# Patient Record
Sex: FEMALE | Race: OTHER | NOT HISPANIC OR LATINO
[De-identification: names, ages, dates, MRNs, and addresses within clinical notes are randomized per-mention and may not be internally consistent; named-entity substitution may affect disease eponyms.]

---

## 2018-05-30 ENCOUNTER — FORM ENCOUNTER (OUTPATIENT)
Age: 63
End: 2018-05-30

## 2018-06-01 PROBLEM — Z00.00 ENCOUNTER FOR PREVENTIVE HEALTH EXAMINATION: Status: ACTIVE | Noted: 2018-06-01

## 2018-06-05 ENCOUNTER — OUTPATIENT (OUTPATIENT)
Dept: OUTPATIENT SERVICES | Facility: HOSPITAL | Age: 63
LOS: 1 days | End: 2018-06-05
Payer: MEDICARE

## 2018-06-05 ENCOUNTER — RESULT REVIEW (OUTPATIENT)
Age: 63
End: 2018-06-05

## 2018-06-05 DIAGNOSIS — C54.1 MALIGNANT NEOPLASM OF ENDOMETRIUM: ICD-10-CM

## 2018-06-05 DIAGNOSIS — C53.9 MALIGNANT NEOPLASM OF CERVIX UTERI, UNSPECIFIED: ICD-10-CM

## 2018-06-05 PROCEDURE — 88321 CONSLTJ&REPRT SLD PREP ELSWR: CPT

## 2018-06-07 ENCOUNTER — FORM ENCOUNTER (OUTPATIENT)
Age: 63
End: 2018-06-07

## 2018-06-08 ENCOUNTER — APPOINTMENT (OUTPATIENT)
Dept: NUCLEAR MEDICINE | Facility: IMAGING CENTER | Age: 63
End: 2018-06-08
Payer: MEDICARE

## 2018-06-08 ENCOUNTER — OUTPATIENT (OUTPATIENT)
Dept: OUTPATIENT SERVICES | Facility: HOSPITAL | Age: 63
LOS: 1 days | End: 2018-06-08
Payer: MEDICARE

## 2018-06-08 DIAGNOSIS — Z00.8 ENCOUNTER FOR OTHER GENERAL EXAMINATION: ICD-10-CM

## 2018-06-08 PROCEDURE — 78815 PET IMAGE W/CT SKULL-THIGH: CPT | Mod: 26,PI

## 2018-06-08 PROCEDURE — 78815 PET IMAGE W/CT SKULL-THIGH: CPT

## 2018-06-08 PROCEDURE — A9552: CPT

## 2018-06-19 ENCOUNTER — OUTPATIENT (OUTPATIENT)
Dept: OUTPATIENT SERVICES | Facility: HOSPITAL | Age: 63
LOS: 1 days | End: 2018-06-19
Payer: MEDICARE

## 2018-06-19 VITALS
WEIGHT: 130.07 LBS | SYSTOLIC BLOOD PRESSURE: 140 MMHG | HEIGHT: 62 IN | HEART RATE: 76 BPM | TEMPERATURE: 97 F | RESPIRATION RATE: 14 BRPM | DIASTOLIC BLOOD PRESSURE: 84 MMHG

## 2018-06-19 DIAGNOSIS — Z01.818 ENCOUNTER FOR OTHER PREPROCEDURAL EXAMINATION: ICD-10-CM

## 2018-06-19 DIAGNOSIS — C54.1 MALIGNANT NEOPLASM OF ENDOMETRIUM: ICD-10-CM

## 2018-06-19 DIAGNOSIS — Z29.9 ENCOUNTER FOR PROPHYLACTIC MEASURES, UNSPECIFIED: ICD-10-CM

## 2018-06-19 DIAGNOSIS — Z98.890 OTHER SPECIFIED POSTPROCEDURAL STATES: Chronic | ICD-10-CM

## 2018-06-19 LAB
ALBUMIN SERPL ELPH-MCNC: 4.4 G/DL — SIGNIFICANT CHANGE UP (ref 3.3–5.2)
ALP SERPL-CCNC: 80 U/L — SIGNIFICANT CHANGE UP (ref 40–120)
ALT FLD-CCNC: 16 U/L — SIGNIFICANT CHANGE UP
ANION GAP SERPL CALC-SCNC: 14 MMOL/L — SIGNIFICANT CHANGE UP (ref 5–17)
AST SERPL-CCNC: 15 U/L — SIGNIFICANT CHANGE UP
BASOPHILS # BLD AUTO: 0 K/UL — SIGNIFICANT CHANGE UP (ref 0–0.2)
BASOPHILS NFR BLD AUTO: 0.2 % — SIGNIFICANT CHANGE UP (ref 0–2)
BILIRUB SERPL-MCNC: 0.4 MG/DL — SIGNIFICANT CHANGE UP (ref 0.4–2)
BLD GP AB SCN SERPL QL: SIGNIFICANT CHANGE UP
BUN SERPL-MCNC: 19 MG/DL — SIGNIFICANT CHANGE UP (ref 8–20)
CALCIUM SERPL-MCNC: 9.8 MG/DL — SIGNIFICANT CHANGE UP (ref 8.6–10.2)
CHLORIDE SERPL-SCNC: 102 MMOL/L — SIGNIFICANT CHANGE UP (ref 98–107)
CO2 SERPL-SCNC: 26 MMOL/L — SIGNIFICANT CHANGE UP (ref 22–29)
CREAT SERPL-MCNC: 0.56 MG/DL — SIGNIFICANT CHANGE UP (ref 0.5–1.3)
EOSINOPHIL # BLD AUTO: 0.1 K/UL — SIGNIFICANT CHANGE UP (ref 0–0.5)
EOSINOPHIL NFR BLD AUTO: 0.8 % — SIGNIFICANT CHANGE UP (ref 0–6)
GLUCOSE SERPL-MCNC: 91 MG/DL — SIGNIFICANT CHANGE UP (ref 70–115)
HBA1C BLD-MCNC: 5.1 % — SIGNIFICANT CHANGE UP (ref 4–5.6)
HCT VFR BLD CALC: 42.6 % — SIGNIFICANT CHANGE UP (ref 37–47)
HGB BLD-MCNC: 14.6 G/DL — SIGNIFICANT CHANGE UP (ref 12–16)
LYMPHOCYTES # BLD AUTO: 2.2 K/UL — SIGNIFICANT CHANGE UP (ref 1–4.8)
LYMPHOCYTES # BLD AUTO: 26 % — SIGNIFICANT CHANGE UP (ref 20–55)
MCHC RBC-ENTMCNC: 31.4 PG — HIGH (ref 27–31)
MCHC RBC-ENTMCNC: 34.3 G/DL — SIGNIFICANT CHANGE UP (ref 32–36)
MCV RBC AUTO: 91.6 FL — SIGNIFICANT CHANGE UP (ref 81–99)
MONOCYTES # BLD AUTO: 0.6 K/UL — SIGNIFICANT CHANGE UP (ref 0–0.8)
MONOCYTES NFR BLD AUTO: 6.8 % — SIGNIFICANT CHANGE UP (ref 3–10)
NEUTROPHILS # BLD AUTO: 5.5 K/UL — SIGNIFICANT CHANGE UP (ref 1.8–8)
NEUTROPHILS NFR BLD AUTO: 66.1 % — SIGNIFICANT CHANGE UP (ref 37–73)
PLATELET # BLD AUTO: 245 K/UL — SIGNIFICANT CHANGE UP (ref 150–400)
POTASSIUM SERPL-MCNC: 4.4 MMOL/L — SIGNIFICANT CHANGE UP (ref 3.5–5.3)
POTASSIUM SERPL-SCNC: 4.4 MMOL/L — SIGNIFICANT CHANGE UP (ref 3.5–5.3)
PROT SERPL-MCNC: 7.4 G/DL — SIGNIFICANT CHANGE UP (ref 6.6–8.7)
RBC # BLD: 4.65 M/UL — SIGNIFICANT CHANGE UP (ref 4.4–5.2)
RBC # FLD: 12.2 % — SIGNIFICANT CHANGE UP (ref 11–15.6)
SODIUM SERPL-SCNC: 142 MMOL/L — SIGNIFICANT CHANGE UP (ref 135–145)
TYPE + AB SCN PNL BLD: SIGNIFICANT CHANGE UP
WBC # BLD: 8.4 K/UL — SIGNIFICANT CHANGE UP (ref 4.8–10.8)
WBC # FLD AUTO: 8.4 K/UL — SIGNIFICANT CHANGE UP (ref 4.8–10.8)

## 2018-06-19 PROCEDURE — 80053 COMPREHEN METABOLIC PANEL: CPT

## 2018-06-19 PROCEDURE — 86901 BLOOD TYPING SEROLOGIC RH(D): CPT

## 2018-06-19 PROCEDURE — 85027 COMPLETE CBC AUTOMATED: CPT

## 2018-06-19 PROCEDURE — G0463: CPT

## 2018-06-19 PROCEDURE — 83036 HEMOGLOBIN GLYCOSYLATED A1C: CPT

## 2018-06-19 PROCEDURE — 86900 BLOOD TYPING SEROLOGIC ABO: CPT

## 2018-06-19 PROCEDURE — 86850 RBC ANTIBODY SCREEN: CPT

## 2018-06-19 PROCEDURE — 36415 COLL VENOUS BLD VENIPUNCTURE: CPT

## 2018-06-19 PROCEDURE — 93005 ELECTROCARDIOGRAM TRACING: CPT

## 2018-06-19 PROCEDURE — 93010 ELECTROCARDIOGRAM REPORT: CPT

## 2018-06-19 NOTE — H&P PST ADULT - NEGATIVE GASTROINTESTINAL SYMPTOMS
no vomiting/no diarrhea/no nausea/no abdominal pain/no flatulence/no melena/no jaundice/no change in bowel habits/no hematochezia/no steatorrhea/no hiccoughs

## 2018-06-19 NOTE — H&P PST ADULT - PSH
H/O dilation and curettage    History of hand surgery  repair from dog bite 2000  History of lumpectomy of right breast  benign  S/P myomectomy

## 2018-06-19 NOTE — H&P PST ADULT - NEGATIVE CARDIOVASCULAR SYMPTOMS
no paroxysmal nocturnal dyspnea/no peripheral edema/no dyspnea on exertion/no palpitations/no orthopnea/no claudication/no chest pain

## 2018-06-19 NOTE — H&P PST ADULT - HISTORY OF PRESENT ILLNESS
62 y/o female with h/o 64 y/o female with h/o endometrial cancer patient now presents for robotic assisted total laparoscopic hysterectomy bilateral salpingo oophorectomy sentinel lymph node mapping cystoscopy possible staging

## 2018-06-19 NOTE — H&P PST ADULT - ASSESSMENT
endometrial cancer  CAPRINI SCORE    AGE RELATED RISK FACTORS                                                       MOBILITY RELATED FACTORS  [] Age 41-60 years                                            (1 Point)                  [ ] Bed rest                                                        (1 Point)  [ x] Age: 61-74 years                                           (2 Points)                [ ] Plaster cast                                                   (2 Points)  [ ] Age= 75 years                                              (3 Points)                 [ ] Bed bound for more than 72 hours                   (2 Points)    DISEASE RELATED RISK FACTORS                                               GENDER SPECIFIC FACTORS  [ ] Edema in the lower extremities                       (1 Point)                  [ ] Pregnancy                                                     (1 Point)  [ ] Varicose veins                                               (1 Point)                  [ ] Post-partum < 6 weeks                                   (1 Point)             [ ] BMI > 25 Kg/m2                                            (1 Point)                  [ ] Hormonal therapy  or oral contraception            (1 Point)                 [ ] Sepsis (in the previous month)                        (1 Point)                  [ ] History of pregnancy complications  [ ] Pneumonia or serious lung disease                                               [ ] Unexplained or recurrent                       (1 Point)           (in the previous month)                               (1 Point)  [ ] Abnormal pulmonary function test                     (1 Point)                 SURGERY RELATED RISK FACTORS  [ ] Acute myocardial infarction                              (1 Point)                 [ ]  Section                                            (1 Point)  [ ] Congestive heart failure (in the previous month)  (1 Point)                 [ ] Minor surgery                                                 (1 Point)   [ ] Inflammatory bowel disease                             (1 Point)                 [ ] Arthroscopic surgery                                        (2 Points)  [ ] Central venous access                                    (2 Points)                [ x] General surgery lasting more than 45 minutes   (2 Points)       [ ] Stroke (in the previous month)                          (5 Points)               [ ] Elective arthroplasty                                        (5 Points)                                                                                                                                               HEMATOLOGY RELATED FACTORS                                                 TRAUMA RELATED RISK FACTORS  [ ] Prior episodes of VTE                                     (3 Points)                 [ ] Fracture of the hip, pelvis, or leg                       (5 Points)  [ ] Positive family history for VTE                         (3 Points)                 [ ] Acute spinal cord injury (in the previous month)  (5 Points)  [ ] Prothrombin 06300 A                                      (3 Points)                 [ ] Paralysis  (less than 1 month)                          (5 Points)  [ ] Factor V Leiden                                             (3 Points)                 [ ] Multiple Trauma within 1 month                         (5 Points)  [ ] Lupus anticoagulants                                     (3 Points)                                                           [ ] Anticardiolipin antibodies                                (3 Points)                                                       [ ] High homocysteine in the blood                      (3 Points)                                             [ ] Other congenital or acquired thrombophilia       (3 Points)                                                [ ] Heparin induced thrombocytopenia                  (3 Points)                                          Total Score [      4   ]

## 2018-06-19 NOTE — H&P PST ADULT - NEUROLOGICAL DETAILS
alert and oriented x 3/responds to verbal commands/responds to pain/no spontaneous movement/cranial nerves intact/superficial reflexes intact/deep reflexes intact/normal strength/sensation intact

## 2018-06-19 NOTE — H&P PST ADULT - NEGATIVE PSYCHIATRIC SYMPTOMS
no paranoia/no depression/no mood swings/no auditory hallucinations/no insomnia/no visual hallucinations/no suicidal ideation/no hyperactivity/no anxiety/no memory loss/no agitation

## 2018-06-19 NOTE — H&P PST ADULT - NSANTHOSAYNRD_GEN_A_CORE
No. SHAQ screening performed.  STOP BANG Legend: 0-2 = LOW Risk; 3-4 = INTERMEDIATE Risk; 5-8 = HIGH Risk

## 2018-06-19 NOTE — H&P PST ADULT - NEGATIVE GENERAL SYMPTOMS
no polyphagia/no polyuria/no weight gain/no anorexia/no malaise/no fatigue/no chills/no polydipsia/no fever/no weight loss/no sweating

## 2018-06-19 NOTE — H&P PST ADULT - NEGATIVE OPHTHALMOLOGIC SYMPTOMS
no lacrimation L/no irritation R/no loss of vision L/no photophobia/no discharge L/no pain R/no scleral injection L/no pain L/no lacrimation R/no loss of vision R/no scleral injection R/no blurred vision L/no blurred vision R/no discharge R/no irritation L/no diplopia

## 2018-06-19 NOTE — H&P PST ADULT - RS GEN PE MLT RESP DETAILS PC
good air movement/airway patent/breath sounds equal/no rales/no wheezes/normal/no rhonchi/no subcutaneous emphysema/clear to auscultation bilaterally/no intercostal retractions/respirations non-labored

## 2018-06-19 NOTE — H&P PST ADULT - PMH
Carotid stenosis, left    Cerebrovascular accident (CVA) due to occlusion of carotid artery  no residual

## 2018-06-19 NOTE — H&P PST ADULT - NEGATIVE SKIN SYMPTOMS
no dryness/no change in size/color of mole/no tumor/no pitted nails/no rash/no hair loss/no brittle nails/no itching

## 2018-06-19 NOTE — H&P PST ADULT - PROBLEM SELECTOR PLAN 1
robotic assisted total laparoscopic hysterectomy bilateral salpingo oophorectomy sentinel lymph node mapping cystoscopy possible staging

## 2018-06-19 NOTE — H&P PST ADULT - NEGATIVE ENMT SYMPTOMS
no ear pain/no throat pain/no dysphagia/no hearing difficulty/no nasal discharge/no post-nasal discharge/no gum bleeding/no abnormal taste sensation/no tinnitus/no vertigo/no sinus symptoms/no recurrent cold sores/no dry mouth/no nasal congestion/no nasal obstruction/no nose bleeds

## 2018-06-19 NOTE — H&P PST ADULT - MUSCULOSKELETAL
details… detailed exam normal/no joint swelling/no joint erythema/no joint warmth/no calf tenderness/normal strength/ROM intact

## 2018-06-19 NOTE — H&P PST ADULT - NEGATIVE NEUROLOGICAL SYMPTOMS
no tremors/no confusion/no syncope/no paresthesias/no difficulty walking/no loss of sensation/no headache/no hemiparesis/no facial palsy/no transient paralysis/no weakness/no generalized seizures/no vertigo/no loss of consciousness/no focal seizures

## 2018-06-19 NOTE — H&P PST ADULT - FAMILY HISTORY
Father  Still living? No  Family history of CLL (chronic lymphoid leukemia), Age at diagnosis: Age Unknown     Mother  Still living? No  Family history of breast cancer in female, Age at diagnosis: Age Unknown  Family history of hypertension, Age at diagnosis: Age Unknown

## 2018-06-19 NOTE — H&P PST ADULT - NEGATIVE MUSCULOSKELETAL SYMPTOMS
no leg pain L/no myalgia/no arm pain R/no leg pain R/no stiffness/no joint swelling/no back pain/no arm pain L/no muscle cramps/no muscle weakness/no neck pain/no arthralgia/no arthritis

## 2018-07-01 ENCOUNTER — FORM ENCOUNTER (OUTPATIENT)
Age: 63
End: 2018-07-01

## 2018-07-02 ENCOUNTER — RESULT REVIEW (OUTPATIENT)
Age: 63
End: 2018-07-02

## 2018-07-02 ENCOUNTER — OUTPATIENT (OUTPATIENT)
Dept: OUTPATIENT SERVICES | Facility: HOSPITAL | Age: 63
LOS: 1 days | End: 2018-07-02
Payer: MEDICARE

## 2018-07-02 DIAGNOSIS — Z98.890 OTHER SPECIFIED POSTPROCEDURAL STATES: Chronic | ICD-10-CM

## 2018-07-03 ENCOUNTER — RESULT REVIEW (OUTPATIENT)
Age: 63
End: 2018-07-03

## 2018-07-03 ENCOUNTER — TRANSCRIPTION ENCOUNTER (OUTPATIENT)
Age: 63
End: 2018-07-03

## 2018-07-03 PROCEDURE — 82962 GLUCOSE BLOOD TEST: CPT

## 2018-07-03 PROCEDURE — S2900: CPT

## 2018-07-03 PROCEDURE — 80048 BASIC METABOLIC PNL TOTAL CA: CPT

## 2018-07-03 PROCEDURE — 88309 TISSUE EXAM BY PATHOLOGIST: CPT

## 2018-07-03 PROCEDURE — 88305 TISSUE EXAM BY PATHOLOGIST: CPT

## 2018-07-03 PROCEDURE — 83735 ASSAY OF MAGNESIUM: CPT

## 2018-07-03 PROCEDURE — 88341 IMHCHEM/IMCYTCHM EA ADD ANTB: CPT

## 2018-07-03 PROCEDURE — 85027 COMPLETE CBC AUTOMATED: CPT

## 2018-07-03 PROCEDURE — 58548 LAP RADICAL HYST: CPT

## 2018-07-03 PROCEDURE — 88342 IMHCHEM/IMCYTCHM 1ST ANTB: CPT

## 2018-07-03 RX ORDER — IBUPROFEN 200 MG
1 TABLET ORAL
Qty: 0 | Refills: 0 | COMMUNITY

## 2018-07-11 ENCOUNTER — FORM ENCOUNTER (OUTPATIENT)
Age: 63
End: 2018-07-11

## 2018-07-18 DIAGNOSIS — C54.1 MALIGNANT NEOPLASM OF ENDOMETRIUM: ICD-10-CM

## 2018-07-18 PROBLEM — I65.22 OCCLUSION AND STENOSIS OF LEFT CAROTID ARTERY: Chronic | Status: ACTIVE | Noted: 2018-06-19

## 2018-08-02 ENCOUNTER — FORM ENCOUNTER (OUTPATIENT)
Age: 63
End: 2018-08-02

## 2018-08-06 ENCOUNTER — OUTPATIENT (OUTPATIENT)
Dept: OUTPATIENT SERVICES | Facility: HOSPITAL | Age: 63
LOS: 1 days | Discharge: HOME | End: 2018-08-06
Payer: MEDICARE

## 2018-08-06 ENCOUNTER — RECORD ABSTRACTING (OUTPATIENT)
Age: 63
End: 2018-08-06

## 2018-08-06 DIAGNOSIS — Z87.42 PERSONAL HISTORY OF OTHER DISEASES OF THE FEMALE GENITAL TRACT: ICD-10-CM

## 2018-08-06 DIAGNOSIS — Z87.39 PERSONAL HISTORY OF OTHER DISEASES OF THE MUSCULOSKELETAL SYSTEM AND CONNECTIVE TISSUE: ICD-10-CM

## 2018-08-06 DIAGNOSIS — Z82.49 FAMILY HISTORY OF ISCHEMIC HEART DISEASE AND OTHER DISEASES OF THE CIRCULATORY SYSTEM: ICD-10-CM

## 2018-08-06 DIAGNOSIS — Z87.898 PERSONAL HISTORY OF OTHER SPECIFIED CONDITIONS: ICD-10-CM

## 2018-08-06 DIAGNOSIS — Z83.3 FAMILY HISTORY OF DIABETES MELLITUS: ICD-10-CM

## 2018-08-06 DIAGNOSIS — Z86.79 PERSONAL HISTORY OF OTHER DISEASES OF THE CIRCULATORY SYSTEM: ICD-10-CM

## 2018-08-06 DIAGNOSIS — I10 ESSENTIAL (PRIMARY) HYPERTENSION: ICD-10-CM

## 2018-08-06 DIAGNOSIS — Z63.4 DISAPPEARANCE AND DEATH OF FAMILY MEMBER: ICD-10-CM

## 2018-08-06 DIAGNOSIS — Z78.9 OTHER SPECIFIED HEALTH STATUS: ICD-10-CM

## 2018-08-06 DIAGNOSIS — Z98.890 OTHER SPECIFIED POSTPROCEDURAL STATES: Chronic | ICD-10-CM

## 2018-08-06 SDOH — SOCIAL STABILITY - SOCIAL INSECURITY: DISSAPEARANCE AND DEATH OF FAMILY MEMBER: Z63.4

## 2018-08-09 ENCOUNTER — APPOINTMENT (OUTPATIENT)
Dept: RADIATION ONCOLOGY | Facility: CLINIC | Age: 63
End: 2018-08-09
Payer: MEDICARE

## 2018-08-09 VITALS
SYSTOLIC BLOOD PRESSURE: 146 MMHG | DIASTOLIC BLOOD PRESSURE: 82 MMHG | RESPIRATION RATE: 18 BRPM | HEART RATE: 80 BPM | WEIGHT: 59 LBS | BODY MASS INDEX: 10.86 KG/M2 | HEIGHT: 62 IN | OXYGEN SATURATION: 100 %

## 2018-08-09 DIAGNOSIS — Z80.3 FAMILY HISTORY OF MALIGNANT NEOPLASM OF BREAST: ICD-10-CM

## 2018-08-09 DIAGNOSIS — Z86.018 PERSONAL HISTORY OF OTHER BENIGN NEOPLASM: ICD-10-CM

## 2018-08-09 PROCEDURE — 77263 THER RADIOLOGY TX PLNG CPLX: CPT

## 2018-08-09 PROCEDURE — 99204 OFFICE O/P NEW MOD 45 MIN: CPT | Mod: 25,GC

## 2018-08-12 PROBLEM — Z86.018 HISTORY OF UTERINE LEIOMYOMA: Status: RESOLVED | Noted: 2018-08-06 | Resolved: 2018-08-12

## 2018-08-28 PROCEDURE — 77338 DESIGN MLC DEVICE FOR IMRT: CPT | Mod: 26

## 2018-08-28 PROCEDURE — 77300 RADIATION THERAPY DOSE PLAN: CPT | Mod: 26

## 2018-08-28 PROCEDURE — 77301 RADIOTHERAPY DOSE PLAN IMRT: CPT | Mod: 26

## 2018-08-31 PROCEDURE — 77387B: CUSTOM | Mod: 26

## 2018-09-04 PROCEDURE — 77427 RADIATION TX MANAGEMENT X5: CPT

## 2018-09-04 PROCEDURE — 77387B: CUSTOM | Mod: 26

## 2018-09-05 PROCEDURE — 77387B: CUSTOM | Mod: 26

## 2018-09-06 LAB
BASOPHILS # BLD AUTO: 0.03 K/UL
BASOPHILS NFR BLD AUTO: 0.5 %
EOSINOPHIL # BLD AUTO: 0.17 K/UL
EOSINOPHIL NFR BLD AUTO: 2.8 %
HCT VFR BLD CALC: 42.5 %
HGB BLD-MCNC: 14.6 G/DL
IMM GRANULOCYTES NFR BLD AUTO: 0.2 %
LYMPHOCYTES # BLD AUTO: 2.24 K/UL
LYMPHOCYTES NFR BLD AUTO: 37.1 %
MAN DIFF?: NORMAL
MCHC RBC-ENTMCNC: 32.4 PG
MCHC RBC-ENTMCNC: 34.4 GM/DL
MCV RBC AUTO: 94.2 FL
MONOCYTES # BLD AUTO: 0.52 K/UL
MONOCYTES NFR BLD AUTO: 8.6 %
NEUTROPHILS # BLD AUTO: 3.06 K/UL
NEUTROPHILS NFR BLD AUTO: 50.8 %
PLATELET # BLD AUTO: 227 K/UL
RBC # BLD: 4.51 M/UL
RBC # FLD: 12.8 %
WBC # FLD AUTO: 6.03 K/UL

## 2018-09-06 PROCEDURE — 77014: CPT | Mod: 26

## 2018-09-07 PROCEDURE — 77387B: CUSTOM | Mod: 26

## 2018-09-10 PROCEDURE — 77387B: CUSTOM | Mod: 26

## 2018-09-11 PROCEDURE — 77387B: CUSTOM | Mod: 26

## 2018-09-11 PROCEDURE — 77427 RADIATION TX MANAGEMENT X5: CPT

## 2018-09-12 PROCEDURE — 77387B: CUSTOM | Mod: 26

## 2018-09-13 PROCEDURE — 77014: CPT | Mod: 26

## 2018-09-14 PROCEDURE — 77387B: CUSTOM | Mod: 26

## 2018-09-17 PROCEDURE — 77387B: CUSTOM | Mod: 26

## 2018-09-18 VITALS
WEIGHT: 131.72 LBS | HEIGHT: 62 IN | SYSTOLIC BLOOD PRESSURE: 149 MMHG | HEART RATE: 88 BPM | TEMPERATURE: 98.5 F | RESPIRATION RATE: 18 BRPM | BODY MASS INDEX: 24.24 KG/M2 | OXYGEN SATURATION: 100 % | DIASTOLIC BLOOD PRESSURE: 83 MMHG

## 2018-09-18 PROCEDURE — 77387B: CUSTOM | Mod: 26

## 2018-09-19 PROCEDURE — 77427 RADIATION TX MANAGEMENT X5: CPT

## 2018-09-19 PROCEDURE — 77387B: CUSTOM | Mod: 26

## 2018-09-20 PROCEDURE — 77014: CPT | Mod: 26

## 2018-09-21 PROCEDURE — 77387B: CUSTOM | Mod: 26

## 2018-09-24 PROCEDURE — 77387B: CUSTOM | Mod: 26

## 2018-09-25 PROCEDURE — 77387B: CUSTOM | Mod: 26

## 2018-09-26 PROCEDURE — 77387B: CUSTOM | Mod: 26

## 2018-09-27 PROCEDURE — 77014: CPT | Mod: 26

## 2018-09-28 PROCEDURE — 77387B: CUSTOM | Mod: 26

## 2018-10-01 PROCEDURE — 77427 RADIATION TX MANAGEMENT X5: CPT

## 2018-10-01 PROCEDURE — 77387B: CUSTOM | Mod: 26

## 2018-10-01 NOTE — HISTORY OF PRESENT ILLNESS
[FreeTextEntry1] : Mrs. Hoang is a 63 yr old woman s/p comprehensive surgical staging for a FIGO stage II grade II endometrial cancer. \par \par Doing well.  No urinary or bowel complaints. No vaginal bleeding or discharge. Today patient completed 2160/4500 cGy.

## 2018-10-01 NOTE — HISTORY OF PRESENT ILLNESS
[FreeTextEntry1] : Mrs. Hoang is a 63 yr old woman s/p comprehensive surgical staging for a FIGO stage II grade II endometrial cancer. \par \par Doing well.   No urinary or bowel complaints. No vaginal bleeding or discharge. \par Discussede brachytherapy at conclusion of EBRT.

## 2018-10-01 NOTE — DISEASE MANAGEMENT
[Pathological] : TNM Stage: p [II] : II [TTNM] : 2 [NTNM] : 0 [MTNM] : 0 [de-identified] : 1335 [[place] [id number]] : 2267 [de-identified] : pelvis

## 2018-10-01 NOTE — DISEASE MANAGEMENT
[Pathological] : TNM Stage: p [II] : II [TTNM] : 2 [NTNM] : 0 [MTNM] : 0 [de-identified] : 8046 [[place] [id number]] : 4635 [de-identified] : pelvis

## 2018-10-01 NOTE — REVIEW OF SYSTEMS
[Constipation: Grade 0] : Constipation: Grade 0 [Diarrhea: Grade 0] : Diarrhea: Grade 0 [Nausea: Grade 1 - Loss of appetite without alteration in eating habits] : Nausea: Grade 1 - Loss of appetite without alteration in eating habits [Vomiting: Grade 0] : Vomiting: Grade 0 [Fatigue: Grade 0] : Fatigue: Grade 0 [Urinary Incontinence: Grade 0] : Urinary Incontinence: Grade 0  [Urinary Retention: Grade 0] : Urinary Retention: Grade 0 [Urinary Tract Pain: Grade 0] : Urinary Tract Pain: Grade 0 [Urinary Urgency: Grade 0] : Urinary Urgency: Grade 0 [Urinary Frequency: Grade 0] : Urinary Frequency: Grade 0 [de-identified] : mild

## 2018-10-01 NOTE — REVIEW OF SYSTEMS
[Constipation: Grade 0] : Constipation: Grade 0 [Diarrhea: Grade 0] : Diarrhea: Grade 0 [Nausea: Grade 1 - Loss of appetite without alteration in eating habits] : Nausea: Grade 1 - Loss of appetite without alteration in eating habits [Vomiting: Grade 0] : Vomiting: Grade 0 [Fatigue: Grade 0] : Fatigue: Grade 0 [Urinary Incontinence: Grade 0] : Urinary Incontinence: Grade 0  [Urinary Retention: Grade 0] : Urinary Retention: Grade 0 [Urinary Tract Pain: Grade 0] : Urinary Tract Pain: Grade 0 [Urinary Urgency: Grade 0] : Urinary Urgency: Grade 0 [Urinary Frequency: Grade 0] : Urinary Frequency: Grade 0 [de-identified] : mild

## 2018-10-02 PROCEDURE — 77387B: CUSTOM | Mod: 26

## 2018-10-03 VITALS
WEIGHT: 130.73 LBS | RESPIRATION RATE: 18 BRPM | OXYGEN SATURATION: 99 % | HEART RATE: 77 BPM | BODY MASS INDEX: 23.91 KG/M2

## 2018-10-03 PROCEDURE — 77387B: CUSTOM | Mod: 26

## 2018-10-03 NOTE — PHYSICAL EXAM
[Normal] : well developed, well nourished, in no acute distress [Abdomen Soft] : soft [Nondistended] : nondistended [Abdomen Tenderness] : non-tender [Normal External Genitalia] : normal external genitalia  [Normal Vaginal Cuff] : vaginal cuff without lesion or nodularity [Cervical Lymph Nodes Enlarged Anterior Bilaterally] : anterior cervical [Supraclavicular Lymph Nodes Enlarged Bilaterally] : supraclavicular [Inguinal Lymph Nodes Enlarged Bilaterally] : inguinal

## 2018-10-03 NOTE — DISEASE MANAGEMENT
[Pathological] : TNM Stage: p [II] : II [TTNM] : 2 [NTNM] : 0 [MTNM] : 0 [de-identified] : 2339 [[place] [id number]] : 9603 [de-identified] : pelvis

## 2018-10-03 NOTE — HISTORY OF PRESENT ILLNESS
[FreeTextEntry1] : Mrs. Hoang is a 63 yr old woman s/p comprehensive surgical staging for a FIGO stage II grade II endometrial cancer. \par \par Doing well.   No urinary or bowel complaints. No vaginal bleeding or discharge. \par Discussed  brachytherapy at conclusion of EBRT.

## 2018-10-03 NOTE — REVIEW OF SYSTEMS
[Constipation: Grade 0] : Constipation: Grade 0 [Diarrhea: Grade 0] : Diarrhea: Grade 0 [Vomiting: Grade 0] : Vomiting: Grade 0 [Fatigue: Grade 0] : Fatigue: Grade 0 [Urinary Incontinence: Grade 0] : Urinary Incontinence: Grade 0  [Urinary Retention: Grade 0] : Urinary Retention: Grade 0 [Urinary Tract Pain: Grade 0] : Urinary Tract Pain: Grade 0 [Urinary Urgency: Grade 0] : Urinary Urgency: Grade 0 [Urinary Frequency: Grade 0] : Urinary Frequency: Grade 0 [Nausea: Grade 0] : Nausea: Grade 0 [de-identified] : mild

## 2018-10-04 PROCEDURE — 77014: CPT | Mod: 26

## 2018-10-05 PROCEDURE — 77387B: CUSTOM | Mod: 26

## 2018-10-12 PROCEDURE — 77317 BRACHYTX ISODOSE INTERMED: CPT | Mod: 26

## 2018-10-12 PROCEDURE — 77290 THER RAD SIMULAJ FIELD CPLX: CPT | Mod: 26

## 2018-10-12 PROCEDURE — 77770 HDR RDNCL NTRSTL/ICAV BRCHTX: CPT | Mod: 26

## 2018-10-12 PROCEDURE — 57156 INS VAG BRACHYTX DEVICE: CPT

## 2018-10-12 PROCEDURE — 77332 RADIATION TREATMENT AID(S): CPT | Mod: 26

## 2018-10-19 PROCEDURE — 77770 HDR RDNCL NTRSTL/ICAV BRCHTX: CPT | Mod: 26

## 2018-10-19 PROCEDURE — 77332 RADIATION TREATMENT AID(S): CPT | Mod: 26

## 2018-10-19 PROCEDURE — 57156 INS VAG BRACHYTX DEVICE: CPT

## 2018-11-16 ENCOUNTER — APPOINTMENT (OUTPATIENT)
Dept: RADIATION ONCOLOGY | Facility: CLINIC | Age: 63
End: 2018-11-16
Payer: MEDICARE

## 2018-11-16 VITALS
WEIGHT: 132.5 LBS | RESPIRATION RATE: 14 BRPM | TEMPERATURE: 98.42 F | DIASTOLIC BLOOD PRESSURE: 78 MMHG | OXYGEN SATURATION: 100 % | HEART RATE: 79 BPM | BODY MASS INDEX: 24.23 KG/M2 | SYSTOLIC BLOOD PRESSURE: 131 MMHG

## 2018-11-16 PROCEDURE — 99024 POSTOP FOLLOW-UP VISIT: CPT | Mod: GC

## 2018-11-16 NOTE — PHYSICAL EXAM
[Abdomen Soft] : soft [Nondistended] : nondistended [Abdomen Tenderness] : non-tender [Normal External Genitalia] : normal external genitalia  [Normal Vaginal Cuff] : vaginal cuff without lesion or nodularity [Cervical Lymph Nodes Enlarged Anterior Bilaterally] : anterior cervical [Supraclavicular Lymph Nodes Enlarged Bilaterally] : supraclavicular [Inguinal Lymph Nodes Enlarged Bilaterally] : inguinal [Normal] : oriented to person, place and time, the affect was normal, the mood was normal and not anxious

## 2018-11-16 NOTE — VITALS
[Maximal Pain Intensity: 0/10] : 0/10 [Least Pain Intensity: 0/10] : 0/10 [NoTreatment Scheduled] : no treatment scheduled [100: Normal, no complaints, no evidence of disease.] : 100: Normal, no complaints, no evidence of disease. [100: Fully active, normal.] : 100: Fully active, normal. [ECOG Performance Status: 0 - Fully active, able to carry on all pre-disease performance without restriction] : Performance Status: 0 - Fully active, able to carry on all pre-disease performance without restriction

## 2018-11-19 NOTE — HISTORY OF PRESENT ILLNESS
[FreeTextEntry1] : 63 year old woman with Endometrioid Carcinoma who completed 4500 cGy to pelvis and 1200 cGy vaginal cuff brachytherapy completed on 10/19/2018.  Pt presents today for PTE.\par \par She denies pain, vaginal discharge, vaginal bleeding and urinary symptoms. She recently had an episode of diarrhea after consuming broccoli.  Otherwise, she denies bowel symptoms.

## 2018-11-19 NOTE — REVIEW OF SYSTEMS
[Urinary Frequency] : urinary frequency [Negative] : Allergic/Immunologic [Anal Pain: Grade 0] : Anal Pain: Grade 0 [Constipation: Grade 0] : Constipation: Grade 0 [Diarrhea: Grade 0] : Diarrhea: Grade 0 [Dyspepsia: Grade 0] : Dyspepsia: Grade 0 [Dysphagia: Grade 0] : Dysphagia: Grade 0 [Esophagitis: Grade 0] : Esophagitis: Grade 0 [Fecal Incontinence: Grade 0] : Fecal Incontinence: Grade 0 [Gastroparesis: Grade 0] : Gastroparesis: Grade 0 [Nausea: Grade 0] : Nausea: Grade 0 [Proctitis: Grade 0] : Proctitis: Grade 0 [Rectal Pain: Grade 0] : Rectal Pain: Grade 0 [Small Intestinal Obstruction: Grade 0] : Small Intestinal Obstruction: Grade 0 [Vomiting: Grade 0] : Vomiting: Grade 0 [Edema Limbs: Grade 0] : Edema Limbs: Grade 0  [Fatigue: Grade 0] : Fatigue: Grade 0 [Localized Edema: Grade 0] : Localized Edema: Grade 0  [Neck Edema: Grade 0] : Neck Edema: Grade 0 [Hematuria: Grade 0] : Hematuria: Grade 0 [Urinary Incontinence: Grade 0] : Urinary Incontinence: Grade 0  [Urinary Retention: Grade 0] : Urinary Retention: Grade 0 [Urinary Tract Pain: Grade 0] : Urinary Tract Pain: Grade 0 [Urinary Urgency: Grade 0] : Urinary Urgency: Grade 0 [Urinary Frequency: Grade 0] : Urinary Frequency: Grade 0 [Ejaculation Disorder: Grade 0] : Ejaculation Disorder: Grade 0 [Erectile Dysfunction: Grade 0] : Erectile Dysfunction: Grade 0 [Genital Edema: Grade 0] : Genital Edema: Grade 0 [Vaginal Stricture: Grade 0] : Vaginal Stricture: Grade 0 [Vaginal Infection: Grade 0] : Vaginal Infection: Grade 0  [Breast Pain: Grade 0] : Breast Pain: Grade 0 [Dyspareunia: Grade 0] : Dyspareunia: Grade 0 [Tinnitus - Grade 0] : Tinnitus - Grade 0 [Blurred Vision: Grade 0] : Blurred Vision: Grade 0 [Mucositis Oral: Grade 0] : Mucositis Oral: Grade 0  [Xerostomia: Grade 0] : Xerostomia: Grade 0 [Oral Pain: Grade 0] : Oral Pain: Grade 0 [Salivary duct inflammation: Grade 0] : Salivary duct inflammation: Grade 0 [Dysgeusia: Grade 0] : Dysgeusia: Grade 0 [Cognitive Disturbance: Grade 0] : Cognitive Disturbance: Grade 0 [Concentration Impairment: Grade 0] : Concentration Impairment: Grade 0 [Dizziness: Grade 0] : Dizziness: Grade 0  [Facial Muscle Weakness: Grade 0] : Facial Muscle Weakness: Grade 0 [Headache: Grade 0] : Headache: Grade 0 [Lethargy: Grade 0] : Lethargy: Grade 0 [Meningismus: Grade 0] : Meningismus: Grade 0 [Peripheral Motor Neuropathy: Grade 0] : Peripheral Motor Neuropathy: Grade 0 [Peripheral Sensory Neuropathy: Grade 0] : Peripheral Sensory Neuropathy: Grade 0 [Somnolence: Grade 0] : Somnolence: Grade 0 [Anxiety: Grade 0] : Anxiety: Grade 0  [Depression: Grade 0] : Depression: Grade 0 [Insomnia: Grade 0] : Insomnia: Grade 0 [Libido Decreased: Grade 0] : Libido Decreased: Grade 0 [Cough: Grade 0] : Cough: Grade 0 [Dyspnea: Grade 0] : Dyspnea: Grade 0 [Hiccups: Grade 0] : Hiccups: Grade 0 [Hoarseness: Grade 0] : Hoarseness: Grade 0 [Hypoxia: Grade 0] : Hypoxia: Grade 0 [Pharyngeal Mucositis: Grade 0] : Pharyngeal Mucositis: Grade 0 [Pneumonitis: Grade 0] : Pneumonitis: Grade 0 [Voice Alteration: Grade 0] : Voice Alteration: Grade 0 [Alopecia: Grade 0] : Alopecia: Grade 0 [Pruritus: Grade 0] : Pruritus: Grade 0 [Skin Atrophy: Grade 0] : Skin Atrophy: Grade 0 [Skin Hyperpigmentation: Grade 0] : Skin Hyperpigmentation: Grade 0 [Skin Induration: Grade 0] : Skin Induration: Grade 0 [Dermatitis Radiation: Grade 0] : Dermatitis Radiation: Grade 0 [Nocturia] : no nocturia [Vaginal Discharge] : no vaginal discharge [Dysmenorrhea/Abn Vaginal Bleeding] : no dysmenorrhea/abnormal vaginal bleeding

## 2018-11-20 ENCOUNTER — FORM ENCOUNTER (OUTPATIENT)
Age: 63
End: 2018-11-20

## 2018-11-21 ENCOUNTER — APPOINTMENT (OUTPATIENT)
Dept: GYNECOLOGIC ONCOLOGY | Facility: CLINIC | Age: 63
End: 2018-11-21
Payer: MEDICARE

## 2018-11-21 VITALS
WEIGHT: 129 LBS | RESPIRATION RATE: 16 BRPM | BODY MASS INDEX: 23.74 KG/M2 | HEIGHT: 62 IN | DIASTOLIC BLOOD PRESSURE: 88 MMHG | HEART RATE: 88 BPM | SYSTOLIC BLOOD PRESSURE: 147 MMHG | OXYGEN SATURATION: 99 %

## 2018-11-21 PROCEDURE — 99214 OFFICE O/P EST MOD 30 MIN: CPT

## 2018-12-16 NOTE — HISTORY OF PRESENT ILLNESS
[FreeTextEntry1] : Mrs. Hoang is a 63 yr old woman s/p comprehensive surgical staging for a FIGO stage II grade II endometrial cancer. \par \par Doing well. Reports occasional nausea relieved with white grapes. No urinary or bowel complaints. No vaginal bleeding or discharge.

## 2018-12-16 NOTE — REVIEW OF SYSTEMS
[Constipation: Grade 0] : Constipation: Grade 0 [Diarrhea: Grade 0] : Diarrhea: Grade 0 [Nausea: Grade 1 - Loss of appetite without alteration in eating habits] : Nausea: Grade 1 - Loss of appetite without alteration in eating habits [Vomiting: Grade 0] : Vomiting: Grade 0 [Fatigue: Grade 0] : Fatigue: Grade 0 [Urinary Incontinence: Grade 0] : Urinary Incontinence: Grade 0  [Urinary Retention: Grade 0] : Urinary Retention: Grade 0 [Urinary Tract Pain: Grade 0] : Urinary Tract Pain: Grade 0 [Urinary Urgency: Grade 0] : Urinary Urgency: Grade 0 [Urinary Frequency: Grade 0] : Urinary Frequency: Grade 0 [de-identified] : mild

## 2018-12-16 NOTE — DISEASE MANAGEMENT
[Pathological] : TNM Stage: p [II] : II [TTNM] : 2 [NTNM] : 0 [MTNM] : 0 [de-identified] : 2332 [[place] [id number]] : 1160 [de-identified] : pelvis

## 2019-02-11 ENCOUNTER — APPOINTMENT (OUTPATIENT)
Dept: GYNECOLOGIC ONCOLOGY | Facility: CLINIC | Age: 64
End: 2019-02-11
Payer: MEDICARE

## 2019-02-11 VITALS
OXYGEN SATURATION: 100 % | BODY MASS INDEX: 23.55 KG/M2 | RESPIRATION RATE: 16 BRPM | WEIGHT: 128 LBS | HEART RATE: 74 BPM | SYSTOLIC BLOOD PRESSURE: 146 MMHG | DIASTOLIC BLOOD PRESSURE: 83 MMHG | HEIGHT: 62 IN

## 2019-02-11 PROCEDURE — 99214 OFFICE O/P EST MOD 30 MIN: CPT

## 2019-02-12 NOTE — END OF VISIT
[FreeTextEntry3] : Written by Alexandria Starks, acting as a scribe for Dr. Paras Grayson.\par This note accurately reflects the work and decision made by me.\par

## 2019-02-12 NOTE — REASON FOR VISIT
[FreeTextEntry1] : [facility]\par \par [facility] [phone] at [address] [place] [phone]\par

## 2019-02-12 NOTE — HISTORY OF PRESENT ILLNESS
[FreeTextEntry1] : This 62y/o with stage 2 grade 2 endometrial cancer since 07/2018 returns to the office today for a routine three month surveillance visit. Patient feels well from a gynecological stand point. She denies pain or VB. She is due for a mammogram 05/2019.

## 2019-02-12 NOTE — PHYSICAL EXAM
[Absent] : Adnexa(ae): Absent [Normal] : Recto-Vaginal Exam: Normal [Fully active, able to carry on all pre-disease performance without restriction] : Status 0 - Fully active, able to carry on all pre-disease performance without restriction [de-identified] : Alexandria Starks MA was present the entire time of gynecological exam

## 2019-05-08 ENCOUNTER — APPOINTMENT (OUTPATIENT)
Dept: GYNECOLOGIC ONCOLOGY | Facility: CLINIC | Age: 64
End: 2019-05-08
Payer: MEDICARE

## 2019-05-08 VITALS
HEIGHT: 62 IN | SYSTOLIC BLOOD PRESSURE: 149 MMHG | WEIGHT: 126 LBS | HEART RATE: 84 BPM | DIASTOLIC BLOOD PRESSURE: 81 MMHG | RESPIRATION RATE: 16 BRPM | OXYGEN SATURATION: 97 % | BODY MASS INDEX: 23.19 KG/M2

## 2019-05-08 PROCEDURE — 99214 OFFICE O/P EST MOD 30 MIN: CPT

## 2019-05-08 NOTE — REASON FOR VISIT
[FreeTextEntry1] : [facility]\par \par [facility] [phone] at [address] [place] [id number]\par

## 2019-05-08 NOTE — PHYSICAL EXAM
[Absent] : Adnexa(ae): Absent [Normal] : Bimanual Exam: Normal [de-identified] : Alexandria Starks MA was present the entire time of gynecological exam

## 2019-05-08 NOTE — HISTORY OF PRESENT ILLNESS
[FreeTextEntry1] : This 65 y/o with stage 2 grade 2 endometrial cancer since 07/2018 returns to the office today for a routine three month surveillance visit. Patient feels well from a gynecological stand point. She denies pain or VB. Patient is scheduled for a mammogram tomorrow. Her last bone density was in 2018. She is overdue for a colonoscopy.

## 2019-05-16 ENCOUNTER — APPOINTMENT (OUTPATIENT)
Dept: RADIATION ONCOLOGY | Facility: CLINIC | Age: 64
End: 2019-05-16
Payer: MEDICARE

## 2019-05-16 VITALS
BODY MASS INDEX: 23.79 KG/M2 | RESPIRATION RATE: 16 BRPM | HEART RATE: 76 BPM | DIASTOLIC BLOOD PRESSURE: 74 MMHG | TEMPERATURE: 98.42 F | SYSTOLIC BLOOD PRESSURE: 112 MMHG | OXYGEN SATURATION: 100 % | WEIGHT: 130.07 LBS

## 2019-05-16 PROCEDURE — 99213 OFFICE O/P EST LOW 20 MIN: CPT | Mod: GC

## 2019-05-21 NOTE — PHYSICAL EXAM
[Abdomen Soft] : soft [Nondistended] : nondistended [Normal External Genitalia] : normal external genitalia  [Abdomen Tenderness] : non-tender [Normal Vaginal Cuff] : vaginal cuff without lesion or nodularity [Cervical Lymph Nodes Enlarged Anterior Bilaterally] : anterior cervical [Inguinal Lymph Nodes Enlarged Bilaterally] : inguinal [Supraclavicular Lymph Nodes Enlarged Bilaterally] : supraclavicular [Normal] : oriented to person, place and time, the affect was normal, the mood was normal and not anxious [] : no respiratory distress [Blood on examination glove] : no blood on examination glove

## 2019-05-21 NOTE — REVIEW OF SYSTEMS
[Urinary Frequency] : urinary frequency [Negative] : Allergic/Immunologic [Anal Pain: Grade 0] : Anal Pain: Grade 0 [Constipation: Grade 0] : Constipation: Grade 0 [Diarrhea: Grade 0] : Diarrhea: Grade 0 [Nausea: Grade 0] : Nausea: Grade 0 [Edema Limbs: Grade 0] : Edema Limbs: Grade 0  [Fatigue: Grade 0] : Fatigue: Grade 0 [Localized Edema: Grade 0] : Localized Edema: Grade 0  [Urinary Incontinence: Grade 0] : Urinary Incontinence: Grade 0  [Hematuria: Grade 0] : Hematuria: Grade 0 [Urinary Retention: Grade 0] : Urinary Retention: Grade 0 [Urinary Tract Pain: Grade 0] : Urinary Tract Pain: Grade 0 [Urinary Frequency: Grade 0] : Urinary Frequency: Grade 0 [Ejaculation Disorder: Grade 0] : Ejaculation Disorder: Grade 0 [Urinary Urgency: Grade 0] : Urinary Urgency: Grade 0 [Erectile Dysfunction: Grade 0] : Erectile Dysfunction: Grade 0 [Vaginal Stricture: Grade 0] : Vaginal Stricture: Grade 0 [Genital Edema: Grade 0] : Genital Edema: Grade 0 [Breast Pain: Grade 0] : Breast Pain: Grade 0 [Vaginal Infection: Grade 0] : Vaginal Infection: Grade 0  [Dyspareunia: Grade 0] : Dyspareunia: Grade 0 [Tinnitus - Grade 0] : Tinnitus - Grade 0 [Blurred Vision: Grade 0] : Blurred Vision: Grade 0 [Mucositis Oral: Grade 0] : Mucositis Oral: Grade 0  [Oral Pain: Grade 0] : Oral Pain: Grade 0 [Xerostomia: Grade 0] : Xerostomia: Grade 0 [Dysgeusia: Grade 0] : Dysgeusia: Grade 0 [Salivary duct inflammation: Grade 0] : Salivary duct inflammation: Grade 0 [Cognitive Disturbance: Grade 0] : Cognitive Disturbance: Grade 0 [Concentration Impairment: Grade 0] : Concentration Impairment: Grade 0 [Facial Muscle Weakness: Grade 0] : Facial Muscle Weakness: Grade 0 [Dizziness: Grade 0] : Dizziness: Grade 0  [Headache: Grade 0] : Headache: Grade 0 [Peripheral Motor Neuropathy: Grade 0] : Peripheral Motor Neuropathy: Grade 0 [Lethargy: Grade 0] : Lethargy: Grade 0 [Meningismus: Grade 0] : Meningismus: Grade 0 [Somnolence: Grade 0] : Somnolence: Grade 0 [Peripheral Sensory Neuropathy: Grade 0] : Peripheral Sensory Neuropathy: Grade 0 [Anxiety: Grade 0] : Anxiety: Grade 0  [Depression: Grade 0] : Depression: Grade 0 [Insomnia: Grade 0] : Insomnia: Grade 0 [Libido Decreased: Grade 0] : Libido Decreased: Grade 0 [Cough: Grade 0] : Cough: Grade 0 [Dyspnea: Grade 0] : Dyspnea: Grade 0 [Hiccups: Grade 0] : Hiccups: Grade 0 [Hoarseness: Grade 0] : Hoarseness: Grade 0 [Hypoxia: Grade 0] : Hypoxia: Grade 0 [Pharyngeal Mucositis: Grade 0] : Pharyngeal Mucositis: Grade 0 [Pneumonitis: Grade 0] : Pneumonitis: Grade 0 [Voice Alteration: Grade 0] : Voice Alteration: Grade 0 [Alopecia: Grade 0] : Alopecia: Grade 0 [Skin Atrophy: Grade 0] : Skin Atrophy: Grade 0 [Pruritus: Grade 0] : Pruritus: Grade 0 [Dermatitis Radiation: Grade 0] : Dermatitis Radiation: Grade 0 [Skin Induration: Grade 0] : Skin Induration: Grade 0 [Skin Hyperpigmentation: Grade 0] : Skin Hyperpigmentation: Grade 0 [Nocturia] : no nocturia [Vaginal Discharge] : no vaginal discharge [Dysmenorrhea/Abn Vaginal Bleeding] : no dysmenorrhea/abnormal vaginal bleeding

## 2019-05-21 NOTE — VITALS
[Least Pain Intensity: 0/10] : 0/10 [Maximal Pain Intensity: 0/10] : 0/10 [ECOG Performance Status: 0 - Fully active, able to carry on all pre-disease performance without restriction] : Performance Status: 0 - Fully active, able to carry on all pre-disease performance without restriction [90: Able to carry normal activity; minor signs or symptoms of disease.] : 90: Able to carry normal activity; minor signs or symptoms of disease.

## 2019-05-21 NOTE — HISTORY OF PRESENT ILLNESS
[FreeTextEntry1] : 63 year old woman with Endometrioid Carcinoma who completed 4500 cGy to pelvis and 1200 cGy vaginal cuff brachytherapy completed on 10/19/2018. \par \par 5/16/19  Follow up. She was seen by Dr Grayson on 5/9/19. She reports feeling well. She denies pain, vaginal discharge, vaginal bleeding and urinary symptoms.  she denies bowel symptoms. She denies recent use of dilators.

## 2019-09-12 ENCOUNTER — APPOINTMENT (OUTPATIENT)
Dept: GYNECOLOGIC ONCOLOGY | Facility: CLINIC | Age: 64
End: 2019-09-12
Payer: MEDICARE

## 2019-09-12 VITALS
DIASTOLIC BLOOD PRESSURE: 87 MMHG | WEIGHT: 128 LBS | SYSTOLIC BLOOD PRESSURE: 133 MMHG | BODY MASS INDEX: 23.55 KG/M2 | OXYGEN SATURATION: 100 % | HEIGHT: 62 IN | HEART RATE: 89 BPM | RESPIRATION RATE: 16 BRPM

## 2019-09-12 PROCEDURE — 99214 OFFICE O/P EST MOD 30 MIN: CPT

## 2019-09-12 NOTE — REASON FOR VISIT
[FreeTextEntry1] : [facility]\par \par [facility] [phone] at [address] [place] [id number]\par

## 2019-09-12 NOTE — PHYSICAL EXAM
[Absent] : Adnexa(ae): Absent [Normal] : Bimanual Exam: Normal [de-identified] : Emily Chatman was present as chaperone during examination.\par  [de-identified] : shortnened, atropic vagina, moderate clear fluid in vault, difficult pelvic exam due to RT changes. + blood when examined

## 2019-09-12 NOTE — HISTORY OF PRESENT ILLNESS
[FreeTextEntry1] : This 65 y/o with stage 2 grade 2 endometrial cancer since 07/2018 s/p RA TLH, BSO bilateral pelvic and para-aortic sentinel lymph node dissection cystoscopy PW on 7/02/2018 s/p pelvic IMRT and vaginal    cuff brachytherapy- 10/2018 with Dr. Bowling. Patient returns to the office today for a routine three month surveillance visit. Patient complains of leaking a thin yellowish fluid from vagina for the past month, unsure if it is urine. She has to wear a light pad due to this leakage. Denies abdominal or pelvic pains.  Also saw a small amount of blood last week when she wiped after urination. Her last bone density was in 2018. Had a normal colonoscopy in Jan 2014. Reports a normal mammogram this year.  Pt is not sexually active and has not been using vaginal dilators. \par  \par Preop pet ct-6/8/2018-bilateral renal cysts, no evidence of mets.\par

## 2019-09-13 LAB — CANCER AG125 SERPL-ACNC: 27 U/ML

## 2019-09-16 ENCOUNTER — FORM ENCOUNTER (OUTPATIENT)
Age: 64
End: 2019-09-16

## 2019-09-17 ENCOUNTER — APPOINTMENT (OUTPATIENT)
Dept: CT IMAGING | Facility: IMAGING CENTER | Age: 64
End: 2019-09-17
Payer: MEDICARE

## 2019-09-17 ENCOUNTER — OUTPATIENT (OUTPATIENT)
Dept: OUTPATIENT SERVICES | Facility: HOSPITAL | Age: 64
LOS: 1 days | End: 2019-09-17
Payer: MEDICARE

## 2019-09-17 DIAGNOSIS — Z98.890 OTHER SPECIFIED POSTPROCEDURAL STATES: Chronic | ICD-10-CM

## 2019-09-17 DIAGNOSIS — C54.1 MALIGNANT NEOPLASM OF ENDOMETRIUM: ICD-10-CM

## 2019-09-17 PROCEDURE — 74177 CT ABD & PELVIS W/CONTRAST: CPT | Mod: 26

## 2019-09-17 PROCEDURE — 74177 CT ABD & PELVIS W/CONTRAST: CPT

## 2019-09-17 PROCEDURE — 82565 ASSAY OF CREATININE: CPT

## 2019-09-17 PROCEDURE — 71260 CT THORAX DX C+: CPT | Mod: 26

## 2019-09-17 PROCEDURE — 71260 CT THORAX DX C+: CPT

## 2019-09-23 ENCOUNTER — APPOINTMENT (OUTPATIENT)
Dept: THORACIC SURGERY | Facility: CLINIC | Age: 64
End: 2019-09-23
Payer: MEDICARE

## 2019-09-23 VITALS
OXYGEN SATURATION: 100 % | SYSTOLIC BLOOD PRESSURE: 146 MMHG | RESPIRATION RATE: 16 BRPM | HEART RATE: 97 BPM | DIASTOLIC BLOOD PRESSURE: 77 MMHG

## 2019-09-23 DIAGNOSIS — R91.1 SOLITARY PULMONARY NODULE: ICD-10-CM

## 2019-09-23 PROCEDURE — 99203 OFFICE O/P NEW LOW 30 MIN: CPT

## 2019-09-24 NOTE — ASSESSMENT
[FreeTextEntry1] : Angela Gentile is a 64-year-old female with a history of a GYN malignancy who presents now with bilateral pulmonary nodules concerning for metastatic disease. These do appear too small for an accurate transthoracic needle biopsy and I have recommended a thoracoscopy and removal. She will be scheduled for a right thoracoscopy in the near future.\par \par Thank you for allowing me to participate in the care of your patient.\par \par Emmanuel Weston MD\par Department of Cardiovascular and Thoracic Surgery\par \par [employer]\[facility] [employer] at [facility]\par

## 2019-09-24 NOTE — PHYSICAL EXAM
[General Appearance - In No Acute Distress] : in no acute distress [General Appearance - Alert] : alert [Auscultation Breath Sounds / Voice Sounds] : lungs were clear to auscultation bilaterally [Heart Rate And Rhythm] : heart rate was normal and rhythm regular [Heart Sounds] : normal S1 and S2 [Heart Sounds Gallop] : no gallops [Murmurs] : no murmurs [Heart Sounds Pericardial Friction Rub] : no pericardial rub [Examination Of The Chest] : the chest was normal in appearance [Chest Visual Inspection Thoracic Asymmetry] : no chest asymmetry [Diminished Respiratory Excursion] : normal chest expansion [Bowel Sounds] : normal bowel sounds [Abdomen Tenderness] : non-tender [] : no hepato-splenomegaly [Abdomen Soft] : soft [Abdomen Mass (___ Cm)] : no abdominal mass palpated [No Focal Deficits] : no focal deficits [Oriented To Time, Place, And Person] : oriented to person, place, and time [Impaired Insight] : insight and judgment were intact [Affect] : the affect was normal

## 2019-09-24 NOTE — HISTORY OF PRESENT ILLNESS
[FreeTextEntry1] : \cici Gentile was in the office today for an initial visit. She has a history of a GYN malignancy and now bilateral pulmonary nodules concerning for metastatic disease. She feels well and has no complaints.

## 2019-09-27 ENCOUNTER — APPOINTMENT (OUTPATIENT)
Dept: GYNECOLOGIC ONCOLOGY | Facility: CLINIC | Age: 64
End: 2019-09-27
Payer: MEDICARE

## 2019-09-27 PROCEDURE — 99214 OFFICE O/P EST MOD 30 MIN: CPT

## 2019-10-09 ENCOUNTER — NON-APPOINTMENT (OUTPATIENT)
Age: 64
End: 2019-10-09

## 2019-10-09 ENCOUNTER — APPOINTMENT (OUTPATIENT)
Dept: CARDIOLOGY | Facility: CLINIC | Age: 64
End: 2019-10-09
Payer: MEDICARE

## 2019-10-09 VITALS
HEIGHT: 62 IN | BODY MASS INDEX: 23.55 KG/M2 | DIASTOLIC BLOOD PRESSURE: 80 MMHG | HEART RATE: 85 BPM | WEIGHT: 128 LBS | OXYGEN SATURATION: 97 % | SYSTOLIC BLOOD PRESSURE: 160 MMHG

## 2019-10-09 DIAGNOSIS — Z01.810 ENCOUNTER FOR PREPROCEDURAL CARDIOVASCULAR EXAMINATION: ICD-10-CM

## 2019-10-09 PROCEDURE — 99205 OFFICE O/P NEW HI 60 MIN: CPT

## 2019-10-09 PROCEDURE — 93000 ELECTROCARDIOGRAM COMPLETE: CPT

## 2019-10-09 NOTE — PHYSICAL EXAM
[Normal Appearance] : normal appearance [General Appearance - Well Developed] : well developed [Well Groomed] : well groomed [General Appearance - Well Nourished] : well nourished [Normal Conjunctiva] : the conjunctiva exhibited no abnormalities [No Oral Pallor] : no oral pallor [No Oral Cyanosis] : no oral cyanosis [Normal Oral Mucosa] : normal oral mucosa [Normal Jugular Venous A Waves Present] : normal jugular venous A waves present [Normal Jugular Venous V Waves Present] : normal jugular venous V waves present [Heart Sounds] : normal S1 and S2 [Arterial Pulses Normal] : the arterial pulses were normal [Edema] : no peripheral edema present [Respiration, Rhythm And Depth] : normal respiratory rhythm and effort [Auscultation Breath Sounds / Voice Sounds] : lungs were clear to auscultation bilaterally [Abdomen Soft] : soft [Abdomen Tenderness] : non-tender [] : no hepato-splenomegaly [Abnormal Walk] : normal gait [Nail Clubbing] : no clubbing of the fingernails [Cyanosis, Localized] : no localized cyanosis [Skin Turgor] : normal skin turgor [No Venous Stasis] : no venous stasis [No Skin Ulcers] : no skin ulcer [Affect] : the affect was normal [Oriented To Time, Place, And Person] : oriented to person, place, and time [Mood] : the mood was normal [FreeTextEntry1] : CEA scar on left

## 2019-10-09 NOTE — ASSESSMENT
[FreeTextEntry1] : 63 y/o female with history of ?metastatic cancer with bilateral pulmonary nodules going for thoracoscopy\par no exertional chest pain or  SOB, palpitations, dizziness or syncope\par hx of PVD with significant carotid disease s/p CEA and hx of CVA , no significant risk factors \par will order an exercise stress test  to evaluate for ischemia given PVD \par will order an echo to evaluate for valvular heart disease\par based on results of these 2 tests, will evaluate risk for surgery.

## 2019-10-09 NOTE — REASON FOR VISIT
[Initial Evaluation] : an initial evaluation of [FreeTextEntry1] : preoperative risk stratification prior to non cardiac surgery

## 2019-10-09 NOTE — HISTORY OF PRESENT ILLNESS
[FreeTextEntry1] : 63 y/o female with history of endometrial cancer s/p hysterectomy, BSO and lymph nodes dissection \par NO prior cardiac history , no DM, no HTN, no smoking. no FH Of CAD \par no exertional chest pain or  SOB, palpitations, dizziness or syncope\par hx of CVA 15 years ago and carotid endarterectomy. \par \par

## 2019-10-10 ENCOUNTER — FORM ENCOUNTER (OUTPATIENT)
Age: 64
End: 2019-10-10

## 2019-10-11 ENCOUNTER — OUTPATIENT (OUTPATIENT)
Dept: OUTPATIENT SERVICES | Facility: HOSPITAL | Age: 64
LOS: 1 days | End: 2019-10-11
Payer: MEDICARE

## 2019-10-11 DIAGNOSIS — Z01.810 ENCOUNTER FOR PREPROCEDURAL CARDIOVASCULAR EXAMINATION: ICD-10-CM

## 2019-10-11 DIAGNOSIS — Z98.890 OTHER SPECIFIED POSTPROCEDURAL STATES: Chronic | ICD-10-CM

## 2019-10-11 PROCEDURE — 93016 CV STRESS TEST SUPVJ ONLY: CPT

## 2019-10-11 PROCEDURE — 93018 CV STRESS TEST I&R ONLY: CPT

## 2019-10-11 PROCEDURE — 93306 TTE W/DOPPLER COMPLETE: CPT | Mod: 26

## 2019-10-16 NOTE — PHYSICAL EXAM
[Normal] : No focal neurologic defects observed [de-identified] : Alexandria Starks MA was present the entire time of results and discussion

## 2019-10-16 NOTE — HISTORY OF PRESENT ILLNESS
[FreeTextEntry1] : 65 y/o with stage 2 grade 2 endometrial cancer since 07/2018 s/p RA TLH, BSO bilateral pelvic and para-aortic sentinel lymph node dissection cystoscopy PW on 7/02/2018 s/p pelvic IMRT and vaginal cuff brachytherapy- 10/2018 with Dr. Bowling. She was recently seen by my PA for a svl visit and complained of a thin yellowish fluid from the vagina, unsure if it is urine. On exam she had a moderate amount of clear fluid in vault, and blood. 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 CT C/A/P and ca-125 was ordered to evaluate. \par \par CT revealed new bilateral pulmonary nodules, some of which contain central cavitation, suspicious for metastatic disease. Nodularity is noted superior to the vaginal cuff, with the largest component to the right of midline, measuring 1.9x1.6 cm, concerning for metastatic disease. \par \par Ca-125 from 9/12 was 27. \par Results were discussed with patient over the phone, she was advised to have a consult with Dr. Weston. She was seen by Dr. Weston on 9/23/19. His recommendation was to have a thoracoscopy and removal, which is scheduled for 10/18/19.

## 2019-10-16 NOTE — END OF VISIT
[FreeTextEntry3] : Written by Alexandria Starks, acting as a scribe for Dr. Paras Grayson.\par This note accurately reflects the work and decisions made by me\par

## 2019-10-16 NOTE — REASON FOR VISIT
[FreeTextEntry1] : [facility]\par \par [facility] [phone] at [address] [place] [id number]\par

## 2019-10-21 ENCOUNTER — OUTPATIENT (OUTPATIENT)
Dept: OUTPATIENT SERVICES | Facility: HOSPITAL | Age: 64
LOS: 1 days | End: 2019-10-21
Payer: MEDICARE

## 2019-10-21 VITALS
RESPIRATION RATE: 20 BRPM | DIASTOLIC BLOOD PRESSURE: 90 MMHG | HEIGHT: 61 IN | TEMPERATURE: 88 F | SYSTOLIC BLOOD PRESSURE: 146 MMHG | HEART RATE: 76 BPM | WEIGHT: 293 LBS

## 2019-10-21 DIAGNOSIS — Z90.710 ACQUIRED ABSENCE OF BOTH CERVIX AND UTERUS: Chronic | ICD-10-CM

## 2019-10-21 DIAGNOSIS — Z29.9 ENCOUNTER FOR PROPHYLACTIC MEASURES, UNSPECIFIED: ICD-10-CM

## 2019-10-21 DIAGNOSIS — Z01.818 ENCOUNTER FOR OTHER PREPROCEDURAL EXAMINATION: ICD-10-CM

## 2019-10-21 DIAGNOSIS — Z98.890 OTHER SPECIFIED POSTPROCEDURAL STATES: Chronic | ICD-10-CM

## 2019-10-21 DIAGNOSIS — R91.1 SOLITARY PULMONARY NODULE: ICD-10-CM

## 2019-10-21 DIAGNOSIS — I63.239 CEREBRAL INFARCTION DUE TO UNSPECIFIED OCCLUSION OR STENOSIS OF UNSPECIFIED CAROTID ARTERY: ICD-10-CM

## 2019-10-21 LAB
ANION GAP SERPL CALC-SCNC: 12 MMOL/L — SIGNIFICANT CHANGE UP (ref 5–17)
APTT BLD: 24.3 SEC — LOW (ref 27.5–36.3)
BASOPHILS # BLD AUTO: 0.01 K/UL — SIGNIFICANT CHANGE UP (ref 0–0.2)
BASOPHILS NFR BLD AUTO: 0.2 % — SIGNIFICANT CHANGE UP (ref 0–2)
BLD GP AB SCN SERPL QL: SIGNIFICANT CHANGE UP
BUN SERPL-MCNC: 12 MG/DL — SIGNIFICANT CHANGE UP (ref 8–20)
CALCIUM SERPL-MCNC: 9.6 MG/DL — SIGNIFICANT CHANGE UP (ref 8.6–10.2)
CHLORIDE SERPL-SCNC: 103 MMOL/L — SIGNIFICANT CHANGE UP (ref 98–107)
CO2 SERPL-SCNC: 28 MMOL/L — SIGNIFICANT CHANGE UP (ref 22–29)
CREAT SERPL-MCNC: 0.57 MG/DL — SIGNIFICANT CHANGE UP (ref 0.5–1.3)
EOSINOPHIL # BLD AUTO: 0.04 K/UL — SIGNIFICANT CHANGE UP (ref 0–0.5)
EOSINOPHIL NFR BLD AUTO: 0.8 % — SIGNIFICANT CHANGE UP (ref 0–6)
GLUCOSE SERPL-MCNC: 99 MG/DL — SIGNIFICANT CHANGE UP (ref 70–115)
HCT VFR BLD CALC: 42.5 % — SIGNIFICANT CHANGE UP (ref 34.5–45)
HGB BLD-MCNC: 14.8 G/DL — SIGNIFICANT CHANGE UP (ref 11.5–15.5)
IMM GRANULOCYTES NFR BLD AUTO: 0.2 % — SIGNIFICANT CHANGE UP (ref 0–1.5)
INR BLD: 0.94 RATIO — SIGNIFICANT CHANGE UP (ref 0.88–1.16)
LYMPHOCYTES # BLD AUTO: 1 K/UL — SIGNIFICANT CHANGE UP (ref 1–3.3)
LYMPHOCYTES # BLD AUTO: 20 % — SIGNIFICANT CHANGE UP (ref 13–44)
MCHC RBC-ENTMCNC: 32.7 PG — SIGNIFICANT CHANGE UP (ref 27–34)
MCHC RBC-ENTMCNC: 34.8 GM/DL — SIGNIFICANT CHANGE UP (ref 32–36)
MCV RBC AUTO: 93.8 FL — SIGNIFICANT CHANGE UP (ref 80–100)
MONOCYTES # BLD AUTO: 0.45 K/UL — SIGNIFICANT CHANGE UP (ref 0–0.9)
MONOCYTES NFR BLD AUTO: 9 % — SIGNIFICANT CHANGE UP (ref 2–14)
NEUTROPHILS # BLD AUTO: 3.48 K/UL — SIGNIFICANT CHANGE UP (ref 1.8–7.4)
NEUTROPHILS NFR BLD AUTO: 69.8 % — SIGNIFICANT CHANGE UP (ref 43–77)
PLATELET # BLD AUTO: 253 K/UL — SIGNIFICANT CHANGE UP (ref 150–400)
POTASSIUM SERPL-MCNC: 4.2 MMOL/L — SIGNIFICANT CHANGE UP (ref 3.5–5.3)
POTASSIUM SERPL-SCNC: 4.2 MMOL/L — SIGNIFICANT CHANGE UP (ref 3.5–5.3)
PROTHROM AB SERPL-ACNC: 10.8 SEC — SIGNIFICANT CHANGE UP (ref 10–12.9)
RBC # BLD: 4.53 M/UL — SIGNIFICANT CHANGE UP (ref 3.8–5.2)
RBC # FLD: 11.8 % — SIGNIFICANT CHANGE UP (ref 10.3–14.5)
SODIUM SERPL-SCNC: 143 MMOL/L — SIGNIFICANT CHANGE UP (ref 135–145)
WBC # BLD: 4.99 K/UL — SIGNIFICANT CHANGE UP (ref 3.8–10.5)
WBC # FLD AUTO: 4.99 K/UL — SIGNIFICANT CHANGE UP (ref 3.8–10.5)

## 2019-10-21 PROCEDURE — 93010 ELECTROCARDIOGRAM REPORT: CPT

## 2019-10-21 PROCEDURE — G0463: CPT

## 2019-10-21 PROCEDURE — 93005 ELECTROCARDIOGRAM TRACING: CPT

## 2019-10-21 RX ORDER — SODIUM CHLORIDE 9 MG/ML
3 INJECTION INTRAMUSCULAR; INTRAVENOUS; SUBCUTANEOUS EVERY 8 HOURS
Refills: 0 | Status: DISCONTINUED | OUTPATIENT
Start: 2019-10-30 | End: 2019-11-01

## 2019-10-21 NOTE — H&P PST ADULT - ASSESSMENT
CAPRINI SCORE [CLOT]    AGE RELATED RISK FACTORS                                                       MOBILITY RELATED FACTORS  [ ] Age 41-60 years                                            (1 Point)                  [ ] Bed rest                                                        (1 Point)  [x ] Age: 61-74 years                                           (2 Points)                 [ ] Plaster cast                                                   (2 Points)  [ ] Age= 75 years                                              (3 Points)                 [ ] Bed bound for more than 72 hours                 (2 Points)    DISEASE RELATED RISK FACTORS                                               GENDER SPECIFIC FACTORS  [ ] Edema in the lower extremities                       (1 Point)                  [ ] Pregnancy                                                     (1 Point)  [ ] Varicose veins                                               (1 Point)                  [ ] Post-partum < 6 weeks                                   (1 Point)             [ ] BMI > 25 Kg/m2                                            (1 Point)                  [ ] Hormonal therapy  or oral contraception          (1 Point)                 [ ] Sepsis (in the previous month)                        (1 Point)                  [ ] History of pregnancy complications                 (1 point)  [ ] Pneumonia or serious lung disease                                               [ ] Unexplained or recurrent                     (1 Point)           (in the previous month)                               (1 Point)  [ ] Abnormal pulmonary function test                     (1 Point)                 SURGERY RELATED RISK FACTORS  [ ] Acute myocardial infarction                              (1 Point)                 [ ]  Section                                             (1 Point)  [ ] Congestive heart failure (in the previous month)  (1 Point)               [ ] Minor surgery                                                  (1 Point)   [ ] Inflammatory bowel disease                             (1 Point)                 [ ] Arthroscopic surgery                                        (2 Points)  [ ] Central venous access                                      (2 Points)                [x ] General surgery lasting more than 45 minutes   (2 Points)       [ ] Stroke (in the previous month)                          (5 Points)               [ ] Elective arthroplasty                                         (5 Points)                                                                                                                                               HEMATOLOGY RELATED FACTORS                                                 TRAUMA RELATED RISK FACTORS  [ ] Prior episodes of VTE                                     (3 Points)                [ ] Fracture of the hip, pelvis, or leg                       (5 Points)  [ ] Positive family history for VTE                         (3 Points)                 [ ] Acute spinal cord injury (in the previous month)  (5 Points)  [ ] Prothrombin 27563 A                                     (3 Points)                 [ ] Paralysis  (less than 1 month)                             (5 Points)  [ ] Factor V Leiden                                             (3 Points)                  [ ] Multiple Trauma within 1 month                        (5 Points)  [ ] Lupus anticoagulants                                     (3 Points)                                                           [ ] Anticardiolipin antibodies                               (3 Points)                                                       [ ] High homocysteine in the blood                      (3 Points)                                             [ ] Other congenital or acquired thrombophilia      (3 Points)                                                [ ] Heparin induced thrombocytopenia                  (3 Points)                                          Total Score [  4       ]    Caprini Score 0 - 2:  Low Risk, No VTE Prophylaxis required for most patients, encourage ambulation  Caprini Score 3 - 6:  At Risk, pharmacologic VTE prophylaxis is indicated for most patients (in the absence of a contraindication)  Caprini Score Greater than or = 7:  High Risk, pharmacologic VTE prophylaxis is indicated for most patients (in the absence of a contraindication)  OPIOID RISK TOOL    RACHAEL EACH BOX THAT APPLIES AND ADD TOTALS AT THE END    FAMILY HISTORY OF SUBSTANCE ABUSE                 FEMALE         MALE                                                Alcohol                             [  ]1 pt          [  ]3pts                                               Illegal Drugs                     [  ]2 pts        [  ]3pts                                               Rx Drugs                           [  ]4 pts        [  ]4 pts    PERSONAL HISTORY OF SUBSTANCE ABUSE                                                                                          Alcohol                             [  ]3 pts       [  ]3 pts                                               Illegal Drugs                     [  ]4 pts        [  ]4 pts                                               Rx Drugs                           [  ]5 pts        [  ]5 pts    AGE BETWEEN 16-45 YEARS                                      [  ]1 pt         [  ]1 pt    HISTORY OF PREADOLESCENT   SEXUAL ABUSE                                                             [  ]3 pts        [  ]0pts    PSYCHOLOGICAL DISEASE                     ADD, OCD, Bipolar, Schizophrenia        [  ]2 pts         [  ]2 pts                      Depression                                               [  ]1 pt           [  ]1 pt           SCORING TOTAL   (add numbers and type here)              (**0*)                                     A score of 3 or lower indicated LOW risk for future opioid abuse  A score of 4 to 7 indicated moderate risk for future opioid abuse  A score of 8 or higher indicates a high risk for opioid abuse

## 2019-10-21 NOTE — H&P PST ADULT - NSICDXPROBLEM_GEN_ALL_CORE_FT
PROBLEM DIAGNOSES  Problem: Solitary pulmonary nodule present on computed tomography of lung  Assessment and Plan: flex bronchoscopy right video assist thorascopic surgery lung resection     Problem: Need for prophylactic measure  Assessment and Plan: caprini score 4 moderate VTE risk SCD's ordered surgical team to assess need for pharm proph     Problem: Cerebrovascular accident (CVA) due to occlusion of carotid artery  Assessment and Plan:

## 2019-10-21 NOTE — H&P PST ADULT - NSICDXPASTSURGICALHX_GEN_ALL_CORE_FT
PAST SURGICAL HISTORY:  H/O dilation and curettage     History of hand surgery repair from dog bite 2000    History of lumpectomy of right breast benign    S/P hysterectomy     S/P myomectomy

## 2019-10-21 NOTE — H&P PST ADULT - HISTORY OF PRESENT ILLNESS
Patient is 65 y/o works as a  non smoker . Patient reports having right lung nodules, which was found on a previous examination patient denies pain or discomfort. Patient is having a flex bronchoscopy right video assisted thoracoscopic surgery , lung resection ,with Dr dickens on 10/30/19. Patient medical history CVA due to carotid stenosis , carotid stenosis , Surgical history hysterectomy, lumpectomy, D & C ,.

## 2019-10-21 NOTE — ASU PATIENT PROFILE, ADULT - LEARNING ASSESSMENT (PATIENT) ADDITIONAL COMMENTS
Pt left PST without nursing interview. LMOM for pt to call PST to complete interview. NP, instructed pt on pre-op instructions/teaching & pre-surgical infection prevention instructions. Tips for safer surgery given.

## 2019-10-21 NOTE — ASU PATIENT PROFILE, ADULT - ABLE TO REACH PT
[phone] (H) / [phone] (H) / [phone] (C) [phone] (H) / [phone] (C)/yes

## 2019-10-21 NOTE — H&P PST ADULT - NSICDXPASTMEDICALHX_GEN_ALL_CORE_FT
PAST MEDICAL HISTORY:  Carotid stenosis, left     Cerebrovascular accident (CVA) due to occlusion of carotid artery no residual

## 2019-10-21 NOTE — H&P PST ADULT - NSICDXFAMILYHX_GEN_ALL_CORE_FT
FAMILY HISTORY:  Family history of breast cancer in female  Family history of CLL (chronic lymphoid leukemia)  Family history of hypertension

## 2019-10-25 ENCOUNTER — APPOINTMENT (OUTPATIENT)
Dept: GYNECOLOGIC ONCOLOGY | Facility: CLINIC | Age: 64
End: 2019-10-25

## 2019-10-29 ENCOUNTER — TRANSCRIPTION ENCOUNTER (OUTPATIENT)
Age: 64
End: 2019-10-29

## 2019-10-30 ENCOUNTER — APPOINTMENT (OUTPATIENT)
Dept: THORACIC SURGERY | Facility: HOSPITAL | Age: 64
End: 2019-10-30

## 2019-10-30 ENCOUNTER — RESULT REVIEW (OUTPATIENT)
Age: 64
End: 2019-10-30

## 2019-10-30 ENCOUNTER — INPATIENT (INPATIENT)
Facility: HOSPITAL | Age: 64
LOS: 1 days | Discharge: HOME | DRG: 167 | End: 2019-11-01
Attending: THORACIC SURGERY (CARDIOTHORACIC VASCULAR SURGERY) | Admitting: THORACIC SURGERY (CARDIOTHORACIC VASCULAR SURGERY)
Payer: MEDICARE

## 2019-10-30 VITALS
RESPIRATION RATE: 16 BRPM | SYSTOLIC BLOOD PRESSURE: 128 MMHG | TEMPERATURE: 98 F | OXYGEN SATURATION: 100 % | WEIGHT: 126.99 LBS | HEIGHT: 61 IN | DIASTOLIC BLOOD PRESSURE: 85 MMHG | HEART RATE: 83 BPM

## 2019-10-30 DIAGNOSIS — Z90.710 ACQUIRED ABSENCE OF BOTH CERVIX AND UTERUS: Chronic | ICD-10-CM

## 2019-10-30 DIAGNOSIS — R91.1 SOLITARY PULMONARY NODULE: ICD-10-CM

## 2019-10-30 DIAGNOSIS — Z98.890 OTHER SPECIFIED POSTPROCEDURAL STATES: Chronic | ICD-10-CM

## 2019-10-30 LAB
BLD GP AB SCN SERPL QL: SIGNIFICANT CHANGE UP
GRAM STN FLD: SIGNIFICANT CHANGE UP
SPECIMEN SOURCE: SIGNIFICANT CHANGE UP

## 2019-10-30 PROCEDURE — 93017 CV STRESS TEST TRACING ONLY: CPT

## 2019-10-30 PROCEDURE — 88307 TISSUE EXAM BY PATHOLOGIST: CPT | Mod: 26

## 2019-10-30 PROCEDURE — 32666 THORACOSCOPY W/WEDGE RESECT: CPT

## 2019-10-30 PROCEDURE — 88342 IMHCHEM/IMCYTCHM 1ST ANTB: CPT | Mod: 26

## 2019-10-30 PROCEDURE — 71045 X-RAY EXAM CHEST 1 VIEW: CPT | Mod: 26

## 2019-10-30 PROCEDURE — 88341 IMHCHEM/IMCYTCHM EA ADD ANTB: CPT | Mod: 26

## 2019-10-30 PROCEDURE — 93306 TTE W/DOPPLER COMPLETE: CPT

## 2019-10-30 PROCEDURE — 32667 THORACOSCOPY W/W RESECT ADDL: CPT

## 2019-10-30 RX ORDER — CEFAZOLIN SODIUM 1 G
2000 VIAL (EA) INJECTION ONCE
Refills: 0 | Status: COMPLETED | OUTPATIENT
Start: 2019-10-30 | End: 2019-10-30

## 2019-10-30 RX ORDER — ONDANSETRON 8 MG/1
4 TABLET, FILM COATED ORAL ONCE
Refills: 0 | Status: DISCONTINUED | OUTPATIENT
Start: 2019-10-30 | End: 2019-10-30

## 2019-10-30 RX ORDER — FENTANYL CITRATE 50 UG/ML
30 INJECTION INTRAVENOUS
Refills: 0 | Status: DISCONTINUED | OUTPATIENT
Start: 2019-10-30 | End: 2019-11-01

## 2019-10-30 RX ORDER — FENTANYL CITRATE 50 UG/ML
50 INJECTION INTRAVENOUS
Refills: 0 | Status: DISCONTINUED | OUTPATIENT
Start: 2019-10-30 | End: 2019-10-30

## 2019-10-30 RX ORDER — SODIUM CHLORIDE 9 MG/ML
3 INJECTION INTRAMUSCULAR; INTRAVENOUS; SUBCUTANEOUS EVERY 8 HOURS
Refills: 0 | Status: DISCONTINUED | OUTPATIENT
Start: 2019-10-30 | End: 2019-10-30

## 2019-10-30 RX ORDER — KETOROLAC TROMETHAMINE 30 MG/ML
15 SYRINGE (ML) INJECTION EVERY 6 HOURS
Refills: 0 | Status: DISCONTINUED | OUTPATIENT
Start: 2019-10-30 | End: 2019-11-01

## 2019-10-30 RX ORDER — HYDROMORPHONE HYDROCHLORIDE 2 MG/ML
1 INJECTION INTRAMUSCULAR; INTRAVENOUS; SUBCUTANEOUS
Refills: 0 | Status: DISCONTINUED | OUTPATIENT
Start: 2019-10-30 | End: 2019-10-30

## 2019-10-30 RX ORDER — ONDANSETRON 8 MG/1
4 TABLET, FILM COATED ORAL EVERY 6 HOURS
Refills: 0 | Status: DISCONTINUED | OUTPATIENT
Start: 2019-10-30 | End: 2019-11-01

## 2019-10-30 RX ORDER — SODIUM CHLORIDE 9 MG/ML
1000 INJECTION, SOLUTION INTRAVENOUS
Refills: 0 | Status: DISCONTINUED | OUTPATIENT
Start: 2019-10-30 | End: 2019-10-30

## 2019-10-30 RX ORDER — ASPIRIN/CALCIUM CARB/MAGNESIUM 324 MG
81 TABLET ORAL DAILY
Refills: 0 | Status: DISCONTINUED | OUTPATIENT
Start: 2019-10-31 | End: 2019-11-01

## 2019-10-30 RX ORDER — NALOXONE HYDROCHLORIDE 4 MG/.1ML
0.1 SPRAY NASAL
Refills: 0 | Status: DISCONTINUED | OUTPATIENT
Start: 2019-10-30 | End: 2019-11-01

## 2019-10-30 RX ADMIN — FENTANYL CITRATE 30 MILLILITER(S): 50 INJECTION INTRAVENOUS at 19:41

## 2019-10-30 RX ADMIN — FENTANYL CITRATE 30 MILLILITER(S): 50 INJECTION INTRAVENOUS at 18:29

## 2019-10-30 RX ADMIN — FENTANYL CITRATE 30 MILLILITER(S): 50 INJECTION INTRAVENOUS at 15:11

## 2019-10-30 RX ADMIN — Medication 100 MILLIGRAM(S): at 12:45

## 2019-10-30 RX ADMIN — SODIUM CHLORIDE 3 MILLILITER(S): 9 INJECTION INTRAMUSCULAR; INTRAVENOUS; SUBCUTANEOUS at 22:17

## 2019-10-30 NOTE — BRIEF OPERATIVE NOTE - NSICDXBRIEFPROCEDURE_GEN_ALL_CORE_FT
PROCEDURES:  Wedge resection, lung 30-Oct-2019 16:52:11 Right upper/lower wedge resection Yovani Rojas  Thoracotomy, with VATS 30-Oct-2019 16:51:56  Yovani Rojas  Bronchoscopy, flexible, adult 30-Oct-2019 16:51:38  Yovani Rojas

## 2019-10-31 DIAGNOSIS — R91.8 OTHER NONSPECIFIC ABNORMAL FINDING OF LUNG FIELD: ICD-10-CM

## 2019-10-31 LAB
ANION GAP SERPL CALC-SCNC: 13 MMOL/L — SIGNIFICANT CHANGE UP (ref 5–17)
BUN SERPL-MCNC: 11 MG/DL — SIGNIFICANT CHANGE UP (ref 8–20)
CALCIUM SERPL-MCNC: 9 MG/DL — SIGNIFICANT CHANGE UP (ref 8.6–10.2)
CHLORIDE SERPL-SCNC: 106 MMOL/L — SIGNIFICANT CHANGE UP (ref 98–107)
CO2 SERPL-SCNC: 23 MMOL/L — SIGNIFICANT CHANGE UP (ref 22–29)
CREAT SERPL-MCNC: 0.48 MG/DL — LOW (ref 0.5–1.3)
GLUCOSE SERPL-MCNC: 128 MG/DL — HIGH (ref 70–115)
HCT VFR BLD CALC: 39.2 % — SIGNIFICANT CHANGE UP (ref 34.5–45)
HGB BLD-MCNC: 13.3 G/DL — SIGNIFICANT CHANGE UP (ref 11.5–15.5)
MAGNESIUM SERPL-MCNC: 2.2 MG/DL — SIGNIFICANT CHANGE UP (ref 1.6–2.6)
MCHC RBC-ENTMCNC: 32.2 PG — SIGNIFICANT CHANGE UP (ref 27–34)
MCHC RBC-ENTMCNC: 33.9 GM/DL — SIGNIFICANT CHANGE UP (ref 32–36)
MCV RBC AUTO: 94.9 FL — SIGNIFICANT CHANGE UP (ref 80–100)
PLATELET # BLD AUTO: 211 K/UL — SIGNIFICANT CHANGE UP (ref 150–400)
POTASSIUM SERPL-MCNC: 4.8 MMOL/L — SIGNIFICANT CHANGE UP (ref 3.5–5.3)
POTASSIUM SERPL-SCNC: 4.8 MMOL/L — SIGNIFICANT CHANGE UP (ref 3.5–5.3)
RBC # BLD: 4.13 M/UL — SIGNIFICANT CHANGE UP (ref 3.8–5.2)
RBC # FLD: 11.7 % — SIGNIFICANT CHANGE UP (ref 10.3–14.5)
SODIUM SERPL-SCNC: 142 MMOL/L — SIGNIFICANT CHANGE UP (ref 135–145)
WBC # BLD: 8.94 K/UL — SIGNIFICANT CHANGE UP (ref 3.8–10.5)
WBC # FLD AUTO: 8.94 K/UL — SIGNIFICANT CHANGE UP (ref 3.8–10.5)

## 2019-10-31 PROCEDURE — 71045 X-RAY EXAM CHEST 1 VIEW: CPT | Mod: 26

## 2019-10-31 RX ORDER — PANTOPRAZOLE SODIUM 20 MG/1
40 TABLET, DELAYED RELEASE ORAL
Refills: 0 | Status: DISCONTINUED | OUTPATIENT
Start: 2019-10-31 | End: 2019-11-01

## 2019-10-31 RX ORDER — SENNA PLUS 8.6 MG/1
2 TABLET ORAL AT BEDTIME
Refills: 0 | Status: DISCONTINUED | OUTPATIENT
Start: 2019-10-31 | End: 2019-11-01

## 2019-10-31 RX ORDER — IPRATROPIUM/ALBUTEROL SULFATE 18-103MCG
3 AEROSOL WITH ADAPTER (GRAM) INHALATION EVERY 6 HOURS
Refills: 0 | Status: DISCONTINUED | OUTPATIENT
Start: 2019-10-31 | End: 2019-11-01

## 2019-10-31 RX ORDER — INFLUENZA VIRUS VACCINE 15; 15; 15; 15 UG/.5ML; UG/.5ML; UG/.5ML; UG/.5ML
0.5 SUSPENSION INTRAMUSCULAR ONCE
Refills: 0 | Status: DISCONTINUED | OUTPATIENT
Start: 2019-10-31 | End: 2019-11-01

## 2019-10-31 RX ORDER — ENOXAPARIN SODIUM 100 MG/ML
40 INJECTION SUBCUTANEOUS DAILY
Refills: 0 | Status: DISCONTINUED | OUTPATIENT
Start: 2019-10-31 | End: 2019-11-01

## 2019-10-31 RX ADMIN — ENOXAPARIN SODIUM 40 MILLIGRAM(S): 100 INJECTION SUBCUTANEOUS at 23:11

## 2019-10-31 RX ADMIN — Medication 81 MILLIGRAM(S): at 12:45

## 2019-10-31 RX ADMIN — SODIUM CHLORIDE 3 MILLILITER(S): 9 INJECTION INTRAMUSCULAR; INTRAVENOUS; SUBCUTANEOUS at 06:07

## 2019-10-31 RX ADMIN — SODIUM CHLORIDE 3 MILLILITER(S): 9 INJECTION INTRAMUSCULAR; INTRAVENOUS; SUBCUTANEOUS at 16:33

## 2019-10-31 RX ADMIN — SODIUM CHLORIDE 3 MILLILITER(S): 9 INJECTION INTRAMUSCULAR; INTRAVENOUS; SUBCUTANEOUS at 23:11

## 2019-10-31 RX ADMIN — FENTANYL CITRATE 30 MILLILITER(S): 50 INJECTION INTRAVENOUS at 19:29

## 2019-10-31 RX ADMIN — FENTANYL CITRATE 30 MILLILITER(S): 50 INJECTION INTRAVENOUS at 07:34

## 2019-10-31 RX ADMIN — PANTOPRAZOLE SODIUM 40 MILLIGRAM(S): 20 TABLET, DELAYED RELEASE ORAL at 12:44

## 2019-10-31 NOTE — PROGRESS NOTE ADULT - SUBJECTIVE AND OBJECTIVE BOX
Subjective: Patient c/o some incisional pain. Alleviated with PCA. Yet to use IS. Ambulating well. Voiding well. Denies fever, chills, cough, SOB.     Vital Signs:  Vital Signs Last 24 Hrs  T(C): 36.7 (10-31-19 @ 06:05), Max: 37 (10-30-19 @ 22:14)  T(F): 98 (10-31-19 @ 06:05), Max: 98.6 (10-30-19 @ 22:14)  HR: 65 (10-31-19 @ 06:05) (65 - 88)  BP: 112/60 (10-31-19 @ 06:05) (109/64 - 141/64)  RR: 16 (10-31-19 @ 06:05) (16 - 24)  SpO2: 99% (10-31-19 @ 06:05) (98% - 100%) on (O2)    I&O's Detail    30 Oct 2019 07:01  -  31 Oct 2019 07:00  --------------------------------------------------------  IN:  Total IN: 0 mL    OUT:    Chest Tube: 5 mL  Total OUT: 5 mL    Total NET: -5 mL          General: WN/WD NAD  Neurology: Awake, nonfocal, CAMARGO x 4  Eyes: Scleras clear, PERRLA/ EOMI, Gross vision intact  ENT: Gross hearing intact, grossly patent pharynx, no stridor  Neck: Neck supple, trachea midline, No JVD  Respiratory: Diminished at right base  CV: RRR, S1S2, no murmurs, rubs or gallops  Abdominal: Soft, NT, ND  Extremities: No edema  Skin: No Rashes, Hematoma, Ecchymosis  Psych: Oriented x 3, normal affect  Incisions: Dressing C/D/I  Tubes: R CT to WS, 5cc out in last 24H. small air leak this AM    Relevant labs, radiology and Medications reviewed                        13.3   8.94  )-----------( 211      ( 31 Oct 2019 06:25 )             39.2     10-31    142  |  106  |  11.0  ----------------------------<  128<H>  4.8   |  23.0  |  0.48<L>    Ca    9.0      31 Oct 2019 06:25  Mg     2.2     10-31        MEDICATIONS  (STANDING):  aspirin enteric coated 81 milliGRAM(s) Oral daily  enoxaparin Injectable 40 milliGRAM(s) SubCutaneous daily  fentaNYL PCA (50 MICROgram(s)/mL) 30 milliLiter(s) PCA Continuous PCA Continuous  pantoprazole    Tablet 40 milliGRAM(s) Oral before breakfast  senna 2 Tablet(s) Oral at bedtime  sodium chloride 0.9% lock flush 3 milliLiter(s) IV Push every 8 hours    MEDICATIONS  (PRN):  albuterol/ipratropium for Nebulization. 3 milliLiter(s) Nebulizer every 6 hours PRN Shortness of Breath and/or Wheezing  ketorolac   Injectable 15 milliGRAM(s) IV Push every 6 hours PRN Moderate Pain (4 - 6)  naloxone Injectable 0.1 milliGRAM(s) IV Push every 3 minutes PRN For ANY of the following changes in patient status:  A. RR LESS THAN 10 breaths per minute, B. Oxygen saturation LESS THAN 90%, C. Sedation score of 6  ondansetron Injectable 4 milliGRAM(s) IV Push every 6 hours PRN Nausea        Assessment  64y Female  w/ PAST MEDICAL & SURGICAL HISTORY:  Cerebrovascular accident (CVA) due to occlusion of carotid artery: no residual  Carotid stenosis, left  S/P hysterectomy  History of lumpectomy of right breast: benign  History of hand surgery: repair from dog bite 2000  S/P myomectomy  H/O dilation and curettage  admitted with complaints of Patient is a 64y old  Female who presents with a chief complaint of .  On (Date), patient underwent Wedge resection, lung  Thoracotomy, with VATS  Bronchoscopy, flexible, adult

## 2019-11-01 ENCOUNTER — TRANSCRIPTION ENCOUNTER (OUTPATIENT)
Age: 64
End: 2019-11-01

## 2019-11-01 VITALS
HEART RATE: 67 BPM | DIASTOLIC BLOOD PRESSURE: 70 MMHG | SYSTOLIC BLOOD PRESSURE: 113 MMHG | OXYGEN SATURATION: 98 % | RESPIRATION RATE: 17 BRPM | TEMPERATURE: 98 F

## 2019-11-01 LAB
ANION GAP SERPL CALC-SCNC: 11 MMOL/L — SIGNIFICANT CHANGE UP (ref 5–17)
BUN SERPL-MCNC: 14 MG/DL — SIGNIFICANT CHANGE UP (ref 8–20)
CALCIUM SERPL-MCNC: 9.1 MG/DL — SIGNIFICANT CHANGE UP (ref 8.6–10.2)
CHLORIDE SERPL-SCNC: 104 MMOL/L — SIGNIFICANT CHANGE UP (ref 98–107)
CO2 SERPL-SCNC: 24 MMOL/L — SIGNIFICANT CHANGE UP (ref 22–29)
CREAT SERPL-MCNC: 0.53 MG/DL — SIGNIFICANT CHANGE UP (ref 0.5–1.3)
GLUCOSE SERPL-MCNC: 114 MG/DL — SIGNIFICANT CHANGE UP (ref 70–115)
HCT VFR BLD CALC: 38.9 % — SIGNIFICANT CHANGE UP (ref 34.5–45)
HGB BLD-MCNC: 12.9 G/DL — SIGNIFICANT CHANGE UP (ref 11.5–15.5)
MAGNESIUM SERPL-MCNC: 1.9 MG/DL — SIGNIFICANT CHANGE UP (ref 1.6–2.6)
MCHC RBC-ENTMCNC: 31.9 PG — SIGNIFICANT CHANGE UP (ref 27–34)
MCHC RBC-ENTMCNC: 33.2 GM/DL — SIGNIFICANT CHANGE UP (ref 32–36)
MCV RBC AUTO: 96.3 FL — SIGNIFICANT CHANGE UP (ref 80–100)
NIGHT BLUE STAIN TISS: SIGNIFICANT CHANGE UP
PLATELET # BLD AUTO: 201 K/UL — SIGNIFICANT CHANGE UP (ref 150–400)
POTASSIUM SERPL-MCNC: 4.1 MMOL/L — SIGNIFICANT CHANGE UP (ref 3.5–5.3)
POTASSIUM SERPL-SCNC: 4.1 MMOL/L — SIGNIFICANT CHANGE UP (ref 3.5–5.3)
RBC # BLD: 4.04 M/UL — SIGNIFICANT CHANGE UP (ref 3.8–5.2)
RBC # FLD: 11.8 % — SIGNIFICANT CHANGE UP (ref 10.3–14.5)
SODIUM SERPL-SCNC: 139 MMOL/L — SIGNIFICANT CHANGE UP (ref 135–145)
SPECIMEN SOURCE: SIGNIFICANT CHANGE UP
WBC # BLD: 7.61 K/UL — SIGNIFICANT CHANGE UP (ref 3.8–10.5)
WBC # FLD AUTO: 7.61 K/UL — SIGNIFICANT CHANGE UP (ref 3.8–10.5)

## 2019-11-01 PROCEDURE — 71045 X-RAY EXAM CHEST 1 VIEW: CPT | Mod: 26,76

## 2019-11-01 PROCEDURE — 71045 X-RAY EXAM CHEST 1 VIEW: CPT | Mod: 26,77

## 2019-11-01 RX ORDER — MAGNESIUM SULFATE 500 MG/ML
2 VIAL (ML) INJECTION ONCE
Refills: 0 | Status: COMPLETED | OUTPATIENT
Start: 2019-11-01 | End: 2019-11-01

## 2019-11-01 RX ORDER — OXYCODONE AND ACETAMINOPHEN 5; 325 MG/1; MG/1
1 TABLET ORAL EVERY 6 HOURS
Refills: 0 | Status: DISCONTINUED | OUTPATIENT
Start: 2019-11-01 | End: 2019-11-01

## 2019-11-01 RX ADMIN — OXYCODONE AND ACETAMINOPHEN 1 TABLET(S): 5; 325 TABLET ORAL at 09:34

## 2019-11-01 RX ADMIN — Medication 50 GRAM(S): at 11:37

## 2019-11-01 RX ADMIN — FENTANYL CITRATE 30 MILLILITER(S): 50 INJECTION INTRAVENOUS at 07:20

## 2019-11-01 RX ADMIN — Medication 81 MILLIGRAM(S): at 12:41

## 2019-11-01 RX ADMIN — SODIUM CHLORIDE 3 MILLILITER(S): 9 INJECTION INTRAMUSCULAR; INTRAVENOUS; SUBCUTANEOUS at 05:56

## 2019-11-01 RX ADMIN — PANTOPRAZOLE SODIUM 40 MILLIGRAM(S): 20 TABLET, DELAYED RELEASE ORAL at 06:12

## 2019-11-01 RX ADMIN — ONDANSETRON 4 MILLIGRAM(S): 8 TABLET, FILM COATED ORAL at 06:12

## 2019-11-01 NOTE — DISCHARGE NOTE PROVIDER - CARE PROVIDER_API CALL
Emmanuel Weston (MD)  Surgery; Thoracic Surgery  [address] [place] [address]  Phone: [phone]  Fax: [phone]  Follow Up Time:

## 2019-11-01 NOTE — DISCHARGE NOTE PROVIDER - NSDCCPTREATMENT_GEN_ALL_CORE_FT
PRINCIPAL PROCEDURE  Procedure: Thoracotomy, with VATS  Findings and Treatment:       SECONDARY PROCEDURE  Procedure: Wedge resection, lung  Findings and Treatment: Right upper/lower wedge resection

## 2019-11-01 NOTE — PROGRESS NOTE ADULT - ASSESSMENT
65yo F with PMH of carotid stenosis, CVA, now s/p FB, R VATS, RUL and RLL wedge resection (x2 each lobe) for lung nodules.     PLAN  Neuro: Pain management with PCA. Transition to oral regimen once tube removed.   Pulm: Encourage coughing, deep breathing and use of incentive spirometry. Daily CXR.   Cardio: Monitor telemetry/alarms  GI: Tolerating diet. Continue stool softeners.  Renal: Monitor urine output, supplement electrolytes as needed  Vasc: Lovenox SC/SCDs for DVT prophylaxis  Heme: Stable H/H.  ID: Off antibiotics. Stable.  Therapy: OOB/ambulate  Tubes: Monitor Chest tube output. Maintain to WS. Reassess this afternoon for leak. Possible removal today pending leak results.   Disposition: Aim to D/C to home later today pending CT removal     Discussed with Cardiothoracic Team at AM rounds.    Elizabeth BLAIR  Thoracic Surgery   #[phone]
63yo F with PMH of carotid stenosis, CVA, now s/p FB, R VATS, RUL and RLL wedge resection (x2 each lobe) for lung nodules 10/30 with Dr. Weston

## 2019-11-01 NOTE — DISCHARGE NOTE PROVIDER - NSDCFUSCHEDAPPT_GEN_ALL_CORE_FT
SOHA VICENTE ; 12/05/2019 ; NPP [address] SOHA VICENTE ; 11/11/2019 ; NPP Thor Surg [address]   SOHA VICENTE ; 12/05/2019 ; NPP [facility] [address] [place] [address] SOHA VICENTE ; 11/11/2019 ; NPP Thor Surg [address]   SOHA VICENTE ; 12/05/2019 ; NPP [facility] [address] [place] [address]

## 2019-11-01 NOTE — PROGRESS NOTE ADULT - PROBLEM SELECTOR PLAN 1
air leak resolved  chest tube d/c'd  f/u post removal CXR, if no pneumothorax, will discharge pt  d/c fentanyl PCA, start percocet  OOB, ambulate  cont IS use, deep breathing/coughing  diet as tolerated, nausea resolved without intervention ? due to fentanyl?  replace lytes PRN  lovenox for dvt ppx  protonix for GI ppx  d/w Dr. Weston in AM rounds; air leak resolved  chest tube d/c'd  f/u post removal CXR, if no pneumothorax, will discharge pt  f/u Monday 11/11, suture to be removed in office   d/c fentanyl PCA, start percocet  OOB, ambulate  cont IS use, deep breathing/coughing  diet as tolerated, nausea resolved without intervention ? due to fentanyl?  replace lytes PRN  lovenox for dvt ppx  protonix for GI ppx  d/w Dr. Weston in AM rounds;

## 2019-11-01 NOTE — DISCHARGE NOTE PROVIDER - NSDCFUADDINST_GEN_ALL_CORE_FT
Please call the Cardiothoracic Surgery office at [phone] if you are experiencing any shortness of breath, chest pain, fevers or chills, drainage from the incisions, persistent nausea, vomiting or if you have any questions about your medications. If the symptoms are severe, call 911 and go to the nearest hospital.

## 2019-11-01 NOTE — DISCHARGE NOTE PROVIDER - HOSPITAL COURSE
64F, pmhx carotid stenosis, CVA (no residual deficit), hysterectomy, lumpectomy, D&C, non smoker, presented electively now s/p FB, R VATS (upper and lower wedge resection, 2 from each lobe) 10/30 with Dr. Weston. Postop course notable for air leak. chest tube placed to waterseal 10/31. Air leak resolved and chest tube d/c'd.  Tiny residual apical pneumothorax on f/u CXR (present before removal).  pt remains HD stable and is appropriate for discharge as discussed with Dr. Weston. 64F, pmhx carotid stenosis, CVA (no residual deficit), uterine CA s/p hysterectomy, lumpectomy, D&C, non smoker, incidental lung nodule found on CT (for discharge s/p hysterectomy) presented electively now s/p FB, R VATS (upper and lower wedge resection, 2 from each lobe) 10/30 with Dr. Weston. Postop course notable for air leak. chest tube placed to waterseal 10/31. Air leak resolved and chest tube d/c'd.  Tiny residual apical pneumothorax on f/u CXR (present before removal).  pt remains HD stable and is appropriate for discharge as discussed with Dr. Weston.

## 2019-11-01 NOTE — DISCHARGE NOTE NURSING/CASE MANAGEMENT/SOCIAL WORK - PATIENT PORTAL LINK FT
You can access the FollowMyHealth Patient Portal offered by [facility] by registering at the following website: [email or web address]. By joining [employer]’s FollowMyHealth portal, you will also be able to view your health information using other applications (apps) compatible with our system.

## 2019-11-01 NOTE — PROGRESS NOTE ADULT - SUBJECTIVE AND OBJECTIVE BOX
POD   Subjective:    T(C): 36.4 (11-01-19 @ 05:56), Max: 37.3 (10-31-19 @ 15:30)  HR: 83 (11-01-19 @ 05:56) (73 - 84)  BP: 131/79 (11-01-19 @ 05:56) (119/74 - 137/82)  RR: 16 (11-01-19 @ 05:56) (16 - 18)  SpO2: 95% (11-01-19 @ 05:56) (93% - 100%)    11-01    139  |  104  |  14.0  ----------------------------<  114  4.1   |  24.0  |  0.53    Ca    9.1      01 Nov 2019 05:55  Mg     1.9     11-01                                 12.9   7.61  )-----------( 201      ( 01 Nov 2019 05:55 )             38.9          I&O's Detail    31 Oct 2019 07:01  -  01 Nov 2019 07:00  --------------------------------------------------------  IN:    0.9% NaCl: 550 mL  Total IN: 550 mL    OUT:    Chest Tube: 95 mL  Total OUT: 95 mL  Total NET: 455 mL    Drug Dosing Weight  Height (cm): 154.94 (30 Oct 2019 11:10)  Weight (kg): 57.6 (30 Oct 2019 11:28)  BMI (kg/m2): 24 (30 Oct 2019 11:28)  BSA (m2): 1.56 (30 Oct 2019 11:28)    MEDICATIONS  (STANDING):  aspirin enteric coated 81 milliGRAM(s) Oral daily  enoxaparin Injectable 40 milliGRAM(s) SubCutaneous daily  fentaNYL PCA (50 MICROgram(s)/mL) 30 milliLiter(s) PCA Continuous PCA Continuous  influenza   Vaccine 0.5 milliLiter(s) IntraMuscular once  pantoprazole    Tablet 40 milliGRAM(s) Oral before breakfast  senna 2 Tablet(s) Oral at bedtime  sodium chloride 0.9% lock flush 3 milliLiter(s) IV Push every 8 hours    MEDICATIONS  (PRN):  albuterol/ipratropium for Nebulization. 3 milliLiter(s) Nebulizer every 6 hours PRN Shortness of Breath and/or Wheezing  ketorolac   Injectable 15 milliGRAM(s) IV Push every 6 hours PRN Moderate Pain (4 - 6)  naloxone Injectable 0.1 milliGRAM(s) IV Push every 3 minutes PRN For ANY of the following changes in patient status:  A. RR LESS THAN 10 breaths per minute, B. Oxygen saturation LESS THAN 90%, C. Sedation score of 6  ondansetron Injectable 4 milliGRAM(s) IV Push every 6 hours PRN Nausea      Physical Exam  Neuro:   Pulm:   CV:   Abd:   Extrem/MS:   Incision(s): POD 2 s/p FB R VATS, upper and lower wedge resection x4, postop CT with air leak, none noted today, chest tube d/c'd    Subjective: c/o pain, some relief with PCA, also with nausea this morning, resolved without intervention, denies CP, palpitations, SOB, cough, fever, chills, diaphoresis, HA, numbness/tingling, lightheadedness/dizziness, abd pain, N/V;    T(C): 36.4 (11-01-19 @ 05:56), Max: 37.3 (10-31-19 @ 15:30)  HR: 83 (11-01-19 @ 05:56) (73 - 84)  BP: 131/79 (11-01-19 @ 05:56) (119/74 - 137/82)  RR: 16 (11-01-19 @ 05:56) (16 - 18)  SpO2: 95% (11-01-19 @ 05:56) (93% - 100%)    11-01    139  |  104  |  14.0  ----------------------------<  114  4.1   |  24.0  |  0.53    Ca    9.1      01 Nov 2019 05:55  Mg     1.9     11-01                                 12.9   7.61  )-----------( 201      ( 01 Nov 2019 05:55 )             38.9          I&O's Detail    31 Oct 2019 07:01  -  01 Nov 2019 07:00  --------------------------------------------------------  IN:    0.9% NaCl: 550 mL  Total IN: 550 mL    OUT:    Chest Tube: 95 mL  Total OUT: 95 mL  Total NET: 455 mL    Drug Dosing Weight  Height (cm): 154.94 (30 Oct 2019 11:10)  Weight (kg): 57.6 (30 Oct 2019 11:28)  BMI (kg/m2): 24 (30 Oct 2019 11:28)  BSA (m2): 1.56 (30 Oct 2019 11:28)    MEDICATIONS  (STANDING):  aspirin enteric coated 81 milliGRAM(s) Oral daily  enoxaparin Injectable 40 milliGRAM(s) SubCutaneous daily  fentaNYL PCA (50 MICROgram(s)/mL) 30 milliLiter(s) PCA Continuous PCA Continuous  influenza   Vaccine 0.5 milliLiter(s) IntraMuscular once  pantoprazole    Tablet 40 milliGRAM(s) Oral before breakfast  senna 2 Tablet(s) Oral at bedtime  sodium chloride 0.9% lock flush 3 milliLiter(s) IV Push every 8 hours    MEDICATIONS  (PRN):  albuterol/ipratropium for Nebulization. 3 milliLiter(s) Nebulizer every 6 hours PRN Shortness of Breath and/or Wheezing  ketorolac   Injectable 15 milliGRAM(s) IV Push every 6 hours PRN Moderate Pain (4 - 6)  naloxone Injectable 0.1 milliGRAM(s) IV Push every 3 minutes PRN For ANY of the following changes in patient status:  A. RR LESS THAN 10 breaths per minute, B. Oxygen saturation LESS THAN 90%, C. Sedation score of 6  ondansetron Injectable 4 milliGRAM(s) IV Push every 6 hours PRN Nausea    Physical Exam  Constitutional: NAD, well developed, well nourished  Neuro: A+O x 3, non-focal, speech clear and intact  HEENT: NC/AT, PERRL, EOMI, anicteric sclerae, oral mucosa pink and moist  Neck: supple, no JVD  CV: S1S2 RRR, no murmurs/rubs  Pulm/chest: decreased breath sounds b/l, no wheezing, no use of accessory muscles  Abd: + BS soft NT ND  Ext: CAMARGO x 4, no clubbing/cyanosis/edema  Skin: warm, well perfused  Psych: calm, appropriate affect POD 2 s/p FB R VATS, upper and lower wedge resection x4, postop CT with air leak, none noted today, chest tube d/c'd    Subjective: c/o pain, some relief with PCA, also with nausea this morning, resolved without intervention, denies CP, palpitations, SOB, cough, fever, chills, diaphoresis, HA, numbness/tingling, lightheadedness/dizziness, abd pain, N/V;    T(C): 36.4 (11-01-19 @ 05:56), Max: 37.3 (10-31-19 @ 15:30)  HR: 83 (11-01-19 @ 05:56) (73 - 84)  BP: 131/79 (11-01-19 @ 05:56) (119/74 - 137/82)  RR: 16 (11-01-19 @ 05:56) (16 - 18)  SpO2: 95% (11-01-19 @ 05:56) (93% - 100%)    11-01    139  |  104  |  14.0  ----------------------------<  114  4.1   |  24.0  |  0.53    Ca    9.1      01 Nov 2019 05:55  Mg     1.9     11-01                                 12.9   7.61  )-----------( 201      ( 01 Nov 2019 05:55 )             38.9          I&O's Detail    31 Oct 2019 07:01  -  01 Nov 2019 07:00  --------------------------------------------------------  IN:    0.9% NaCl: 550 mL  Total IN: 550 mL    OUT:    Chest Tube: 95 mL  Total OUT: 95 mL  Total NET: 455 mL    Drug Dosing Weight  Height (cm): 154.94 (30 Oct 2019 11:10)  Weight (kg): 57.6 (30 Oct 2019 11:28)  BMI (kg/m2): 24 (30 Oct 2019 11:28)  BSA (m2): 1.56 (30 Oct 2019 11:28)    MEDICATIONS  (STANDING):  aspirin enteric coated 81 milliGRAM(s) Oral daily  enoxaparin Injectable 40 milliGRAM(s) SubCutaneous daily  fentaNYL PCA (50 MICROgram(s)/mL) 30 milliLiter(s) PCA Continuous PCA Continuous  influenza   Vaccine 0.5 milliLiter(s) IntraMuscular once  pantoprazole    Tablet 40 milliGRAM(s) Oral before breakfast  senna 2 Tablet(s) Oral at bedtime  sodium chloride 0.9% lock flush 3 milliLiter(s) IV Push every 8 hours    MEDICATIONS  (PRN):  albuterol/ipratropium for Nebulization. 3 milliLiter(s) Nebulizer every 6 hours PRN Shortness of Breath and/or Wheezing  ketorolac   Injectable 15 milliGRAM(s) IV Push every 6 hours PRN Moderate Pain (4 - 6)  naloxone Injectable 0.1 milliGRAM(s) IV Push every 3 minutes PRN For ANY of the following changes in patient status:  A. RR LESS THAN 10 breaths per minute, B. Oxygen saturation LESS THAN 90%, C. Sedation score of 6  ondansetron Injectable 4 milliGRAM(s) IV Push every 6 hours PRN Nausea    Physical Exam  Constitutional: NAD, well developed, well nourished  Neuro: A+O x 3, non-focal, speech clear and intact  HEENT: NC/AT, PERRL, EOMI, anicteric sclerae, oral mucosa pink and moist  Neck: supple, no JVD  CV: S1S2 RRR, no murmurs/rubs  Pulm/chest: decreased breath sounds b/l, no wheezing, no use of accessory muscles  Abd: + BS soft NT ND  Ext: CAMARGO x 4, no clubbing/cyanosis/edema  Skin: warm, well perfused  Psych: calm, appropriate affect   Inc: R VATS site C/D/I, R pCT site with suture

## 2019-11-04 LAB
CULTURE RESULTS: SIGNIFICANT CHANGE UP
SPECIMEN SOURCE: SIGNIFICANT CHANGE UP

## 2019-11-05 LAB — SURGICAL PATHOLOGY STUDY: SIGNIFICANT CHANGE UP

## 2019-11-11 ENCOUNTER — OUTPATIENT (OUTPATIENT)
Dept: OUTPATIENT SERVICES | Facility: HOSPITAL | Age: 64
LOS: 1 days | End: 2019-11-11
Payer: MEDICARE

## 2019-11-11 ENCOUNTER — APPOINTMENT (OUTPATIENT)
Dept: THORACIC SURGERY | Facility: CLINIC | Age: 64
End: 2019-11-11
Payer: MEDICARE

## 2019-11-11 VITALS
HEART RATE: 92 BPM | RESPIRATION RATE: 15 BRPM | HEIGHT: 61 IN | BODY MASS INDEX: 23.98 KG/M2 | WEIGHT: 127 LBS | OXYGEN SATURATION: 99 % | DIASTOLIC BLOOD PRESSURE: 86 MMHG | TEMPERATURE: 208.4 F | SYSTOLIC BLOOD PRESSURE: 143 MMHG

## 2019-11-11 DIAGNOSIS — Z98.890 OTHER SPECIFIED POSTPROCEDURAL STATES: Chronic | ICD-10-CM

## 2019-11-11 DIAGNOSIS — Z90.710 ACQUIRED ABSENCE OF BOTH CERVIX AND UTERUS: Chronic | ICD-10-CM

## 2019-11-11 DIAGNOSIS — R91.1 SOLITARY PULMONARY NODULE: ICD-10-CM

## 2019-11-11 PROCEDURE — 71046 X-RAY EXAM CHEST 2 VIEWS: CPT | Mod: 26

## 2019-11-11 PROCEDURE — 99024 POSTOP FOLLOW-UP VISIT: CPT

## 2019-11-11 PROCEDURE — 71046 X-RAY EXAM CHEST 2 VIEWS: CPT

## 2019-11-12 PROCEDURE — 88307 TISSUE EXAM BY PATHOLOGIST: CPT

## 2019-11-12 PROCEDURE — 87116 MYCOBACTERIA CULTURE: CPT

## 2019-11-12 PROCEDURE — 86901 BLOOD TYPING SEROLOGIC RH(D): CPT

## 2019-11-12 PROCEDURE — 86900 BLOOD TYPING SEROLOGIC ABO: CPT

## 2019-11-12 PROCEDURE — 71045 X-RAY EXAM CHEST 1 VIEW: CPT

## 2019-11-12 PROCEDURE — 87102 FUNGUS ISOLATION CULTURE: CPT

## 2019-11-12 PROCEDURE — 88341 IMHCHEM/IMCYTCHM EA ADD ANTB: CPT

## 2019-11-12 PROCEDURE — 80048 BASIC METABOLIC PNL TOTAL CA: CPT

## 2019-11-12 PROCEDURE — 85027 COMPLETE CBC AUTOMATED: CPT

## 2019-11-12 PROCEDURE — 88342 IMHCHEM/IMCYTCHM 1ST ANTB: CPT

## 2019-11-12 PROCEDURE — 36415 COLL VENOUS BLD VENIPUNCTURE: CPT

## 2019-11-12 PROCEDURE — 86850 RBC ANTIBODY SCREEN: CPT

## 2019-11-12 PROCEDURE — 87075 CULTR BACTERIA EXCEPT BLOOD: CPT

## 2019-11-12 PROCEDURE — 87206 SMEAR FLUORESCENT/ACID STAI: CPT

## 2019-11-12 PROCEDURE — 83735 ASSAY OF MAGNESIUM: CPT

## 2019-11-12 PROCEDURE — 87070 CULTURE OTHR SPECIMN AEROBIC: CPT

## 2019-11-13 NOTE — PHYSICAL EXAM
[Auscultation Breath Sounds / Voice Sounds] : lungs were clear to auscultation bilaterally [Heart Rate And Rhythm] : heart rate was normal and rhythm regular [Heart Sounds] : normal S1 and S2 [Heart Sounds Gallop] : no gallops [Murmurs] : no murmurs [Heart Sounds Pericardial Friction Rub] : no pericardial rub [Examination Of The Chest] : the chest was normal in appearance [Chest Visual Inspection Thoracic Asymmetry] : no chest asymmetry [Diminished Respiratory Excursion] : normal chest expansion [FreeTextEntry1] : wounds are healing well [Bowel Sounds] : normal bowel sounds [Abdomen Soft] : soft [Abdomen Tenderness] : non-tender [Abdomen Mass (___ Cm)] : no abdominal mass palpated [] : no hepato-splenomegaly

## 2019-11-13 NOTE — HISTORY OF PRESENT ILLNESS
[FreeTextEntry1] : \cici Gentile was in the office today for her first postoperative visit. She recently underwent thoracoscopy and resection of several pulmonary nodules. These demonstrated malignancy consistent with metastatic endometrial carcinoma.

## 2019-11-13 NOTE — ASSESSMENT
[FreeTextEntry1] : Angela Gentile is a 64-year-old female with a history of uterine carcinoma who recently underwent a thoracoscopy and biopsy which demonstrated metastatic disease. I recommend that she follow up with GYN oncology in the near future for further recommendations.\par \par Thank you for allowing me to participate in the care of your patient.\par \par Emmanuel Weston MD\[facility] Department of Cardiovascular and Thoracic Surgery\par \[facility] Sabrina Alas\[facility] [employer] [facility]\par

## 2019-11-25 LAB
CULTURE RESULTS: SIGNIFICANT CHANGE UP
SPECIMEN SOURCE: SIGNIFICANT CHANGE UP

## 2019-12-05 ENCOUNTER — APPOINTMENT (OUTPATIENT)
Dept: RADIATION ONCOLOGY | Facility: CLINIC | Age: 64
End: 2019-12-05

## 2019-12-08 ENCOUNTER — FORM ENCOUNTER (OUTPATIENT)
Age: 64
End: 2019-12-08

## 2019-12-19 ENCOUNTER — APPOINTMENT (OUTPATIENT)
Dept: RADIATION ONCOLOGY | Facility: CLINIC | Age: 64
End: 2019-12-19

## 2019-12-21 LAB
CULTURE RESULTS: SIGNIFICANT CHANGE UP
SPECIMEN SOURCE: SIGNIFICANT CHANGE UP

## 2020-07-31 ENCOUNTER — APPOINTMENT (OUTPATIENT)
Dept: GYNECOLOGIC ONCOLOGY | Facility: CLINIC | Age: 65
End: 2020-07-31
Payer: MEDICARE

## 2020-07-31 PROCEDURE — 99204 OFFICE O/P NEW MOD 45 MIN: CPT | Mod: 95

## 2020-08-04 ENCOUNTER — OUTPATIENT (OUTPATIENT)
Dept: OUTPATIENT SERVICES | Facility: HOSPITAL | Age: 65
LOS: 1 days | Discharge: HOME | End: 2020-08-04

## 2020-08-04 DIAGNOSIS — Z98.890 OTHER SPECIFIED POSTPROCEDURAL STATES: Chronic | ICD-10-CM

## 2020-08-04 DIAGNOSIS — Z90.710 ACQUIRED ABSENCE OF BOTH CERVIX AND UTERUS: Chronic | ICD-10-CM

## 2020-08-04 DIAGNOSIS — C54.1 MALIGNANT NEOPLASM OF ENDOMETRIUM: ICD-10-CM

## 2020-08-06 ENCOUNTER — RESULT REVIEW (OUTPATIENT)
Age: 65
End: 2020-08-06

## 2020-08-06 ENCOUNTER — APPOINTMENT (OUTPATIENT)
Dept: HEMATOLOGY ONCOLOGY | Facility: CLINIC | Age: 65
End: 2020-08-06
Payer: MEDICARE

## 2020-08-06 ENCOUNTER — OUTPATIENT (OUTPATIENT)
Dept: OUTPATIENT SERVICES | Facility: HOSPITAL | Age: 65
LOS: 1 days | End: 2020-08-06
Payer: MEDICARE

## 2020-08-06 VITALS
TEMPERATURE: 208.58 F | HEART RATE: 80 BPM | DIASTOLIC BLOOD PRESSURE: 76 MMHG | WEIGHT: 121.25 LBS | BODY MASS INDEX: 21.48 KG/M2 | HEIGHT: 62.99 IN | OXYGEN SATURATION: 99 % | SYSTOLIC BLOOD PRESSURE: 114 MMHG | RESPIRATION RATE: 16 BRPM

## 2020-08-06 DIAGNOSIS — Z98.890 OTHER SPECIFIED POSTPROCEDURAL STATES: Chronic | ICD-10-CM

## 2020-08-06 DIAGNOSIS — C54.1 MALIGNANT NEOPLASM OF ENDOMETRIUM: ICD-10-CM

## 2020-08-06 DIAGNOSIS — Z90.710 ACQUIRED ABSENCE OF BOTH CERVIX AND UTERUS: Chronic | ICD-10-CM

## 2020-08-06 LAB
ALBUMIN SERPL ELPH-MCNC: 3.3 G/DL
ALP BLD-CCNC: 476 U/L
ALT SERPL-CCNC: 46 U/L
ANION GAP SERPL CALC-SCNC: 16 MMOL/L
APTT BLD: 32.4 SEC
AST SERPL-CCNC: 30 U/L
BASOPHILS # BLD AUTO: 0.06 K/UL — SIGNIFICANT CHANGE UP (ref 0–0.2)
BASOPHILS NFR BLD AUTO: 0.3 % — SIGNIFICANT CHANGE UP (ref 0–2)
BILIRUB SERPL-MCNC: 0.3 MG/DL
BLD GP AB SCN SERPL QL: NEGATIVE — SIGNIFICANT CHANGE UP
BUN SERPL-MCNC: 11 MG/DL
CALCIUM SERPL-MCNC: 9.6 MG/DL
CANCER AG125 SERPL-ACNC: 88 U/ML
CEA SERPL-MCNC: 1.7 NG/ML
CHLORIDE SERPL-SCNC: 94 MMOL/L
CO2 SERPL-SCNC: 28 MMOL/L
CREAT SERPL-MCNC: 0.57 MG/DL
EOSINOPHIL # BLD AUTO: 0.04 K/UL — SIGNIFICANT CHANGE UP (ref 0–0.5)
EOSINOPHIL NFR BLD AUTO: 0.2 % — SIGNIFICANT CHANGE UP (ref 0–6)
GLUCOSE SERPL-MCNC: 130 MG/DL
HCT VFR BLD CALC: 37.6 % — SIGNIFICANT CHANGE UP (ref 34.5–45)
HGB BLD-MCNC: 12.1 G/DL — SIGNIFICANT CHANGE UP (ref 11.5–15.5)
IMM GRANULOCYTES NFR BLD AUTO: 0.7 % — SIGNIFICANT CHANGE UP (ref 0–1.5)
INR PPP: 1.12 RATIO
LYMPHOCYTES # BLD AUTO: 1.04 K/UL — SIGNIFICANT CHANGE UP (ref 1–3.3)
LYMPHOCYTES # BLD AUTO: 5.9 % — LOW (ref 13–44)
MCHC RBC-ENTMCNC: 32.2 GM/DL — SIGNIFICANT CHANGE UP (ref 32–36)
MCHC RBC-ENTMCNC: 32.3 PG — SIGNIFICANT CHANGE UP (ref 27–34)
MCV RBC AUTO: 100.3 FL — HIGH (ref 80–100)
MONOCYTES # BLD AUTO: 1.12 K/UL — HIGH (ref 0–0.9)
MONOCYTES NFR BLD AUTO: 6.4 % — SIGNIFICANT CHANGE UP (ref 2–14)
NEUTROPHILS # BLD AUTO: 15.17 K/UL — HIGH (ref 1.8–7.4)
NEUTROPHILS NFR BLD AUTO: 86.5 % — HIGH (ref 43–77)
NRBC # BLD: 0 /100 WBCS — SIGNIFICANT CHANGE UP (ref 0–0)
PLATELET # BLD AUTO: 590 K/UL — HIGH (ref 150–400)
POTASSIUM SERPL-SCNC: 4.6 MMOL/L
PROT SERPL-MCNC: 7.1 G/DL
PT BLD: 13.2 SEC
RBC # BLD: 3.75 M/UL — LOW (ref 3.8–5.2)
RBC # FLD: 11.4 % — SIGNIFICANT CHANGE UP (ref 10.3–14.5)
RH IG SCN BLD-IMP: POSITIVE — SIGNIFICANT CHANGE UP
RH IG SCN BLD-IMP: POSITIVE — SIGNIFICANT CHANGE UP
SODIUM SERPL-SCNC: 138 MMOL/L
WBC # BLD: 17.56 K/UL — HIGH (ref 3.8–10.5)
WBC # FLD AUTO: 17.56 K/UL — HIGH (ref 3.8–10.5)

## 2020-08-06 PROCEDURE — 93000 ELECTROCARDIOGRAM COMPLETE: CPT

## 2020-08-06 PROCEDURE — 99205 OFFICE O/P NEW HI 60 MIN: CPT | Mod: 25

## 2020-08-07 LAB
HAV IGM SER QL: NONREACTIVE
HBV CORE IGG+IGM SER QL: NONREACTIVE
HBV CORE IGM SER QL: NONREACTIVE
HBV SURFACE AB SER QL: REACTIVE
HBV SURFACE AG SER QL: NONREACTIVE
HBV SURFACE AG SER QL: NONREACTIVE
HCV AB SER QL: NONREACTIVE
HCV AB SER QL: NONREACTIVE
HCV S/CO RATIO: 0.09 S/CO
HCV S/CO RATIO: 0.11 S/CO

## 2020-08-11 ENCOUNTER — RESULT REVIEW (OUTPATIENT)
Age: 65
End: 2020-08-11

## 2020-08-11 ENCOUNTER — APPOINTMENT (OUTPATIENT)
Dept: HEMATOLOGY ONCOLOGY | Facility: CLINIC | Age: 65
End: 2020-08-11

## 2020-08-11 ENCOUNTER — APPOINTMENT (OUTPATIENT)
Dept: CT IMAGING | Facility: IMAGING CENTER | Age: 65
End: 2020-08-11
Payer: MEDICARE

## 2020-08-11 ENCOUNTER — OUTPATIENT (OUTPATIENT)
Dept: OUTPATIENT SERVICES | Facility: HOSPITAL | Age: 65
LOS: 1 days | End: 2020-08-11
Payer: MEDICARE

## 2020-08-11 DIAGNOSIS — Z98.890 OTHER SPECIFIED POSTPROCEDURAL STATES: Chronic | ICD-10-CM

## 2020-08-11 DIAGNOSIS — Z90.710 ACQUIRED ABSENCE OF BOTH CERVIX AND UTERUS: Chronic | ICD-10-CM

## 2020-08-11 DIAGNOSIS — C54.1 MALIGNANT NEOPLASM OF ENDOMETRIUM: ICD-10-CM

## 2020-08-11 LAB
BASOPHILS # BLD AUTO: 0.04 K/UL — SIGNIFICANT CHANGE UP (ref 0–0.2)
BASOPHILS NFR BLD AUTO: 0.3 % — SIGNIFICANT CHANGE UP (ref 0–2)
EOSINOPHIL # BLD AUTO: 0.04 K/UL — SIGNIFICANT CHANGE UP (ref 0–0.5)
EOSINOPHIL NFR BLD AUTO: 0.3 % — SIGNIFICANT CHANGE UP (ref 0–6)
HCT VFR BLD CALC: 35.1 % — SIGNIFICANT CHANGE UP (ref 34.5–45)
HGB BLD-MCNC: 11.8 G/DL — SIGNIFICANT CHANGE UP (ref 11.5–15.5)
IMM GRANULOCYTES NFR BLD AUTO: 1 % — SIGNIFICANT CHANGE UP (ref 0–1.5)
LYMPHOCYTES # BLD AUTO: 0.92 K/UL — LOW (ref 1–3.3)
LYMPHOCYTES # BLD AUTO: 6.7 % — LOW (ref 13–44)
MCHC RBC-ENTMCNC: 32.6 PG — SIGNIFICANT CHANGE UP (ref 27–34)
MCHC RBC-ENTMCNC: 33.6 GM/DL — SIGNIFICANT CHANGE UP (ref 32–36)
MCV RBC AUTO: 97 FL — SIGNIFICANT CHANGE UP (ref 80–100)
MONOCYTES # BLD AUTO: 1.06 K/UL — HIGH (ref 0–0.9)
MONOCYTES NFR BLD AUTO: 7.8 % — SIGNIFICANT CHANGE UP (ref 2–14)
NEUTROPHILS # BLD AUTO: 11.47 K/UL — HIGH (ref 1.8–7.4)
NEUTROPHILS NFR BLD AUTO: 83.9 % — HIGH (ref 43–77)
NRBC # BLD: 0 /100 WBCS — SIGNIFICANT CHANGE UP (ref 0–0)
PLATELET # BLD AUTO: 685 K/UL — HIGH (ref 150–400)
RBC # BLD: 3.62 M/UL — LOW (ref 3.8–5.2)
RBC # FLD: 11.3 % — SIGNIFICANT CHANGE UP (ref 10.3–14.5)
WBC # BLD: 13.66 K/UL — HIGH (ref 3.8–10.5)
WBC # FLD AUTO: 13.66 K/UL — HIGH (ref 3.8–10.5)

## 2020-08-11 PROCEDURE — 71260 CT THORAX DX C+: CPT

## 2020-08-11 PROCEDURE — 74177 CT ABD & PELVIS W/CONTRAST: CPT | Mod: 26

## 2020-08-11 PROCEDURE — 71260 CT THORAX DX C+: CPT | Mod: 26

## 2020-08-11 PROCEDURE — 74177 CT ABD & PELVIS W/CONTRAST: CPT

## 2020-08-12 LAB — HCV RNA FLD QL NAA+PROBE: NEGATIVE

## 2020-08-19 ENCOUNTER — RESULT REVIEW (OUTPATIENT)
Age: 65
End: 2020-08-19

## 2020-08-19 ENCOUNTER — APPOINTMENT (OUTPATIENT)
Dept: INFUSION THERAPY | Facility: HOSPITAL | Age: 65
End: 2020-08-19

## 2020-08-19 ENCOUNTER — APPOINTMENT (OUTPATIENT)
Dept: HEMATOLOGY ONCOLOGY | Facility: CLINIC | Age: 65
End: 2020-08-19
Payer: MEDICARE

## 2020-08-19 LAB
ALBUMIN SERPL ELPH-MCNC: 3.3 G/DL
ALP BLD-CCNC: 297 U/L
ALT SERPL-CCNC: 11 U/L
ANION GAP SERPL CALC-SCNC: 13 MMOL/L
AST SERPL-CCNC: 13 U/L
BASOPHILS # BLD AUTO: 0.05 K/UL — SIGNIFICANT CHANGE UP (ref 0–0.2)
BASOPHILS NFR BLD AUTO: 0.3 % — SIGNIFICANT CHANGE UP (ref 0–2)
BILIRUB SERPL-MCNC: 0.2 MG/DL
BUN SERPL-MCNC: 9 MG/DL
CALCIUM SERPL-MCNC: 10.3 MG/DL
CHLORIDE SERPL-SCNC: 92 MMOL/L
CO2 SERPL-SCNC: 26 MMOL/L
CREAT SERPL-MCNC: 0.53 MG/DL
EOSINOPHIL # BLD AUTO: 0.06 K/UL — SIGNIFICANT CHANGE UP (ref 0–0.5)
EOSINOPHIL NFR BLD AUTO: 0.4 % — SIGNIFICANT CHANGE UP (ref 0–6)
GLUCOSE SERPL-MCNC: 143 MG/DL
HCT VFR BLD CALC: 37.6 % — SIGNIFICANT CHANGE UP (ref 34.5–45)
HGB BLD-MCNC: 12.3 G/DL — SIGNIFICANT CHANGE UP (ref 11.5–15.5)
IMM GRANULOCYTES NFR BLD AUTO: 0.9 % — SIGNIFICANT CHANGE UP (ref 0–1.5)
LYMPHOCYTES # BLD AUTO: 0.93 K/UL — LOW (ref 1–3.3)
LYMPHOCYTES # BLD AUTO: 5.8 % — LOW (ref 13–44)
MAGNESIUM SERPL-MCNC: 2.3 MG/DL
MCHC RBC-ENTMCNC: 32 PG — SIGNIFICANT CHANGE UP (ref 27–34)
MCHC RBC-ENTMCNC: 32.7 GM/DL — SIGNIFICANT CHANGE UP (ref 32–36)
MCV RBC AUTO: 97.9 FL — SIGNIFICANT CHANGE UP (ref 80–100)
MONOCYTES # BLD AUTO: 1.31 K/UL — HIGH (ref 0–0.9)
MONOCYTES NFR BLD AUTO: 8.1 % — SIGNIFICANT CHANGE UP (ref 2–14)
NEUTROPHILS # BLD AUTO: 13.66 K/UL — HIGH (ref 1.8–7.4)
NEUTROPHILS NFR BLD AUTO: 84.5 % — HIGH (ref 43–77)
NRBC # BLD: 0 /100 WBCS — SIGNIFICANT CHANGE UP (ref 0–0)
PLATELET # BLD AUTO: 657 K/UL — HIGH (ref 150–400)
POTASSIUM SERPL-SCNC: 4.9 MMOL/L
PROT SERPL-MCNC: 7.1 G/DL
RBC # BLD: 3.84 M/UL — SIGNIFICANT CHANGE UP (ref 3.8–5.2)
RBC # FLD: 11.9 % — SIGNIFICANT CHANGE UP (ref 10.3–14.5)
SODIUM SERPL-SCNC: 132 MMOL/L
WBC # BLD: 16.16 K/UL — HIGH (ref 3.8–10.5)
WBC # FLD AUTO: 16.16 K/UL — HIGH (ref 3.8–10.5)

## 2020-08-19 PROCEDURE — 99214 OFFICE O/P EST MOD 30 MIN: CPT

## 2020-08-19 NOTE — REVIEW OF SYSTEMS
[Negative] : Allergic/Immunologic [FreeTextEntry8] : +vaginal discharge and spotting, vaginal mass/lumps

## 2020-08-19 NOTE — ASSESSMENT
[FreeTextEntry1] : Ms. Hoang is a 66 y/o G0 postmenopausal F who is referred to medical oncology for treatment considerations for recurrent endometrial cancer. She has a history of stage II endometrioid adenocarcinoma s/p TLH-BSO and staging surgery in 7/2018 followed by adjuvant RT, now with biopsy proven metastatic disease involving lung and vaginal cuff nodularity. She presents for initial consultation accompanied by a friend, Michelle for further oncologic management. \par \par I reviewed with the patient in detail, the findings from her recent oncologic workup including the surgical pathology findings from her lung resection and imaging studies from 10/2019 that demonstrate metastatic disease concerning for recurrent endometrial cancer. Unfortunately the patient was unable to follow up since this diagnosis until recently, and we discussed the need for repeat imaging studies to assess the full extent of her metastatic disease. \par \par We reviewed my recommendations for systemic chemotherapy for the treatment of her metastatic recurrent endometrial cancer. I explained the rationale and the goals of systemic therapy in this disease setting which is unresectable and difficult to cure. I discussed the potential benefits and risks of chemotherapy, as well as the possible side effects and toxicities of combination treatment with carboplatin and paclitaxel. These include but are not limited to: fatigue, nausea, vomiting, decreased appetite, alopecia, diarrhea, constipation, body aches, neuropathy, cytopenias with increased risk of infections, renal dysfunction, liver toxicities and allergic reactions. We also discussed the possible need for growth factors and supportive transfusions.  \par \par After a lengthy discussion, the patient demonstrated good understanding of the above and is in agreement with the plan of care as outlined. The patient signed the chemotherapy consent. \par \par Plan:\par -prechemotherapy bloodwork/EKG, check tumor markers\par -chemotherapy consent; tentatively scheduled to start chemotherapy in 1-2 weeks\par -repeat imaging to assess full extent of disease, CT CAP ordered\par -send tissue for Foundation testing and PDL1/MSI status \par -chemotherapy teaching provided \par -oxycodone prescribed for pain control and referral to palliative care \par \par All of the patient and her friend's questions and concerns were addressed in full.

## 2020-08-19 NOTE — HISTORY OF PRESENT ILLNESS
[N: ___] : N[unfilled] [T: ___] : T[unfilled] [M: ___] : M[unfilled] [AJCC Stage: ____] : AJCC Stage: [unfilled] [de-identified] :  ER +  NM +  PAX 8  +     MSI STABLE [de-identified] : Referred by Dr. Gaytan\par \par Ms. Hoang is a 64 y/o G0 postmenopausal F who is referred to medical oncology for treatment considerations for recurrent endometrial cancer.\par Her oncologic history is summarized as follows:\par \par She was initially diagnosed with stage II grade 2 endometrial cancer in 2018, and her oncologic history is summarized as follows:\par \par 18 -  She underwent robotic assisted TLH BSO, bilateral pelvic and para-aortic sentinel LN dissection by Dr. Paras Grayson\par \par Surgical pathology demonstrated pT2N0 disease with endometrial tumor measuring 4cm, FIGO 2 endometrioid adenocarcinoma, >90 myometrial invasion with involvement of cervical stroma, +LVI. \par \par IHC of MMR proteins with intact expression \par Pelvic wash negative for malignant cells\par \par 10/2018 - Completed pelvic IMRT and vaginal cuff brachytherapy with Dr. Bowling\par \par She did well clinically under surveillance until 2019 when she developed vaginal spotting and fluid discharge, and was evaluated by Dr. Grayson. CA-125 was 27. Concern for possible recurrence and further workup pursued.\par \par 19 CT CAP demonstrated:\par New bilateral pulmonary nodules, some with central cavitation, suspicious for metastatic disease\par Enhancing nodularity superior to the vaginal cuff concerning for metastatic disease, measuring 1.9x1.6cm\par \par 10/30/19 - Underwent thorascopic multiple RLL wedge resections with Dr. Weston\par Surgical pathology c/w metastatic adenocarcinoma, consistent with patient's endometrial primary\par +ER MD Pax8\par \par She was recommended further treatment for her recurrent endometrial cancer but the patient was lost to follow up until last month. Over the past several months she has noticed increasing hardness and "lumps" in her vagina, which has been progressively more painful and burning sensation. She was subsequently evaluated by Dr. Gaytan and referred to medical oncology for systemic treatment evaluation. \par \par She continues to have vaginal discharge and on and off spotting (not more than 1 pad a day). She feels constipated. She has been taking Percocet 5-325, Tylenol and ibuprofen for vaginal pain. She has been very busy with work and family affairs, was not able to come for cancer treatment over the past months. She denies fevers, chills, n/v, abdominal pain, urinary symptoms, cough, CP, SOB. \par \par PMH:\par uterine fibroids\par osteoporosis\par \par PSH:\par R breast resection (?) was told benign pathology, \par D&C \par \par All: none\par Medications: Percocet\par \par SH:\par  since , lives alone\par No children\par Works as a hairdresser\par Denies smoking history, drinks wine socially\par \par FH:\par Mother - breast cancer in her 80s\par Father - leukemia\par Sister -  recently from bladder cancer at age 68\par \par GYN:\par Menopause age 53\par No use of HRT\par G0\par \par HCM:\par Mammogram - 2019 was normal per patient report\par Colonoscopy >10 years ago, was normal per report \par Her sister got sick and passed away due to bladder cancer. \par

## 2020-08-21 ENCOUNTER — APPOINTMENT (OUTPATIENT)
Dept: INFUSION THERAPY | Facility: HOSPITAL | Age: 65
End: 2020-08-21

## 2020-08-24 DIAGNOSIS — Z51.11 ENCOUNTER FOR ANTINEOPLASTIC CHEMOTHERAPY: ICD-10-CM

## 2020-08-24 DIAGNOSIS — R11.2 NAUSEA WITH VOMITING, UNSPECIFIED: ICD-10-CM

## 2020-08-25 ENCOUNTER — APPOINTMENT (OUTPATIENT)
Dept: HEMATOLOGY ONCOLOGY | Facility: CLINIC | Age: 65
End: 2020-08-25
Payer: MEDICARE

## 2020-08-25 DIAGNOSIS — R20.8 OTHER DISTURBANCES OF SKIN SENSATION: ICD-10-CM

## 2020-08-25 PROCEDURE — 99442: CPT | Mod: 95

## 2020-08-31 ENCOUNTER — LABORATORY RESULT (OUTPATIENT)
Age: 65
End: 2020-08-31

## 2020-08-31 ENCOUNTER — APPOINTMENT (OUTPATIENT)
Dept: HEMATOLOGY ONCOLOGY | Facility: CLINIC | Age: 65
End: 2020-08-31

## 2020-09-01 NOTE — RESULTS/DATA
[FreeTextEntry1] : CT CAP 8/11/20:\par \par FINDINGS:\par CHEST:\par LUNGS AND LARGE AIRWAYS: Patent central airways. Interval marked worsening of pulmonary metastatic lesions. For example a right upper lobe mass measuring 2.5 x 2.0 cm (2:31), previously 8.0 x 0.6 cm and a right lower lobe mass measuring 2.9 x 2.5 cm (2:34), previously 1.0 x 0.9 cm. Several new metastases are seen within the bilateral lungs as well.\par PLEURA: No pleural effusion.\par VESSELS: Within normal limits.\par HEART: Heart size is normal. No pericardial effusion.\par MEDIASTINUM AND VIDAL: No lymphadenopathy.\par CHEST WALL AND LOWER NECK: Within normal limits.\par \par ABDOMEN AND PELVIS:\par LIVER: Within normal limits.\par BILE DUCTS: Common bile duct measures 7 mm, the upper limit of normal, with gradual tapering distally.\par GALLBLADDER: Within normal limits.\par SPLEEN: Within normal limits.\par PANCREAS: Within normal limits.\par ADRENALS: Within normal limits.\par KIDNEYS/URETERS: Multiple renal cysts bilaterally. No hydronephrosis.\par \par \par PELVIC ORGANS: Centrally necrotic mass measuring 4.8 x 4.4 x 8.2 cm extending from the vaginal cuff, inferiorly through the vagina to the level of the introitus. The mass abuts the posterior/inferior bladder with an irregular margin, suggesting invasion. The mass also abuts the rectum with unclear evidence of invasion.\par \par BOWEL: No bowel obstruction. Appendix is normal.\par PERITONEUM: No ascites.\par VESSELS: Within normal limits.\par RETROPERITONEUM/LYMPH NODES: New conglomerate of centrally necrotic left external iliac lymph nodes measuring 3.0 x 1.7 cm with partial encasement of the external iliac artery and compression of the external iliac vein. New centrally necrotic left inguinal lymph node/conglomerate of lymph nodes measuring 4.5 x 3.5 cm with surrounding fat stranding; the mass is inseparable from the abductor musculature.\par ABDOMINAL WALL: Within normal limits.\par BONES: No osseous lesions.\par \par IMPRESSION:\par Progression of disease since 9/17/2019.\par \par Enlarging pulmonary metastases. Large necrotic vaginal mass.\par \par Bulky necrotic left external iliac and inguinal lymphadenopathy.

## 2020-09-01 NOTE — ASSESSMENT
[FreeTextEntry1] : Ms. Hoang is a 66 y/o G0 postmenopausal F who is referred to medical oncology for treatment considerations for recurrent endometrial cancer. She has a history of stage II endometrioid adenocarcinoma s/p TLH-BSO and staging surgery in 7/2018 followed by adjuvant RT, now with biopsy proven metastatic disease involving lung and vaginal cuff nodularity. \par \par We reviewed the CT scans from 8/11 that shows disease progression since her last imaging studies in 9/2019 with enlarging pulmonary metastases, large necrotic vaginal mass and bulky pelvic adenopathy. \par Enlarging pulmonary metastases. Large necrotic vaginal mass.\par \par We again reviewed my recommendations for systemic chemotherapy for the treatment of her metastatic recurrent endometrial cancer. I explained the rationale and the goals of systemic therapy and the potential toxicities and side effects of chemotherapy with carbo/taxol. \par \par She has completed a course of antibiotics for possible superinfection of the vaginal mass that appears clean and non-bleeding today. \par \par Plan:\par -repeat bloodwork today\par -proceed with C1 carbo/taxol on 8/21 as scheduled \par -f/u Foundation testing of tumor and MSI/PDL1 testing \par -f/u palliative care referral, to see Dr. Singh \par -continue oxycodone for pian control\par \par All of the patient's questions and concerns were addressed in full.

## 2020-09-01 NOTE — HISTORY OF PRESENT ILLNESS
[T: ___] : T[unfilled] [M: ___] : M[unfilled] [N: ___] : N[unfilled] [AJCC Stage: ____] : AJCC Stage: [unfilled] [de-identified] : Referred by Dr. Gaytan\par \par Ms. Hoang is a 64 y/o G0 postmenopausal F who is referred to medical oncology for treatment considerations for recurrent endometrial cancer.\par Her oncologic history is summarized as follows:\par \par She was initially diagnosed with stage II grade 2 endometrial cancer in 2018, and her oncologic history is summarized as follows:\par \par 18 -  She underwent robotic assisted TLH BSO, bilateral pelvic and para-aortic sentinel LN dissection by Dr. Paras Grayson\par \par Surgical pathology demonstrated pT2N0 disease with endometrial tumor measuring 4cm, FIGO 2 endometrioid adenocarcinoma, >90 myometrial invasion with involvement of cervical stroma, +LVI. \par \par IHC of MMR proteins with intact expression \par Pelvic wash negative for malignant cells\par \par 10/2018 - Completed pelvic IMRT and vaginal cuff brachytherapy with Dr. Bowling\par \par She did well clinically under surveillance until 2019 when she developed vaginal spotting and fluid discharge, and was evaluated by Dr. Grayson. CA-125 was 27. Concern for possible recurrence and further workup pursued.\par \par 19 CT CAP demonstrated:\par New bilateral pulmonary nodules, some with central cavitation, suspicious for metastatic disease\par Enhancing nodularity superior to the vaginal cuff concerning for metastatic disease, measuring 1.9x1.6cm\par \par 10/30/19 - Underwent thorascopic multiple RLL wedge resections with Dr. Weston\par Surgical pathology c/w metastatic adenocarcinoma, consistent with patient's endometrial primary\par +ER IN Pax8\par \par She was recommended further treatment for her recurrent endometrial cancer but the patient was lost to follow up until last month. Over the past several months she has noticed increasing hardness and "lumps" in her vagina, which has been progressively more painful and burning sensation. She was subsequently evaluated by Dr. Gaytan and referred to medical oncology for systemic treatment evaluation. \par \par She continues to have vaginal discharge and on and off spotting (not more than 1 pad a day). She feels constipated. She has been taking Percocet 5-325, Tylenol and ibuprofen for vaginal pain. She has been very busy with work and family affairs, was not able to come for cancer treatment over the past months. She denies fevers, chills, n/v, abdominal pain, urinary symptoms, cough, CP, SOB. \par \par PMH:\par uterine fibroids\par osteoporosis\par \par PSH:\par R breast resection (?) was told benign pathology, \par D&C \par \par All: none\par Medications: Percocet\par \par SH:\par  since , lives alone\par No children\par Works as a hairdresser\par Denies smoking history, drinks wine socially\par \par FH:\par Mother - breast cancer in her 80s\par Father - leukemia\par Sister -  recently from bladder cancer at age 68\par \par GYN:\par Menopause age 53\par No use of HRT\par G0\par \par HCM:\par Mammogram - 2019 was normal per patient report\par Colonoscopy >10 years ago, was normal per report \par Her sister got sick and passed away due to bladder cancer. \par  [de-identified] :  ER +  NE +  PAX 8  +     MSI STABLE [de-identified] : The patient presents for follow up. She is scheduled for chemotherapy to start 8/21. Since her last visit she feels well overall but continues to have on and off vaginal/perineal pain. She has been taking oxycodone and analgesics with improvement, but it does not completely resolve her pain. She continues to have intermittent vaginal spotting <1pad daily. She denies fevers, chills, cough, SOB, n/v, abdominal pain, urinary symptoms, changes in bowel habits, headaches, dizziness. \par \par

## 2020-09-01 NOTE — PHYSICAL EXAM
[Fully active, able to carry on all pre-disease performance without restriction] : Status 0 - Fully active, able to carry on all pre-disease performance without restriction [Normal] : affect appropriate [de-identified] : vaginal mass protruding at midline with whitish discharge, thickened area mons pubis. Non-bleeding

## 2020-09-02 ENCOUNTER — APPOINTMENT (OUTPATIENT)
Dept: INFUSION THERAPY | Facility: HOSPITAL | Age: 65
End: 2020-09-02

## 2020-09-02 ENCOUNTER — RESULT REVIEW (OUTPATIENT)
Age: 65
End: 2020-09-02

## 2020-09-02 ENCOUNTER — LABORATORY RESULT (OUTPATIENT)
Age: 65
End: 2020-09-02

## 2020-09-02 LAB
BASOPHILS # BLD AUTO: 0 K/UL — SIGNIFICANT CHANGE UP (ref 0–0.2)
BASOPHILS NFR BLD AUTO: 0 % — SIGNIFICANT CHANGE UP (ref 0–2)
BUN SERPL-MCNC: 19 MG/DL — SIGNIFICANT CHANGE UP (ref 7–23)
CA-I BLDA-SCNC: 1.19 MMOL/L — SIGNIFICANT CHANGE UP (ref 1.12–1.3)
CHLORIDE SERPL-SCNC: 93 MMOL/L — LOW (ref 96–108)
CO2 SERPL-SCNC: 32 MMOL/L — HIGH (ref 22–31)
CREAT SERPL-MCNC: 0.6 MG/DL — SIGNIFICANT CHANGE UP (ref 0.5–1.3)
EOSINOPHIL # BLD AUTO: 0 K/UL — SIGNIFICANT CHANGE UP (ref 0–0.5)
EOSINOPHIL NFR BLD AUTO: 0 % — SIGNIFICANT CHANGE UP (ref 0–6)
GLUCOSE SERPL-MCNC: 118 MG/DL — HIGH (ref 70–99)
HCT VFR BLD CALC: 33.3 % — LOW (ref 34.5–45)
HGB BLD-MCNC: 11.6 G/DL — SIGNIFICANT CHANGE UP (ref 11.5–15.5)
LYMPHOCYTES # BLD AUTO: 0.89 K/UL — LOW (ref 1–3.3)
LYMPHOCYTES # BLD AUTO: 21 % — SIGNIFICANT CHANGE UP (ref 13–44)
MCHC RBC-ENTMCNC: 32.7 PG — SIGNIFICANT CHANGE UP (ref 27–34)
MCHC RBC-ENTMCNC: 34.8 GM/DL — SIGNIFICANT CHANGE UP (ref 32–36)
MCV RBC AUTO: 93.8 FL — SIGNIFICANT CHANGE UP (ref 80–100)
MONOCYTES # BLD AUTO: 0.38 K/UL — SIGNIFICANT CHANGE UP (ref 0–0.9)
MONOCYTES NFR BLD AUTO: 9 % — SIGNIFICANT CHANGE UP (ref 2–14)
NEUTROPHILS # BLD AUTO: 2.98 K/UL — SIGNIFICANT CHANGE UP (ref 1.8–7.4)
NEUTROPHILS NFR BLD AUTO: 67 % — SIGNIFICANT CHANGE UP (ref 43–77)
NEUTS BAND # BLD: 3 % — SIGNIFICANT CHANGE UP (ref 0–8)
NRBC # BLD: 0 /100 — SIGNIFICANT CHANGE UP (ref 0–0)
NRBC # BLD: SIGNIFICANT CHANGE UP /100 WBCS (ref 0–0)
PLAT MORPH BLD: NORMAL — SIGNIFICANT CHANGE UP
PLATELET # BLD AUTO: 217 K/UL — SIGNIFICANT CHANGE UP (ref 150–400)
POTASSIUM SERPL-MCNC: 3.5 MMOL/L — SIGNIFICANT CHANGE UP (ref 3.5–5.3)
POTASSIUM SERPL-SCNC: 3.5 MMOL/L — SIGNIFICANT CHANGE UP (ref 3.5–5.3)
RBC # BLD: 3.55 M/UL — LOW (ref 3.8–5.2)
RBC # FLD: 12.3 % — SIGNIFICANT CHANGE UP (ref 10.3–14.5)
RBC BLD AUTO: SIGNIFICANT CHANGE UP
SODIUM SERPL-SCNC: 134 MMOL/L — LOW (ref 135–145)
WBC # BLD: 4.25 K/UL — SIGNIFICANT CHANGE UP (ref 3.8–10.5)
WBC # FLD AUTO: 4.25 K/UL — SIGNIFICANT CHANGE UP (ref 3.8–10.5)

## 2020-09-03 ENCOUNTER — APPOINTMENT (OUTPATIENT)
Dept: HEMATOLOGY ONCOLOGY | Facility: CLINIC | Age: 65
End: 2020-09-03
Payer: MEDICARE

## 2020-09-03 LAB
ALBUMIN SERPL ELPH-MCNC: 3.8 G/DL
ALP BLD-CCNC: 256 U/L
ALT SERPL-CCNC: 25 U/L
ANION GAP SERPL CALC-SCNC: 21 MMOL/L
AST SERPL-CCNC: 25 U/L
BASOPHILS # BLD AUTO: 0.01 K/UL
BASOPHILS NFR BLD AUTO: 0.4 %
BILIRUB SERPL-MCNC: 0.2 MG/DL
BUN SERPL-MCNC: 14 MG/DL
CALCIUM SERPL-MCNC: 9.4 MG/DL
CHLORIDE SERPL-SCNC: 87 MMOL/L
CO2 SERPL-SCNC: 25 MMOL/L
CREAT SERPL-MCNC: 0.67 MG/DL
EOSINOPHIL # BLD AUTO: 0.02 K/UL
EOSINOPHIL NFR BLD AUTO: 0.8 %
GLUCOSE SERPL-MCNC: 95 MG/DL
HCT VFR BLD CALC: 35.5 %
HGB BLD-MCNC: 11.6 G/DL
IMM GRANULOCYTES NFR BLD AUTO: 0 %
LYMPHOCYTES # BLD AUTO: 0.87 K/UL
LYMPHOCYTES NFR BLD AUTO: 34.9 %
MAGNESIUM SERPL-MCNC: 2.3 MG/DL
MAN DIFF?: NORMAL
MCHC RBC-ENTMCNC: 31.7 PG
MCHC RBC-ENTMCNC: 32.7 GM/DL
MCV RBC AUTO: 97 FL
MONOCYTES # BLD AUTO: 1.02 K/UL
MONOCYTES NFR BLD AUTO: 41 %
NEUTROPHILS # BLD AUTO: 0.57 K/UL
NEUTROPHILS NFR BLD AUTO: 22.9 %
PLATELET # BLD AUTO: 323 K/UL
POTASSIUM SERPL-SCNC: 3.7 MMOL/L
PROT SERPL-MCNC: 6.7 G/DL
RBC # BLD: 3.66 M/UL
RBC # FLD: 11.9 %
SARS-COV-2 N GENE NPH QL NAA+PROBE: NOT DETECTED
SODIUM SERPL-SCNC: 134 MMOL/L
WBC # FLD AUTO: 2.49 K/UL

## 2020-09-03 PROCEDURE — 99443: CPT | Mod: 95

## 2020-09-05 ENCOUNTER — OUTPATIENT (OUTPATIENT)
Dept: OUTPATIENT SERVICES | Facility: HOSPITAL | Age: 65
LOS: 1 days | Discharge: HOME | End: 2020-09-05

## 2020-09-05 DIAGNOSIS — Z98.890 OTHER SPECIFIED POSTPROCEDURAL STATES: Chronic | ICD-10-CM

## 2020-09-05 DIAGNOSIS — C54.1 MALIGNANT NEOPLASM OF ENDOMETRIUM: ICD-10-CM

## 2020-09-05 DIAGNOSIS — Z90.710 ACQUIRED ABSENCE OF BOTH CERVIX AND UTERUS: Chronic | ICD-10-CM

## 2020-09-07 PROCEDURE — 86850 RBC ANTIBODY SCREEN: CPT

## 2020-09-07 PROCEDURE — 86900 BLOOD TYPING SEROLOGIC ABO: CPT

## 2020-09-07 PROCEDURE — 86901 BLOOD TYPING SEROLOGIC RH(D): CPT

## 2020-09-08 DIAGNOSIS — Z87.898 PERSONAL HISTORY OF OTHER SPECIFIED CONDITIONS: ICD-10-CM

## 2020-09-18 ENCOUNTER — RESULT REVIEW (OUTPATIENT)
Age: 65
End: 2020-09-18

## 2020-09-18 ENCOUNTER — APPOINTMENT (OUTPATIENT)
Dept: INFUSION THERAPY | Facility: HOSPITAL | Age: 65
End: 2020-09-18

## 2020-09-18 LAB
BASOPHILS # BLD AUTO: 0.04 K/UL — SIGNIFICANT CHANGE UP (ref 0–0.2)
BASOPHILS NFR BLD AUTO: 0.6 % — SIGNIFICANT CHANGE UP (ref 0–2)
EOSINOPHIL # BLD AUTO: 0.04 K/UL — SIGNIFICANT CHANGE UP (ref 0–0.5)
EOSINOPHIL NFR BLD AUTO: 0.6 % — SIGNIFICANT CHANGE UP (ref 0–6)
HCT VFR BLD CALC: 32.7 % — LOW (ref 34.5–45)
HGB BLD-MCNC: 11.4 G/DL — LOW (ref 11.5–15.5)
IMM GRANULOCYTES NFR BLD AUTO: 0.8 % — SIGNIFICANT CHANGE UP (ref 0–1.5)
LYMPHOCYTES # BLD AUTO: 1.43 K/UL — SIGNIFICANT CHANGE UP (ref 1–3.3)
LYMPHOCYTES # BLD AUTO: 20.2 % — SIGNIFICANT CHANGE UP (ref 13–44)
MCHC RBC-ENTMCNC: 33.2 PG — SIGNIFICANT CHANGE UP (ref 27–34)
MCHC RBC-ENTMCNC: 34.9 G/DL — SIGNIFICANT CHANGE UP (ref 32–36)
MCV RBC AUTO: 95.3 FL — SIGNIFICANT CHANGE UP (ref 80–100)
MONOCYTES # BLD AUTO: 1.01 K/UL — HIGH (ref 0–0.9)
MONOCYTES NFR BLD AUTO: 14.3 % — HIGH (ref 2–14)
NEUTROPHILS # BLD AUTO: 4.49 K/UL — SIGNIFICANT CHANGE UP (ref 1.8–7.4)
NEUTROPHILS NFR BLD AUTO: 63.5 % — SIGNIFICANT CHANGE UP (ref 43–77)
NRBC # BLD: 0 /100 WBCS — SIGNIFICANT CHANGE UP (ref 0–0)
PLATELET # BLD AUTO: 400 K/UL — SIGNIFICANT CHANGE UP (ref 150–400)
RBC # BLD: 3.43 M/UL — LOW (ref 3.8–5.2)
RBC # FLD: 13.5 % — SIGNIFICANT CHANGE UP (ref 10.3–14.5)
WBC # BLD: 7.07 K/UL — SIGNIFICANT CHANGE UP (ref 3.8–10.5)
WBC # FLD AUTO: 7.07 K/UL — SIGNIFICANT CHANGE UP (ref 3.8–10.5)

## 2020-09-21 DIAGNOSIS — R11.2 NAUSEA WITH VOMITING, UNSPECIFIED: ICD-10-CM

## 2020-09-21 DIAGNOSIS — Z51.11 ENCOUNTER FOR ANTINEOPLASTIC CHEMOTHERAPY: ICD-10-CM

## 2020-09-22 NOTE — HISTORY OF PRESENT ILLNESS
[N: ___] : N[unfilled] [T: ___] : T[unfilled] [M: ___] : M[unfilled] [AJCC Stage: ____] : AJCC Stage: [unfilled] [de-identified] : Referred by Dr. Gaytan\par \par Ms. Hoang is a 66 y/o G0 postmenopausal F who is referred to medical oncology for treatment considerations for recurrent endometrial cancer.\par Her oncologic history is summarized as follows:\par \par She was initially diagnosed with stage II grade 2 endometrial cancer in 2018, and her oncologic history is summarized as follows:\par \par 18 -  She underwent robotic assisted TLH BSO, bilateral pelvic and para-aortic sentinel LN dissection by Dr. Paras Grayson\par \par Surgical pathology demonstrated pT2N0 disease with endometrial tumor measuring 4cm, FIGO 2 endometrioid adenocarcinoma, >90 myometrial invasion with involvement of cervical stroma, +LVI. \par \par IHC of MMR proteins with intact expression \par Pelvic wash negative for malignant cells\par \par 10/2018 - Completed pelvic IMRT and vaginal cuff brachytherapy with Dr. Bowling\par \par She did well clinically under surveillance until 2019 when she developed vaginal spotting and fluid discharge, and was evaluated by Dr. Grayson. CA-125 was 27. Concern for possible recurrence and further workup pursued.\par \par 19 CT CAP demonstrated:\par New bilateral pulmonary nodules, some with central cavitation, suspicious for metastatic disease\par Enhancing nodularity superior to the vaginal cuff concerning for metastatic disease, measuring 1.9x1.6cm\par \par 10/30/19 - Underwent thorascopic multiple RLL wedge resections with Dr. Weston\par Surgical pathology c/w metastatic adenocarcinoma, consistent with patient's endometrial primary\par +ER IN Pax8\par \par She was recommended further treatment for her recurrent endometrial cancer but the patient was lost to follow up until last month. Over the past several months she has noticed increasing hardness and "lumps" in her vagina, which has been progressively more painful and burning sensation. She was subsequently evaluated by Dr. Gaytan and referred to medical oncology for systemic treatment evaluation. \par \par She continues to have vaginal discharge and on and off spotting (not more than 1 pad a day). She feels constipated. She has been taking Percocet 5-325, Tylenol and ibuprofen for vaginal pain. She has been very busy with work and family affairs, was not able to come for cancer treatment over the past months. She denies fevers, chills, n/v, abdominal pain, urinary symptoms, cough, CP, SOB. \par \par PMH:\par uterine fibroids\par osteoporosis\par \par PSH:\par R breast resection (?) was told benign pathology, \par D&C \par \par All: none\par Medications: Percocet\par \par SH:\par  since , lives alone\par No children\par Works as a hairdresser\par Denies smoking history, drinks wine socially\par \par FH:\par Mother - breast cancer in her 80s\par Father - leukemia\par Sister -  recently from bladder cancer at age 68\par \par GYN:\par Menopause age 53\par No use of HRT\par G0\par \par HCM:\par Mammogram - 2019 was normal per patient report\par Colonoscopy >10 years ago, was normal per report \par Her sister got sick and passed away due to bladder cancer. \par  [de-identified] :  ER +  [place] +  PAX 8  +     MSI STABLE [de-identified] : The patient presents for follow up. Since her C1 carbo/taxol treatment she felt weak and fatigued. She had diarrhea for several days following treatment that resolved 2 days ago. She has been having little energy to do her ADLs or walking much. She also has decreased appetite and has not been eating or drinking much. Since undergoing IV hydration yesterday, she is feeling much better and more energetic. She was able to do her daily ADLs, and is currently eating and drinking well today. She feels 100% better. She has stable vaginal perineal pain that is improved with oxycodone and Tylenol. She continues to have intermittent vaginal spotting <1pad daily. She denies fevers, chills, cough, SOB, n/v, abdominal pain, urinary symptoms, changes in bowel habits, headaches, dizziness. \par \par \par \par \par

## 2020-09-22 NOTE — PHYSICAL EXAM
[Normal] : affect appropriate [Restricted in physically strenuous activity but ambulatory and able to carry out work of a light or sedentary nature] : Status 1- Restricted in physically strenuous activity but ambulatory and able to carry out work of a light or sedentary nature, e.g., light house work, office work [de-identified] : vaginal mass protruding at midline with whitish discharge, thickened area mons pubis. Non-bleeding

## 2020-09-22 NOTE — ASSESSMENT
[FreeTextEntry1] : Ms. Hoang is a 64 y/o G0 postmenopausal F who is referred to medical oncology for treatment considerations for recurrent endometrial cancer. She has a history of stage II endometrioid adenocarcinoma s/p TLH-BSO and staging surgery in 7/2018 followed by adjuvant RT, now with biopsy proven metastatic disease involving lung and vaginal cuff nodularity. \par \par CT scans from 8/11 that shows disease progression since her last imaging studies in 9/2019 with enlarging pulmonary metastases, large necrotic vaginal mass and bulky pelvic adenopathy. Enlarging pulmonary metastases. Large necrotic vaginal mass.\par \par She is s/p C1 carbo/taxol and post-treatment course was c/b diarrhea and weakness, which have improved significantly after IV hydration. She is feeling back to her baseline today. We discussed management of chemotherapy-associated toxicities. She knows to call my office should there be any further concerns or symptoms.\par \par Plan:\par -interval bloodwork\par -proceed with C2 carbo/taxol as scheduled \par -f/u [facility] testing of tumor and MSI/PDL1 testing \par -f/u palliative care referral, Dr. Singh \par -continue oxycodone for pian control\par \par All of the patient's questions and concerns were addressed in full.

## 2020-09-26 ENCOUNTER — RESULT REVIEW (OUTPATIENT)
Age: 65
End: 2020-09-26

## 2020-09-26 ENCOUNTER — LABORATORY RESULT (OUTPATIENT)
Age: 65
End: 2020-09-26

## 2020-09-26 ENCOUNTER — APPOINTMENT (OUTPATIENT)
Dept: INFUSION THERAPY | Facility: HOSPITAL | Age: 65
End: 2020-09-26

## 2020-09-26 LAB
BASOPHILS # BLD AUTO: 0.02 K/UL — SIGNIFICANT CHANGE UP (ref 0–0.2)
BASOPHILS NFR BLD AUTO: 0.8 % — SIGNIFICANT CHANGE UP (ref 0–2)
EOSINOPHIL # BLD AUTO: 0.03 K/UL — SIGNIFICANT CHANGE UP (ref 0–0.5)
EOSINOPHIL NFR BLD AUTO: 1.2 % — SIGNIFICANT CHANGE UP (ref 0–6)
HCT VFR BLD CALC: 31.9 % — LOW (ref 34.5–45)
HGB BLD-MCNC: 11 G/DL — LOW (ref 11.5–15.5)
IMM GRANULOCYTES NFR BLD AUTO: 0.8 % — SIGNIFICANT CHANGE UP (ref 0–1.5)
LYMPHOCYTES # BLD AUTO: 0.59 K/UL — LOW (ref 1–3.3)
LYMPHOCYTES # BLD AUTO: 23 % — SIGNIFICANT CHANGE UP (ref 13–44)
MCHC RBC-ENTMCNC: 32.4 PG — SIGNIFICANT CHANGE UP (ref 27–34)
MCHC RBC-ENTMCNC: 34.5 G/DL — SIGNIFICANT CHANGE UP (ref 32–36)
MCV RBC AUTO: 93.8 FL — SIGNIFICANT CHANGE UP (ref 80–100)
MONOCYTES # BLD AUTO: 0.26 K/UL — SIGNIFICANT CHANGE UP (ref 0–0.9)
MONOCYTES NFR BLD AUTO: 10.1 % — SIGNIFICANT CHANGE UP (ref 2–14)
NEUTROPHILS # BLD AUTO: 1.65 K/UL — LOW (ref 1.8–7.4)
NEUTROPHILS NFR BLD AUTO: 64.1 % — SIGNIFICANT CHANGE UP (ref 43–77)
NRBC # BLD: 0 /100 WBCS — SIGNIFICANT CHANGE UP (ref 0–0)
PLATELET # BLD AUTO: 218 K/UL — SIGNIFICANT CHANGE UP (ref 150–400)
RBC # BLD: 3.4 M/UL — LOW (ref 3.8–5.2)
RBC # FLD: 12.5 % — SIGNIFICANT CHANGE UP (ref 10.3–14.5)
WBC # BLD: 2.57 K/UL — LOW (ref 3.8–10.5)
WBC # FLD AUTO: 2.57 K/UL — LOW (ref 3.8–10.5)

## 2020-09-28 ENCOUNTER — APPOINTMENT (OUTPATIENT)
Dept: HEMATOLOGY ONCOLOGY | Facility: CLINIC | Age: 65
End: 2020-09-28

## 2020-09-28 DIAGNOSIS — E86.0 DEHYDRATION: ICD-10-CM

## 2020-09-29 ENCOUNTER — APPOINTMENT (OUTPATIENT)
Dept: HEMATOLOGY ONCOLOGY | Facility: CLINIC | Age: 65
End: 2020-09-29
Payer: MEDICARE

## 2020-09-29 PROCEDURE — 99441: CPT | Mod: 95

## 2020-09-30 ENCOUNTER — APPOINTMENT (OUTPATIENT)
Dept: HEMATOLOGY ONCOLOGY | Facility: CLINIC | Age: 65
End: 2020-09-30
Payer: MEDICARE

## 2020-09-30 PROCEDURE — 99443: CPT | Mod: 95

## 2020-10-05 ENCOUNTER — OUTPATIENT (OUTPATIENT)
Dept: OUTPATIENT SERVICES | Facility: HOSPITAL | Age: 65
LOS: 1 days | Discharge: HOME | End: 2020-10-05
Payer: MEDICARE

## 2020-10-05 DIAGNOSIS — Z98.890 OTHER SPECIFIED POSTPROCEDURAL STATES: Chronic | ICD-10-CM

## 2020-10-05 DIAGNOSIS — C54.1 MALIGNANT NEOPLASM OF ENDOMETRIUM: ICD-10-CM

## 2020-10-05 DIAGNOSIS — Z90.710 ACQUIRED ABSENCE OF BOTH CERVIX AND UTERUS: Chronic | ICD-10-CM

## 2020-10-09 ENCOUNTER — APPOINTMENT (OUTPATIENT)
Dept: HEMATOLOGY ONCOLOGY | Facility: CLINIC | Age: 65
End: 2020-10-09
Payer: MEDICARE

## 2020-10-09 ENCOUNTER — RESULT REVIEW (OUTPATIENT)
Age: 65
End: 2020-10-09

## 2020-10-09 ENCOUNTER — APPOINTMENT (OUTPATIENT)
Dept: INFUSION THERAPY | Facility: HOSPITAL | Age: 65
End: 2020-10-09

## 2020-10-09 DIAGNOSIS — T80.1XXA VASCULAR COMPLICATIONS FOLLOWING INFUSION, TRANSFUSION AND THERAPEUTIC INJECTION, INITIAL ENCOUNTER: ICD-10-CM

## 2020-10-09 LAB
BASOPHILS # BLD AUTO: 0.02 K/UL — SIGNIFICANT CHANGE UP (ref 0–0.2)
BASOPHILS NFR BLD AUTO: 0.5 % — SIGNIFICANT CHANGE UP (ref 0–2)
EOSINOPHIL # BLD AUTO: 0.01 K/UL — SIGNIFICANT CHANGE UP (ref 0–0.5)
EOSINOPHIL NFR BLD AUTO: 0.2 % — SIGNIFICANT CHANGE UP (ref 0–6)
HCT VFR BLD CALC: 31.8 % — LOW (ref 34.5–45)
HGB BLD-MCNC: 11.3 G/DL — LOW (ref 11.5–15.5)
IMM GRANULOCYTES NFR BLD AUTO: 0.2 % — SIGNIFICANT CHANGE UP (ref 0–1.5)
LYMPHOCYTES # BLD AUTO: 1.31 K/UL — SIGNIFICANT CHANGE UP (ref 1–3.3)
LYMPHOCYTES # BLD AUTO: 31.5 % — SIGNIFICANT CHANGE UP (ref 13–44)
MCHC RBC-ENTMCNC: 33.3 PG — SIGNIFICANT CHANGE UP (ref 27–34)
MCHC RBC-ENTMCNC: 35.5 G/DL — SIGNIFICANT CHANGE UP (ref 32–36)
MCV RBC AUTO: 93.8 FL — SIGNIFICANT CHANGE UP (ref 80–100)
MONOCYTES # BLD AUTO: 0.49 K/UL — SIGNIFICANT CHANGE UP (ref 0–0.9)
MONOCYTES NFR BLD AUTO: 11.8 % — SIGNIFICANT CHANGE UP (ref 2–14)
NEUTROPHILS # BLD AUTO: 2.32 K/UL — SIGNIFICANT CHANGE UP (ref 1.8–7.4)
NEUTROPHILS NFR BLD AUTO: 55.8 % — SIGNIFICANT CHANGE UP (ref 43–77)
NRBC # BLD: 0 /100 WBCS — SIGNIFICANT CHANGE UP (ref 0–0)
PLATELET # BLD AUTO: 228 K/UL — SIGNIFICANT CHANGE UP (ref 150–400)
RBC # BLD: 3.39 M/UL — LOW (ref 3.8–5.2)
RBC # FLD: 14.6 % — HIGH (ref 10.3–14.5)
WBC # BLD: 4.16 K/UL — SIGNIFICANT CHANGE UP (ref 3.8–10.5)
WBC # FLD AUTO: 4.16 K/UL — SIGNIFICANT CHANGE UP (ref 3.8–10.5)

## 2020-10-09 PROCEDURE — 99212 OFFICE O/P EST SF 10 MIN: CPT

## 2020-10-12 ENCOUNTER — APPOINTMENT (OUTPATIENT)
Dept: INFUSION THERAPY | Facility: HOSPITAL | Age: 65
End: 2020-10-12

## 2020-10-12 DIAGNOSIS — Z51.11 ENCOUNTER FOR ANTINEOPLASTIC CHEMOTHERAPY: ICD-10-CM

## 2020-10-12 DIAGNOSIS — R11.2 NAUSEA WITH VOMITING, UNSPECIFIED: ICD-10-CM

## 2020-10-12 PROBLEM — T80.1XXA IV INFILTRATION, INITIAL ENCOUNTER: Status: ACTIVE | Noted: 2020-10-12

## 2020-10-15 ENCOUNTER — LABORATORY RESULT (OUTPATIENT)
Age: 65
End: 2020-10-15

## 2020-10-15 ENCOUNTER — RESULT REVIEW (OUTPATIENT)
Age: 65
End: 2020-10-15

## 2020-10-15 ENCOUNTER — OUTPATIENT (OUTPATIENT)
Dept: OUTPATIENT SERVICES | Facility: HOSPITAL | Age: 65
LOS: 1 days | End: 2020-10-15
Payer: MEDICARE

## 2020-10-15 ENCOUNTER — APPOINTMENT (OUTPATIENT)
Dept: INFUSION THERAPY | Facility: HOSPITAL | Age: 65
End: 2020-10-15

## 2020-10-15 DIAGNOSIS — Z98.890 OTHER SPECIFIED POSTPROCEDURAL STATES: Chronic | ICD-10-CM

## 2020-10-15 DIAGNOSIS — Z90.710 ACQUIRED ABSENCE OF BOTH CERVIX AND UTERUS: Chronic | ICD-10-CM

## 2020-10-15 LAB
BASOPHILS # BLD AUTO: 0 K/UL — SIGNIFICANT CHANGE UP (ref 0–0.2)
BASOPHILS NFR BLD AUTO: 0 % — SIGNIFICANT CHANGE UP (ref 0–2)
EOSINOPHIL # BLD AUTO: 0 K/UL — SIGNIFICANT CHANGE UP (ref 0–0.5)
EOSINOPHIL NFR BLD AUTO: 0 % — SIGNIFICANT CHANGE UP (ref 0–6)
HCT VFR BLD CALC: 31.7 % — LOW (ref 34.5–45)
HGB BLD-MCNC: 11.2 G/DL — LOW (ref 11.5–15.5)
LYMPHOCYTES # BLD AUTO: 0.62 K/UL — LOW (ref 1–3.3)
LYMPHOCYTES # BLD AUTO: 28 % — SIGNIFICANT CHANGE UP (ref 13–44)
MCHC RBC-ENTMCNC: 33.6 PG — SIGNIFICANT CHANGE UP (ref 27–34)
MCHC RBC-ENTMCNC: 35.3 G/DL — SIGNIFICANT CHANGE UP (ref 32–36)
MCV RBC AUTO: 95.2 FL — SIGNIFICANT CHANGE UP (ref 80–100)
MONOCYTES # BLD AUTO: 0.07 K/UL — SIGNIFICANT CHANGE UP (ref 0–0.9)
MONOCYTES NFR BLD AUTO: 3 % — SIGNIFICANT CHANGE UP (ref 2–14)
NEUTROPHILS # BLD AUTO: 1.52 K/UL — LOW (ref 1.8–7.4)
NEUTROPHILS NFR BLD AUTO: 69 % — SIGNIFICANT CHANGE UP (ref 43–77)
NRBC # BLD: 0 /100 — SIGNIFICANT CHANGE UP (ref 0–0)
NRBC # BLD: SIGNIFICANT CHANGE UP /100 WBCS (ref 0–0)
PLAT MORPH BLD: NORMAL — SIGNIFICANT CHANGE UP
PLATELET # BLD AUTO: 320 K/UL — SIGNIFICANT CHANGE UP (ref 150–400)
RBC # BLD: 3.33 M/UL — LOW (ref 3.8–5.2)
RBC # FLD: 13.7 % — SIGNIFICANT CHANGE UP (ref 10.3–14.5)
RBC BLD AUTO: SIGNIFICANT CHANGE UP
WBC # BLD: 2.21 K/UL — LOW (ref 3.8–10.5)
WBC # FLD AUTO: 2.21 K/UL — LOW (ref 3.8–10.5)

## 2020-10-16 DIAGNOSIS — E86.0 DEHYDRATION: ICD-10-CM

## 2020-10-19 ENCOUNTER — APPOINTMENT (OUTPATIENT)
Dept: HEMATOLOGY ONCOLOGY | Facility: CLINIC | Age: 65
End: 2020-10-19
Payer: MEDICARE

## 2020-10-19 VITALS
DIASTOLIC BLOOD PRESSURE: 83 MMHG | RESPIRATION RATE: 16 BRPM | SYSTOLIC BLOOD PRESSURE: 118 MMHG | WEIGHT: 112.44 LBS | TEMPERATURE: 210.56 F | OXYGEN SATURATION: 99 % | BODY MASS INDEX: 19.92 KG/M2 | HEART RATE: 114 BPM

## 2020-10-19 PROCEDURE — 99214 OFFICE O/P EST MOD 30 MIN: CPT

## 2020-10-20 ENCOUNTER — NON-APPOINTMENT (OUTPATIENT)
Age: 65
End: 2020-10-20

## 2020-10-20 DIAGNOSIS — C54.1 MALIGNANT NEOPLASM OF ENDOMETRIUM: ICD-10-CM

## 2020-10-20 NOTE — RESULTS/DATA
[FreeTextEntry1] : CT CAP 8/11/20:\par \par FINDINGS:\par CHEST:\par LUNGS AND LARGE AIRWAYS: Patent central airways. Interval marked worsening of pulmonary metastatic lesions. For example a right upper lobe mass measuring 2.5 x 2.0 cm (2:31), previously 8.0 x 0.6 cm and a right lower lobe mass measuring 2.9 x 2.5 cm (2:34), previously 1.0 x 0.9 cm. Several new metastases are seen within the bilateral lungs as well.\par PLEURA: No pleural effusion.\par VESSELS: Within normal limits.\par HEART: Heart size is normal. No pericardial effusion.\par MEDIASTINUM AND VIADL: No lymphadenopathy.\par CHEST WALL AND LOWER NECK: Within normal limits.\par \par ABDOMEN AND PELVIS:\par LIVER: Within normal limits.\par BILE DUCTS: Common bile duct measures 7 mm, the upper limit of normal, with gradual tapering distally.\par GALLBLADDER: Within normal limits.\par SPLEEN: Within normal limits.\par PANCREAS: Within normal limits.\par ADRENALS: Within normal limits.\par KIDNEYS/URETERS: Multiple renal cysts bilaterally. No hydronephrosis.\par \par \par PELVIC ORGANS: Centrally necrotic mass measuring 4.8 x 4.4 x 8.2 cm extending from the vaginal cuff, inferiorly through the vagina to the level of the introitus. The mass abuts the posterior/inferior bladder with an irregular margin, suggesting invasion. The mass also abuts the rectum with unclear evidence of invasion.\par \par BOWEL: No bowel obstruction. Appendix is normal.\par PERITONEUM: No ascites.\par VESSELS: Within normal limits.\par RETROPERITONEUM/LYMPH NODES: New conglomerate of centrally necrotic left external iliac lymph nodes measuring 3.0 x 1.7 cm with partial encasement of the external iliac artery and compression of the external iliac vein. New centrally necrotic left inguinal lymph node/conglomerate of lymph nodes measuring 4.5 x 3.5 cm with surrounding fat stranding; the mass is inseparable from the abductor musculature.\par ABDOMINAL WALL: Within normal limits.\par BONES: No osseous lesions.\par \par IMPRESSION:\par Progression of disease since 9/17/2019.\par \par Enlarging pulmonary metastases. Large necrotic vaginal mass.\par \par Bulky necrotic left external iliac and inguinal lymphadenopathy.

## 2020-10-20 NOTE — ASSESSMENT
Well-Child Checkup: 11 to 13 Years     Physical activity is key to lifelong good health. Encourage your child to find activities that he or she enjoys.     Between ages 11 and 13, your child will grow and change a lot. Its important to keep having yearly checkups so the healthcare provider can track this progress. As your child enters puberty, he or she may become more embarrassed about having a checkup. Reassure your child that the exam is normal and necessary. Be aware that the healthcare provider may ask to talk with the child without you in the exam room.  School and social issues  Here are some topics you, your child, and the healthcare provider may want to discuss during this visit:  · School performance. How is your child doing in school? Is homework finished on time? Does your child stay organized? These are skills you can help with. Keep in mind that a drop in school performance can be a sign of other problems.  · Friendships. Do you like your childs friends? Do the friendships seem healthy? Make sure to talk to your child about who his or her friends are and how they spend time together. This is the age when peer pressure can start to be a problem.  · Life at home. How is your childs behavior? Does he or she get along with others in the family? Is he or she respectful of you, other adults, and authority? Does your child participate in family events, or does he or she withdraw from other family members?  · Risky behaviors. Its not too early to start talking to your child about drugs, alcohol, smoking, and sex. Make sure your child understands that these are not activities he or she should do, even if friends are. Answer your childs questions, and dont be afraid to ask questions of your own. Make sure your child knows he or she can always come to you for help. If youre not sure how to approach these topics, talk to the healthcare provider for advice.  Entering puberty  Puberty is the stage when a  [FreeTextEntry1] : Ms. Hoang is a 64 y/o G0 postmenopausal F who is referred to medical oncology for treatment considerations for recurrent endometrial cancer. She has a history of stage II endometrioid adenocarcinoma s/p TLH-BSO and staging surgery in 7/2018 followed by adjuvant RT, now with biopsy proven metastatic disease involving lung and vaginal cuff nodularity. \par \par CT scans from 8/11 that shows disease progression since her last imaging studies in 9/2019 with enlarging pulmonary metastases, large necrotic vaginal mass and bulky pelvic adenopathy. Enlarging pulmonary metastases. Large necrotic vaginal mass.\par \par She is s/p C2 carbo/taxol and post-treatment course improved significantly after IV hydration. She is feeling back to her baseline today. We discussed management of chemotherapy-associated toxicities. She knows to call my office should there be any further concerns or symptoms.\par \par Plan:\par -interval bloodwork scheduled today\par -proceed with C3 carbo/taxol as scheduled \par -plan for imaging studies after C3 to assess response to treatment \par -f/u [facility] testing of tumor and MSI/PDL1 testing \par -f/u palliative care referral, Dr. Singh \par -continue oxycodone for pain control\par \par All of the patient's questions and concerns were addressed in full.  child begins to develop sexually into an adult. It usually starts between 9 and 14 for girls, and between 12 and 16 for boys. Here is some of what you can expect when puberty begins:  · Acne and body odor. Hormones that increase during puberty can cause acne (pimples) on the face and body. Hormones can also increase sweating and cause a stronger body odor. At this age, your child should begin to shower or bathe daily. Encourage your child to use deodorant and acne products as needed.  · Body changes in girls. Early in puberty, breasts begin to develop. One breast often starts to grow before the other. This is normal. Hair begins to grow in the pubic area, under the arms, and on the legs. Around 2 years after breasts begin to grow, a girl will start having monthly periods (menstruation). To help prepare your daughter for this change, talk to her about periods, what to expect, and how to use feminine products.  · Body changes in boys. At the start of puberty, the testicles drop lower and the scrotum darkens and becomes looser. Hair begins to grow in the pubic area, under the arms, and on the legs, chest, and face. The voice changes, becoming lower and deeper. As the penis grows and matures, erections and wet dreams begin to happen. Reassure your son that this is normal.  · Emotional changes. Along with these physical changes, youll likely notice changes in your childs personality. You may notice your child developing an interest in dating and becoming more than friends with others. Also, many kids become patiño and develop an attitude around puberty. This can be frustrating, but it is very normal. Try to be patient and consistent. Encourage conversations, even when your child doesnt seem to want to talk. No matter how your child acts, he or she still needs a parent.  Nutrition and exercise tips  Today, kids are less active and eat more junk food than ever before. Your child is starting to make choices about what  to eat and how active to be. You cant always have the final say, but you can help your child develop healthy habits. Here are some tips:  · Help your child get at least 30 to 60 minutes of activity every day. The time can be broken up throughout the day. If the weathers bad or youre worried about safety, find supervised indoor activities.   · Limit screen time to 1 hour each day. This includes time spent watching TV, playing video games, using the computer, and texting. If your child has a TV, computer, or video game console in the bedroom, consider replacing it with a music player. For many kids, dancing and singing are fun ways to get moving.  · Limit sugary drinks. Soda, juice, and sports drinks lead to unhealthy weight gain and tooth decay. Water and low-fat or nonfat milk are best to drink. In moderation (no more than 8 to 12 ounces daily), 100% fruit juice is OK. Save soda and other sugary drinks for special occasions.  · Have at least one family meal together each day. Busy schedules often limit time for sitting and talking. Sitting and eating together allows for family time. It also lets you see what and how your child eats.  · Pay attention to portions. Serve portions that make sense for your kids. Let them stop eating when theyre full--dont make them clean their plates. Be aware that many kids appetites increase during puberty. If your child is still hungry after a meal, offer seconds of vegetables or fruit.  · Serve and encourage healthy foods. Your child is making more food decisions on his or her own. All foods have a place in a balanced diet. Fruits, vegetables, lean meats, and whole grains should be eaten every day. Save less healthy foods--like french fries, candy, and chips--for a special occasion. When your child does choose to eat junk food, consider making the child buy it with his or her own money. Ask your child to tell you when he or she buys junk food or swaps food with  "friends.  · Bring your child to the dentist at least twice a year for teeth cleaning and a checkup.  Sleeping tips  At this age, your child needs about 10 hours of sleep each night. Here are some tips:  · Set a bedtime and make sure your child follows it each night.  · TV, computer, and video games can agitate a child and make it hard to calm down for the night. Turn them off the at least an hour before bed. Instead, encourage your child to read before bed.  · If your child has a cell phone, make sure its turned off at night.  · Dont let your child go to sleep very late or sleep in on weekends. This can disrupt sleep patterns and make it harder to sleep on school nights.  · Remind your child to brush and floss his or her teeth before bed. Briefly supervise your child's dental self-care once a week to make sure of proper technique.  Safety tips  Recommendations for keeping your child safe include the following:   · When riding a bike, roller-skating, or using a scooter or skateboard, your child should wear a helmet with the strap fastened. When using roller skates, a scooter, or a skateboard, it is also a good idea for your child to wear wrist guards, elbow pads, and knee pads.  · In the car, all children younger than 13 should sit in the back seat. Children shorter than 4'9" (57 inches) should continue to use a booster seat to properly position the seat belt.  · If your child has a cell phone or portable music player, make sure these are used safely and responsibly. Do not allow your child to talk on the phone, text, or listen to music with headphones while he or she is riding a bike or walking outdoors. Remind your child to pay special attention when crossing the street.  · Constant loud music can cause hearing damage, so monitor the volume on your childs music player. Many players let you set a limit for how loud the volume can be turned up. Check the directions for details.  · At this age, kids may start " taking risks that could be dangerous to their health or well-being. Sometimes bad decisions stem from peer pressure. Other times, kids just dont think ahead about what could happen. Teach your child the importance of making good decisions. Talk about how to recognize peer pressure and come up with strategies for coping with it.  · Sudden changes in your childs mood, behavior, friendships, or activities can be warning signs of problems at school or in other aspects of your childs life. If you notice signs like these, talk to your child and to the staff at your childs school. The healthcare provider may also be able to offer advice.  Vaccines  Based on recommendations from the American Association of Pediatrics, at this visit your child may receive the following vaccines:  · Human papillomavirus (HPV) (ages 11 to 12)  · Influenza (flu), annually  · Meningococcal (ages 11 to 12)  · Tetanus, diphtheria, and pertussis (ages 11 to 12)  Stay on top of social media  In this wired age, kids are much more connected with friends--possibly some theyve never met in person. To teach your child how to use social media responsibly:  · Set limits for the use of cell phones, the computer, and the Internet. Remind your child that you can check the web browser history and cell phone logs to know how these devices are being used. Use parental controls and passwords to block access to inappropriate websites. Use privacy settings on websites so only your childs friends can view his or her profile.  · Explain to your child the dangers of giving out personal information online. Teach your child not to share his or her phone number, address, picture, or other personal details with online friends without your permission.  · Make sure your child understands that things he or she says on the Internet are never private. Posts made on websites like Facebook, [employer], and [employer] can be seen by people they werent intended for. Posts can  easily be misunderstood and can even cause trouble for you or your child. Supervise your childs use of social networks, chat rooms, and email.      Next checkup at: _______________________________     PARENT NOTES:  Date Last Reviewed: 12/1/2016  © 8135-1312 [employer]. [address] [place] [address], [place] [address]. All rights reserved. This information is not intended as a substitute for professional medical care. Always follow your healthcare professional's instructions.

## 2020-10-20 NOTE — PHYSICAL EXAM
[Restricted in physically strenuous activity but ambulatory and able to carry out work of a light or sedentary nature] : Status 1- Restricted in physically strenuous activity but ambulatory and able to carry out work of a light or sedentary nature, e.g., light house work, office work [de-identified] : vaginal mass protruding at midline with whitish discharge, thickened area mons pubis. Non-bleeding

## 2020-10-20 NOTE — REVIEW OF SYSTEMS
[FreeTextEntry8] : +vaginal discharge and spotting, vaginal mass/lumps  [Negative] : Allergic/Immunologic

## 2020-10-20 NOTE — HISTORY OF PRESENT ILLNESS
[T: ___] : T[unfilled] [N: ___] : N[unfilled] [M: ___] : M[unfilled] [AJCC Stage: ____] : AJCC Stage: [unfilled] [Home] : at home, [unfilled] , at the time of the visit. [de-identified] :  ER +  [place] +  PAX 8  +     MSI STABLE [de-identified] : Referred by Dr. Gaytan\par \par Ms. Hoang is a 66 y/o G0 postmenopausal F who is referred to medical oncology for treatment considerations for recurrent endometrial cancer.\par Her oncologic history is summarized as follows:\par \par She was initially diagnosed with stage II grade 2 endometrial cancer in 2018, and her oncologic history is summarized as follows:\par \par 18 -  She underwent robotic assisted TLH BSO, bilateral pelvic and para-aortic sentinel LN dissection by Dr. Paras Grayson\par \par Surgical pathology demonstrated pT2N0 disease with endometrial tumor measuring 4cm, FIGO 2 endometrioid adenocarcinoma, >90 myometrial invasion with involvement of cervical stroma, +LVI. \par \par IHC of MMR proteins with intact expression \par Pelvic wash negative for malignant cells\par \par 10/2018 - Completed pelvic IMRT and vaginal cuff brachytherapy with Dr. Bowling\par \par She did well clinically under surveillance until 2019 when she developed vaginal spotting and fluid discharge, and was evaluated by Dr. Grayson. CA-125 was 27. Concern for possible recurrence and further workup pursued.\par \par 19 CT CAP demonstrated:\par New bilateral pulmonary nodules, some with central cavitation, suspicious for metastatic disease\par Enhancing nodularity superior to the vaginal cuff concerning for metastatic disease, measuring 1.9x1.6cm\par \par 10/30/19 - Underwent thorascopic multiple RLL wedge resections with Dr. Weston\par Surgical pathology c/w metastatic adenocarcinoma, consistent with patient's endometrial primary\par +ER [place] Pax8\par \par She was recommended further treatment for her recurrent endometrial cancer but the patient was lost to follow up until last month. Over the past several months she has noticed increasing hardness and "lumps" in her vagina, which has been progressively more painful and burning sensation. She was subsequently evaluated by Dr. Gaytan and referred to medical oncology for systemic treatment evaluation. \par \par She continues to have vaginal discharge and on and off spotting (not more than 1 pad a day). She feels constipated. She has been taking Percocet 5-325, Tylenol and ibuprofen for vaginal pain. She has been very busy with work and family affairs, was not able to come for cancer treatment over the past months. She denies fevers, chills, n/v, abdominal pain, urinary symptoms, cough, CP, SOB. \par \par PMH:\par uterine fibroids\par osteoporosis\par \par PSH:\par R breast resection (?) was told benign pathology, \par D&C \par \par All: none\par Medications: Percocet\par \par SH:\par  since , lives alone\par No children\par Works as a hairdresser\par Denies smoking history, drinks wine socially\par \par FH:\par Mother - breast cancer in her 80s\par Father - leukemia\par Sister -  recently from bladder cancer at age 68\par \par GYN:\par Menopause age 53\par No use of HRT\par G0\par \par HCM:\par Mammogram - 2019 was normal per patient report\par Colonoscopy >10 years ago, was normal per report \par Her sister got sick and passed away due to bladder cancer. \par  [Medical Office: ([facility])___] : at the medical office located in  [de-identified] : The patient presents for follow up. Since her C2 carbo/taxol treatment on 9/18 she underwent IV hydration and she felt much better with energy and appetite. She feels that her vaginal mass is regressing and pain is significantly improved. She has not had to use the oxycodone. No further vaginal spotting noted. She is able to do her daily ADLs, and is currently eating and drinking well today. She denies fevers, chills, cough, SOB, n/v, abdominal pain, urinary symptoms, changes in bowel habits, headaches, dizziness. \par \par \par \par \par  [Verbal consent obtained from patient] : the patient, [unfilled]

## 2020-10-21 ENCOUNTER — RESULT REVIEW (OUTPATIENT)
Age: 65
End: 2020-10-21

## 2020-10-21 ENCOUNTER — LABORATORY RESULT (OUTPATIENT)
Age: 65
End: 2020-10-21

## 2020-10-21 ENCOUNTER — APPOINTMENT (OUTPATIENT)
Dept: INFUSION THERAPY | Facility: HOSPITAL | Age: 65
End: 2020-10-21

## 2020-10-21 LAB
BASOPHILS # BLD AUTO: 0 K/UL — SIGNIFICANT CHANGE UP (ref 0–0.2)
BASOPHILS NFR BLD AUTO: 0 % — SIGNIFICANT CHANGE UP (ref 0–2)
EOSINOPHIL # BLD AUTO: 0.02 K/UL — SIGNIFICANT CHANGE UP (ref 0–0.5)
EOSINOPHIL NFR BLD AUTO: 1.2 % — SIGNIFICANT CHANGE UP (ref 0–6)
HCT VFR BLD CALC: 23.7 % — LOW (ref 34.5–45)
HGB BLD-MCNC: 8.4 G/DL — LOW (ref 11.5–15.5)
LYMPHOCYTES # BLD AUTO: 0.84 K/UL — LOW (ref 1–3.3)
LYMPHOCYTES # BLD AUTO: 52.3 % — HIGH (ref 13–44)
MCHC RBC-ENTMCNC: 33.7 PG — SIGNIFICANT CHANGE UP (ref 27–34)
MCHC RBC-ENTMCNC: 35.4 G/DL — SIGNIFICANT CHANGE UP (ref 32–36)
MCV RBC AUTO: 95.2 FL — SIGNIFICANT CHANGE UP (ref 80–100)
MONOCYTES # BLD AUTO: 0.48 K/UL — SIGNIFICANT CHANGE UP (ref 0–0.9)
MONOCYTES NFR BLD AUTO: 30.2 % — HIGH (ref 2–14)
NEUTROPHILS # BLD AUTO: 0.26 K/UL — LOW (ref 1.8–7.4)
NEUTROPHILS NFR BLD AUTO: 16.3 % — LOW (ref 43–77)
NRBC # BLD: 0 /100 — SIGNIFICANT CHANGE UP (ref 0–0)
NRBC # BLD: SIGNIFICANT CHANGE UP /100 WBCS (ref 0–0)
PLAT MORPH BLD: NORMAL — SIGNIFICANT CHANGE UP
PLATELET # BLD AUTO: 78 K/UL — LOW (ref 150–400)
RBC # BLD: 2.49 M/UL — LOW (ref 3.8–5.2)
RBC # FLD: 13.2 % — SIGNIFICANT CHANGE UP (ref 10.3–14.5)
RBC BLD AUTO: SIGNIFICANT CHANGE UP
WBC # BLD: 1.6 K/UL — LOW (ref 3.8–10.5)
WBC # FLD AUTO: 1.6 K/UL — LOW (ref 3.8–10.5)

## 2020-10-21 PROCEDURE — 86850 RBC ANTIBODY SCREEN: CPT

## 2020-10-21 PROCEDURE — 86923 COMPATIBILITY TEST ELECTRIC: CPT

## 2020-10-21 PROCEDURE — 86900 BLOOD TYPING SEROLOGIC ABO: CPT

## 2020-10-21 PROCEDURE — 86901 BLOOD TYPING SEROLOGIC RH(D): CPT

## 2020-10-21 PROCEDURE — 93010 ELECTROCARDIOGRAM REPORT: CPT

## 2020-10-22 ENCOUNTER — APPOINTMENT (OUTPATIENT)
Dept: INFUSION THERAPY | Facility: HOSPITAL | Age: 65
End: 2020-10-22

## 2020-10-22 ENCOUNTER — LABORATORY RESULT (OUTPATIENT)
Age: 65
End: 2020-10-22

## 2020-10-22 ENCOUNTER — NON-APPOINTMENT (OUTPATIENT)
Age: 65
End: 2020-10-22

## 2020-10-22 DIAGNOSIS — E83.42 HYPOMAGNESEMIA: ICD-10-CM

## 2020-10-23 ENCOUNTER — APPOINTMENT (OUTPATIENT)
Dept: INFUSION THERAPY | Facility: HOSPITAL | Age: 65
End: 2020-10-23

## 2020-10-23 DIAGNOSIS — Z51.89 ENCOUNTER FOR OTHER SPECIFIED AFTERCARE: ICD-10-CM

## 2020-10-24 ENCOUNTER — APPOINTMENT (OUTPATIENT)
Dept: INFUSION THERAPY | Facility: HOSPITAL | Age: 65
End: 2020-10-24

## 2020-10-26 ENCOUNTER — APPOINTMENT (OUTPATIENT)
Dept: CT IMAGING | Facility: IMAGING CENTER | Age: 65
End: 2020-10-26
Payer: MEDICARE

## 2020-10-26 ENCOUNTER — OUTPATIENT (OUTPATIENT)
Dept: OUTPATIENT SERVICES | Facility: HOSPITAL | Age: 65
LOS: 1 days | End: 2020-10-26
Payer: MEDICARE

## 2020-10-26 DIAGNOSIS — Z98.890 OTHER SPECIFIED POSTPROCEDURAL STATES: Chronic | ICD-10-CM

## 2020-10-26 DIAGNOSIS — C54.1 MALIGNANT NEOPLASM OF ENDOMETRIUM: ICD-10-CM

## 2020-10-26 DIAGNOSIS — Z90.710 ACQUIRED ABSENCE OF BOTH CERVIX AND UTERUS: Chronic | ICD-10-CM

## 2020-10-26 PROCEDURE — 74177 CT ABD & PELVIS W/CONTRAST: CPT | Mod: 26

## 2020-10-26 PROCEDURE — 74177 CT ABD & PELVIS W/CONTRAST: CPT

## 2020-10-26 PROCEDURE — 71260 CT THORAX DX C+: CPT | Mod: 26

## 2020-10-26 PROCEDURE — 71260 CT THORAX DX C+: CPT

## 2020-10-30 ENCOUNTER — APPOINTMENT (OUTPATIENT)
Dept: INFUSION THERAPY | Facility: HOSPITAL | Age: 65
End: 2020-10-30

## 2020-10-30 ENCOUNTER — OUTPATIENT (OUTPATIENT)
Dept: OUTPATIENT SERVICES | Facility: HOSPITAL | Age: 65
LOS: 1 days | Discharge: HOME | End: 2020-10-30

## 2020-10-30 DIAGNOSIS — C54.1 MALIGNANT NEOPLASM OF ENDOMETRIUM: ICD-10-CM

## 2020-10-30 DIAGNOSIS — Z98.890 OTHER SPECIFIED POSTPROCEDURAL STATES: Chronic | ICD-10-CM

## 2020-10-30 DIAGNOSIS — Z90.710 ACQUIRED ABSENCE OF BOTH CERVIX AND UTERUS: Chronic | ICD-10-CM

## 2020-10-31 ENCOUNTER — LABORATORY RESULT (OUTPATIENT)
Age: 65
End: 2020-10-31

## 2020-11-01 ENCOUNTER — LABORATORY RESULT (OUTPATIENT)
Age: 65
End: 2020-11-01

## 2020-11-01 LAB
APPEARANCE: ABNORMAL
BILIRUBIN URINE: NEGATIVE
BLOOD URINE: ABNORMAL
COLOR: YELLOW
GLUCOSE QUALITATIVE U: NEGATIVE
KETONES URINE: NEGATIVE
LEUKOCYTE ESTERASE URINE: ABNORMAL
NITRITE URINE: NEGATIVE
PH URINE: 5.5
PROTEIN URINE: ABNORMAL
SPECIFIC GRAVITY URINE: 1.02
UROBILINOGEN URINE: NORMAL

## 2020-11-02 ENCOUNTER — APPOINTMENT (OUTPATIENT)
Dept: INFUSION THERAPY | Facility: HOSPITAL | Age: 65
End: 2020-11-02

## 2020-11-03 NOTE — HISTORY OF PRESENT ILLNESS
[T: ___] : T[unfilled] [N: ___] : N[unfilled] [M: ___] : M[unfilled] [AJCC Stage: ____] : AJCC Stage: [unfilled] [de-identified] : Referred by Dr. Gaytan\par \par Ms. Hoang is a 66 y/o G0 postmenopausal F who is referred to medical oncology for treatment considerations for recurrent endometrial cancer.\par Her oncologic history is summarized as follows:\par \par She was initially diagnosed with stage II grade 2 endometrial cancer in 2018, and her oncologic history is summarized as follows:\par \par 18 -  She underwent robotic assisted TLH BSO, bilateral pelvic and para-aortic sentinel LN dissection by Dr. Paras Grayson\par \par Surgical pathology demonstrated pT2N0 disease with endometrial tumor measuring 4cm, FIGO 2 endometrioid adenocarcinoma, >90 myometrial invasion with involvement of cervical stroma, +LVI. \par \par IHC of MMR proteins with intact expression \par Pelvic wash negative for malignant cells\par \par 10/2018 - Completed pelvic IMRT and vaginal cuff brachytherapy with Dr. Bowling\par \par She did well clinically under surveillance until 2019 when she developed vaginal spotting and fluid discharge, and was evaluated by Dr. Grayson. CA-125 was 27. Concern for possible recurrence and further workup pursued.\par \par 19 CT CAP demonstrated:\par New bilateral pulmonary nodules, some with central cavitation, suspicious for metastatic disease\par Enhancing nodularity superior to the vaginal cuff concerning for metastatic disease, measuring 1.9x1.6cm\par \par 10/30/19 - Underwent thorascopic multiple RLL wedge resections with Dr. Weston\par Surgical pathology c/w metastatic adenocarcinoma, consistent with patient's endometrial primary\par +ER [place] Pax8\par \par She was recommended further treatment for her recurrent endometrial cancer but the patient was lost to follow up until last month. Over the past several months she has noticed increasing hardness and "lumps" in her vagina, which has been progressively more painful and burning sensation. She was subsequently evaluated by Dr. Gaytan and referred to medical oncology for systemic treatment evaluation. \par \par She continues to have vaginal discharge and on and off spotting (not more than 1 pad a day). She feels constipated. She has been taking Percocet 5-325, Tylenol and ibuprofen for vaginal pain. She has been very busy with work and family affairs, was not able to come for cancer treatment over the past months. She denies fevers, chills, n/v, abdominal pain, urinary symptoms, cough, CP, SOB. \par \par PMH:\par uterine fibroids\par osteoporosis\par \par PSH:\par R breast resection (?) was told benign pathology, \par D&C \par \par All: none\par Medications: Percocet\par \par SH:\par  since , lives alone\par No children\par Works as a hairdresser\par Denies smoking history, drinks wine socially\par \par FH:\par Mother - breast cancer in her 80s\par Father - leukemia\par Sister -  recently from bladder cancer at age 68\par \par GYN:\par Menopause age 53\par No use of HRT\par G0\par \par HCM:\par Mammogram - 2019 was normal per patient report\par Colonoscopy >10 years ago, was normal per report \par Her sister got sick and passed away due to bladder cancer. \par  [de-identified] :  ER +  [place] +  PAX 8  +     MSI STABLE [de-identified] : The patient presents for follow up. Since her C2 carbo/taxol treatment on 9/18 she underwent IV hydration and she felt much better with energy and appetite. She feels that her vaginal mass is regressing and pain is significantly improved. She has not had to use the oxycodone. No further vaginal spotting noted. She is able to do her daily ADLs, and is currently eating and drinking well today. She denies fevers, chills, cough, SOB, n/v, abdominal pain, urinary symptoms, changes in bowel habits, headaches, dizziness. \par \par \par \par \par

## 2020-11-03 NOTE — PHYSICAL EXAM
[Restricted in physically strenuous activity but ambulatory and able to carry out work of a light or sedentary nature] : Status 1- Restricted in physically strenuous activity but ambulatory and able to carry out work of a light or sedentary nature, e.g., light house work, office work [de-identified] : vaginal mass protruding at midline with whitish discharge, thickened area mons pubis. Non-bleeding

## 2020-11-03 NOTE — ASSESSMENT
[FreeTextEntry1] : Ms. Hoang is a 66 y/o G0 postmenopausal F who is referred to medical oncology for treatment considerations for recurrent endometrial cancer. She has a history of stage II endometrioid adenocarcinoma s/p TLH-BSO and staging surgery in 7/2018 followed by adjuvant RT, now with biopsy proven metastatic disease involving lung and vaginal cuff nodularity. \par \par CT scans from 8/11 that shows disease progression since her last imaging studies in 9/2019 with enlarging pulmonary metastases, large necrotic vaginal mass and bulky pelvic adenopathy. Enlarging pulmonary metastases. Large necrotic vaginal mass.\par \par She is s/p C2 carbo/taxol and post-treatment course improved significantly after IV hydration. She is feeling back to her baseline today. We discussed management of chemotherapy-associated toxicities. She knows to call my office should there be any further concerns or symptoms.\par \par Plan:\par -interval bloodwork scheduled today\par -proceed with C3 carbo/taxol as scheduled \par -plan for imaging studies after C3 to assess response to treatment \par -f/u [facility] testing of tumor and MSI/PDL1 testing \par -f/u palliative care referral, Dr. Singh \par -continue oxycodone for pain control\par \par All of the patient's questions and concerns were addressed in full.

## 2020-11-06 ENCOUNTER — RESULT REVIEW (OUTPATIENT)
Age: 65
End: 2020-11-06

## 2020-11-06 ENCOUNTER — APPOINTMENT (OUTPATIENT)
Dept: HEMATOLOGY ONCOLOGY | Facility: CLINIC | Age: 65
End: 2020-11-06
Payer: MEDICARE

## 2020-11-06 ENCOUNTER — APPOINTMENT (OUTPATIENT)
Dept: INFUSION THERAPY | Facility: HOSPITAL | Age: 65
End: 2020-11-06

## 2020-11-06 DIAGNOSIS — Z51.11 ENCOUNTER FOR ANTINEOPLASTIC CHEMOTHERAPY: ICD-10-CM

## 2020-11-06 DIAGNOSIS — R11.2 NAUSEA WITH VOMITING, UNSPECIFIED: ICD-10-CM

## 2020-11-06 LAB
BASOPHILS # BLD AUTO: 0.03 K/UL — SIGNIFICANT CHANGE UP (ref 0–0.2)
BASOPHILS NFR BLD AUTO: 0.9 % — SIGNIFICANT CHANGE UP (ref 0–2)
EOSINOPHIL # BLD AUTO: 0.04 K/UL — SIGNIFICANT CHANGE UP (ref 0–0.5)
EOSINOPHIL NFR BLD AUTO: 1.2 % — SIGNIFICANT CHANGE UP (ref 0–6)
HCT VFR BLD CALC: 35.1 % — SIGNIFICANT CHANGE UP (ref 34.5–45)
HGB BLD-MCNC: 12.1 G/DL — SIGNIFICANT CHANGE UP (ref 11.5–15.5)
IMM GRANULOCYTES NFR BLD AUTO: 0.9 % — SIGNIFICANT CHANGE UP (ref 0–1.5)
LYMPHOCYTES # BLD AUTO: 0.95 K/UL — LOW (ref 1–3.3)
LYMPHOCYTES # BLD AUTO: 29.2 % — SIGNIFICANT CHANGE UP (ref 13–44)
MCHC RBC-ENTMCNC: 33.1 PG — SIGNIFICANT CHANGE UP (ref 27–34)
MCHC RBC-ENTMCNC: 35 G/DL — SIGNIFICANT CHANGE UP (ref 32–36)
MCV RBC AUTO: 94.4 FL — SIGNIFICANT CHANGE UP (ref 80–100)
MONOCYTES # BLD AUTO: 0.71 K/UL — SIGNIFICANT CHANGE UP (ref 0–0.9)
MONOCYTES NFR BLD AUTO: 21.8 % — HIGH (ref 2–14)
NEUTROPHILS # BLD AUTO: 1.49 K/UL — LOW (ref 1.8–7.4)
NEUTROPHILS NFR BLD AUTO: 46 % — SIGNIFICANT CHANGE UP (ref 43–77)
NRBC # BLD: 0 /100 WBCS — SIGNIFICANT CHANGE UP (ref 0–0)
PLATELET # BLD AUTO: 447 K/UL — HIGH (ref 150–400)
RBC # BLD: 3.72 M/UL — LOW (ref 3.8–5.2)
RBC # FLD: 15.5 % — HIGH (ref 10.3–14.5)
WBC # BLD: 3.25 K/UL — LOW (ref 3.8–10.5)
WBC # FLD AUTO: 3.25 K/UL — LOW (ref 3.8–10.5)

## 2020-11-06 PROCEDURE — 99213 OFFICE O/P EST LOW 20 MIN: CPT

## 2020-11-11 ENCOUNTER — APPOINTMENT (OUTPATIENT)
Dept: INFUSION THERAPY | Facility: HOSPITAL | Age: 65
End: 2020-11-11

## 2020-11-11 ENCOUNTER — RESULT REVIEW (OUTPATIENT)
Age: 65
End: 2020-11-11

## 2020-11-11 ENCOUNTER — LABORATORY RESULT (OUTPATIENT)
Age: 65
End: 2020-11-11

## 2020-11-11 LAB
BASOPHILS # BLD AUTO: 0 K/UL — SIGNIFICANT CHANGE UP (ref 0–0.2)
BASOPHILS NFR BLD AUTO: 0 % — SIGNIFICANT CHANGE UP (ref 0–2)
EOSINOPHIL # BLD AUTO: 0.06 K/UL — SIGNIFICANT CHANGE UP (ref 0–0.5)
EOSINOPHIL NFR BLD AUTO: 1 % — SIGNIFICANT CHANGE UP (ref 0–6)
HCT VFR BLD CALC: 35.7 % — SIGNIFICANT CHANGE UP (ref 34.5–45)
HGB BLD-MCNC: 12.2 G/DL — SIGNIFICANT CHANGE UP (ref 11.5–15.5)
LYMPHOCYTES # BLD AUTO: 0.68 K/UL — LOW (ref 1–3.3)
LYMPHOCYTES # BLD AUTO: 11 % — LOW (ref 13–44)
MCHC RBC-ENTMCNC: 33 PG — SIGNIFICANT CHANGE UP (ref 27–34)
MCHC RBC-ENTMCNC: 34.2 G/DL — SIGNIFICANT CHANGE UP (ref 32–36)
MCV RBC AUTO: 96.5 FL — SIGNIFICANT CHANGE UP (ref 80–100)
MONOCYTES # BLD AUTO: 0.43 K/UL — SIGNIFICANT CHANGE UP (ref 0–0.9)
MONOCYTES NFR BLD AUTO: 7 % — SIGNIFICANT CHANGE UP (ref 2–14)
NEUTROPHILS # BLD AUTO: 4.99 K/UL — SIGNIFICANT CHANGE UP (ref 1.8–7.4)
NEUTROPHILS NFR BLD AUTO: 81 % — HIGH (ref 43–77)
NRBC # BLD: 0 /100 — SIGNIFICANT CHANGE UP (ref 0–0)
NRBC # BLD: SIGNIFICANT CHANGE UP /100 WBCS (ref 0–0)
PLAT MORPH BLD: NORMAL — SIGNIFICANT CHANGE UP
PLATELET # BLD AUTO: 217 K/UL — SIGNIFICANT CHANGE UP (ref 150–400)
RBC # BLD: 3.7 M/UL — LOW (ref 3.8–5.2)
RBC # FLD: 15 % — HIGH (ref 10.3–14.5)
RBC BLD AUTO: SIGNIFICANT CHANGE UP
WBC # BLD: 6.16 K/UL — SIGNIFICANT CHANGE UP (ref 3.8–10.5)
WBC # FLD AUTO: 6.16 K/UL — SIGNIFICANT CHANGE UP (ref 3.8–10.5)

## 2020-11-13 ENCOUNTER — APPOINTMENT (OUTPATIENT)
Dept: INFUSION THERAPY | Facility: HOSPITAL | Age: 65
End: 2020-11-13

## 2020-11-16 ENCOUNTER — APPOINTMENT (OUTPATIENT)
Dept: HEMATOLOGY ONCOLOGY | Facility: CLINIC | Age: 65
End: 2020-11-16

## 2020-11-18 ENCOUNTER — RESULT REVIEW (OUTPATIENT)
Age: 65
End: 2020-11-18

## 2020-11-18 ENCOUNTER — APPOINTMENT (OUTPATIENT)
Dept: HEMATOLOGY ONCOLOGY | Facility: CLINIC | Age: 65
End: 2020-11-18
Payer: MEDICARE

## 2020-11-18 VITALS
BODY MASS INDEX: 21.49 KG/M2 | RESPIRATION RATE: 16 BRPM | HEART RATE: 87 BPM | WEIGHT: 121.25 LBS | TEMPERATURE: 207.5 F | SYSTOLIC BLOOD PRESSURE: 149 MMHG | OXYGEN SATURATION: 99 % | DIASTOLIC BLOOD PRESSURE: 88 MMHG

## 2020-11-18 LAB
BASOPHILS # BLD AUTO: 0.14 K/UL — SIGNIFICANT CHANGE UP (ref 0–0.2)
BASOPHILS NFR BLD AUTO: 0.9 % — SIGNIFICANT CHANGE UP (ref 0–2)
EOSINOPHIL # BLD AUTO: 0.02 K/UL — SIGNIFICANT CHANGE UP (ref 0–0.5)
EOSINOPHIL NFR BLD AUTO: 0.1 % — SIGNIFICANT CHANGE UP (ref 0–6)
HCT VFR BLD CALC: 34.8 % — SIGNIFICANT CHANGE UP (ref 34.5–45)
HGB BLD-MCNC: 11.8 G/DL — SIGNIFICANT CHANGE UP (ref 11.5–15.5)
IMM GRANULOCYTES NFR BLD AUTO: 5.4 % — HIGH (ref 0–1.5)
LYMPHOCYTES # BLD AUTO: 1.48 K/UL — SIGNIFICANT CHANGE UP (ref 1–3.3)
LYMPHOCYTES # BLD AUTO: 9.2 % — LOW (ref 13–44)
MCHC RBC-ENTMCNC: 33.2 PG — SIGNIFICANT CHANGE UP (ref 27–34)
MCHC RBC-ENTMCNC: 33.9 G/DL — SIGNIFICANT CHANGE UP (ref 32–36)
MCV RBC AUTO: 98 FL — SIGNIFICANT CHANGE UP (ref 80–100)
MONOCYTES # BLD AUTO: 1.31 K/UL — HIGH (ref 0–0.9)
MONOCYTES NFR BLD AUTO: 8.1 % — SIGNIFICANT CHANGE UP (ref 2–14)
NEUTROPHILS # BLD AUTO: 12.34 K/UL — HIGH (ref 1.8–7.4)
NEUTROPHILS NFR BLD AUTO: 76.3 % — SIGNIFICANT CHANGE UP (ref 43–77)
NRBC # BLD: 0 /100 WBCS — SIGNIFICANT CHANGE UP (ref 0–0)
PLATELET # BLD AUTO: 104 K/UL — LOW (ref 150–400)
RBC # BLD: 3.55 M/UL — LOW (ref 3.8–5.2)
RBC # FLD: 14.6 % — HIGH (ref 10.3–14.5)
WBC # BLD: 16.16 K/UL — HIGH (ref 3.8–10.5)
WBC # FLD AUTO: 16.16 K/UL — HIGH (ref 3.8–10.5)

## 2020-11-18 PROCEDURE — 99214 OFFICE O/P EST MOD 30 MIN: CPT

## 2020-11-18 RX ORDER — GABAPENTIN 300 MG/1
300 CAPSULE ORAL
Qty: 90 | Refills: 3 | Status: DISCONTINUED | COMMUNITY
Start: 2020-08-25 | End: 2020-11-18

## 2020-11-19 ENCOUNTER — RESULT REVIEW (OUTPATIENT)
Age: 65
End: 2020-11-19

## 2020-11-19 ENCOUNTER — APPOINTMENT (OUTPATIENT)
Dept: HEMATOLOGY ONCOLOGY | Facility: CLINIC | Age: 65
End: 2020-11-19

## 2020-11-19 LAB
BASOPHILS # BLD AUTO: 0.05 K/UL — SIGNIFICANT CHANGE UP (ref 0–0.2)
BASOPHILS NFR BLD AUTO: 0.4 % — SIGNIFICANT CHANGE UP (ref 0–2)
EOSINOPHIL # BLD AUTO: 0.03 K/UL — SIGNIFICANT CHANGE UP (ref 0–0.5)
EOSINOPHIL NFR BLD AUTO: 0.3 % — SIGNIFICANT CHANGE UP (ref 0–6)
HCT VFR BLD CALC: 31.7 % — LOW (ref 34.5–45)
HGB BLD-MCNC: 11.1 G/DL — LOW (ref 11.5–15.5)
IMM GRANULOCYTES NFR BLD AUTO: 2.1 % — HIGH (ref 0–1.5)
INR BLD: 0.96 RATIO — SIGNIFICANT CHANGE UP (ref 0.88–1.16)
LYMPHOCYTES # BLD AUTO: 1.33 K/UL — SIGNIFICANT CHANGE UP (ref 1–3.3)
LYMPHOCYTES # BLD AUTO: 11.8 % — LOW (ref 13–44)
MCHC RBC-ENTMCNC: 33.4 PG — SIGNIFICANT CHANGE UP (ref 27–34)
MCHC RBC-ENTMCNC: 35 G/DL — SIGNIFICANT CHANGE UP (ref 32–36)
MCV RBC AUTO: 95.5 FL — SIGNIFICANT CHANGE UP (ref 80–100)
MONOCYTES # BLD AUTO: 0.9 K/UL — SIGNIFICANT CHANGE UP (ref 0–0.9)
MONOCYTES NFR BLD AUTO: 8 % — SIGNIFICANT CHANGE UP (ref 2–14)
NEUTROPHILS # BLD AUTO: 8.71 K/UL — HIGH (ref 1.8–7.4)
NEUTROPHILS NFR BLD AUTO: 77.4 % — HIGH (ref 43–77)
NRBC # BLD: 0 /100 WBCS — SIGNIFICANT CHANGE UP (ref 0–0)
PLATELET # BLD AUTO: 103 K/UL — LOW (ref 150–400)
PROTHROM AB SERPL-ACNC: 11.5 SEC — SIGNIFICANT CHANGE UP (ref 10.6–13.6)
RBC # BLD: 3.32 M/UL — LOW (ref 3.8–5.2)
RBC # FLD: 14.8 % — HIGH (ref 10.3–14.5)
WBC # BLD: 11.26 K/UL — HIGH (ref 3.8–10.5)
WBC # FLD AUTO: 11.26 K/UL — HIGH (ref 3.8–10.5)

## 2020-11-20 ENCOUNTER — RESULT REVIEW (OUTPATIENT)
Age: 65
End: 2020-11-20

## 2020-11-20 ENCOUNTER — OUTPATIENT (OUTPATIENT)
Dept: OUTPATIENT SERVICES | Facility: HOSPITAL | Age: 65
LOS: 1 days | End: 2020-11-20
Payer: MEDICARE

## 2020-11-20 VITALS
OXYGEN SATURATION: 100 % | TEMPERATURE: 98 F | SYSTOLIC BLOOD PRESSURE: 122 MMHG | RESPIRATION RATE: 16 BRPM | WEIGHT: 111.99 LBS | HEIGHT: 61 IN | HEART RATE: 89 BPM | DIASTOLIC BLOOD PRESSURE: 79 MMHG

## 2020-11-20 VITALS
DIASTOLIC BLOOD PRESSURE: 72 MMHG | HEART RATE: 82 BPM | RESPIRATION RATE: 17 BRPM | OXYGEN SATURATION: 99 % | SYSTOLIC BLOOD PRESSURE: 125 MMHG

## 2020-11-20 DIAGNOSIS — Z90.710 ACQUIRED ABSENCE OF BOTH CERVIX AND UTERUS: Chronic | ICD-10-CM

## 2020-11-20 DIAGNOSIS — Z98.890 OTHER SPECIFIED POSTPROCEDURAL STATES: Chronic | ICD-10-CM

## 2020-11-20 DIAGNOSIS — C54.1 MALIGNANT NEOPLASM OF ENDOMETRIUM: ICD-10-CM

## 2020-11-20 LAB
ALBUMIN SERPL ELPH-MCNC: 4.2 G/DL
ALP BLD-CCNC: 277 U/L
ALT SERPL-CCNC: 56 U/L
ANION GAP SERPL CALC-SCNC: 12 MMOL/L
AST SERPL-CCNC: 38 U/L
BILIRUB SERPL-MCNC: 0.2 MG/DL
BUN SERPL-MCNC: 11 MG/DL
CALCIUM SERPL-MCNC: 9 MG/DL
CANCER AG125 SERPL-ACNC: 12 U/ML
CHLORIDE SERPL-SCNC: 104 MMOL/L
CO2 SERPL-SCNC: 25 MMOL/L
CREAT SERPL-MCNC: 0.66 MG/DL
GLUCOSE SERPL-MCNC: 104 MG/DL
MAGNESIUM SERPL-MCNC: 1.7 MG/DL
POTASSIUM SERPL-SCNC: 3.6 MMOL/L
PROT SERPL-MCNC: 7.2 G/DL
SARS-COV-2 N GENE NPH QL NAA+PROBE: NOT DETECTED
SODIUM SERPL-SCNC: 142 MMOL/L

## 2020-11-20 PROCEDURE — 76937 US GUIDE VASCULAR ACCESS: CPT | Mod: 26

## 2020-11-20 PROCEDURE — 36561 INSERT TUNNELED CV CATH: CPT

## 2020-11-20 PROCEDURE — C1769: CPT

## 2020-11-20 PROCEDURE — 76937 US GUIDE VASCULAR ACCESS: CPT

## 2020-11-20 PROCEDURE — 77001 FLUOROGUIDE FOR VEIN DEVICE: CPT | Mod: 26

## 2020-11-20 PROCEDURE — 77001 FLUOROGUIDE FOR VEIN DEVICE: CPT

## 2020-11-20 PROCEDURE — C1894: CPT

## 2020-11-20 PROCEDURE — C1788: CPT

## 2020-11-20 RX ORDER — ACETAMINOPHEN 500 MG
650 TABLET ORAL ONCE
Refills: 0 | Status: DISCONTINUED | OUTPATIENT
Start: 2020-11-20 | End: 2020-12-04

## 2020-11-20 NOTE — PRE PROCEDURE NOTE - PRE PROCEDURE EVALUATION
Interventional Radiology Pre-Procedure Note    This is a 65y Female with Lung metastatic adenocarcinoma who presents to IR for mediport placement.    NPO:  Anticoagulation:  Antibiotics:  Adverse reaction to anesthesia:  Objection to blood products:     PAST MEDICAL & SURGICAL HISTORY:  Cerebrovascular accident (CVA) due to occlusion of carotid artery  no residual    Carotid stenosis, left    S/P hysterectomy    History of lumpectomy of right breast  benign    History of hand surgery  repair from dog bite 2000    S/P myomectomy    H/O dilation and curettage         Vitals:Vital Signs Last 24 Hrs  T(C): --  T(F): --  HR: --  BP: --  BP(mean): --  RR: --  SpO2: --    Allergies: Allergies    gabapentin (Other)    Intolerances        Physical Exam: Gen; NAD    Labs: LAbs reviewed and WNL                        11.1   11.26 )-----------( 103      ( 19 Nov 2020 14:32 )             31.7           PT/INR - ( 19 Nov 2020 18:11 )   PT: 11.5 sec;   INR: 0.96 ratio             Informed consent obtained. All questions and concerns have been addressed at this time.     Plan: Mediport placement   Interventional Radiology Pre-Procedure Note    This is a 65y Female with Uterine CA as primary site s/p hysterectomy, Lung metastatic adenocarcinoma who presents to IR for mediport placement.    NPO:  Anticoagulation:  Antibiotics:  Adverse reaction to anesthesia:  Objection to blood products:     PAST MEDICAL & SURGICAL HISTORY:  Cerebrovascular accident (CVA) due to occlusion of carotid artery  no residual    Carotid stenosis, left    S/P hysterectomy    History of lumpectomy of right breast  benign    History of hand surgery  repair from dog bite 2000    S/P myomectomy    H/O dilation and curettage         Vitals:Vital Signs Last 24 Hrs  T(C): --  T(F): --  HR: --  BP: --  BP(mean): --  RR: --  SpO2: --    Allergies: Allergies    gabapentin (Other)    Intolerances        Physical Exam: Gen; NAD    Labs: LAbs reviewed and WNL                        11.1   11.26 )-----------( 103      ( 19 Nov 2020 14:32 )             31.7           PT/INR - ( 19 Nov 2020 18:11 )   PT: 11.5 sec;   INR: 0.96 ratio             Informed consent obtained. All questions and concerns have been addressed at this time.     Plan: Mediport placement   Interventional Radiology Pre-Procedure Note    This is a 65y Female with Uterine CA as primary site s/p hysterectomy, Lung metastatic adenocarcinoma who presents to IR for mediport placement.    NPO: 11/19/20 at [place]  Anticoagulation: none  Antibiotics: none  Adverse reaction to anesthesia: denies  Objection to blood products: none    PAST MEDICAL & SURGICAL HISTORY:  Cerebrovascular accident (CVA) due to occlusion of carotid artery  no residual    Carotid stenosis, left    S/P hysterectomy    History of lumpectomy of right breast  benign    History of hand surgery  repair from dog bite 2000    S/P myomectomy    H/O dilation and curettage         Vitals:Vital Signs Last 24 Hrs  T(C): --  T(F): --  HR: --  BP: --  BP(mean): --  RR: --  SpO2: --    Allergies: Allergies    gabapentin (Other)    Intolerances        Physical Exam: Gen; NAD    Labs: LAbs reviewed and WNL                        11.1   11.26 )-----------( 103      ( 19 Nov 2020 14:32 )             31.7           PT/INR - ( 19 Nov 2020 18:11 )   PT: 11.5 sec;   INR: 0.96 ratio             Informed consent obtained. All questions and concerns have been addressed at this time.     Plan: Mediport placement   Interventional Radiology Pre-Procedure Note    This is a 65y Female with Uterine CA as primary site s/p hysterectomy, Lung metastatic adenocarcinoma who presents to IR for mediport placement.    NPO: 11/19/20 at [place]  Anticoagulation: none  Antibiotics: none  Adverse reaction to anesthesia: denies  Objection to blood products: none    PAST MEDICAL & SURGICAL HISTORY:  Cerebrovascular accident (CVA) due to occlusion of carotid artery  no residual    Carotid stenosis, left    S/P hysterectomy    History of lumpectomy of right breast  benign    History of hand surgery  repair from dog bite 2000    S/P myomectomy    H/O dilation and curettage         Vitals:Vital Signs Last 24 Hrs  T(C): --  T(F): --  HR: --  BP: --  BP(mean): --  RR: --  SpO2: --    Allergies: Allergies    gabapentin (Other)    Intolerances        Physical Exam: Gen; NAD    Labs: LAbs reviewed and WNL                        11.1   11.26 )-----------( 103      ( 19 Nov 2020 14:32 )             31.7           PT/INR - ( 19 Nov 2020 18:11 )   PT: 11.5 sec;   INR: 0.96 ratio             Informed consent obtained. All questions and concerns have been addressed at this time.     Plan: Chest port placement

## 2020-11-20 NOTE — ASU PREOP CHECKLIST - DENTURES
Physical Therapy Daily Note     Name: Obdulia Yepez  Clinic Number: [id number]  Diagnosis:   Encounter Diagnoses   Name Primary?    Gait instability Yes    Chronic pain of left knee      Physician: Dr. Og Perez  Precautions: falls   Visit  3/12  PTA Visit #: 2  Time In: :10:00 AM  Time Out:  11:20  AM     Subjective     Pt reports: No new c/o's.     Pain Scale: Obdulia rates pain on a scale of 0-10 to be 4 currently.    Objective     Obdulia received individual therapeutic exercises to develop strength, posture and core stabilization for 45 minutes includin. Recumbent Bike x 25 mins  2. Quadraped - alternate leg x 10- needs cga to keep from falling    3. Bridging x 20 - blue theraband around thighs to engage gluteals  4. Ball Squeezes x 3 mins  5. Lateral and front walking on/off Air-Ex mat with CGA   6. Stepping on/off the Air-Ex mat - CGA to maintain balance   7. Clams: 20 x each side with blue band  8. Sit <>stand from mat - focus on keeping feet on ground and controlled sitting. - standing on blue mat  9. Transverse Ab activation with progression to posterior pelvic tilt x 15  10. Nu-Step x 20 mins  11. Alternate level standing - one foot on bench, one of ground - rotation toward flexed hip/knee - control movement and prevent LOB  12. Stairs x 5   13. Vigor Gym level 7 x10 mins  14. Toe Taps on Airex mat x 2 mins        Obdulia received the following manual therapy techniques:  were applied to the:  for  minutes including:       The patient received the following direct contact modalities after being cleared for contraindications:     The patient received the following supervised modalities after being cleared for contradictions:     Education provided re:  Rec to continue use of rolling walker at all times.     Obdulia verbalized good understanding of education provided.   No spiritual or educational barriers to learning provided    Assessment      Patient tolerated treatment well.  She is demonstrating improved strength in her LE's - now able to ride the bicycle independently for full 20 mins - can keep the pedals moving by herself.   Continues to demonstrate myelopathic gait with peripheral neuropathy - improving in her stability with use of the walker.    She is using her RW all of the time now, which will hopefully help with safety.  She appears in abetter mood today, alittle more upbeat about what she needs to work on.   This is a 64 y.o. female referred to outpatient physical therapy and presents with a medical diagnosis of gait instability; LE weakness and demonstrates limitations as described in the problem list. Pt prognosis is Fair. Pt will continue to benefit from skilled outpatient physical therapy to address the deficits listed in the problem list, provide pt/family education and to maximize pt's level of independence in the home and community environment.     Strength Re-assessment:    Left Abduction 4+/5 - no change  Left IR 3+/5 - no change ; Left Hip Extension 4-/5 - improved to 4/5 ; Knee Flexion 4-/5 - no change   Right Abduction 4-/5 - improved to 4/5 ; Left IR 3-/5 - improved to 3+/5 ; Right Hip Extension 4-/5 - improved to 4/5; Knee Flexion 4-/5 - no change       Goals as follows:    Short Term Goals (4 Weeks):   1. Improvement in LE strength by 1/2 grade - goals partially met - see above.   2. Improvement in ability to perform sit <>stand from chair to 9 reps without LOB - goals met  3. Able to stand on compliant surface x 1 min without LOB- exceeded goal - able to stand x 2 mins.   4. LEFS improved to <50 % limitation - No improvement in score.     Long Term Goals (6 Weeks):   1. Patient Independent with HEP and management of symptoms  2. Patient to have no falls in 6 weeks  3. LEFS improved to <40% limitation     Plan     Continue with established Plan of Care towards PT goals. 2x/week x 6 weeks    Therapist: Jonathan Favre,  PTA  12/20/2018   no

## 2020-11-20 NOTE — ASU DISCHARGE PLAN (ADULT/PEDIATRIC) - ASU DC SPECIAL INSTRUCTIONSFT
Chest Port Placement    Discharge Instructions  - You have had a chest port implated in your chest.   - The port is ready for use.  - You may shower in 48 hours. No soaking or swimming for 2 weeks or until the site is completely healed.  - Keep the area covered and dry for the next 7 days. It may be removed by a chemotherapy nurse as needed for treatment.  - Do not perform any heavy lifting or put tension on the area for the next week or until the site is healed.  - You may resume your normal diet.  - You may resume your normal medications however you should wait 48 hours before restarting aspirin, plavix, or blood thinners.  - It is normal to experience some pain over the site for the next few days. You may take apply ice to the area (20 minutes on, 20 minutes off) and take Tylenol for that pain. Do not take more frequently than every 6 hours and do not exceed more than 3000mg of Tylenol in a 24 hour period.    - You were given conscious sedation which may make you drowsy, therefore you need someone to stay with you until the morning following the procedure.  - Do not drive, engage in heavy lifting or strenuous activity, or drink any alcoholic beverages for the next 24 hours.   - You may resume normal activity in 24 hours.    Notify your primary physician and/or Interventional Radiology IMMEDIATELY if you experience any of the following       - Fever of 100.4F or 38C       - Chills or Rigors/ Shakes       - Swelling and/or Redness in the area around the port       - Worsening Pain       - Blood soaked bandages or worsening bleeding       - Lightheadedness and/or dizziness upon standing       - Chest Pain/ Tightness       - Shortness of Breath       - Difficulty walking    If you have a problem that you believe requires IMMEDIATE attention, please go to your NEAREST Emergency Room. If you believe your problem can safely wait until you speak to a physician, please call Interventional Radiology for any concerns.    During Normal Weekday Business Hours- You can contact the Interventional Radiology department during normal business hours via telephone.  During Evenings and Weekends- If you need to contact Interventional Radiology during off hours, do so by calling the hospital and requesting to be connected to the Interventional Radiologist on call. Please feel free to contact us at [phone] if any problems arise. After 6PM, Monday through Friday, on weekends and on holidays, please call [phone] and ask for the radiology resident on call to be paged.

## 2020-11-20 NOTE — ASU DISCHARGE PLAN (ADULT/PEDIATRIC) - CALL YOUR DOCTOR IF YOU HAVE ANY OF THE FOLLOWING:
Swelling that gets worse/Bleeding that does not stop/Nausea and vomiting that does not stop/Wound/Surgical Site with redness, or foul smelling discharge or pus

## 2020-11-20 NOTE — ASU DISCHARGE PLAN (ADULT/PEDIATRIC) - ASU DC REMOVE DRESSINGFT
Date of Surgery Update:  Kristin Pascual was seen and examined. See office H&P dated 7/25/18- prior ESRD on HD off HD 6 months, with L peña AVF steal symptoms - L hand numbness. Presents for L peña AVF with plication / finger pressures and side branch ligation.      Signed By: Jacque Youssef MD     August 23, 2018 1:02 PM
48

## 2020-11-20 NOTE — ASU PATIENT PROFILE, ADULT - PSH
H/O dilation and curettage    History of hand surgery  repair from dog bite 2000  History of lumpectomy of right breast  benign  S/P hysterectomy    S/P myomectomy

## 2020-11-25 DIAGNOSIS — Z45.2 ENCOUNTER FOR ADJUSTMENT AND MANAGEMENT OF VASCULAR ACCESS DEVICE: ICD-10-CM

## 2020-11-25 DIAGNOSIS — C55 MALIGNANT NEOPLASM OF UTERUS, PART UNSPECIFIED: ICD-10-CM

## 2020-11-27 ENCOUNTER — RESULT REVIEW (OUTPATIENT)
Age: 65
End: 2020-11-27

## 2020-11-27 ENCOUNTER — APPOINTMENT (OUTPATIENT)
Dept: INFUSION THERAPY | Facility: HOSPITAL | Age: 65
End: 2020-11-27

## 2020-11-27 LAB
BASOPHILS # BLD AUTO: 0.03 K/UL — SIGNIFICANT CHANGE UP (ref 0–0.2)
BASOPHILS NFR BLD AUTO: 0.8 % — SIGNIFICANT CHANGE UP (ref 0–2)
EOSINOPHIL # BLD AUTO: 0.03 K/UL — SIGNIFICANT CHANGE UP (ref 0–0.5)
EOSINOPHIL NFR BLD AUTO: 0.8 % — SIGNIFICANT CHANGE UP (ref 0–6)
HCT VFR BLD CALC: 30.8 % — LOW (ref 34.5–45)
HGB BLD-MCNC: 10.6 G/DL — LOW (ref 11.5–15.5)
IMM GRANULOCYTES NFR BLD AUTO: 0.5 % — SIGNIFICANT CHANGE UP (ref 0–1.5)
LYMPHOCYTES # BLD AUTO: 1.05 K/UL — SIGNIFICANT CHANGE UP (ref 1–3.3)
LYMPHOCYTES # BLD AUTO: 26.4 % — SIGNIFICANT CHANGE UP (ref 13–44)
MCHC RBC-ENTMCNC: 34.2 PG — HIGH (ref 27–34)
MCHC RBC-ENTMCNC: 34.4 G/DL — SIGNIFICANT CHANGE UP (ref 32–36)
MCV RBC AUTO: 99.4 FL — SIGNIFICANT CHANGE UP (ref 80–100)
MONOCYTES # BLD AUTO: 0.79 K/UL — SIGNIFICANT CHANGE UP (ref 0–0.9)
MONOCYTES NFR BLD AUTO: 19.8 % — HIGH (ref 2–14)
NEUTROPHILS # BLD AUTO: 2.06 K/UL — SIGNIFICANT CHANGE UP (ref 1.8–7.4)
NEUTROPHILS NFR BLD AUTO: 51.7 % — SIGNIFICANT CHANGE UP (ref 43–77)
NRBC # BLD: 0 /100 WBCS — SIGNIFICANT CHANGE UP (ref 0–0)
PLATELET # BLD AUTO: 218 K/UL — SIGNIFICANT CHANGE UP (ref 150–400)
RBC # BLD: 3.1 M/UL — LOW (ref 3.8–5.2)
RBC # FLD: 16.6 % — HIGH (ref 10.3–14.5)
WBC # BLD: 3.98 K/UL — SIGNIFICANT CHANGE UP (ref 3.8–10.5)
WBC # FLD AUTO: 3.98 K/UL — SIGNIFICANT CHANGE UP (ref 3.8–10.5)

## 2020-11-30 ENCOUNTER — NON-APPOINTMENT (OUTPATIENT)
Age: 65
End: 2020-11-30

## 2020-11-30 ENCOUNTER — OUTPATIENT (OUTPATIENT)
Dept: OUTPATIENT SERVICES | Facility: HOSPITAL | Age: 65
LOS: 1 days | Discharge: HOME | End: 2020-11-30

## 2020-11-30 DIAGNOSIS — Z98.890 OTHER SPECIFIED POSTPROCEDURAL STATES: Chronic | ICD-10-CM

## 2020-11-30 DIAGNOSIS — C54.1 MALIGNANT NEOPLASM OF ENDOMETRIUM: ICD-10-CM

## 2020-11-30 DIAGNOSIS — Z90.710 ACQUIRED ABSENCE OF BOTH CERVIX AND UTERUS: Chronic | ICD-10-CM

## 2020-12-01 NOTE — ASSESSMENT
[FreeTextEntry1] : Ms. Hoang is a 64 y/o G0 postmenopausal F who is referred to medical oncology for treatment considerations for recurrent endometrial cancer. She has a history of stage II endometrioid adenocarcinoma s/p TLH-BSO and staging surgery in 7/2018 followed by adjuvant RT, now with biopsy proven metastatic disease involving lung and vaginal cuff nodularity. \par \par CT scans from 8/11 that shows disease progression since her last imaging studies in 9/2019 with enlarging pulmonary metastases, large necrotic vaginal mass and bulky pelvic adenopathy. Enlarging pulmonary metastases. Large necrotic vaginal mass.\par \par She is s/p C2 carbo/taxol and post-treatment course improved significantly after IV hydration. She is feeling back to her baseline today. We discussed management of chemotherapy-associated toxicities. She knows to call my office should there be any further concerns or symptoms.\par \par Plan:\par -interval bloodwork scheduled today\par -proceed with C3 carbo/taxol as scheduled \par -plan for imaging studies after C3 to assess response to treatment \par -f/u [facility] testing of tumor and MSI/PDL1 testing \par -f/u palliative care referral, Dr. Singh \par -continue oxycodone for pain control\par \par All of the patient's questions and concerns were addressed in full.

## 2020-12-01 NOTE — PHYSICAL EXAM
[Restricted in physically strenuous activity but ambulatory and able to carry out work of a light or sedentary nature] : Status 1- Restricted in physically strenuous activity but ambulatory and able to carry out work of a light or sedentary nature, e.g., light house work, office work [de-identified] : vaginal mass protruding at midline with whitish discharge, thickened area mons pubis. Non-bleeding

## 2020-12-01 NOTE — HISTORY OF PRESENT ILLNESS
[T: ___] : T[unfilled] [N: ___] : N[unfilled] [M: ___] : M[unfilled] [AJCC Stage: ____] : AJCC Stage: [unfilled] [de-identified] : Referred by Dr. Gaytan\par \par Ms. Hoang is a 66 y/o G0 postmenopausal F who is referred to medical oncology for treatment considerations for recurrent endometrial cancer.\par Her oncologic history is summarized as follows:\par \par She was initially diagnosed with stage II grade 2 endometrial cancer in 2018, and her oncologic history is summarized as follows:\par \par 18 -  She underwent robotic assisted TLH BSO, bilateral pelvic and para-aortic sentinel LN dissection by Dr. Paras Grayson\par \par Surgical pathology demonstrated pT2N0 disease with endometrial tumor measuring 4cm, FIGO 2 endometrioid adenocarcinoma, >90 myometrial invasion with involvement of cervical stroma, +LVI. \par \par IHC of MMR proteins with intact expression \par Pelvic wash negative for malignant cells\par \par 10/2018 - Completed pelvic IMRT and vaginal cuff brachytherapy with Dr. Bowling\par \par She did well clinically under surveillance until 2019 when she developed vaginal spotting and fluid discharge, and was evaluated by Dr. Grayson. CA-125 was 27. Concern for possible recurrence and further workup pursued.\par \par 19 CT CAP demonstrated:\par New bilateral pulmonary nodules, some with central cavitation, suspicious for metastatic disease\par Enhancing nodularity superior to the vaginal cuff concerning for metastatic disease, measuring 1.9x1.6cm\par \par 10/30/19 - Underwent thorascopic multiple RLL wedge resections with Dr. Weston\par Surgical pathology c/w metastatic adenocarcinoma, consistent with patient's endometrial primary\par +ER [place] Pax8\par \par She was recommended further treatment for her recurrent endometrial cancer but the patient was lost to follow up until last month. Over the past several months she has noticed increasing hardness and "lumps" in her vagina, which has been progressively more painful and burning sensation. She was subsequently evaluated by Dr. Gaytan and referred to medical oncology for systemic treatment evaluation. \par \par She continues to have vaginal discharge and on and off spotting (not more than 1 pad a day). She feels constipated. She has been taking Percocet 5-325, Tylenol and ibuprofen for vaginal pain. She has been very busy with work and family affairs, was not able to come for cancer treatment over the past months. She denies fevers, chills, n/v, abdominal pain, urinary symptoms, cough, CP, SOB. \par \par PMH:\par uterine fibroids\par osteoporosis\par \par PSH:\par R breast resection (?) was told benign pathology, \par D&C \par \par All: none\par Medications: Percocet\par \par SH:\par  since , lives alone\par No children\par Works as a hairdresser\par Denies smoking history, drinks wine socially\par \par FH:\par Mother - breast cancer in her 80s\par Father - leukemia\par Sister -  recently from bladder cancer at age 68\par \par GYN:\par Menopause age 53\par No use of HRT\par G0\par \par HCM:\par Mammogram - 2019 was normal per patient report\par Colonoscopy >10 years ago, was normal per report \par Her sister got sick and passed away due to bladder cancer. \par  [de-identified] :  ER +  [place] +  PAX 8  +     MSI STABLE [de-identified] : The patient presents for follow up. Since her C2 carbo/taxol treatment on 9/18 she underwent IV hydration and she felt much better with energy and appetite. She feels that her vaginal mass is regressing and pain is significantly improved. She has not had to use the oxycodone. No further vaginal spotting noted. She is able to do her daily ADLs, and is currently eating and drinking well today. She denies fevers, chills, cough, SOB, n/v, abdominal pain, urinary symptoms, changes in bowel habits, headaches, dizziness. \par \par 11/18/20:\par Feels ok, felt much better with lower doses \par Appetite is good\par Felt nausea \par \par \par \par

## 2020-12-03 ENCOUNTER — APPOINTMENT (OUTPATIENT)
Dept: INFUSION THERAPY | Facility: HOSPITAL | Age: 65
End: 2020-12-03

## 2020-12-06 DIAGNOSIS — E86.0 DEHYDRATION: ICD-10-CM

## 2020-12-09 ENCOUNTER — APPOINTMENT (OUTPATIENT)
Dept: HEMATOLOGY ONCOLOGY | Facility: CLINIC | Age: 65
End: 2020-12-09
Payer: MEDICARE

## 2020-12-09 VITALS
RESPIRATION RATE: 16 BRPM | BODY MASS INDEX: 21.34 KG/M2 | TEMPERATURE: 207.5 F | DIASTOLIC BLOOD PRESSURE: 80 MMHG | OXYGEN SATURATION: 99 % | SYSTOLIC BLOOD PRESSURE: 137 MMHG | WEIGHT: 123.46 LBS | HEIGHT: 63.78 IN | HEART RATE: 68 BPM

## 2020-12-09 PROCEDURE — 99214 OFFICE O/P EST MOD 30 MIN: CPT

## 2020-12-09 NOTE — HISTORY OF PRESENT ILLNESS
[T: ___] : T[unfilled] [N: ___] : N[unfilled] [M: ___] : M[unfilled] [AJCC Stage: ____] : AJCC Stage: [unfilled] [de-identified] : Referred by Dr. Gaytan\par \par Ms. Hoang is a 64 y/o G0 postmenopausal F who is referred to medical oncology for treatment considerations for recurrent endometrial cancer.\par Her oncologic history is summarized as follows:\par \par She was initially diagnosed with stage II grade 2 endometrial cancer in 2018, and her oncologic history is summarized as follows:\par \par 18 -  She underwent robotic assisted TLH BSO, bilateral pelvic and para-aortic sentinel LN dissection by Dr. Paras Grayson\par \par Surgical pathology demonstrated pT2N0 disease with endometrial tumor measuring 4cm, FIGO 2 endometrioid adenocarcinoma, >90 myometrial invasion with involvement of cervical stroma, +LVI. \par \par IHC of MMR proteins with intact expression \par Pelvic wash negative for malignant cells\par \par 10/2018 - Completed pelvic IMRT and vaginal cuff brachytherapy with Dr. Bowling\par \par She did well clinically under surveillance until 2019 when she developed vaginal spotting and fluid discharge, and was evaluated by Dr. Grayson. CA-125 was 27. Concern for possible recurrence and further workup pursued.\par \par 19 CT CAP demonstrated:\par New bilateral pulmonary nodules, some with central cavitation, suspicious for metastatic disease\par Enhancing nodularity superior to the vaginal cuff concerning for metastatic disease, measuring 1.9x1.6cm\par \par 10/30/19 - Underwent thorascopic multiple RLL wedge resections with Dr. Weston\par Surgical pathology c/w metastatic adenocarcinoma, consistent with patient's endometrial primary\par +ER [place] Pax8\par \par She was recommended further treatment for her recurrent endometrial cancer but the patient was lost to follow up until last month. Over the past several months she has noticed increasing hardness and "lumps" in her vagina, which has been progressively more painful and burning sensation. She was subsequently evaluated by Dr. Gaytan and referred to medical oncology for systemic treatment evaluation. \par \par She continues to have vaginal discharge and on and off spotting (not more than 1 pad a day). She feels constipated. She has been taking Percocet 5-325, Tylenol and ibuprofen for vaginal pain. She has been very busy with work and family affairs, was not able to come for cancer treatment over the past months. She denies fevers, chills, n/v, abdominal pain, urinary symptoms, cough, CP, SOB. \par \par PMH:\par uterine fibroids\par osteoporosis\par \par PSH:\par R breast resection (?) was told benign pathology, \par D&C \par \par All: none\par Medications: Percocet\par \par SH:\par  since , lives alone\par No children\par Works as a hairdresser\par Denies smoking history, drinks wine socially\par \par FH:\par Mother - breast cancer in her 80s\par Father - leukemia\par Sister -  recently from bladder cancer at age 68\par \par GYN:\par Menopause age 53\par No use of HRT\par G0\par \par HCM:\par Mammogram - 2019 was normal per patient report\par Colonoscopy >10 years ago, was normal per report \par Her sister got sick and passed away due to bladder cancer. \par  [de-identified] :  ER +  [place] +  PAX 8  +     MSI STABLE [de-identified] : The patient presents for follow up. Since her C2 carbo/taxol treatment on 9/18 she underwent IV hydration and she felt much better with energy and appetite. She feels that her vaginal mass is regressing and pain is significantly improved. She has not had to use the oxycodone. No further vaginal spotting noted. She is able to do her daily ADLs, and is currently eating and drinking well today. She denies fevers, chills, cough, SOB, n/v, abdominal pain, urinary symptoms, changes in bowel habits, headaches, dizziness. \par \par 11/18/20:\par Feels ok, felt much better with lower doses \par Appetite is good\par Felt nausea \par \par 12/9/20:\par Didn't bounce back as previously \par \par \par

## 2020-12-09 NOTE — PHYSICAL EXAM
[Restricted in physically strenuous activity but ambulatory and able to carry out work of a light or sedentary nature] : Status 1- Restricted in physically strenuous activity but ambulatory and able to carry out work of a light or sedentary nature, e.g., light house work, office work [de-identified] : vaginal mass protruding at midline with whitish discharge, thickened area mons pubis. Non-bleeding

## 2020-12-10 ENCOUNTER — RESULT REVIEW (OUTPATIENT)
Age: 65
End: 2020-12-10

## 2020-12-10 ENCOUNTER — APPOINTMENT (OUTPATIENT)
Dept: INFUSION THERAPY | Facility: HOSPITAL | Age: 65
End: 2020-12-10

## 2020-12-10 ENCOUNTER — LABORATORY RESULT (OUTPATIENT)
Age: 65
End: 2020-12-10

## 2020-12-10 LAB
BASOPHILS # BLD AUTO: 0.02 K/UL — SIGNIFICANT CHANGE UP (ref 0–0.2)
BASOPHILS NFR BLD AUTO: 0.2 % — SIGNIFICANT CHANGE UP (ref 0–2)
EOSINOPHIL # BLD AUTO: 0.03 K/UL — SIGNIFICANT CHANGE UP (ref 0–0.5)
EOSINOPHIL NFR BLD AUTO: 0.3 % — SIGNIFICANT CHANGE UP (ref 0–6)
HCT VFR BLD CALC: 26.6 % — LOW (ref 34.5–45)
HGB BLD-MCNC: 9.3 G/DL — LOW (ref 11.5–15.5)
IMM GRANULOCYTES NFR BLD AUTO: 0.6 % — SIGNIFICANT CHANGE UP (ref 0–1.5)
LYMPHOCYTES # BLD AUTO: 1.04 K/UL — SIGNIFICANT CHANGE UP (ref 1–3.3)
LYMPHOCYTES # BLD AUTO: 11.2 % — LOW (ref 13–44)
MCHC RBC-ENTMCNC: 35 G/DL — SIGNIFICANT CHANGE UP (ref 32–36)
MCHC RBC-ENTMCNC: 35.4 PG — HIGH (ref 27–34)
MCV RBC AUTO: 101.1 FL — HIGH (ref 80–100)
MONOCYTES # BLD AUTO: 0.72 K/UL — SIGNIFICANT CHANGE UP (ref 0–0.9)
MONOCYTES NFR BLD AUTO: 7.7 % — SIGNIFICANT CHANGE UP (ref 2–14)
NEUTROPHILS # BLD AUTO: 7.43 K/UL — HIGH (ref 1.8–7.4)
NEUTROPHILS NFR BLD AUTO: 80 % — HIGH (ref 43–77)
NRBC # BLD: 0 /100 WBCS — SIGNIFICANT CHANGE UP (ref 0–0)
PLATELET # BLD AUTO: 120 K/UL — LOW (ref 150–400)
RBC # BLD: 2.63 M/UL — LOW (ref 3.8–5.2)
RBC # FLD: 17.3 % — HIGH (ref 10.3–14.5)
WBC # BLD: 9.3 K/UL — SIGNIFICANT CHANGE UP (ref 3.8–10.5)
WBC # FLD AUTO: 9.3 K/UL — SIGNIFICANT CHANGE UP (ref 3.8–10.5)

## 2020-12-18 ENCOUNTER — RESULT REVIEW (OUTPATIENT)
Age: 65
End: 2020-12-18

## 2020-12-18 ENCOUNTER — APPOINTMENT (OUTPATIENT)
Dept: INFUSION THERAPY | Facility: HOSPITAL | Age: 65
End: 2020-12-18

## 2020-12-18 ENCOUNTER — LABORATORY RESULT (OUTPATIENT)
Age: 65
End: 2020-12-18

## 2020-12-18 LAB
BASOPHILS # BLD AUTO: 0.03 K/UL — SIGNIFICANT CHANGE UP (ref 0–0.2)
BASOPHILS NFR BLD AUTO: 0.5 % — SIGNIFICANT CHANGE UP (ref 0–2)
EOSINOPHIL # BLD AUTO: 0.02 K/UL — SIGNIFICANT CHANGE UP (ref 0–0.5)
EOSINOPHIL NFR BLD AUTO: 0.3 % — SIGNIFICANT CHANGE UP (ref 0–6)
HCT VFR BLD CALC: 28.1 % — LOW (ref 34.5–45)
HGB BLD-MCNC: 9.9 G/DL — LOW (ref 11.5–15.5)
IMM GRANULOCYTES NFR BLD AUTO: 0.9 % — SIGNIFICANT CHANGE UP (ref 0–1.5)
LYMPHOCYTES # BLD AUTO: 0.83 K/UL — LOW (ref 1–3.3)
LYMPHOCYTES # BLD AUTO: 14.4 % — SIGNIFICANT CHANGE UP (ref 13–44)
MCHC RBC-ENTMCNC: 35.2 G/DL — SIGNIFICANT CHANGE UP (ref 32–36)
MCHC RBC-ENTMCNC: 36.3 PG — HIGH (ref 27–34)
MCV RBC AUTO: 102.9 FL — HIGH (ref 80–100)
MONOCYTES # BLD AUTO: 0.74 K/UL — SIGNIFICANT CHANGE UP (ref 0–0.9)
MONOCYTES NFR BLD AUTO: 12.9 % — SIGNIFICANT CHANGE UP (ref 2–14)
NEUTROPHILS # BLD AUTO: 4.08 K/UL — SIGNIFICANT CHANGE UP (ref 1.8–7.4)
NEUTROPHILS NFR BLD AUTO: 71 % — SIGNIFICANT CHANGE UP (ref 43–77)
NRBC # BLD: 0 /100 WBCS — SIGNIFICANT CHANGE UP (ref 0–0)
PLATELET # BLD AUTO: 228 K/UL — SIGNIFICANT CHANGE UP (ref 150–400)
RBC # BLD: 2.73 M/UL — LOW (ref 3.8–5.2)
RBC # FLD: 18.4 % — HIGH (ref 10.3–14.5)
WBC # BLD: 5.75 K/UL — SIGNIFICANT CHANGE UP (ref 3.8–10.5)
WBC # FLD AUTO: 5.75 K/UL — SIGNIFICANT CHANGE UP (ref 3.8–10.5)

## 2020-12-21 ENCOUNTER — NON-APPOINTMENT (OUTPATIENT)
Age: 65
End: 2020-12-21

## 2020-12-21 DIAGNOSIS — Z51.89 ENCOUNTER FOR OTHER SPECIFIED AFTERCARE: ICD-10-CM

## 2020-12-21 DIAGNOSIS — Z51.11 ENCOUNTER FOR ANTINEOPLASTIC CHEMOTHERAPY: ICD-10-CM

## 2020-12-21 DIAGNOSIS — R11.2 NAUSEA WITH VOMITING, UNSPECIFIED: ICD-10-CM

## 2020-12-23 ENCOUNTER — APPOINTMENT (OUTPATIENT)
Dept: INFUSION THERAPY | Facility: HOSPITAL | Age: 65
End: 2020-12-23

## 2020-12-23 PROBLEM — Z87.898 HISTORY OF VAGINAL INFECTION: Status: RESOLVED | Noted: 2020-09-08 | Resolved: 2020-12-23

## 2020-12-24 ENCOUNTER — OUTPATIENT (OUTPATIENT)
Dept: OUTPATIENT SERVICES | Facility: HOSPITAL | Age: 65
LOS: 1 days | Discharge: HOME | End: 2020-12-24

## 2020-12-24 DIAGNOSIS — Z98.890 OTHER SPECIFIED POSTPROCEDURAL STATES: Chronic | ICD-10-CM

## 2020-12-24 DIAGNOSIS — C54.1 MALIGNANT NEOPLASM OF ENDOMETRIUM: ICD-10-CM

## 2020-12-24 DIAGNOSIS — Z90.710 ACQUIRED ABSENCE OF BOTH CERVIX AND UTERUS: Chronic | ICD-10-CM

## 2021-01-04 ENCOUNTER — RESULT REVIEW (OUTPATIENT)
Age: 66
End: 2021-01-04

## 2021-01-04 ENCOUNTER — APPOINTMENT (OUTPATIENT)
Dept: INFUSION THERAPY | Facility: HOSPITAL | Age: 66
End: 2021-01-04

## 2021-01-04 ENCOUNTER — LABORATORY RESULT (OUTPATIENT)
Age: 66
End: 2021-01-04

## 2021-01-04 ENCOUNTER — APPOINTMENT (OUTPATIENT)
Dept: HEMATOLOGY ONCOLOGY | Facility: CLINIC | Age: 66
End: 2021-01-04
Payer: MEDICARE

## 2021-01-04 VITALS
TEMPERATURE: 208.22 F | HEIGHT: 55 IN | DIASTOLIC BLOOD PRESSURE: 83 MMHG | RESPIRATION RATE: 16 BRPM | OXYGEN SATURATION: 99 % | WEIGHT: 131.83 LBS | BODY MASS INDEX: 30.51 KG/M2 | SYSTOLIC BLOOD PRESSURE: 137 MMHG | HEART RATE: 99 BPM

## 2021-01-04 LAB
BASOPHILS # BLD AUTO: 0.03 K/UL — SIGNIFICANT CHANGE UP (ref 0–0.2)
BASOPHILS NFR BLD AUTO: 0.3 % — SIGNIFICANT CHANGE UP (ref 0–2)
EOSINOPHIL # BLD AUTO: 0.01 K/UL — SIGNIFICANT CHANGE UP (ref 0–0.5)
EOSINOPHIL NFR BLD AUTO: 0.1 % — SIGNIFICANT CHANGE UP (ref 0–6)
HCT VFR BLD CALC: 27.2 % — LOW (ref 34.5–45)
HGB BLD-MCNC: 9.2 G/DL — LOW (ref 11.5–15.5)
IMM GRANULOCYTES NFR BLD AUTO: 0.5 % — SIGNIFICANT CHANGE UP (ref 0–1.5)
LYMPHOCYTES # BLD AUTO: 0.84 K/UL — LOW (ref 1–3.3)
LYMPHOCYTES # BLD AUTO: 9.1 % — LOW (ref 13–44)
MCHC RBC-ENTMCNC: 33.8 G/DL — SIGNIFICANT CHANGE UP (ref 32–36)
MCHC RBC-ENTMCNC: 36.4 PG — HIGH (ref 27–34)
MCV RBC AUTO: 107.5 FL — HIGH (ref 80–100)
MONOCYTES # BLD AUTO: 1.04 K/UL — HIGH (ref 0–0.9)
MONOCYTES NFR BLD AUTO: 11.3 % — SIGNIFICANT CHANGE UP (ref 2–14)
NEUTROPHILS # BLD AUTO: 7.24 K/UL — SIGNIFICANT CHANGE UP (ref 1.8–7.4)
NEUTROPHILS NFR BLD AUTO: 78.7 % — HIGH (ref 43–77)
NRBC # BLD: 0 /100 WBCS — SIGNIFICANT CHANGE UP (ref 0–0)
PLATELET # BLD AUTO: 183 K/UL — SIGNIFICANT CHANGE UP (ref 150–400)
RBC # BLD: 2.53 M/UL — LOW (ref 3.8–5.2)
RBC # FLD: 16.5 % — HIGH (ref 10.3–14.5)
WBC # BLD: 9.21 K/UL — SIGNIFICANT CHANGE UP (ref 3.8–10.5)
WBC # FLD AUTO: 9.21 K/UL — SIGNIFICANT CHANGE UP (ref 3.8–10.5)

## 2021-01-04 PROCEDURE — 99072 ADDL SUPL MATRL&STAF TM PHE: CPT

## 2021-01-04 PROCEDURE — 99214 OFFICE O/P EST MOD 30 MIN: CPT

## 2021-01-08 ENCOUNTER — OUTPATIENT (OUTPATIENT)
Dept: OUTPATIENT SERVICES | Facility: HOSPITAL | Age: 66
LOS: 1 days | End: 2021-01-08
Payer: MEDICARE

## 2021-01-08 ENCOUNTER — APPOINTMENT (OUTPATIENT)
Dept: CT IMAGING | Facility: IMAGING CENTER | Age: 66
End: 2021-01-08
Payer: MEDICARE

## 2021-01-08 DIAGNOSIS — Z98.890 OTHER SPECIFIED POSTPROCEDURAL STATES: Chronic | ICD-10-CM

## 2021-01-08 DIAGNOSIS — C54.1 MALIGNANT NEOPLASM OF ENDOMETRIUM: ICD-10-CM

## 2021-01-08 DIAGNOSIS — Z90.710 ACQUIRED ABSENCE OF BOTH CERVIX AND UTERUS: Chronic | ICD-10-CM

## 2021-01-08 PROCEDURE — 74177 CT ABD & PELVIS W/CONTRAST: CPT

## 2021-01-08 PROCEDURE — 71260 CT THORAX DX C+: CPT | Mod: 26

## 2021-01-08 PROCEDURE — 71260 CT THORAX DX C+: CPT

## 2021-01-08 PROCEDURE — 74177 CT ABD & PELVIS W/CONTRAST: CPT | Mod: 26

## 2021-01-13 ENCOUNTER — OUTPATIENT (OUTPATIENT)
Dept: OUTPATIENT SERVICES | Facility: HOSPITAL | Age: 66
LOS: 1 days | Discharge: HOME | End: 2021-01-13

## 2021-01-13 DIAGNOSIS — Z98.890 OTHER SPECIFIED POSTPROCEDURAL STATES: Chronic | ICD-10-CM

## 2021-01-13 DIAGNOSIS — Z90.710 ACQUIRED ABSENCE OF BOTH CERVIX AND UTERUS: Chronic | ICD-10-CM

## 2021-01-13 DIAGNOSIS — C54.1 MALIGNANT NEOPLASM OF ENDOMETRIUM: ICD-10-CM

## 2021-01-15 ENCOUNTER — APPOINTMENT (OUTPATIENT)
Dept: INFUSION THERAPY | Facility: HOSPITAL | Age: 66
End: 2021-01-15

## 2021-01-15 ENCOUNTER — RESULT REVIEW (OUTPATIENT)
Age: 66
End: 2021-01-15

## 2021-01-15 LAB
BASOPHILS # BLD AUTO: 0.03 K/UL — SIGNIFICANT CHANGE UP (ref 0–0.2)
BASOPHILS NFR BLD AUTO: 0.5 % — SIGNIFICANT CHANGE UP (ref 0–2)
EOSINOPHIL # BLD AUTO: 0.06 K/UL — SIGNIFICANT CHANGE UP (ref 0–0.5)
EOSINOPHIL NFR BLD AUTO: 1 % — SIGNIFICANT CHANGE UP (ref 0–6)
HCT VFR BLD CALC: 29.5 % — LOW (ref 34.5–45)
HGB BLD-MCNC: 10.4 G/DL — LOW (ref 11.5–15.5)
IMM GRANULOCYTES NFR BLD AUTO: 0.7 % — SIGNIFICANT CHANGE UP (ref 0–1.5)
LYMPHOCYTES # BLD AUTO: 0.69 K/UL — LOW (ref 1–3.3)
LYMPHOCYTES # BLD AUTO: 11.6 % — LOW (ref 13–44)
MCHC RBC-ENTMCNC: 35.3 G/DL — SIGNIFICANT CHANGE UP (ref 32–36)
MCHC RBC-ENTMCNC: 37.1 PG — HIGH (ref 27–34)
MCV RBC AUTO: 105.4 FL — HIGH (ref 80–100)
MONOCYTES # BLD AUTO: 0.77 K/UL — SIGNIFICANT CHANGE UP (ref 0–0.9)
MONOCYTES NFR BLD AUTO: 13 % — SIGNIFICANT CHANGE UP (ref 2–14)
NEUTROPHILS # BLD AUTO: 4.34 K/UL — SIGNIFICANT CHANGE UP (ref 1.8–7.4)
NEUTROPHILS NFR BLD AUTO: 73.2 % — SIGNIFICANT CHANGE UP (ref 43–77)
NRBC # BLD: 0 /100 WBCS — SIGNIFICANT CHANGE UP (ref 0–0)
PLATELET # BLD AUTO: 217 K/UL — SIGNIFICANT CHANGE UP (ref 150–400)
RBC # BLD: 2.8 M/UL — LOW (ref 3.8–5.2)
RBC # FLD: 14 % — SIGNIFICANT CHANGE UP (ref 10.3–14.5)
WBC # BLD: 5.93 K/UL — SIGNIFICANT CHANGE UP (ref 3.8–10.5)
WBC # FLD AUTO: 5.93 K/UL — SIGNIFICANT CHANGE UP (ref 3.8–10.5)

## 2021-01-19 ENCOUNTER — NON-APPOINTMENT (OUTPATIENT)
Age: 66
End: 2021-01-19

## 2021-01-19 DIAGNOSIS — Z51.11 ENCOUNTER FOR ANTINEOPLASTIC CHEMOTHERAPY: ICD-10-CM

## 2021-01-19 DIAGNOSIS — Z51.89 ENCOUNTER FOR OTHER SPECIFIED AFTERCARE: ICD-10-CM

## 2021-01-19 DIAGNOSIS — R11.2 NAUSEA WITH VOMITING, UNSPECIFIED: ICD-10-CM

## 2021-01-21 ENCOUNTER — APPOINTMENT (OUTPATIENT)
Dept: INFUSION THERAPY | Facility: HOSPITAL | Age: 66
End: 2021-01-21

## 2021-01-22 DIAGNOSIS — E86.0 DEHYDRATION: ICD-10-CM

## 2021-01-25 ENCOUNTER — RESULT REVIEW (OUTPATIENT)
Age: 66
End: 2021-01-25

## 2021-01-25 ENCOUNTER — LABORATORY RESULT (OUTPATIENT)
Age: 66
End: 2021-01-25

## 2021-01-25 ENCOUNTER — APPOINTMENT (OUTPATIENT)
Dept: INFUSION THERAPY | Facility: HOSPITAL | Age: 66
End: 2021-01-25

## 2021-01-25 ENCOUNTER — APPOINTMENT (OUTPATIENT)
Dept: HEMATOLOGY ONCOLOGY | Facility: CLINIC | Age: 66
End: 2021-01-25
Payer: MEDICARE

## 2021-01-25 VITALS
DIASTOLIC BLOOD PRESSURE: 80 MMHG | OXYGEN SATURATION: 99 % | BODY MASS INDEX: 23.73 KG/M2 | TEMPERATURE: 206.96 F | RESPIRATION RATE: 16 BRPM | SYSTOLIC BLOOD PRESSURE: 156 MMHG | HEIGHT: 62 IN | WEIGHT: 128.97 LBS | HEART RATE: 113 BPM

## 2021-01-25 LAB
BASOPHILS # BLD AUTO: 0.04 K/UL — SIGNIFICANT CHANGE UP (ref 0–0.2)
BASOPHILS NFR BLD AUTO: 0.4 % — SIGNIFICANT CHANGE UP (ref 0–2)
EOSINOPHIL # BLD AUTO: 0.03 K/UL — SIGNIFICANT CHANGE UP (ref 0–0.5)
EOSINOPHIL NFR BLD AUTO: 0.3 % — SIGNIFICANT CHANGE UP (ref 0–6)
HCT VFR BLD CALC: 27.1 % — LOW (ref 34.5–45)
HGB BLD-MCNC: 9.2 G/DL — LOW (ref 11.5–15.5)
IMM GRANULOCYTES NFR BLD AUTO: 0.6 % — SIGNIFICANT CHANGE UP (ref 0–1.5)
LYMPHOCYTES # BLD AUTO: 1 K/UL — SIGNIFICANT CHANGE UP (ref 1–3.3)
LYMPHOCYTES # BLD AUTO: 10.4 % — LOW (ref 13–44)
MCHC RBC-ENTMCNC: 33.9 G/DL — SIGNIFICANT CHANGE UP (ref 32–36)
MCHC RBC-ENTMCNC: 36.8 PG — HIGH (ref 27–34)
MCV RBC AUTO: 108.4 FL — HIGH (ref 80–100)
MONOCYTES # BLD AUTO: 0.71 K/UL — SIGNIFICANT CHANGE UP (ref 0–0.9)
MONOCYTES NFR BLD AUTO: 7.4 % — SIGNIFICANT CHANGE UP (ref 2–14)
NEUTROPHILS # BLD AUTO: 7.75 K/UL — HIGH (ref 1.8–7.4)
NEUTROPHILS NFR BLD AUTO: 80.9 % — HIGH (ref 43–77)
NRBC # BLD: 0 /100 WBCS — SIGNIFICANT CHANGE UP (ref 0–0)
PLATELET # BLD AUTO: 110 K/UL — LOW (ref 150–400)
RBC # BLD: 2.5 M/UL — LOW (ref 3.8–5.2)
RBC # FLD: 12.6 % — SIGNIFICANT CHANGE UP (ref 10.3–14.5)
WBC # BLD: 9.59 K/UL — SIGNIFICANT CHANGE UP (ref 3.8–10.5)
WBC # FLD AUTO: 9.59 K/UL — SIGNIFICANT CHANGE UP (ref 3.8–10.5)

## 2021-01-25 PROCEDURE — 99214 OFFICE O/P EST MOD 30 MIN: CPT

## 2021-01-26 LAB — SARS-COV-2 N GENE NPH QL NAA+PROBE: NOT DETECTED

## 2021-01-31 NOTE — RESULTS/DATA
[FreeTextEntry1] : CT CAP 10/26/20:\par \par FINDINGS:\par CHEST:\par LUNGS AND LARGE AIRWAYS: Patent central airways. Status post right upper and lower lobe wedge resections. Bilateral pulmonary nodules are again noted, decreased in size and many of which now demonstrate cavitation.. Reference lesions are as follows:\par \par Right upper lobe: There is currently a 2.2 x 1.5 cm lobulated cavity in the location of the previously noted 2.5 x 2 cm right upper lobe mass.\par Right lower lobe previously noted mass also demonstrates a residual cavity on the current study, measuring 2 x 1.9 cm (2:34).\par A left apical nodule (2:12) measures 1.3 x 1.2 cm, previously 1.5 x 1.3 cm.\par PLEURA: No pleural effusion.\par VESSELS: Within normal limits.\par HEART: Heart size is normal. No pericardial effusion.\par MEDIASTINUM AND VIDAL: No lymphadenopathy.\par CHEST WALL AND LOWER NECK: Within normal limits.\par \par ABDOMEN AND PELVIS:\par LIVER: Within normal limits.\par BILE DUCTS: Common bile duct is dilated at the level of the pancreatic head, measuring 0.8 cm.\par GALLBLADDER: Within normal limits.\par SPLEEN: Within normal limits.\par PANCREAS: Within normal limits.\par ADRENALS: Within normal limits.\par KIDNEYS/URETERS: No hydronephrosis. Multiple bilateral renal cysts, some of which demonstrate internal septations.\par \par BLADDER: Within normal limits.\par REPRODUCTIVE ORGANS: Status post hysterectomy. Previously noted necrotic mass extending from the vaginal cuff has decreased in size, currently measuring 2.9 x 1.2 cm (2:148), previously 4.8 x 4.4 cm.\par \par BOWEL: No bowel obstruction. Appendix is within normal limits.\par PERITONEUM: No ascites.\par VESSELS: Within normal limits.\par RETROPERITONEUM/LYMPH NODES: Interval decrease in size of previously noted necrotic left external iliac and left inguinal lymphadenopathy. A reference left external iliac lymph node (2:140) measures 1.5 x 1.1 cm, previously 3 x 1.7 cm. Previously noted necrotic left inguinal lymph node has decreased in size, currently measuring 1.8 x 1.4 cm, previously 4.5 x 3.5 cm.\par ABDOMINAL WALL: Within normal limits.\par BONES: Degenerative changes in the spine.\par \par IMPRESSION:\par Interval decrease in size of previously noted bilateral pulmonary nodules, many of which now demonstrate residual cavities.\par \par Interval decrease in size of previously noted necrotic vaginal cuff mass, left external iliac and left inguinal lymphadenopathy.\par

## 2021-01-31 NOTE — HISTORY OF PRESENT ILLNESS
[T: ___] : T[unfilled] [N: ___] : N[unfilled] [M: ___] : M[unfilled] [AJCC Stage: ____] : AJCC Stage: [unfilled] [de-identified] :  ER +  [place] +  PAX 8  +     MSI STABLE [de-identified] : Referred by Dr. Gaytan\par \par Ms. Hoang is a 66 y/o G0 postmenopausal F who is referred to medical oncology for treatment considerations for recurrent endometrial cancer.\par Her oncologic history is summarized as follows:\par \par She was initially diagnosed with stage II grade 2 endometrial cancer in 2018, and her oncologic history is summarized as follows:\par \par 18 -  She underwent robotic assisted TLH BSO, bilateral pelvic and para-aortic sentinel LN dissection by Dr. Paras Grayson\par \par Surgical pathology demonstrated pT2N0 disease with endometrial tumor measuring 4cm, FIGO 2 endometrioid adenocarcinoma, >90 myometrial invasion with involvement of cervical stroma, +LVI. \par \par IHC of MMR proteins with intact expression \par Pelvic wash negative for malignant cells\par \par 10/2018 - Completed pelvic IMRT and vaginal cuff brachytherapy with Dr. Bowling\par \par She did well clinically under surveillance until 2019 when she developed vaginal spotting and fluid discharge, and was evaluated by Dr. Grayson. CA-125 was 27. Concern for possible recurrence and further workup pursued.\par \par 19 CT CAP demonstrated:\par New bilateral pulmonary nodules, some with central cavitation, suspicious for metastatic disease\par Enhancing nodularity superior to the vaginal cuff concerning for metastatic disease, measuring 1.9x1.6cm\par \par 10/30/19 - Underwent thorascopic multiple RLL wedge resections with Dr. Weston\par Surgical pathology c/w metastatic adenocarcinoma, consistent with patient's endometrial primary\par +ER [place] Pax8\par \par She was recommended further treatment for her recurrent endometrial cancer but the patient was lost to follow up until last month. Over the past several months she has noticed increasing hardness and "lumps" in her vagina, which has been progressively more painful and burning sensation. She was subsequently evaluated by Dr. Gaytan and referred to medical oncology for systemic treatment evaluation. \par \par She continues to have vaginal discharge and on and off spotting (not more than 1 pad a day). She feels constipated. She has been taking Percocet 5-325, Tylenol and ibuprofen for vaginal pain. She has been very busy with work and family affairs, was not able to come for cancer treatment over the past months. She denies fevers, chills, n/v, abdominal pain, urinary symptoms, cough, CP, SOB. \par \par PMH:\par uterine fibroids\par osteoporosis\par \par PSH:\par R breast resection (?) was told benign pathology, \par D&C \par \par All: none\par Medications: Percocet\par \par SH:\par  since , lives alone\par No children\par Works as a hairdresser\par Denies smoking history, drinks wine socially\par \par FH:\par Mother - breast cancer in her 80s\par Father - leukemia\par Sister -  recently from bladder cancer at age 68\par \par GYN:\par Menopause age 53\par No use of HRT\par G0\par \par HCM:\par Mammogram - 2019 was normal per patient report\par Colonoscopy >10 years ago, was normal per report \par Her sister got sick and passed away due to bladder cancer. \par  [de-identified] : The patient presents for follow up. S/p C6 on 12/18/20. Tolerated chemotherapy well without significant toxicities, had mild fatigue but was able to do all activities and now back to baseline. She has been able to go outside and walk the dog daily. Only complaint is the pain in the legs b/l since onpro growth support was added but improved with analgesics. Eating/drinking well. She denies fevers, chills, cough, SOB, n/v, abdominal pain, urinary symptoms, neuropathy, changes in bowel habits, vaginal bleeding or discharge, headaches, dizziness. \par \par \par \par

## 2021-01-31 NOTE — PHYSICAL EXAM
[Restricted in physically strenuous activity but ambulatory and able to carry out work of a light or sedentary nature] : Status 1- Restricted in physically strenuous activity but ambulatory and able to carry out work of a light or sedentary nature, e.g., light house work, office work [de-identified] : vaginal mass protruding at midline with whitish discharge, thickened area mons pubis. Non-bleeding

## 2021-01-31 NOTE — ASSESSMENT
[FreeTextEntry1] : Ms. Hoang is a 64 y/o G0 postmenopausal F who is referred to medical oncology for treatment considerations for recurrent endometrial cancer. She has a history of stage II endometrioid adenocarcinoma s/p TLH-BSO and staging surgery in 7/2018 followed by adjuvant RT, now with biopsy proven metastatic disease involving lung and vaginal cuff nodularity. \par \par CT scans from 10/26/20 after C3 chemotherapy showed interval decrease in the size of pulmonary nodules and vaginal cuff mass. \par \par She is s/p C6 carbo/taxol and continues to tolerate chemotherapy without significant toxicities. We again discussed management of chemotherapy-associated toxicities. She knows to call my office should there be any further concerns or symptoms.\par \par Plan:\par -interval bloodwork scheduled today\par -CT scans ordered to assess response to therapy\par -proceed with carbo/taxol as scheduled \par -continue oxycodone for pain control prn\par -f/u Dr. Gaytan in gyn onc clinic \par \par All of the patient's questions and concerns were addressed in full.

## 2021-02-03 NOTE — RESULTS/DATA
[FreeTextEntry1] : CT CAP 1/8/21:\par \par FINDINGS:\par CHEST:\par LUNGS AND LARGE AIRWAYS: Patent central airways. Status post right upper and lower lobe wedge resections. A left apical nodule (2:12) measures 1.4 x 1.3 cm, previously 1.3 x 1.2 cm. A new left upper lobe nodule (2:30) measures 0.7 cm. Residual thin-walled cavities are again noted in the right upper and lower lobes at the site of previously noted nodules. An irregular right upper lobe nodule (2:33) measures 1 x 0.8 cm, unchanged.\par PLEURA: No pleural effusion.\par VESSELS: Mediport, with its tip in the right atrium.\par HEART: Heart size is normal. No pericardial effusion.\par MEDIASTINUM AND VIDAL: No lymphadenopathy.\par CHEST WALL AND LOWER NECK: Within normal limits.\par \par ABDOMEN AND PELVIS:\par LIVER: Within normal limits.\par BILE DUCTS: Normal caliber.\par GALLBLADDER: Within normal limits.\par SPLEEN: Within normal limits.\par PANCREAS: Within normal limits.\par ADRENALS: Within normal limits.\par KIDNEYS/URETERS: No hydronephrosis. Again noted, are bilateral cysts, some of which contain internal septations.\par \par BLADDER: Underdistended.\par REPRODUCTIVE ORGANS: Status post hysterectomy. A centrally necrotic mass involving the vagina and right vaginal cuff is again noted, measuring approximately 2.6 x 1.7 cm (2:146), previously measuring 2.9 x 1.2 cm. However, the mass appears increased in size inferiorly, extending to the introitus, where it measures 4.3 x 3.5 cm (2:156).\par \par BOWEL: No bowel obstruction.\par PERITONEUM: No ascites.\par VESSELS: Within normal limits.\par RETROPERITONEUM/LYMPH NODES: A previously noted left external iliac lymph node has decreased in size, currently measuring 0.8 x 0.6 cm (2:141), previously 1.5 x 1.1 cm. A heterogeneous necrotic appearing left inguinal lymph node (2:153) measures 1.5 x 1.1 cm, previously 1.8 x 1.4 cm.\par ABDOMINAL WALL: Infiltration of the subcutaneous tissues tissues in the lower anterior abdomen.\par BONES: Mild degenerative changes in the spine.\par \par IMPRESSION:\par Slight interval increase in size of a left apical nodule. In addition, there is a new 0.7 cm left upper lobe nodule.\par \par Thin-walled right upper and lower lobe cavities at the site of prior metastatic disease are unchanged. An irregular right upper lobe nodule is also stable.\par \par Mass involving the right vaginal cuff and vagina, similar in size to 10/26/2020. However, there is increased size of a component of the mass which extends inferiorly to the introitus, measuring up to 4.3 x 3.5 cm.\par \par Heterogeneous left inguinal lymph node and left external iliac lymph node, slightly decreased in size since 10/26/2020.\par

## 2021-02-03 NOTE — PHYSICAL EXAM
[Restricted in physically strenuous activity but ambulatory and able to carry out work of a light or sedentary nature] : Status 1- Restricted in physically strenuous activity but ambulatory and able to carry out work of a light or sedentary nature, e.g., light house work, office work [de-identified] : vaginal mass protruding at midline with whitish discharge, thickened area mons pubis. Non-bleeding

## 2021-02-03 NOTE — ASSESSMENT
[FreeTextEntry1] : Ms. Hoang is a 66 y/o G0 postmenopausal F who is referred to medical oncology for treatment considerations for recurrent endometrial cancer. She has a history of stage II endometrioid adenocarcinoma s/p TLH-BSO and staging surgery in 7/2018 followed by adjuvant RT, now with biopsy proven metastatic disease involving lung and vaginal cuff nodularity. \par \par CT scans from 1/8/21 reviewed which shows overall stable disease however I discussed that there is a new subcm REMBERTO nodule as well as a slight increase in a component of the vaginal cuff mass inferiorly however overall vaginal cuff mass reviewed and stable. Inginal lymphadenpathy stable/slightly decreased. \par \par She is s/p C7 carbo/taxol and continues to tolerate chemotherapy without significant toxicities. We again discussed management of chemotherapy-associated toxicities.\par \par Plan:\par -interval bloodwork scheduled today\par -proceed with carbo/taxol as scheduled \par -continue oxycodone for pain control prn\par -f/u Dr. Gaytan in gyn onc clinic \par \par All of the patient's questions and concerns were addressed in full.

## 2021-02-03 NOTE — HISTORY OF PRESENT ILLNESS
[T: ___] : T[unfilled] [N: ___] : N[unfilled] [M: ___] : M[unfilled] [AJCC Stage: ____] : AJCC Stage: [unfilled] [de-identified] : Referred by Dr. Gaytan\par \par Ms. Hoang is a 66 y/o G0 postmenopausal F who is referred to medical oncology for treatment considerations for recurrent endometrial cancer.\par Her oncologic history is summarized as follows:\par \par She was initially diagnosed with stage II grade 2 endometrial cancer in 2018, and her oncologic history is summarized as follows:\par \par 18 -  She underwent robotic assisted TLH BSO, bilateral pelvic and para-aortic sentinel LN dissection by Dr. Paras Grayson\par \par Surgical pathology demonstrated pT2N0 disease with endometrial tumor measuring 4cm, FIGO 2 endometrioid adenocarcinoma, >90 myometrial invasion with involvement of cervical stroma, +LVI. \par \par IHC of MMR proteins with intact expression \par Pelvic wash negative for malignant cells\par \par 10/2018 - Completed pelvic IMRT and vaginal cuff brachytherapy with Dr. Bowling\par \par She did well clinically under surveillance until 2019 when she developed vaginal spotting and fluid discharge, and was evaluated by Dr. Grayson. CA-125 was 27. Concern for possible recurrence and further workup pursued.\par \par 19 CT CAP demonstrated:\par New bilateral pulmonary nodules, some with central cavitation, suspicious for metastatic disease\par Enhancing nodularity superior to the vaginal cuff concerning for metastatic disease, measuring 1.9x1.6cm\par \par 10/30/19 - Underwent thorascopic multiple RLL wedge resections with Dr. Weston\par Surgical pathology c/w metastatic adenocarcinoma, consistent with patient's endometrial primary\par +ER CT Pax8\par \par She was recommended further treatment for her recurrent endometrial cancer but the patient was lost to follow up until last month. Over the past several months she has noticed increasing hardness and "lumps" in her vagina, which has been progressively more painful and burning sensation. She was subsequently evaluated by Dr. Gaytan and referred to medical oncology for systemic treatment evaluation. \par \par She continues to have vaginal discharge and on and off spotting (not more than 1 pad a day). She feels constipated. She has been taking Percocet 5-325, Tylenol and ibuprofen for vaginal pain. She has been very busy with work and family affairs, was not able to come for cancer treatment over the past months. She denies fevers, chills, n/v, abdominal pain, urinary symptoms, cough, CP, SOB. \par \par PMH:\par uterine fibroids\par osteoporosis\par \par PSH:\par R breast resection (?) was told benign pathology, \par D&C \par \par All: none\par Medications: Percocet\par \par SH:\par  since , lives alone\par No children\par Works as a hairdresser\par Denies smoking history, drinks wine socially\par \par FH:\par Mother - breast cancer in her 80s\par Father - leukemia\par Sister -  recently from bladder cancer at age 68\par \par GYN:\par Menopause age 53\par No use of HRT\par G0\par \par HCM:\par Mammogram - 2019 was normal per patient report\par Colonoscopy >10 years ago, was normal per report \par Her sister got sick and passed away due to bladder cancer. \par  [de-identified] :  ER +  [place] +  PAX 8  +     MSI STABLE [de-identified] : The patient presents for follow up. Tolerated last chemotherapy well without significant toxicities. She feels very well overall. She had mild fatigue but was able to do all activities and now back to baseline. She has been able to go outside and walk the dog daily. Only complaint is the pain in the legs b/l since onpro growth support was added but improved with analgesics. Eating/drinking well. She denies fevers, chills, cough, SOB, n/v, abdominal pain, urinary symptoms, neuropathy, changes in bowel habits, vaginal bleeding or discharge, headaches, dizziness. \par \par \par \par

## 2021-02-11 ENCOUNTER — NON-APPOINTMENT (OUTPATIENT)
Age: 66
End: 2021-02-11

## 2021-02-11 ENCOUNTER — RESULT REVIEW (OUTPATIENT)
Age: 66
End: 2021-02-11

## 2021-02-11 ENCOUNTER — APPOINTMENT (OUTPATIENT)
Dept: INFUSION THERAPY | Facility: HOSPITAL | Age: 66
End: 2021-02-11

## 2021-02-11 ENCOUNTER — APPOINTMENT (OUTPATIENT)
Dept: HEMATOLOGY ONCOLOGY | Facility: CLINIC | Age: 66
End: 2021-02-11
Payer: MEDICARE

## 2021-02-11 DIAGNOSIS — Z51.11 ENCOUNTER FOR ANTINEOPLASTIC CHEMOTHERAPY: ICD-10-CM

## 2021-02-11 DIAGNOSIS — T78.40XA ALLERGY, UNSPECIFIED, INITIAL ENCOUNTER: ICD-10-CM

## 2021-02-11 DIAGNOSIS — J39.2 OTHER DISEASES OF PHARYNX: ICD-10-CM

## 2021-02-11 LAB
BASOPHILS # BLD AUTO: 0.03 K/UL — SIGNIFICANT CHANGE UP (ref 0–0.2)
BASOPHILS NFR BLD AUTO: 0.5 % — SIGNIFICANT CHANGE UP (ref 0–2)
EOSINOPHIL # BLD AUTO: 0.04 K/UL — SIGNIFICANT CHANGE UP (ref 0–0.5)
EOSINOPHIL NFR BLD AUTO: 0.6 % — SIGNIFICANT CHANGE UP (ref 0–6)
HCT VFR BLD CALC: 32 % — LOW (ref 34.5–45)
HGB BLD-MCNC: 11.1 G/DL — LOW (ref 11.5–15.5)
IMM GRANULOCYTES NFR BLD AUTO: 0.8 % — SIGNIFICANT CHANGE UP (ref 0–1.5)
LYMPHOCYTES # BLD AUTO: 0.9 K/UL — LOW (ref 1–3.3)
LYMPHOCYTES # BLD AUTO: 14.1 % — SIGNIFICANT CHANGE UP (ref 13–44)
MCHC RBC-ENTMCNC: 34.7 G/DL — SIGNIFICANT CHANGE UP (ref 32–36)
MCHC RBC-ENTMCNC: 36.2 PG — HIGH (ref 27–34)
MCV RBC AUTO: 104.2 FL — HIGH (ref 80–100)
MONOCYTES # BLD AUTO: 0.8 K/UL — SIGNIFICANT CHANGE UP (ref 0–0.9)
MONOCYTES NFR BLD AUTO: 12.5 % — SIGNIFICANT CHANGE UP (ref 2–14)
NEUTROPHILS # BLD AUTO: 4.57 K/UL — SIGNIFICANT CHANGE UP (ref 1.8–7.4)
NEUTROPHILS NFR BLD AUTO: 71.5 % — SIGNIFICANT CHANGE UP (ref 43–77)
NRBC # BLD: 0 /100 WBCS — SIGNIFICANT CHANGE UP (ref 0–0)
PLATELET # BLD AUTO: 304 K/UL — SIGNIFICANT CHANGE UP (ref 150–400)
RBC # BLD: 3.07 M/UL — LOW (ref 3.8–5.2)
RBC # FLD: 12.2 % — SIGNIFICANT CHANGE UP (ref 10.3–14.5)
WBC # BLD: 6.39 K/UL — SIGNIFICANT CHANGE UP (ref 3.8–10.5)
WBC # FLD AUTO: 6.39 K/UL — SIGNIFICANT CHANGE UP (ref 3.8–10.5)

## 2021-02-11 PROCEDURE — 99214 OFFICE O/P EST MOD 30 MIN: CPT

## 2021-02-12 ENCOUNTER — APPOINTMENT (OUTPATIENT)
Dept: HEMATOLOGY ONCOLOGY | Facility: CLINIC | Age: 66
End: 2021-02-12

## 2021-02-12 ENCOUNTER — LABORATORY RESULT (OUTPATIENT)
Age: 66
End: 2021-02-12

## 2021-02-13 ENCOUNTER — OUTPATIENT (OUTPATIENT)
Dept: OUTPATIENT SERVICES | Facility: HOSPITAL | Age: 66
LOS: 1 days | Discharge: HOME | End: 2021-02-13

## 2021-02-13 DIAGNOSIS — C54.1 MALIGNANT NEOPLASM OF ENDOMETRIUM: ICD-10-CM

## 2021-02-13 DIAGNOSIS — Z98.890 OTHER SPECIFIED POSTPROCEDURAL STATES: Chronic | ICD-10-CM

## 2021-02-13 DIAGNOSIS — Z90.710 ACQUIRED ABSENCE OF BOTH CERVIX AND UTERUS: Chronic | ICD-10-CM

## 2021-02-17 ENCOUNTER — APPOINTMENT (OUTPATIENT)
Dept: INFUSION THERAPY | Facility: HOSPITAL | Age: 66
End: 2021-02-17

## 2021-02-17 ENCOUNTER — APPOINTMENT (OUTPATIENT)
Dept: HEMATOLOGY ONCOLOGY | Facility: CLINIC | Age: 66
End: 2021-02-17
Payer: MEDICARE

## 2021-02-17 VITALS
WEIGHT: 125.64 LBS | HEART RATE: 89 BPM | DIASTOLIC BLOOD PRESSURE: 93 MMHG | BODY MASS INDEX: 23.12 KG/M2 | HEIGHT: 61.81 IN | SYSTOLIC BLOOD PRESSURE: 151 MMHG | OXYGEN SATURATION: 99 % | RESPIRATION RATE: 16 BRPM | TEMPERATURE: 97 F

## 2021-02-17 PROCEDURE — 99214 OFFICE O/P EST MOD 30 MIN: CPT

## 2021-02-17 PROCEDURE — 99072 ADDL SUPL MATRL&STAF TM PHE: CPT

## 2021-02-18 ENCOUNTER — NON-APPOINTMENT (OUTPATIENT)
Age: 66
End: 2021-02-18

## 2021-02-18 DIAGNOSIS — E86.0 DEHYDRATION: ICD-10-CM

## 2021-02-18 PROBLEM — T78.40XA DRUG-INDUCED HYPERSENSITIVITY REACTION: Status: ACTIVE | Noted: 2021-02-18

## 2021-02-18 PROBLEM — Z51.11 ENCOUNTER FOR ANTINEOPLASTIC CHEMOTHERAPY: Status: ACTIVE | Noted: 2021-02-18

## 2021-02-18 PROBLEM — J39.2 THROAT IRRITATION: Status: ACTIVE | Noted: 2021-02-18

## 2021-02-22 ENCOUNTER — LABORATORY RESULT (OUTPATIENT)
Age: 66
End: 2021-02-22

## 2021-02-22 ENCOUNTER — RESULT REVIEW (OUTPATIENT)
Age: 66
End: 2021-02-22

## 2021-02-22 ENCOUNTER — APPOINTMENT (OUTPATIENT)
Dept: INFUSION THERAPY | Facility: HOSPITAL | Age: 66
End: 2021-02-22

## 2021-02-22 LAB
BASOPHILS # BLD AUTO: 0.01 K/UL — SIGNIFICANT CHANGE UP (ref 0–0.2)
BASOPHILS NFR BLD AUTO: 0.3 % — SIGNIFICANT CHANGE UP (ref 0–2)
EOSINOPHIL # BLD AUTO: 0.02 K/UL — SIGNIFICANT CHANGE UP (ref 0–0.5)
EOSINOPHIL NFR BLD AUTO: 0.5 % — SIGNIFICANT CHANGE UP (ref 0–6)
HCT VFR BLD CALC: 28.4 % — LOW (ref 34.5–45)
HGB BLD-MCNC: 9.8 G/DL — LOW (ref 11.5–15.5)
IMM GRANULOCYTES NFR BLD AUTO: 0.3 % — SIGNIFICANT CHANGE UP (ref 0–1.5)
LYMPHOCYTES # BLD AUTO: 1.01 K/UL — SIGNIFICANT CHANGE UP (ref 1–3.3)
LYMPHOCYTES # BLD AUTO: 25.4 % — SIGNIFICANT CHANGE UP (ref 13–44)
MCHC RBC-ENTMCNC: 34.5 G/DL — SIGNIFICANT CHANGE UP (ref 32–36)
MCHC RBC-ENTMCNC: 35.8 PG — HIGH (ref 27–34)
MCV RBC AUTO: 103.6 FL — HIGH (ref 80–100)
MONOCYTES # BLD AUTO: 0.59 K/UL — SIGNIFICANT CHANGE UP (ref 0–0.9)
MONOCYTES NFR BLD AUTO: 14.8 % — HIGH (ref 2–14)
NEUTROPHILS # BLD AUTO: 2.34 K/UL — SIGNIFICANT CHANGE UP (ref 1.8–7.4)
NEUTROPHILS NFR BLD AUTO: 58.7 % — SIGNIFICANT CHANGE UP (ref 43–77)
NRBC # BLD: 0 /100 WBCS — SIGNIFICANT CHANGE UP (ref 0–0)
PLATELET # BLD AUTO: 229 K/UL — SIGNIFICANT CHANGE UP (ref 150–400)
RBC # BLD: 2.74 M/UL — LOW (ref 3.8–5.2)
RBC # FLD: 12 % — SIGNIFICANT CHANGE UP (ref 10.3–14.5)
WBC # BLD: 3.98 K/UL — SIGNIFICANT CHANGE UP (ref 3.8–10.5)
WBC # FLD AUTO: 3.98 K/UL — SIGNIFICANT CHANGE UP (ref 3.8–10.5)

## 2021-02-27 ENCOUNTER — APPOINTMENT (OUTPATIENT)
Dept: CT IMAGING | Facility: IMAGING CENTER | Age: 66
End: 2021-02-27
Payer: MEDICARE

## 2021-02-27 ENCOUNTER — RESULT REVIEW (OUTPATIENT)
Age: 66
End: 2021-02-27

## 2021-02-27 ENCOUNTER — OUTPATIENT (OUTPATIENT)
Dept: OUTPATIENT SERVICES | Facility: HOSPITAL | Age: 66
LOS: 1 days | End: 2021-02-27
Payer: MEDICARE

## 2021-02-27 DIAGNOSIS — Z98.890 OTHER SPECIFIED POSTPROCEDURAL STATES: Chronic | ICD-10-CM

## 2021-02-27 DIAGNOSIS — Z00.8 ENCOUNTER FOR OTHER GENERAL EXAMINATION: ICD-10-CM

## 2021-02-27 DIAGNOSIS — C54.1 MALIGNANT NEOPLASM OF ENDOMETRIUM: ICD-10-CM

## 2021-02-27 DIAGNOSIS — Z90.710 ACQUIRED ABSENCE OF BOTH CERVIX AND UTERUS: Chronic | ICD-10-CM

## 2021-02-27 PROCEDURE — 71260 CT THORAX DX C+: CPT

## 2021-02-27 PROCEDURE — 71260 CT THORAX DX C+: CPT | Mod: 26

## 2021-02-27 PROCEDURE — 74177 CT ABD & PELVIS W/CONTRAST: CPT | Mod: 26

## 2021-02-27 PROCEDURE — 74177 CT ABD & PELVIS W/CONTRAST: CPT

## 2021-03-15 ENCOUNTER — LABORATORY RESULT (OUTPATIENT)
Age: 66
End: 2021-03-15

## 2021-03-15 ENCOUNTER — RESULT REVIEW (OUTPATIENT)
Age: 66
End: 2021-03-15

## 2021-03-15 ENCOUNTER — APPOINTMENT (OUTPATIENT)
Dept: INFUSION THERAPY | Facility: HOSPITAL | Age: 66
End: 2021-03-15

## 2021-03-15 ENCOUNTER — APPOINTMENT (OUTPATIENT)
Dept: HEMATOLOGY ONCOLOGY | Facility: CLINIC | Age: 66
End: 2021-03-15
Payer: MEDICARE

## 2021-03-15 ENCOUNTER — APPOINTMENT (OUTPATIENT)
Dept: HEMATOLOGY ONCOLOGY | Facility: CLINIC | Age: 66
End: 2021-03-15

## 2021-03-15 VITALS
OXYGEN SATURATION: 99 % | HEART RATE: 118 BPM | SYSTOLIC BLOOD PRESSURE: 148 MMHG | WEIGHT: 127.18 LBS | TEMPERATURE: 97.2 F | HEIGHT: 61.81 IN | BODY MASS INDEX: 23.4 KG/M2 | RESPIRATION RATE: 16 BRPM | DIASTOLIC BLOOD PRESSURE: 89 MMHG

## 2021-03-15 LAB
BASOPHILS # BLD AUTO: 0.02 K/UL — SIGNIFICANT CHANGE UP (ref 0–0.2)
BASOPHILS NFR BLD AUTO: 0.2 % — SIGNIFICANT CHANGE UP (ref 0–2)
EOSINOPHIL # BLD AUTO: 0.04 K/UL — SIGNIFICANT CHANGE UP (ref 0–0.5)
EOSINOPHIL NFR BLD AUTO: 0.5 % — SIGNIFICANT CHANGE UP (ref 0–6)
HCT VFR BLD CALC: 30.1 % — LOW (ref 34.5–45)
HGB BLD-MCNC: 10.5 G/DL — LOW (ref 11.5–15.5)
IMM GRANULOCYTES NFR BLD AUTO: 0.5 % — SIGNIFICANT CHANGE UP (ref 0–1.5)
LYMPHOCYTES # BLD AUTO: 0.95 K/UL — LOW (ref 1–3.3)
LYMPHOCYTES # BLD AUTO: 11 % — LOW (ref 13–44)
MCHC RBC-ENTMCNC: 34.9 G/DL — SIGNIFICANT CHANGE UP (ref 32–36)
MCHC RBC-ENTMCNC: 35 PG — HIGH (ref 27–34)
MCV RBC AUTO: 100.3 FL — HIGH (ref 80–100)
MONOCYTES # BLD AUTO: 0.71 K/UL — SIGNIFICANT CHANGE UP (ref 0–0.9)
MONOCYTES NFR BLD AUTO: 8.2 % — SIGNIFICANT CHANGE UP (ref 2–14)
NEUTROPHILS # BLD AUTO: 6.89 K/UL — SIGNIFICANT CHANGE UP (ref 1.8–7.4)
NEUTROPHILS NFR BLD AUTO: 79.6 % — HIGH (ref 43–77)
NRBC # BLD: 0 /100 WBCS — SIGNIFICANT CHANGE UP (ref 0–0)
PLATELET # BLD AUTO: 253 K/UL — SIGNIFICANT CHANGE UP (ref 150–400)
RBC # BLD: 3 M/UL — LOW (ref 3.8–5.2)
RBC # FLD: 11.9 % — SIGNIFICANT CHANGE UP (ref 10.3–14.5)
WBC # BLD: 8.65 K/UL — SIGNIFICANT CHANGE UP (ref 3.8–10.5)
WBC # FLD AUTO: 8.65 K/UL — SIGNIFICANT CHANGE UP (ref 3.8–10.5)

## 2021-03-15 PROCEDURE — 99072 ADDL SUPL MATRL&STAF TM PHE: CPT

## 2021-03-15 PROCEDURE — 99214 OFFICE O/P EST MOD 30 MIN: CPT

## 2021-03-16 ENCOUNTER — OUTPATIENT (OUTPATIENT)
Dept: OUTPATIENT SERVICES | Facility: HOSPITAL | Age: 66
LOS: 1 days | Discharge: HOME | End: 2021-03-16
Payer: MEDICARE

## 2021-03-16 ENCOUNTER — APPOINTMENT (OUTPATIENT)
Dept: RADIATION ONCOLOGY | Facility: CLINIC | Age: 66
End: 2021-03-16
Payer: MEDICARE

## 2021-03-16 VITALS
OXYGEN SATURATION: 99 % | BODY MASS INDEX: 23.33 KG/M2 | RESPIRATION RATE: 16 BRPM | DIASTOLIC BLOOD PRESSURE: 50 MMHG | WEIGHT: 126.77 LBS | TEMPERATURE: 97 F | SYSTOLIC BLOOD PRESSURE: 118 MMHG | HEART RATE: 100 BPM

## 2021-03-16 DIAGNOSIS — Z98.890 OTHER SPECIFIED POSTPROCEDURAL STATES: Chronic | ICD-10-CM

## 2021-03-16 DIAGNOSIS — Z90.710 ACQUIRED ABSENCE OF BOTH CERVIX AND UTERUS: Chronic | ICD-10-CM

## 2021-03-16 DIAGNOSIS — C54.1 MALIGNANT NEOPLASM OF ENDOMETRIUM: ICD-10-CM

## 2021-03-16 PROCEDURE — 99072 ADDL SUPL MATRL&STAF TM PHE: CPT

## 2021-03-16 PROCEDURE — 99214 OFFICE O/P EST MOD 30 MIN: CPT | Mod: GC

## 2021-03-16 NOTE — REVIEW OF SYSTEMS
[Dysmenorrhea/Abn Vaginal Bleeding] : dysmenorrhea/abnormal vaginal bleeding [Skin Rash] : skin rash [Negative] : Allergic/Immunologic

## 2021-03-16 NOTE — REASON FOR VISIT
[Other: _____] : [unfilled] [Consideration for Non-Curative Therapy] : consideration for non-curative therapy for [Endometrial Cancer] : endometrial cancer

## 2021-03-17 ENCOUNTER — APPOINTMENT (OUTPATIENT)
Dept: INFUSION THERAPY | Facility: HOSPITAL | Age: 66
End: 2021-03-17

## 2021-03-17 NOTE — HISTORY OF PRESENT ILLNESS
[FreeTextEntry1] : Ms. Hoang is a 66 y/o G0 postmenopausal F who is referred to medical oncology for treatment considerations for recurrent endometrial cancer.\par \par Her oncologic history began with stage II grade 2 endometrial cancer in 7/2018 s/p robotic assisted TLH BSO, bilateral pelvic and para-aortic sentinel LN dissection by Dr. Paras Grayson\par \par Surgical pathology demonstrated pT2N0 disease with endometrial tumor measuring 4cm, FIGO 2 endometrioid adenocarcinoma, >90 myometrial invasion with involvement of cervical stroma, +LVI. \par \par IHC of MMR proteins with intact expression \par Pelvic wash negative for malignant cells\par \par 10/2018 - Completed pelvic IMRT and vaginal cuff brachytherapy using 45Gy/5 weeks and a 6Gyx2 brachytherapy boost\par \par She did well clinically under surveillance until 9/2019 when she developed vaginal spotting and fluid discharge, and was evaluated by Dr. Grayson. CA-125 was 27.\par \par 9/17/19 CT CAP demonstrated:\par New bilateral pulmonary nodules, some with central cavitation, suspicious for metastatic disease\par Enhancing nodularity superior to the vaginal cuff concerning for metastatic disease, measuring 1.9x1.6cm\par \par 10/30/19 - Underwent thorascopic multiple RLL wedge resection x3 with Dr. Weston\par Surgical pathology c/w metastatic adenocarcinoma, consistent with patient's endometrial primary\par +ER [place] Pax8\par \par She was recommended further treatment for her recurrent endometrial cancer but the patient was lost to follow up unil 8/2020 when she presented for increasing hardness and "lumps" in her vagina, which has been progressively more painful and burning sensation. \par \par She was subsequently evaluated by Dr. Gaytan and referred to medical oncology for systemic treatment evaluation. \par \par She began carbotaxol with Dr. Toy Blank, s/p cycle 7 2/17/21\par \par CT CAP done 2/27/21 showed progression of necrotic vaginal mass and left inguinal adenopathy with an enlarging mass REMBERTO mass anterior to the mediastinum\par \par She will be starting Keytruda tomorrow.

## 2021-03-17 NOTE — PHYSICAL EXAM
[Sclera] : the sclera and conjunctiva were normal [] : no respiratory distress [Exaggerated Use Of Accessory Muscles For Inspiration] : no accessory muscle use [Abdomen Soft] : soft [Abdomen Tenderness] : non-tender [Normal] : normal skin color and pigmentation and no rash [Oriented To Time, Place, And Person] : oriented to person, place, and time [de-identified] : large necrotic mass visible outside intoitus labia majora intact but tumor appears to be filling vulva unable to enter the vaginal canal

## 2021-03-17 NOTE — VITALS
[Maximal Pain Intensity: 5/10] : 5/10 [Least Pain Intensity: 2/10] : 2/10 [Pain Description/Quality: ___] : Pain description/quality: [unfilled] [Pain Duration: ___] : Pain duration: [unfilled] [Pain Location: ___] : Pain Location: [unfilled] [Opioid] : opioid [NSAID/Non-Opioid] : NSAID/Non-Opioid [80: Normal activity with effort; some signs or symptoms of disease.] : 80: Normal activity with effort; some signs or symptoms of disease.  [ECOG Performance Status: 1 - Restricted in physically strenuous activity but ambulatory and able to carry out work of a light or sedentary nature] : Performance Status: 1 - Restricted in physically strenuous activity but ambulatory and able to carry out work of a light or sedentary nature, e.g., light house work, office work [Pain Interferes with ADLs] : Pain does not interfere with activities of daily living

## 2021-03-18 ENCOUNTER — NON-APPOINTMENT (OUTPATIENT)
Age: 66
End: 2021-03-18

## 2021-03-18 DIAGNOSIS — Z51.11 ENCOUNTER FOR ANTINEOPLASTIC CHEMOTHERAPY: ICD-10-CM

## 2021-03-18 DIAGNOSIS — R11.2 NAUSEA WITH VOMITING, UNSPECIFIED: ICD-10-CM

## 2021-03-31 NOTE — HISTORY OF PRESENT ILLNESS
[T: ___] : T[unfilled] [N: ___] : N[unfilled] [M: ___] : M[unfilled] [AJCC Stage: ____] : AJCC Stage: [unfilled] [de-identified] : Referred by Dr. Gaytan\par \par Ms. Hoang is a 66 y/o G0 postmenopausal F who is referred to medical oncology for treatment considerations for recurrent endometrial cancer.\par Her oncologic history is summarized as follows:\par \par She was initially diagnosed with stage II grade 2 endometrial cancer in 2018, and her oncologic history is summarized as follows:\par \par 18 -  She underwent robotic assisted TLH BSO, bilateral pelvic and para-aortic sentinel LN dissection by Dr. Paras Grayson\par \par Surgical pathology demonstrated pT2N0 disease with endometrial tumor measuring 4cm, FIGO 2 endometrioid adenocarcinoma, >90 myometrial invasion with involvement of cervical stroma, +LVI. \par \par IHC of MMR proteins with intact expression \par Pelvic wash negative for malignant cells\par \par 10/2018 - Completed pelvic IMRT and vaginal cuff brachytherapy with Dr. Bowling\par \par She did well clinically under surveillance until 2019 when she developed vaginal spotting and fluid discharge, and was evaluated by Dr. Grayson. CA-125 was 27. Concern for possible recurrence and further workup pursued.\par \par 19 CT CAP demonstrated:\par New bilateral pulmonary nodules, some with central cavitation, suspicious for metastatic disease\par Enhancing nodularity superior to the vaginal cuff concerning for metastatic disease, measuring 1.9x1.6cm\par \par 10/30/19 - Underwent thorascopic multiple RLL wedge resections with Dr. Weston\par Surgical pathology c/w metastatic adenocarcinoma, consistent with patient's endometrial primary\par +ER [place] Pax8\par \par She was recommended further treatment for her recurrent endometrial cancer but the patient was lost to follow up until last month. Over the past several months she has noticed increasing hardness and "lumps" in her vagina, which has been progressively more painful and burning sensation. She was subsequently evaluated by Dr. Gaytan and referred to medical oncology for systemic treatment evaluation. \par \par She continues to have vaginal discharge and on and off spotting (not more than 1 pad a day). She feels constipated. She has been taking Percocet 5-325, Tylenol and ibuprofen for vaginal pain. She has been very busy with work and family affairs, was not able to come for cancer treatment over the past months. She denies fevers, chills, n/v, abdominal pain, urinary symptoms, cough, CP, SOB. \par \par PMH:\par uterine fibroids\par osteoporosis\par \par PSH:\par R breast resection (?) was told benign pathology, \par D&C \par \par All: none\par Medications: Percocet\par \par SH:\par  since , lives alone\par No children\par Works as a hairdresser\par Denies smoking history, drinks wine socially\par \par FH:\par Mother - breast cancer in her 80s\par Father - leukemia\par Sister -  recently from bladder cancer at age 68\par \par GYN:\par Menopause age 53\par No use of HRT\par G0\par \par HCM:\par Mammogram - 2019 was normal per patient report\par Colonoscopy >10 years ago, was normal per report \par Her sister got sick and passed away due to bladder cancer. \par  [de-identified] :  ER +  [place] +  PAX 8  +     MSI STABLE [de-identified] : The patient presents for follow up. With last cycle of chemotherapy, the patient developed a reaction several minutes into the carboplatin infusion with burning in the back of the throat, and feeling warmth over the whole body. Infusion was discontinued and patient's symptoms resolved with reaction medications. \par Further infusion was not continued. Since being home, no further residual symptoms. Continues to have grade 1 fatigue and myalgias/arthralgias after onpro as last cycle.\par She has been able to go outside and walk the dog daily. Eating/drinking well. Regular BMs. Grieving the loss of a close friend. She denies fevers, chills, cough, SOB, n/v, abdominal pain, urinary symptoms, neuropathy, changes in bowel habits, vaginal bleeding or discharge, headaches, dizziness. \par \par \par \par

## 2021-03-31 NOTE — PHYSICAL EXAM
[Restricted in physically strenuous activity but ambulatory and able to carry out work of a light or sedentary nature] : Status 1- Restricted in physically strenuous activity but ambulatory and able to carry out work of a light or sedentary nature, e.g., light house work, office work [de-identified] : vaginal mass protruding at midline with whitish discharge, thickened area mons pubis. Non-bleeding

## 2021-03-31 NOTE — RESULTS/DATA
[FreeTextEntry1] : CT CAP 2/27/21:\par \par FINDINGS:\par CHEST:\par LUNGS AND LARGE AIRWAYS: Patent central airways. Status post right lower lobe wedge resection. Left apical nodule (2, 9) measures 1.4 x 1.3 cm, unchanged. Right upper lobe nodule measuring 1.0 cm (2, 32), unchanged. Residual thin-walled cavities at the site of prior nodules are again noted. A 1.2 cm left upper lobe nodule abutting the anterior mediastinum (2, 29) is increased previously 0.7 cm.\par PLEURA: No pleural effusion.\par VESSELS: Within normal limits.\par HEART: Heart size is normal. No pericardial effusion.\par MEDIASTINUM AND VIDAL: No lymphadenopathy.\par CHEST WALL AND LOWER NECK: Right chest wall MediPort with tip at the cavoatrial junction.\par \par ABDOMEN AND PELVIS:\par LIVER: Within normal limits.\par BILE DUCTS: Normal caliber.\par GALLBLADDER: Within normal limits.\par SPLEEN: Within normal limits.\par PANCREAS: Within normal limits.\par ADRENALS: Within normal limits.\par KIDNEYS/URETERS: No hydronephrosis. Bilateral renal cysts, some of which contain internal septations and additional hypodensities too small to characterize, unchanged.\par \par BLADDER: Within normal limits.\par REPRODUCTIVE ORGANS: Hysterectomy. A necrotic appearing mass involving the right vaginal cuff and extending inferiorly to the introitus, is increased in size measuring 8.9 x 4.7 cm, previously 4.3 x 3.5 cm.\par \par BOWEL: No bowel obstruction.\par PERITONEUM: No ascites.\par VESSELS: Within normal limits.\par RETROPERITONEUM/LYMPH NODES: Necrotic appearing left inguinal lymph node (2, 152) is increased in size measuring 2.0 x 1.8 cm, previously 1.5 x 1.1 cm. Subcentimeter left external iliac lymph node is unchanged.\par ABDOMINAL WALL: Within normal limits.\par BONES: Mild degenerative changes.\par \par IMPRESSION:\par Left upper lobe nodule abutting the mediastinum is increased in size. Otherwise stable pulmonary nodules.\par Interval enlargement of necrotic vaginal mass and left inguinal lymph node.\par \par \par CT CAP 1/8/21:\par \par FINDINGS:\par CHEST:\par LUNGS AND LARGE AIRWAYS: Patent central airways. Status post right upper and lower lobe wedge resections. A left apical nodule (2:12) measures 1.4 x 1.3 cm, previously 1.3 x 1.2 cm. A new left upper lobe nodule (2:30) measures 0.7 cm. Residual thin-walled cavities are again noted in the right upper and lower lobes at the site of previously noted nodules. An irregular right upper lobe nodule (2:33) measures 1 x 0.8 cm, unchanged.\par PLEURA: No pleural effusion.\par VESSELS: Mediport, with its tip in the right atrium.\par HEART: Heart size is normal. No pericardial effusion.\par MEDIASTINUM AND VIDAL: No lymphadenopathy.\par CHEST WALL AND LOWER NECK: Within normal limits.\par \par ABDOMEN AND PELVIS:\par LIVER: Within normal limits.\par BILE DUCTS: Normal caliber.\par GALLBLADDER: Within normal limits.\par SPLEEN: Within normal limits.\par PANCREAS: Within normal limits.\par ADRENALS: Within normal limits.\par KIDNEYS/URETERS: No hydronephrosis. Again noted, are bilateral cysts, some of which contain internal septations.\par \par BLADDER: Underdistended.\par REPRODUCTIVE ORGANS: Status post hysterectomy. A centrally necrotic mass involving the vagina and right vaginal cuff is again noted, measuring approximately 2.6 x 1.7 cm (2:146), previously measuring 2.9 x 1.2 cm. However, the mass appears increased in size inferiorly, extending to the introitus, where it measures 4.3 x 3.5 cm (2:156).\par \par BOWEL: No bowel obstruction.\par PERITONEUM: No ascites.\par VESSELS: Within normal limits.\par RETROPERITONEUM/LYMPH NODES: A previously noted left external iliac lymph node has decreased in size, currently measuring 0.8 x 0.6 cm (2:141), previously 1.5 x 1.1 cm. A heterogeneous necrotic appearing left inguinal lymph node (2:153) measures 1.5 x 1.1 cm, previously 1.8 x 1.4 cm.\par ABDOMINAL WALL: Infiltration of the subcutaneous tissues tissues in the lower anterior abdomen.\par BONES: Mild degenerative changes in the spine.\par \par IMPRESSION:\par Slight interval increase in size of a left apical nodule. In addition, there is a new 0.7 cm left upper lobe nodule.\par \par Thin-walled right upper and lower lobe cavities at the site of prior metastatic disease are unchanged. An irregular right upper lobe nodule is also stable.\par \par Mass involving the right vaginal cuff and vagina, similar in size to 10/26/2020. However, there is increased size of a component of the mass which extends inferiorly to the introitus, measuring up to 4.3 x 3.5 cm.\par \par Heterogeneous left inguinal lymph node and left external iliac lymph node, slightly decreased in size since 10/26/2020.\par

## 2021-03-31 NOTE — HISTORY OF PRESENT ILLNESS
[T: ___] : T[unfilled] [N: ___] : N[unfilled] [M: ___] : M[unfilled] [AJCC Stage: ____] : AJCC Stage: [unfilled] [de-identified] : Referred by Dr. Gaytan\par \par Ms. Hoang is a 64 y/o G0 postmenopausal F who is referred to medical oncology for treatment considerations for recurrent endometrial cancer.\par Her oncologic history is summarized as follows:\par \par She was initially diagnosed with stage II grade 2 endometrial cancer in 2018, and her oncologic history is summarized as follows:\par \par 18 -  She underwent robotic assisted TLH BSO, bilateral pelvic and para-aortic sentinel LN dissection by Dr. Paras Grayson\par \par Surgical pathology demonstrated pT2N0 disease with endometrial tumor measuring 4cm, FIGO 2 endometrioid adenocarcinoma, >90 myometrial invasion with involvement of cervical stroma, +LVI. \par \par IHC of MMR proteins with intact expression \par Pelvic wash negative for malignant cells\par \par 10/2018 - Completed pelvic IMRT and vaginal cuff brachytherapy with Dr. Bowling\par \par She did well clinically under surveillance until 2019 when she developed vaginal spotting and fluid discharge, and was evaluated by Dr. Grayson. CA-125 was 27. Concern for possible recurrence and further workup pursued.\par \par 19 CT CAP demonstrated:\par New bilateral pulmonary nodules, some with central cavitation, suspicious for metastatic disease\par Enhancing nodularity superior to the vaginal cuff concerning for metastatic disease, measuring 1.9x1.6cm\par \par 10/30/19 - Underwent thorascopic multiple RLL wedge resections with Dr. Weston\par Surgical pathology c/w metastatic adenocarcinoma, consistent with patient's endometrial primary\par +ER [place] Pax8\par \par She was recommended further treatment for her recurrent endometrial cancer but the patient was lost to follow up until last month. Over the past several months she has noticed increasing hardness and "lumps" in her vagina, which has been progressively more painful and burning sensation. She was subsequently evaluated by Dr. Gaytan and referred to medical oncology for systemic treatment evaluation. \par \par She continues to have vaginal discharge and on and off spotting (not more than 1 pad a day). She feels constipated. She has been taking Percocet 5-325, Tylenol and ibuprofen for vaginal pain. She has been very busy with work and family affairs, was not able to come for cancer treatment over the past months. She denies fevers, chills, n/v, abdominal pain, urinary symptoms, cough, CP, SOB. \par \par PMH:\par uterine fibroids\par osteoporosis\par \par PSH:\par R breast resection (?) was told benign pathology, \par D&C \par \par All: none\par Medications: Percocet\par \par SH:\par  since , lives alone\par No children\par Works as a hairdresser\par Denies smoking history, drinks wine socially\par \par FH:\par Mother - breast cancer in her 80s\par Father - leukemia\par Sister -  recently from bladder cancer at age 68\par \par GYN:\par Menopause age 53\par No use of HRT\par G0\par \par HCM:\par Mammogram - 2019 was normal per patient report\par Colonoscopy >10 years ago, was normal per report \par Her sister got sick and passed away due to bladder cancer. \par  [de-identified] :  ER +  [place] +  PAX 8  +     MSI STABLE [de-identified] : The patient presents for follow up. With last cycle of chemotherapy, the patient developed a reaction several minutes into the carboplatin infusion with burning in the back of the throat, and feeling warmth over the whole body. Infusion was discontinued and patient's symptoms resolved with reaction medications. \par Since her last visit she has had new CT scans.\par \par Continues to have grade 1 fatigue however becoming more symptomatic from vaginal mass that causes increased discomfort and she can feel it protruding more. It has been more difficult controlling the pain lately. No burning with urination or discharge. Regular BMs. She denies fevers, chills, cough, SOB, n/v, abdominal pain, urinary symptoms, neuropathy, changes in bowel habits, vaginal bleeding or discharge, headaches, dizziness. \par \par \par \par

## 2021-03-31 NOTE — PHYSICAL EXAM
[Restricted in physically strenuous activity but ambulatory and able to carry out work of a light or sedentary nature] : Status 1- Restricted in physically strenuous activity but ambulatory and able to carry out work of a light or sedentary nature, e.g., light house work, office work [de-identified] : vaginal mass protruding at midline with whitish discharge, thickened area mons pubis. Non-bleeding

## 2021-03-31 NOTE — ASSESSMENT
[FreeTextEntry1] : Ms. Hoang is a 66 y/o G0 postmenopausal F who is referred to medical oncology for treatment considerations for recurrent endometrial cancer. She has a history of stage II endometrioid adenocarcinoma s/p TLH-BSO and staging surgery in 7/2018 followed by adjuvant RT, now with biopsy proven metastatic disease involving lung and vaginal cuff nodularity. \par \par She has been treated with carbo/taxol with good clinical and radiographic response, however had a carboplatin reaction with last cycle. \par \par We reviewed the new CT scans from 2/27 that shows disease progression involving increased size of the known necrotic vaginal mass and left inguinal lymph node, and the REMBERTO nodule. Other pulmonary nodules stable. \par \par We discussed my recommendations for discontinuation of carboplatin/paclitaxel. I reviewed the rationale, benefits, risks, possible side effects and toxicities, as well as the scheduling of treatment with pembrolizumab and lenvima. Patient signed the new chemotherapy consent and treatment teaching was provided by Nataly Stevens. \par \par In addition, I will refer the patient back to Dr. Bowling for consideration of palliative RT to the vaginal mass, which she is most symptomatic from. \par \par Plan:\par -to start pembrolizumab and lenvima, approval process initiated\par -referral to rad onc for consideration of palliative RT\par -continue oxycodone for pain control prn\par -f/u Dr. Gaytan in gyn onc clinic \par -RTC 3 weeks \par \par All of the patient's questions and concerns were addressed in full.

## 2021-03-31 NOTE — ASSESSMENT
[FreeTextEntry1] : Ms. Hoang is a 64 y/o G0 postmenopausal F who is referred to medical oncology for treatment considerations for recurrent endometrial cancer. She has a history of stage II endometrioid adenocarcinoma s/p TLH-BSO and staging surgery in 7/2018 followed by adjuvant RT, now with biopsy proven metastatic disease involving lung and vaginal cuff nodularity. \par \par CT scans from 1/8/21 showed overall stable disease however I discussed that there is a new subcm REMBERTO nodule as well as a slight increase in a component of the vaginal cuff mass inferiorly however overall vaginal cuff mass reviewed with radiology and stable. Inguinal lymphadenopathy stable/slightly decreased. \par \par She is s/p 2 additional cycles of carbo/taxol since the January scans. The patient had a carboplatin reaction with resolution of symptoms however will require desensitization protocol should we proceed with further treatment with carboplatin. At this juncture, I would recommend new CT scans to assess the vaginal cuff mass and lung nodules to determine further treatment planning. \par \par Plan:\par -interval bloodwork scheduled today\par -CT scans to assess for response since January \par -may need desensitization evaluation with allergy for carboplatin reaction  \par -continue oxycodone for pain control prn\par -f/u Dr. Gaytan in gyn onc clinic \par \par All of the patient's questions and concerns were addressed in full.

## 2021-04-01 ENCOUNTER — APPOINTMENT (OUTPATIENT)
Dept: HEMATOLOGY ONCOLOGY | Facility: CLINIC | Age: 66
End: 2021-04-01

## 2021-04-01 ENCOUNTER — APPOINTMENT (OUTPATIENT)
Dept: INFUSION THERAPY | Facility: HOSPITAL | Age: 66
End: 2021-04-01

## 2021-04-05 ENCOUNTER — APPOINTMENT (OUTPATIENT)
Dept: HEMATOLOGY ONCOLOGY | Facility: CLINIC | Age: 66
End: 2021-04-05
Payer: MEDICARE

## 2021-04-05 ENCOUNTER — LABORATORY RESULT (OUTPATIENT)
Age: 66
End: 2021-04-05

## 2021-04-05 ENCOUNTER — OUTPATIENT (OUTPATIENT)
Dept: OUTPATIENT SERVICES | Facility: HOSPITAL | Age: 66
LOS: 1 days | End: 2021-04-05
Payer: MEDICARE

## 2021-04-05 ENCOUNTER — APPOINTMENT (OUTPATIENT)
Dept: INFUSION THERAPY | Facility: HOSPITAL | Age: 66
End: 2021-04-05

## 2021-04-05 ENCOUNTER — RESULT REVIEW (OUTPATIENT)
Age: 66
End: 2021-04-05

## 2021-04-05 VITALS
OXYGEN SATURATION: 97 % | HEIGHT: 61.81 IN | HEART RATE: 121 BPM | BODY MASS INDEX: 22.64 KG/M2 | RESPIRATION RATE: 16 BRPM | TEMPERATURE: 97.9 F | DIASTOLIC BLOOD PRESSURE: 84 MMHG | SYSTOLIC BLOOD PRESSURE: 127 MMHG | WEIGHT: 123.02 LBS

## 2021-04-05 DIAGNOSIS — Z98.890 OTHER SPECIFIED POSTPROCEDURAL STATES: Chronic | ICD-10-CM

## 2021-04-05 DIAGNOSIS — Z90.710 ACQUIRED ABSENCE OF BOTH CERVIX AND UTERUS: Chronic | ICD-10-CM

## 2021-04-05 LAB
BASOPHILS # BLD AUTO: 0.02 K/UL — SIGNIFICANT CHANGE UP (ref 0–0.2)
BASOPHILS NFR BLD AUTO: 0.3 % — SIGNIFICANT CHANGE UP (ref 0–2)
EOSINOPHIL # BLD AUTO: 0.01 K/UL — SIGNIFICANT CHANGE UP (ref 0–0.5)
EOSINOPHIL NFR BLD AUTO: 0.1 % — SIGNIFICANT CHANGE UP (ref 0–6)
HCT VFR BLD CALC: 31.2 % — LOW (ref 34.5–45)
HGB BLD-MCNC: 10.7 G/DL — LOW (ref 11.5–15.5)
IMM GRANULOCYTES NFR BLD AUTO: 0.7 % — SIGNIFICANT CHANGE UP (ref 0–1.5)
LYMPHOCYTES # BLD AUTO: 0.82 K/UL — LOW (ref 1–3.3)
LYMPHOCYTES # BLD AUTO: 10.9 % — LOW (ref 13–44)
MCHC RBC-ENTMCNC: 33.2 PG — SIGNIFICANT CHANGE UP (ref 27–34)
MCHC RBC-ENTMCNC: 34.3 G/DL — SIGNIFICANT CHANGE UP (ref 32–36)
MCV RBC AUTO: 96.9 FL — SIGNIFICANT CHANGE UP (ref 80–100)
MONOCYTES # BLD AUTO: 0.67 K/UL — SIGNIFICANT CHANGE UP (ref 0–0.9)
MONOCYTES NFR BLD AUTO: 8.9 % — SIGNIFICANT CHANGE UP (ref 2–14)
NEUTROPHILS # BLD AUTO: 5.97 K/UL — SIGNIFICANT CHANGE UP (ref 1.8–7.4)
NEUTROPHILS NFR BLD AUTO: 79.1 % — HIGH (ref 43–77)
NRBC # BLD: 0 /100 WBCS — SIGNIFICANT CHANGE UP (ref 0–0)
PLATELET # BLD AUTO: 212 K/UL — SIGNIFICANT CHANGE UP (ref 150–400)
RBC # BLD: 3.22 M/UL — LOW (ref 3.8–5.2)
RBC # FLD: 12.4 % — SIGNIFICANT CHANGE UP (ref 10.3–14.5)
WBC # BLD: 7.54 K/UL — SIGNIFICANT CHANGE UP (ref 3.8–10.5)
WBC # FLD AUTO: 7.54 K/UL — SIGNIFICANT CHANGE UP (ref 3.8–10.5)

## 2021-04-05 PROCEDURE — 86901 BLOOD TYPING SEROLOGIC RH(D): CPT

## 2021-04-05 PROCEDURE — 99213 OFFICE O/P EST LOW 20 MIN: CPT

## 2021-04-05 PROCEDURE — 86900 BLOOD TYPING SEROLOGIC ABO: CPT

## 2021-04-05 PROCEDURE — 86850 RBC ANTIBODY SCREEN: CPT

## 2021-04-07 ENCOUNTER — OUTPATIENT (OUTPATIENT)
Dept: OUTPATIENT SERVICES | Facility: HOSPITAL | Age: 66
LOS: 1 days | Discharge: HOME | End: 2021-04-07

## 2021-04-07 ENCOUNTER — APPOINTMENT (OUTPATIENT)
Dept: INFUSION THERAPY | Facility: HOSPITAL | Age: 66
End: 2021-04-07

## 2021-04-07 DIAGNOSIS — Z98.890 OTHER SPECIFIED POSTPROCEDURAL STATES: Chronic | ICD-10-CM

## 2021-04-07 DIAGNOSIS — Z90.710 ACQUIRED ABSENCE OF BOTH CERVIX AND UTERUS: Chronic | ICD-10-CM

## 2021-04-07 DIAGNOSIS — C54.1 MALIGNANT NEOPLASM OF ENDOMETRIUM: ICD-10-CM

## 2021-04-08 DIAGNOSIS — C54.1 MALIGNANT NEOPLASM OF ENDOMETRIUM: ICD-10-CM

## 2021-04-08 DIAGNOSIS — Z51.11 ENCOUNTER FOR ANTINEOPLASTIC CHEMOTHERAPY: ICD-10-CM

## 2021-04-08 DIAGNOSIS — R11.2 NAUSEA WITH VOMITING, UNSPECIFIED: ICD-10-CM

## 2021-04-22 ENCOUNTER — APPOINTMENT (OUTPATIENT)
Dept: INFUSION THERAPY | Facility: HOSPITAL | Age: 66
End: 2021-04-22

## 2021-04-22 ENCOUNTER — LABORATORY RESULT (OUTPATIENT)
Age: 66
End: 2021-04-22

## 2021-04-22 ENCOUNTER — RESULT REVIEW (OUTPATIENT)
Age: 66
End: 2021-04-22

## 2021-04-22 ENCOUNTER — APPOINTMENT (OUTPATIENT)
Dept: HEMATOLOGY ONCOLOGY | Facility: CLINIC | Age: 66
End: 2021-04-22
Payer: MEDICARE

## 2021-04-22 VITALS
RESPIRATION RATE: 16 BRPM | OXYGEN SATURATION: 99 % | HEART RATE: 144 BPM | DIASTOLIC BLOOD PRESSURE: 94 MMHG | TEMPERATURE: 97 F | BODY MASS INDEX: 21.46 KG/M2 | SYSTOLIC BLOOD PRESSURE: 142 MMHG | WEIGHT: 116.6 LBS

## 2021-04-22 VITALS — HEART RATE: 99 BPM

## 2021-04-22 LAB
BASOPHILS # BLD AUTO: 0.03 K/UL — SIGNIFICANT CHANGE UP (ref 0–0.2)
BASOPHILS NFR BLD AUTO: 0.4 % — SIGNIFICANT CHANGE UP (ref 0–2)
EOSINOPHIL # BLD AUTO: 0.03 K/UL — SIGNIFICANT CHANGE UP (ref 0–0.5)
EOSINOPHIL NFR BLD AUTO: 0.4 % — SIGNIFICANT CHANGE UP (ref 0–6)
HCT VFR BLD CALC: 33.1 % — LOW (ref 34.5–45)
HGB BLD-MCNC: 11.1 G/DL — LOW (ref 11.5–15.5)
IMM GRANULOCYTES NFR BLD AUTO: 0.5 % — SIGNIFICANT CHANGE UP (ref 0–1.5)
LYMPHOCYTES # BLD AUTO: 0.75 K/UL — LOW (ref 1–3.3)
LYMPHOCYTES # BLD AUTO: 9 % — LOW (ref 13–44)
MCHC RBC-ENTMCNC: 32.8 PG — SIGNIFICANT CHANGE UP (ref 27–34)
MCHC RBC-ENTMCNC: 33.5 G/DL — SIGNIFICANT CHANGE UP (ref 32–36)
MCV RBC AUTO: 97.9 FL — SIGNIFICANT CHANGE UP (ref 80–100)
MONOCYTES # BLD AUTO: 0.74 K/UL — SIGNIFICANT CHANGE UP (ref 0–0.9)
MONOCYTES NFR BLD AUTO: 8.9 % — SIGNIFICANT CHANGE UP (ref 2–14)
NEUTROPHILS # BLD AUTO: 6.75 K/UL — SIGNIFICANT CHANGE UP (ref 1.8–7.4)
NEUTROPHILS NFR BLD AUTO: 80.8 % — HIGH (ref 43–77)
NRBC # BLD: 0 /100 WBCS — SIGNIFICANT CHANGE UP (ref 0–0)
PLATELET # BLD AUTO: 180 K/UL — SIGNIFICANT CHANGE UP (ref 150–400)
RBC # BLD: 3.38 M/UL — LOW (ref 3.8–5.2)
RBC # FLD: 13.4 % — SIGNIFICANT CHANGE UP (ref 10.3–14.5)
WBC # BLD: 8.34 K/UL — SIGNIFICANT CHANGE UP (ref 3.8–10.5)
WBC # FLD AUTO: 8.34 K/UL — SIGNIFICANT CHANGE UP (ref 3.8–10.5)

## 2021-04-22 PROCEDURE — 99214 OFFICE O/P EST MOD 30 MIN: CPT

## 2021-04-28 ENCOUNTER — APPOINTMENT (OUTPATIENT)
Dept: INFUSION THERAPY | Facility: HOSPITAL | Age: 66
End: 2021-04-28

## 2021-05-07 ENCOUNTER — OUTPATIENT (OUTPATIENT)
Dept: OUTPATIENT SERVICES | Facility: HOSPITAL | Age: 66
LOS: 1 days | Discharge: HOME | End: 2021-05-07

## 2021-05-07 DIAGNOSIS — Z98.890 OTHER SPECIFIED POSTPROCEDURAL STATES: Chronic | ICD-10-CM

## 2021-05-07 DIAGNOSIS — Z90.710 ACQUIRED ABSENCE OF BOTH CERVIX AND UTERUS: Chronic | ICD-10-CM

## 2021-05-07 DIAGNOSIS — C54.1 MALIGNANT NEOPLASM OF ENDOMETRIUM: ICD-10-CM

## 2021-05-10 ENCOUNTER — LABORATORY RESULT (OUTPATIENT)
Age: 66
End: 2021-05-10

## 2021-05-10 ENCOUNTER — APPOINTMENT (OUTPATIENT)
Dept: HEMATOLOGY ONCOLOGY | Facility: CLINIC | Age: 66
End: 2021-05-10
Payer: MEDICARE

## 2021-05-10 ENCOUNTER — APPOINTMENT (OUTPATIENT)
Dept: INFUSION THERAPY | Facility: HOSPITAL | Age: 66
End: 2021-05-10

## 2021-05-10 ENCOUNTER — RESULT REVIEW (OUTPATIENT)
Age: 66
End: 2021-05-10

## 2021-05-10 ENCOUNTER — NON-APPOINTMENT (OUTPATIENT)
Age: 66
End: 2021-05-10

## 2021-05-10 VITALS
WEIGHT: 116.6 LBS | TEMPERATURE: 97.1 F | HEART RATE: 144 BPM | SYSTOLIC BLOOD PRESSURE: 131 MMHG | OXYGEN SATURATION: 98 % | DIASTOLIC BLOOD PRESSURE: 84 MMHG | RESPIRATION RATE: 16 BRPM | BODY MASS INDEX: 21.46 KG/M2

## 2021-05-10 LAB
BASOPHILS # BLD AUTO: 0.02 K/UL — SIGNIFICANT CHANGE UP (ref 0–0.2)
BASOPHILS NFR BLD AUTO: 0.2 % — SIGNIFICANT CHANGE UP (ref 0–2)
EOSINOPHIL # BLD AUTO: 0.13 K/UL — SIGNIFICANT CHANGE UP (ref 0–0.5)
EOSINOPHIL NFR BLD AUTO: 1.4 % — SIGNIFICANT CHANGE UP (ref 0–6)
HCT VFR BLD CALC: 34.4 % — LOW (ref 34.5–45)
HGB BLD-MCNC: 11.3 G/DL — LOW (ref 11.5–15.5)
IMM GRANULOCYTES NFR BLD AUTO: 0.4 % — SIGNIFICANT CHANGE UP (ref 0–1.5)
LYMPHOCYTES # BLD AUTO: 1.45 K/UL — SIGNIFICANT CHANGE UP (ref 1–3.3)
LYMPHOCYTES # BLD AUTO: 16 % — SIGNIFICANT CHANGE UP (ref 13–44)
MCHC RBC-ENTMCNC: 32.7 PG — SIGNIFICANT CHANGE UP (ref 27–34)
MCHC RBC-ENTMCNC: 32.8 G/DL — SIGNIFICANT CHANGE UP (ref 32–36)
MCV RBC AUTO: 99.4 FL — SIGNIFICANT CHANGE UP (ref 80–100)
MONOCYTES # BLD AUTO: 0.86 K/UL — SIGNIFICANT CHANGE UP (ref 0–0.9)
MONOCYTES NFR BLD AUTO: 9.5 % — SIGNIFICANT CHANGE UP (ref 2–14)
NEUTROPHILS # BLD AUTO: 6.59 K/UL — SIGNIFICANT CHANGE UP (ref 1.8–7.4)
NEUTROPHILS NFR BLD AUTO: 72.5 % — SIGNIFICANT CHANGE UP (ref 43–77)
NRBC # BLD: 0 /100 WBCS — SIGNIFICANT CHANGE UP (ref 0–0)
PLATELET # BLD AUTO: 286 K/UL — SIGNIFICANT CHANGE UP (ref 150–400)
RBC # BLD: 3.46 M/UL — LOW (ref 3.8–5.2)
RBC # FLD: 13.9 % — SIGNIFICANT CHANGE UP (ref 10.3–14.5)
WBC # BLD: 9.09 K/UL — SIGNIFICANT CHANGE UP (ref 3.8–10.5)
WBC # FLD AUTO: 9.09 K/UL — SIGNIFICANT CHANGE UP (ref 3.8–10.5)

## 2021-05-10 PROCEDURE — 99214 OFFICE O/P EST MOD 30 MIN: CPT

## 2021-05-18 ENCOUNTER — OUTPATIENT (OUTPATIENT)
Dept: OUTPATIENT SERVICES | Facility: HOSPITAL | Age: 66
LOS: 1 days | End: 2021-05-18
Payer: MEDICARE

## 2021-05-18 ENCOUNTER — APPOINTMENT (OUTPATIENT)
Dept: CT IMAGING | Facility: IMAGING CENTER | Age: 66
End: 2021-05-18
Payer: MEDICARE

## 2021-05-18 ENCOUNTER — RESULT REVIEW (OUTPATIENT)
Age: 66
End: 2021-05-18

## 2021-05-18 DIAGNOSIS — Z90.710 ACQUIRED ABSENCE OF BOTH CERVIX AND UTERUS: Chronic | ICD-10-CM

## 2021-05-18 DIAGNOSIS — Z98.890 OTHER SPECIFIED POSTPROCEDURAL STATES: Chronic | ICD-10-CM

## 2021-05-18 DIAGNOSIS — C54.1 MALIGNANT NEOPLASM OF ENDOMETRIUM: ICD-10-CM

## 2021-05-18 PROCEDURE — G1004: CPT

## 2021-05-18 PROCEDURE — 71260 CT THORAX DX C+: CPT

## 2021-05-18 PROCEDURE — 71260 CT THORAX DX C+: CPT | Mod: 26,MG

## 2021-05-18 PROCEDURE — 74177 CT ABD & PELVIS W/CONTRAST: CPT

## 2021-05-18 PROCEDURE — 74177 CT ABD & PELVIS W/CONTRAST: CPT | Mod: 26,MG

## 2021-05-18 RX ORDER — MORPHINE SULFATE 15 MG/1
15 TABLET, FILM COATED, EXTENDED RELEASE ORAL
Qty: 90 | Refills: 0 | Status: DISCONTINUED | COMMUNITY
Start: 2021-05-10 | End: 2021-05-18

## 2021-05-18 RX ORDER — SULFAMETHOXAZOLE AND TRIMETHOPRIM 800; 160 MG/1; MG/1
800-160 TABLET ORAL TWICE DAILY
Qty: 10 | Refills: 0 | Status: DISCONTINUED | COMMUNITY
Start: 2020-10-30 | End: 2021-05-18

## 2021-05-18 RX ORDER — TRAMADOL HYDROCHLORIDE 50 MG/1
50 TABLET, COATED ORAL
Qty: 30 | Refills: 0 | Status: DISCONTINUED | COMMUNITY
Start: 2021-02-17 | End: 2021-05-18

## 2021-05-18 RX ORDER — MORPHINE SULFATE 15 MG/1
15 TABLET ORAL
Qty: 90 | Refills: 0 | Status: DISCONTINUED | COMMUNITY
Start: 2021-05-10 | End: 2021-05-18

## 2021-05-18 RX ORDER — CIPROFLOXACIN HYDROCHLORIDE 500 MG/1
500 TABLET, FILM COATED ORAL
Qty: 10 | Refills: 0 | Status: COMPLETED | COMMUNITY
Start: 2020-10-22 | End: 2021-05-18

## 2021-05-19 RX ORDER — PROCHLORPERAZINE MALEATE 10 MG/1
10 TABLET ORAL EVERY 6 HOURS
Qty: 30 | Refills: 2 | Status: DISCONTINUED | COMMUNITY
Start: 2020-08-06 | End: 2021-05-19

## 2021-05-20 ENCOUNTER — APPOINTMENT (OUTPATIENT)
Dept: RADIATION ONCOLOGY | Facility: CLINIC | Age: 66
End: 2021-05-20
Payer: MEDICARE

## 2021-05-20 ENCOUNTER — OUTPATIENT (OUTPATIENT)
Dept: OUTPATIENT SERVICES | Facility: HOSPITAL | Age: 66
LOS: 1 days | Discharge: HOME | End: 2021-05-20
Payer: MEDICARE

## 2021-05-20 ENCOUNTER — NON-APPOINTMENT (OUTPATIENT)
Age: 66
End: 2021-05-20

## 2021-05-20 DIAGNOSIS — Z98.890 OTHER SPECIFIED POSTPROCEDURAL STATES: Chronic | ICD-10-CM

## 2021-05-20 DIAGNOSIS — Z90.710 ACQUIRED ABSENCE OF BOTH CERVIX AND UTERUS: Chronic | ICD-10-CM

## 2021-05-20 PROCEDURE — 99212 OFFICE O/P EST SF 10 MIN: CPT | Mod: 25

## 2021-05-20 PROCEDURE — 77290 THER RAD SIMULAJ FIELD CPLX: CPT | Mod: 26

## 2021-05-20 NOTE — VITALS
[Maximal Pain Intensity: 7/10] : 7/10 [Least Pain Intensity: 4/10] : 4/10 [Pain Description/Quality: ___] : Pain description/quality: [unfilled] [Pain Duration: ___] : Pain duration: [unfilled] [Pain Location: ___] : Pain Location: [unfilled] [Pain Interferes with ADLs] : Pain interferes with activities of daily living. [Opioid] : opioid [70: Cares for self; unalbe to carry on normal activity or do active work.] : 70: Cares for self; unable to carry on normal activity or do active work. [ECOG Performance Status: 1 - Restricted in physically strenuous activity but ambulatory and able to carry out work of a light or sedentary nature] : Performance Status: 1 - Restricted in physically strenuous activity but ambulatory and able to carry out work of a light or sedentary nature, e.g., light house work, office work

## 2021-05-20 NOTE — DISEASE MANAGEMENT
[Pathological] : TNM Stage: p [FreeTextEntry4] : recurrent/metastatic uterine [TTNM] : 2 [NTNM] : 0 [MTNM] : 0 [II] : II

## 2021-05-20 NOTE — HISTORY OF PRESENT ILLNESS
[FreeTextEntry1] : Ms. Hoang is a 66 year old woman with a history of FIGO stage 2 endometrioid endometrial cancer s/p TLHBSO and adjuvant pelvic IMRT and vaginal cuff brachytherapy using 45Gy/5 weeks and a 6Gyx2 brachytherapy boost in 2018 with pulmonary and vaginal recurrence 9/2019 with enlarging pulmonary nodules s/p wedge resection x3 and subsequent loss to follow up and representation in 8/2020 with disease progression in the vagina, pelvic lymph nodes, and lungs. She has since received Carbo/Taxol with Dr. Blank with mixed response but has most recently experienced progression through therapy at the vagina and REMBERTO anterior to mediastinum. Currently on lenvima/pembro with Dr. Blank.\par \par Today she continues with pain to the vagina. Is currently on methadone which has helped slightly. Pain is worse when sitting .. Reports vaginal bleeding yesterday after sitting for a prolonged period of time. CT done yesterday noted POD.

## 2021-05-21 PROCEDURE — 77295 3-D RADIOTHERAPY PLAN: CPT | Mod: 26

## 2021-05-21 PROCEDURE — 77334 RADIATION TREATMENT AID(S): CPT | Mod: 26

## 2021-05-21 PROCEDURE — 77300 RADIATION THERAPY DOSE PLAN: CPT | Mod: 26

## 2021-05-21 PROCEDURE — 77262 THER RADIOLOGY TX PLNG INTRM: CPT

## 2021-05-25 ENCOUNTER — NON-APPOINTMENT (OUTPATIENT)
Age: 66
End: 2021-05-25

## 2021-05-25 VITALS
DIASTOLIC BLOOD PRESSURE: 84 MMHG | HEART RATE: 82 BPM | SYSTOLIC BLOOD PRESSURE: 138 MMHG | BODY MASS INDEX: 21.42 KG/M2 | WEIGHT: 116.4 LBS | RESPIRATION RATE: 16 BRPM | OXYGEN SATURATION: 100 %

## 2021-05-25 PROCEDURE — 77280 THER RAD SIMULAJ FIELD SMPL: CPT | Mod: 26

## 2021-05-25 PROCEDURE — 77427 RADIATION TX MANAGEMENT X5: CPT

## 2021-05-25 RX ORDER — OXYCODONE 5 MG/1
5 TABLET ORAL
Qty: 120 | Refills: 0 | Status: DISCONTINUED | COMMUNITY
Start: 2020-08-11 | End: 2021-05-25

## 2021-05-25 RX ORDER — OXYCODONE 5 MG/1
5 TABLET ORAL
Qty: 90 | Refills: 0 | Status: DISCONTINUED | COMMUNITY
Start: 2020-08-18 | End: 2021-05-25

## 2021-05-25 NOTE — REVIEW OF SYSTEMS
[Constipation: Grade 0] : Constipation: Grade 0 [Diarrhea: Grade 0] : Diarrhea: Grade 0 [Fatigue: Grade 0] : Fatigue: Grade 0 [Hematuria: Grade 0] : Hematuria: Grade 0 [Urinary Incontinence: Grade 0] : Urinary Incontinence: Grade 0  [Skin Hyperpigmentation: Grade 0] : Skin Hyperpigmentation: Grade 0 [Dermatitis Radiation: Grade 0] : Dermatitis Radiation: Grade 0

## 2021-05-25 NOTE — VITALS
[Maximal Pain Intensity: 3/10] : 3/10 [Least Pain Intensity: 1/10] : 1/10 [Pain Description/Quality: ___] : Pain description/quality: [unfilled] [Pain Duration: ___] : Pain duration: [unfilled] [Pain Location: ___] : Pain Location: [unfilled] [Pain Interferes with ADLs] : Pain interferes with activities of daily living. [Opioid] : opioid [70: Cares for self; unalbe to carry on normal activity or do active work.] : 70: Cares for self; unable to carry on normal activity or do active work. [ECOG Performance Status: 1 - Restricted in physically strenuous activity but ambulatory and able to carry out work of a light or sedentary nature] : Performance Status: 1 - Restricted in physically strenuous activity but ambulatory and able to carry out work of a light or sedentary nature, e.g., light house work, office work

## 2021-05-27 ENCOUNTER — APPOINTMENT (OUTPATIENT)
Dept: HEMATOLOGY ONCOLOGY | Facility: CLINIC | Age: 66
End: 2021-05-27
Payer: MEDICARE

## 2021-05-27 VITALS
BODY MASS INDEX: 20.7 KG/M2 | WEIGHT: 116.82 LBS | HEIGHT: 63.07 IN | HEART RATE: 85 BPM | RESPIRATION RATE: 16 BRPM | TEMPERATURE: 97.9 F | OXYGEN SATURATION: 99 % | DIASTOLIC BLOOD PRESSURE: 69 MMHG | SYSTOLIC BLOOD PRESSURE: 103 MMHG

## 2021-05-27 PROCEDURE — 99215 OFFICE O/P EST HI 40 MIN: CPT

## 2021-05-28 ENCOUNTER — NON-APPOINTMENT (OUTPATIENT)
Age: 66
End: 2021-05-28

## 2021-05-28 NOTE — PHYSICAL EXAM
[General Appearance - Alert] : alert [General Appearance - In No Acute Distress] : in no acute distress [Sclera] : the sclera and conjunctiva were normal [Normal Oral Mucosa] : normal oral mucosa [Neck Appearance] : the appearance of the neck was normal [] : no respiratory distress [Auscultation Breath Sounds / Voice Sounds] : lungs were clear to auscultation bilaterally [Heart Rate And Rhythm] : heart rate was normal and rhythm regular [Heart Sounds] : normal S1 and S2 [Edema] : there was no peripheral edema [Bowel Sounds] : normal bowel sounds [Abdomen Soft] : soft [Skin Color & Pigmentation] : normal skin color and pigmentation [No Focal Deficits] : no focal deficits [Oriented To Time, Place, And Person] : oriented to person, place, and time [Affect] : the affect was normal [Mood] : the mood was normal

## 2021-06-04 NOTE — ASSESSMENT
[______] : HCP: [unfilled] [FreeTextEntry1] : 66yoF with:\par \par 1. Recurrent endometrial cancer - receiving RT.  Is interested in pursuing DMT. Follow up with Med Onc, Rad Onc,\par \par 2. Neoplasm related pain - Will increase methadone to 2.5mg TID, c/w PRN hydromorphone 2 mg.\par \par Medical cannabis certification completed today. She is interested in its effect to potentially offset opioid use. Provided cannabis education, overview of state program, and discussed adverse effects in detail. Counseled that vaporized cannabis is not the preferred route of administration due to the fact that both short-term and long-term risks associated with vaporizing oils are not yet fully understood. Recommend starting with 1:1 THC:CBD formulation at low dose of THC (~2mg/dose).\par \par 3. Decreased appetite - Medical cannabis may be of benefit.   Encouraged nutritional supplementation. Will request nutrition consult.\par \par 4. Nausea - C/w PRN metoclopramide 5-10mg. \par \par 5. Opioid-induced constipation - Educated on the importance of maintaining bowel regularity in light of opioid use. C/w colace, will start miralax once daily. \par \par 6. Encounter for palliative care \par - HCP completed in office today.   Original given to patient and additional copy to be scanned into EMR. \par - Reports she has a living will but declines to engage in conversation regarding.\par - All questions answered.  Empathetic listening and emotional support provided. \par \par Follow up in 2 weeks, call sooner with questions or issues.

## 2021-06-04 NOTE — HISTORY OF PRESENT ILLNESS
[Opioids for treatment of cancer not in remission, and/or  hospice, palliative care] : Opioids for treatment of cancer not in remission, and/or  hospice, palliative care [FreeTextEntry1] : 66yoF with recurrent endometrial cancer presents for initial palliative care visit, referred by Oncology.  \par PMH significant for PVD.\par \par Patient initially diagnosed with stage II endometrioid endometrial cancer s/p TLHBSO and adjuvant pelvic IMRT and vaginal cuff brachytherapy in 2018 with pulmonary and vaginal recurrence 2019 with enlarging pulmonary nodules s/p wedge resection x3 and subsequent loss to follow up and re-presentation in 2020 with disease progression in the vagina, pelvic lymph nodes, and lungs. She has since received Carbo/Taxol with mixed response but has most recently experienced progression through therapy at the vagina and REMBERTO anterior to mediastinum. Most recently on lenvima and pembrolizumab.   She self-discontinued lenimia due to side effects including voice changes.   Reports her voice returned to baseline with discontinuation. \par \par Main reason for which patient presents today is pain.  She has been experiencing vaginal/pelvic pain which became progressively worse over the past few weeks.   She endorses increasing vaginal hardness and "lumps".  Pain is described as constant burning, throbbing. Reports pain as high as 15-20/10 previously.\par \par Her pain has been difficult to manage due to opioid sensitivity.  She has trialed MS IR, tramadol and oxycodone but discontinued due to pruritus, nightmares, decreased appetite. MS ER was initiated but patient did not like how it made her feel.  PRN hydromorphone was recommended but patient was reluctant to utilize.  She was started on methadone 2.5mg BID on .  She reports significant improvement.   Experienced some intermittent nausea which was controlled with PRN metoclopramide.  \par \par Pain is currently 1/10.  As high as 3-4/10 which prompts her PRN hydromorphone 2mg usage. Sleeping improved with pain control.  Currently receiving RT, has completed 4/5 treatments. \par \par Her appetite is decreased.  Reports she was unaware of recommendation for protein supplementation.  Does not care for boost or ensure due to lactose component.   \par \par Current pain regimen:\par Methadone 2.5mg BID\par PRN hydromorphone 2mg (using 3-4 times per day)\par \par ROS\par + 8 lb weight loss\par + decreased appetite\par + CIPN of b/l toes - tolerable\par + intermittent nausea / no vomiting - PRN metoclopramide helps\par + fatigue \par + constipation - using colace\par + trouble sleeping - improved with pain control\par Denies diarrhea, dyspnea, depressed mood.\par All other ROS as outlined or noncontributory. \par \par Patient is .  Her spouse  due to testicular cancer in early . Her closest relative was her sister who passed away over a year ago. She is Confucianism, is a born again Gnosticist. She has a few close friends that check in on her.  [place] Her family is not aware of her diagnosis.\par \par I-Stop Ref#:  [id number]

## 2021-06-07 ENCOUNTER — OUTPATIENT (OUTPATIENT)
Dept: OUTPATIENT SERVICES | Facility: HOSPITAL | Age: 66
LOS: 1 days | Discharge: HOME | End: 2021-06-07

## 2021-06-07 DIAGNOSIS — Z98.890 OTHER SPECIFIED POSTPROCEDURAL STATES: Chronic | ICD-10-CM

## 2021-06-07 DIAGNOSIS — Z90.710 ACQUIRED ABSENCE OF BOTH CERVIX AND UTERUS: Chronic | ICD-10-CM

## 2021-06-07 DIAGNOSIS — C54.1 MALIGNANT NEOPLASM OF ENDOMETRIUM: ICD-10-CM

## 2021-06-09 ENCOUNTER — LABORATORY RESULT (OUTPATIENT)
Age: 66
End: 2021-06-09

## 2021-06-09 ENCOUNTER — RESULT REVIEW (OUTPATIENT)
Age: 66
End: 2021-06-09

## 2021-06-09 ENCOUNTER — APPOINTMENT (OUTPATIENT)
Dept: HEMATOLOGY ONCOLOGY | Facility: CLINIC | Age: 66
End: 2021-06-09
Payer: MEDICARE

## 2021-06-09 ENCOUNTER — APPOINTMENT (OUTPATIENT)
Dept: INFUSION THERAPY | Facility: HOSPITAL | Age: 66
End: 2021-06-09

## 2021-06-09 VITALS
BODY MASS INDEX: 20.57 KG/M2 | DIASTOLIC BLOOD PRESSURE: 76 MMHG | HEIGHT: 62.68 IN | OXYGEN SATURATION: 100 % | SYSTOLIC BLOOD PRESSURE: 114 MMHG | WEIGHT: 114.64 LBS | HEART RATE: 92 BPM | RESPIRATION RATE: 16 BRPM | TEMPERATURE: 98 F

## 2021-06-09 LAB
BASOPHILS # BLD AUTO: 0.02 K/UL — SIGNIFICANT CHANGE UP (ref 0–0.2)
BASOPHILS NFR BLD AUTO: 0.4 % — SIGNIFICANT CHANGE UP (ref 0–2)
EOSINOPHIL # BLD AUTO: 0.08 K/UL — SIGNIFICANT CHANGE UP (ref 0–0.5)
EOSINOPHIL NFR BLD AUTO: 1.5 % — SIGNIFICANT CHANGE UP (ref 0–6)
HCT VFR BLD CALC: 33.1 % — LOW (ref 34.5–45)
HGB BLD-MCNC: 10.7 G/DL — LOW (ref 11.5–15.5)
IMM GRANULOCYTES NFR BLD AUTO: 0.6 % — SIGNIFICANT CHANGE UP (ref 0–1.5)
LYMPHOCYTES # BLD AUTO: 0.73 K/UL — LOW (ref 1–3.3)
LYMPHOCYTES # BLD AUTO: 13.5 % — SIGNIFICANT CHANGE UP (ref 13–44)
MCHC RBC-ENTMCNC: 32.3 G/DL — SIGNIFICANT CHANGE UP (ref 32–36)
MCHC RBC-ENTMCNC: 32.4 PG — SIGNIFICANT CHANGE UP (ref 27–34)
MCV RBC AUTO: 100.3 FL — HIGH (ref 80–100)
MONOCYTES # BLD AUTO: 0.54 K/UL — SIGNIFICANT CHANGE UP (ref 0–0.9)
MONOCYTES NFR BLD AUTO: 10 % — SIGNIFICANT CHANGE UP (ref 2–14)
NEUTROPHILS # BLD AUTO: 4.01 K/UL — SIGNIFICANT CHANGE UP (ref 1.8–7.4)
NEUTROPHILS NFR BLD AUTO: 74 % — SIGNIFICANT CHANGE UP (ref 43–77)
NRBC # BLD: 0 /100 WBCS — SIGNIFICANT CHANGE UP (ref 0–0)
PLATELET # BLD AUTO: 250 K/UL — SIGNIFICANT CHANGE UP (ref 150–400)
RBC # BLD: 3.3 M/UL — LOW (ref 3.8–5.2)
RBC # FLD: 12.8 % — SIGNIFICANT CHANGE UP (ref 10.3–14.5)
WBC # BLD: 5.41 K/UL — SIGNIFICANT CHANGE UP (ref 3.8–10.5)
WBC # FLD AUTO: 5.41 K/UL — SIGNIFICANT CHANGE UP (ref 3.8–10.5)

## 2021-06-09 PROCEDURE — 99214 OFFICE O/P EST MOD 30 MIN: CPT

## 2021-06-10 ENCOUNTER — APPOINTMENT (OUTPATIENT)
Dept: HEMATOLOGY ONCOLOGY | Facility: CLINIC | Age: 66
End: 2021-06-10
Payer: MEDICARE

## 2021-06-10 VITALS
WEIGHT: 115.08 LBS | BODY MASS INDEX: 20.6 KG/M2 | DIASTOLIC BLOOD PRESSURE: 68 MMHG | OXYGEN SATURATION: 96 % | RESPIRATION RATE: 16 BRPM | HEART RATE: 72 BPM | TEMPERATURE: 96.9 F | SYSTOLIC BLOOD PRESSURE: 109 MMHG

## 2021-06-10 PROCEDURE — 99214 OFFICE O/P EST MOD 30 MIN: CPT

## 2021-06-10 RX ORDER — AMOXICILLIN AND CLAVULANATE POTASSIUM 500; 125 MG/1; MG/1
500-125 TABLET, FILM COATED ORAL
Qty: 10 | Refills: 0 | Status: DISCONTINUED | COMMUNITY
Start: 2020-10-16 | End: 2021-06-10

## 2021-06-13 NOTE — HISTORY OF PRESENT ILLNESS
[FreeTextEntry1] : Ms. Hoang is a 66 year old woman with a history of FIGO stage 2 endometrioid endometrial cancer s/p TLHBSO and adjuvant pelvic IMRT and vaginal cuff brachytherapy using 45Gy/5 weeks and a 6Gyx2 brachytherapy boost in 2018 with pulmonary and vaginal recurrence 9/2019 with enlarging pulmonary nodules s/p wedge resection x3 and subsequent loss to follow up and representation in 8/2020 with disease progression in the vagina, pelvic lymph nodes, and lungs. She has since received Carbo/Taxol with Dr. Blank with mixed response but has most recently experienced progression through therapy at the vagina and REMBERTO anterior to mediastinum. Currently on lenvima/pembro with Dr. Blank.\par \par Presents today for OTB. Completed 1/5 fractions \par Pain to vagina under control with methadone. States has yellowish vaginal discharge with no bleeding.

## 2021-06-13 NOTE — DISEASE MANAGEMENT
[Pathological] : TNM Stage: p [II] : II [FreeTextEntry4] : recurrent/metastatic uterine [TTNM] : 2 [NTNM] : 0 [MTNM] : 0 [de-identified] : 400 [de-identified] : 2000 [de-identified] : Vaginal canal

## 2021-06-15 NOTE — ASSESSMENT
[______] : HCP: [unfilled] [FreeTextEntry1] : 66yoF with:\par \par 1. Recurrent endometrial cancer - receiving RT.  Is interested in pursuing DMT. Follow up with Med Onc, Rad Onc,\par \par 2. Neoplasm related pain - Encouraged patient to restart methadone 2.5mg BID for optimal effect.  C/w PRN hydromorphone 2 mg.\par - Recommend increasing 1:1 THC:CBD formulation at low dose of THC to ~2mg/dose prior to meals for appetite stimulation. \par \par 3. Decreased appetite - Medical cannabis may be of benefit.   Encouraged nutritional supplementation. Will request nutrition consult.\par \par 4. Nausea - C/w PRN metoclopramide 5-10mg. \par \par 5. Opioid-induced constipation - C/w senna, colace, miralax.  Will add MoM.  If not effective, lactulose PRN sent. \par \par 6. Encounter for palliative care \par - HCP on file.\par - Reports she has a living will but declines to engage in conversation regarding.\par - All questions answered.  Empathetic listening and emotional support provided. \par \par Follow up in 3 weeks, call sooner with questions or issues.

## 2021-06-15 NOTE — HISTORY OF PRESENT ILLNESS
[Opioids for treatment of cancer not in remission, and/or  hospice, palliative care] : Opioids for treatment of cancer not in remission, and/or  hospice, palliative care [FreeTextEntry1] : 66yoF with recurrent endometrial cancer presents for follow-up palliative care visit, referred by Oncology.  \par PMH significant for PVD.\par \par Patient initially diagnosed with stage II endometrioid endometrial cancer s/p TLHBSO and adjuvant pelvic IMRT and vaginal cuff brachytherapy in 2018 with pulmonary and vaginal recurrence 2019 with enlarging pulmonary nodules s/p wedge resection x3 and subsequent loss to follow up and re-presentation in 2020 with disease progression in the vagina, pelvic lymph nodes, and lungs. She has since received Carbo/Taxol with mixed response but has most recently experienced progression through therapy at the vagina and REMBERTO anterior to mediastinum. Most recently on lenvima and pembrolizumab.   She self-discontinued lenvima due to side effects including voice changes.   Reports her voice returned to baseline with discontinuation. \par \par Main reason for which patient presents today is pain.  She has been experiencing vaginal/pelvic pain which became progressively worse over the past few weeks.   She endorses increasing vaginal hardness and "lumps".  Pain is described as constant burning, throbbing. Reports pain as high as 15-20/10 previously.\par \par Her pain has been difficult to manage due to opioid sensitivity.  She has trialed MS IR, tramadol and oxycodone but discontinued due to pruritus, nightmares, decreased appetite. MS ER was initiated but patient did not like how it made her feel.  PRN hydromorphone was recommended but patient was reluctant to utilize.  She was started on methadone 2.5mg BID on .  She reports significant improvement.   Experienced some intermittent nausea which was controlled with PRN metoclopramide.  \par \par Pain is currently 1/10.  As high as 3-4/10 which prompts her PRN hydromorphone 2mg usage. Sleeping improved with pain control.  Currently receiving RT, has completed 4/5 treatments. \par \par Her appetite is decreased.  Reports she was unaware of recommendation for protein supplementation.  Does not care for boost or ensure due to lactose component.   \par \par Interval History (6/10/21):  Patient present for 2 week follow-up.  She has completed RT. She is scheduled to restart lenvima and pembrolizumab. Pain overall is much improved.  She is using methadone 2.5mg PRN and hydromorphone 2 mg PRN.   \par \par She has been using medical cannabis 1:1 ~1.25mg THC/dose up to every 4 hours.  She has not noticed much symptom improvement but does express that it "takes the edge off".\par \par ROS\par + 8 lb weight loss\par + decreased appetite\par + CIPN of b/l toes - tolerable\par + intermittent nausea / no vomiting - PRN metoclopramide helps\par + fatigue \par + constipation - using colace, senna, miralax\par + trouble sleeping - improved with pain control\par Denies diarrhea, dyspnea, depressed mood.\par All other ROS as outlined or noncontributory. \par \par Patient is .  Her spouse  due to testicular cancer in early s. Her closest relative was her sister who passed away over a year ago. She is Adventism, is a born again Restorationism. She has a few close friends that check in on her.  [place]. Her family is not aware of her diagnosis.\par \par I-Stop Ref#:  [id number]

## 2021-06-16 ENCOUNTER — APPOINTMENT (OUTPATIENT)
Dept: INFUSION THERAPY | Facility: HOSPITAL | Age: 66
End: 2021-06-16

## 2021-06-16 PROCEDURE — 93010 ELECTROCARDIOGRAM REPORT: CPT

## 2021-06-17 DIAGNOSIS — Z51.11 ENCOUNTER FOR ANTINEOPLASTIC CHEMOTHERAPY: ICD-10-CM

## 2021-06-17 DIAGNOSIS — R11.2 NAUSEA WITH VOMITING, UNSPECIFIED: ICD-10-CM

## 2021-07-01 ENCOUNTER — APPOINTMENT (OUTPATIENT)
Dept: HEMATOLOGY ONCOLOGY | Facility: CLINIC | Age: 66
End: 2021-07-01
Payer: MEDICARE

## 2021-07-01 ENCOUNTER — LABORATORY RESULT (OUTPATIENT)
Age: 66
End: 2021-07-01

## 2021-07-01 VITALS
SYSTOLIC BLOOD PRESSURE: 143 MMHG | BODY MASS INDEX: 21.29 KG/M2 | DIASTOLIC BLOOD PRESSURE: 95 MMHG | WEIGHT: 114.2 LBS | OXYGEN SATURATION: 98 % | RESPIRATION RATE: 14 BRPM | HEART RATE: 79 BPM | TEMPERATURE: 97.3 F | HEIGHT: 61.61 IN

## 2021-07-01 PROCEDURE — 99214 OFFICE O/P EST MOD 30 MIN: CPT

## 2021-07-01 RX ORDER — CALCIUM CARBONATE/VITAMIN D3 500-10/5ML
400 LIQUID (ML) ORAL
Qty: 14 | Refills: 0 | Status: DISCONTINUED | COMMUNITY
Start: 2020-10-22 | End: 2021-07-01

## 2021-07-01 RX ORDER — PANTOPRAZOLE 20 MG/1
20 TABLET, DELAYED RELEASE ORAL TWICE DAILY
Qty: 60 | Refills: 2 | Status: DISCONTINUED | COMMUNITY
Start: 2020-09-03 | End: 2021-07-01

## 2021-07-02 NOTE — HISTORY OF PRESENT ILLNESS
[Opioids for treatment of cancer not in remission, and/or  hospice, palliative care] : Opioids for treatment of cancer not in remission, and/or  hospice, palliative care [FreeTextEntry1] : 66yoF with recurrent endometrial cancer presents for follow-up palliative care visit, referred by Oncology.  \par PMH significant for PVD.\par \par Patient initially diagnosed with stage II endometrioid endometrial cancer s/p TLHBSO and adjuvant pelvic IMRT and vaginal cuff brachytherapy in 2018 with pulmonary and vaginal recurrence 2019 with enlarging pulmonary nodules s/p wedge resection x3 and subsequent loss to follow up and re-presentation in 2020 with disease progression in the vagina, pelvic lymph nodes, and lungs. She has since received Carbo/Taxol with mixed response but has most recently experienced progression through therapy at the vagina and REMBERTO anterior to mediastinum. Most recently on lenvima and pembrolizumab.   She self-discontinued lenvima due to side effects including voice changes.   Reports her voice returned to baseline with discontinuation. \par \par Main reason for which patient presents today is pain.  She has been experiencing vaginal/pelvic pain which became progressively worse over the past few weeks.   She endorses increasing vaginal hardness and "lumps".  Pain is described as constant burning, throbbing. Reports pain as high as 15-20/10 previously.\par \par Her pain has been difficult to manage due to opioid sensitivity.  She has trialed MS IR, tramadol and oxycodone but discontinued due to pruritus, nightmares, decreased appetite. MS ER was initiated but patient did not like how it made her feel.  PRN hydromorphone was recommended but patient was reluctant to utilize.  She was started on methadone 2.5mg BID on .  She reports significant improvement.   Experienced some intermittent nausea which was controlled with PRN metoclopramide.  \par \par S/P 5 fractions of RT in May.\par \par Interval History (21):  Patient has resumed lenvima and Pembrolizumab.  Pain overall is controlled. She is using methadone 2.5mg BID and hydromorphone 2 mg PRN.   Pain gets as high as 5/10, as low as 2-3/10; she reports this is tolerable.\par    \par She has been using medical cannabis 1:1 2.5mg THC/dose a few times per week.  She found that ~2.5mg made her feel paranoid. \par \par Patient recently had to euthanize her 14-year-old dog and is grieving her loss.\par \par Current pain regimen:\par Methadone 2.5mg BID (QTc 417 in 2021)\par PRN hydromorphone 2 mg (uses a few times per week)\par \par ROS\par + stable weight \par + decreased appetite - improved - is "grazing" more\par + CIPN of b/l toes - tolerable  \par + intermittent nausea / no vomiting - PRN metoclopramide helps\par + fatigue \par + constipation - using colace, senna, miralax\par + trouble sleeping - improved with pain control\par Denies diarrhea, dyspnea, depressed mood.\par All other ROS as outlined or noncontributory. \par \par Patient is .  Her spouse  due to testicular cancer in early . Her closest relative was her sister who passed away over a year ago. She is Quaker, is a born again Rastafarian. She has a few close friends that check in on her.  [place]. Her family is not aware of her diagnosis.\par \par I-Stop Ref#: [id number]

## 2021-07-02 NOTE — ASSESSMENT
[______] : HCP: [unfilled] [FreeTextEntry1] : 66yoF with:\par \par 1. Recurrent endometrial cancer - on Pembrolizumab and Lenvima.  Is interested in pursuing DMT. Follow up with Med Onc, Rad Onc,\par \par 2. Neoplasm related pain - Pain presently controlled.  C/w methadone 2.5mg BID, PRN hydromorphone 2 mg-4mg.\par - Recommend increasing 1:1 THC:CBD formulation at low dose of THC to ~1.75mg/dose prior to meals for appetite stimulation. \par - Patient was previously on gabapentin; declines re-trial due to SE. \par \par 3. Decreased appetite -Recommend increasing 1:1 THC:CBD formulation at low dose of THC to ~1.75mg/dose prior to meals for appetite stimulation. \par \par 4. Nausea - C/w PRN metoclopramide 5-10mg. \par \par 5. Opioid-induced constipation - C/w senna, colace, miralax.  Will add MoM.  If not effective, lactulose PRN sent. \par \par 6. Encounter for palliative care \par - HCP on file.\par - Reports she has a living will but declines to engage in conversation regarding.  Will continue to follow-up. \par - All questions answered.  Empathetic listening and emotional support provided. \par \par Follow up in 1 month, call sooner with questions or issues.

## 2021-07-03 ENCOUNTER — APPOINTMENT (OUTPATIENT)
Dept: INFUSION THERAPY | Facility: HOSPITAL | Age: 66
End: 2021-07-03

## 2021-07-03 ENCOUNTER — LABORATORY RESULT (OUTPATIENT)
Age: 66
End: 2021-07-03

## 2021-07-03 ENCOUNTER — RESULT REVIEW (OUTPATIENT)
Age: 66
End: 2021-07-03

## 2021-07-03 DIAGNOSIS — R30.0 DYSURIA: ICD-10-CM

## 2021-07-03 LAB
APPEARANCE: ABNORMAL
BASOPHILS # BLD AUTO: 0.01 K/UL — SIGNIFICANT CHANGE UP (ref 0–0.2)
BASOPHILS NFR BLD AUTO: 0.3 % — SIGNIFICANT CHANGE UP (ref 0–2)
BILIRUBIN URINE: NEGATIVE
BLOOD URINE: ABNORMAL
COLOR: YELLOW
EOSINOPHIL # BLD AUTO: 0.08 K/UL — SIGNIFICANT CHANGE UP (ref 0–0.5)
EOSINOPHIL NFR BLD AUTO: 2.1 % — SIGNIFICANT CHANGE UP (ref 0–6)
GLUCOSE QUALITATIVE U: NEGATIVE
HCT VFR BLD CALC: 35.4 % — SIGNIFICANT CHANGE UP (ref 34.5–45)
HGB BLD-MCNC: 12 G/DL — SIGNIFICANT CHANGE UP (ref 11.5–15.5)
IMM GRANULOCYTES NFR BLD AUTO: 0.5 % — SIGNIFICANT CHANGE UP (ref 0–1.5)
KETONES URINE: NEGATIVE
LEUKOCYTE ESTERASE URINE: ABNORMAL
LYMPHOCYTES # BLD AUTO: 0.68 K/UL — LOW (ref 1–3.3)
LYMPHOCYTES # BLD AUTO: 18 % — SIGNIFICANT CHANGE UP (ref 13–44)
MCHC RBC-ENTMCNC: 32.3 PG — SIGNIFICANT CHANGE UP (ref 27–34)
MCHC RBC-ENTMCNC: 33.9 G/DL — SIGNIFICANT CHANGE UP (ref 32–36)
MCV RBC AUTO: 95.4 FL — SIGNIFICANT CHANGE UP (ref 80–100)
MONOCYTES # BLD AUTO: 0.27 K/UL — SIGNIFICANT CHANGE UP (ref 0–0.9)
MONOCYTES NFR BLD AUTO: 7.1 % — SIGNIFICANT CHANGE UP (ref 2–14)
NEUTROPHILS # BLD AUTO: 2.72 K/UL — SIGNIFICANT CHANGE UP (ref 1.8–7.4)
NEUTROPHILS NFR BLD AUTO: 72 % — SIGNIFICANT CHANGE UP (ref 43–77)
NITRITE URINE: NEGATIVE
NRBC # BLD: 0 /100 WBCS — SIGNIFICANT CHANGE UP (ref 0–0)
PH URINE: 5.5
PLATELET # BLD AUTO: 173 K/UL — SIGNIFICANT CHANGE UP (ref 150–400)
PROTEIN URINE: ABNORMAL
RBC # BLD: 3.71 M/UL — LOW (ref 3.8–5.2)
RBC # FLD: 13.1 % — SIGNIFICANT CHANGE UP (ref 10.3–14.5)
SPECIFIC GRAVITY URINE: 1.02
UROBILINOGEN URINE: NORMAL
WBC # BLD: 3.78 K/UL — LOW (ref 3.8–10.5)
WBC # FLD AUTO: 3.78 K/UL — LOW (ref 3.8–10.5)

## 2021-07-03 RX ORDER — CEFUROXIME AXETIL 250 MG/1
250 TABLET ORAL
Qty: 14 | Refills: 0 | Status: DISCONTINUED | COMMUNITY
Start: 2020-09-08 | End: 2021-07-03

## 2021-07-03 RX ORDER — CEFUROXIME AXETIL 500 MG/1
500 TABLET ORAL
Qty: 10 | Refills: 0 | Status: DISCONTINUED | COMMUNITY
Start: 2020-08-12 | End: 2021-07-03

## 2021-07-07 ENCOUNTER — LABORATORY RESULT (OUTPATIENT)
Age: 66
End: 2021-07-07

## 2021-07-07 ENCOUNTER — APPOINTMENT (OUTPATIENT)
Dept: INFUSION THERAPY | Facility: HOSPITAL | Age: 66
End: 2021-07-07

## 2021-07-07 RX ORDER — METHADONE HYDROCHLORIDE 5 MG/1
5 TABLET ORAL TWICE DAILY
Qty: 30 | Refills: 0 | Status: DISCONTINUED | COMMUNITY
Start: 2021-05-18 | End: 2021-07-07

## 2021-07-19 ENCOUNTER — OUTPATIENT (OUTPATIENT)
Dept: OUTPATIENT SERVICES | Facility: HOSPITAL | Age: 66
LOS: 1 days | Discharge: HOME | End: 2021-07-19

## 2021-07-19 DIAGNOSIS — Z90.710 ACQUIRED ABSENCE OF BOTH CERVIX AND UTERUS: Chronic | ICD-10-CM

## 2021-07-19 DIAGNOSIS — Z98.890 OTHER SPECIFIED POSTPROCEDURAL STATES: Chronic | ICD-10-CM

## 2021-07-19 DIAGNOSIS — C54.1 MALIGNANT NEOPLASM OF ENDOMETRIUM: ICD-10-CM

## 2021-07-22 ENCOUNTER — LABORATORY RESULT (OUTPATIENT)
Age: 66
End: 2021-07-22

## 2021-07-22 ENCOUNTER — RESULT REVIEW (OUTPATIENT)
Age: 66
End: 2021-07-22

## 2021-07-22 ENCOUNTER — APPOINTMENT (OUTPATIENT)
Dept: INFUSION THERAPY | Facility: HOSPITAL | Age: 66
End: 2021-07-22

## 2021-07-22 ENCOUNTER — APPOINTMENT (OUTPATIENT)
Dept: HEMATOLOGY ONCOLOGY | Facility: CLINIC | Age: 66
End: 2021-07-22
Payer: MEDICARE

## 2021-07-22 VITALS
SYSTOLIC BLOOD PRESSURE: 170 MMHG | BODY MASS INDEX: 21.73 KG/M2 | WEIGHT: 116.6 LBS | HEIGHT: 61.61 IN | OXYGEN SATURATION: 99 % | DIASTOLIC BLOOD PRESSURE: 100 MMHG | TEMPERATURE: 97.2 F | RESPIRATION RATE: 14 BRPM | HEART RATE: 67 BPM

## 2021-07-22 LAB
BASOPHILS # BLD AUTO: 0.02 K/UL — SIGNIFICANT CHANGE UP (ref 0–0.2)
BASOPHILS NFR BLD AUTO: 0.5 % — SIGNIFICANT CHANGE UP (ref 0–2)
EOSINOPHIL # BLD AUTO: 0.13 K/UL — SIGNIFICANT CHANGE UP (ref 0–0.5)
EOSINOPHIL NFR BLD AUTO: 3.5 % — SIGNIFICANT CHANGE UP (ref 0–6)
HCT VFR BLD CALC: 36.2 % — SIGNIFICANT CHANGE UP (ref 34.5–45)
HGB BLD-MCNC: 12.5 G/DL — SIGNIFICANT CHANGE UP (ref 11.5–15.5)
IMM GRANULOCYTES NFR BLD AUTO: 0.3 % — SIGNIFICANT CHANGE UP (ref 0–1.5)
LYMPHOCYTES # BLD AUTO: 1.1 K/UL — SIGNIFICANT CHANGE UP (ref 1–3.3)
LYMPHOCYTES # BLD AUTO: 29.3 % — SIGNIFICANT CHANGE UP (ref 13–44)
MCHC RBC-ENTMCNC: 33.2 PG — SIGNIFICANT CHANGE UP (ref 27–34)
MCHC RBC-ENTMCNC: 34.5 G/DL — SIGNIFICANT CHANGE UP (ref 32–36)
MCV RBC AUTO: 96 FL — SIGNIFICANT CHANGE UP (ref 80–100)
MONOCYTES # BLD AUTO: 0.47 K/UL — SIGNIFICANT CHANGE UP (ref 0–0.9)
MONOCYTES NFR BLD AUTO: 12.5 % — SIGNIFICANT CHANGE UP (ref 2–14)
NEUTROPHILS # BLD AUTO: 2.02 K/UL — SIGNIFICANT CHANGE UP (ref 1.8–7.4)
NEUTROPHILS NFR BLD AUTO: 53.9 % — SIGNIFICANT CHANGE UP (ref 43–77)
NRBC # BLD: 0 /100 WBCS — SIGNIFICANT CHANGE UP (ref 0–0)
PLATELET # BLD AUTO: 150 K/UL — SIGNIFICANT CHANGE UP (ref 150–400)
RBC # BLD: 3.77 M/UL — LOW (ref 3.8–5.2)
RBC # FLD: 14.5 % — SIGNIFICANT CHANGE UP (ref 10.3–14.5)
WBC # BLD: 3.75 K/UL — LOW (ref 3.8–10.5)
WBC # FLD AUTO: 3.75 K/UL — LOW (ref 3.8–10.5)

## 2021-07-22 PROCEDURE — 99213 OFFICE O/P EST LOW 20 MIN: CPT

## 2021-07-23 ENCOUNTER — APPOINTMENT (OUTPATIENT)
Dept: ENDOCRINOLOGY | Facility: CLINIC | Age: 66
End: 2021-07-23
Payer: MEDICARE

## 2021-07-23 VITALS
HEIGHT: 61.61 IN | BODY MASS INDEX: 21.62 KG/M2 | WEIGHT: 116 LBS | SYSTOLIC BLOOD PRESSURE: 166 MMHG | RESPIRATION RATE: 15 BRPM | OXYGEN SATURATION: 99 % | HEART RATE: 77 BPM | DIASTOLIC BLOOD PRESSURE: 100 MMHG

## 2021-07-23 DIAGNOSIS — Z13.29 ENCOUNTER FOR SCREENING FOR OTHER SUSPECTED ENDOCRINE DISORDER: ICD-10-CM

## 2021-07-23 PROCEDURE — 99204 OFFICE O/P NEW MOD 45 MIN: CPT

## 2021-07-23 RX ORDER — DEXAMETHASONE 4 MG/1
4 TABLET ORAL
Qty: 11 | Refills: 0 | Status: DISCONTINUED | COMMUNITY
Start: 2020-08-06 | End: 2021-07-23

## 2021-07-23 RX ORDER — POLYETHYLENE GLYCOL 3350 17 G/17G
17 POWDER, FOR SOLUTION ORAL DAILY
Qty: 1 | Refills: 0 | Status: DISCONTINUED | COMMUNITY
Start: 2021-05-27 | End: 2021-07-23

## 2021-07-23 RX ORDER — HYDROMORPHONE HYDROCHLORIDE 2 MG/1
2 TABLET ORAL EVERY 4 HOURS
Qty: 120 | Refills: 0 | Status: DISCONTINUED | COMMUNITY
Start: 2021-05-17 | End: 2021-07-23

## 2021-07-23 RX ORDER — METOCLOPRAMIDE 10 MG/1
10 TABLET ORAL EVERY 6 HOURS
Qty: 30 | Refills: 1 | Status: DISCONTINUED | COMMUNITY
Start: 2021-03-15 | End: 2021-07-23

## 2021-07-23 RX ORDER — SULFAMETHOXAZOLE AND TRIMETHOPRIM 800; 160 MG/1; MG/1
800-160 TABLET ORAL TWICE DAILY
Qty: 10 | Refills: 0 | Status: DISCONTINUED | COMMUNITY
Start: 2021-07-03 | End: 2021-07-23

## 2021-07-23 RX ORDER — LIDOCAINE 5 G/100G
5 OINTMENT TOPICAL
Qty: 1 | Refills: 0 | Status: DISCONTINUED | COMMUNITY
Start: 2020-10-16 | End: 2021-07-23

## 2021-07-23 RX ORDER — MULTIVIT WITH MINERALS/HERBS
TABLET ORAL
Refills: 0 | Status: DISCONTINUED | COMMUNITY
End: 2021-07-23

## 2021-07-23 RX ORDER — LIDOCAINE 5 G/100G
5 OINTMENT TOPICAL
Qty: 1 | Refills: 0 | Status: DISCONTINUED | COMMUNITY
Start: 2021-04-23 | End: 2021-07-23

## 2021-07-23 RX ORDER — ASPIRIN 81 MG
81 TABLET, DELAYED RELEASE (ENTERIC COATED) ORAL
Refills: 0 | Status: DISCONTINUED | COMMUNITY
End: 2021-07-23

## 2021-07-23 RX ORDER — LENVATINIB 14 MG/DAY
10 & 4 KIT ORAL
Qty: 60 | Refills: 2 | Status: DISCONTINUED | COMMUNITY
Start: 2021-03-15 | End: 2021-07-23

## 2021-07-23 RX ORDER — LENVATINIB 4 MG/1
3 X 4 CAPSULE ORAL
Qty: 90 | Refills: 2 | Status: DISCONTINUED | COMMUNITY
Start: 2021-05-11 | End: 2021-07-23

## 2021-07-23 RX ORDER — LACTULOSE 10 G/15ML
10 SOLUTION ORAL
Qty: 1 | Refills: 2 | Status: DISCONTINUED | COMMUNITY
Start: 2021-06-10 | End: 2021-07-23

## 2021-07-23 RX ORDER — APREPITANT 80 MG/1
80 CAPSULE ORAL
Qty: 6 | Refills: 5 | Status: DISCONTINUED | COMMUNITY
Start: 2020-08-06 | End: 2021-07-23

## 2021-07-23 RX ORDER — FAMOTIDINE 20 MG/1
20 TABLET, FILM COATED ORAL
Qty: 30 | Refills: 2 | Status: DISCONTINUED | COMMUNITY
Start: 2020-10-19 | End: 2021-07-23

## 2021-07-23 RX ORDER — POTASSIUM CHLORIDE 20 MEQ/15ML
20 MEQ/15ML SOLUTION ORAL
Qty: 1 | Refills: 0 | Status: DISCONTINUED | COMMUNITY
Start: 2020-09-04 | End: 2021-07-23

## 2021-07-23 RX ORDER — LIDOCAINE AND PRILOCAINE 25; 25 MG/G; MG/G
2.5-2.5 CREAM TOPICAL
Qty: 1 | Refills: 1 | Status: DISCONTINUED | COMMUNITY
Start: 2020-11-18 | End: 2021-07-23

## 2021-07-25 NOTE — HISTORY OF PRESENT ILLNESS
[FreeTextEntry1] : 66 year female  referred for hypothyroidism management. \par Ms. VICENTE  was diagnosed with endometrial cancer in 2018. She underwent ELISEO-BSO same year , followed by a pelvic IMRT. In 2019, she was found mts to lungs and pelvic LN. She completed another course of radiation therapy and recently started on Keytruda and Lenvima about 4 months ago. Her TSH early this month returned as 28.1, and she was started on Synthroid 25 mcg about 3 months ago. Her last TSH from yesterday was- 56.8 with FT4- 0.8 \par Her blood pressure was found to be elevated past 3 weeks, and she's complaining of some fatigue. \par Denies family history of thyroid cancer or history of radiation exposure to head and neck area in a childhood.\par

## 2021-07-25 NOTE — ASSESSMENT
[FreeTextEntry1] : Acquired hypothyroidism, secondary to use of check point and TK inhibitors\par - increase Synthroid 75 mcg\par - screen for pituitary deficiencies, type 1 diabetes, adrenal insufficiency\par - labs in 5 weeks and follow up after\par \par

## 2021-07-28 ENCOUNTER — RESULT REVIEW (OUTPATIENT)
Age: 66
End: 2021-07-28

## 2021-07-28 ENCOUNTER — APPOINTMENT (OUTPATIENT)
Dept: INFUSION THERAPY | Facility: HOSPITAL | Age: 66
End: 2021-07-28

## 2021-07-28 ENCOUNTER — APPOINTMENT (OUTPATIENT)
Dept: HEMATOLOGY ONCOLOGY | Facility: CLINIC | Age: 66
End: 2021-07-28
Payer: MEDICARE

## 2021-07-28 DIAGNOSIS — K59.03 DRUG INDUCED CONSTIPATION: ICD-10-CM

## 2021-07-28 DIAGNOSIS — T40.2X5A DRUG INDUCED CONSTIPATION: ICD-10-CM

## 2021-07-28 LAB
BASOPHILS # BLD AUTO: 0.03 K/UL — SIGNIFICANT CHANGE UP (ref 0–0.2)
BASOPHILS NFR BLD AUTO: 0.7 % — SIGNIFICANT CHANGE UP (ref 0–2)
EOSINOPHIL # BLD AUTO: 0.07 K/UL — SIGNIFICANT CHANGE UP (ref 0–0.5)
EOSINOPHIL NFR BLD AUTO: 1.7 % — SIGNIFICANT CHANGE UP (ref 0–6)
HCT VFR BLD CALC: 37.9 % — SIGNIFICANT CHANGE UP (ref 34.5–45)
HGB BLD-MCNC: 13 G/DL — SIGNIFICANT CHANGE UP (ref 11.5–15.5)
IMM GRANULOCYTES NFR BLD AUTO: 0.2 % — SIGNIFICANT CHANGE UP (ref 0–1.5)
LYMPHOCYTES # BLD AUTO: 0.74 K/UL — LOW (ref 1–3.3)
LYMPHOCYTES # BLD AUTO: 18.5 % — SIGNIFICANT CHANGE UP (ref 13–44)
MCHC RBC-ENTMCNC: 33.1 PG — SIGNIFICANT CHANGE UP (ref 27–34)
MCHC RBC-ENTMCNC: 34.3 G/DL — SIGNIFICANT CHANGE UP (ref 32–36)
MCV RBC AUTO: 96.4 FL — SIGNIFICANT CHANGE UP (ref 80–100)
MONOCYTES # BLD AUTO: 0.29 K/UL — SIGNIFICANT CHANGE UP (ref 0–0.9)
MONOCYTES NFR BLD AUTO: 7.2 % — SIGNIFICANT CHANGE UP (ref 2–14)
NEUTROPHILS # BLD AUTO: 2.87 K/UL — SIGNIFICANT CHANGE UP (ref 1.8–7.4)
NEUTROPHILS NFR BLD AUTO: 71.7 % — SIGNIFICANT CHANGE UP (ref 43–77)
NRBC # BLD: 0 /100 WBCS — SIGNIFICANT CHANGE UP (ref 0–0)
PLATELET # BLD AUTO: 151 K/UL — SIGNIFICANT CHANGE UP (ref 150–400)
RBC # BLD: 3.93 M/UL — SIGNIFICANT CHANGE UP (ref 3.8–5.2)
RBC # FLD: 14.5 % — SIGNIFICANT CHANGE UP (ref 10.3–14.5)
WBC # BLD: 4.01 K/UL — SIGNIFICANT CHANGE UP (ref 3.8–10.5)
WBC # FLD AUTO: 4.01 K/UL — SIGNIFICANT CHANGE UP (ref 3.8–10.5)

## 2021-07-28 PROCEDURE — 99214 OFFICE O/P EST MOD 30 MIN: CPT

## 2021-07-28 RX ORDER — METHADONE HYDROCHLORIDE 10 MG/5ML
10 SOLUTION ORAL
Qty: 75 | Refills: 0 | Status: DISCONTINUED | COMMUNITY
Start: 2021-07-07 | End: 2021-07-23

## 2021-07-29 DIAGNOSIS — R11.2 NAUSEA WITH VOMITING, UNSPECIFIED: ICD-10-CM

## 2021-07-29 DIAGNOSIS — Z51.11 ENCOUNTER FOR ANTINEOPLASTIC CHEMOTHERAPY: ICD-10-CM

## 2021-07-29 NOTE — ASSESSMENT
[______] : HCP: [unfilled] [FreeTextEntry1] : 66yoF with:\par \par 1. Recurrent endometrial cancer - on Pembrolizumab and Lenvima.  Follow up with Med Onc, Rad Onc,\par \par 2. Neoplasm related pain - Pain presently controlled.  C/w methadone 2.5mg BID, PRN hydromorphone 2mg.\par - Patient was previously on gabapentin; declines re-trial due to SE. \par \par 3. Decreased appetite - Patient does not wish to continue with medical cannabis at this time.   Her weight is currently stable. \par \par 4. Nausea - C/w PRN metoclopramide 5mg\par \par 5. Opioid-induced constipation - C/w senna, colace, miralax.  Will add MoM.  If not effective, lactulose PRN sent. \par \par 6. Encounter for palliative care \par - HCP on file.\par - Reports she has a living will but declines to engage in conversation regarding.  Will continue to follow-up. \par - All questions answered.  Empathetic listening and emotional support provided. \par \par Follow up in 1 month, call sooner with questions or issues.

## 2021-07-29 NOTE — HISTORY OF PRESENT ILLNESS
[Opioids for treatment of cancer not in remission, and/or  hospice, palliative care] : Opioids for treatment of cancer not in remission, and/or  hospice, palliative care [FreeTextEntry1] : 66yoF with recurrent endometrial cancer presents for follow-up palliative care visit, referred by Oncology.  \par PMH significant for PVD.\par \par Patient initially diagnosed with stage II endometrioid endometrial cancer s/p TLHBSO and adjuvant pelvic IMRT and vaginal cuff brachytherapy in 2018 with pulmonary and vaginal recurrence 2019 with enlarging pulmonary nodules s/p wedge resection x3 and subsequent loss to follow up and re-presentation in 2020 with disease progression in the vagina, pelvic lymph nodes, and lungs. She has since received Carbo/Taxol with mixed response but has most recently experienced progression through therapy at the vagina and REMBERTO anterior to mediastinum. Most recently on lenvima and pembrolizumab.   She self-discontinued lenvima due to side effects including voice changes.   Reports her voice returned to baseline with discontinuation. \par \par Main reason for which patient presents today is pain.  She has been experiencing vaginal/pelvic pain which became progressively worse over the past few weeks.   She endorses increasing vaginal hardness and "lumps".  Pain is described as constant burning, throbbing. Reports pain as high as 15-20/10 previously.\par \par Her pain has been difficult to manage due to opioid sensitivity.  She has trialed MS IR, tramadol and oxycodone but discontinued due to pruritus, nightmares, decreased appetite. MS ER was initiated but patient did not like how it made her feel.  PRN hydromorphone was recommended but patient was reluctant to utilize.  She was started on methadone 2.5mg BID on .  She reports significant improvement.   Experienced some intermittent nausea which was controlled with PRN metoclopramide.  \par \par S/P 5 fractions of RT in May.\par \par Interval History (21):  Patient has remains onlenvima and Pembrolizumab.  Has developed treatment related HTN and is now on valsartan 80mg daily. Is now monitoring her BP at home. Levothyroxine recently increased to 75mcg daily.  Pain overall is controlled. She is using methadone solution 2.5mg BID. and PRN hydromorphone 2 mg which she has not required in 3-4 weeks.   Pain gets as high as 5/10, as low as 2-3/10; she reports this is tolerable.\par    \par She was previously using medical cannabis 1:1 2.5mg THC/dose a few times per week.  She found that ~2.5mg made her feel paranoid and she does not wish to continue with.\par \par Patient recently had to euthanize her 14-year-old dog and continues to grieve her loss.\par \par Current pain regimen:\par Methadone 2.5mg BID (QTc 417 in 2021)\par PRN hydromorphone 2 mg (has not used in 3-4 weeks)\par \par ROS\par + stable weight \par + decreased appetite - improved - is "grazing" more\par + CIPN of b/l toes - increased (previously on gabapentin and declines to re-trial) \par + intermittent nausea / no vomiting - PRN metoclopramide helps\par + fatigue \par + constipation - not requiring bowel regimen (previously using colace, senna, miralax)\par + trouble sleeping - improved with pain control\par Denies diarrhea, dyspnea, depressed mood.\par All other ROS as outlined or noncontributory. \par \par Patient is .  Her spouse  due to testicular cancer in early . Her closest relative was her sister who passed away over a year ago. She is Jain, is a born again Zoroastrianism. She has a few close friends that check in on her.  [place] Her family is not aware of her diagnosis.\par \par I-Stop Ref#: [id number]

## 2021-08-05 ENCOUNTER — APPOINTMENT (OUTPATIENT)
Dept: RADIATION ONCOLOGY | Facility: CLINIC | Age: 66
End: 2021-08-05
Payer: MEDICARE

## 2021-08-05 VITALS
DIASTOLIC BLOOD PRESSURE: 80 MMHG | HEART RATE: 98 BPM | TEMPERATURE: 98.06 F | RESPIRATION RATE: 16 BRPM | OXYGEN SATURATION: 100 % | SYSTOLIC BLOOD PRESSURE: 135 MMHG | WEIGHT: 114.42 LBS | BODY MASS INDEX: 21.19 KG/M2

## 2021-08-05 PROCEDURE — 99024 POSTOP FOLLOW-UP VISIT: CPT

## 2021-08-05 NOTE — REVIEW OF SYSTEMS
[Constipation: Grade 0] : Constipation: Grade 0 [Diarrhea: Grade 0] : Diarrhea: Grade 0 [Fatigue: Grade 1 - Fatigue relieved by rest] : Fatigue: Grade 1 - Fatigue relieved by rest [Hematuria: Grade 0] : Hematuria: Grade 0 [Urinary Incontinence: Grade 0] : Urinary Incontinence: Grade 0  [Urinary Retention: Grade 0] : Urinary Retention: Grade 0 [Urinary Tract Pain: Grade 0] : Urinary Tract Pain: Grade 0 [Urinary Urgency: Grade 0] : Urinary Urgency: Grade 0 [Urinary Frequency: Grade 0] : Urinary Frequency: Grade 0

## 2021-08-09 NOTE — HISTORY OF PRESENT ILLNESS
[T: ___] : T[unfilled] [N: ___] : N[unfilled] [M: ___] : M[unfilled] [AJCC Stage: ____] : AJCC Stage: [unfilled] [de-identified] : Referred by Dr. Gaytan\par \par Ms. Hoang is a 66 y/o G0 postmenopausal F who is referred to medical oncology for treatment considerations for recurrent endometrial cancer.\par Her oncologic history is summarized as follows:\par \par She was initially diagnosed with stage II grade 2 endometrial cancer in 2018, and her oncologic history is summarized as follows:\par \par 18 -  She underwent robotic assisted TLH BSO, bilateral pelvic and para-aortic sentinel LN dissection by Dr. Paras Grayson\par \par Surgical pathology demonstrated pT2N0 disease with endometrial tumor measuring 4cm, FIGO 2 endometrioid adenocarcinoma, >90 myometrial invasion with involvement of cervical stroma, +LVI. \par \par IHC of MMR proteins with intact expression \par Pelvic wash negative for malignant cells\par \par 10/2018 - Completed pelvic IMRT and vaginal cuff brachytherapy with Dr. Bowling\par \par She did well clinically under surveillance until 2019 when she developed vaginal spotting and fluid discharge, and was evaluated by Dr. Grayson. CA-125 was 27. Concern for possible recurrence and further workup pursued.\par \par 19 CT CAP demonstrated:\par New bilateral pulmonary nodules, some with central cavitation, suspicious for metastatic disease\par Enhancing nodularity superior to the vaginal cuff concerning for metastatic disease, measuring 1.9x1.6cm\par \par 10/30/19 - Underwent thorascopic multiple RLL wedge resections with Dr. Weston\par Surgical pathology c/w metastatic adenocarcinoma, consistent with patient's endometrial primary\par +ER [place] Pax8\par \par She was recommended further treatment for her recurrent endometrial cancer but the patient was lost to follow up until last month. Over the past several months she has noticed increasing hardness and "lumps" in her vagina, which has been progressively more painful and burning sensation. She was subsequently evaluated by Dr. Gaytan and referred to medical oncology for systemic treatment evaluation. \par \par She continues to have vaginal discharge and on and off spotting (not more than 1 pad a day). She feels constipated. She has been taking Percocet 5-325, Tylenol and ibuprofen for vaginal pain. She has been very busy with work and family affairs, was not able to come for cancer treatment over the past months. She denies fevers, chills, n/v, abdominal pain, urinary symptoms, cough, CP, SOB. \par \par PMH:\par uterine fibroids\par osteoporosis\par \par PSH:\par R breast resection (?) was told benign pathology, \par D&C \par \par All: none\par Medications: Percocet\par \par SH:\par  since , lives alone\par No children\par Works as a hairdresser\par Denies smoking history, drinks wine socially\par \par FH:\par Mother - breast cancer in her 80s\par Father - leukemia\par Sister -  recently from bladder cancer at age 68\par \par GYN:\par Menopause age 53\par No use of HRT\par G0\par \par HCM:\par Mammogram - 2019 was normal per patient report\par Colonoscopy >10 years ago, was normal per report \par Her sister got sick and passed away due to bladder cancer. \par  [de-identified] :  ER +  [place] +  PAX 8  +     MSI STABLE [de-identified] : The patient presents for follow up. \par Feeling well overall. Pain is well controlled on current regimen.\par Dealing with a lot of emotional - had to euthanize her 14-year dog recently, and her two close friends passed away unexpectedly. It's been emotionally difficult but she has been dealing with it ok, does not feel that she needs additional clinical support at this time. \par Recently noticed her L arm with a pink, mild rash; not itchy but sometimes pops up and then resolves. Much better.\par Appetite good. BMs are regular without senna. Started levothyroxine daily as prescribed for hypothyroidism. \par No vaginal bleeding, now resolved.  Occasional nausea but doesn't need medications.\par \par She denies fevers, chills, cough, SOB, n/v, abdominal pain, urinary symptoms, neuropathy, changes in bowel habits, vaginal bleeding or discharge, headaches, dizziness. \par

## 2021-08-09 NOTE — HISTORY OF PRESENT ILLNESS
[T: ___] : T[unfilled] [N: ___] : N[unfilled] [M: ___] : M[unfilled] [AJCC Stage: ____] : AJCC Stage: [unfilled] [de-identified] : Referred by Dr. Gaytan\par \par Ms. Hoang is a 64 y/o G0 postmenopausal F who is referred to medical oncology for treatment considerations for recurrent endometrial cancer.\par Her oncologic history is summarized as follows:\par \par She was initially diagnosed with stage II grade 2 endometrial cancer in 2018, and her oncologic history is summarized as follows:\par \par 18 -  She underwent robotic assisted TLH BSO, bilateral pelvic and para-aortic sentinel LN dissection by Dr. Paras Grayson\par \par Surgical pathology demonstrated pT2N0 disease with endometrial tumor measuring 4cm, FIGO 2 endometrioid adenocarcinoma, >90 myometrial invasion with involvement of cervical stroma, +LVI. \par \par IHC of MMR proteins with intact expression \par Pelvic wash negative for malignant cells\par \par 10/2018 - Completed pelvic IMRT and vaginal cuff brachytherapy with Dr. Bowling\par \par She did well clinically under surveillance until 2019 when she developed vaginal spotting and fluid discharge, and was evaluated by Dr. Grayson. CA-125 was 27. Concern for possible recurrence and further workup pursued.\par \par 19 CT CAP demonstrated:\par New bilateral pulmonary nodules, some with central cavitation, suspicious for metastatic disease\par Enhancing nodularity superior to the vaginal cuff concerning for metastatic disease, measuring 1.9x1.6cm\par \par 10/30/19 - Underwent thorascopic multiple RLL wedge resections with Dr. Weston\par Surgical pathology c/w metastatic adenocarcinoma, consistent with patient's endometrial primary\par +ER OR Pax8\par \par She was recommended further treatment for her recurrent endometrial cancer but the patient was lost to follow up until last month. Over the past several months she has noticed increasing hardness and "lumps" in her vagina, which has been progressively more painful and burning sensation. She was subsequently evaluated by Dr. Gaytan and referred to medical oncology for systemic treatment evaluation. \par \par She continues to have vaginal discharge and on and off spotting (not more than 1 pad a day). She feels constipated. She has been taking Percocet 5-325, Tylenol and ibuprofen for vaginal pain. She has been very busy with work and family affairs, was not able to come for cancer treatment over the past months. She denies fevers, chills, n/v, abdominal pain, urinary symptoms, cough, CP, SOB. \par \par PMH:\par uterine fibroids\par osteoporosis\par \par PSH:\par R breast resection (?) was told benign pathology, \par D&C \par \par All: none\par Medications: Percocet\par \par SH:\par  since , lives alone\par No children\par Works as a hairdresser\par Denies smoking history, drinks wine socially\par \par FH:\par Mother - breast cancer in her 80s\par Father - leukemia\par Sister -  recently from bladder cancer at age 68\par \par GYN:\par Menopause age 53\par No use of HRT\par G0\par \par HCM:\par Mammogram - 2019 was normal per patient report\par Colonoscopy >10 years ago, was normal per report \par Her sister got sick and passed away due to bladder cancer. \par  [de-identified] :  ER +  [place] +  PAX 8  +     MSI STABLE [de-identified] : The patient presents for follow up. \par Started lenvima/pembro 3/17/21.\par Main side effects:\par -fatigue grade 1, still able to perform all her ADLs but on the couch more \par -lips/mouth/tongue felt dry, tongue feels very sensitive\par -appetite fair, trying to eat and drink throughout the day\par -BMs are regular \par -No vaginal bleeding or discharge, urination ok\par -using oxycodone 5mg x 2 tabs usually 3x a day with good control of pain\par -vaginal mass stable \par -had episode of R upper lip had swelling x 6 hours and resolved with a cream \par \par She denies fevers, chills, cough, SOB, n/v, abdominal pain, urinary symptoms, neuropathy, changes in bowel habits, vaginal bleeding or discharge, headaches, dizziness. \par \par \par \par

## 2021-08-09 NOTE — PHYSICAL EXAM
[General Appearance - In No Acute Distress] : in no acute distress [] : no respiratory distress [Abdomen Soft] : soft [de-identified] : edema  external genitalia with normal contour.probable persistent vaginal diseas

## 2021-08-09 NOTE — PHYSICAL EXAM
[Restricted in physically strenuous activity but ambulatory and able to carry out work of a light or sedentary nature] : Status 1- Restricted in physically strenuous activity but ambulatory and able to carry out work of a light or sedentary nature, e.g., light house work, office work [de-identified] : vaginal mass protruding at midline with whitish discharge, thickened area mons pubis. Non-bleeding

## 2021-08-09 NOTE — PHYSICAL EXAM
[Restricted in physically strenuous activity but ambulatory and able to carry out work of a light or sedentary nature] : Status 1- Restricted in physically strenuous activity but ambulatory and able to carry out work of a light or sedentary nature, e.g., light house work, office work [de-identified] : vaginal mass protruding at midline with whitish discharge, thickened area mons pubis. Non-bleeding

## 2021-08-09 NOTE — ASSESSMENT
[FreeTextEntry1] : Ms. Hoang is a 64 y/o G0 postmenopausal F who is referred to medical oncology for treatment considerations for recurrent endometrial cancer. She has a history of stage II endometrioid adenocarcinoma s/p TLH-BSO and staging surgery in 7/2018 followed by adjuvant RT, now with biopsy proven metastatic disease involving lung and vaginal cuff nodularity. \par \par Patient started on lenvima and pembrolizumab combination therapy on 3/17/21. \par Given the worsening vaginal pain, I discussed with the patient my recommendations for palliative RT to the vaginal mass while continuing with the current regimen. I will also make a referral to palliative care for pain control. \par \par Plan:\par -interim bloodwork today\par -continue with pembrolizumab as scheduled; Lenvima on hold and plan to restart lenvima at lower dose (12mg daily) \par -To start MS-ER for long acting pain control with prn oxycodone as needed \par -f/u Dr. Bowling in rad onc for palliative RT to vaginal mass for local palliation of pain \par -CT scans ordered to assess extent of disease \par -f/u palliative care evaluation for pain control and GOC discussions \par -f/u Dr. Gaytan in gyn onc clinic \par -RTC 1 week \par \par All of the patient's questions and concerns were addressed in full.

## 2021-08-09 NOTE — ASSESSMENT
[FreeTextEntry1] : Ms. Hoang is a 64 y/o G0 postmenopausal F who is referred to medical oncology for treatment considerations for recurrent endometrial cancer. She has a history of stage II endometrioid adenocarcinoma s/p TLH-BSO and staging surgery in 7/2018 followed by adjuvant RT, now with biopsy proven metastatic disease involving lung and vaginal cuff nodularity. \par \par Patient started on lenvima and pembrolizumab combination therapy on 3/17/21. \par Given increasing vaginal pain and increased mass involving the vaginal cuff region, she underwent palliative RT to the vaginal mass and cuff area with Dr. Bowling. She has improved pain and has been able to tolerate lenvima without significant toxicities. I reviewed my recommendations to continue current regimen with pembrolizumab and lenvima as lenvima had been on hold for several weeks due to difficulty tolerating treatment and pain control issues. 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 Bowling in rad onc\par -f/u Dr. Umana for palliative care evaluation for pain control and symptoms management; continue methadone, dilaudid prn, and medical cannabis\par -f/u Dr. Gaytan in gyn onc clinic \par -RTC 3 weeks \par \par All of the patient's questions and concerns were addressed in full.

## 2021-08-09 NOTE — HISTORY OF PRESENT ILLNESS
[T: ___] : T[unfilled] [N: ___] : N[unfilled] [M: ___] : M[unfilled] [AJCC Stage: ____] : AJCC Stage: [unfilled] [de-identified] : Referred by Dr. Gaytan\par \par Ms. Hoang is a 64 y/o G0 postmenopausal F who is referred to medical oncology for treatment considerations for recurrent endometrial cancer.\par Her oncologic history is summarized as follows:\par \par She was initially diagnosed with stage II grade 2 endometrial cancer in 2018, and her oncologic history is summarized as follows:\par \par 18 -  She underwent robotic assisted TLH BSO, bilateral pelvic and para-aortic sentinel LN dissection by Dr. Paras Grayson\par \par Surgical pathology demonstrated pT2N0 disease with endometrial tumor measuring 4cm, FIGO 2 endometrioid adenocarcinoma, >90 myometrial invasion with involvement of cervical stroma, +LVI. \par \par IHC of MMR proteins with intact expression \par Pelvic wash negative for malignant cells\par \par 10/2018 - Completed pelvic IMRT and vaginal cuff brachytherapy with Dr. Bowling\par \par She did well clinically under surveillance until 2019 when she developed vaginal spotting and fluid discharge, and was evaluated by Dr. Grayson. CA-125 was 27. Concern for possible recurrence and further workup pursued.\par \par 19 CT CAP demonstrated:\par New bilateral pulmonary nodules, some with central cavitation, suspicious for metastatic disease\par Enhancing nodularity superior to the vaginal cuff concerning for metastatic disease, measuring 1.9x1.6cm\par \par 10/30/19 - Underwent thorascopic multiple RLL wedge resections with Dr. Weston\par Surgical pathology c/w metastatic adenocarcinoma, consistent with patient's endometrial primary\par +ER [place] Pax8\par \par She was recommended further treatment for her recurrent endometrial cancer but the patient was lost to follow up until last month. Over the past several months she has noticed increasing hardness and "lumps" in her vagina, which has been progressively more painful and burning sensation. She was subsequently evaluated by Dr. Gaytan and referred to medical oncology for systemic treatment evaluation. \par \par She continues to have vaginal discharge and on and off spotting (not more than 1 pad a day). She feels constipated. She has been taking Percocet 5-325, Tylenol and ibuprofen for vaginal pain. She has been very busy with work and family affairs, was not able to come for cancer treatment over the past months. She denies fevers, chills, n/v, abdominal pain, urinary symptoms, cough, CP, SOB. \par \par PMH:\par uterine fibroids\par osteoporosis\par \par PSH:\par R breast resection (?) was told benign pathology, \par D&C \par \par All: none\par Medications: Percocet\par \par SH:\par  since , lives alone\par No children\par Works as a hairdresser\par Denies smoking history, drinks wine socially\par \par FH:\par Mother - breast cancer in her 80s\par Father - leukemia\par Sister -  recently from bladder cancer at age 68\par \par GYN:\par Menopause age 53\par No use of HRT\par G0\par \par HCM:\par Mammogram - 2019 was normal per patient report\par Colonoscopy >10 years ago, was normal per report \par Her sister got sick and passed away due to bladder cancer. \par  [de-identified] :  ER +  [place] +  PAX 8  +     MSI STABLE [de-identified] : The patient presents for follow up. \par Started lenvima/pembro 3/17/21. She has been feeling increased pain in the vagina related to the vaginal mass more these days. Using oxycodone tabs 3 times a day and alternating with NSAIDS. Has increased fatigue since starting new treatment, still able to get things done but feels like she is less active. Fair appetite. Regular BMs. No vaginal bleeding. Feels that she is having more difficulty with tolerating Lenvima including changes in her voice (more hoarse) and has self-discontinued for the past week. \par \par She denies fevers, chills, cough, SOB, n/v, abdominal pain, urinary symptoms, neuropathy, changes in bowel habits, vaginal bleeding or discharge, headaches, dizziness. \par \par \par \par

## 2021-08-09 NOTE — HISTORY OF PRESENT ILLNESS
[FreeTextEntry1] : Ms. Hoang is a 66 year old woman with a history of FIGO stage 2 endometrioid endometrial cancer s/p TLHBSO and adjuvant pelvic IMRT and vaginal cuff brachytherapy using 45Gy/5 weeks and a 6Gyx2 brachytherapy boost in 2018 with pulmonary and vaginal recurrence 9/2019 with enlarging pulmonary nodules s/p wedge resection x3 and subsequent loss to follow up and representation in 8/2020 with disease progression in the vagina, pelvic lymph nodes, and lungs. She has since received Carbo/Taxol with Dr. Blank with mixed response but has most recently experienced progression through therapy at the vagina and REMBERTO anterior to mediastinum. \par She received radiation to the vaginal canal to a total dose of 2000 cGy in 5 fraction. Treatment was delivered from 5/25/21-6/1/21. She tolerated treatment well.  and appeared to have a response achieving good pain contol with MEthadone 2.5 mg tid \par \par She returns for a PTE. Feeling well. Pain with good relief with Methadone. Continues on Lenvima and PEmbro.. Has decreased appetite due to this medication, weight stable.  She is on anthypertensives and thyroid medication with an increase in dose since immunotherapy.

## 2021-08-09 NOTE — HISTORY OF PRESENT ILLNESS
[T: ___] : T[unfilled] [N: ___] : N[unfilled] [M: ___] : M[unfilled] [AJCC Stage: ____] : AJCC Stage: [unfilled] [de-identified] : Referred by Dr. Gaytan\par \par Ms. Hoang is a 64 y/o G0 postmenopausal F who is referred to medical oncology for treatment considerations for recurrent endometrial cancer.\par Her oncologic history is summarized as follows:\par \par She was initially diagnosed with stage II grade 2 endometrial cancer in 2018, and her oncologic history is summarized as follows:\par \par 18 -  She underwent robotic assisted TLH BSO, bilateral pelvic and para-aortic sentinel LN dissection by Dr. Paras Grayson\par \par Surgical pathology demonstrated pT2N0 disease with endometrial tumor measuring 4cm, FIGO 2 endometrioid adenocarcinoma, >90 myometrial invasion with involvement of cervical stroma, +LVI. \par \par IHC of MMR proteins with intact expression \par Pelvic wash negative for malignant cells\par \par 10/2018 - Completed pelvic IMRT and vaginal cuff brachytherapy with Dr. Bowling\par \par She did well clinically under surveillance until 2019 when she developed vaginal spotting and fluid discharge, and was evaluated by Dr. Grayson. CA-125 was 27. Concern for possible recurrence and further workup pursued.\par \par 19 CT CAP demonstrated:\par New bilateral pulmonary nodules, some with central cavitation, suspicious for metastatic disease\par Enhancing nodularity superior to the vaginal cuff concerning for metastatic disease, measuring 1.9x1.6cm\par \par 10/30/19 - Underwent thorascopic multiple RLL wedge resections with Dr. Weston\par Surgical pathology c/w metastatic adenocarcinoma, consistent with patient's endometrial primary\par +ER OR Pax8\par \par She was recommended further treatment for her recurrent endometrial cancer but the patient was lost to follow up until last month. Over the past several months she has noticed increasing hardness and "lumps" in her vagina, which has been progressively more painful and burning sensation. She was subsequently evaluated by Dr. Gaytan and referred to medical oncology for systemic treatment evaluation. \par \par She continues to have vaginal discharge and on and off spotting (not more than 1 pad a day). She feels constipated. She has been taking Percocet 5-325, Tylenol and ibuprofen for vaginal pain. She has been very busy with work and family affairs, was not able to come for cancer treatment over the past months. She denies fevers, chills, n/v, abdominal pain, urinary symptoms, cough, CP, SOB. \par \par PMH:\par uterine fibroids\par osteoporosis\par \par PSH:\par R breast resection (?) was told benign pathology, \par D&C \par \par All: none\par Medications: Percocet\par \par SH:\par  since , lives alone\par No children\par Works as a hairdresser\par Denies smoking history, drinks wine socially\par \par FH:\par Mother - breast cancer in her 80s\par Father - leukemia\par Sister -  recently from bladder cancer at age 68\par \par GYN:\par Menopause age 53\par No use of HRT\par G0\par \par HCM:\par Mammogram - 2019 was normal per patient report\par Colonoscopy >10 years ago, was normal per report \par Her sister got sick and passed away due to bladder cancer. \par  [de-identified] :  ER +  [place] +  PAX 8  +     MSI STABLE [de-identified] : The patient presents for follow up.\par Since last visit, the patient underwent palliative radiation therapy to the vaginal mass with Dr. Bowling. \par She also started methadone for pain control per palliative care recommendations with improved pain. She feels much better in regards to the vaginal mass pian. She denies vaginal bleeding.\par \par She denies fevers, chills, cough, SOB, n/v, abdominal pain, urinary symptoms, neuropathy, changes in bowel habits, vaginal bleeding or discharge, headaches, dizziness. \par \par \par

## 2021-08-09 NOTE — ASSESSMENT
[FreeTextEntry1] : Ms. Hoang is a 66 y/o G0 postmenopausal F who is referred to medical oncology for treatment considerations for recurrent endometrial cancer. She has a history of stage II endometrioid adenocarcinoma s/p TLH-BSO and staging surgery in 7/2018 followed by adjuvant RT, now with biopsy proven metastatic disease involving lung and vaginal cuff nodularity. \par \par She has been treated with carbo/taxol with good clinical and radiographic response, however had a carboplatin reaction with last cycle. \par \par 2/27/21: CT scans showed radiographic disease progression involving increased size of the known necrotic vaginal mass and left inguinal lymph node, and the REMBERTO nodule. Other pulmonary nodules stable. \par \par Patient started on lenvima and pembrolizumab combination therapy on 3/17/21. \par I again reviewed the rationale, benefits, risks, possible side effects and toxicities.\par Given the worsening vaginal pain, I discussed with the patient my recommendations for palliative RT to the vaginal mass while continuing with the current regimen. I will also make a referral to palliative care for pain control. \par \par Plan:\par -interim bloodwork today\par -continue with pembrolizumab as scheduled \par -will hold Lenvima for now and discussed lowering dose of lenvima for better tolerance  \par -f/u Dr. Bowling in rad onc for palliative RT to vaginal mass for local palliation of pain \par -CT scans ordered \par -continue oxycodone for pain control prn\par -referral to palliative care evaluation for pain control \par -f/u Dr. Gaytan in gyn onc clinic \par -RTC 3 weeks \par \par All of the patient's questions and concerns were addressed in full.

## 2021-08-09 NOTE — DISEASE MANAGEMENT
[Pathological] : TNM Stage: p [FreeTextEntry4] : recurrent [TTNM] : 2 [NTNM] : 0 [MTNM] : 0 [II] : II

## 2021-08-09 NOTE — PHYSICAL EXAM
[Restricted in physically strenuous activity but ambulatory and able to carry out work of a light or sedentary nature] : Status 1- Restricted in physically strenuous activity but ambulatory and able to carry out work of a light or sedentary nature, e.g., light house work, office work [de-identified] : vaginal mass protruding at midline with whitish discharge, thickened area mons pubis. Non-bleeding

## 2021-08-09 NOTE — HISTORY OF PRESENT ILLNESS
[T: ___] : T[unfilled] [N: ___] : N[unfilled] [M: ___] : M[unfilled] [AJCC Stage: ____] : AJCC Stage: [unfilled] [de-identified] : Referred by Dr. Gaytan\par \par Ms. Hoang is a 64 y/o G0 postmenopausal F who is referred to medical oncology for treatment considerations for recurrent endometrial cancer.\par Her oncologic history is summarized as follows:\par \par She was initially diagnosed with stage II grade 2 endometrial cancer in 2018, and her oncologic history is summarized as follows:\par \par 18 -  She underwent robotic assisted TLH BSO, bilateral pelvic and para-aortic sentinel LN dissection by Dr. Paras Grayson\par \par Surgical pathology demonstrated pT2N0 disease with endometrial tumor measuring 4cm, FIGO 2 endometrioid adenocarcinoma, >90 myometrial invasion with involvement of cervical stroma, +LVI. \par \par IHC of MMR proteins with intact expression \par Pelvic wash negative for malignant cells\par \par 10/2018 - Completed pelvic IMRT and vaginal cuff brachytherapy with Dr. Bowling\par \par She did well clinically under surveillance until 2019 when she developed vaginal spotting and fluid discharge, and was evaluated by Dr. Grayson. CA-125 was 27. Concern for possible recurrence and further workup pursued.\par \par 19 CT CAP demonstrated:\par New bilateral pulmonary nodules, some with central cavitation, suspicious for metastatic disease\par Enhancing nodularity superior to the vaginal cuff concerning for metastatic disease, measuring 1.9x1.6cm\par \par 10/30/19 - Underwent thorascopic multiple RLL wedge resections with Dr. Weston\par Surgical pathology c/w metastatic adenocarcinoma, consistent with patient's endometrial primary\par +ER [place] Pax8\par \par She was recommended further treatment for her recurrent endometrial cancer but the patient was lost to follow up until last month. Over the past several months she has noticed increasing hardness and "lumps" in her vagina, which has been progressively more painful and burning sensation. She was subsequently evaluated by Dr. Gaytan and referred to medical oncology for systemic treatment evaluation. \par \par She continues to have vaginal discharge and on and off spotting (not more than 1 pad a day). She feels constipated. She has been taking Percocet 5-325, Tylenol and ibuprofen for vaginal pain. She has been very busy with work and family affairs, was not able to come for cancer treatment over the past months. She denies fevers, chills, n/v, abdominal pain, urinary symptoms, cough, CP, SOB. \par \par PMH:\par uterine fibroids\par osteoporosis\par \par PSH:\par R breast resection (?) was told benign pathology, \par D&C \par \par All: none\par Medications: Percocet\par \par SH:\par  since , lives alone\par No children\par Works as a hairdresser\par Denies smoking history, drinks wine socially\par \par FH:\par Mother - breast cancer in her 80s\par Father - leukemia\par Sister -  recently from bladder cancer at age 68\par \par GYN:\par Menopause age 53\par No use of HRT\par G0\par \par HCM:\par Mammogram - 2019 was normal per patient report\par Colonoscopy >10 years ago, was normal per report \par Her sister got sick and passed away due to bladder cancer. \par  [de-identified] :  ER +  [place] +  PAX 8  +     MSI STABLE [de-identified] : The patient presents for follow up.\par Since last visit, she has resumed lenvima at 12mg daily dose. She also underwent pembrolizumab infusion without incident.\par She feels well overall. Her pain has significantly improved. She has continued on methadone and dilaudid prn, as well as most recently the addition of medical cannabis. Vaginal mass "feels like it's shrinking." She denies vaginal bleeding. She has improved energy.\par \par She denies fevers, chills, cough, SOB, n/v, abdominal pain, urinary symptoms, neuropathy, changes in bowel habits, vaginal bleeding or discharge, headaches, dizziness. \par \par \par

## 2021-08-09 NOTE — ASSESSMENT
[FreeTextEntry1] : Ms. Hoang is a 66 y/o G0 postmenopausal F who is referred to medical oncology for treatment considerations for recurrent endometrial cancer. She has a history of stage II endometrioid adenocarcinoma s/p TLH-BSO and staging surgery in 7/2018 followed by adjuvant RT, now with biopsy proven metastatic disease involving lung and vaginal cuff nodularity. \par \par She has been treated with carbo/taxol with good clinical and radiographic response, however had a carboplatin reaction with last cycle. \par \par 2/27/21: CT scans showed radiographic disease progression involving increased size of the known necrotic vaginal mass and left inguinal lymph node, and the REMBERTO nodule. Other pulmonary nodules stable. \par \par Patient started on lenvima and pembrolizumab combination therapy o 3/17/21. \par I again reviewed the rationale, benefits, risks, possible side effects and toxicities, as well as the scheduling of treatment with pembrolizumab and lenvima. \par \par Plan:\par -interim bloodwork today\par -continue with pembrolizumab and lenvima as scheduled \par -f/u Dr. Bowling in rad onc for palliative RT to vaginal mass for local palliation of pain \par -continue oxycodone for pain control prn\par -f/u Dr. Gaytan in gyn onc clinic \par -RTC 3 weeks \par \par All of the patient's questions and concerns were addressed in full.

## 2021-08-09 NOTE — VITALS
[Maximal Pain Intensity: 5/10] : 5/10 [Pain Description/Quality: ___] : Pain description/quality: [unfilled] [Pain Duration: ___] : Pain duration: [unfilled] [Pain Location: ___] : Pain Location: [unfilled] [NSAID/Non-Opioid] : NSAID/Non-Opioid [80: Normal activity with effort; some signs or symptoms of disease.] : 80: Normal activity with effort; some signs or symptoms of disease.  [ECOG Performance Status: 1 - Restricted in physically strenuous activity but ambulatory and able to carry out work of a light or sedentary nature] : Performance Status: 1 - Restricted in physically strenuous activity but ambulatory and able to carry out work of a light or sedentary nature, e.g., light house work, office work [Least Pain Intensity: 2/10] : 2/10

## 2021-08-09 NOTE — RESULTS/DATA
[FreeTextEntry1] : CT CAP 5/18/21:\par \par FINDINGS:\par CHEST:\par LUNGS AND LARGE AIRWAYS: Patent central airways. Status post right lower lobe wedge resection. A left upper lobe nodule abutting the mediastinum (2:28) measures 1.7 x 1.2 cm, previously 1.2 x 0.5 cm. A left apical nodule with spiculated borders and areas of central cavitation (2:9) measures 1.9 x 1.2 cm, previously 1.4 x 1.3 cm. A 0.9 cm right upper lobe nodule (2:29) previously measured 1 cm. A 0.5 cm right lower lobe nodule (2:28) is new. Thin-walled right upper lobe cavities are again noted.\par PLEURA: No pleural effusion.\par VESSELS: Mediport, with its tip at the cavoatrial junction.\par HEART: Heart size is normal. No pericardial effusion.\par MEDIASTINUM AND VIDAL: No lymphadenopathy.\par CHEST WALL AND LOWER NECK: Within normal limits.\par \par ABDOMEN AND PELVIS:\par LIVER: Coarse calcification in the right lobe of the liver, unchanged.\par BILE DUCTS: Normal caliber.\par GALLBLADDER: Within normal limits.\par SPLEEN: Within normal limits.\par PANCREAS: A 0.3 cm cystic focus in the tail of the pancreas (2:76) is too small to characterize, but likely unchanged.\par ADRENALS: Within normal limits.\par KIDNEYS/URETERS: No hydronephrosis. Bilateral cysts and subcentimeter hypodense foci, too small to characterize. Several of the cysts are again noted to contain thin septations, unchanged\par \par BLADDER: Mass in the region of the vaginal cuff is inseparable from the right posterior aspect of the bladder and likely infiltrates the bladder.\par REPRODUCTIVE ORGANS: Status post hysterectomy. Necrotic mass is again noted in the region of the right vaginal cuff and extending into the vagina and labia. Increased gas is seen within the mass in the region of the labia compared with 2/27/2021. The size of the mass involving the labia has decreased in size, measuring 6.5 x 2.8 cm (2:154), previously 8.9 x 4.7 cm. However, the component of the mass involving the right vaginal cuff has slightly increased in size and appears to involve the bladder.\par \par BOWEL: No bowel obstruction. Appendix is within normal limits.\par PERITONEUM: No ascites.\par VESSELS: Atherosclerotic changes.\par RETROPERITONEUM/LYMPH NODES: A necrotic left inguinal lymph node (2:150) measures 2.8 x 2.5 cm, previously 2 x 1.8 cm.\par ABDOMINAL WALL: Within normal limits.\par BONES: Degenerative changes.\par \par IMPRESSION:\par \par Left upper lobe and left apical lung nodules, increased in size since 2/27/2021.\par \par Necrotic left inguinal lymph node, increased in size since 2/27/2021.\par \par Mass in the region of the right vaginal cuff has increased in size and appears to infiltrate the right posterior aspect of the bladder.\par \par Necrotic appearing mass in the region of the introitus has decreased in size and contains increased gas, which may be secondary to infection or cutaneous communication.\par \par \par \par \par \par CT CAP 2/27/21:\par \par FINDINGS:\par CHEST:\par LUNGS AND LARGE AIRWAYS: Patent central airways. Status post right lower lobe wedge resection. Left apical nodule (2, 9) measures 1.4 x 1.3 cm, unchanged. Right upper lobe nodule measuring 1.0 cm (2, 32), unchanged. Residual thin-walled cavities at the site of prior nodules are again noted. A 1.2 cm left upper lobe nodule abutting the anterior mediastinum (2, 29) is increased previously 0.7 cm.\par PLEURA: No pleural effusion.\par VESSELS: Within normal limits.\par HEART: Heart size is normal. No pericardial effusion.\par MEDIASTINUM AND VIDAL: No lymphadenopathy.\par CHEST WALL AND LOWER NECK: Right chest wall MediPort with tip at the cavoatrial junction.\par \par ABDOMEN AND PELVIS:\par LIVER: Within normal limits.\par BILE DUCTS: Normal caliber.\par GALLBLADDER: Within normal limits.\par SPLEEN: Within normal limits.\par PANCREAS: Within normal limits.\par ADRENALS: Within normal limits.\par KIDNEYS/URETERS: No hydronephrosis. Bilateral renal cysts, some of which contain internal septations and additional hypodensities too small to characterize, unchanged.\par \par BLADDER: Within normal limits.\par REPRODUCTIVE ORGANS: Hysterectomy. A necrotic appearing mass involving the right vaginal cuff and extending inferiorly to the introitus, is increased in size measuring 8.9 x 4.7 cm, previously 4.3 x 3.5 cm.\par \par BOWEL: No bowel obstruction.\par PERITONEUM: No ascites.\par VESSELS: Within normal limits.\par RETROPERITONEUM/LYMPH NODES: Necrotic appearing left inguinal lymph node (2, 152) is increased in size measuring 2.0 x 1.8 cm, previously 1.5 x 1.1 cm. Subcentimeter left external iliac lymph node is unchanged.\par ABDOMINAL WALL: Within normal limits.\par BONES: Mild degenerative changes.\par \par IMPRESSION:\par Left upper lobe nodule abutting the mediastinum is increased in size. Otherwise stable pulmonary nodules.\par Interval enlargement of necrotic vaginal mass and left inguinal lymph node.\par \par \par CT CAP 1/8/21:\par \par FINDINGS:\par CHEST:\par LUNGS AND LARGE AIRWAYS: Patent central airways. Status post right upper and lower lobe wedge resections. A left apical nodule (2:12) measures 1.4 x 1.3 cm, previously 1.3 x 1.2 cm. A new left upper lobe nodule (2:30) measures 0.7 cm. Residual thin-walled cavities are again noted in the right upper and lower lobes at the site of previously noted nodules. An irregular right upper lobe nodule (2:33) measures 1 x 0.8 cm, unchanged.\par PLEURA: No pleural effusion.\par VESSELS: Mediport, with its tip in the right atrium.\par HEART: Heart size is normal. No pericardial effusion.\par MEDIASTINUM AND VIDAL: No lymphadenopathy.\par CHEST WALL AND LOWER NECK: Within normal limits.\par \par ABDOMEN AND PELVIS:\par LIVER: Within normal limits.\par BILE DUCTS: Normal caliber.\par GALLBLADDER: Within normal limits.\par SPLEEN: Within normal limits.\par PANCREAS: Within normal limits.\par ADRENALS: Within normal limits.\par KIDNEYS/URETERS: No hydronephrosis. Again noted, are bilateral cysts, some of which contain internal septations.\par \par BLADDER: Underdistended.\par REPRODUCTIVE ORGANS: Status post hysterectomy. A centrally necrotic mass involving the vagina and right vaginal cuff is again noted, measuring approximately 2.6 x 1.7 cm (2:146), previously measuring 2.9 x 1.2 cm. However, the mass appears increased in size inferiorly, extending to the introitus, where it measures 4.3 x 3.5 cm (2:156).\par \par BOWEL: No bowel obstruction.\par PERITONEUM: No ascites.\par VESSELS: Within normal limits.\par RETROPERITONEUM/LYMPH NODES: A previously noted left external iliac lymph node has decreased in size, currently measuring 0.8 x 0.6 cm (2:141), previously 1.5 x 1.1 cm. A heterogeneous necrotic appearing left inguinal lymph node (2:153) measures 1.5 x 1.1 cm, previously 1.8 x 1.4 cm.\par ABDOMINAL WALL: Infiltration of the subcutaneous tissues tissues in the lower anterior abdomen.\par BONES: Mild degenerative changes in the spine.\par \par IMPRESSION:\par Slight interval increase in size of a left apical nodule. In addition, there is a new 0.7 cm left upper lobe nodule.\par \par Thin-walled right upper and lower lobe cavities at the site of prior metastatic disease are unchanged. An irregular right upper lobe nodule is also stable.\par \par Mass involving the right vaginal cuff and vagina, similar in size to 10/26/2020. However, there is increased size of a component of the mass which extends inferiorly to the introitus, measuring up to 4.3 x 3.5 cm.\par \par Heterogeneous left inguinal lymph node and left external iliac lymph node, slightly decreased in size since 10/26/2020.\par

## 2021-08-09 NOTE — PHYSICAL EXAM
[Restricted in physically strenuous activity but ambulatory and able to carry out work of a light or sedentary nature] : Status 1- Restricted in physically strenuous activity but ambulatory and able to carry out work of a light or sedentary nature, e.g., light house work, office work [de-identified] : vaginal mass protruding at midline with whitish discharge, thickened area mons pubis. Non-bleeding

## 2021-08-09 NOTE — PHYSICAL EXAM
[Restricted in physically strenuous activity but ambulatory and able to carry out work of a light or sedentary nature] : Status 1- Restricted in physically strenuous activity but ambulatory and able to carry out work of a light or sedentary nature, e.g., light house work, office work [de-identified] : vaginal mass protruding at midline with whitish discharge, thickened area mons pubis. Non-bleeding

## 2021-08-09 NOTE — ASSESSMENT
[FreeTextEntry1] : Ms. Hoang is a 66 y/o G0 postmenopausal F who is referred to medical oncology for treatment considerations for recurrent endometrial cancer. She has a history of stage II endometrioid adenocarcinoma s/p TLH-BSO and staging surgery in 7/2018 followed by adjuvant RT, now with biopsy proven metastatic disease involving lung and vaginal cuff nodularity. \par \par Patient started on lenvima and pembrolizumab combination therapy on 3/17/21. \par Given increasing vaginal pain and increased mass involving the vaginal cuff region, she underwent palliative RT to the vaginal mass and cuff area with Dr. Bowling. She has improved pain and has been able to tolerate lenvima without significant toxicities. I reviewed my recommendations to continue current regimen with pembrolizumab and lenvima as lenvima had been on hold for several weeks due to difficulty tolerating treatment and pain control issues. \par \par Plan:\par -interim bloodwork today\par -resume pembrolizumab s/p palliative RT \par -resume Lenvima at lower dose, 12mg daily \par -f/u Dr. Bowling in rad onc for palliative RT to vaginal mass for local palliation of pain \par -f/u Dr. Umana for palliative care evaluation for pain control and GOC discussions \par -f/u Dr. Gaytan in gyn onc clinic \par -RTC 3 weeks \par \par All of the patient's questions and concerns were addressed in full.

## 2021-08-09 NOTE — PHYSICAL EXAM
[Restricted in physically strenuous activity but ambulatory and able to carry out work of a light or sedentary nature] : Status 1- Restricted in physically strenuous activity but ambulatory and able to carry out work of a light or sedentary nature, e.g., light house work, office work [de-identified] : vaginal mass protruding at midline with whitish discharge, thickened area mons pubis. Non-bleeding

## 2021-08-09 NOTE — ASSESSMENT
[FreeTextEntry1] : Ms. Hoang is a 64 y/o G0 postmenopausal F who is referred to medical oncology for treatment considerations for recurrent endometrial cancer. She has a history of stage II endometrioid adenocarcinoma s/p TLH-BSO and staging surgery in 7/2018 followed by adjuvant RT, now with biopsy proven metastatic disease involving lung and vaginal cuff nodularity. \par \par Patient started on lenvima and pembrolizumab combination therapy on 3/17/21. \par Given increasing vaginal pain and increased mass involving the vaginal cuff region, she underwent palliative RT to the vaginal mass and cuff area with Dr. Bowling. She has improved pain and has been able to tolerate lenvima without significant toxicities. I reviewed my recommendations to continue current regimen with pembrolizumab and lenvima as lenvima had been on hold for several weeks due to difficulty tolerating treatment and pain control issues. \par \par Plan:\par -interim bloodwork today\par -continue pembrolizumab and Lenvima at lower dose, 12mg daily \par -f/u Dr. Bowling in rad onc for palliative RT to vaginal mass\par -f/u Dr. Umana for palliative care evaluation for pain control and symptoms management \par -f/u Dr. Gaytan in gyn onc clinic \par -RTC 3 weeks \par \par All of the patient's questions and concerns were addressed in full.

## 2021-08-09 NOTE — HISTORY OF PRESENT ILLNESS
[T: ___] : T[unfilled] [N: ___] : N[unfilled] [M: ___] : M[unfilled] [AJCC Stage: ____] : AJCC Stage: [unfilled] [de-identified] : Referred by Dr. Gaytan\par \par Ms. Hoang is a 66 y/o G0 postmenopausal F who is referred to medical oncology for treatment considerations for recurrent endometrial cancer.\par Her oncologic history is summarized as follows:\par \par She was initially diagnosed with stage II grade 2 endometrial cancer in 2018, and her oncologic history is summarized as follows:\par \par 18 -  She underwent robotic assisted TLH BSO, bilateral pelvic and para-aortic sentinel LN dissection by Dr. Paras Grayson\par \par Surgical pathology demonstrated pT2N0 disease with endometrial tumor measuring 4cm, FIGO 2 endometrioid adenocarcinoma, >90 myometrial invasion with involvement of cervical stroma, +LVI. \par \par IHC of MMR proteins with intact expression \par Pelvic wash negative for malignant cells\par \par 10/2018 - Completed pelvic IMRT and vaginal cuff brachytherapy with Dr. Bowling\par \par She did well clinically under surveillance until 2019 when she developed vaginal spotting and fluid discharge, and was evaluated by Dr. Grayson. CA-125 was 27. Concern for possible recurrence and further workup pursued.\par \par 19 CT CAP demonstrated:\par New bilateral pulmonary nodules, some with central cavitation, suspicious for metastatic disease\par Enhancing nodularity superior to the vaginal cuff concerning for metastatic disease, measuring 1.9x1.6cm\par \par 10/30/19 - Underwent thorascopic multiple RLL wedge resections with Dr. Weston\par Surgical pathology c/w metastatic adenocarcinoma, consistent with patient's endometrial primary\par +ER [place] Pax8\par \par She was recommended further treatment for her recurrent endometrial cancer but the patient was lost to follow up until last month. Over the past several months she has noticed increasing hardness and "lumps" in her vagina, which has been progressively more painful and burning sensation. She was subsequently evaluated by Dr. Gaytan and referred to medical oncology for systemic treatment evaluation. \par \par She continues to have vaginal discharge and on and off spotting (not more than 1 pad a day). She feels constipated. She has been taking Percocet 5-325, Tylenol and ibuprofen for vaginal pain. She has been very busy with work and family affairs, was not able to come for cancer treatment over the past months. She denies fevers, chills, n/v, abdominal pain, urinary symptoms, cough, CP, SOB. \par \par PMH:\par uterine fibroids\par osteoporosis\par \par PSH:\par R breast resection (?) was told benign pathology, \par D&C \par \par All: none\par Medications: Percocet\par \par SH:\par  since , lives alone\par No children\par Works as a hairdresser\par Denies smoking history, drinks wine socially\par \par FH:\par Mother - breast cancer in her 80s\par Father - leukemia\par Sister -  recently from bladder cancer at age 68\par \par GYN:\par Menopause age 53\par No use of HRT\par G0\par \par HCM:\par Mammogram - 2019 was normal per patient report\par Colonoscopy >10 years ago, was normal per report \par Her sister got sick and passed away due to bladder cancer. \par  [de-identified] :  ER +  [place] +  PAX 8  +     MSI STABLE [de-identified] : The patient presents for follow up. \par Started lenvima/pembro 3/17/21. She has been taking oxycodone and NSAIDS for vaginal pain with improved pain, however feels it is shortlived and feel that the vaginal mass has grown. She had light vaginal bleeding when sits in one position for a long time. Has increased fatigue since starting new treatment, Fair appetite. Regular BMs.\par \par She denies fevers, chills, cough, SOB, n/v, abdominal pain, urinary symptoms, neuropathy, changes in bowel habits, vaginal bleeding or discharge, headaches, dizziness. \par \par \par \par

## 2021-08-11 ENCOUNTER — APPOINTMENT (OUTPATIENT)
Dept: INFUSION THERAPY | Facility: HOSPITAL | Age: 66
End: 2021-08-11

## 2021-08-11 ENCOUNTER — LABORATORY RESULT (OUTPATIENT)
Age: 66
End: 2021-08-11

## 2021-08-11 ENCOUNTER — APPOINTMENT (OUTPATIENT)
Dept: HEMATOLOGY ONCOLOGY | Facility: CLINIC | Age: 66
End: 2021-08-11
Payer: MEDICARE

## 2021-08-11 ENCOUNTER — RESULT REVIEW (OUTPATIENT)
Age: 66
End: 2021-08-11

## 2021-08-11 VITALS
TEMPERATURE: 206.78 F | DIASTOLIC BLOOD PRESSURE: 90 MMHG | WEIGHT: 115.94 LBS | HEART RATE: 90 BPM | BODY MASS INDEX: 21.48 KG/M2 | OXYGEN SATURATION: 99 % | RESPIRATION RATE: 16 BRPM | SYSTOLIC BLOOD PRESSURE: 125 MMHG

## 2021-08-11 LAB
BASOPHILS # BLD AUTO: 0.02 K/UL — SIGNIFICANT CHANGE UP (ref 0–0.2)
BASOPHILS NFR BLD AUTO: 0.5 % — SIGNIFICANT CHANGE UP (ref 0–2)
EOSINOPHIL # BLD AUTO: 0.1 K/UL — SIGNIFICANT CHANGE UP (ref 0–0.5)
EOSINOPHIL NFR BLD AUTO: 2.3 % — SIGNIFICANT CHANGE UP (ref 0–6)
HCT VFR BLD CALC: 39.5 % — SIGNIFICANT CHANGE UP (ref 34.5–45)
HGB BLD-MCNC: 13.2 G/DL — SIGNIFICANT CHANGE UP (ref 11.5–15.5)
IMM GRANULOCYTES NFR BLD AUTO: 0.2 % — SIGNIFICANT CHANGE UP (ref 0–1.5)
LYMPHOCYTES # BLD AUTO: 1.03 K/UL — SIGNIFICANT CHANGE UP (ref 1–3.3)
LYMPHOCYTES # BLD AUTO: 24.1 % — SIGNIFICANT CHANGE UP (ref 13–44)
MCHC RBC-ENTMCNC: 33.4 G/DL — SIGNIFICANT CHANGE UP (ref 32–36)
MCHC RBC-ENTMCNC: 33.6 PG — SIGNIFICANT CHANGE UP (ref 27–34)
MCV RBC AUTO: 100.5 FL — HIGH (ref 80–100)
MONOCYTES # BLD AUTO: 0.41 K/UL — SIGNIFICANT CHANGE UP (ref 0–0.9)
MONOCYTES NFR BLD AUTO: 9.6 % — SIGNIFICANT CHANGE UP (ref 2–14)
NEUTROPHILS # BLD AUTO: 2.71 K/UL — SIGNIFICANT CHANGE UP (ref 1.8–7.4)
NEUTROPHILS NFR BLD AUTO: 63.3 % — SIGNIFICANT CHANGE UP (ref 43–77)
NRBC # BLD: 0 /100 WBCS — SIGNIFICANT CHANGE UP (ref 0–0)
PLATELET # BLD AUTO: 207 K/UL — SIGNIFICANT CHANGE UP (ref 150–400)
RBC # BLD: 3.93 M/UL — SIGNIFICANT CHANGE UP (ref 3.8–5.2)
RBC # FLD: 14.3 % — SIGNIFICANT CHANGE UP (ref 10.3–14.5)
WBC # BLD: 4.28 K/UL — SIGNIFICANT CHANGE UP (ref 3.8–10.5)
WBC # FLD AUTO: 4.28 K/UL — SIGNIFICANT CHANGE UP (ref 3.8–10.5)

## 2021-08-11 PROCEDURE — 99213 OFFICE O/P EST LOW 20 MIN: CPT

## 2021-08-13 ENCOUNTER — RESULT REVIEW (OUTPATIENT)
Age: 66
End: 2021-08-13

## 2021-08-13 NOTE — HISTORY OF PRESENT ILLNESS
[Disease: _____________________] : Disease: [unfilled] [T: ___] : T[unfilled] [N: ___] : N[unfilled] [M: ___] : M[unfilled] [AJCC Stage: ____] : AJCC Stage: [unfilled] [de-identified] : Referred by Dr. Gaytan\par \par Ms. Hoang is a 66 y/o G0 postmenopausal F who was referred to medical oncology for treatment considerations for recurrent endometrial cancer.\par She was initially diagnosed with stage II grade 2 endometrial cancer in 2018, she underwent underwent robotic assisted TLH BSO, bilateral pelvic and para-aortic sentinel LN dissection by Dr. Paras Grayson on 2018.  Her surgical pathology demonstrated pT2N0 disease with endometrial tumor measuring 4cm, FIGO 2 endometrioid adenocarcinoma, >90 myometrial invasion with involvement of cervical stroma, +LVI with IHC of MMR proteins with intact expression but pelvic wash negative for malignant cells.  On 10/2018, she completed pelvic IMRT and vaginal cuff brachytherapy with Dr. Bowling.  She did well clinically under surveillance until 2019 when she developed vaginal spotting and fluid discharge, and was evaluated by Dr. Grayson. CA-125 was 27. Concern for possible recurrence and further workup pursued.  Her 19 CT CAP demonstrate new bilateral pulmonary nodules, some with central cavitation, suspicious for metastatic disease, enhancing nodularity superior to the vaginal cuff concerning for metastatic disease, measuring 1.9x1.6cm.  On 10/30/19 she underwent thorascopic multiple RLL wedge resections with Dr. Weston and her surgical pathology c/w metastatic adenocarcinoma, consistent with patient's endometrial primary +ER MT Pax8.  She was recommended further treatment for her recurrent endometrial cancer but the patient was lost to follow up until 2020 several months before which she has noticed increasing hardness and "lumps" in her vagina, which has been progressively more painful and burning sensation. She was subsequently evaluated by Dr. Gaytan 2020 and referred to medical oncology for systemic treatment evaluation.   On presentation for initial consult 2020, she continued to have vaginal discharge and on and off spotting (not more than 1 pad a day). She felt constipated. She was taking Percocet 5-325, Tylenol and ibuprofen for vaginal pain. She had been very busy with work and family affairs, was not able to come for cancer treatment over the past months. 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 \par \par \par Patient started on Lenvima and Pembrolizumab combination therapy on 3/17/21.  Given increasing vaginal pain and increased mass involving the vaginal cuff region, she underwent palliative RT to the vaginal mass and cuff area with Dr. Bowling. She had improved pain and had been able to tolerate Lenvima without significant toxicities. I reviewed my recommendations to continue current regimen with pembrolizumab and lenvima as lenvima had been on hold for several weeks due to difficulty tolerating treatment and pain control issues. \par \par  [de-identified] :  ER +  OK +  PAX 8  +     MSI STABLE [FreeTextEntry1] :  Lenvima and Pembrolizumab [de-identified] : Nicole comes in for follow up while on  Lenvima and Pembrolizumab.  Patient is scheduled for thyroid US (ordered by PMD) due to abnormalities on physical exam.  She states she self stopped Lenvima x 1 week due to joint and muscle pains/mouth sores/fatigue and felt better day 1.   She also consulted Dr Lee July 23, 2021 and her Synthroid was increased to 75 mcg.  Last TSH with PMD was 16.9 on 7/30/2021.  She is feeling better now and denies any vaginal bleeding, nausea fevers, chills, cough, SOB, n/v, abdominal pain, urinary symptoms, neuropathy, changes in bowel habits, vaginal bleeding or discharge, headaches, dizziness. \par

## 2021-08-13 NOTE — PHYSICAL EXAM
[Restricted in physically strenuous activity but ambulatory and able to carry out work of a light or sedentary nature] : Status 1- Restricted in physically strenuous activity but ambulatory and able to carry out work of a light or sedentary nature, e.g., light house work, office work [de-identified] : vaginal mass protruding at midline with whitish discharge, thickened area mons pubis. Non-bleeding

## 2021-08-18 ENCOUNTER — APPOINTMENT (OUTPATIENT)
Dept: INFUSION THERAPY | Facility: HOSPITAL | Age: 66
End: 2021-08-18

## 2021-08-19 ENCOUNTER — OUTPATIENT (OUTPATIENT)
Dept: OUTPATIENT SERVICES | Facility: HOSPITAL | Age: 66
LOS: 1 days | Discharge: HOME | End: 2021-08-19

## 2021-08-19 ENCOUNTER — OUTPATIENT (OUTPATIENT)
Dept: OUTPATIENT SERVICES | Facility: HOSPITAL | Age: 66
LOS: 1 days | End: 2021-08-19
Payer: MEDICARE

## 2021-08-19 ENCOUNTER — APPOINTMENT (OUTPATIENT)
Dept: CT IMAGING | Facility: IMAGING CENTER | Age: 66
End: 2021-08-19
Payer: MEDICARE

## 2021-08-19 DIAGNOSIS — Z98.890 OTHER SPECIFIED POSTPROCEDURAL STATES: Chronic | ICD-10-CM

## 2021-08-19 DIAGNOSIS — Z90.710 ACQUIRED ABSENCE OF BOTH CERVIX AND UTERUS: Chronic | ICD-10-CM

## 2021-08-19 DIAGNOSIS — C54.1 MALIGNANT NEOPLASM OF ENDOMETRIUM: ICD-10-CM

## 2021-08-19 PROCEDURE — 74177 CT ABD & PELVIS W/CONTRAST: CPT | Mod: 26,MH

## 2021-08-19 PROCEDURE — 74177 CT ABD & PELVIS W/CONTRAST: CPT

## 2021-08-19 PROCEDURE — 71260 CT THORAX DX C+: CPT | Mod: 26,MH

## 2021-08-19 PROCEDURE — 71260 CT THORAX DX C+: CPT

## 2021-08-20 ENCOUNTER — APPOINTMENT (OUTPATIENT)
Dept: INFUSION THERAPY | Facility: HOSPITAL | Age: 66
End: 2021-08-20

## 2021-08-20 ENCOUNTER — LABORATORY RESULT (OUTPATIENT)
Age: 66
End: 2021-08-20

## 2021-08-25 ENCOUNTER — APPOINTMENT (OUTPATIENT)
Dept: HEMATOLOGY ONCOLOGY | Facility: CLINIC | Age: 66
End: 2021-08-25

## 2021-08-25 PROBLEM — I10 HYPERTENSION, UNSPECIFIED TYPE: Status: ACTIVE | Noted: 2021-07-23

## 2021-08-26 ENCOUNTER — RX RENEWAL (OUTPATIENT)
Age: 66
End: 2021-08-26

## 2021-08-30 ENCOUNTER — APPOINTMENT (OUTPATIENT)
Dept: HEMATOLOGY ONCOLOGY | Facility: CLINIC | Age: 66
End: 2021-08-30
Payer: MEDICARE

## 2021-08-30 ENCOUNTER — LABORATORY RESULT (OUTPATIENT)
Age: 66
End: 2021-08-30

## 2021-08-30 ENCOUNTER — RESULT REVIEW (OUTPATIENT)
Age: 66
End: 2021-08-30

## 2021-08-30 ENCOUNTER — APPOINTMENT (OUTPATIENT)
Dept: INFUSION THERAPY | Facility: HOSPITAL | Age: 66
End: 2021-08-30

## 2021-08-30 VITALS
OXYGEN SATURATION: 97 % | HEIGHT: 61.61 IN | SYSTOLIC BLOOD PRESSURE: 137 MMHG | WEIGHT: 114.64 LBS | BODY MASS INDEX: 21.37 KG/M2 | TEMPERATURE: 206.6 F | RESPIRATION RATE: 16 BRPM | DIASTOLIC BLOOD PRESSURE: 87 MMHG | HEART RATE: 111 BPM

## 2021-08-30 VITALS
OXYGEN SATURATION: 97 % | HEART RATE: 111 BPM | RESPIRATION RATE: 16 BRPM | WEIGHT: 114.64 LBS | SYSTOLIC BLOOD PRESSURE: 137 MMHG | DIASTOLIC BLOOD PRESSURE: 87 MMHG | BODY MASS INDEX: 21.37 KG/M2 | HEIGHT: 61.61 IN | TEMPERATURE: 97.2 F

## 2021-08-30 DIAGNOSIS — R63.0 ANOREXIA: ICD-10-CM

## 2021-08-30 DIAGNOSIS — K12.30 ORAL MUCOSITIS (ULCERATIVE), UNSPECIFIED: ICD-10-CM

## 2021-08-30 DIAGNOSIS — I10 ESSENTIAL (PRIMARY) HYPERTENSION: ICD-10-CM

## 2021-08-30 LAB
BASOPHILS # BLD AUTO: 0.02 K/UL — SIGNIFICANT CHANGE UP (ref 0–0.2)
BASOPHILS NFR BLD AUTO: 0.4 % — SIGNIFICANT CHANGE UP (ref 0–2)
EOSINOPHIL # BLD AUTO: 0.06 K/UL — SIGNIFICANT CHANGE UP (ref 0–0.5)
EOSINOPHIL NFR BLD AUTO: 1.2 % — SIGNIFICANT CHANGE UP (ref 0–6)
HCT VFR BLD CALC: 40.7 % — SIGNIFICANT CHANGE UP (ref 34.5–45)
HGB BLD-MCNC: 14 G/DL — SIGNIFICANT CHANGE UP (ref 11.5–15.5)
IMM GRANULOCYTES NFR BLD AUTO: 0.2 % — SIGNIFICANT CHANGE UP (ref 0–1.5)
LYMPHOCYTES # BLD AUTO: 1.06 K/UL — SIGNIFICANT CHANGE UP (ref 1–3.3)
LYMPHOCYTES # BLD AUTO: 20.9 % — SIGNIFICANT CHANGE UP (ref 13–44)
MCHC RBC-ENTMCNC: 33.3 PG — SIGNIFICANT CHANGE UP (ref 27–34)
MCHC RBC-ENTMCNC: 34.4 G/DL — SIGNIFICANT CHANGE UP (ref 32–36)
MCV RBC AUTO: 96.9 FL — SIGNIFICANT CHANGE UP (ref 80–100)
MONOCYTES # BLD AUTO: 0.41 K/UL — SIGNIFICANT CHANGE UP (ref 0–0.9)
MONOCYTES NFR BLD AUTO: 8.1 % — SIGNIFICANT CHANGE UP (ref 2–14)
NEUTROPHILS # BLD AUTO: 3.52 K/UL — SIGNIFICANT CHANGE UP (ref 1.8–7.4)
NEUTROPHILS NFR BLD AUTO: 69.2 % — SIGNIFICANT CHANGE UP (ref 43–77)
NRBC # BLD: 0 /100 WBCS — SIGNIFICANT CHANGE UP (ref 0–0)
PLATELET # BLD AUTO: 190 K/UL — SIGNIFICANT CHANGE UP (ref 150–400)
RBC # BLD: 4.2 M/UL — SIGNIFICANT CHANGE UP (ref 3.8–5.2)
RBC # FLD: 13 % — SIGNIFICANT CHANGE UP (ref 10.3–14.5)
WBC # BLD: 5.08 K/UL — SIGNIFICANT CHANGE UP (ref 3.8–10.5)
WBC # FLD AUTO: 5.08 K/UL — SIGNIFICANT CHANGE UP (ref 3.8–10.5)

## 2021-08-30 PROCEDURE — 99213 OFFICE O/P EST LOW 20 MIN: CPT

## 2021-08-30 PROCEDURE — 99215 OFFICE O/P EST HI 40 MIN: CPT

## 2021-08-30 NOTE — HISTORY OF PRESENT ILLNESS
[Disease: _____________________] : Disease: [unfilled] [T: ___] : T[unfilled] [N: ___] : N[unfilled] [M: ___] : M[unfilled] [AJCC Stage: ____] : AJCC Stage: [unfilled] [de-identified] : Referred by Dr. Gaytan\par \par Ms. Hoang is a 66 y/o G0 postmenopausal F who was referred to medical oncology for treatment considerations for recurrent endometrial cancer.\par She was initially diagnosed with stage II grade 2 endometrial cancer in 2018, she underwent underwent robotic assisted TLH BSO, bilateral pelvic and para-aortic sentinel LN dissection by Dr. Paras Grayson on 2018.  Her surgical pathology demonstrated pT2N0 disease with endometrial tumor measuring 4cm, FIGO 2 endometrioid adenocarcinoma, >90 myometrial invasion with involvement of cervical stroma, +LVI with IHC of MMR proteins with intact expression but pelvic wash negative for malignant cells.  On 10/2018, she completed pelvic IMRT and vaginal cuff brachytherapy with Dr. Bowling.  She did well clinically under surveillance until 2019 when she developed vaginal spotting and fluid discharge, and was evaluated by Dr. Grayson. CA-125 was 27. Concern for possible recurrence and further workup pursued.  Her 19 CT CAP demonstrate new bilateral pulmonary nodules, some with central cavitation, suspicious for metastatic disease, enhancing nodularity superior to the vaginal cuff concerning for metastatic disease, measuring 1.9x1.6cm.  On 10/30/19 she underwent thorascopic multiple RLL wedge resections with Dr. Weston and her surgical pathology c/w metastatic adenocarcinoma, consistent with patient's endometrial primary +ER [place] Pax8.  She was recommended further treatment for her recurrent endometrial cancer but the patient was lost to follow up until 2020 several months before which she has noticed increasing hardness and "lumps" in her vagina, which has been progressively more painful and burning sensation. She was subsequently evaluated by Dr. Gaytan 2020 and referred to medical oncology for systemic treatment evaluation.   On presentation for initial consult 2020, she continued to have vaginal discharge and on and off spotting (not more than 1 pad a day). She felt constipated. She was taking Percocet 5-325, Tylenol and ibuprofen for vaginal pain. She had been very busy with work and family affairs, was not able to come for cancer treatment over the past months. 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 \par \par \par Patient started on Lenvima and Pembrolizumab combination therapy on 3/17/21.  Given increasing vaginal pain and increased mass involving the vaginal cuff region, she underwent palliative RT to the vaginal mass and cuff area with Dr. Bowling. She had improved pain and had been able to tolerate Lenvima without significant toxicities. I reviewed my recommendations to continue current regimen with pembrolizumab and lenvima as lenvima had been on hold for several weeks due to difficulty tolerating treatment and pain control issues. \par \par  [de-identified] :  ER +  NH +  PAX 8  +     MSI STABLE [FreeTextEntry1] :  Lenvima and Pembrolizumab [de-identified] : Nicole comes in for follow up while on dose reduced Lenvima and Pembrolizumab and had CT c/a/p done on 8/19/2021 which showed favorable response to therapy. Left inguinal node decreased in size. Vaginal mass decreased in size. Pulmonary nodules decreased in size.  She feels well overall but continues to have issues with insomnia and coping with recent loss of her beloved dog.  She continues to decline COVID vaccines.  Her peripheral neuropathy is still persisting although a little improved compared to before.  Her pain is well controlled on intermittent use of Tylenol and has not been using Methadone or Medical marijuana.  She denies any mouth sores, CP, palpitations, cough, LOMAS, n/v/d/c, LE edema, dizziness, vaginal discharge or bleeding, urinary symptoms, excessive bruising, dizziness, headaches, arthralgias, myalgias, weight/appetite changes and keeps active although not currently working as a .  \par \par \par \par \par

## 2021-08-30 NOTE — RESULTS/DATA
[FreeTextEntry1] : EXAM:  CT ABDOMEN AND PELVIS OC IC\par \par EXAM:  CT CHEST IC\par \par \par PROCEDURE DATE:  08/19/2021\par \par \par \par INTERPRETATION:  CLINICAL INFORMATION: endometrial cancer on immunotherapy - assess for response; No content available Ordering Dxs: C54.1 Malignant neoplasm of endometrium /// Admitting Dxs: C54.1 MALIGNANT NEOPLASM OF ENDOMETRIUM\par \par COMPARISON: 5.18.21.\par \par CONTRAST/COMPLICATIONS:\par IV Contrast: Omnipaque 350 (accession [id number]), IV contrast documented in associated exam (accession [id number])  90 cc administered   10 cc discarded\par Oral Contrast: Omnipaque 300 (accession [id number]), NONE (accession [id number])\par Complications: None reported at time of study completion\par \par PROCEDURE:\par CT of the Chest, Abdomen and Pelvis was performed.\par Sagittal and coronal reformats were performed.\par \par FINDINGS:\par CHEST:\par LUNGS AND LARGE AIRWAYS: Patent central airways. Postsurgical changes are noted in the right lung. Pulmonary nodules are decreased in size. The reference, a 9 x 7 mm left upper lobe medial nodule series 2 image 28 previously 1.7 x 1.2 cm.\par PLEURA: No pleural effusion.\par VESSELS: Within normal limits.\par HEART: Heart size is normal.  No pericardial effusion.\par MEDIASTINUM AND VIDAL: No lymphadenopathy.\par CHEST WALL AND LOWER NECK: Right chest wall chemotherapy port.\par \par ABDOMEN AND PELVIS:\par LIVER: Within normal limits.\par BILE DUCTS: Normal caliber.\par GALLBLADDER: Within normal limits.\par SPLEEN: Within normal limits.\par PANCREAS: A 3 mm pancreatic tail cyst is unchanged.\par ADRENALS: Within normal limits.\par KIDNEYS/URETERS: Cysts.\par \par BLADDER: Within normal limits.\par REPRODUCTIVE ORGANS: Hysterectomy. No adnexal mass. Significantly improved vaginal mass.\par \par BOWEL: No bowel obstruction.\par PERITONEUM: No ascites.\par VESSELS:  Within normal limits.\par RETROPERITONEUM/LYMPH NODES: No lymphadenopathy.\par ABDOMINAL WALL: 11 x 9 mm left inguinal lymph node on image 148 previously 2.8 x 2.5 cm.\par BONES: Within normal limits.\par \par IMPRESSION: Favorable response to therapy. Left inguinal node decreased in size. Vaginal mass decreased in size. Pulmonary nodules decreased in size.\par \par \par \par --- End of Report ---\par \par \par \par \par \par \par TIFFANIE RAMIREZ MD; Attending Radiologist\par This document has been electronically signed. Aug 21 2021  9:41PM\par

## 2021-08-30 NOTE — HISTORY OF PRESENT ILLNESS
[Opioids for treatment of cancer not in remission, and/or  hospice, palliative care] : Opioids for treatment of cancer not in remission, and/or  hospice, palliative care [FreeTextEntry1] : 66yoF with recurrent endometrial cancer presents for follow-up palliative care visit, referred by Oncology.  \par PMH significant for PVD.\par \par Patient initially diagnosed with stage II endometrioid endometrial cancer s/p TLHBSO and adjuvant pelvic IMRT and vaginal cuff brachytherapy in 2018 with pulmonary and vaginal recurrence 2019 with enlarging pulmonary nodules s/p wedge resection x3 and subsequent loss to follow up and re-presentation in 2020 with disease progression in the vagina, pelvic lymph nodes, and lungs. She has since received Carbo/Taxol with mixed response but has most recently experienced progression through therapy at the vagina and REMBERTO anterior to mediastinum. Most recently on lenvima and pembrolizumab.   She self-discontinued lenvima due to side effects including voice changes.   Reports her voice returned to baseline with discontinuation. \par \par Main reason for which patient presents today is pain.  She has been experiencing vaginal/pelvic pain which became progressively worse over the past few weeks.   She endorses increasing vaginal hardness and "lumps".  Pain is described as constant burning, throbbing. Reports pain as high as 15-20/10 previously.\par \par Her pain has been difficult to manage due to opioid sensitivity.  She has trialed MS IR, tramadol and oxycodone but discontinued due to pruritus, nightmares, decreased appetite. MS ER was initiated but patient did not like how it made her feel.  PRN hydromorphone was recommended but patient was reluctant to utilize.  She was started on methadone 2.5mg BID on .  She reports significant improvement.   Experienced some intermittent nausea which was controlled with PRN metoclopramide.  \par \par S/P 5 fractions of RT in May.\par \par Interval History:  Patient remains on dose reduced lenvima and Pembrolizumab.  CT C/A/P showed favorable response to therapy. Her pain is controlled with PRN acetaminophen.  She is no longer on methadone and has not needed PRN hydromorphone in a few weeks.  Continues to experience intermittent trouble sleeping.  \par \par She continues to mourn the loss of her beloved dog. \par \par ROS\par + stable weight \par + decreased appetite - improved - is "grazing" more\par + CIPN of b/l toes - increased (previously on gabapentin and declines to re-trial) \par + intermittent nausea / no vomiting - PRN metoclopramide helps\par + fatigue \par + constipation - not requiring bowel regimen (previously using colace, senna, miralax)\par + trouble sleeping - improved with pain control\par Denies diarrhea, dyspnea, depressed mood.\par All other ROS as outlined or noncontributory. \par \par Patient is .  Her spouse  due to testicular cancer in early . Her closest relative was her sister who passed away over a year ago. She is [employer], is a born again Zoroastrian. She has a few close friends that check in on her.  [place]. Her family is not aware of her diagnosis.\par \par [employer]-Stop Ref#: [id number]

## 2021-08-30 NOTE — PHYSICAL EXAM
[Restricted in physically strenuous activity but ambulatory and able to carry out work of a light or sedentary nature] : Status 1- Restricted in physically strenuous activity but ambulatory and able to carry out work of a light or sedentary nature, e.g., light house work, office work [Normal] : affect appropriate [Mucositis] : no mucositis [Thrush] : no thrush [Vesicles] : no vesicles [de-identified] : vaginal mass protruding at midline with whitish discharge, thickened area mons pubis. Non-bleeding

## 2021-08-30 NOTE — REVIEW OF SYSTEMS
[Negative] : Allergic/Immunologic [Insomnia] : insomnia [Fatigue] : no fatigue [Recent Change In Weight] : ~T no recent weight change [Vision Problems] : no vision problems [Chest Pain] : no chest pain [Lower Ext Edema] : no lower extremity edema [Cough] : no cough [Abdominal Pain] : no abdominal pain [Constipation] : no constipation [Diarrhea] : no diarrhea [Dysuria] : no dysuria [Vaginal Discharge] : no vaginal discharge [Joint Pain] : no joint pain [Muscle Pain] : no muscle pain [Skin Rash] : no skin rash [Difficulty Walking] : no difficulty walking

## 2021-08-30 NOTE — ASSESSMENT
[______] : HCP: [unfilled] [FreeTextEntry1] : 66yoF with:\par \par 1. Recurrent endometrial cancer - on Pembrolizumab and Lenvima.  Follow up with Med Onc, Rad Onc,\par \par 2. Neoplasm related pain - C/w PRN acetaminophen 650mg. \par \par 3. Decreased appetite - Patient does not wish to continue with medical cannabis at this time.   Her weight is currently stable. \par \par 4. Nausea - C/w PRN metoclopramide 5mg\par \par 5. Encounter for palliative care \par - HCP on file.\par - Reports she has a living will but declines to engage in conversation regarding.  Will continue to follow-up. \par - All questions answered.  Empathetic listening and emotional support provided. \par \par Follow up in 2-3 months, call sooner with questions or issues.\par \par

## 2021-09-08 ENCOUNTER — LABORATORY RESULT (OUTPATIENT)
Age: 66
End: 2021-09-08

## 2021-09-08 ENCOUNTER — APPOINTMENT (OUTPATIENT)
Dept: INFUSION THERAPY | Facility: HOSPITAL | Age: 66
End: 2021-09-08

## 2021-09-08 DIAGNOSIS — R11.2 NAUSEA WITH VOMITING, UNSPECIFIED: ICD-10-CM

## 2021-09-08 DIAGNOSIS — Z51.11 ENCOUNTER FOR ANTINEOPLASTIC CHEMOTHERAPY: ICD-10-CM

## 2021-09-10 ENCOUNTER — APPOINTMENT (OUTPATIENT)
Dept: ENDOCRINOLOGY | Facility: CLINIC | Age: 66
End: 2021-09-10
Payer: MEDICARE

## 2021-09-10 VITALS
WEIGHT: 114 LBS | OXYGEN SATURATION: 99 % | HEART RATE: 96 BPM | BODY MASS INDEX: 21.25 KG/M2 | DIASTOLIC BLOOD PRESSURE: 95 MMHG | SYSTOLIC BLOOD PRESSURE: 135 MMHG | HEIGHT: 61.61 IN | RESPIRATION RATE: 17 BRPM | TEMPERATURE: 97.6 F

## 2021-09-10 PROCEDURE — 99214 OFFICE O/P EST MOD 30 MIN: CPT

## 2021-09-10 NOTE — HISTORY OF PRESENT ILLNESS
[FreeTextEntry1] : 66 year female  f/u for hypothyroidism management. \par \par *** Sep 10, 2021 ***\par \par on Synthroid 75 mcg. feels much better. Good energy, no fatigue. \par still on Keytruda and Lenvima\par Thyroid US (8/12/21)- heterogenous thyroid, no nodules\par TSH- 2.19\par am cortisol- 13, rest pending\par \par HPI:\par Ms. VICENTE  was diagnosed with endometrial cancer in 2018. She underwent ELISEO-BSO same year , followed by a pelvic IMRT. In 2019, she was found mts to lungs and pelvic LN. She completed another course of radiation therapy and recently started on Keytruda and Lenvima about 4 months ago. Her TSH early this month returned as 28.1, and she was started on Synthroid 25 mcg about 3 months ago. Her last TSH from yesterday was- 56.8 with FT4- 0.8 \par Her blood pressure was found to be elevated past 3 weeks, and she's complaining of some fatigue. \par Denies family history of thyroid cancer or history of radiation exposure to head and neck area in a childhood.\par

## 2021-09-20 ENCOUNTER — OUTPATIENT (OUTPATIENT)
Dept: OUTPATIENT SERVICES | Facility: HOSPITAL | Age: 66
LOS: 1 days | Discharge: HOME | End: 2021-09-20

## 2021-09-20 DIAGNOSIS — Z98.890 OTHER SPECIFIED POSTPROCEDURAL STATES: Chronic | ICD-10-CM

## 2021-09-20 DIAGNOSIS — Z90.710 ACQUIRED ABSENCE OF BOTH CERVIX AND UTERUS: Chronic | ICD-10-CM

## 2021-09-20 DIAGNOSIS — C54.1 MALIGNANT NEOPLASM OF ENDOMETRIUM: ICD-10-CM

## 2021-09-22 ENCOUNTER — NON-APPOINTMENT (OUTPATIENT)
Age: 66
End: 2021-09-22

## 2021-09-22 ENCOUNTER — LABORATORY RESULT (OUTPATIENT)
Age: 66
End: 2021-09-22

## 2021-09-22 ENCOUNTER — RESULT REVIEW (OUTPATIENT)
Age: 66
End: 2021-09-22

## 2021-09-22 ENCOUNTER — APPOINTMENT (OUTPATIENT)
Dept: HEMATOLOGY ONCOLOGY | Facility: CLINIC | Age: 66
End: 2021-09-22
Payer: MEDICARE

## 2021-09-22 ENCOUNTER — APPOINTMENT (OUTPATIENT)
Dept: INFUSION THERAPY | Facility: HOSPITAL | Age: 66
End: 2021-09-22

## 2021-09-22 VITALS
SYSTOLIC BLOOD PRESSURE: 117 MMHG | DIASTOLIC BLOOD PRESSURE: 82 MMHG | BODY MASS INDEX: 21.64 KG/M2 | TEMPERATURE: 98.2 F | RESPIRATION RATE: 16 BRPM | WEIGHT: 116.84 LBS | HEART RATE: 94 BPM | OXYGEN SATURATION: 99 %

## 2021-09-22 LAB
BASOPHILS # BLD AUTO: 0.02 K/UL — SIGNIFICANT CHANGE UP (ref 0–0.2)
BASOPHILS NFR BLD AUTO: 0.3 % — SIGNIFICANT CHANGE UP (ref 0–2)
EOSINOPHIL # BLD AUTO: 0.1 K/UL — SIGNIFICANT CHANGE UP (ref 0–0.5)
EOSINOPHIL NFR BLD AUTO: 1.7 % — SIGNIFICANT CHANGE UP (ref 0–6)
HCT VFR BLD CALC: 41.1 % — SIGNIFICANT CHANGE UP (ref 34.5–45)
HGB BLD-MCNC: 14.3 G/DL — SIGNIFICANT CHANGE UP (ref 11.5–15.5)
IMM GRANULOCYTES NFR BLD AUTO: 0.3 % — SIGNIFICANT CHANGE UP (ref 0–1.5)
LYMPHOCYTES # BLD AUTO: 1.05 K/UL — SIGNIFICANT CHANGE UP (ref 1–3.3)
LYMPHOCYTES # BLD AUTO: 18 % — SIGNIFICANT CHANGE UP (ref 13–44)
MCHC RBC-ENTMCNC: 33.6 PG — SIGNIFICANT CHANGE UP (ref 27–34)
MCHC RBC-ENTMCNC: 34.8 G/DL — SIGNIFICANT CHANGE UP (ref 32–36)
MCV RBC AUTO: 96.7 FL — SIGNIFICANT CHANGE UP (ref 80–100)
MONOCYTES # BLD AUTO: 0.57 K/UL — SIGNIFICANT CHANGE UP (ref 0–0.9)
MONOCYTES NFR BLD AUTO: 9.8 % — SIGNIFICANT CHANGE UP (ref 2–14)
NEUTROPHILS # BLD AUTO: 4.08 K/UL — SIGNIFICANT CHANGE UP (ref 1.8–7.4)
NEUTROPHILS NFR BLD AUTO: 69.9 % — SIGNIFICANT CHANGE UP (ref 43–77)
NRBC # BLD: 0 /100 WBCS — SIGNIFICANT CHANGE UP (ref 0–0)
PLATELET # BLD AUTO: 194 K/UL — SIGNIFICANT CHANGE UP (ref 150–400)
RBC # BLD: 4.25 M/UL — SIGNIFICANT CHANGE UP (ref 3.8–5.2)
RBC # FLD: 12.7 % — SIGNIFICANT CHANGE UP (ref 10.3–14.5)
WBC # BLD: 5.84 K/UL — SIGNIFICANT CHANGE UP (ref 3.8–10.5)
WBC # FLD AUTO: 5.84 K/UL — SIGNIFICANT CHANGE UP (ref 3.8–10.5)

## 2021-09-22 PROCEDURE — 99214 OFFICE O/P EST MOD 30 MIN: CPT

## 2021-09-29 ENCOUNTER — APPOINTMENT (OUTPATIENT)
Dept: INFUSION THERAPY | Facility: HOSPITAL | Age: 66
End: 2021-09-29

## 2021-09-29 DIAGNOSIS — R11.2 NAUSEA WITH VOMITING, UNSPECIFIED: ICD-10-CM

## 2021-09-29 DIAGNOSIS — Z51.11 ENCOUNTER FOR ANTINEOPLASTIC CHEMOTHERAPY: ICD-10-CM

## 2021-10-13 ENCOUNTER — LABORATORY RESULT (OUTPATIENT)
Age: 66
End: 2021-10-13

## 2021-10-13 ENCOUNTER — RESULT REVIEW (OUTPATIENT)
Age: 66
End: 2021-10-13

## 2021-10-13 ENCOUNTER — APPOINTMENT (OUTPATIENT)
Dept: HEMATOLOGY ONCOLOGY | Facility: CLINIC | Age: 66
End: 2021-10-13
Payer: MEDICARE

## 2021-10-13 ENCOUNTER — APPOINTMENT (OUTPATIENT)
Dept: INFUSION THERAPY | Facility: HOSPITAL | Age: 66
End: 2021-10-13

## 2021-10-13 VITALS
HEIGHT: 61.61 IN | DIASTOLIC BLOOD PRESSURE: 74 MMHG | SYSTOLIC BLOOD PRESSURE: 109 MMHG | WEIGHT: 121.25 LBS | OXYGEN SATURATION: 99 % | TEMPERATURE: 98 F | BODY MASS INDEX: 22.6 KG/M2 | RESPIRATION RATE: 16 BRPM | HEART RATE: 88 BPM

## 2021-10-13 LAB
BASOPHILS # BLD AUTO: 0.02 K/UL — SIGNIFICANT CHANGE UP (ref 0–0.2)
BASOPHILS NFR BLD AUTO: 0.4 % — SIGNIFICANT CHANGE UP (ref 0–2)
EOSINOPHIL # BLD AUTO: 0.09 K/UL — SIGNIFICANT CHANGE UP (ref 0–0.5)
EOSINOPHIL NFR BLD AUTO: 1.6 % — SIGNIFICANT CHANGE UP (ref 0–6)
HCT VFR BLD CALC: 38.3 % — SIGNIFICANT CHANGE UP (ref 34.5–45)
HGB BLD-MCNC: 12.9 G/DL — SIGNIFICANT CHANGE UP (ref 11.5–15.5)
IMM GRANULOCYTES NFR BLD AUTO: 0.2 % — SIGNIFICANT CHANGE UP (ref 0–1.5)
LYMPHOCYTES # BLD AUTO: 1.27 K/UL — SIGNIFICANT CHANGE UP (ref 1–3.3)
LYMPHOCYTES # BLD AUTO: 22.8 % — SIGNIFICANT CHANGE UP (ref 13–44)
MCHC RBC-ENTMCNC: 33.7 G/DL — SIGNIFICANT CHANGE UP (ref 32–36)
MCHC RBC-ENTMCNC: 33.8 PG — SIGNIFICANT CHANGE UP (ref 27–34)
MCV RBC AUTO: 100.3 FL — HIGH (ref 80–100)
MONOCYTES # BLD AUTO: 0.45 K/UL — SIGNIFICANT CHANGE UP (ref 0–0.9)
MONOCYTES NFR BLD AUTO: 8.1 % — SIGNIFICANT CHANGE UP (ref 2–14)
NEUTROPHILS # BLD AUTO: 3.74 K/UL — SIGNIFICANT CHANGE UP (ref 1.8–7.4)
NEUTROPHILS NFR BLD AUTO: 66.9 % — SIGNIFICANT CHANGE UP (ref 43–77)
NRBC # BLD: 0 /100 WBCS — SIGNIFICANT CHANGE UP (ref 0–0)
PLATELET # BLD AUTO: 206 K/UL — SIGNIFICANT CHANGE UP (ref 150–400)
RBC # BLD: 3.82 M/UL — SIGNIFICANT CHANGE UP (ref 3.8–5.2)
RBC # FLD: 12.8 % — SIGNIFICANT CHANGE UP (ref 10.3–14.5)
WBC # BLD: 5.58 K/UL — SIGNIFICANT CHANGE UP (ref 3.8–10.5)
WBC # FLD AUTO: 5.58 K/UL — SIGNIFICANT CHANGE UP (ref 3.8–10.5)

## 2021-10-13 PROCEDURE — 99214 OFFICE O/P EST MOD 30 MIN: CPT

## 2021-10-14 ENCOUNTER — NON-APPOINTMENT (OUTPATIENT)
Age: 66
End: 2021-10-14

## 2021-10-14 ENCOUNTER — APPOINTMENT (OUTPATIENT)
Dept: GYNECOLOGIC ONCOLOGY | Facility: CLINIC | Age: 66
End: 2021-10-14
Payer: MEDICARE

## 2021-10-14 VITALS
SYSTOLIC BLOOD PRESSURE: 124 MMHG | WEIGHT: 121 LBS | BODY MASS INDEX: 22.26 KG/M2 | HEIGHT: 62 IN | DIASTOLIC BLOOD PRESSURE: 52 MMHG

## 2021-10-14 PROCEDURE — 99213 OFFICE O/P EST LOW 20 MIN: CPT

## 2021-10-20 ENCOUNTER — RESULT REVIEW (OUTPATIENT)
Age: 66
End: 2021-10-20

## 2021-10-20 ENCOUNTER — LABORATORY RESULT (OUTPATIENT)
Age: 66
End: 2021-10-20

## 2021-10-20 ENCOUNTER — APPOINTMENT (OUTPATIENT)
Dept: INFUSION THERAPY | Facility: HOSPITAL | Age: 66
End: 2021-10-20

## 2021-10-20 LAB
BASOPHILS # BLD AUTO: 0.01 K/UL — SIGNIFICANT CHANGE UP (ref 0–0.2)
BASOPHILS NFR BLD AUTO: 0.2 % — SIGNIFICANT CHANGE UP (ref 0–2)
EOSINOPHIL # BLD AUTO: 0.08 K/UL — SIGNIFICANT CHANGE UP (ref 0–0.5)
EOSINOPHIL NFR BLD AUTO: 1.9 % — SIGNIFICANT CHANGE UP (ref 0–6)
HCT VFR BLD CALC: 35.3 % — SIGNIFICANT CHANGE UP (ref 34.5–45)
HGB BLD-MCNC: 12.4 G/DL — SIGNIFICANT CHANGE UP (ref 11.5–15.5)
IMM GRANULOCYTES NFR BLD AUTO: 0.5 % — SIGNIFICANT CHANGE UP (ref 0–1.5)
LYMPHOCYTES # BLD AUTO: 1.01 K/UL — SIGNIFICANT CHANGE UP (ref 1–3.3)
LYMPHOCYTES # BLD AUTO: 23.9 % — SIGNIFICANT CHANGE UP (ref 13–44)
MCHC RBC-ENTMCNC: 34.9 PG — HIGH (ref 27–34)
MCHC RBC-ENTMCNC: 35.1 G/DL — SIGNIFICANT CHANGE UP (ref 32–36)
MCV RBC AUTO: 99.4 FL — SIGNIFICANT CHANGE UP (ref 80–100)
MONOCYTES # BLD AUTO: 0.43 K/UL — SIGNIFICANT CHANGE UP (ref 0–0.9)
MONOCYTES NFR BLD AUTO: 10.2 % — SIGNIFICANT CHANGE UP (ref 2–14)
NEUTROPHILS # BLD AUTO: 2.67 K/UL — SIGNIFICANT CHANGE UP (ref 1.8–7.4)
NEUTROPHILS NFR BLD AUTO: 63.3 % — SIGNIFICANT CHANGE UP (ref 43–77)
NRBC # BLD: 0 /100 WBCS — SIGNIFICANT CHANGE UP (ref 0–0)
PLATELET # BLD AUTO: 169 K/UL — SIGNIFICANT CHANGE UP (ref 150–400)
RBC # BLD: 3.55 M/UL — LOW (ref 3.8–5.2)
RBC # FLD: 12.3 % — SIGNIFICANT CHANGE UP (ref 10.3–14.5)
WBC # BLD: 4.22 K/UL — SIGNIFICANT CHANGE UP (ref 3.8–10.5)
WBC # FLD AUTO: 4.22 K/UL — SIGNIFICANT CHANGE UP (ref 3.8–10.5)

## 2021-10-20 NOTE — REVIEW OF SYSTEMS
[Negative] : Allergic/Immunologic [Fatigue] : fatigue [Recent Change In Weight] : ~T recent weight change [Vision Problems] : no vision problems [Chest Pain] : no chest pain [Lower Ext Edema] : no lower extremity edema [Cough] : no cough [Abdominal Pain] : no abdominal pain [Constipation] : no constipation [Diarrhea] : no diarrhea [Dysuria] : no dysuria [Vaginal Discharge] : no vaginal discharge [Joint Pain] : no joint pain [Muscle Pain] : no muscle pain [Skin Rash] : no skin rash [Difficulty Walking] : no difficulty walking

## 2021-10-20 NOTE — HISTORY OF PRESENT ILLNESS
[Disease: _____________________] : Disease: [unfilled] [T: ___] : T[unfilled] [N: ___] : N[unfilled] [M: ___] : M[unfilled] [AJCC Stage: ____] : AJCC Stage: [unfilled] [de-identified] : Referred by Dr. Gaytan\par \par Ms. Hoang is a 66 y/o G0 postmenopausal F who was referred to medical oncology for treatment considerations for recurrent endometrial cancer.\par She was initially diagnosed with stage II grade 2 endometrial cancer in 2018, she underwent underwent robotic assisted TLH BSO, bilateral pelvic and para-aortic sentinel LN dissection by Dr. Paras Grayson on 2018.  Her surgical pathology demonstrated pT2N0 disease with endometrial tumor measuring 4cm, FIGO 2 endometrioid adenocarcinoma, >90 myometrial invasion with involvement of cervical stroma, +LVI with IHC of MMR proteins with intact expression but pelvic wash negative for malignant cells.  On 10/2018, she completed pelvic IMRT and vaginal cuff brachytherapy with Dr. Bowling.  She did well clinically under surveillance until 2019 when she developed vaginal spotting and fluid discharge, and was evaluated by Dr. Grayson. CA-125 was 27. Concern for possible recurrence and further workup pursued.  Her 19 CT CAP demonstrate new bilateral pulmonary nodules, some with central cavitation, suspicious for metastatic disease, enhancing nodularity superior to the vaginal cuff concerning for metastatic disease, measuring 1.9x1.6cm.  On 10/30/19 she underwent thorascopic multiple RLL wedge resections with Dr. Weston and her surgical pathology c/w metastatic adenocarcinoma, consistent with patient's endometrial primary +ER [place] Pax8.  She was recommended further treatment for her recurrent endometrial cancer but the patient was lost to follow up until 2020 several months before which she has noticed increasing hardness and "lumps" in her vagina, which has been progressively more painful and burning sensation. She was subsequently evaluated by Dr. Gaytan 2020 and referred to medical oncology for systemic treatment evaluation.   On presentation for initial consult 2020, she continued to have vaginal discharge and on and off spotting (not more than 1 pad a day). She felt constipated. She was taking Percocet 5-325, Tylenol and ibuprofen for vaginal pain. She had been very busy with work and family affairs, was not able to come for cancer treatment over the past months. 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 \par \par \par Patient started on Lenvima and Pembrolizumab combination therapy on 3/17/21.  Given increasing vaginal pain and increased mass involving the vaginal cuff region, she underwent palliative RT to the vaginal mass and cuff area with Dr. Bowling. She had improved pain and had been able to tolerate Lenvima without significant toxicities. I reviewed my recommendations to continue current regimen with pembrolizumab and lenvima as lenvima had been on hold for several weeks due to difficulty tolerating treatment and pain control issues. \par \par CT c/a/p done on 2021 which showed favorable response to therapy. Left inguinal node decreased in size. Vaginal mass decreased in size. Pulmonary nodules decreased in size.  \par  [de-identified] :  ER +  CT +  PAX 8  +     MSI STABLE [de-identified] : Refuses COVID 19 vaccine  [FreeTextEntry1] :  Lenvima and Pembrolizumab [de-identified] : Nicole comes in for follow up while on dose reduced Lenvima and Pembrolizumab and feels well overall.  She no longer has hot flashes, is sleeping better with Melatonin, has become more active now since she rescued a new dog ("big quentin").  She notes mild fatigue after treatment with Pembro but it usually self resolves.  She is eating better and has gained weight since last visit.  Her belly button infection is better but still persists.  She recently saw her PMD and Endocrine and no interventions were advised.  She denies any mouth sores, CP, palpitations, cough, LOMAS, n/v/d/c, abdominal pain, LE edema, vaginal discharge or bleeding, urinary symptoms, excessive bruising, dizziness, headaches, arthralgias, myalgias  and keeps active. \par \par

## 2021-10-20 NOTE — PHYSICAL EXAM
[Restricted in physically strenuous activity but ambulatory and able to carry out work of a light or sedentary nature] : Status 1- Restricted in physically strenuous activity but ambulatory and able to carry out work of a light or sedentary nature, e.g., light house work, office work [Normal Female] : normal external genitalia, urethral meatus without lesions or discharge. Bladder non-distended, without tenderness. Vagina and cervix normal on visual inspection. On bimanual exam uterus, adnexa, parametria all normal without any discrete masses. [Normal] : affect appropriate [Mucositis] : no mucositis [Thrush] : no thrush [Vesicles] : no vesicles [de-identified] : mild faint erythema around umbilicus without discharge - improved compared to last visit

## 2021-10-31 ENCOUNTER — OUTPATIENT (OUTPATIENT)
Dept: OUTPATIENT SERVICES | Facility: HOSPITAL | Age: 66
LOS: 1 days | Discharge: HOME | End: 2021-10-31

## 2021-10-31 DIAGNOSIS — Z98.890 OTHER SPECIFIED POSTPROCEDURAL STATES: Chronic | ICD-10-CM

## 2021-10-31 DIAGNOSIS — Z90.710 ACQUIRED ABSENCE OF BOTH CERVIX AND UTERUS: Chronic | ICD-10-CM

## 2021-10-31 DIAGNOSIS — C54.1 MALIGNANT NEOPLASM OF ENDOMETRIUM: ICD-10-CM

## 2021-11-03 ENCOUNTER — APPOINTMENT (OUTPATIENT)
Dept: INFUSION THERAPY | Facility: HOSPITAL | Age: 66
End: 2021-11-03

## 2021-11-03 ENCOUNTER — LABORATORY RESULT (OUTPATIENT)
Age: 66
End: 2021-11-03

## 2021-11-03 ENCOUNTER — RESULT REVIEW (OUTPATIENT)
Age: 66
End: 2021-11-03

## 2021-11-03 ENCOUNTER — APPOINTMENT (OUTPATIENT)
Dept: HEMATOLOGY ONCOLOGY | Facility: CLINIC | Age: 66
End: 2021-11-03
Payer: MEDICARE

## 2021-11-03 VITALS
TEMPERATURE: 97.3 F | DIASTOLIC BLOOD PRESSURE: 87 MMHG | RESPIRATION RATE: 16 BRPM | HEART RATE: 72 BPM | BODY MASS INDEX: 22.64 KG/M2 | SYSTOLIC BLOOD PRESSURE: 119 MMHG | OXYGEN SATURATION: 99 % | WEIGHT: 119.93 LBS | HEIGHT: 61 IN

## 2021-11-03 LAB
BASOPHILS # BLD AUTO: 0.03 K/UL — SIGNIFICANT CHANGE UP (ref 0–0.2)
BASOPHILS NFR BLD AUTO: 0.6 % — SIGNIFICANT CHANGE UP (ref 0–2)
EOSINOPHIL # BLD AUTO: 0.1 K/UL — SIGNIFICANT CHANGE UP (ref 0–0.5)
EOSINOPHIL NFR BLD AUTO: 2.1 % — SIGNIFICANT CHANGE UP (ref 0–6)
HCT VFR BLD CALC: 38 % — SIGNIFICANT CHANGE UP (ref 34.5–45)
HGB BLD-MCNC: 13.2 G/DL — SIGNIFICANT CHANGE UP (ref 11.5–15.5)
IMM GRANULOCYTES NFR BLD AUTO: 0.2 % — SIGNIFICANT CHANGE UP (ref 0–1.5)
LYMPHOCYTES # BLD AUTO: 1.23 K/UL — SIGNIFICANT CHANGE UP (ref 1–3.3)
LYMPHOCYTES # BLD AUTO: 25.9 % — SIGNIFICANT CHANGE UP (ref 13–44)
MCHC RBC-ENTMCNC: 34.6 PG — HIGH (ref 27–34)
MCHC RBC-ENTMCNC: 34.7 G/DL — SIGNIFICANT CHANGE UP (ref 32–36)
MCV RBC AUTO: 99.7 FL — SIGNIFICANT CHANGE UP (ref 80–100)
MONOCYTES # BLD AUTO: 0.47 K/UL — SIGNIFICANT CHANGE UP (ref 0–0.9)
MONOCYTES NFR BLD AUTO: 9.9 % — SIGNIFICANT CHANGE UP (ref 2–14)
NEUTROPHILS # BLD AUTO: 2.9 K/UL — SIGNIFICANT CHANGE UP (ref 1.8–7.4)
NEUTROPHILS NFR BLD AUTO: 61.3 % — SIGNIFICANT CHANGE UP (ref 43–77)
NRBC # BLD: 0 /100 WBCS — SIGNIFICANT CHANGE UP (ref 0–0)
PLATELET # BLD AUTO: 183 K/UL — SIGNIFICANT CHANGE UP (ref 150–400)
RBC # BLD: 3.81 M/UL — SIGNIFICANT CHANGE UP (ref 3.8–5.2)
RBC # FLD: 12.4 % — SIGNIFICANT CHANGE UP (ref 10.3–14.5)
WBC # BLD: 4.74 K/UL — SIGNIFICANT CHANGE UP (ref 3.8–10.5)
WBC # FLD AUTO: 4.74 K/UL — SIGNIFICANT CHANGE UP (ref 3.8–10.5)

## 2021-11-03 PROCEDURE — 99215 OFFICE O/P EST HI 40 MIN: CPT

## 2021-11-05 ENCOUNTER — OUTPATIENT (OUTPATIENT)
Dept: OUTPATIENT SERVICES | Facility: HOSPITAL | Age: 66
LOS: 1 days | End: 2021-11-05
Payer: MEDICARE

## 2021-11-05 ENCOUNTER — APPOINTMENT (OUTPATIENT)
Dept: MAMMOGRAPHY | Facility: IMAGING CENTER | Age: 66
End: 2021-11-05
Payer: MEDICARE

## 2021-11-05 ENCOUNTER — RESULT REVIEW (OUTPATIENT)
Age: 66
End: 2021-11-05

## 2021-11-05 DIAGNOSIS — Z98.890 OTHER SPECIFIED POSTPROCEDURAL STATES: Chronic | ICD-10-CM

## 2021-11-05 DIAGNOSIS — Z00.8 ENCOUNTER FOR OTHER GENERAL EXAMINATION: ICD-10-CM

## 2021-11-05 DIAGNOSIS — Z90.710 ACQUIRED ABSENCE OF BOTH CERVIX AND UTERUS: Chronic | ICD-10-CM

## 2021-11-05 PROCEDURE — 77063 BREAST TOMOSYNTHESIS BI: CPT

## 2021-11-05 PROCEDURE — 77067 SCR MAMMO BI INCL CAD: CPT

## 2021-11-05 PROCEDURE — 77067 SCR MAMMO BI INCL CAD: CPT | Mod: 26

## 2021-11-05 PROCEDURE — 77063 BREAST TOMOSYNTHESIS BI: CPT | Mod: 26

## 2021-11-10 ENCOUNTER — APPOINTMENT (OUTPATIENT)
Dept: INFUSION THERAPY | Facility: HOSPITAL | Age: 66
End: 2021-11-10

## 2021-11-11 ENCOUNTER — APPOINTMENT (OUTPATIENT)
Dept: RADIATION ONCOLOGY | Facility: CLINIC | Age: 66
End: 2021-11-11
Payer: MEDICARE

## 2021-11-11 ENCOUNTER — NON-APPOINTMENT (OUTPATIENT)
Age: 66
End: 2021-11-11

## 2021-11-11 VITALS
OXYGEN SATURATION: 99 % | RESPIRATION RATE: 17 BRPM | SYSTOLIC BLOOD PRESSURE: 131 MMHG | DIASTOLIC BLOOD PRESSURE: 79 MMHG | WEIGHT: 121.8 LBS | BODY MASS INDEX: 23.01 KG/M2 | HEART RATE: 65 BPM

## 2021-11-11 DIAGNOSIS — R11.2 NAUSEA WITH VOMITING, UNSPECIFIED: ICD-10-CM

## 2021-11-11 DIAGNOSIS — Z51.11 ENCOUNTER FOR ANTINEOPLASTIC CHEMOTHERAPY: ICD-10-CM

## 2021-11-11 PROCEDURE — 99212 OFFICE O/P EST SF 10 MIN: CPT | Mod: GC

## 2021-11-15 NOTE — PHYSICAL EXAM
[Restricted in physically strenuous activity but ambulatory and able to carry out work of a light or sedentary nature] : Status 1- Restricted in physically strenuous activity but ambulatory and able to carry out work of a light or sedentary nature, e.g., light house work, office work [Normal Female] : normal external genitalia, urethral meatus without lesions or discharge. Bladder non-distended, without tenderness. Vagina and cervix normal on visual inspection. On bimanual exam uterus, adnexa, parametria all normal without any discrete masses. [Normal] : affect appropriate [Mucositis] : no mucositis [Thrush] : no thrush [Vesicles] : no vesicles [de-identified] : mild faint erythema around umbilicus without discharge - improved compared to last visit

## 2021-11-15 NOTE — VITALS
[Maximal Pain Intensity: 5/10] : 5/10 [Least Pain Intensity: 2/10] : 2/10 [Pain Description/Quality: ___] : Pain description/quality: [unfilled] [Pain Duration: ___] : Pain duration: [unfilled] [Pain Location: ___] : Pain Location: [unfilled] [80: Normal activity with effort; some signs or symptoms of disease.] : 80: Normal activity with effort; some signs or symptoms of disease.  [ECOG Performance Status: 1 - Restricted in physically strenuous activity but ambulatory and able to carry out work of a light or sedentary nature] : Performance Status: 1 - Restricted in physically strenuous activity but ambulatory and able to carry out work of a light or sedentary nature, e.g., light house work, office work [OTC] : OTC [Pain Interferes with ADLs] : Pain does not interfere with activities of daily living

## 2021-11-15 NOTE — REVIEW OF SYSTEMS
[Fatigue] : fatigue [Negative] : Allergic/Immunologic [Vision Problems] : no vision problems [Chest Pain] : no chest pain [Lower Ext Edema] : no lower extremity edema [Cough] : no cough [Abdominal Pain] : no abdominal pain [Constipation] : no constipation [Diarrhea] : no diarrhea [Dysuria] : no dysuria [Vaginal Discharge] : no vaginal discharge [Joint Pain] : no joint pain [Muscle Pain] : no muscle pain [Skin Rash] : no skin rash [Difficulty Walking] : no difficulty walking

## 2021-11-15 NOTE — DISEASE MANAGEMENT
[Pathological] : TNM Stage: p [IV] : IV [FreeTextEntry4] : recurrent [TTNM] : 2 [NTNM] : 0 [MTNM] : 1

## 2021-11-15 NOTE — HISTORY OF PRESENT ILLNESS
[Disease: _____________________] : Disease: [unfilled] [T: ___] : T[unfilled] [N: ___] : N[unfilled] [M: ___] : M[unfilled] [AJCC Stage: ____] : AJCC Stage: [unfilled] [de-identified] : Referred by Dr. Gaytan\par \par Ms. Hoang is a 66 y/o G0 postmenopausal F who was referred to medical oncology for treatment considerations for recurrent endometrial cancer.\par She was initially diagnosed with stage II grade 2 endometrial cancer in 2018, she underwent underwent robotic assisted TLH BSO, bilateral pelvic and para-aortic sentinel LN dissection by Dr. Paras Grayson on 2018.  Her surgical pathology demonstrated pT2N0 disease with endometrial tumor measuring 4cm, FIGO 2 endometrioid adenocarcinoma, >90 myometrial invasion with involvement of cervical stroma, +LVI with IHC of MMR proteins with intact expression but pelvic wash negative for malignant cells.  On 10/2018, she completed pelvic IMRT and vaginal cuff brachytherapy with Dr. Bowling.  She did well clinically under surveillance until 2019 when she developed vaginal spotting and fluid discharge, and was evaluated by Dr. Grasyon. CA-125 was 27. Concern for possible recurrence and further workup pursued.  Her 19 CT CAP demonstrate new bilateral pulmonary nodules, some with central cavitation, suspicious for metastatic disease, enhancing nodularity superior to the vaginal cuff concerning for metastatic disease, measuring 1.9x1.6cm.  On 10/30/19 she underwent thorascopic multiple RLL wedge resections with Dr. Weston and her surgical pathology c/w metastatic adenocarcinoma, consistent with patient's endometrial primary +ER [place] Pax8.  She was recommended further treatment for her recurrent endometrial cancer but the patient was lost to follow up until 2020 several months before which she has noticed increasing hardness and "lumps" in her vagina, which has been progressively more painful and burning sensation. She was subsequently evaluated by Dr. Gaytan 2020 and referred to medical oncology for systemic treatment evaluation.   On presentation for initial consult 2020, she continued to have vaginal discharge and on and off spotting (not more than 1 pad a day). She felt constipated. She was taking Percocet 5-325, Tylenol and ibuprofen for vaginal pain. She had been very busy with work and family affairs, was not able to come for cancer treatment over the past months. 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 \par \par \par Patient started on Lenvima and Pembrolizumab combination therapy on 3/17/21.  Given increasing vaginal pain and increased mass involving the vaginal cuff region, she underwent palliative RT to the vaginal mass and cuff area with Dr. Bowling. She had improved pain and had been able to tolerate Lenvima without significant toxicities. I reviewed my recommendations to continue current regimen with pembrolizumab and lenvima as lenvima had been on hold for several weeks due to difficulty tolerating treatment and pain control issues. \par \par CT c/a/p done on 2021 which showed favorable response to therapy. Left inguinal node decreased in size. Vaginal mass decreased in size. Pulmonary nodules decreased in size.  \par  [de-identified] :  ER +  [place] +  PAX 8  +     MSI STABLE [de-identified] : Refuses COVID 19 vaccine  [FreeTextEntry1] :  Lenvima and Pembrolizumab [de-identified] : Nicole comes in for follow up while on dose reduced Lenvima and Pembrolizumab and feels well overall.  She has mild aches/pain, continues with her mild fatigue and neuropathy.  She saw her dentist recently and was advised of need for dental work (extraction, deep gum cleaning, possibly an implant or bridge).  She also established care with Dr. Gaytan recently and had a normal internal exam.  She notes LOMAS with walking sometimes but has been more active now that she has new dog ("big quentin").  She started using alcohol in her belly button which made the skin dry but is back to using Neosporin now which has resulted in improvement.   She states she takes high dose vitamin C and new supplements she bought from [facility].  She denies any mouth sores, CP, palpitations, cough, LOMAS, n/v/d/c, abdominal pain, LE edema, vaginal discharge or bleeding, urinary symptoms, excessive bruising, dizziness, headaches, arthralgias, myalgias  and keeps active. \par \par

## 2021-11-15 NOTE — PHYSICAL EXAM
[General Appearance - In No Acute Distress] : in no acute distress [Abdomen Soft] : soft [General Appearance - Well Developed] : well developed [General Appearance - Well Nourished] : well nourished [Sclera] : the sclera and conjunctiva were normal [Extraocular Movements] : extraocular movements were intact [Outer Ear] : the ears and nose were normal in appearance [Hearing Threshold Finger Rub Not [place]] : hearing was normal [Exaggerated Use Of Accessory Muscles For Inspiration] : no accessory muscle use [Edema] : no peripheral edema present [Nondistended] : nondistended [Abdomen Tenderness] : non-tender [No Rectal Mass] : no rectal mass [Cervical Lymph Nodes Enlarged Posterior Bilaterally] : posterior cervical [Cervical Lymph Nodes Enlarged Anterior Bilaterally] : anterior cervical [Nail Clubbing] : no clubbing  or cyanosis of the fingernails [] : no rash [Normal] : oriented to person, place and time, the affect was normal, the mood was normal and not anxious [Blood on examination glove] : no blood on examination glove [FreeTextEntry1] : no palpable masses on posterior vaginal wall palpated transrectally. [de-identified] : stenotic ivaginal canalNo appreciable masses or nodules in palpable portion of vagina On rectal exam, no mass appreciated  through the re.discharge. Normal external genitalia, less swollen from prior.

## 2021-11-16 ENCOUNTER — APPOINTMENT (OUTPATIENT)
Dept: ULTRASOUND IMAGING | Facility: IMAGING CENTER | Age: 66
End: 2021-11-16
Payer: MEDICARE

## 2021-11-16 ENCOUNTER — RESULT REVIEW (OUTPATIENT)
Age: 66
End: 2021-11-16

## 2021-11-16 ENCOUNTER — OUTPATIENT (OUTPATIENT)
Dept: OUTPATIENT SERVICES | Facility: HOSPITAL | Age: 66
LOS: 1 days | End: 2021-11-16
Payer: MEDICARE

## 2021-11-16 ENCOUNTER — APPOINTMENT (OUTPATIENT)
Dept: CT IMAGING | Facility: IMAGING CENTER | Age: 66
End: 2021-11-16
Payer: MEDICARE

## 2021-11-16 ENCOUNTER — APPOINTMENT (OUTPATIENT)
Dept: MAMMOGRAPHY | Facility: IMAGING CENTER | Age: 66
End: 2021-11-16
Payer: MEDICARE

## 2021-11-16 DIAGNOSIS — Z00.8 ENCOUNTER FOR OTHER GENERAL EXAMINATION: ICD-10-CM

## 2021-11-16 DIAGNOSIS — Z98.890 OTHER SPECIFIED POSTPROCEDURAL STATES: Chronic | ICD-10-CM

## 2021-11-16 DIAGNOSIS — Z90.710 ACQUIRED ABSENCE OF BOTH CERVIX AND UTERUS: Chronic | ICD-10-CM

## 2021-11-16 PROCEDURE — 76642 ULTRASOUND BREAST LIMITED: CPT

## 2021-11-16 PROCEDURE — 71260 CT THORAX DX C+: CPT | Mod: 26,MG

## 2021-11-16 PROCEDURE — 74177 CT ABD & PELVIS W/CONTRAST: CPT

## 2021-11-16 PROCEDURE — 77065 DX MAMMO INCL CAD UNI: CPT

## 2021-11-16 PROCEDURE — G1004: CPT

## 2021-11-16 PROCEDURE — G0279: CPT | Mod: 26

## 2021-11-16 PROCEDURE — 74177 CT ABD & PELVIS W/CONTRAST: CPT | Mod: 26,MH

## 2021-11-16 PROCEDURE — 82565 ASSAY OF CREATININE: CPT

## 2021-11-16 PROCEDURE — 77065 DX MAMMO INCL CAD UNI: CPT | Mod: 26,RT

## 2021-11-16 PROCEDURE — 76642 ULTRASOUND BREAST LIMITED: CPT | Mod: 26,RT

## 2021-11-16 PROCEDURE — G0279: CPT

## 2021-11-16 PROCEDURE — 71260 CT THORAX DX C+: CPT | Mod: MG

## 2021-11-18 NOTE — REVIEW OF SYSTEMS
[Incontinence] : incontinence [Vaginal Discharge] : no vaginal discharge [Abn Vag Bleeding] : no abnormal vaginal bleeding [de-identified] : f/u with Dr. Lee for abnormal thyroid [de-identified] : Abnormal BM

## 2021-11-18 NOTE — REASON FOR VISIT
[FreeTextEntry1] : [facility] [phone] at [address] [place] [address] \par \par Recurrent, metastatic endometrial cancer. \par \par Lenvima and Pembrolizumab combination therapy on 3/17/21 - currently undergoing treatment

## 2021-11-18 NOTE — HISTORY OF PRESENT ILLNESS
[Disease: _____________________] : Disease: [unfilled] [T: ___] : T[unfilled] [N: ___] : N[unfilled] [M: ___] : M[unfilled] [AJCC Stage: ____] : AJCC Stage: [unfilled] [de-identified] : Referred by Dr. Gaytan\par \par Ms. Hoang is a 64 y/o G0 postmenopausal F who was referred to medical oncology for treatment considerations for recurrent endometrial cancer.\par She was initially diagnosed with stage II grade 2 endometrial cancer in 2018, she underwent underwent robotic assisted TLH BSO, bilateral pelvic and para-aortic sentinel LN dissection by Dr. Paras Grayson on 2018.  Her surgical pathology demonstrated pT2N0 disease with endometrial tumor measuring 4cm, FIGO 2 endometrioid adenocarcinoma, >90 myometrial invasion with involvement of cervical stroma, +LVI with IHC of MMR proteins with intact expression but pelvic wash negative for malignant cells.  On 10/2018, she completed pelvic IMRT and vaginal cuff brachytherapy with Dr. Bowling.  She did well clinically under surveillance until 2019 when she developed vaginal spotting and fluid discharge, and was evaluated by Dr. Grayson. CA-125 was 27. Concern for possible recurrence and further workup pursued.  Her 19 CT CAP demonstrate new bilateral pulmonary nodules, some with central cavitation, suspicious for metastatic disease, enhancing nodularity superior to the vaginal cuff concerning for metastatic disease, measuring 1.9x1.6cm.  On 10/30/19 she underwent thorascopic multiple RLL wedge resections with Dr. Weston and her surgical pathology c/w metastatic adenocarcinoma, consistent with patient's endometrial primary +ER [place] Pax8.  She was recommended further treatment for her recurrent endometrial cancer but the patient was lost to follow up until 2020 several months before which she has noticed increasing hardness and "lumps" in her vagina, which has been progressively more painful and burning sensation. She was subsequently evaluated by Dr. Gaytan 2020 and referred to medical oncology for systemic treatment evaluation.   On presentation for initial consult 2020, she continued to have vaginal discharge and on and off spotting (not more than 1 pad a day). She felt constipated. She was taking Percocet 5-325, Tylenol and ibuprofen for vaginal pain. She had been very busy with work and family affairs, was not able to come for cancer treatment over the past months. 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 \par \par \par Patient started on Lenvima and Pembrolizumab combination therapy on 3/17/21.  Given increasing vaginal pain and increased mass involving the vaginal cuff region, she underwent palliative RT to the vaginal mass and cuff area with Dr. Bowling. She had improved pain and had been able to tolerate Lenvima without significant toxicities. I reviewed my recommendations to continue current regimen with pembrolizumab and lenvima as lenvima had been on hold for several weeks due to difficulty tolerating treatment and pain control issues. \par \par CT c/a/p done on 2021 which showed favorable response to therapy. Left inguinal node decreased in size. Vaginal mass decreased in size. Pulmonary nodules decreased in size.  \par  [de-identified] :  ER +  [place] +  PAX 8  +     MSI STABLE [de-identified] : Refuses COVID 19 vaccine  [FreeTextEntry1] :  Lenvima and Pembrolizumab [de-identified] : Nicole comes in for follow up while on dose reduced Lenvima and Pembrolizumab and feels well overall.  Her appetite is improved, she has gained weight but states she has mild internal vaginal burning.  She is experiencing intermittent hot flashes and also noticed discharge from her belly button and itchiness and has been using tea tree oil, with improvement of symptoms. Thyroid US via endocrine was improved.  She denies any mouth sores, CP, palpitations, cough, LOMAS, n/v/d/c, abdominal pain, LE edema, vaginal discharge or bleeding, urinary symptoms, excessive bruising, dizziness, headaches, arthralgias, myalgias  and keeps active. \par \par

## 2021-11-18 NOTE — PHYSICAL EXAM
[Normal] : Bladder: Not distended, no tenderness [Absent] : Adnexa(ae): Absent [de-identified] : rash/post radiation changes [de-identified] : no rectovaginal nodularity [de-identified] : Monae Palmer MA was present the entire time of gynecological exam.

## 2021-11-18 NOTE — ASSESSMENT
[FreeTextEntry1] : The patient is doing well from a gynecological standpoint with an excellent treatment response & improvement in symptoms.  The patient has post radiation changes which may be attributed to her burning and itching. I advised her to do sits baths and to massage the area for relief.\par The patient admitted to reducing her recommended intake of Lenvima from time to time, Dr. Blank's aware per pt. I advised the patient that it may be best to speak with Dr. Blank about alternating her dose for continued therapeutic benefit.  Encouraged completion of mammography.

## 2021-11-18 NOTE — END OF VISIT
[FreeTextEntry3] : Written by Monae Palmer, acting as a scribe for Dr. Samaria Gaytan.\par This note accurately reflects the work and decisions made by me.

## 2021-11-18 NOTE — PHYSICAL EXAM
[Restricted in physically strenuous activity but ambulatory and able to carry out work of a light or sedentary nature] : Status 1- Restricted in physically strenuous activity but ambulatory and able to carry out work of a light or sedentary nature, e.g., light house work, office work [Normal] : affect appropriate [Normal Female] : normal external genitalia, urethral meatus without lesions or discharge. Bladder non-distended, without tenderness. Vagina and cervix normal on visual inspection. On bimanual exam uterus, adnexa, parametria all normal without any discrete masses. [Mucositis] : no mucositis [Thrush] : no thrush [Vesicles] : no vesicles [de-identified] : mild faint erythema around umbilicus without discharge

## 2021-11-18 NOTE — REVIEW OF SYSTEMS
[Insomnia] : insomnia [Negative] : Allergic/Immunologic [Fatigue] : no fatigue [Recent Change In Weight] : ~T no recent weight change [Vision Problems] : no vision problems [Chest Pain] : no chest pain [Lower Ext Edema] : no lower extremity edema [Cough] : no cough [Abdominal Pain] : no abdominal pain [Constipation] : no constipation [Diarrhea] : no diarrhea [Dysuria] : no dysuria [Vaginal Discharge] : no vaginal discharge [Joint Pain] : no joint pain [Muscle Pain] : no muscle pain [Skin Rash] : no skin rash [Difficulty Walking] : no difficulty walking

## 2021-11-18 NOTE — HISTORY OF PRESENT ILLNESS
[FreeTextEntry1] : 65 y/o with recurrent, metastatic endometrial cancer is currently undergoing treatment with Dr. Blank & f/u with Dr. Bowling for RT. Patient started on Lenvima and Pembrolizumab combination therapy on 3/17/21. Given increasing vaginal pain and increased mass involving the vaginal cuff region, she underwent palliative RT to the vaginal mass and cuff area with Dr. Bowling. Patient is tolerating treatment well. Patient had a reduction in her dose of Lenvima from 20mg to 10mg due to fatigue and other side effects. She presents with c/o urinary incontinence and burning and itching on the right side of the vagina. She has also noted itchiness during BM, she speculates that it may be due to her RT. \par \par CT c/a/p done on 8/19/2021 which showed favorable response to therapy. Left inguinal node decreased in size. Vaginal mass decreased in size. Pulmonary nodules decreased in size. \par \par Mammo - DUE, last done 2 years ago. \par \par

## 2021-11-24 ENCOUNTER — RESULT REVIEW (OUTPATIENT)
Age: 66
End: 2021-11-24

## 2021-11-24 ENCOUNTER — LABORATORY RESULT (OUTPATIENT)
Age: 66
End: 2021-11-24

## 2021-11-24 ENCOUNTER — APPOINTMENT (OUTPATIENT)
Dept: HEMATOLOGY ONCOLOGY | Facility: CLINIC | Age: 66
End: 2021-11-24
Payer: MEDICARE

## 2021-11-24 ENCOUNTER — APPOINTMENT (OUTPATIENT)
Dept: INFUSION THERAPY | Facility: HOSPITAL | Age: 66
End: 2021-11-24

## 2021-11-24 VITALS
TEMPERATURE: 97.7 F | WEIGHT: 121.25 LBS | RESPIRATION RATE: 16 BRPM | HEIGHT: 61.02 IN | SYSTOLIC BLOOD PRESSURE: 141 MMHG | BODY MASS INDEX: 22.89 KG/M2 | HEART RATE: 91 BPM | OXYGEN SATURATION: 97 % | DIASTOLIC BLOOD PRESSURE: 89 MMHG

## 2021-11-24 LAB
BASOPHILS # BLD AUTO: 0.03 K/UL — SIGNIFICANT CHANGE UP (ref 0–0.2)
BASOPHILS NFR BLD AUTO: 0.5 % — SIGNIFICANT CHANGE UP (ref 0–2)
EOSINOPHIL # BLD AUTO: 0.07 K/UL — SIGNIFICANT CHANGE UP (ref 0–0.5)
EOSINOPHIL NFR BLD AUTO: 1.1 % — SIGNIFICANT CHANGE UP (ref 0–6)
HCT VFR BLD CALC: 37.4 % — SIGNIFICANT CHANGE UP (ref 34.5–45)
HGB BLD-MCNC: 12.8 G/DL — SIGNIFICANT CHANGE UP (ref 11.5–15.5)
IMM GRANULOCYTES NFR BLD AUTO: 0.3 % — SIGNIFICANT CHANGE UP (ref 0–1.5)
LYMPHOCYTES # BLD AUTO: 0.88 K/UL — LOW (ref 1–3.3)
LYMPHOCYTES # BLD AUTO: 13.3 % — SIGNIFICANT CHANGE UP (ref 13–44)
MCHC RBC-ENTMCNC: 34.2 G/DL — SIGNIFICANT CHANGE UP (ref 32–36)
MCHC RBC-ENTMCNC: 34.2 PG — HIGH (ref 27–34)
MCV RBC AUTO: 100 FL — SIGNIFICANT CHANGE UP (ref 80–100)
MONOCYTES # BLD AUTO: 0.56 K/UL — SIGNIFICANT CHANGE UP (ref 0–0.9)
MONOCYTES NFR BLD AUTO: 8.5 % — SIGNIFICANT CHANGE UP (ref 2–14)
NEUTROPHILS # BLD AUTO: 5.06 K/UL — SIGNIFICANT CHANGE UP (ref 1.8–7.4)
NEUTROPHILS NFR BLD AUTO: 76.3 % — SIGNIFICANT CHANGE UP (ref 43–77)
NRBC # BLD: 0 /100 WBCS — SIGNIFICANT CHANGE UP (ref 0–0)
PLATELET # BLD AUTO: 199 K/UL — SIGNIFICANT CHANGE UP (ref 150–400)
RBC # BLD: 3.74 M/UL — LOW (ref 3.8–5.2)
RBC # FLD: 11.9 % — SIGNIFICANT CHANGE UP (ref 10.3–14.5)
WBC # BLD: 6.62 K/UL — SIGNIFICANT CHANGE UP (ref 3.8–10.5)
WBC # FLD AUTO: 6.62 K/UL — SIGNIFICANT CHANGE UP (ref 3.8–10.5)

## 2021-11-24 PROCEDURE — 99214 OFFICE O/P EST MOD 30 MIN: CPT

## 2021-11-29 ENCOUNTER — NON-APPOINTMENT (OUTPATIENT)
Age: 66
End: 2021-11-29

## 2021-11-29 ENCOUNTER — OUTPATIENT (OUTPATIENT)
Dept: OUTPATIENT SERVICES | Facility: HOSPITAL | Age: 66
LOS: 1 days | Discharge: HOME | End: 2021-11-29

## 2021-11-29 DIAGNOSIS — R92.8 OTHER ABNORMAL AND INCONCLUSIVE FINDINGS ON DIAGNOSTIC IMAGING OF BREAST: ICD-10-CM

## 2021-11-29 DIAGNOSIS — Z98.890 OTHER SPECIFIED POSTPROCEDURAL STATES: Chronic | ICD-10-CM

## 2021-11-29 DIAGNOSIS — Z90.710 ACQUIRED ABSENCE OF BOTH CERVIX AND UTERUS: Chronic | ICD-10-CM

## 2021-11-29 DIAGNOSIS — C54.1 MALIGNANT NEOPLASM OF ENDOMETRIUM: ICD-10-CM

## 2021-12-01 ENCOUNTER — LABORATORY RESULT (OUTPATIENT)
Age: 66
End: 2021-12-01

## 2021-12-01 ENCOUNTER — APPOINTMENT (OUTPATIENT)
Dept: INFUSION THERAPY | Facility: HOSPITAL | Age: 66
End: 2021-12-01

## 2021-12-02 DIAGNOSIS — Z51.11 ENCOUNTER FOR ANTINEOPLASTIC CHEMOTHERAPY: ICD-10-CM

## 2021-12-02 DIAGNOSIS — R11.2 NAUSEA WITH VOMITING, UNSPECIFIED: ICD-10-CM

## 2021-12-10 ENCOUNTER — APPOINTMENT (OUTPATIENT)
Dept: ENDOCRINOLOGY | Facility: CLINIC | Age: 66
End: 2021-12-10
Payer: MEDICARE

## 2021-12-10 VITALS
RESPIRATION RATE: 16 BRPM | WEIGHT: 121 LBS | OXYGEN SATURATION: 99 % | HEART RATE: 105 BPM | SYSTOLIC BLOOD PRESSURE: 126 MMHG | TEMPERATURE: 98 F | DIASTOLIC BLOOD PRESSURE: 81 MMHG | BODY MASS INDEX: 22.84 KG/M2 | HEIGHT: 61.02 IN

## 2021-12-10 DIAGNOSIS — Z79.899 OTHER LONG TERM (CURRENT) DRUG THERAPY: ICD-10-CM

## 2021-12-10 PROCEDURE — 99214 OFFICE O/P EST MOD 30 MIN: CPT

## 2021-12-10 NOTE — HISTORY OF PRESENT ILLNESS
[FreeTextEntry1] : 66 year female  f/u for hypothyroidism management. \par \par *** Dec 10, 2021 ***\par \par feels great. good energy level. still on  Keytruda and Lenvima\par remains on Synthroid 75 mcg\par TSH- 3.5, FT4- 1.4\par prior pit panel wnl- am cortisol 13, prolactin 15, IGF- 196\par \par *** Sep 10, 2021 ***\par \par on Synthroid 75 mcg. feels much better. Good energy, no fatigue. \par still on Keytruda and Lenvima\par Thyroid US (8/12/21)- heterogenous thyroid, no nodules\par TSH- 2.19\par am cortisol- 13, rest pending\par \par HPI:\par Ms. VICENTE  was diagnosed with endometrial cancer in 2018. She underwent ELISEO-BSO same year , followed by a pelvic IMRT. In 2019, she was found mts to lungs and pelvic LN. She completed another course of radiation therapy and recently started on Keytruda and Lenvima about 4 months ago. Her TSH early this month returned as 28.1, and she was started on Synthroid 25 mcg about 3 months ago. Her last TSH from yesterday was- 56.8 with FT4- 0.8 \par Her blood pressure was found to be elevated past 3 weeks, and she's complaining of some fatigue. \par Denies family history of thyroid cancer or history of radiation exposure to head and neck area in a childhood.\par

## 2021-12-10 NOTE — ASSESSMENT
[FreeTextEntry1] : Acquired hypothyroidism, secondary to use of check point and TK inhibitors\par - cont Synthroid 75 mcg for now\par - retest tft's in about 6 weeks at hem/onc office. Might require uptitration\par - periodical screen for pituitary deficiencies, type 1 diabetes, adrenal insufficiency. \par RTC 3-4 mos\par \par

## 2021-12-15 ENCOUNTER — RESULT REVIEW (OUTPATIENT)
Age: 66
End: 2021-12-15

## 2021-12-15 ENCOUNTER — APPOINTMENT (OUTPATIENT)
Dept: INFUSION THERAPY | Facility: HOSPITAL | Age: 66
End: 2021-12-15

## 2021-12-15 ENCOUNTER — APPOINTMENT (OUTPATIENT)
Dept: HEMATOLOGY ONCOLOGY | Facility: CLINIC | Age: 66
End: 2021-12-15
Payer: MEDICARE

## 2021-12-15 VITALS
HEIGHT: 61 IN | RESPIRATION RATE: 16 BRPM | OXYGEN SATURATION: 99 % | TEMPERATURE: 97.4 F | BODY MASS INDEX: 23.23 KG/M2 | DIASTOLIC BLOOD PRESSURE: 89 MMHG | HEART RATE: 84 BPM | SYSTOLIC BLOOD PRESSURE: 149 MMHG | WEIGHT: 123.02 LBS

## 2021-12-15 DIAGNOSIS — H93.13 TINNITUS, BILATERAL: ICD-10-CM

## 2021-12-15 LAB
ALBUMIN SERPL ELPH-MCNC: 4.3 G/DL — SIGNIFICANT CHANGE UP (ref 3.3–5)
ALP SERPL-CCNC: 113 U/L — SIGNIFICANT CHANGE UP (ref 40–120)
ALT FLD-CCNC: 22 U/L — SIGNIFICANT CHANGE UP (ref 10–45)
ANION GAP SERPL CALC-SCNC: 12 MMOL/L — SIGNIFICANT CHANGE UP (ref 5–17)
AST SERPL-CCNC: 21 U/L — SIGNIFICANT CHANGE UP (ref 10–40)
BILIRUB SERPL-MCNC: 0.3 MG/DL — SIGNIFICANT CHANGE UP (ref 0.2–1.2)
BUN SERPL-MCNC: 21 MG/DL — SIGNIFICANT CHANGE UP (ref 7–23)
CALCIUM SERPL-MCNC: 9.7 MG/DL — SIGNIFICANT CHANGE UP (ref 8.4–10.5)
CANCER AG125 SERPL-ACNC: 6 U/ML — SIGNIFICANT CHANGE UP
CHLORIDE SERPL-SCNC: 103 MMOL/L — SIGNIFICANT CHANGE UP (ref 96–108)
CO2 SERPL-SCNC: 26 MMOL/L — SIGNIFICANT CHANGE UP (ref 22–31)
CREAT SERPL-MCNC: 0.93 MG/DL — SIGNIFICANT CHANGE UP (ref 0.5–1.3)
GLUCOSE SERPL-MCNC: 97 MG/DL — SIGNIFICANT CHANGE UP (ref 70–99)
HCT VFR BLD CALC: 38.9 % — SIGNIFICANT CHANGE UP (ref 34.5–45)
HGB BLD-MCNC: 13.2 G/DL — SIGNIFICANT CHANGE UP (ref 11.5–15.5)
MAGNESIUM SERPL-MCNC: 2 MG/DL — SIGNIFICANT CHANGE UP (ref 1.6–2.6)
MCHC RBC-ENTMCNC: 33.8 PG — SIGNIFICANT CHANGE UP (ref 27–34)
MCHC RBC-ENTMCNC: 33.9 G/DL — SIGNIFICANT CHANGE UP (ref 32–36)
MCV RBC AUTO: 99.7 FL — SIGNIFICANT CHANGE UP (ref 80–100)
PLATELET # BLD AUTO: 181 K/UL — SIGNIFICANT CHANGE UP (ref 150–400)
POTASSIUM SERPL-MCNC: 4.7 MMOL/L — SIGNIFICANT CHANGE UP (ref 3.5–5.3)
POTASSIUM SERPL-SCNC: 4.7 MMOL/L — SIGNIFICANT CHANGE UP (ref 3.5–5.3)
PROT SERPL-MCNC: 7.1 G/DL — SIGNIFICANT CHANGE UP (ref 6–8.3)
RBC # BLD: 3.9 M/UL — SIGNIFICANT CHANGE UP (ref 3.8–5.2)
RBC # FLD: 11.7 % — SIGNIFICANT CHANGE UP (ref 10.3–14.5)
SODIUM SERPL-SCNC: 141 MMOL/L — SIGNIFICANT CHANGE UP (ref 135–145)
T4 FREE SERPL-MCNC: 1.5 NG/DL — SIGNIFICANT CHANGE UP (ref 0.9–1.8)
TSH SERPL-MCNC: 2.57 UIU/ML — SIGNIFICANT CHANGE UP (ref 0.27–4.2)
WBC # BLD: 4.56 K/UL — SIGNIFICANT CHANGE UP (ref 3.8–10.5)
WBC # FLD AUTO: 4.56 K/UL — SIGNIFICANT CHANGE UP (ref 3.8–10.5)

## 2021-12-15 PROCEDURE — 99214 OFFICE O/P EST MOD 30 MIN: CPT

## 2021-12-16 ENCOUNTER — NON-APPOINTMENT (OUTPATIENT)
Age: 66
End: 2021-12-16

## 2021-12-17 ENCOUNTER — RESULT REVIEW (OUTPATIENT)
Age: 66
End: 2021-12-17

## 2021-12-22 ENCOUNTER — APPOINTMENT (OUTPATIENT)
Dept: INFUSION THERAPY | Facility: HOSPITAL | Age: 66
End: 2021-12-22

## 2021-12-28 ENCOUNTER — OUTPATIENT (OUTPATIENT)
Dept: OUTPATIENT SERVICES | Facility: HOSPITAL | Age: 66
LOS: 1 days | Discharge: HOME | End: 2021-12-28

## 2021-12-28 DIAGNOSIS — Z98.890 OTHER SPECIFIED POSTPROCEDURAL STATES: Chronic | ICD-10-CM

## 2021-12-28 DIAGNOSIS — Z90.710 ACQUIRED ABSENCE OF BOTH CERVIX AND UTERUS: Chronic | ICD-10-CM

## 2021-12-28 DIAGNOSIS — C54.1 MALIGNANT NEOPLASM OF ENDOMETRIUM: ICD-10-CM

## 2021-12-30 ENCOUNTER — NON-APPOINTMENT (OUTPATIENT)
Age: 66
End: 2021-12-30

## 2022-01-03 ENCOUNTER — RESULT REVIEW (OUTPATIENT)
Age: 67
End: 2022-01-03

## 2022-01-03 ENCOUNTER — APPOINTMENT (OUTPATIENT)
Dept: INFUSION THERAPY | Facility: HOSPITAL | Age: 67
End: 2022-01-03

## 2022-01-03 ENCOUNTER — APPOINTMENT (OUTPATIENT)
Dept: HEMATOLOGY ONCOLOGY | Facility: CLINIC | Age: 67
End: 2022-01-03
Payer: MEDICARE

## 2022-01-03 VITALS
TEMPERATURE: 97 F | HEIGHT: 61 IN | DIASTOLIC BLOOD PRESSURE: 88 MMHG | OXYGEN SATURATION: 99 % | WEIGHT: 122.33 LBS | RESPIRATION RATE: 16 BRPM | SYSTOLIC BLOOD PRESSURE: 133 MMHG | BODY MASS INDEX: 23.1 KG/M2 | HEART RATE: 81 BPM

## 2022-01-03 LAB
ALBUMIN SERPL ELPH-MCNC: 4.3 G/DL — SIGNIFICANT CHANGE UP (ref 3.3–5)
ALP SERPL-CCNC: 110 U/L — SIGNIFICANT CHANGE UP (ref 40–120)
ALT FLD-CCNC: 23 U/L — SIGNIFICANT CHANGE UP (ref 10–45)
ANION GAP SERPL CALC-SCNC: 14 MMOL/L — SIGNIFICANT CHANGE UP (ref 5–17)
AST SERPL-CCNC: 19 U/L — SIGNIFICANT CHANGE UP (ref 10–40)
BASOPHILS # BLD AUTO: 0.02 K/UL — SIGNIFICANT CHANGE UP (ref 0–0.2)
BASOPHILS NFR BLD AUTO: 0.4 % — SIGNIFICANT CHANGE UP (ref 0–2)
BILIRUB SERPL-MCNC: 0.4 MG/DL — SIGNIFICANT CHANGE UP (ref 0.2–1.2)
BUN SERPL-MCNC: 27 MG/DL — HIGH (ref 7–23)
CALCIUM SERPL-MCNC: 9.6 MG/DL — SIGNIFICANT CHANGE UP (ref 8.4–10.5)
CANCER AG125 SERPL-ACNC: 6 U/ML — SIGNIFICANT CHANGE UP
CHLORIDE SERPL-SCNC: 105 MMOL/L — SIGNIFICANT CHANGE UP (ref 96–108)
CO2 SERPL-SCNC: 24 MMOL/L — SIGNIFICANT CHANGE UP (ref 22–31)
CREAT SERPL-MCNC: 0.94 MG/DL — SIGNIFICANT CHANGE UP (ref 0.5–1.3)
EOSINOPHIL # BLD AUTO: 0.15 K/UL — SIGNIFICANT CHANGE UP (ref 0–0.5)
EOSINOPHIL NFR BLD AUTO: 3.2 % — SIGNIFICANT CHANGE UP (ref 0–6)
GLUCOSE SERPL-MCNC: 97 MG/DL — SIGNIFICANT CHANGE UP (ref 70–99)
HCT VFR BLD CALC: 40.2 % — SIGNIFICANT CHANGE UP (ref 34.5–45)
HGB BLD-MCNC: 13.7 G/DL — SIGNIFICANT CHANGE UP (ref 11.5–15.5)
IMM GRANULOCYTES NFR BLD AUTO: 0.2 % — SIGNIFICANT CHANGE UP (ref 0–1.5)
LYMPHOCYTES # BLD AUTO: 1.17 K/UL — SIGNIFICANT CHANGE UP (ref 1–3.3)
LYMPHOCYTES # BLD AUTO: 25.3 % — SIGNIFICANT CHANGE UP (ref 13–44)
MAGNESIUM SERPL-MCNC: 2 MG/DL — SIGNIFICANT CHANGE UP (ref 1.6–2.6)
MCHC RBC-ENTMCNC: 33.6 PG — SIGNIFICANT CHANGE UP (ref 27–34)
MCHC RBC-ENTMCNC: 34.1 G/DL — SIGNIFICANT CHANGE UP (ref 32–36)
MCV RBC AUTO: 98.5 FL — SIGNIFICANT CHANGE UP (ref 80–100)
MONOCYTES # BLD AUTO: 0.45 K/UL — SIGNIFICANT CHANGE UP (ref 0–0.9)
MONOCYTES NFR BLD AUTO: 9.7 % — SIGNIFICANT CHANGE UP (ref 2–14)
NEUTROPHILS # BLD AUTO: 2.82 K/UL — SIGNIFICANT CHANGE UP (ref 1.8–7.4)
NEUTROPHILS NFR BLD AUTO: 61.2 % — SIGNIFICANT CHANGE UP (ref 43–77)
NRBC # BLD: 0 /100 WBCS — SIGNIFICANT CHANGE UP (ref 0–0)
PLATELET # BLD AUTO: 187 K/UL — SIGNIFICANT CHANGE UP (ref 150–400)
POTASSIUM SERPL-MCNC: 4.7 MMOL/L — SIGNIFICANT CHANGE UP (ref 3.5–5.3)
POTASSIUM SERPL-SCNC: 4.7 MMOL/L — SIGNIFICANT CHANGE UP (ref 3.5–5.3)
PROT SERPL-MCNC: 6.9 G/DL — SIGNIFICANT CHANGE UP (ref 6–8.3)
RBC # BLD: 4.08 M/UL — SIGNIFICANT CHANGE UP (ref 3.8–5.2)
RBC # FLD: 11.7 % — SIGNIFICANT CHANGE UP (ref 10.3–14.5)
SODIUM SERPL-SCNC: 142 MMOL/L — SIGNIFICANT CHANGE UP (ref 135–145)
T3 SERPL-MCNC: 102 NG/DL — SIGNIFICANT CHANGE UP (ref 80–200)
T4 FREE+ TSH PNL SERPL: 1.16 UIU/ML — SIGNIFICANT CHANGE UP (ref 0.27–4.2)
WBC # BLD: 4.62 K/UL — SIGNIFICANT CHANGE UP (ref 3.8–10.5)
WBC # FLD AUTO: 4.62 K/UL — SIGNIFICANT CHANGE UP (ref 3.8–10.5)

## 2022-01-03 PROCEDURE — 99214 OFFICE O/P EST MOD 30 MIN: CPT

## 2022-01-04 ENCOUNTER — OUTPATIENT (OUTPATIENT)
Dept: OUTPATIENT SERVICES | Facility: HOSPITAL | Age: 67
LOS: 1 days | End: 2022-01-04
Payer: MEDICARE

## 2022-01-04 ENCOUNTER — RESULT REVIEW (OUTPATIENT)
Age: 67
End: 2022-01-04

## 2022-01-04 ENCOUNTER — APPOINTMENT (OUTPATIENT)
Dept: CT IMAGING | Facility: IMAGING CENTER | Age: 67
End: 2022-01-04
Payer: MEDICARE

## 2022-01-04 DIAGNOSIS — Z98.890 OTHER SPECIFIED POSTPROCEDURAL STATES: Chronic | ICD-10-CM

## 2022-01-04 DIAGNOSIS — C54.1 MALIGNANT NEOPLASM OF ENDOMETRIUM: ICD-10-CM

## 2022-01-04 DIAGNOSIS — Z90.710 ACQUIRED ABSENCE OF BOTH CERVIX AND UTERUS: Chronic | ICD-10-CM

## 2022-01-04 PROCEDURE — 74177 CT ABD & PELVIS W/CONTRAST: CPT | Mod: MG

## 2022-01-04 PROCEDURE — 71260 CT THORAX DX C+: CPT | Mod: 26,MG

## 2022-01-04 PROCEDURE — G1004: CPT

## 2022-01-04 PROCEDURE — 74177 CT ABD & PELVIS W/CONTRAST: CPT | Mod: 26,MG

## 2022-01-04 PROCEDURE — 82565 ASSAY OF CREATININE: CPT

## 2022-01-04 PROCEDURE — 71260 CT THORAX DX C+: CPT | Mod: MG

## 2022-01-07 ENCOUNTER — APPOINTMENT (OUTPATIENT)
Dept: HEMATOLOGY ONCOLOGY | Facility: CLINIC | Age: 67
End: 2022-01-07

## 2022-01-12 ENCOUNTER — APPOINTMENT (OUTPATIENT)
Dept: INFUSION THERAPY | Facility: HOSPITAL | Age: 67
End: 2022-01-12

## 2022-01-12 DIAGNOSIS — Z51.11 ENCOUNTER FOR ANTINEOPLASTIC CHEMOTHERAPY: ICD-10-CM

## 2022-01-12 DIAGNOSIS — R11.2 NAUSEA WITH VOMITING, UNSPECIFIED: ICD-10-CM

## 2022-01-21 ENCOUNTER — APPOINTMENT (OUTPATIENT)
Dept: HEMATOLOGY ONCOLOGY | Facility: CLINIC | Age: 67
End: 2022-01-21

## 2022-01-25 NOTE — HISTORY OF PRESENT ILLNESS
[Disease: _____________________] : Disease: [unfilled] [T: ___] : T[unfilled] [N: ___] : N[unfilled] [M: ___] : M[unfilled] [AJCC Stage: ____] : AJCC Stage: [unfilled] [de-identified] : Referred by Dr. Gaytan\par \par Ms. Hoang is a 66 y/o G0 postmenopausal F who was referred to medical oncology for treatment considerations for recurrent endometrial cancer.\par She was initially diagnosed with stage II grade 2 endometrial cancer in 2018, she underwent underwent robotic assisted TLH BSO, bilateral pelvic and para-aortic sentinel LN dissection by Dr. Paras Grayson on 2018.  Her surgical pathology demonstrated pT2N0 disease with endometrial tumor measuring 4cm, FIGO 2 endometrioid adenocarcinoma, >90 myometrial invasion with involvement of cervical stroma, +LVI with IHC of MMR proteins with intact expression but pelvic wash negative for malignant cells.  On 10/2018, she completed pelvic IMRT and vaginal cuff brachytherapy with Dr. Bowling.  She did well clinically under surveillance until 2019 when she developed vaginal spotting and fluid discharge, and was evaluated by Dr. Grayson. CA-125 was 27. Concern for possible recurrence and further workup pursued.  Her 19 CT CAP demonstrate new bilateral pulmonary nodules, some with central cavitation, suspicious for metastatic disease, enhancing nodularity superior to the vaginal cuff concerning for metastatic disease, measuring 1.9x1.6cm.  On 10/30/19 she underwent thorascopic multiple RLL wedge resections with Dr. Weston and her surgical pathology c/w metastatic adenocarcinoma, consistent with patient's endometrial primary +ER [place] Pax8.  She was recommended further treatment for her recurrent endometrial cancer but the patient was lost to follow up until 2020 several months before which she has noticed increasing hardness and "lumps" in her vagina, which has been progressively more painful and burning sensation. She was subsequently evaluated by Dr. Gaytan 2020 and referred to medical oncology for systemic treatment evaluation.   On presentation for initial consult 2020, she continued to have vaginal discharge and on and off spotting (not more than 1 pad a day). She felt constipated. She was taking Percocet 5-325, Tylenol and ibuprofen for vaginal pain. She had been very busy with work and family affairs, was not able to come for cancer treatment over the past months. 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 \par \par \par Patient started on Lenvima and Pembrolizumab combination therapy on 3/17/21.  Given increasing vaginal pain and increased mass involving the vaginal cuff region, she underwent palliative RT to the vaginal mass and cuff area with Dr. Bowling. She had improved pain and had been able to tolerate Lenvima without significant toxicities. I reviewed my recommendations to continue current regimen with pembrolizumab and lenvima as lenvima had been on hold for several weeks due to difficulty tolerating treatment and pain control issues. \par \par CT c/a/p done on 2021 which showed favorable response to therapy. Left inguinal node decreased in size. Vaginal mass decreased in size. Pulmonary nodules decreased in size.  \par \par Her CT c/a/p from 2021 showed pulmonary nodules. While some are stable in size, there is mild increase in pulmonary nodule in the right upper lobe.  3 x 5 mm nodular focus of higher attenuation suggesting enhancement within a right renal cyst. This is slightly better seen on the current examination although may be exaggerated by volume averaging.  [de-identified] :  ER +  [place] +  PAX 8  +     MSI STABLE [de-identified] : Refuses COVID 19 vaccine  [FreeTextEntry1] :  Lenvima and Pembrolizumab [de-identified] : Nicole comes in for follow up while on dose reduced Lenvima and Pembrolizumab and in the interim, she had mammogram after which she is recommended to have repeat diagnostic mammogram in 6 months (May 2022) and saw her Endocrine who continues Synthroid at same dose but advised TFT check mid to end of Jan 2022.  She continues to have joint pains/discomfort at previous RT site although much more compared to the past.  She states she has had ringing in her ears for months along with changes to her hearing but states it does not bother her.  She had mild tongue tingling with Pembro infusion last cycle which worried her although it eventually self resolved.  She keeps active otherwise and denies any mouth sores, CP, palpitations, cough, LOMAS, n/v/d/c, abdominal pain, LE edema, vaginal discharge or bleeding, urinary symptoms, excessive bruising, dizziness, headaches, weight/appetite changes and tries to keeps active.  She has not yet had her dental procedures but states she spoke with pharmacy and they approved her to take her high dose Vitamin C.  \par \par \par

## 2022-01-25 NOTE — HISTORY OF PRESENT ILLNESS
[Disease: _____________________] : Disease: [unfilled] [T: ___] : T[unfilled] [N: ___] : N[unfilled] [M: ___] : M[unfilled] [AJCC Stage: ____] : AJCC Stage: [unfilled] [de-identified] : Referred by Dr. Gaytan\par \par Ms. Hoang is a 66 y/o G0 postmenopausal F who was referred to medical oncology for treatment considerations for recurrent endometrial cancer.\par She was initially diagnosed with stage II grade 2 endometrial cancer in 2018, she underwent underwent robotic assisted TLH BSO, bilateral pelvic and para-aortic sentinel LN dissection by Dr. Paras Grayson on 2018.  Her surgical pathology demonstrated pT2N0 disease with endometrial tumor measuring 4cm, FIGO 2 endometrioid adenocarcinoma, >90 myometrial invasion with involvement of cervical stroma, +LVI with IHC of MMR proteins with intact expression but pelvic wash negative for malignant cells.  On 10/2018, she completed pelvic IMRT and vaginal cuff brachytherapy with Dr. Bowling.  She did well clinically under surveillance until 2019 when she developed vaginal spotting and fluid discharge, and was evaluated by Dr. Grayson. CA-125 was 27. Concern for possible recurrence and further workup pursued.  Her 19 CT CAP demonstrate new bilateral pulmonary nodules, some with central cavitation, suspicious for metastatic disease, enhancing nodularity superior to the vaginal cuff concerning for metastatic disease, measuring 1.9x1.6cm.  On 10/30/19 she underwent thorascopic multiple RLL wedge resections with Dr. Weston and her surgical pathology c/w metastatic adenocarcinoma, consistent with patient's endometrial primary +ER [place] Pax8.  She was recommended further treatment for her recurrent endometrial cancer but the patient was lost to follow up until 2020 several months before which she has noticed increasing hardness and "lumps" in her vagina, which has been progressively more painful and burning sensation. She was subsequently evaluated by Dr. Gaytan 2020 and referred to medical oncology for systemic treatment evaluation.   On presentation for initial consult 2020, she continued to have vaginal discharge and on and off spotting (not more than 1 pad a day). She felt constipated. She was taking Percocet 5-325, Tylenol and ibuprofen for vaginal pain. She had been very busy with work and family affairs, was not able to come for cancer treatment over the past months. 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 \par \par \par Patient started on Lenvima and Pembrolizumab combination therapy on 3/17/21.  Given increasing vaginal pain and increased mass involving the vaginal cuff region, she underwent palliative RT to the vaginal mass and cuff area with Dr. Bowling. She had improved pain and had been able to tolerate Lenvima without significant toxicities. I reviewed my recommendations to continue current regimen with pembrolizumab and lenvima as lenvima had been on hold for several weeks due to difficulty tolerating treatment and pain control issues. \par \par CT c/a/p done on 2021 which showed favorable response to therapy. Left inguinal node decreased in size. Vaginal mass decreased in size. Pulmonary nodules decreased in size.  \par  [de-identified] :  ER +  [place] +  PAX 8  +     MSI STABLE [de-identified] : Refuses COVID 19 vaccine  [FreeTextEntry1] :  Lenvima and Pembrolizumab [de-identified] : Nicole comes in for follow up while on dose reduced Lenvima and Pembrolizumab and feels well overall.  Her CT c/a/p from 11/16/2021 showed pulmonary nodules. While some are stable in size, there is mild increase in pulmonary nodule in the right upper lobe.  3 x 5 mm nodular focus of higher attenuation suggesting enhancement within a right renal cyst. This is slightly better seen on the current examination although may be exaggerated by volume averaging. Continued follow-up with attention to this region in 6-12 months as clinically warranted.

## 2022-01-25 NOTE — RESULTS/DATA
[FreeTextEntry1] : EXAM: CT CHEST IC  EXAM: CT ABDOMEN AND PELVIS OC IC   PROCEDURE DATE: 11/16/2021    INTERPRETATION: CLINICAL INFORMATION: Endometrial carcinoma  COMPARISON: CT scan of the chest, abdomen and pelvis from 8/19/2021  CONTRAST/COMPLICATIONS: IV Contrast: Omnipaque 350 90 cc administered 10 cc discarded Oral Contrast: Omnipaque 300 Complications: None reported at time of study completion  PROCEDURE: CT of the Chest, Abdomen and Pelvis was performed. Sagittal and coronal reformats were performed.  FINDINGS: CHEST: LUNGS AND LARGE AIRWAYS: Patent central airways. The pulmonary nodules. Previously referenced nodule in the left upper lobe anteriorly and medially on series 2 image 30 measures 1.0 x 0.8 cm which has not significantly changed. There is a nodule in the right upper lobe posterior medially on series 2 image 29 measuring 1.2 x 1.3 cm which has increased from 1.2 x 0.8 cm. Other nodules appear stable in size. Irregular nodule in the left apex with cavitation measuring 1.8 x 1.3 cm on series 2 image #9 is stable in size although the degree of cavitation appears increased. PLEURA: No pleural effusion. VESSELS: Right-sided central line with its tip in the distal SVC. HEART: Heart size is normal. No pericardial effusion. MEDIASTINUM AND VIDAL: No lymphadenopathy. CHEST WALL AND LOWER NECK: Right anterior chest wall chemotherapy port.  ABDOMEN AND PELVIS: LIVER: Within normal limits. BILE DUCTS: Normal caliber. GALLBLADDER: Within normal limits. SPLEEN: Within normal limits. PANCREAS: 8 x 6 mm low-attenuation lesion in the pancreatic head on series 2 image 80 and 3 mm pancreatic cystic structure in the pancreatic tail on series 2 image 69 has not significantly changed. ADRENALS: Within normal limits. KIDNEYS/URETERS: Bilateral renal cysts. There is a cyst in the upper pole of the right kidney measuring up to 2.4 cm with a 5 x 3 mm enhancing focus in the upper pole on series 2 image 72. This is slightly better seen on the current examination although this may be exaggerated by volume averaging. There is a thin linear septation partially imaged within a cyst in the left lower pole on series 2 image 92 which is grossly stable.  BLADDER: Within normal limits. REPRODUCTIVE ORGANS: Status post hysterectomy with postsurgical changes. No discrete mass is identified.  BOWEL: No bowel obstruction. Appendix within normal limits. PERITONEUM: No ascites. VESSELS: Within normal limits. RETROPERITONEUM/LYMPH NODES: No lymphadenopathy. ABDOMINAL WALL: Within normal limits. BONES: Degenerative changes of bone and scoliosis.  IMPRESSION: Pulmonary nodules. While some are stable in size, there is mild increase in pulmonary nodule in the right upper lobe.  3 x 5 mm nodular focus of higher attenuation suggesting enhancement within a right renal cyst. This is slightly better seen on the current examination although may be exaggerated by volume averaging. Continued follow-up with attention to this region in 6-12 months as clinically warranted.  --- End of Report ---       BING CORMIER MD; Attending Radiologist This document has been electronically signed. Nov 20 2021 3:20PM

## 2022-01-25 NOTE — REVIEW OF SYSTEMS
[Fatigue] : fatigue [Negative] : Allergic/Immunologic [Dysuria] : dysuria [Vision Problems] : no vision problems [Chest Pain] : no chest pain [Lower Ext Edema] : no lower extremity edema [Cough] : no cough [Abdominal Pain] : no abdominal pain [Constipation] : no constipation [Diarrhea] : no diarrhea [Vaginal Discharge] : no vaginal discharge [Joint Pain] : no joint pain [Muscle Pain] : no muscle pain [Skin Rash] : no skin rash [Difficulty Walking] : no difficulty walking

## 2022-01-25 NOTE — PHYSICAL EXAM
[Restricted in physically strenuous activity but ambulatory and able to carry out work of a light or sedentary nature] : Status 1- Restricted in physically strenuous activity but ambulatory and able to carry out work of a light or sedentary nature, e.g., light house work, office work [Normal Female] : normal external genitalia, urethral meatus without lesions or discharge. Bladder non-distended, without tenderness. Vagina and cervix normal on visual inspection. On bimanual exam uterus, adnexa, parametria all normal without any discrete masses. [Normal] : affect appropriate [Mucositis] : no mucositis [Thrush] : no thrush [Vesicles] : no vesicles

## 2022-01-25 NOTE — HISTORY OF PRESENT ILLNESS
[Disease: _____________________] : Disease: [unfilled] [T: ___] : T[unfilled] [N: ___] : N[unfilled] [M: ___] : M[unfilled] [AJCC Stage: ____] : AJCC Stage: [unfilled] [de-identified] : Referred by Dr. Gaytan\par \par Ms. Hoang is a 64 y/o G0 postmenopausal F who was referred to medical oncology for treatment considerations for recurrent endometrial cancer.\par She was initially diagnosed with stage II grade 2 endometrial cancer in 2018, she underwent underwent robotic assisted TLH BSO, bilateral pelvic and para-aortic sentinel LN dissection by Dr. Paras Grayson on 2018.  Her surgical pathology demonstrated pT2N0 disease with endometrial tumor measuring 4cm, FIGO 2 endometrioid adenocarcinoma, >90 myometrial invasion with involvement of cervical stroma, +LVI with IHC of MMR proteins with intact expression but pelvic wash negative for malignant cells.  On 10/2018, she completed pelvic IMRT and vaginal cuff brachytherapy with Dr. Bowling.  She did well clinically under surveillance until 2019 when she developed vaginal spotting and fluid discharge, and was evaluated by Dr. Grayson. CA-125 was 27. Concern for possible recurrence and further workup pursued.  Her 19 CT CAP demonstrate new bilateral pulmonary nodules, some with central cavitation, suspicious for metastatic disease, enhancing nodularity superior to the vaginal cuff concerning for metastatic disease, measuring 1.9x1.6cm.  On 10/30/19 she underwent thorascopic multiple RLL wedge resections with Dr. Weston and her surgical pathology c/w metastatic adenocarcinoma, consistent with patient's endometrial primary +ER [place] Pax8.  She was recommended further treatment for her recurrent endometrial cancer but the patient was lost to follow up until 2020 several months before which she has noticed increasing hardness and "lumps" in her vagina, which has been progressively more painful and burning sensation. She was subsequently evaluated by Dr. Gaytan 2020 and referred to medical oncology for systemic treatment evaluation.   On presentation for initial consult 2020, she continued to have vaginal discharge and on and off spotting (not more than 1 pad a day). She felt constipated. She was taking Percocet 5-325, Tylenol and ibuprofen for vaginal pain. She had been very busy with work and family affairs, was not able to come for cancer treatment over the past months. 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 \par \par \par Patient started on Lenvima and Pembrolizumab combination therapy on 3/17/21.  Given increasing vaginal pain and increased mass involving the vaginal cuff region, she underwent palliative RT to the vaginal mass and cuff area with Dr. Bowling. She had improved pain and had been able to tolerate Lenvima without significant toxicities. I reviewed my recommendations to continue current regimen with pembrolizumab and lenvima as lenvima had been on hold for several weeks due to difficulty tolerating treatment and pain control issues. \par \par CT c/a/p done on 2021 which showed favorable response to therapy. Left inguinal node decreased in size. Vaginal mass decreased in size. Pulmonary nodules decreased in size.  \par \par Her CT c/a/p from 2021 showed pulmonary nodules. While some are stable in size, there is mild increase in pulmonary nodule in the right upper lobe.  3 x 5 mm nodular focus of higher attenuation suggesting enhancement within a right renal cyst. This is slightly better seen on the current examination although may be exaggerated by volume averaging.  [de-identified] :  ER +  [place] +  PAX 8  +     MSI STABLE [de-identified] : Refuses COVID 19 vaccine  [FreeTextEntry1] :  Lenvima and Pembrolizumab [de-identified] : Nicole comes in for follow up while on dose reduced Lenvima and Pembrolizumab and end of December was treated for presumed UTI (dysuria/odorous urine).  Her urinary symptoms are improving but she continues to have her chronic external burning/pruritus.  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  She admits to not taking Lenvima on Saturday nights and notes her joint pains are better on Sunday.  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  She otherwise feels well and denies worsening of her tinnitus and denies any mouth sores, CP, palpitations, cough, LOMAS, n/v/d/c, abdominal pain, LE edema, vaginal discharge or bleeding, excessive bruising, dizziness, headaches, arthralgias, myalgias, weight/appetite changes and tries to keeps active. \par \par \par \par \par \par \par \par ????premeds changed not\par ?dental procedures still not done\par -TFT check mid to end of Jan 2022.  \par ? joint pains/discomfort at previous RT site although much more compared to the past.  \par ?ringing in her ears for months along with changes to her hearing but states it does not bother her.  \par ??She had mild tongue tingling with Pembro infusion last cycle which worried her although it eventually self resolved.  \par ?She keeps active otherwise and denies any mouth sores, CP, palpitations, cough, LOMAS, n/v/d/c, abdominal pain, LE edema, vaginal discharge or bleeding, urinary symptoms, excessive bruising, dizziness, headaches, weight/appetite changes and tries to keeps active.  \par \par   -Continue Lenvima (10 mg QD) and Pembro at current doses, continues to tolerate well\par     overall without significant toxicities however will discuss tongue numbness/tingling from\par     last cycle with Dr Blank in case premed changes are advisable\par     \par      -Labs from last visit reviewed as well as CBC from today; f/u CMP and \par     -next CT scans in 3 cycles (Mid Jan 2022 - ordered today)\par     -Advised to hold Lenvima the day before and day of Dental procedure \par     -f/u Dr. Bowling in rad onc\par     -f/u Dr. Umana for pain control and symptoms management\par     -f/u Dr. Gaytan in gyn onc clinic \par     -RTC 3 weeks\par \par \par \par \par

## 2022-01-25 NOTE — PHYSICAL EXAM
[Restricted in physically strenuous activity but ambulatory and able to carry out work of a light or sedentary nature] : Status 1- Restricted in physically strenuous activity but ambulatory and able to carry out work of a light or sedentary nature, e.g., light house work, office work [Normal Female] : normal external genitalia, urethral meatus without lesions or discharge. Bladder non-distended, without tenderness. Vagina and cervix normal on visual inspection. On bimanual exam uterus, adnexa, parametria all normal without any discrete masses. [Normal] : affect appropriate [Mucositis] : no mucositis [Thrush] : no thrush [Vesicles] : no vesicles [de-identified] : mild faint erythema around umbilicus without discharge - improved compared to last visit

## 2022-01-25 NOTE — PHYSICAL EXAM
[Restricted in physically strenuous activity but ambulatory and able to carry out work of a light or sedentary nature] : Status 1- Restricted in physically strenuous activity but ambulatory and able to carry out work of a light or sedentary nature, e.g., light house work, office work [Normal Female] : normal external genitalia, urethral meatus without lesions or discharge. Bladder non-distended, without tenderness. Vagina and cervix normal on visual inspection. On bimanual exam uterus, adnexa, parametria all normal without any discrete masses. [Normal] : affect appropriate [Mucositis] : no mucositis [Thrush] : no thrush [Vesicles] : no vesicles [de-identified] : mild faint erythema around umbilicus without discharge - improved compared to last visit

## 2022-01-27 ENCOUNTER — RESULT REVIEW (OUTPATIENT)
Age: 67
End: 2022-01-27

## 2022-01-27 ENCOUNTER — APPOINTMENT (OUTPATIENT)
Dept: HEMATOLOGY ONCOLOGY | Facility: CLINIC | Age: 67
End: 2022-01-27
Payer: MEDICARE

## 2022-01-27 ENCOUNTER — APPOINTMENT (OUTPATIENT)
Dept: INFUSION THERAPY | Facility: HOSPITAL | Age: 67
End: 2022-01-27

## 2022-01-27 ENCOUNTER — NON-APPOINTMENT (OUTPATIENT)
Age: 67
End: 2022-01-27

## 2022-01-27 VITALS
OXYGEN SATURATION: 99 % | TEMPERATURE: 97.3 F | RESPIRATION RATE: 16 BRPM | SYSTOLIC BLOOD PRESSURE: 123 MMHG | BODY MASS INDEX: 23.1 KG/M2 | DIASTOLIC BLOOD PRESSURE: 77 MMHG | WEIGHT: 122.33 LBS | HEIGHT: 61 IN | HEART RATE: 108 BPM

## 2022-01-27 LAB
ALBUMIN SERPL ELPH-MCNC: 4.3 G/DL — SIGNIFICANT CHANGE UP (ref 3.3–5)
ALP SERPL-CCNC: 98 U/L — SIGNIFICANT CHANGE UP (ref 40–120)
ALT FLD-CCNC: 19 U/L — SIGNIFICANT CHANGE UP (ref 10–45)
ANION GAP SERPL CALC-SCNC: 14 MMOL/L — SIGNIFICANT CHANGE UP (ref 5–17)
AST SERPL-CCNC: 17 U/L — SIGNIFICANT CHANGE UP (ref 10–40)
BILIRUB SERPL-MCNC: 0.4 MG/DL — SIGNIFICANT CHANGE UP (ref 0.2–1.2)
BUN SERPL-MCNC: 34 MG/DL — HIGH (ref 7–23)
CALCIUM SERPL-MCNC: 9.8 MG/DL — SIGNIFICANT CHANGE UP (ref 8.4–10.5)
CANCER AG125 SERPL-ACNC: 6 U/ML — SIGNIFICANT CHANGE UP
CHLORIDE SERPL-SCNC: 102 MMOL/L — SIGNIFICANT CHANGE UP (ref 96–108)
CO2 SERPL-SCNC: 24 MMOL/L — SIGNIFICANT CHANGE UP (ref 22–31)
CREAT SERPL-MCNC: 0.91 MG/DL — SIGNIFICANT CHANGE UP (ref 0.5–1.3)
GLUCOSE SERPL-MCNC: 107 MG/DL — HIGH (ref 70–99)
HCT VFR BLD CALC: 42.2 % — SIGNIFICANT CHANGE UP (ref 34.5–45)
HGB BLD-MCNC: 14.3 G/DL — SIGNIFICANT CHANGE UP (ref 11.5–15.5)
MAGNESIUM SERPL-MCNC: 2.2 MG/DL — SIGNIFICANT CHANGE UP (ref 1.6–2.6)
MCHC RBC-ENTMCNC: 33.4 PG — SIGNIFICANT CHANGE UP (ref 27–34)
MCHC RBC-ENTMCNC: 33.9 G/DL — SIGNIFICANT CHANGE UP (ref 32–36)
MCV RBC AUTO: 98.6 FL — SIGNIFICANT CHANGE UP (ref 80–100)
PLATELET # BLD AUTO: 244 K/UL — SIGNIFICANT CHANGE UP (ref 150–400)
POTASSIUM SERPL-MCNC: 4.8 MMOL/L — SIGNIFICANT CHANGE UP (ref 3.5–5.3)
POTASSIUM SERPL-SCNC: 4.8 MMOL/L — SIGNIFICANT CHANGE UP (ref 3.5–5.3)
PROT SERPL-MCNC: 7.2 G/DL — SIGNIFICANT CHANGE UP (ref 6–8.3)
RBC # BLD: 4.28 M/UL — SIGNIFICANT CHANGE UP (ref 3.8–5.2)
RBC # FLD: 11.9 % — SIGNIFICANT CHANGE UP (ref 10.3–14.5)
SODIUM SERPL-SCNC: 139 MMOL/L — SIGNIFICANT CHANGE UP (ref 135–145)
T4 FREE+ TSH PNL SERPL: 1.6 UIU/ML — SIGNIFICANT CHANGE UP (ref 0.27–4.2)
WBC # BLD: 6.05 K/UL — SIGNIFICANT CHANGE UP (ref 3.8–10.5)
WBC # FLD AUTO: 6.05 K/UL — SIGNIFICANT CHANGE UP (ref 3.8–10.5)

## 2022-01-27 PROCEDURE — 99214 OFFICE O/P EST MOD 30 MIN: CPT

## 2022-01-29 ENCOUNTER — OUTPATIENT (OUTPATIENT)
Dept: OUTPATIENT SERVICES | Facility: HOSPITAL | Age: 67
LOS: 1 days | Discharge: HOME | End: 2022-01-29

## 2022-01-29 DIAGNOSIS — Z98.890 OTHER SPECIFIED POSTPROCEDURAL STATES: Chronic | ICD-10-CM

## 2022-01-29 DIAGNOSIS — Z90.710 ACQUIRED ABSENCE OF BOTH CERVIX AND UTERUS: Chronic | ICD-10-CM

## 2022-01-29 DIAGNOSIS — C54.1 MALIGNANT NEOPLASM OF ENDOMETRIUM: ICD-10-CM

## 2022-02-02 ENCOUNTER — APPOINTMENT (OUTPATIENT)
Dept: INFUSION THERAPY | Facility: HOSPITAL | Age: 67
End: 2022-02-02

## 2022-02-03 DIAGNOSIS — R11.2 NAUSEA WITH VOMITING, UNSPECIFIED: ICD-10-CM

## 2022-02-03 DIAGNOSIS — Z51.11 ENCOUNTER FOR ANTINEOPLASTIC CHEMOTHERAPY: ICD-10-CM

## 2022-02-14 ENCOUNTER — RESULT REVIEW (OUTPATIENT)
Age: 67
End: 2022-02-14

## 2022-02-14 ENCOUNTER — APPOINTMENT (OUTPATIENT)
Dept: INFUSION THERAPY | Facility: HOSPITAL | Age: 67
End: 2022-02-14

## 2022-02-14 ENCOUNTER — APPOINTMENT (OUTPATIENT)
Dept: HEMATOLOGY ONCOLOGY | Facility: CLINIC | Age: 67
End: 2022-02-14
Payer: MEDICARE

## 2022-02-14 VITALS
HEART RATE: 85 BPM | HEIGHT: 60.71 IN | RESPIRATION RATE: 16 BRPM | TEMPERATURE: 97.3 F | WEIGHT: 120.37 LBS | BODY MASS INDEX: 23.02 KG/M2 | DIASTOLIC BLOOD PRESSURE: 81 MMHG | OXYGEN SATURATION: 99 % | SYSTOLIC BLOOD PRESSURE: 135 MMHG

## 2022-02-14 LAB
ALBUMIN SERPL ELPH-MCNC: 4.2 G/DL — SIGNIFICANT CHANGE UP (ref 3.3–5)
ALP SERPL-CCNC: 111 U/L — SIGNIFICANT CHANGE UP (ref 40–120)
ALT FLD-CCNC: 26 U/L — SIGNIFICANT CHANGE UP (ref 10–45)
ANION GAP SERPL CALC-SCNC: 14 MMOL/L — SIGNIFICANT CHANGE UP (ref 5–17)
AST SERPL-CCNC: 23 U/L — SIGNIFICANT CHANGE UP (ref 10–40)
BILIRUB SERPL-MCNC: 0.4 MG/DL — SIGNIFICANT CHANGE UP (ref 0.2–1.2)
BUN SERPL-MCNC: 23 MG/DL — SIGNIFICANT CHANGE UP (ref 7–23)
CALCIUM SERPL-MCNC: 9.8 MG/DL — SIGNIFICANT CHANGE UP (ref 8.4–10.5)
CANCER AG125 SERPL-ACNC: 5 U/ML — SIGNIFICANT CHANGE UP
CHLORIDE SERPL-SCNC: 106 MMOL/L — SIGNIFICANT CHANGE UP (ref 96–108)
CO2 SERPL-SCNC: 22 MMOL/L — SIGNIFICANT CHANGE UP (ref 22–31)
CREAT SERPL-MCNC: 0.83 MG/DL — SIGNIFICANT CHANGE UP (ref 0.5–1.3)
GLUCOSE SERPL-MCNC: 89 MG/DL — SIGNIFICANT CHANGE UP (ref 70–99)
HCT VFR BLD CALC: 40.6 % — SIGNIFICANT CHANGE UP (ref 34.5–45)
HGB BLD-MCNC: 13.8 G/DL — SIGNIFICANT CHANGE UP (ref 11.5–15.5)
MAGNESIUM SERPL-MCNC: 1.8 MG/DL — SIGNIFICANT CHANGE UP (ref 1.6–2.6)
MCHC RBC-ENTMCNC: 32.9 PG — SIGNIFICANT CHANGE UP (ref 27–34)
MCHC RBC-ENTMCNC: 34 G/DL — SIGNIFICANT CHANGE UP (ref 32–36)
MCV RBC AUTO: 96.9 FL — SIGNIFICANT CHANGE UP (ref 80–100)
PLATELET # BLD AUTO: 189 K/UL — SIGNIFICANT CHANGE UP (ref 150–400)
POTASSIUM SERPL-MCNC: 4.5 MMOL/L — SIGNIFICANT CHANGE UP (ref 3.5–5.3)
POTASSIUM SERPL-SCNC: 4.5 MMOL/L — SIGNIFICANT CHANGE UP (ref 3.5–5.3)
PROT SERPL-MCNC: 6.8 G/DL — SIGNIFICANT CHANGE UP (ref 6–8.3)
RBC # BLD: 4.19 M/UL — SIGNIFICANT CHANGE UP (ref 3.8–5.2)
RBC # FLD: 12.1 % — SIGNIFICANT CHANGE UP (ref 10.3–14.5)
SODIUM SERPL-SCNC: 142 MMOL/L — SIGNIFICANT CHANGE UP (ref 135–145)
T4 FREE+ TSH PNL SERPL: 2.07 UIU/ML — SIGNIFICANT CHANGE UP (ref 0.27–4.2)
WBC # BLD: 4.92 K/UL — SIGNIFICANT CHANGE UP (ref 3.8–10.5)
WBC # FLD AUTO: 4.92 K/UL — SIGNIFICANT CHANGE UP (ref 3.8–10.5)

## 2022-02-14 PROCEDURE — 99213 OFFICE O/P EST LOW 20 MIN: CPT

## 2022-02-14 RX ORDER — NITROFURANTOIN MACROCRYSTALS 100 MG/1
100 CAPSULE ORAL
Qty: 20 | Refills: 0 | Status: DISCONTINUED | COMMUNITY
Start: 2021-12-30 | End: 2022-02-14

## 2022-02-15 ENCOUNTER — NON-APPOINTMENT (OUTPATIENT)
Age: 67
End: 2022-02-15

## 2022-02-23 ENCOUNTER — RESULT REVIEW (OUTPATIENT)
Age: 67
End: 2022-02-23

## 2022-02-23 ENCOUNTER — APPOINTMENT (OUTPATIENT)
Dept: INFUSION THERAPY | Facility: HOSPITAL | Age: 67
End: 2022-02-23

## 2022-02-23 LAB
C PEPTIDE SERPL-MCNC: 4.3 NG/ML — SIGNIFICANT CHANGE UP (ref 1.1–4.4)
GLUCOSE P FAST SERPL-MCNC: 95 MG/DL — SIGNIFICANT CHANGE UP (ref 70–99)
INSULIN SERPL-MCNC: 16.6 UU/ML — SIGNIFICANT CHANGE UP (ref 2.6–24.9)
T3FREE SERPL-MCNC: 2.54 PG/ML — SIGNIFICANT CHANGE UP (ref 1.8–4.6)
T4 FREE SERPL-MCNC: 1.4 NG/DL — SIGNIFICANT CHANGE UP (ref 0.9–1.8)
TSH SERPL-MCNC: 2.52 UIU/ML — SIGNIFICANT CHANGE UP (ref 0.27–4.2)

## 2022-02-24 LAB — SARS-COV-2 RNA SPEC QL NAA+PROBE: SIGNIFICANT CHANGE UP

## 2022-02-27 LAB — ISLET CELL512 AB SER-ACNC: SIGNIFICANT CHANGE UP

## 2022-02-28 LAB
GLIADIN PEPTIDE IGA SER-ACNC: 5.3 UNITS — SIGNIFICANT CHANGE UP
GLIADIN PEPTIDE IGA SER-ACNC: NEGATIVE — SIGNIFICANT CHANGE UP
GLIADIN PEPTIDE IGG SER-ACNC: <5 UNITS — SIGNIFICANT CHANGE UP
GLIADIN PEPTIDE IGG SER-ACNC: NEGATIVE — SIGNIFICANT CHANGE UP

## 2022-03-01 ENCOUNTER — OUTPATIENT (OUTPATIENT)
Dept: OUTPATIENT SERVICES | Facility: HOSPITAL | Age: 67
LOS: 1 days | End: 2022-03-01
Payer: MEDICARE

## 2022-03-01 ENCOUNTER — APPOINTMENT (OUTPATIENT)
Dept: CT IMAGING | Facility: IMAGING CENTER | Age: 67
End: 2022-03-01
Payer: MEDICARE

## 2022-03-01 DIAGNOSIS — C54.1 MALIGNANT NEOPLASM OF ENDOMETRIUM: ICD-10-CM

## 2022-03-01 DIAGNOSIS — Z98.890 OTHER SPECIFIED POSTPROCEDURAL STATES: Chronic | ICD-10-CM

## 2022-03-01 DIAGNOSIS — Z90.710 ACQUIRED ABSENCE OF BOTH CERVIX AND UTERUS: Chronic | ICD-10-CM

## 2022-03-01 PROCEDURE — 74177 CT ABD & PELVIS W/CONTRAST: CPT | Mod: 26,MG

## 2022-03-01 PROCEDURE — G1004: CPT

## 2022-03-01 PROCEDURE — 74177 CT ABD & PELVIS W/CONTRAST: CPT | Mod: MG

## 2022-03-01 PROCEDURE — 71260 CT THORAX DX C+: CPT | Mod: ME

## 2022-03-01 PROCEDURE — 71260 CT THORAX DX C+: CPT | Mod: 26,ME

## 2022-03-02 ENCOUNTER — NON-APPOINTMENT (OUTPATIENT)
Age: 67
End: 2022-03-02

## 2022-03-04 LAB — ZINC TRANSPORTER 8 AB, RESULT: <15 U/ML — SIGNIFICANT CHANGE UP

## 2022-03-07 ENCOUNTER — OUTPATIENT (OUTPATIENT)
Dept: OUTPATIENT SERVICES | Facility: HOSPITAL | Age: 67
LOS: 1 days | Discharge: HOME | End: 2022-03-07

## 2022-03-07 ENCOUNTER — NON-APPOINTMENT (OUTPATIENT)
Age: 67
End: 2022-03-07

## 2022-03-07 DIAGNOSIS — Z98.890 OTHER SPECIFIED POSTPROCEDURAL STATES: Chronic | ICD-10-CM

## 2022-03-07 DIAGNOSIS — Z90.710 ACQUIRED ABSENCE OF BOTH CERVIX AND UTERUS: Chronic | ICD-10-CM

## 2022-03-07 DIAGNOSIS — C54.1 MALIGNANT NEOPLASM OF ENDOMETRIUM: ICD-10-CM

## 2022-03-07 NOTE — REVIEW OF SYSTEMS
[Negative] : Allergic/Immunologic [Dysuria] : dysuria [Fatigue] : fatigue [Vision Problems] : no vision problems [Chest Pain] : no chest pain [Lower Ext Edema] : no lower extremity edema [Cough] : no cough [Constipation] : no constipation [Diarrhea] : no diarrhea [Vaginal Discharge] : no vaginal discharge [Joint Pain] : no joint pain [Muscle Pain] : no muscle pain [Skin Rash] : no skin rash [Difficulty Walking] : no difficulty walking [FreeTextEntry7] : nausea secondary to valsartan

## 2022-03-07 NOTE — ASSESSMENT
[Palliative] : Goals of care discussed with patient: Palliative [Palliative Care Plan] : not applicable at this time [FreeTextEntry1] : \par \par

## 2022-03-07 NOTE — ADDENDUM
[FreeTextEntry1] : I, Jaida Deena, acted solely as a scribe for Dr. Toy Blank on 01/27/2022. All medical entries made by the Scribe were at my, Dr. Toy Blank's, direction and personally dictated by me on 01/27/2022. I have reviewed the chart and agree that the record accurately reflects my personal performance of the history, physical exam, assessment and plan. I have also personally directed, reviewed, and agreed with the chart.

## 2022-03-09 ENCOUNTER — APPOINTMENT (OUTPATIENT)
Dept: INFUSION THERAPY | Facility: HOSPITAL | Age: 67
End: 2022-03-09

## 2022-03-09 ENCOUNTER — RESULT REVIEW (OUTPATIENT)
Age: 67
End: 2022-03-09

## 2022-03-09 ENCOUNTER — APPOINTMENT (OUTPATIENT)
Dept: HEMATOLOGY ONCOLOGY | Facility: CLINIC | Age: 67
End: 2022-03-09
Payer: MEDICARE

## 2022-03-09 VITALS
SYSTOLIC BLOOD PRESSURE: 136 MMHG | HEART RATE: 105 BPM | OXYGEN SATURATION: 99 % | TEMPERATURE: 97.9 F | WEIGHT: 121.01 LBS | BODY MASS INDEX: 22.56 KG/M2 | DIASTOLIC BLOOD PRESSURE: 89 MMHG | HEIGHT: 61.42 IN | RESPIRATION RATE: 16 BRPM

## 2022-03-09 LAB
ALBUMIN SERPL ELPH-MCNC: 4.2 G/DL — SIGNIFICANT CHANGE UP (ref 3.3–5)
ALP SERPL-CCNC: 102 U/L — SIGNIFICANT CHANGE UP (ref 40–120)
ALT FLD-CCNC: 28 U/L — SIGNIFICANT CHANGE UP (ref 10–45)
ANION GAP SERPL CALC-SCNC: 14 MMOL/L — SIGNIFICANT CHANGE UP (ref 5–17)
AST SERPL-CCNC: 27 U/L — SIGNIFICANT CHANGE UP (ref 10–40)
BILIRUB SERPL-MCNC: 0.4 MG/DL — SIGNIFICANT CHANGE UP (ref 0.2–1.2)
BUN SERPL-MCNC: 18 MG/DL — SIGNIFICANT CHANGE UP (ref 7–23)
CALCIUM SERPL-MCNC: 9.4 MG/DL — SIGNIFICANT CHANGE UP (ref 8.4–10.5)
CANCER AG125 SERPL-ACNC: 5 U/ML — SIGNIFICANT CHANGE UP
CHLORIDE SERPL-SCNC: 102 MMOL/L — SIGNIFICANT CHANGE UP (ref 96–108)
CO2 SERPL-SCNC: 22 MMOL/L — SIGNIFICANT CHANGE UP (ref 22–31)
CREAT SERPL-MCNC: 0.87 MG/DL — SIGNIFICANT CHANGE UP (ref 0.5–1.3)
EGFR: 73 ML/MIN/1.73M2 — SIGNIFICANT CHANGE UP
GLUCOSE SERPL-MCNC: 91 MG/DL — SIGNIFICANT CHANGE UP (ref 70–99)
HCT VFR BLD CALC: 40.9 % — SIGNIFICANT CHANGE UP (ref 34.5–45)
HGB BLD-MCNC: 13.9 G/DL — SIGNIFICANT CHANGE UP (ref 11.5–15.5)
MAGNESIUM SERPL-MCNC: 1.9 MG/DL — SIGNIFICANT CHANGE UP (ref 1.6–2.6)
MCHC RBC-ENTMCNC: 32.9 PG — SIGNIFICANT CHANGE UP (ref 27–34)
MCHC RBC-ENTMCNC: 34 G/DL — SIGNIFICANT CHANGE UP (ref 32–36)
MCV RBC AUTO: 96.7 FL — SIGNIFICANT CHANGE UP (ref 80–100)
PLATELET # BLD AUTO: 205 K/UL — SIGNIFICANT CHANGE UP (ref 150–400)
POTASSIUM SERPL-MCNC: 4.3 MMOL/L — SIGNIFICANT CHANGE UP (ref 3.5–5.3)
POTASSIUM SERPL-SCNC: 4.3 MMOL/L — SIGNIFICANT CHANGE UP (ref 3.5–5.3)
PROT SERPL-MCNC: 7.1 G/DL — SIGNIFICANT CHANGE UP (ref 6–8.3)
RBC # BLD: 4.23 M/UL — SIGNIFICANT CHANGE UP (ref 3.8–5.2)
RBC # FLD: 12.9 % — SIGNIFICANT CHANGE UP (ref 10.3–14.5)
SODIUM SERPL-SCNC: 139 MMOL/L — SIGNIFICANT CHANGE UP (ref 135–145)
T4 FREE+ TSH PNL SERPL: 9.19 UIU/ML — HIGH (ref 0.27–4.2)
WBC # BLD: 5.68 K/UL — SIGNIFICANT CHANGE UP (ref 3.8–10.5)
WBC # FLD AUTO: 5.68 K/UL — SIGNIFICANT CHANGE UP (ref 3.8–10.5)

## 2022-03-09 PROCEDURE — 99213 OFFICE O/P EST LOW 20 MIN: CPT

## 2022-03-10 ENCOUNTER — NON-APPOINTMENT (OUTPATIENT)
Age: 67
End: 2022-03-10

## 2022-03-11 ENCOUNTER — APPOINTMENT (OUTPATIENT)
Dept: ENDOCRINOLOGY | Facility: CLINIC | Age: 67
End: 2022-03-11
Payer: MEDICARE

## 2022-03-11 VITALS
HEIGHT: 61.42 IN | HEART RATE: 88 BPM | TEMPERATURE: 98 F | BODY MASS INDEX: 22.18 KG/M2 | OXYGEN SATURATION: 98 % | WEIGHT: 119 LBS | DIASTOLIC BLOOD PRESSURE: 84 MMHG | RESPIRATION RATE: 16 BRPM | SYSTOLIC BLOOD PRESSURE: 126 MMHG

## 2022-03-11 PROCEDURE — 99214 OFFICE O/P EST MOD 30 MIN: CPT

## 2022-03-11 NOTE — HISTORY OF PRESENT ILLNESS
[FreeTextEntry1] : 66 year female  f/u for hypothyroidism management. \par \par *** Mar 11, 2022 ***\par \par feels well, no new c/o\par on  Synthroid 75 mcg. remains on Keytruda and Lenvima- tolerates well\par labs from 3/9/22- TSH- 9.19 with FT4- 1.1. \par labs from 2/23/22- TSH- 2.52\par per patient, started taking "supplement powder" and "a collagen drink" about 2 weeks ago\par \par *** Dec 10, 2021 ***\par \par feels great. good energy level. still on  Keytruda and Lenvima\par remains on Synthroid 75 mcg\par TSH- 3.5, FT4- 1.4\par prior pit panel wnl- am cortisol 13, prolactin 15, IGF- 196\par \par *** Sep 10, 2021 ***\par \par on Synthroid 75 mcg. feels much better. Good energy, no fatigue. \par still on Keytruda and Lenvima\par Thyroid US (8/12/21)- heterogenous thyroid, no nodules\par TSH- 2.19\par am cortisol- 13, rest pending\par \par HPI:\par Ms. VICENTE  was diagnosed with endometrial cancer in 2018. She underwent ELISEO-BSO same year , followed by a pelvic IMRT. In 2019, she was found mts to lungs and pelvic LN. She completed another course of radiation therapy and recently started on Keytruda and Lenvima about 4 months ago. Her TSH early this month returned as 28.1, and she was started on Synthroid 25 mcg about 3 months ago. Her last TSH from yesterday was- 56.8 with FT4- 0.8 \par Her blood pressure was found to be elevated past 3 weeks, and she's complaining of some fatigue. \par Denies family history of thyroid cancer or history of radiation exposure to head and neck area in a childhood.\par

## 2022-03-11 NOTE — ASSESSMENT
[FreeTextEntry1] : Acquired hypothyroidism, secondary to use of check point and TK inhibitors\par - unclear why such a drastic TSH change within 2 weeks. Possibly related to the new supplements\par - hold all new supplements for 2 weeks and retest thyroid panel\par - cont Synthroid 75 mcg for now\par - periodical screen for pituitary deficiencies, type 1 diabetes, adrenal insufficiency. \par RTC 3 mos, or sooner prn. Will touch base after the next blood work\par \par

## 2022-03-16 ENCOUNTER — RESULT REVIEW (OUTPATIENT)
Age: 67
End: 2022-03-16

## 2022-03-16 ENCOUNTER — APPOINTMENT (OUTPATIENT)
Dept: INFUSION THERAPY | Facility: HOSPITAL | Age: 67
End: 2022-03-16

## 2022-03-16 DIAGNOSIS — Z51.11 ENCOUNTER FOR ANTINEOPLASTIC CHEMOTHERAPY: ICD-10-CM

## 2022-03-16 DIAGNOSIS — R11.2 NAUSEA WITH VOMITING, UNSPECIFIED: ICD-10-CM

## 2022-03-16 LAB
THYROGLOB AB FLD IA-ACNC: <20 IU/ML — SIGNIFICANT CHANGE UP
THYROGLOB AB SERPL-ACNC: <20 IU/ML — SIGNIFICANT CHANGE UP
THYROPEROXIDASE AB SERPL-ACNC: 14 IU/ML — SIGNIFICANT CHANGE UP
TSH SERPL-MCNC: 9.43 UIU/ML — HIGH (ref 0.27–4.2)

## 2022-03-17 ENCOUNTER — NON-APPOINTMENT (OUTPATIENT)
Age: 67
End: 2022-03-17

## 2022-03-22 NOTE — HISTORY OF PRESENT ILLNESS
[Disease: _____________________] : Disease: [unfilled] [T: ___] : T[unfilled] [N: ___] : N[unfilled] [M: ___] : M[unfilled] [AJCC Stage: ____] : AJCC Stage: [unfilled] [de-identified] : Referred by Dr. Gaytan\par \par Ms. Hoang is a 67 y/o G0 postmenopausal F who was referred to medical oncology for treatment considerations for recurrent endometrial cancer.\par She was initially diagnosed with stage II grade 2 endometrial cancer in 2018, she underwent underwent robotic assisted TLH BSO, bilateral pelvic and para-aortic sentinel LN dissection by Dr. Paras Grayson on 2018.  Her surgical pathology demonstrated pT2N0 disease with endometrial tumor measuring 4cm, FIGO 2 endometrioid adenocarcinoma, >90 myometrial invasion with involvement of cervical stroma, +LVI with IHC of MMR proteins with intact expression but pelvic wash negative for malignant cells.  On 10/2018, she completed pelvic IMRT and vaginal cuff brachytherapy with Dr. Bowling.  She did well clinically under surveillance until 2019 when she developed vaginal spotting and fluid discharge, and was evaluated by Dr. Grayson. CA-125 was 27. Concern for possible recurrence and further workup pursued.  Her 19 CT CAP demonstrate new bilateral pulmonary nodules, some with central cavitation, suspicious for metastatic disease, enhancing nodularity superior to the vaginal cuff concerning for metastatic disease, measuring 1.9x1.6cm.  On 10/30/19 she underwent thorascopic multiple RLL wedge resections with Dr. Weston and her surgical pathology c/w metastatic adenocarcinoma, consistent with patient's endometrial primary +ER [place] Pax8.  She was recommended further treatment for her recurrent endometrial cancer but the patient was lost to follow up until 2020 several months before which she has noticed increasing hardness and "lumps" in her vagina, which has been progressively more painful and burning sensation. She was subsequently evaluated by Dr. Gaytan 2020 and referred to medical oncology for systemic treatment evaluation.   On presentation for initial consult 2020, she continued to have vaginal discharge and on and off spotting (not more than 1 pad a day). She felt constipated. She was taking Percocet 5-325, Tylenol and ibuprofen for vaginal pain. She had been very busy with work and family affairs, was not able to come for cancer treatment over the past months. 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 \par \par \par Patient started on Lenvima and Pembrolizumab combination therapy on 3/17/21.  Given increasing vaginal pain and increased mass involving the vaginal cuff region, she underwent palliative RT to the vaginal mass and cuff area with Dr. Bowling. She had improved pain and had been able to tolerate Lenvima without significant toxicities. I reviewed my recommendations to continue current regimen with pembrolizumab and lenvima as lenvima had been on hold for several weeks due to difficulty tolerating treatment and pain control issues. \par \par CT c/a/p done on 2021 which showed favorable response to therapy. Left inguinal node decreased in size. Vaginal mass decreased in size. Pulmonary nodules decreased in size.  \par \par Her CT c/a/p from 2021 showed pulmonary nodules. While some are stable in size, there is mild increase in pulmonary nodule in the right upper lobe.  3 x 5 mm nodular focus of higher attenuation suggesting enhancement within a right renal cyst. This is slightly better seen on the current examination although may be exaggerated by volume averaging.  [de-identified] :  ER +  [place] +  PAX 8  +     MSI STABLE [de-identified] : Refuses COVID 19 vaccine  [FreeTextEntry1] :  Lenvima and Pembrolizumab [de-identified] : Nicole comes in for follow up while on dose reduced Lenvima and Pembrolizumab.  She feels well today but states she had two weeks of general malaise along with palpitations/LOMAS with going up stairs which is at baseline.  She started working again and is happy with her schedul as she is working from home.  She reports compliance with Lenvima except for Saturday nights as she needs to be prepared for Y[employer] the next day.  She denies any mouth sores, CP, palpitations, cough, LOMAS, n/v/d/c, abdominal pain, LE edema, vaginal discharge or bleeding, urinary symptoms, excessive bruising, dizziness, headaches, arthralgias, myalgias, weight/appetite changes and tries to keeps active. \par \par \par \par \par \par \par \par

## 2022-03-22 NOTE — REVIEW OF SYSTEMS
[Fatigue] : fatigue [Negative] : Allergic/Immunologic [Vision Problems] : no vision problems [Chest Pain] : no chest pain [Lower Ext Edema] : no lower extremity edema [Cough] : no cough [Constipation] : no constipation [Diarrhea] : no diarrhea [Vaginal Discharge] : no vaginal discharge [Joint Pain] : no joint pain [Muscle Pain] : no muscle pain [Skin Rash] : no skin rash [Difficulty Walking] : no difficulty walking

## 2022-03-27 LAB — T4 FREE SERPL DIALY-MCNC: 1.5 NG/DL — SIGNIFICANT CHANGE UP

## 2022-03-28 NOTE — H&P PST ADULT - URINARY CATHETER
Pharmacy Progress Note - Telephone Encounter    S/O: Ms. Garry Quevedo 59 y.o. female, referred by Dr. Brii Zarate, [place], was contacted via an outbound telephone call to discuss cost of inhaler therapy today. Verified patients identifiers (name & ) per HIPAA policy.     - Inhaler tx was $300. Advair was alternative and cost $138 which was also too expesnive.      - Pt has Rx coverage thru [employer]; however, pt's card was not with her at the time. - Discussed cash price options of alternative inhalers. Tony Cyphers is a generic of Advair and costs $89 at [place] with the use of [facility]. A/P:  - Pt will send Rx insurance information via [employer] to this writer  - If there are no other cheaper alternatives, will utilize [employer]. - Patient endorses understanding to the provided information. All questions answered at this time. There are no discontinued medications. No orders of the defined types were placed in this encounter. Maria G Zhou, PharmD, [facility]  Clinical Pharmacist Specialist      For Pharmacy Admin Tracking Only     CPA in place:  Yes   Recommendation Provided To: Patient/Caregiver: 1 via Telephone   Intervention Detail: Patient Access Assistance/Sample Provided   Intervention Accepted By: Patient/Caregiver: 1   Time Spent (min): 30 no

## 2022-04-04 ENCOUNTER — RESULT REVIEW (OUTPATIENT)
Age: 67
End: 2022-04-04

## 2022-04-04 ENCOUNTER — APPOINTMENT (OUTPATIENT)
Dept: HEMATOLOGY ONCOLOGY | Facility: CLINIC | Age: 67
End: 2022-04-04
Payer: MEDICARE

## 2022-04-04 ENCOUNTER — APPOINTMENT (OUTPATIENT)
Dept: INFUSION THERAPY | Facility: HOSPITAL | Age: 67
End: 2022-04-04

## 2022-04-04 VITALS
DIASTOLIC BLOOD PRESSURE: 79 MMHG | TEMPERATURE: 97.7 F | BODY MASS INDEX: 22.04 KG/M2 | SYSTOLIC BLOOD PRESSURE: 128 MMHG | HEART RATE: 83 BPM | WEIGHT: 118.23 LBS | RESPIRATION RATE: 16 BRPM | OXYGEN SATURATION: 99 % | HEIGHT: 61.42 IN

## 2022-04-04 LAB
ALBUMIN SERPL ELPH-MCNC: 4.4 G/DL — SIGNIFICANT CHANGE UP (ref 3.3–5)
ALP SERPL-CCNC: 102 U/L — SIGNIFICANT CHANGE UP (ref 40–120)
ALT FLD-CCNC: 31 U/L — SIGNIFICANT CHANGE UP (ref 10–45)
ANION GAP SERPL CALC-SCNC: 13 MMOL/L — SIGNIFICANT CHANGE UP (ref 5–17)
AST SERPL-CCNC: 30 U/L — SIGNIFICANT CHANGE UP (ref 10–40)
BILIRUB SERPL-MCNC: 0.4 MG/DL — SIGNIFICANT CHANGE UP (ref 0.2–1.2)
BUN SERPL-MCNC: 23 MG/DL — SIGNIFICANT CHANGE UP (ref 7–23)
CALCIUM SERPL-MCNC: 9.5 MG/DL — SIGNIFICANT CHANGE UP (ref 8.4–10.5)
CANCER AG125 SERPL-ACNC: 5 U/ML — SIGNIFICANT CHANGE UP
CHLORIDE SERPL-SCNC: 104 MMOL/L — SIGNIFICANT CHANGE UP (ref 96–108)
CO2 SERPL-SCNC: 24 MMOL/L — SIGNIFICANT CHANGE UP (ref 22–31)
CREAT SERPL-MCNC: 0.76 MG/DL — SIGNIFICANT CHANGE UP (ref 0.5–1.3)
EGFR: 85 ML/MIN/1.73M2 — SIGNIFICANT CHANGE UP
GLUCOSE SERPL-MCNC: 81 MG/DL — SIGNIFICANT CHANGE UP (ref 70–99)
HCT VFR BLD CALC: 39.7 % — SIGNIFICANT CHANGE UP (ref 34.5–45)
HGB BLD-MCNC: 13.6 G/DL — SIGNIFICANT CHANGE UP (ref 11.5–15.5)
MAGNESIUM SERPL-MCNC: 2 MG/DL — SIGNIFICANT CHANGE UP (ref 1.6–2.6)
MCHC RBC-ENTMCNC: 33.3 PG — SIGNIFICANT CHANGE UP (ref 27–34)
MCHC RBC-ENTMCNC: 34.3 G/DL — SIGNIFICANT CHANGE UP (ref 32–36)
MCV RBC AUTO: 97.3 FL — SIGNIFICANT CHANGE UP (ref 80–100)
PLATELET # BLD AUTO: 205 K/UL — SIGNIFICANT CHANGE UP (ref 150–400)
POTASSIUM SERPL-MCNC: 4.5 MMOL/L — SIGNIFICANT CHANGE UP (ref 3.5–5.3)
POTASSIUM SERPL-SCNC: 4.5 MMOL/L — SIGNIFICANT CHANGE UP (ref 3.5–5.3)
PROT SERPL-MCNC: 7.1 G/DL — SIGNIFICANT CHANGE UP (ref 6–8.3)
RBC # BLD: 4.08 M/UL — SIGNIFICANT CHANGE UP (ref 3.8–5.2)
RBC # FLD: 13.2 % — SIGNIFICANT CHANGE UP (ref 10.3–14.5)
SODIUM SERPL-SCNC: 140 MMOL/L — SIGNIFICANT CHANGE UP (ref 135–145)
T4 FREE+ TSH PNL SERPL: 3.45 UIU/ML — SIGNIFICANT CHANGE UP (ref 0.27–4.2)
WBC # BLD: 5.61 K/UL — SIGNIFICANT CHANGE UP (ref 3.8–10.5)
WBC # FLD AUTO: 5.61 K/UL — SIGNIFICANT CHANGE UP (ref 3.8–10.5)

## 2022-04-04 PROCEDURE — 99214 OFFICE O/P EST MOD 30 MIN: CPT

## 2022-04-06 ENCOUNTER — APPOINTMENT (OUTPATIENT)
Dept: INFUSION THERAPY | Facility: HOSPITAL | Age: 67
End: 2022-04-06

## 2022-04-12 ENCOUNTER — OUTPATIENT (OUTPATIENT)
Dept: OUTPATIENT SERVICES | Facility: HOSPITAL | Age: 67
LOS: 1 days | Discharge: HOME | End: 2022-04-12

## 2022-04-12 DIAGNOSIS — Z98.890 OTHER SPECIFIED POSTPROCEDURAL STATES: Chronic | ICD-10-CM

## 2022-04-12 DIAGNOSIS — C54.1 MALIGNANT NEOPLASM OF ENDOMETRIUM: ICD-10-CM

## 2022-04-12 DIAGNOSIS — Z90.710 ACQUIRED ABSENCE OF BOTH CERVIX AND UTERUS: Chronic | ICD-10-CM

## 2022-04-14 ENCOUNTER — APPOINTMENT (OUTPATIENT)
Dept: GYNECOLOGIC ONCOLOGY | Facility: CLINIC | Age: 67
End: 2022-04-14
Payer: MEDICARE

## 2022-04-14 VITALS — HEART RATE: 104 BPM | SYSTOLIC BLOOD PRESSURE: 122 MMHG | DIASTOLIC BLOOD PRESSURE: 89 MMHG | OXYGEN SATURATION: 100 %

## 2022-04-14 PROCEDURE — 99213 OFFICE O/P EST LOW 20 MIN: CPT

## 2022-04-18 ENCOUNTER — RESULT REVIEW (OUTPATIENT)
Age: 67
End: 2022-04-18

## 2022-04-18 ENCOUNTER — APPOINTMENT (OUTPATIENT)
Dept: INFUSION THERAPY | Facility: HOSPITAL | Age: 67
End: 2022-04-18

## 2022-04-18 ENCOUNTER — APPOINTMENT (OUTPATIENT)
Dept: HEMATOLOGY ONCOLOGY | Facility: CLINIC | Age: 67
End: 2022-04-18
Payer: MEDICARE

## 2022-04-18 VITALS
DIASTOLIC BLOOD PRESSURE: 78 MMHG | HEIGHT: 61.42 IN | SYSTOLIC BLOOD PRESSURE: 111 MMHG | BODY MASS INDEX: 22.23 KG/M2 | WEIGHT: 119.27 LBS | TEMPERATURE: 97.4 F | OXYGEN SATURATION: 99 % | RESPIRATION RATE: 16 BRPM | HEART RATE: 100 BPM

## 2022-04-18 DIAGNOSIS — L21.9 SEBORRHEIC DERMATITIS, UNSPECIFIED: ICD-10-CM

## 2022-04-18 DIAGNOSIS — G62.0 DRUG-INDUCED POLYNEUROPATHY: ICD-10-CM

## 2022-04-18 DIAGNOSIS — R19.8 OTHER SPECIFIED SYMPTOMS AND SIGNS INVOLVING THE DIGESTIVE SYSTEM AND ABDOMEN: ICD-10-CM

## 2022-04-18 DIAGNOSIS — T45.1X5A DRUG-INDUCED POLYNEUROPATHY: ICD-10-CM

## 2022-04-18 LAB
HCT VFR BLD CALC: 37.4 % — SIGNIFICANT CHANGE UP (ref 34.5–45)
HGB BLD-MCNC: 12.8 G/DL — SIGNIFICANT CHANGE UP (ref 11.5–15.5)
MCHC RBC-ENTMCNC: 33.2 PG — SIGNIFICANT CHANGE UP (ref 27–34)
MCHC RBC-ENTMCNC: 34.2 G/DL — SIGNIFICANT CHANGE UP (ref 32–36)
MCV RBC AUTO: 96.9 FL — SIGNIFICANT CHANGE UP (ref 80–100)
PLATELET # BLD AUTO: 212 K/UL — SIGNIFICANT CHANGE UP (ref 150–400)
RBC # BLD: 3.86 M/UL — SIGNIFICANT CHANGE UP (ref 3.8–5.2)
RBC # FLD: 12.7 % — SIGNIFICANT CHANGE UP (ref 10.3–14.5)
WBC # BLD: 4.73 K/UL — SIGNIFICANT CHANGE UP (ref 3.8–10.5)
WBC # FLD AUTO: 4.73 K/UL — SIGNIFICANT CHANGE UP (ref 3.8–10.5)

## 2022-04-18 PROCEDURE — 99214 OFFICE O/P EST MOD 30 MIN: CPT

## 2022-04-19 DIAGNOSIS — R11.2 NAUSEA WITH VOMITING, UNSPECIFIED: ICD-10-CM

## 2022-04-19 DIAGNOSIS — Z51.11 ENCOUNTER FOR ANTINEOPLASTIC CHEMOTHERAPY: ICD-10-CM

## 2022-04-19 LAB
ALBUMIN SERPL ELPH-MCNC: 4.1 G/DL — SIGNIFICANT CHANGE UP (ref 3.3–5)
ALP SERPL-CCNC: 91 U/L — SIGNIFICANT CHANGE UP (ref 40–120)
ALT FLD-CCNC: 16 U/L — SIGNIFICANT CHANGE UP (ref 10–45)
ANION GAP SERPL CALC-SCNC: 17 MMOL/L — SIGNIFICANT CHANGE UP (ref 5–17)
AST SERPL-CCNC: 14 U/L — SIGNIFICANT CHANGE UP (ref 10–40)
BILIRUB SERPL-MCNC: 0.2 MG/DL — SIGNIFICANT CHANGE UP (ref 0.2–1.2)
BUN SERPL-MCNC: 24 MG/DL — HIGH (ref 7–23)
CALCIUM SERPL-MCNC: 9.3 MG/DL — SIGNIFICANT CHANGE UP (ref 8.4–10.5)
CANCER AG125 SERPL-ACNC: 6 U/ML — SIGNIFICANT CHANGE UP
CHLORIDE SERPL-SCNC: 101 MMOL/L — SIGNIFICANT CHANGE UP (ref 96–108)
CO2 SERPL-SCNC: 21 MMOL/L — LOW (ref 22–31)
CREAT SERPL-MCNC: 0.81 MG/DL — SIGNIFICANT CHANGE UP (ref 0.5–1.3)
EGFR: 79 ML/MIN/1.73M2 — SIGNIFICANT CHANGE UP
GLUCOSE SERPL-MCNC: 73 MG/DL — SIGNIFICANT CHANGE UP (ref 70–99)
MAGNESIUM SERPL-MCNC: 2 MG/DL — SIGNIFICANT CHANGE UP (ref 1.6–2.6)
POTASSIUM SERPL-MCNC: 4.2 MMOL/L — SIGNIFICANT CHANGE UP (ref 3.5–5.3)
POTASSIUM SERPL-SCNC: 4.2 MMOL/L — SIGNIFICANT CHANGE UP (ref 3.5–5.3)
PROT SERPL-MCNC: 6.6 G/DL — SIGNIFICANT CHANGE UP (ref 6–8.3)
SODIUM SERPL-SCNC: 140 MMOL/L — SIGNIFICANT CHANGE UP (ref 135–145)
T4 FREE+ TSH PNL SERPL: 1.17 UIU/ML — SIGNIFICANT CHANGE UP (ref 0.27–4.2)
T4 FREE+ TSH PNL SERPL: 1.24 UIU/ML — SIGNIFICANT CHANGE UP (ref 0.27–4.2)

## 2022-04-21 ENCOUNTER — APPOINTMENT (OUTPATIENT)
Dept: HEMATOLOGY ONCOLOGY | Facility: CLINIC | Age: 67
End: 2022-04-21

## 2022-04-21 ENCOUNTER — APPOINTMENT (OUTPATIENT)
Dept: INFUSION THERAPY | Facility: HOSPITAL | Age: 67
End: 2022-04-21

## 2022-04-22 NOTE — REASON FOR VISIT
Chief complaint:   Chief Complaint   Patient presents with   • Eye Problem     RM 4         HISTORY OF PRESENT ILLNESS     HPI     8-year-old male brought in by mom due to swelling of his left eye. Mom reports patient had bug bites to his left forehead, reports starting last night he has been having swelling of his left upper eyelid. He also reported he had \"bumped\" left eye, but unsure with what, [place]. He has not had any discharge, fever, and has not complained of pain or blurry vision.     Other significant problems:  Patient Active Problem List   Diagnosis   • Well child visit       PAST MEDICAL, FAMILY AND SOCIAL HISTORY     Medications:  Outpatient Encounter Prescriptions as of 8/9/2018   Medication Sig Dispense Refill   • diphenhydrAMINE (BENADRYL) 12.5 MG/5ML liquid Take by mouth 4 times daily as needed for Allergies.     • ibuprofen (MOTRIN) 100 MG/5ML suspension Take  by mouth every 8 hours as needed.     • clindamycin (CLEOCIN) 75 MG/5ML solution Take 22.5 mLs by mouth 3 times daily for 10 days. 100 mL 0   • erythromycin (ILOTYCIN) ophthalmic ointment Place into left eye 4 times daily. 3.5 g 0   • hydroCORTisone (CORTIZONE) 1 % cream Apply topically 3 times daily. 30 g 0     No facility-administered encounter medications on file as of 8/9/2018.        Allergies:  ALLERGIES: no known allergies.    Past Medical History/Surgeries:  Past Medical History:   Diagnosis Date   • Tongue tied        History reviewed. No pertinent surgical history.    Family History:  No family history on file.    Social History:  Social History     Social History   • Marital status: Single     Spouse name: N/A   • Number of children: N/A   • Years of education: N/A     Occupational History   • Not on file.     Social History Main Topics   • Smoking status: Never Smoker   • Smokeless tobacco: Never Used   • Alcohol use Not on file   • Drug use: Unknown   • Sexual activity: Not on file     Other Topics  Concern   • Not on file     Social History Narrative   • No narrative on file       REVIEW OF SYSTEMS   Review of Systems  Per HPI.     PHYSICAL EXAM   Pulse 88, temperature 97.8 °F (36.6 °C), temperature source Oral, resp. rate 18, height 4' 8.5\" (1.435 m), weight 33.7 kg, SpO2 98 %.  Body mass index is 16.36 kg/m².  Physical Exam   Constitutional: He appears well-developed and well-nourished. He is active.   HENT:   Mouth/Throat: Oropharynx is clear.   Eyes: Pupils are equal, round, and reactive to light. Conjunctivae and EOM are normal. Right eye exhibits no edema, no erythema and no tenderness. Left eye exhibits no discharge and no tenderness. Periorbital edema and erythema (upper lid/periorbital erythema, edema mild warmth. No tenderness. No pain with eye movement.  EOMs intact. PERRLA. No discharge, no conjunctival injection) present on the left side.       Pulmonary/Chest: Effort normal and breath sounds normal. He has no wheezes. He has no rhonchi.   Neurological: He is alert.   Skin: Skin is warm and dry. He is not diaphoretic.       ASSESSMENT/PLAN   Anjel was seen today for eye problem.    Diagnoses and all orders for this visit:    Preseptal cellulitis  -     clindamycin (CLEOCIN) 75 MG/5ML solution; Take 22.5 mLs by mouth 3 times daily for 10 days.  -     erythromycin (ILOTYCIN) ophthalmic ointment; Place into left eye 4 times daily.    - Take antibiotics as prescribed  -Take children's Tylenol or Motrin for pain as needed.  -Do not rub your eyes, keep area clean.   -Go to hospital if your child  develop severe eye pain, decreased vision or blurry vision, fever, redness and pain around the eye getting worse, pain with eye movement, any worsening symptoms [facility] as he may need CT scan, IV antibiotics. Otherwise follow up with PCP this or early next week.     She Christine MD           [Follow-Up Visit] : a follow-up [FreeTextEntry2] : Recurrent Endometrial Cancer

## 2022-04-22 NOTE — REVIEW OF SYSTEMS
[Fatigue] : fatigue [Abdominal Pain] : abdominal pain [Constipation] : constipation [Negative] : Genitourinary [Vision Problems] : no vision problems [Chest Pain] : no chest pain [Lower Ext Edema] : no lower extremity edema [Cough] : no cough [Vaginal Discharge] : no vaginal discharge [Joint Pain] : no joint pain [Muscle Pain] : no muscle pain [Skin Rash] : no skin rash [Difficulty Walking] : no difficulty walking [FreeTextEntry7] : nausea

## 2022-04-22 NOTE — HISTORY OF PRESENT ILLNESS
[Disease: _____________________] : Disease: [unfilled] [T: ___] : T[unfilled] [N: ___] : N[unfilled] [M: ___] : M[unfilled] [AJCC Stage: ____] : AJCC Stage: [unfilled] [de-identified] : Referred by Dr. Gaytan\par \par Ms. Hoang is a 68 y/o G0 postmenopausal F who was referred to medical oncology for treatment considerations for recurrent endometrial cancer.\par She was initially diagnosed with stage II grade 2 endometrial cancer in 2018, she underwent underwent robotic assisted TLH BSO, bilateral pelvic and para-aortic sentinel LN dissection by Dr. Paras Garyson on 2018.  Her surgical pathology demonstrated pT2N0 disease with endometrial tumor measuring 4cm, FIGO 2 endometrioid adenocarcinoma, >90 myometrial invasion with involvement of cervical stroma, +LVI with IHC of MMR proteins with intact expression but pelvic wash negative for malignant cells.  On 10/2018, she completed pelvic IMRT and vaginal cuff brachytherapy with Dr. Bowling.  She did well clinically under surveillance until 2019 when she developed vaginal spotting and fluid discharge, and was evaluated by Dr. Grayson. CA-125 was 27. Concern for possible recurrence and further workup pursued.  Her 19 CT CAP demonstrate new bilateral pulmonary nodules, some with central cavitation, suspicious for metastatic disease, enhancing nodularity superior to the vaginal cuff concerning for metastatic disease, measuring 1.9x1.6cm.  On 10/30/19 she underwent thorascopic multiple RLL wedge resections with Dr. Weston and her surgical pathology c/w metastatic adenocarcinoma, consistent with patient's endometrial primary +ER ID Pax8.  She was recommended further treatment for her recurrent endometrial cancer but the patient was lost to follow up until 2020 several months before which she has noticed increasing hardness and "lumps" in her vagina, which has been progressively more painful and burning sensation. She was subsequently evaluated by Dr. Gaytan 2020 and referred to medical oncology for systemic treatment evaluation.   On presentation for initial consult 2020, she continued to have vaginal discharge and on and off spotting (not more than 1 pad a day). She felt constipated. She was taking Percocet 5-325, Tylenol and ibuprofen for vaginal pain. She had been very busy with work and family affairs, was not able to come for cancer treatment over the past months. 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 \par \par \par Patient started on Lenvima and Pembrolizumab combination therapy on 3/17/21.  Given increasing vaginal pain and increased mass involving the vaginal cuff region, she underwent palliative RT to the vaginal mass and cuff area with Dr. Bowling. She had improved pain and had been able to tolerate Lenvima without significant toxicities. I reviewed my recommendations to continue current regimen with pembrolizumab and lenvima as lenvima had been on hold for several weeks due to difficulty tolerating treatment and pain control issues. \par \par CT c/a/p done on 2021 which showed favorable response to therapy. Left inguinal node decreased in size. Vaginal mass decreased in size. Pulmonary nodules decreased in size.  \par \par Her CT c/a/p from 2021 showed pulmonary nodules. While some are stable in size, there is mild increase in pulmonary nodule in the right upper lobe.  3 x 5 mm nodular focus of higher attenuation suggesting enhancement within a right renal cyst. This is slightly better seen on the current examination although may be exaggerated by volume averaging.  [de-identified] :  ER +  [place] +  PAX 8  +     MSI STABLE [de-identified] : Refuses COVID 19 vaccine  [FreeTextEntry1] :  Lenvima and Pembrolizumab [de-identified] : Nicole comes in for follow up while on dose reduced Lenvima and Pembrolizumab. After her last infusion, she felt extremely fatigued. She states she had one of her dental implants removed and was subsequently put on Amoxicillin. She was constipated and then had one episode of explosive diarrhea. During this time, she had been feeling nauseous, which has been present for two weeks. She attributes this partially to Valsartan, but notes her GI issues had been "brewing for some time". She has been having midepigastric discomfort which radiates to her sides and around to her back. When she bends over or with exertional activities her abdominal discomfort worsens. She is minimally constipated. She otherwise denies any mouth sores, CP, palpitations, cough, LOMAS, v/d, LE edema, vaginal discharge or bleeding, urinary symptoms, excessive bruising, dizziness, headaches, arthralgias, myalgias, weight/appetite changes and tries to keeps active. \par \par

## 2022-04-22 NOTE — REVIEW OF SYSTEMS
[Fatigue] : fatigue [Negative] : Allergic/Immunologic [Diarrhea] : diarrhea [Vision Problems] : no vision problems [Chest Pain] : no chest pain [Lower Ext Edema] : no lower extremity edema [Cough] : no cough [Constipation] : no constipation [Vaginal Discharge] : no vaginal discharge [Joint Pain] : no joint pain [Muscle Pain] : no muscle pain [Skin Rash] : no skin rash [Difficulty Walking] : no difficulty walking

## 2022-04-22 NOTE — HISTORY OF PRESENT ILLNESS
[Disease: _____________________] : Disease: [unfilled] [T: ___] : T[unfilled] [N: ___] : N[unfilled] [M: ___] : M[unfilled] [AJCC Stage: ____] : AJCC Stage: [unfilled] [de-identified] : Referred by Dr. Gaytan\par \par Ms. Hoang is a 67 y/o G0 postmenopausal F who was referred to medical oncology for treatment considerations for recurrent endometrial cancer.\par She was initially diagnosed with stage II grade 2 endometrial cancer in 2018, she underwent underwent robotic assisted TLH BSO, bilateral pelvic and para-aortic sentinel LN dissection by Dr. Paras Grayson on 2018.  Her surgical pathology demonstrated pT2N0 disease with endometrial tumor measuring 4cm, FIGO 2 endometrioid adenocarcinoma, >90 myometrial invasion with involvement of cervical stroma, +LVI with IHC of MMR proteins with intact expression but pelvic wash negative for malignant cells.  On 10/2018, she completed pelvic IMRT and vaginal cuff brachytherapy with Dr. Bowling.  She did well clinically under surveillance until 2019 when she developed vaginal spotting and fluid discharge, and was evaluated by Dr. Grayson. CA-125 was 27. Concern for possible recurrence and further workup pursued.  Her 19 CT CAP demonstrate new bilateral pulmonary nodules, some with central cavitation, suspicious for metastatic disease, enhancing nodularity superior to the vaginal cuff concerning for metastatic disease, measuring 1.9x1.6cm.  On 10/30/19 she underwent thorascopic multiple RLL wedge resections with Dr. Weston and her surgical pathology c/w metastatic adenocarcinoma, consistent with patient's endometrial primary +ER MN Pax8.  She was recommended further treatment for her recurrent endometrial cancer but the patient was lost to follow up until 2020 several months before which she has noticed increasing hardness and "lumps" in her vagina, which has been progressively more painful and burning sensation. She was subsequently evaluated by Dr. Gaytan 2020 and referred to medical oncology for systemic treatment evaluation.   On presentation for initial consult 2020, she continued to have vaginal discharge and on and off spotting (not more than 1 pad a day). She felt constipated. She was taking Percocet 5-325, Tylenol and ibuprofen for vaginal pain. She had been very busy with work and family affairs, was not able to come for cancer treatment over the past months. 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 \par \par \par Patient started on Lenvima and Pembrolizumab combination therapy on 3/17/21.  Given increasing vaginal pain and increased mass involving the vaginal cuff region, she underwent palliative RT to the vaginal mass and cuff area with Dr. Bowling. She had improved pain and had been able to tolerate Lenvima without significant toxicities. I reviewed my recommendations to continue current regimen with pembrolizumab and lenvima as lenvima had been on hold for several weeks due to difficulty tolerating treatment and pain control issues. \par \par CT c/a/p done on 2021 which showed favorable response to therapy. Left inguinal node decreased in size. Vaginal mass decreased in size. Pulmonary nodules decreased in size.  \par \par Her CT c/a/p from 2021 showed pulmonary nodules. While some are stable in size, there is mild increase in pulmonary nodule in the right upper lobe.  3 x 5 mm nodular focus of higher attenuation suggesting enhancement within a right renal cyst. This is slightly better seen on the current examination although may be exaggerated by volume averaging.  [de-identified] :  ER +  AR +  PAX 8  +     MSI STABLE [de-identified] : Refuses COVID 19 vaccine  [FreeTextEntry1] :  Lenvima and Pembrolizumab [de-identified] : Nicole comes in for follow up while on dose reduced Lenvima and Pembrolizumab.  She feels well today but states she had two weeks of general malaise along with palpitations/LOMAS with going up stairs which is at baseline.  She started working again and is working from home.  She reports compliance with Lenvima except for saturday nights as she needs to be prepared for [employer] the next day.  She denies any mouth sores, CP, palpitations, cough, LOMAS, n/v/d/c, abdominal pain, LE edema, vaginal discharge or bleeding, urinary symptoms, excessive bruising, dizziness, headaches, arthralgias, myalgias, weight/appetite changes and tries to keeps active. \par \par \par \par \par \par \par \par

## 2022-04-22 NOTE — PHYSICAL EXAM
[Restricted in physically strenuous activity but ambulatory and able to carry out work of a light or sedentary nature] : Status 1- Restricted in physically strenuous activity but ambulatory and able to carry out work of a light or sedentary nature, e.g., light house work, office work [Normal Female] : normal external genitalia, urethral meatus without lesions or discharge. Bladder non-distended, without tenderness. Vagina and cervix normal on visual inspection. On bimanual exam uterus, adnexa, parametria all normal without any discrete masses. [Normal] : affect appropriate [Mucositis] : no mucositis [Thrush] : no thrush [Vesicles] : no vesicles [de-identified] : tender

## 2022-04-22 NOTE — ADDENDUM
[FreeTextEntry1] : I, Jaida Deena, acted solely as a scribe for Dr. Toy Blank on 04/18/2022. All medical entries made by the Scribe were at my, Dr. Toy Blank's, direction and personally dictated by me on 04/18/2022. I have reviewed the chart and agree that the record accurately reflects my personal performance of the history, physical exam, assessment and plan. I have also personally directed, reviewed, and agreed with the chart.

## 2022-04-22 NOTE — HISTORY OF PRESENT ILLNESS
[Disease: _____________________] : Disease: [unfilled] [T: ___] : T[unfilled] [N: ___] : N[unfilled] [M: ___] : M[unfilled] [AJCC Stage: ____] : AJCC Stage: [unfilled] [de-identified] : Referred by Dr. Gaytan\par \par Ms. Hoang is a 67 y/o G0 postmenopausal F who was referred to medical oncology for treatment considerations for recurrent endometrial cancer.\par She was initially diagnosed with stage II grade 2 endometrial cancer in 2018, she underwent underwent robotic assisted TLH BSO, bilateral pelvic and para-aortic sentinel LN dissection by Dr. Paras Grayson on 2018.  Her surgical pathology demonstrated pT2N0 disease with endometrial tumor measuring 4cm, FIGO 2 endometrioid adenocarcinoma, >90 myometrial invasion with involvement of cervical stroma, +LVI with IHC of MMR proteins with intact expression but pelvic wash negative for malignant cells.  On 10/2018, she completed pelvic IMRT and vaginal cuff brachytherapy with Dr. Bowling.  She did well clinically under surveillance until 2019 when she developed vaginal spotting and fluid discharge, and was evaluated by Dr. Grayson. CA-125 was 27. Concern for possible recurrence and further workup pursued.  Her 19 CT CAP demonstrate new bilateral pulmonary nodules, some with central cavitation, suspicious for metastatic disease, enhancing nodularity superior to the vaginal cuff concerning for metastatic disease, measuring 1.9x1.6cm.  On 10/30/19 she underwent thorascopic multiple RLL wedge resections with Dr. Weston and her surgical pathology c/w metastatic adenocarcinoma, consistent with patient's endometrial primary +ER ND Pax8.  She was recommended further treatment for her recurrent endometrial cancer but the patient was lost to follow up until 2020 several months before which she has noticed increasing hardness and "lumps" in her vagina, which has been progressively more painful and burning sensation. She was subsequently evaluated by Dr. Gaytan 2020 and referred to medical oncology for systemic treatment evaluation.   On presentation for initial consult 2020, she continued to have vaginal discharge and on and off spotting (not more than 1 pad a day). She felt constipated. She was taking Percocet 5-325, Tylenol and ibuprofen for vaginal pain. She had been very busy with work and family affairs, was not able to come for cancer treatment over the past months. 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 \par \par \par Patient started on Lenvima and Pembrolizumab combination therapy on 3/17/21.  Given increasing vaginal pain and increased mass involving the vaginal cuff region, she underwent palliative RT to the vaginal mass and cuff area with Dr. Bowling. She had improved pain and had been able to tolerate Lenvima without significant toxicities. I reviewed my recommendations to continue current regimen with pembrolizumab and lenvima as lenvima had been on hold for several weeks due to difficulty tolerating treatment and pain control issues. \par \par CT c/a/p done on 2021 which showed favorable response to therapy. Left inguinal node decreased in size. Vaginal mass decreased in size. Pulmonary nodules decreased in size.  \par \par Her CT c/a/p from 2021 showed pulmonary nodules. While some are stable in size, there is mild increase in pulmonary nodule in the right upper lobe.  3 x 5 mm nodular focus of higher attenuation suggesting enhancement within a right renal cyst. This is slightly better seen on the current examination although may be exaggerated by volume averaging.  [de-identified] :  ER +  [place] +  PAX 8  +     MSI STABLE [de-identified] : Refuses COVID 19 vaccine  [FreeTextEntry1] :  Lenvima and Pembrolizumab [de-identified] : Nicole comes in for follow up while on dose reduced Lenvima and Pembrolizumab.  She c/o uncontrolled Dandruff.  She states she had one of her dental implants removed and is currently on Amoxicillin and has 1 loose BM sometimes so taking it QD instead of BID.  She also states she noticed her belly button having a whitish discharge.  Her neuropathy continues/stable but is bothersome.  She otherwise denies any mouth sores, CP, palpitations, cough, LOMAS, n/v/d/c, abdominal pain, LE edema, vaginal discharge or bleeding, urinary symptoms, excessive bruising, dizziness, headaches, arthralgias, myalgias, weight/appetite changes and tries to keeps active. \par \par

## 2022-04-26 NOTE — HISTORY OF PRESENT ILLNESS
[FreeTextEntry1] : 67 year old returns for interval oncologic surveillance offering no new complaints including no abdominopelvic pain, vaginal or rectal bleeding, chest pain or shortness of breath. Normal bladder function. Patient with significant constipation. \par \par Last saw Med/Onc on 4/4/22 -Continue Lenvima (10 mg QD) and Pembro at current doses, continues to tolerate well overall without significant toxicities. \par -Patient states she will retry Medical Marijuana and a lower dose\par -Advised f/u Dr. Umana for pain control and symptoms management\par \par \par Rad/Onc last seen in November/2021. \par \par \par CT C/A/P (3/1/22) -\par Mild increase in size of the left apical pulmonary nodule now with internal cavitation representing treatment-related changes. Stability of the remaining pulmonary metastases.\par \par No imaging evidence of locally recurrent or metastatic disease involving the abdomen or pelvis\par \par Similar left vulvar lesion which could be attributable to Bartholin's gland cyst\par \par Bilateral complex renal cystic lesions with mural nodularity. These can be assessed at time of next routine imaging surveillance\par \par \par Health Maintenance:\par Mammo (11/16/21) - BI-RADS 3, probably benign

## 2022-04-26 NOTE — REASON FOR VISIT
[FreeTextEntry1] : [place] Location\par \par [facility] Physician [facility] Gynecologic Oncology [phone] at [address] [place] [address]\par  \par Recurrent, metastatic endometrial cancer. \par \par Lenvima and Pembrolizumab combination therapy on 3/17/21 - currently undergoing treatment

## 2022-04-26 NOTE — ASSESSMENT
[FreeTextEntry1] : 67 year old who has a history of recurrent endometrial cancer. The patient is doing well from a gynecological standpoint, on Lenvima & Pembro. Current treatment regimen with very favorable effect. Continue as planned.

## 2022-04-26 NOTE — PHYSICAL EXAM
[Absent] : Adnexa(ae): Absent [Normal] : Parametria: Normal [Abnormal] : External genitalia: Abnormal [FreeTextEntry1] : Monae Palmer MA was present the entire time of gynecological exam.  [de-identified] : Periumbilical redness [de-identified] : No lymphadenopathy in groin [de-identified] : B/L vulvar redness; mild edema to labia minora   [de-identified] : vaginal stenosis [de-identified] : Rectal exam deferred

## 2022-04-27 ENCOUNTER — RESULT REVIEW (OUTPATIENT)
Age: 67
End: 2022-04-27

## 2022-04-27 ENCOUNTER — APPOINTMENT (OUTPATIENT)
Dept: INFUSION THERAPY | Facility: HOSPITAL | Age: 67
End: 2022-04-27

## 2022-04-27 ENCOUNTER — APPOINTMENT (OUTPATIENT)
Dept: DERMATOLOGY | Facility: CLINIC | Age: 67
End: 2022-04-27

## 2022-04-28 LAB
APPEARANCE UR: CLEAR — SIGNIFICANT CHANGE UP
BILIRUB UR-MCNC: NEGATIVE — SIGNIFICANT CHANGE UP
COLOR SPEC: SIGNIFICANT CHANGE UP
CREAT ?TM UR-MCNC: 56 MG/DL — SIGNIFICANT CHANGE UP
DIFF PNL FLD: NEGATIVE — SIGNIFICANT CHANGE UP
GLUCOSE UR QL: NEGATIVE — SIGNIFICANT CHANGE UP
KETONES UR-MCNC: NEGATIVE — SIGNIFICANT CHANGE UP
LEUKOCYTE ESTERASE UR-ACNC: NEGATIVE — SIGNIFICANT CHANGE UP
NITRITE UR-MCNC: NEGATIVE — SIGNIFICANT CHANGE UP
PH UR: 6.5 — SIGNIFICANT CHANGE UP (ref 5–8)
PROT ?TM UR-MCNC: 6 MG/DL — SIGNIFICANT CHANGE UP (ref 0–12)
PROT UR-MCNC: NEGATIVE — SIGNIFICANT CHANGE UP
PROT/CREAT UR-RTO: 0.1 RATIO — SIGNIFICANT CHANGE UP (ref 0–0.2)
SP GR SPEC: 1.01 — SIGNIFICANT CHANGE UP (ref 1.01–1.02)
UROBILINOGEN FLD QL: SIGNIFICANT CHANGE UP

## 2022-05-11 ENCOUNTER — OUTPATIENT (OUTPATIENT)
Dept: OUTPATIENT SERVICES | Facility: HOSPITAL | Age: 67
LOS: 1 days | Discharge: HOME | End: 2022-05-11

## 2022-05-11 DIAGNOSIS — Z90.710 ACQUIRED ABSENCE OF BOTH CERVIX AND UTERUS: Chronic | ICD-10-CM

## 2022-05-11 DIAGNOSIS — Z98.890 OTHER SPECIFIED POSTPROCEDURAL STATES: Chronic | ICD-10-CM

## 2022-05-11 DIAGNOSIS — C54.1 MALIGNANT NEOPLASM OF ENDOMETRIUM: ICD-10-CM

## 2022-05-12 ENCOUNTER — APPOINTMENT (OUTPATIENT)
Dept: RADIATION ONCOLOGY | Facility: CLINIC | Age: 67
End: 2022-05-12
Payer: MEDICARE

## 2022-05-12 VITALS
DIASTOLIC BLOOD PRESSURE: 83 MMHG | HEART RATE: 100 BPM | SYSTOLIC BLOOD PRESSURE: 125 MMHG | RESPIRATION RATE: 16 BRPM | WEIGHT: 116 LBS | OXYGEN SATURATION: 100 % | BODY MASS INDEX: 21.62 KG/M2 | TEMPERATURE: 98.96 F

## 2022-05-12 PROCEDURE — 99212 OFFICE O/P EST SF 10 MIN: CPT | Mod: GC

## 2022-05-15 NOTE — HISTORY OF PRESENT ILLNESS
[FreeTextEntry1] : Ms. Hoang is a 67 year old woman with metastatic endometrial cancer (prior FIGO stage 2) with pulmonary and vaginal recurrence in 2019 disease progression in 8/2020 in the vagina, pelvic lymph nodes, and lungs.\par  \par History of Present Illness:\par She initially presented reporting vaginal spotting and pelvic pains x1 year. She did not pursue treatment due to insurance and personal issues. Pap smear (4/24/18) revealed YAIR favor neoplasm. Pelvic sonogram revealed a thickened 2.7-3.7 cm lining.\par \par 5/21/18 - D&C hysteroscopy: well differentiated endometrioid adenocarcinoma.\par  \par 6/5/18 - Biopsy - Endometrial curettings: Well differentiated endometrioid adenocarcinoma.\par \par 6/8/18 - PET CT: Diffuse endometrial hypermetabolism corresponds to known malignancy. No evidence of metastatic disease.\par \par 7/2/18 - RA TLH, BSO, b/l pelvic and para-aortic SLND, cystoscopy: Pathology was significant for a 4 cm endometrial mass with LVSI with right ovarian hilum invasion, >90% myometrial invasion, lower uterine segment and cervical stromal invasion. There was was no pathological lymph node metastases.\par \par 8/31/18 - 10/5/18 - IMRT Pelvis to a total dose of 4,500 cGy in 25 fractions.\par \par 10/12/18 - 10/19/18 - VBT to a total dose of 1,200 cGy in 2 fractions.\par \par 9/12/19 - She presented to GYN ONC reporting leaking a thin yellowish fluid from the vagina x1 month and noted small amount of blood when wiping after urination. On exam she had a moderate amount of clear fluid in vault, and blood. 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 S/p hysterectomy. Nodularity is noted superior to the vaginal cuff, concerning for metastatic disease.\par \par 10/30/19 - Thoracoscopy, resection of several pulmonary nodules and biopsy: +ER MT Pax8\par  - Right lower lobe wedge: Consistent with Metastatic Adenocarcinoma.\par  - Right lower lobe wedge #2: Lung parenchyma with nodular fibrosis, resolved pneumonitis.\par  - Right lower lobe wedge #3: Consistent with Metastatic Adenocarcinoma.\par  - Right upper lobe wedge: Lung parenchyma with nodular fibrosis.\par \par 7/31/20 - Presented noting a hardness in her vagina and more recently palpated a mass in the vagina which is painful & associated with burning. There was bleeding from the mass with associated with vaginal discharge, slight odor. On exam, mass at left groin ~ 4 cm ("1/2 darion") and protruding from surface without ulceration of the overlying skin.\par \par 8/21/20 - 2/11/21 - 7 cycles carbo/taxol, cycle 8 stopped due to reaction during carboplatin infusion.\par \par 2/27/21 - CT C/A/P: Left upper lobe nodule abutting the mediastinum is increased in size. Otherwise stable pulmonary nodules. Interval enlargement of necrotic vaginal mass and left inguinal lymph node.\par \par 3/17/21 - Began treatment with pembrolizumab and lenvima. \par \par 5/25/21 - 6/1/21 - Radiation therapy to the vaginal canal to a total dose of 2,000 cGy in 5 fractions. \par \par 8/19/21 - CT C/A/P: shows favorable response to therapy, decreased size of left inguinal LN and vaginal mass, pulmonary nodules decreased in size.\par \par 11/16/21 - Mammo/US: Persistent partially obscured ovoid nodular mass in the upper outer posterior right breast by mammography, without sonographic correlate. BI-RADS 3.\par \par 3/1/22 - CT C/A/P: Mild increase in size of the left apical pulmonary nodule now with internal cavitation representing treatment-related changes. Stability of the remaining pulmonary metastases. No imaging evidence of locally recurrent or metastatic disease involving the abdomen or pelvis. Similar left vulvar lesion which could be attributable to Bartholin's gland cyst. Bilateral complex renal cystic lesions with mural nodularity. \par \par 4/18/22 - : 6 U/mL. \par \par She presents today for routine follow-up. \par Today she is feeling well. Denies any pain. Reports burning, itching and redness in the vaginal area. Using [employer] cream and aquaphor with some relief noted. No urinary or bowel complaints. No vaginal discharge/bleeding.\par

## 2022-05-15 NOTE — REVIEW OF SYSTEMS
[Constipation: Grade 0] : Constipation: Grade 0 [Diarrhea: Grade 0] : Diarrhea: Grade 0 [Fatigue: Grade 0] : Fatigue: Grade 0 [Hematuria: Grade 0] : Hematuria: Grade 0 [Urinary Incontinence: Grade 0] : Urinary Incontinence: Grade 0  [Urinary Retention: Grade 0] : Urinary Retention: Grade 0 [Urinary Tract Pain: Grade 0] : Urinary Tract Pain: Grade 0 [Urinary Urgency: Grade 0] : Urinary Urgency: Grade 0 [Urinary Frequency: Grade 0] : Urinary Frequency: Grade 0 [Pruritus: Grade 1 - Mild or localized; topical intervention indicated] : Pruritus: Grade 1 - Mild or localized; topical intervention indicated [Vaginal Stricture: Grade 3 - Vaginal narrowing and/or shortening interfering with the use of tampons, sexual activity or physical examination] : Vaginal Stricture: Grade 3 - Vaginal narrowing and/or shortening interfering with the use of tampons, sexual activity or physical examination

## 2022-05-15 NOTE — PHYSICAL EXAM
[Sclera] : the sclera and conjunctiva were normal [] : no respiratory distress [Normal] : no palpable adenopathy [Musculoskeletal - Swelling] : no joint swelling [No Focal Deficits] : no focal deficits [Oriented To Time, Place, And Person] : oriented to person, place, and time [de-identified] : significant vaginal stenosis allowing digital exam of approximately 2 cm

## 2022-05-16 ENCOUNTER — RESULT REVIEW (OUTPATIENT)
Age: 67
End: 2022-05-16

## 2022-05-16 ENCOUNTER — APPOINTMENT (OUTPATIENT)
Dept: INFUSION THERAPY | Facility: HOSPITAL | Age: 67
End: 2022-05-16

## 2022-05-16 ENCOUNTER — APPOINTMENT (OUTPATIENT)
Dept: HEMATOLOGY ONCOLOGY | Facility: CLINIC | Age: 67
End: 2022-05-16
Payer: MEDICARE

## 2022-05-16 VITALS
HEART RATE: 101 BPM | SYSTOLIC BLOOD PRESSURE: 120 MMHG | OXYGEN SATURATION: 99 % | WEIGHT: 117.26 LBS | BODY MASS INDEX: 21.86 KG/M2 | DIASTOLIC BLOOD PRESSURE: 86 MMHG | TEMPERATURE: 206.96 F | RESPIRATION RATE: 16 BRPM

## 2022-05-16 LAB
ALBUMIN SERPL ELPH-MCNC: 4.2 G/DL — SIGNIFICANT CHANGE UP (ref 3.3–5)
ALP SERPL-CCNC: 112 U/L — SIGNIFICANT CHANGE UP (ref 40–120)
ALT FLD-CCNC: 21 U/L — SIGNIFICANT CHANGE UP (ref 10–45)
ANION GAP SERPL CALC-SCNC: 12 MMOL/L — SIGNIFICANT CHANGE UP (ref 5–17)
APPEARANCE UR: ABNORMAL
AST SERPL-CCNC: 19 U/L — SIGNIFICANT CHANGE UP (ref 10–40)
BACTERIA # UR AUTO: NEGATIVE — SIGNIFICANT CHANGE UP
BASOPHILS # BLD AUTO: 0.02 K/UL — SIGNIFICANT CHANGE UP (ref 0–0.2)
BASOPHILS NFR BLD AUTO: 0.3 % — SIGNIFICANT CHANGE UP (ref 0–2)
BILIRUB SERPL-MCNC: 0.2 MG/DL — SIGNIFICANT CHANGE UP (ref 0.2–1.2)
BILIRUB UR-MCNC: NEGATIVE — SIGNIFICANT CHANGE UP
BUN SERPL-MCNC: 13 MG/DL — SIGNIFICANT CHANGE UP (ref 7–23)
CALCIUM SERPL-MCNC: 9.1 MG/DL — SIGNIFICANT CHANGE UP (ref 8.4–10.5)
CHLORIDE SERPL-SCNC: 103 MMOL/L — SIGNIFICANT CHANGE UP (ref 96–108)
CO2 SERPL-SCNC: 24 MMOL/L — SIGNIFICANT CHANGE UP (ref 22–31)
COLOR SPEC: YELLOW — SIGNIFICANT CHANGE UP
COMMENT - URINE: SIGNIFICANT CHANGE UP
CREAT ?TM UR-MCNC: 195 MG/DL — SIGNIFICANT CHANGE UP
CREAT SERPL-MCNC: 0.73 MG/DL — SIGNIFICANT CHANGE UP (ref 0.5–1.3)
DIFF PNL FLD: NEGATIVE — SIGNIFICANT CHANGE UP
EGFR: 89 ML/MIN/1.73M2 — SIGNIFICANT CHANGE UP
EOSINOPHIL # BLD AUTO: 0.09 K/UL — SIGNIFICANT CHANGE UP (ref 0–0.5)
EOSINOPHIL NFR BLD AUTO: 1.2 % — SIGNIFICANT CHANGE UP (ref 0–6)
EPI CELLS # UR: 4 /HPF — SIGNIFICANT CHANGE UP (ref 0–5)
GLUCOSE SERPL-MCNC: 112 MG/DL — HIGH (ref 70–99)
GLUCOSE UR QL: NEGATIVE — SIGNIFICANT CHANGE UP
HCT VFR BLD CALC: 38.7 % — SIGNIFICANT CHANGE UP (ref 34.5–45)
HGB BLD-MCNC: 13.3 G/DL — SIGNIFICANT CHANGE UP (ref 11.5–15.5)
HYALINE CASTS # UR AUTO: 0 /LPF — SIGNIFICANT CHANGE UP (ref 0–7)
IMM GRANULOCYTES NFR BLD AUTO: 0.3 % — SIGNIFICANT CHANGE UP (ref 0–1.5)
KETONES UR-MCNC: NEGATIVE — SIGNIFICANT CHANGE UP
LEUKOCYTE ESTERASE UR-ACNC: ABNORMAL
LYMPHOCYTES # BLD AUTO: 0.92 K/UL — LOW (ref 1–3.3)
LYMPHOCYTES # BLD AUTO: 12.5 % — LOW (ref 13–44)
MAGNESIUM SERPL-MCNC: 1.9 MG/DL — SIGNIFICANT CHANGE UP (ref 1.6–2.6)
MCHC RBC-ENTMCNC: 33.2 PG — SIGNIFICANT CHANGE UP (ref 27–34)
MCHC RBC-ENTMCNC: 34.4 G/DL — SIGNIFICANT CHANGE UP (ref 32–36)
MCV RBC AUTO: 96.5 FL — SIGNIFICANT CHANGE UP (ref 80–100)
MONOCYTES # BLD AUTO: 0.66 K/UL — SIGNIFICANT CHANGE UP (ref 0–0.9)
MONOCYTES NFR BLD AUTO: 9 % — SIGNIFICANT CHANGE UP (ref 2–14)
NEUTROPHILS # BLD AUTO: 5.65 K/UL — SIGNIFICANT CHANGE UP (ref 1.8–7.4)
NEUTROPHILS NFR BLD AUTO: 76.7 % — SIGNIFICANT CHANGE UP (ref 43–77)
NITRITE UR-MCNC: NEGATIVE — SIGNIFICANT CHANGE UP
NRBC # BLD: 0 /100 WBCS — SIGNIFICANT CHANGE UP (ref 0–0)
PH UR: 5.5 — SIGNIFICANT CHANGE UP (ref 5–8)
PLATELET # BLD AUTO: 233 K/UL — SIGNIFICANT CHANGE UP (ref 150–400)
POTASSIUM SERPL-MCNC: 3.8 MMOL/L — SIGNIFICANT CHANGE UP (ref 3.5–5.3)
POTASSIUM SERPL-SCNC: 3.8 MMOL/L — SIGNIFICANT CHANGE UP (ref 3.5–5.3)
PROT ?TM UR-MCNC: 32 MG/DL — HIGH (ref 0–12)
PROT SERPL-MCNC: 6.6 G/DL — SIGNIFICANT CHANGE UP (ref 6–8.3)
PROT UR-MCNC: ABNORMAL
PROT/CREAT UR-RTO: 0.2 RATIO — SIGNIFICANT CHANGE UP (ref 0–0.2)
RBC # BLD: 4.01 M/UL — SIGNIFICANT CHANGE UP (ref 3.8–5.2)
RBC # FLD: 13.2 % — SIGNIFICANT CHANGE UP (ref 10.3–14.5)
RBC CASTS # UR COMP ASSIST: 8 /HPF — HIGH (ref 0–4)
SODIUM SERPL-SCNC: 140 MMOL/L — SIGNIFICANT CHANGE UP (ref 135–145)
SP GR SPEC: 1.03 — HIGH (ref 1.01–1.02)
T4 FREE+ TSH PNL SERPL: 3.09 UIU/ML — SIGNIFICANT CHANGE UP (ref 0.27–4.2)
UROBILINOGEN FLD QL: SIGNIFICANT CHANGE UP
WBC # BLD: 7.36 K/UL — SIGNIFICANT CHANGE UP (ref 3.8–10.5)
WBC # FLD AUTO: 7.36 K/UL — SIGNIFICANT CHANGE UP (ref 3.8–10.5)
WBC UR QL: 16 /HPF — HIGH (ref 0–5)

## 2022-05-16 PROCEDURE — 99214 OFFICE O/P EST MOD 30 MIN: CPT

## 2022-05-18 ENCOUNTER — RESULT REVIEW (OUTPATIENT)
Age: 67
End: 2022-05-18

## 2022-05-18 ENCOUNTER — APPOINTMENT (OUTPATIENT)
Dept: INFUSION THERAPY | Facility: HOSPITAL | Age: 67
End: 2022-05-18

## 2022-05-18 LAB
CREAT ?TM UR-MCNC: 285 MG/DL — SIGNIFICANT CHANGE UP
PROT ?TM UR-MCNC: 42 MG/DL — HIGH (ref 0–12)
PROT/CREAT UR-RTO: 0.2 RATIO — SIGNIFICANT CHANGE UP (ref 0–0.2)

## 2022-05-19 DIAGNOSIS — R11.2 NAUSEA WITH VOMITING, UNSPECIFIED: ICD-10-CM

## 2022-05-19 DIAGNOSIS — Z51.11 ENCOUNTER FOR ANTINEOPLASTIC CHEMOTHERAPY: ICD-10-CM

## 2022-05-19 LAB
APPEARANCE UR: ABNORMAL
BACTERIA # UR AUTO: NEGATIVE — SIGNIFICANT CHANGE UP
BILIRUB UR-MCNC: NEGATIVE — SIGNIFICANT CHANGE UP
COLOR SPEC: ABNORMAL
COMMENT - URINE: SIGNIFICANT CHANGE UP
DIFF PNL FLD: NEGATIVE — SIGNIFICANT CHANGE UP
EPI CELLS # UR: 3 /HPF — SIGNIFICANT CHANGE UP (ref 0–5)
GLUCOSE UR QL: NEGATIVE — SIGNIFICANT CHANGE UP
HYALINE CASTS # UR AUTO: 0 /LPF — SIGNIFICANT CHANGE UP (ref 0–7)
KETONES UR-MCNC: SIGNIFICANT CHANGE UP
LEUKOCYTE ESTERASE UR-ACNC: ABNORMAL
NITRITE UR-MCNC: NEGATIVE — SIGNIFICANT CHANGE UP
PH UR: 5.5 — SIGNIFICANT CHANGE UP (ref 5–8)
PROT UR-MCNC: ABNORMAL
RBC CASTS # UR COMP ASSIST: 5 /HPF — HIGH (ref 0–4)
SP GR SPEC: 1.04 — HIGH (ref 1.01–1.02)
UROBILINOGEN FLD QL: SIGNIFICANT CHANGE UP
WBC UR QL: 25 /HPF — HIGH (ref 0–5)

## 2022-05-23 ENCOUNTER — NON-APPOINTMENT (OUTPATIENT)
Age: 67
End: 2022-05-23

## 2022-05-24 ENCOUNTER — APPOINTMENT (OUTPATIENT)
Dept: INFUSION THERAPY | Facility: HOSPITAL | Age: 67
End: 2022-05-24

## 2022-05-25 ENCOUNTER — OUTPATIENT (OUTPATIENT)
Dept: OUTPATIENT SERVICES | Facility: HOSPITAL | Age: 67
LOS: 1 days | End: 2022-05-25
Payer: MEDICARE

## 2022-05-25 ENCOUNTER — RESULT REVIEW (OUTPATIENT)
Age: 67
End: 2022-05-25

## 2022-05-25 ENCOUNTER — APPOINTMENT (OUTPATIENT)
Dept: CT IMAGING | Facility: IMAGING CENTER | Age: 67
End: 2022-05-25
Payer: MEDICARE

## 2022-05-25 DIAGNOSIS — Z90.710 ACQUIRED ABSENCE OF BOTH CERVIX AND UTERUS: Chronic | ICD-10-CM

## 2022-05-25 DIAGNOSIS — Z98.890 OTHER SPECIFIED POSTPROCEDURAL STATES: Chronic | ICD-10-CM

## 2022-05-25 DIAGNOSIS — C54.1 MALIGNANT NEOPLASM OF ENDOMETRIUM: ICD-10-CM

## 2022-05-25 PROCEDURE — G1004: CPT

## 2022-05-25 PROCEDURE — 74177 CT ABD & PELVIS W/CONTRAST: CPT | Mod: ME

## 2022-05-25 PROCEDURE — 74177 CT ABD & PELVIS W/CONTRAST: CPT | Mod: 26,ME

## 2022-05-25 PROCEDURE — 71260 CT THORAX DX C+: CPT | Mod: 26,ME

## 2022-05-25 PROCEDURE — 71260 CT THORAX DX C+: CPT | Mod: ME

## 2022-05-27 NOTE — HISTORY OF PRESENT ILLNESS
[Disease: _____________________] : Disease: [unfilled] [T: ___] : T[unfilled] [N: ___] : N[unfilled] [M: ___] : M[unfilled] [AJCC Stage: ____] : AJCC Stage: [unfilled] [de-identified] : Referred by Dr. Gaytan\par \par Ms. Hoang is a 66 y/o G0 postmenopausal F who was referred to medical oncology for treatment considerations for recurrent endometrial cancer.\par She was initially diagnosed with stage II grade 2 endometrial cancer in 2018, she underwent underwent robotic assisted TLH BSO, bilateral pelvic and para-aortic sentinel LN dissection by Dr. Paras Grayson on 2018.  Her surgical pathology demonstrated pT2N0 disease with endometrial tumor measuring 4cm, FIGO 2 endometrioid adenocarcinoma, >90 myometrial invasion with involvement of cervical stroma, +LVI with IHC of MMR proteins with intact expression but pelvic wash negative for malignant cells.  On 10/2018, she completed pelvic IMRT and vaginal cuff brachytherapy with Dr. Bowling.  She did well clinically under surveillance until 2019 when she developed vaginal spotting and fluid discharge, and was evaluated by Dr. Grayson. CA-125 was 27. Concern for possible recurrence and further workup pursued.  Her 19 CT CAP demonstrate new bilateral pulmonary nodules, some with central cavitation, suspicious for metastatic disease, enhancing nodularity superior to the vaginal cuff concerning for metastatic disease, measuring 1.9x1.6cm.  On 10/30/19 she underwent thorascopic multiple RLL wedge resections with Dr. Weston and her surgical pathology c/w metastatic adenocarcinoma, consistent with patient's endometrial primary +ER MI Pax8.  She was recommended further treatment for her recurrent endometrial cancer but the patient was lost to follow up until 2020 several months before which she has noticed increasing hardness and "lumps" in her vagina, which has been progressively more painful and burning sensation. She was subsequently evaluated by Dr. Gaytan 2020 and referred to medical oncology for systemic treatment evaluation.   On presentation for initial consult 2020, she continued to have vaginal discharge and on and off spotting (not more than 1 pad a day). She felt constipated. She was taking Percocet 5-325, Tylenol and ibuprofen for vaginal pain. She had been very busy with work and family affairs, was not able to come for cancer treatment over the past months. 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 \par \par \par Patient started on Lenvima and Pembrolizumab combination therapy on 3/17/21.  Given increasing vaginal pain and increased mass involving the vaginal cuff region, she underwent palliative RT to the vaginal mass and cuff area with Dr. Bowling. She had improved pain and had been able to tolerate Lenvima without significant toxicities. I reviewed my recommendations to continue current regimen with pembrolizumab and lenvima as lenvima had been on hold for several weeks due to difficulty tolerating treatment and pain control issues. \par \par CT c/a/p done on 2021 which showed favorable response to therapy. Left inguinal node decreased in size. Vaginal mass decreased in size. Pulmonary nodules decreased in size.  \par \par Her CT c/a/p from 2021 showed pulmonary nodules. While some are stable in size, there is mild increase in pulmonary nodule in the right upper lobe.  3 x 5 mm nodular focus of higher attenuation suggesting enhancement within a right renal cyst. This is slightly better seen on the current examination although may be exaggerated by volume averaging.  [de-identified] :  ER +  NE +  PAX 8  +     MSI STABLE [de-identified] : Refuses COVID 19 vaccine  [FreeTextEntry1] :  Lenvima and Pembrolizumab [de-identified] : Nicole comes in for follow up while on dose reduced Lenvima and Pembrolizumab and in the interim saw Dr. Bowling for f/u with improved exam. She has mild fatigue. She reports her nausea has resolved. She still has intermittent midepigastric discomfort which radiates to her sides and around to her back. She notes it is worse when she is hungry or "during digestion" and improves after she eats. Pepcid makes it worse, but Pepto-Bismol offers relief. She also has reflux. She otherwise denies any mouth sores, CP, palpitations, cough, LOMAS, v/d, LE edema, vaginal discharge or bleeding, urinary symptoms, excessive bruising, dizziness, headaches, arthralgias, myalgias, weight/appetite changes and tries to keeps active. She has a rash on her belly button for which she uses Neosporin without relief. She also has vaginal dryness/burning/itching. \par \par

## 2022-05-27 NOTE — ADDENDUM
[FreeTextEntry1] : I, Jaida Deena, acted solely as a scribe for Dr. Toy Blank on 05/16/2022. All medical entries made by the Scribe were at my, Dr. Toy Blank's, direction and personally dictated by me on 05/16/2022. I have reviewed the chart and agree that the record accurately reflects my personal performance of the history, physical exam, assessment and plan. I have also personally directed, reviewed, and agreed with the chart.

## 2022-05-27 NOTE — REVIEW OF SYSTEMS
[Abdominal Pain] : abdominal pain [Negative] : Allergic/Immunologic [Fatigue] : fatigue [Skin Rash] : skin rash [Vision Problems] : no vision problems [Chest Pain] : no chest pain [Lower Ext Edema] : no lower extremity edema [Cough] : no cough [Vaginal Discharge] : no vaginal discharge [Joint Pain] : no joint pain [Muscle Pain] : no muscle pain [Difficulty Walking] : no difficulty walking [FreeTextEntry7] : reflux [FreeTextEntry8] : vaginal dryness/burning/itching

## 2022-05-27 NOTE — PHYSICAL EXAM
[Restricted in physically strenuous activity but ambulatory and able to carry out work of a light or sedentary nature] : Status 1- Restricted in physically strenuous activity but ambulatory and able to carry out work of a light or sedentary nature, e.g., light house work, office work [Normal] : affect appropriate [Mucositis] : no mucositis [Thrush] : no thrush [Vesicles] : no vesicles [de-identified] : tender. erythematous rash on her umbilicus.

## 2022-06-01 ENCOUNTER — RESULT REVIEW (OUTPATIENT)
Age: 67
End: 2022-06-01

## 2022-06-01 ENCOUNTER — APPOINTMENT (OUTPATIENT)
Dept: INFUSION THERAPY | Facility: HOSPITAL | Age: 67
End: 2022-06-01

## 2022-06-01 ENCOUNTER — APPOINTMENT (OUTPATIENT)
Dept: HEMATOLOGY ONCOLOGY | Facility: CLINIC | Age: 67
End: 2022-06-01
Payer: MEDICARE

## 2022-06-01 VITALS
TEMPERATURE: 97.5 F | OXYGEN SATURATION: 98 % | BODY MASS INDEX: 21.04 KG/M2 | RESPIRATION RATE: 16 BRPM | DIASTOLIC BLOOD PRESSURE: 73 MMHG | SYSTOLIC BLOOD PRESSURE: 113 MMHG | WEIGHT: 112.85 LBS | HEART RATE: 114 BPM

## 2022-06-01 LAB
ALBUMIN SERPL ELPH-MCNC: 4 G/DL — SIGNIFICANT CHANGE UP (ref 3.3–5)
ALP SERPL-CCNC: 104 U/L — SIGNIFICANT CHANGE UP (ref 40–120)
ALT FLD-CCNC: 12 U/L — SIGNIFICANT CHANGE UP (ref 10–45)
ANION GAP SERPL CALC-SCNC: 17 MMOL/L — SIGNIFICANT CHANGE UP (ref 5–17)
AST SERPL-CCNC: 15 U/L — SIGNIFICANT CHANGE UP (ref 10–40)
BILIRUB SERPL-MCNC: 0.2 MG/DL — SIGNIFICANT CHANGE UP (ref 0.2–1.2)
BUN SERPL-MCNC: 11 MG/DL — SIGNIFICANT CHANGE UP (ref 7–23)
CALCIUM SERPL-MCNC: 9.1 MG/DL — SIGNIFICANT CHANGE UP (ref 8.4–10.5)
CANCER AG125 SERPL-ACNC: 10 U/ML — SIGNIFICANT CHANGE UP
CHLORIDE SERPL-SCNC: 103 MMOL/L — SIGNIFICANT CHANGE UP (ref 96–108)
CO2 SERPL-SCNC: 20 MMOL/L — LOW (ref 22–31)
CREAT SERPL-MCNC: 0.8 MG/DL — SIGNIFICANT CHANGE UP (ref 0.5–1.3)
EGFR: 80 ML/MIN/1.73M2 — SIGNIFICANT CHANGE UP
GLUCOSE SERPL-MCNC: 126 MG/DL — HIGH (ref 70–99)
HCT VFR BLD CALC: 37.9 % — SIGNIFICANT CHANGE UP (ref 34.5–45)
HGB BLD-MCNC: 13.2 G/DL — SIGNIFICANT CHANGE UP (ref 11.5–15.5)
MAGNESIUM SERPL-MCNC: 1.6 MG/DL — SIGNIFICANT CHANGE UP (ref 1.6–2.6)
MCHC RBC-ENTMCNC: 33.6 PG — SIGNIFICANT CHANGE UP (ref 27–34)
MCHC RBC-ENTMCNC: 34.8 G/DL — SIGNIFICANT CHANGE UP (ref 32–36)
MCV RBC AUTO: 96.4 FL — SIGNIFICANT CHANGE UP (ref 80–100)
PLATELET # BLD AUTO: 354 K/UL — SIGNIFICANT CHANGE UP (ref 150–400)
POTASSIUM SERPL-MCNC: 3.2 MMOL/L — LOW (ref 3.5–5.3)
POTASSIUM SERPL-SCNC: 3.2 MMOL/L — LOW (ref 3.5–5.3)
PROT SERPL-MCNC: 6.5 G/DL — SIGNIFICANT CHANGE UP (ref 6–8.3)
RBC # BLD: 3.93 M/UL — SIGNIFICANT CHANGE UP (ref 3.8–5.2)
RBC # FLD: 12.6 % — SIGNIFICANT CHANGE UP (ref 10.3–14.5)
SODIUM SERPL-SCNC: 139 MMOL/L — SIGNIFICANT CHANGE UP (ref 135–145)
T4 FREE+ TSH PNL SERPL: 5.35 UIU/ML — HIGH (ref 0.27–4.2)
WBC # BLD: 8.53 K/UL — SIGNIFICANT CHANGE UP (ref 3.8–10.5)
WBC # FLD AUTO: 8.53 K/UL — SIGNIFICANT CHANGE UP (ref 3.8–10.5)

## 2022-06-01 PROCEDURE — 99214 OFFICE O/P EST MOD 30 MIN: CPT

## 2022-06-03 ENCOUNTER — INPATIENT (INPATIENT)
Facility: HOSPITAL | Age: 67
LOS: 2 days | Discharge: HOME | End: 2022-06-06
Attending: HOSPITALIST | Admitting: HOSPITALIST
Payer: MEDICARE

## 2022-06-03 ENCOUNTER — APPOINTMENT (OUTPATIENT)
Dept: INFUSION THERAPY | Facility: HOSPITAL | Age: 67
End: 2022-06-03

## 2022-06-03 ENCOUNTER — NON-APPOINTMENT (OUTPATIENT)
Age: 67
End: 2022-06-03

## 2022-06-03 ENCOUNTER — RESULT REVIEW (OUTPATIENT)
Age: 67
End: 2022-06-03

## 2022-06-03 VITALS
HEIGHT: 61 IN | TEMPERATURE: 98 F | SYSTOLIC BLOOD PRESSURE: 95 MMHG | DIASTOLIC BLOOD PRESSURE: 58 MMHG | OXYGEN SATURATION: 98 % | RESPIRATION RATE: 18 BRPM | HEART RATE: 92 BPM

## 2022-06-03 DIAGNOSIS — Z98.890 OTHER SPECIFIED POSTPROCEDURAL STATES: Chronic | ICD-10-CM

## 2022-06-03 DIAGNOSIS — Z90.710 ACQUIRED ABSENCE OF BOTH CERVIX AND UTERUS: Chronic | ICD-10-CM

## 2022-06-03 DIAGNOSIS — E87.6 HYPOKALEMIA: ICD-10-CM

## 2022-06-03 LAB
ALBUMIN SERPL ELPH-MCNC: 3.1 G/DL — LOW (ref 3.3–5)
ALP SERPL-CCNC: 89 U/L — SIGNIFICANT CHANGE UP (ref 40–120)
ALT FLD-CCNC: 8 U/L — SIGNIFICANT CHANGE UP (ref 4–33)
ANION GAP SERPL CALC-SCNC: 12 MMOL/L — SIGNIFICANT CHANGE UP (ref 7–14)
AST SERPL-CCNC: 10 U/L — SIGNIFICANT CHANGE UP (ref 4–32)
BASOPHILS # BLD AUTO: 0.01 K/UL — SIGNIFICANT CHANGE UP (ref 0–0.2)
BASOPHILS NFR BLD AUTO: 0.1 % — SIGNIFICANT CHANGE UP (ref 0–2)
BILIRUB SERPL-MCNC: 0.2 MG/DL — SIGNIFICANT CHANGE UP (ref 0.2–1.2)
BUN SERPL-MCNC: 11 MG/DL — SIGNIFICANT CHANGE UP (ref 7–23)
BUN SERPL-MCNC: 9 MG/DL — SIGNIFICANT CHANGE UP (ref 7–23)
CA-I BLDA-SCNC: 1.28 MMOL/L — SIGNIFICANT CHANGE UP (ref 1.12–1.3)
CALCIUM SERPL-MCNC: 8.7 MG/DL — SIGNIFICANT CHANGE UP (ref 8.4–10.5)
CHLORIDE SERPL-SCNC: 106 MMOL/L — SIGNIFICANT CHANGE UP (ref 96–108)
CHLORIDE SERPL-SCNC: 108 MMOL/L — HIGH (ref 98–107)
CO2 SERPL-SCNC: 18 MMOL/L — LOW (ref 22–31)
CO2 SERPL-SCNC: 19 MMOL/L — LOW (ref 22–31)
CREAT SERPL-MCNC: 0.78 MG/DL — SIGNIFICANT CHANGE UP (ref 0.5–1.3)
CREAT SERPL-MCNC: 1 MG/DL — SIGNIFICANT CHANGE UP (ref 0.5–1.3)
EGFR: 82 ML/MIN/1.73M2 — SIGNIFICANT CHANGE UP
EOSINOPHIL # BLD AUTO: 0.14 K/UL — SIGNIFICANT CHANGE UP (ref 0–0.5)
EOSINOPHIL NFR BLD AUTO: 1.8 % — SIGNIFICANT CHANGE UP (ref 0–6)
GLUCOSE SERPL-MCNC: 109 MG/DL — HIGH (ref 70–99)
GLUCOSE SERPL-MCNC: 121 MG/DL — HIGH (ref 70–99)
HCT VFR BLD CALC: 33.9 % — LOW (ref 34.5–45)
HGB BLD-MCNC: 11.5 G/DL — SIGNIFICANT CHANGE UP (ref 11.5–15.5)
IANC: 5.43 K/UL — SIGNIFICANT CHANGE UP (ref 1.8–7.4)
IMM GRANULOCYTES NFR BLD AUTO: 0.5 % — SIGNIFICANT CHANGE UP (ref 0–1.5)
LIDOCAIN IGE QN: 264 U/L — HIGH (ref 7–60)
LYMPHOCYTES # BLD AUTO: 1.13 K/UL — SIGNIFICANT CHANGE UP (ref 1–3.3)
LYMPHOCYTES # BLD AUTO: 14.3 % — SIGNIFICANT CHANGE UP (ref 13–44)
MCHC RBC-ENTMCNC: 33 PG — SIGNIFICANT CHANGE UP (ref 27–34)
MCHC RBC-ENTMCNC: 33.9 GM/DL — SIGNIFICANT CHANGE UP (ref 32–36)
MCV RBC AUTO: 97.1 FL — SIGNIFICANT CHANGE UP (ref 80–100)
MONOCYTES # BLD AUTO: 1.15 K/UL — HIGH (ref 0–0.9)
MONOCYTES NFR BLD AUTO: 14.6 % — HIGH (ref 2–14)
NEUTROPHILS # BLD AUTO: 5.43 K/UL — SIGNIFICANT CHANGE UP (ref 1.8–7.4)
NEUTROPHILS NFR BLD AUTO: 68.7 % — SIGNIFICANT CHANGE UP (ref 43–77)
NRBC # BLD: 0 /100 WBCS — SIGNIFICANT CHANGE UP
NRBC # FLD: 0 K/UL — SIGNIFICANT CHANGE UP
PLATELET # BLD AUTO: 286 K/UL — SIGNIFICANT CHANGE UP (ref 150–400)
POTASSIUM SERPL-MCNC: 2.7 MMOL/L — CRITICAL LOW (ref 3.5–5.3)
POTASSIUM SERPL-MCNC: 2.7 MMOL/L — CRITICAL LOW (ref 3.5–5.3)
POTASSIUM SERPL-SCNC: 2.7 MMOL/L — CRITICAL LOW (ref 3.5–5.3)
POTASSIUM SERPL-SCNC: 2.7 MMOL/L — CRITICAL LOW (ref 3.5–5.3)
PROT SERPL-MCNC: 5.6 G/DL — LOW (ref 6–8.3)
RBC # BLD: 3.49 M/UL — LOW (ref 3.8–5.2)
RBC # FLD: 13 % — SIGNIFICANT CHANGE UP (ref 10.3–14.5)
SODIUM SERPL-SCNC: 138 MMOL/L — SIGNIFICANT CHANGE UP (ref 135–145)
SODIUM SERPL-SCNC: 139 MMOL/L — SIGNIFICANT CHANGE UP (ref 135–145)
WBC # BLD: 7.9 K/UL — SIGNIFICANT CHANGE UP (ref 3.8–10.5)
WBC # FLD AUTO: 7.9 K/UL — SIGNIFICANT CHANGE UP (ref 3.8–10.5)

## 2022-06-03 PROCEDURE — 99285 EMERGENCY DEPT VISIT HI MDM: CPT | Mod: 25,GC

## 2022-06-03 PROCEDURE — 71045 X-RAY EXAM CHEST 1 VIEW: CPT | Mod: 26

## 2022-06-03 PROCEDURE — 93010 ELECTROCARDIOGRAM REPORT: CPT | Mod: GC

## 2022-06-03 RX ORDER — FAMOTIDINE 10 MG/ML
20 INJECTION INTRAVENOUS ONCE
Refills: 0 | Status: COMPLETED | OUTPATIENT
Start: 2022-06-03 | End: 2022-06-03

## 2022-06-03 RX ORDER — POTASSIUM CHLORIDE 20 MEQ
40 PACKET (EA) ORAL ONCE
Refills: 0 | Status: COMPLETED | OUTPATIENT
Start: 2022-06-03 | End: 2022-06-03

## 2022-06-03 RX ORDER — POTASSIUM CHLORIDE 20 MEQ
10 PACKET (EA) ORAL
Refills: 0 | Status: COMPLETED | OUTPATIENT
Start: 2022-06-03 | End: 2022-06-03

## 2022-06-03 RX ORDER — SODIUM CHLORIDE 9 MG/ML
1000 INJECTION INTRAMUSCULAR; INTRAVENOUS; SUBCUTANEOUS ONCE
Refills: 0 | Status: COMPLETED | OUTPATIENT
Start: 2022-06-03 | End: 2022-06-03

## 2022-06-03 RX ADMIN — FAMOTIDINE 20 MILLIGRAM(S): 10 INJECTION INTRAVENOUS at 22:30

## 2022-06-03 RX ADMIN — Medication 40 MILLIEQUIVALENT(S): at 22:29

## 2022-06-03 RX ADMIN — Medication 100 MILLIEQUIVALENT(S): at 22:30

## 2022-06-03 RX ADMIN — Medication 30 MILLILITER(S): at 22:29

## 2022-06-03 RX ADMIN — SODIUM CHLORIDE 1000 MILLILITER(S): 9 INJECTION INTRAMUSCULAR; INTRAVENOUS; SUBCUTANEOUS at 22:30

## 2022-06-03 NOTE — ED PROVIDER NOTE - ATTENDING CONTRIBUTION TO CARE
Pt with active cancer on chemo presents with worsening diarrhea >10 episodes per day, vomiting now resolved, K 2.7 likely in setting of volume loss from diarrhea. Plan for K repletion, admission for further care, GI PCR and c diff pending.

## 2022-06-03 NOTE — ED ADULT NURSE REASSESSMENT NOTE - NS ED NURSE REASSESS COMMENT FT1
Report received from JADA Roach. pt. remains A&Ox4, awake and resting. pt. offers no new complaints at this time. no acute distress noted. respirations even and unlabored. VS as noted. pending due labs and meds.

## 2022-06-03 NOTE — ED ADULT TRIAGE NOTE - CHIEF COMPLAINT QUOTE
Pt from [facility] by EMS for hypokalemia. Pt was receiving routine IV infusion of NS and k lab draw after infusion showed pt to be hypokalemic. (2.7) Pt endorsing diarrhea x2 days. PMH endometrial CA.

## 2022-06-03 NOTE — ED PROVIDER NOTE - PROGRESS NOTE DETAILS
Garrett ALBARADO PGY1: labs hypo k hypo mag will replete and admit. spoke to hosp who will admit pt.

## 2022-06-03 NOTE — ED PROVIDER NOTE - OBJECTIVE STATEMENT
66 yo F pmhx uterine ca previous chemo on immunotherapy followed at [facility] p/w out pt labs K 2.7 in setting of 1 week of n/v/d. pt reports intermittent episodes over the last week. non bloody. Pt also reports epigastric burning with radiation up her throat. pcp prescribed metoclopramide and pantoprazole. Has GI appt next week. No cp sob abd pain f.c dysuria.

## 2022-06-03 NOTE — ED PROVIDER NOTE - CLINICAL SUMMARY MEDICAL DECISION MAKING FREE TEXT BOX
68 yo F pmhx uterine ca previous chemo on immunotherapy followed at [facility] p/w out pt labs K 2.7 in setting of 1 week of n/v/d. a/w epigastric burning. pt no currently nauseous VSS. benign abd exam. ekg with no hypoK changes. c/f hepatobiliary vs pancreatic vs enteritis vs pud vs med side effects. will get labs ekg. give ivf pepcid maalox and K runners w/ po and r/a

## 2022-06-04 DIAGNOSIS — C54.1 MALIGNANT NEOPLASM OF ENDOMETRIUM: ICD-10-CM

## 2022-06-04 DIAGNOSIS — D64.9 ANEMIA, UNSPECIFIED: ICD-10-CM

## 2022-06-04 DIAGNOSIS — E06.4 DRUG-INDUCED THYROIDITIS: ICD-10-CM

## 2022-06-04 DIAGNOSIS — R19.7 DIARRHEA, UNSPECIFIED: ICD-10-CM

## 2022-06-04 DIAGNOSIS — E87.6 HYPOKALEMIA: ICD-10-CM

## 2022-06-04 DIAGNOSIS — I10 ESSENTIAL (PRIMARY) HYPERTENSION: ICD-10-CM

## 2022-06-04 DIAGNOSIS — R10.13 EPIGASTRIC PAIN: ICD-10-CM

## 2022-06-04 DIAGNOSIS — E83.42 HYPOMAGNESEMIA: ICD-10-CM

## 2022-06-04 DIAGNOSIS — Z29.9 ENCOUNTER FOR PROPHYLACTIC MEASURES, UNSPECIFIED: ICD-10-CM

## 2022-06-04 LAB
ALBUMIN SERPL ELPH-MCNC: 2.7 G/DL — LOW (ref 3.3–5)
ALP SERPL-CCNC: 85 U/L — SIGNIFICANT CHANGE UP (ref 40–120)
ALT FLD-CCNC: 9 U/L — SIGNIFICANT CHANGE UP (ref 4–33)
ANION GAP SERPL CALC-SCNC: 9 MMOL/L — SIGNIFICANT CHANGE UP (ref 7–14)
APTT BLD: 31.3 SEC — SIGNIFICANT CHANGE UP (ref 27–36.3)
AST SERPL-CCNC: 10 U/L — SIGNIFICANT CHANGE UP (ref 4–32)
BASE EXCESS BLDV CALC-SCNC: -8.7 MMOL/L — LOW (ref -2–3)
BASOPHILS # BLD AUTO: 0.02 K/UL — SIGNIFICANT CHANGE UP (ref 0–0.2)
BASOPHILS NFR BLD AUTO: 0.3 % — SIGNIFICANT CHANGE UP (ref 0–2)
BILIRUB SERPL-MCNC: 0.3 MG/DL — SIGNIFICANT CHANGE UP (ref 0.2–1.2)
BLOOD GAS VENOUS COMPREHENSIVE RESULT: SIGNIFICANT CHANGE UP
BUN SERPL-MCNC: 6 MG/DL — LOW (ref 7–23)
C DIFF BY PCR RESULT: SIGNIFICANT CHANGE UP
C DIFF TOX GENS STL QL NAA+PROBE: SIGNIFICANT CHANGE UP
CALCIUM SERPL-MCNC: 8.2 MG/DL — LOW (ref 8.4–10.5)
CHLORIDE BLDV-SCNC: 113 MMOL/L — HIGH (ref 96–108)
CHLORIDE SERPL-SCNC: 112 MMOL/L — HIGH (ref 98–107)
CO2 BLDV-SCNC: 19.3 MMOL/L — LOW (ref 22–26)
CO2 SERPL-SCNC: 17 MMOL/L — LOW (ref 22–31)
CREAT SERPL-MCNC: 0.74 MG/DL — SIGNIFICANT CHANGE UP (ref 0.5–1.3)
CULTURE RESULTS: SIGNIFICANT CHANGE UP
EGFR: 88 ML/MIN/1.73M2 — SIGNIFICANT CHANGE UP
EOSINOPHIL # BLD AUTO: 0.26 K/UL — SIGNIFICANT CHANGE UP (ref 0–0.5)
EOSINOPHIL NFR BLD AUTO: 3.8 % — SIGNIFICANT CHANGE UP (ref 0–6)
FLUAV AG NPH QL: SIGNIFICANT CHANGE UP
FLUBV AG NPH QL: SIGNIFICANT CHANGE UP
GAS PNL BLDV: 138 MMOL/L — SIGNIFICANT CHANGE UP (ref 136–145)
GAS PNL BLDV: SIGNIFICANT CHANGE UP
GLUCOSE BLDV-MCNC: 99 MG/DL — SIGNIFICANT CHANGE UP (ref 70–99)
GLUCOSE SERPL-MCNC: 105 MG/DL — HIGH (ref 70–99)
HCO3 BLDV-SCNC: 18 MMOL/L — LOW (ref 22–29)
HCT VFR BLD CALC: 30.7 % — LOW (ref 34.5–45)
HCT VFR BLDA CALC: 32 % — LOW (ref 34.5–46.5)
HGB BLD CALC-MCNC: 10.5 G/DL — LOW (ref 11.5–15.5)
HGB BLD-MCNC: 10.6 G/DL — LOW (ref 11.5–15.5)
IANC: 4.58 K/UL — SIGNIFICANT CHANGE UP (ref 1.8–7.4)
IMM GRANULOCYTES NFR BLD AUTO: 0.6 % — SIGNIFICANT CHANGE UP (ref 0–1.5)
INR BLD: 1.41 RATIO — HIGH (ref 0.88–1.16)
LACTATE BLDV-MCNC: 0.9 MMOL/L — SIGNIFICANT CHANGE UP (ref 0.5–2)
LYMPHOCYTES # BLD AUTO: 0.95 K/UL — LOW (ref 1–3.3)
LYMPHOCYTES # BLD AUTO: 14 % — SIGNIFICANT CHANGE UP (ref 13–44)
MAGNESIUM SERPL-MCNC: 2.1 MG/DL — SIGNIFICANT CHANGE UP (ref 1.6–2.6)
MCHC RBC-ENTMCNC: 32.6 PG — SIGNIFICANT CHANGE UP (ref 27–34)
MCHC RBC-ENTMCNC: 34.5 GM/DL — SIGNIFICANT CHANGE UP (ref 32–36)
MCV RBC AUTO: 94.5 FL — SIGNIFICANT CHANGE UP (ref 80–100)
MONOCYTES # BLD AUTO: 0.96 K/UL — HIGH (ref 0–0.9)
MONOCYTES NFR BLD AUTO: 14.1 % — HIGH (ref 2–14)
NEUTROPHILS # BLD AUTO: 4.58 K/UL — SIGNIFICANT CHANGE UP (ref 1.8–7.4)
NEUTROPHILS NFR BLD AUTO: 67.2 % — SIGNIFICANT CHANGE UP (ref 43–77)
NRBC # BLD: 0 /100 WBCS — SIGNIFICANT CHANGE UP
NRBC # FLD: 0 K/UL — SIGNIFICANT CHANGE UP
OB PNL STL: POSITIVE
PCO2 BLDV: 41 MMHG — SIGNIFICANT CHANGE UP (ref 39–42)
PH BLDV: 7.25 — LOW (ref 7.32–7.43)
PHOSPHATE SERPL-MCNC: 1.9 MG/DL — LOW (ref 2.5–4.5)
PLATELET # BLD AUTO: 258 K/UL — SIGNIFICANT CHANGE UP (ref 150–400)
PO2 BLDV: 39 MMHG — SIGNIFICANT CHANGE UP
POTASSIUM BLDV-SCNC: 3.6 MMOL/L — SIGNIFICANT CHANGE UP (ref 3.5–5.1)
POTASSIUM SERPL-MCNC: 3.7 MMOL/L — SIGNIFICANT CHANGE UP (ref 3.5–5.3)
POTASSIUM SERPL-SCNC: 3.7 MMOL/L — SIGNIFICANT CHANGE UP (ref 3.5–5.3)
PROT SERPL-MCNC: 5 G/DL — LOW (ref 6–8.3)
PROTHROM AB SERPL-ACNC: 16.4 SEC — HIGH (ref 10.5–13.4)
RBC # BLD: 3.25 M/UL — LOW (ref 3.8–5.2)
RBC # FLD: 13 % — SIGNIFICANT CHANGE UP (ref 10.3–14.5)
RSV RNA NPH QL NAA+NON-PROBE: SIGNIFICANT CHANGE UP
SAO2 % BLDV: 64.3 % — SIGNIFICANT CHANGE UP
SARS-COV-2 RNA SPEC QL NAA+PROBE: SIGNIFICANT CHANGE UP
SODIUM SERPL-SCNC: 138 MMOL/L — SIGNIFICANT CHANGE UP (ref 135–145)
SPECIMEN SOURCE: SIGNIFICANT CHANGE UP
WBC # BLD: 6.81 K/UL — SIGNIFICANT CHANGE UP (ref 3.8–10.5)
WBC # FLD AUTO: 6.81 K/UL — SIGNIFICANT CHANGE UP (ref 3.8–10.5)

## 2022-06-04 PROCEDURE — 99223 1ST HOSP IP/OBS HIGH 75: CPT

## 2022-06-04 PROCEDURE — 99233 SBSQ HOSP IP/OBS HIGH 50: CPT

## 2022-06-04 PROCEDURE — 99222 1ST HOSP IP/OBS MODERATE 55: CPT

## 2022-06-04 RX ORDER — MAGNESIUM SULFATE 500 MG/ML
2 VIAL (ML) INJECTION ONCE
Refills: 0 | Status: COMPLETED | OUTPATIENT
Start: 2022-06-04 | End: 2022-06-04

## 2022-06-04 RX ORDER — CHLORHEXIDINE GLUCONATE 213 G/1000ML
1 SOLUTION TOPICAL DAILY
Refills: 0 | Status: DISCONTINUED | OUTPATIENT
Start: 2022-06-04 | End: 2022-06-06

## 2022-06-04 RX ORDER — SODIUM CHLORIDE 9 MG/ML
1000 INJECTION, SOLUTION INTRAVENOUS
Refills: 0 | Status: DISCONTINUED | OUTPATIENT
Start: 2022-06-04 | End: 2022-06-04

## 2022-06-04 RX ORDER — SODIUM CHLORIDE 9 MG/ML
1000 INJECTION INTRAMUSCULAR; INTRAVENOUS; SUBCUTANEOUS ONCE
Refills: 0 | Status: COMPLETED | OUTPATIENT
Start: 2022-06-04 | End: 2022-06-04

## 2022-06-04 RX ORDER — LEVOTHYROXINE SODIUM 125 MCG
75 TABLET ORAL DAILY
Refills: 0 | Status: DISCONTINUED | OUTPATIENT
Start: 2022-06-04 | End: 2022-06-06

## 2022-06-04 RX ORDER — ACETAMINOPHEN 500 MG
650 TABLET ORAL EVERY 6 HOURS
Refills: 0 | Status: DISCONTINUED | OUTPATIENT
Start: 2022-06-04 | End: 2022-06-06

## 2022-06-04 RX ORDER — INFLUENZA VIRUS VACCINE 15; 15; 15; 15 UG/.5ML; UG/.5ML; UG/.5ML; UG/.5ML
0.7 SUSPENSION INTRAMUSCULAR ONCE
Refills: 0 | Status: DISCONTINUED | OUTPATIENT
Start: 2022-06-04 | End: 2022-06-06

## 2022-06-04 RX ORDER — PANTOPRAZOLE SODIUM 20 MG/1
40 TABLET, DELAYED RELEASE ORAL EVERY 12 HOURS
Refills: 0 | Status: DISCONTINUED | OUTPATIENT
Start: 2022-06-04 | End: 2022-06-06

## 2022-06-04 RX ADMIN — Medication 25 GRAM(S): at 02:20

## 2022-06-04 RX ADMIN — SODIUM CHLORIDE 500 MILLILITER(S): 9 INJECTION INTRAMUSCULAR; INTRAVENOUS; SUBCUTANEOUS at 01:11

## 2022-06-04 RX ADMIN — Medication 63.75 MILLIMOLE(S): at 11:33

## 2022-06-04 RX ADMIN — PANTOPRAZOLE SODIUM 40 MILLIGRAM(S): 20 TABLET, DELAYED RELEASE ORAL at 07:34

## 2022-06-04 RX ADMIN — Medication 75 MICROGRAM(S): at 05:51

## 2022-06-04 RX ADMIN — Medication 650 MILLIGRAM(S): at 23:15

## 2022-06-04 RX ADMIN — Medication 100 MILLIEQUIVALENT(S): at 01:10

## 2022-06-04 RX ADMIN — Medication 100 MILLIEQUIVALENT(S): at 00:16

## 2022-06-04 RX ADMIN — Medication 650 MILLIGRAM(S): at 22:45

## 2022-06-04 RX ADMIN — CHLORHEXIDINE GLUCONATE 1 APPLICATION(S): 213 SOLUTION TOPICAL at 12:40

## 2022-06-04 RX ADMIN — PANTOPRAZOLE SODIUM 40 MILLIGRAM(S): 20 TABLET, DELAYED RELEASE ORAL at 17:57

## 2022-06-04 NOTE — H&P ADULT - PROBLEM SELECTOR PLAN 3
Serum K 2.7 on admission. Likely in setting of diarrhea. EKG with no concerning changes.   - Repleted with IV KCl 10 mEq x3 and PO KCl 40 mEq x1 in ED  - Serum Mg 1.5 on admission, also repleted as below  - F/u AM CMP  - Monitor serum K and serum Mg  - If pt with persistent severe hypokalemia from ongoing diarrhea, will switch to tele until serum K normalizes

## 2022-06-04 NOTE — H&P ADULT - PROBLEM SELECTOR PLAN 6
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  - Pt follows with Dr. Toy Blank at [facility]. Oncology consult to be called in AM.   - Pain control with PO Tylenol PRN (pt not on any pain meds at home)

## 2022-06-04 NOTE — CONSULT NOTE ADULT - SUBJECTIVE AND OBJECTIVE BOX
Chief Complaint:  Patient is a 67y old  Female who presents with a chief complaint of Diarrhea, electrolyte abnormalities (04 Jun 2022 03:13)      HPI: 68 yo woman with history of endometrial cancer (initially diagnosed as stage 2 in 7/2018) s/p TLH+BSO and adjuvant RT, now with metastatic disease to lungs (s/p multiple RLL wedge resections 10/2019) and vaginal cuff, tx with carboplatin/paclitaxel until 2/2021, most recently on lenvatinib (Lenvima) and pembrolizumab (Keytruda) started 3/2021, HTN, and immunotherapy-associated thyroiditis presents with 3 days of worsening diarrhea. Pt has been having diarrhea on and off for the past 2 weeks. The diarrhea initially subsided late last week but then intensified this past Wednesday, with pt having more than 10 episodes of watery diarrhea the last 3 days, including at least 6-7 episodes while in the ED. Pt has only been able to eat rice since the diarrhea worsened. Pt notes that with one of the episodes in the ED, the toilet bowel was filled with red liquid, which pt thinks was due to recent tomato sauce she had eaten and not from blood. Pt reports that with subsequent episodes of diarrhea, the liquid became clearer and clearer, with no red seen with the last episode. No recent antibiotic use, sick contacts, or travel. Of note, pt's Lenvima was lowered 5/16 for GI symptoms (epigastric pain, reflux, nausea). Continued to have burning throat/upper esophageal pain late May with no relief using peptobismol, pepcid or carafate. Lenvima was held at this time (5/23). Seen outpatient 6/1 with worsening GI symptoms of n/v, midepigastric pain, reflux and diarrhea. Holding lenvima did not help symptoms. Patient began to have increasing nausea with multiple episodes of nonbloody, nonbilious vomiting on Sunday and Monday, so pt was told by her oncologist to again hold her Lenvima, with her last dose being Tuesday night. Pt's last dose of Keytruda was ~3 weeks ago. Pt's vomiting resolved after Monday but was followed soon after by worsening diarrhea. Pt has been taking Imodium since Wednesday, though without her diarrhea easing up. Pt states that this is different from last week when pt's diarrhea responded to the Imodium and eventually subsided.     Pt reports fatigue and generalized weakness but denies any chest pain, shortness of breath, palpitations, cough, LE edema, lightheadedness, dizziness, headaches, vision changes, urinary symptoms, melena, or BRBPR.    Allergies:  gabapentin (Other)      Home Medications:    Hospital Medications:  acetaminophen     Tablet .. 650 milliGRAM(s) Oral every 6 hours PRN  chlorhexidine 2% Cloths 1 Application(s) Topical daily  influenza  Vaccine (HIGH DOSE) 0.7 milliLiter(s) IntraMuscular once  lactated ringers. 1000 milliLiter(s) IV Continuous <Continuous>  levothyroxine 75 MICROGram(s) Oral daily  pantoprazole  Injectable 40 milliGRAM(s) IV Push every 12 hours      PMHX/PSHX:  Carotid stenosis, left    Cerebrovascular accident (CVA) due to occlusion of carotid artery    Endometrial cancer    HTN (hypertension)    Drug-induced thyroiditis    H/O dilation and curettage    S/P myomectomy    History of hand surgery    History of lumpectomy of right breast    Status post hysteroscopy    S/P hysterectomy        Family history:  Family history of CLL (chronic lymphoid leukemia)    Family history of breast cancer in female    Family history of hypertension        Social History:     ROS:     General:  No weight loss, fevers, chills, night sweats, fatigue  Eyes:  No vision changes, no yellowing of eyes   ENT:  No throat pain, runny nose  CV:  No chest pain, palpitations  Resp:  No SOB, cough, wheezing  GI:  See HPI  :  No burning with urination, no hematuria   Muscle:  No muscle pain, weakness  Neuro:  No numbness/tingling, memory problems  Psych:  No fatigue, insomnia, mood problems  Heme:  No easy bruisability  Skin:  No rash, itching       PHYSICAL EXAM:     GENERAL:  Appears stated age, well-groomed, well-nourished, no distress  HEENT:  NC/AT,  conjunctivae clear and pink,  no JVD  CHEST:  Full & symmetric excursion, no increased effort, breath sounds clear  HEART:  Regular rhythm, S1, S2, no murmur/rub/S3/S4, no abdominal bruit, no edema  ABDOMEN:  Soft, non-tender, non-distended, normoactive bowel sounds,  no masses ,  EXTREMITIES:  no cyanosis,clubbing or edema  SKIN:  No rash/erythema/ecchymoses/petechiae/wounds/abscess/warm/dry  NEURO:  Alert, oriented    Vital Signs:  Vital Signs Last 24 Hrs  T(C): 36.8 (04 Jun 2022 11:19), Max: 36.9 (03 Jun 2022 16:30)  T(F): 98.3 (04 Jun 2022 11:19), Max: 98.4 (03 Jun 2022 16:30)  HR: 78 (04 Jun 2022 11:19) (76 - 100)  BP: 114/71 (04 Jun 2022 11:19) (95/58 - 127/99)  BP(mean): --  RR: 18 (04 Jun 2022 11:19) (16 - 18)  SpO2: 100% (04 Jun 2022 11:19) (97% - 100%)  Daily Height in cm: 154.94 (03 Jun 2022 18:29)    Daily     LABS:                        10.6   6.81  )-----------( 258      ( 04 Jun 2022 06:00 )             30.7     06-04    138  |  112<H>  |  6<L>  ----------------------------<  105<H>  3.7   |  17<L>  |  0.74    Ca    8.2<L>      04 Jun 2022 06:00  Phos  1.9     06-04  Mg     2.10     06-04    TPro  5.0<L>  /  Alb  2.7<L>  /  TBili  0.3  /  DBili  x   /  AST  10  /  ALT  9   /  AlkPhos  85  06-04    LIVER FUNCTIONS - ( 04 Jun 2022 06:00 )  Alb: 2.7 g/dL / Pro: 5.0 g/dL / ALK PHOS: 85 U/L / ALT: 9 U/L / AST: 10 U/L / GGT: x           PT/INR - ( 04 Jun 2022 06:00 )   PT: 16.4 sec;   INR: 1.41 ratio         PTT - ( 04 Jun 2022 06:00 )  PTT:31.3 sec    Amylase Serum--      Lipase [id number]       Ammonia--      Imaging:    < from: CT Chest w/ IV Cont (05.25.22 @ 15:12) >  FINDINGS:  CHEST:  LUNGS AND LARGE AIRWAYS: Patent central airways. Postsurgical changes of   wedge resection of the right lower lobe. Stable left upper lobe nodule   measuring 2.3 x 1.8 cm (2, 8). Slight interval decrease in right   perihilar nodule, now measuring 1.6 cm (2, 30), previously measured 1.9   cm. Stable nodular thickening along the minor fissure.  PLEURA: No pleural effusion.  VESSELS: Within normal limits.  HEART: Heart size is normal. No pericardial effusion.  MEDIASTINUM AND VIDAL: No lymphadenopathy.  CHEST WALL AND LOWER NECK: Mediport with tip in SVC.    ABDOMEN AND PELVIS:  LIVER: Steatosis. Right hepatic calcified granuloma.  BILE DUCTS: Normal caliber.  GALLBLADDER: Within normal limits.  SPLEEN: Within normal limits.  PANCREAS: Stable subcentimeter pancreatic head and tail hypodense likely   cystic lesions  ADRENALS: Within normal limits.  KIDNEYS/URETERS: No hydronephrosis. Bilateral nonobstructing intrarenal   calculi. Bilateral renal cysts and additional subcentimeter hypodensities   which are too small to characterize. Stable partially exophytic right   upper pole cyst measuring 1.5 cm with internal enhancing septations and   marrow nodule.    BLADDER: Within normal limits.  REPRODUCTIVE ORGANS: Prostatectomy.    BOWEL: No bowel obstruction. Appendix is normal.  PERITONEUM: No ascites.  VESSELS: Atherosclerotic changes.  RETROPERITONEUM/LYMPH NODES: No lymphadenopathy.  ABDOMINAL WALL: Stable appearance of the left elbow.  BONES: Within normal limits.    IMPRESSION:  Stable/slightly decreased pulmonary nodules as described above.  No evidence of recurrence or metastatic disease in the abdomen and pelvis.    --- End of Report ---    < end of copied text >           Chief Complaint:  Patient is a 67y old  Female who presents with a chief complaint of Diarrhea, electrolyte abnormalities (04 Jun 2022 03:13)      HPI: 66 yo woman with history of endometrial cancer (initially diagnosed as stage 2 in 7/2018) s/p TLH+BSO and adjuvant RT, now with metastatic disease to lungs (s/p multiple RLL wedge resections 10/2019) and vaginal cuff, tx with carboplatin/paclitaxel until 2/2021, most recently on lenvatinib (Lenvima) and pembrolizumab (Keytruda) started 3/2021, HTN, and immunotherapy-associated thyroiditis presents with 3 days of worsening diarrhea. Patient has been having GI symptoms for 2 months now. First started with early satiety, epigastric pain and nausea without vomiting. It would worsen with eating and she noted things felt as though they were getting stuck going down however then would slowly pass. She noted this first happened whenever she took her valsartan however it then resolved after peaking 3 weeks ago when she was having multiple episodes of vomiting daily. It resolved approx 2.5 weeks ago. She has also had intermittently diarrhea over this time that would improve with imodium. No hematochezia or melena. Now, as of Wednesday, the diarrhea restarted and has been the most intense it's been with up to 10 episodes of watery stool in one day that ranges in color from red to brown to green depending on what she eats. No clots. This morning she had 5-6 watery BMs before breakfast, then ate 2 meals since without n/v or diarrhea. No f/c. No recent antibiotic use, sick contacts, or travel. Of note, pt's Lenvima was lowered 5/16 for GI symptoms however it did not help. Lenvima was then held as of 5/23. Seen outpatient 6/1 with worsening GI symptoms of n/v, midepigastric pain, reflux and diarrhea. Pt's last dose of Keytruda was ~3 weeks ago. She reports she has had 3 colonoscopies, all normal; last one was a while ago. Never had EGD. Of note, patient reports most significant discomfort in epigastric area is on top of lower right ribs with palpation however states the pain is inside.       Allergies:  gabapentin (Other)      Home Medications:    Hospital Medications:  acetaminophen     Tablet .. 650 milliGRAM(s) Oral every 6 hours PRN  chlorhexidine 2% Cloths 1 Application(s) Topical daily  influenza  Vaccine (HIGH DOSE) 0.7 milliLiter(s) IntraMuscular once  lactated ringers. 1000 milliLiter(s) IV Continuous <Continuous>  levothyroxine 75 MICROGram(s) Oral daily  pantoprazole  Injectable 40 milliGRAM(s) IV Push every 12 hours      PMHX/PSHX:  Carotid stenosis, left    Cerebrovascular accident (CVA) due to occlusion of carotid artery    Endometrial cancer    HTN (hypertension)    Drug-induced thyroiditis    H/O dilation and curettage    S/P myomectomy    History of hand surgery    History of lumpectomy of right breast    Status post hysteroscopy    S/P hysterectomy        Family history:  Family history of CLL (chronic lymphoid leukemia)    Family history of breast cancer in female    Family history of hypertension        Social History:     ROS:     General:  No weight loss, fevers, chills, night sweats, fatigue  Eyes:  No vision changes, no yellowing of eyes   ENT:  No throat pain, runny nose  CV:  No chest pain, palpitations  Resp:  No SOB, cough, wheezing  GI:  See HPI  :  No burning with urination, no hematuria   Muscle:  No muscle pain, weakness  Neuro:  No numbness/tingling, memory problems  Psych:  No fatigue, insomnia, mood problems  Heme:  No easy bruisability  Skin:  No rash, itching       PHYSICAL EXAM:     GENERAL:  Appears stated age, well-groomed, well-nourished, no distress  HEENT:  NC/AT,  conjunctivae clear and pink,  no JVD  CHEST:  Full & symmetric excursion, no increased effort, breath sounds clear, tender to palpation of right lower anterior ribs  HEART:  Regular rhythm, S1, S2, no murmur/rub/S3/S4, no abdominal bruit, no edema  ABDOMEN:  Soft, minimal tenderness to palpation of epigastric area  EXTREMITIES:  no cyanosis,clubbing or edema  SKIN:  No rash/erythema/ecchymoses/petechiae/wounds/abscess/warm/dry  NEURO:  Alert, oriented    Vital Signs:  Vital Signs Last 24 Hrs  T(C): 36.8 (04 Jun 2022 11:19), Max: 36.9 (03 Jun 2022 16:30)  T(F): 98.3 (04 Jun 2022 11:19), Max: 98.4 (03 Jun 2022 16:30)  HR: 78 (04 Jun 2022 11:19) (76 - 100)  BP: 114/71 (04 Jun 2022 11:19) (95/58 - 127/99)  BP(mean): --  RR: 18 (04 Jun 2022 11:19) (16 - 18)  SpO2: 100% (04 Jun 2022 11:19) (97% - 100%)  Daily Height in cm: 154.94 (03 Jun 2022 18:29)    Daily     LABS:                        10.6   6.81  )-----------( 258      ( 04 Jun 2022 06:00 )             30.7     06-04    138  |  112<H>  |  6<L>  ----------------------------<  105<H>  3.7   |  17<L>  |  0.74    Ca    8.2<L>      04 Jun 2022 06:00  Phos  1.9     06-04  Mg     2.10     06-04    TPro  5.0<L>  /  Alb  2.7<L>  /  TBili  0.3  /  DBili  x   /  AST  10  /  ALT  9   /  AlkPhos  85  06-04    LIVER FUNCTIONS - ( 04 Jun 2022 06:00 )  Alb: 2.7 g/dL / Pro: 5.0 g/dL / ALK PHOS: 85 U/L / ALT: 9 U/L / AST: 10 U/L / GGT: x           PT/INR - ( 04 Jun 2022 06:00 )   PT: 16.4 sec;   INR: 1.41 ratio         PTT - ( 04 Jun 2022 06:00 )  PTT:31.3 sec    Amylase Serum--      Lipase [id number]       Ammonia--      Imaging:    < from: CT Chest w/ IV Cont (05.25.22 @ 15:12) >  FINDINGS:  CHEST:  LUNGS AND LARGE AIRWAYS: Patent central airways. Postsurgical changes of   wedge resection of the right lower lobe. Stable left upper lobe nodule   measuring 2.3 x 1.8 cm (2, 8). Slight interval decrease in right   perihilar nodule, now measuring 1.6 cm (2, 30), previously measured 1.9   cm. Stable nodular thickening along the minor fissure.  PLEURA: No pleural effusion.  VESSELS: Within normal limits.  HEART: Heart size is normal. No pericardial effusion.  MEDIASTINUM AND VIDAL: No lymphadenopathy.  CHEST WALL AND LOWER NECK: Mediport with tip in SVC.    ABDOMEN AND PELVIS:  LIVER: Steatosis. Right hepatic calcified granuloma.  BILE DUCTS: Normal caliber.  GALLBLADDER: Within normal limits.  SPLEEN: Within normal limits.  PANCREAS: Stable subcentimeter pancreatic head and tail hypodense likely   cystic lesions  ADRENALS: Within normal limits.  KIDNEYS/URETERS: No hydronephrosis. Bilateral nonobstructing intrarenal   calculi. Bilateral renal cysts and additional subcentimeter hypodensities   which are too small to characterize. Stable partially exophytic right   upper pole cyst measuring 1.5 cm with internal enhancing septations and   marrow nodule.    BLADDER: Within normal limits.  REPRODUCTIVE ORGANS: Prostatectomy.    BOWEL: No bowel obstruction. Appendix is normal.  PERITONEUM: No ascites.  VESSELS: Atherosclerotic changes.  RETROPERITONEUM/LYMPH NODES: No lymphadenopathy.  ABDOMINAL WALL: Stable appearance of the left elbow.  BONES: Within normal limits.    IMPRESSION:  Stable/slightly decreased pulmonary nodules as described above.  No evidence of recurrence or metastatic disease in the abdomen and pelvis.    --- End of Report ---    < end of copied text >

## 2022-06-04 NOTE — H&P ADULT - HISTORY OF PRESENT ILLNESS
66 yo woman with history of endometrial cancer (initially diagnosed as stage 2 in 7/2018) s/p TLH+BSO and adjuvant RT, now with metastatic disease to lungs and vaginal cuff, most recently on lenvatinib (Lenvima) and pembrolizumab (Keytruda), HTN, and immunotherapy-associated thyroiditis presents with 3 days of worsening diarrhea. Pt has been having diarrhea on and off for the past 2 weeks. The diarrhea initially subsided late last week but then intensified this past Wednesday, with pt having more than 10 episodes of watery diarrhea the last 3 days, including at least 6-7 episodes while in the ED. Pt has only been able to eat rice since the diarrhea worsened. Pt notes that with one of the episodes in the ED, the toilet bowel was filled with red liquid, which pt thinks was due to recent tomato sauce she had eaten and not from blood. Pt reports that with subsequent episodes of diarrhea, the liquid became clearer and clearer, with no red seen with the last episode. Pt has chronic burning epigastric pain, intermittently associated with nausea, but pt denies any new abdominal pain. Pt also denies any associated fevers or chills. No recent antibiotic use, sick contacts, or travel. Of note, pt's Lenvima was held for one week due to pt's chronic epigastric pain, but it was resumed last Friday (5/27/22) at a lower dose of 8 mg daily instead of her usual 14 mg daily. Pt, however, began to have increasing nausea with multiple episodes of nonbloody, nonbilious vomiting on Sunday and Monday, so pt was told by her oncologist to again hold her Lenvima, with her last dose being Tuesday night. Pt's last dose of Keytruda was ~3 weeks ago. Pt's vomiting resolved but was followed by worsening diarrhea over the past 3 days. Pt has been taking Imodium since Wednesday, though without her diarrhea easing up. Pt states that this is different from last week when pt's diarrhea responded to the Imodium and eventually subsided.     Pt denies any chest pain, shortness of breath, palpitations, cough, LE edema, lightheadedness, dizziness, headaches, vision changes, urinary symptoms, melena, or BRBPR.    In the ED,  T 98.1-98.4, HR 88-95, BP /58-90, RR 16-18, SpO2 % RA.  C diff PCR and GI stool PCR collected.  68 yo woman with history of endometrial cancer (initially diagnosed as stage 2 in 7/2018) s/p TLH+BSO and adjuvant RT, now with metastatic disease to lungs and vaginal cuff, most recently on lenvatinib (Lenvima) and pembrolizumab (Keytruda), HTN, and immunotherapy-associated thyroiditis presents with 3 days of worsening diarrhea. Pt has been having diarrhea on and off for the past 2 weeks. The diarrhea initially subsided late last week but then intensified this past Wednesday, with pt having more than 10 episodes of watery diarrhea the last 3 days, including at least 6-7 episodes while in the ED. Pt has only been able to eat rice since the diarrhea worsened. Pt notes that with one of the episodes in the ED, the toilet bowel was filled with red liquid, which pt thinks was due to recent tomato sauce she had eaten and not from blood. Pt reports that with subsequent episodes of diarrhea, the liquid became clearer and clearer, with no red seen with the last episode. Pt has chronic burning epigastric pain, associated with a globus sensation and intermittent nausea, but pt denies any new abdominal pain. Pt also denies any associated fevers or chills. No recent antibiotic use, sick contacts, or travel. Of note, pt's Lenvima was held for one week due to pt's chronic epigastric pain, but it was resumed last Friday (5/27/22) at a lower dose of 8 mg daily instead of her usual 14 mg daily. Pt, however, began to have increasing nausea with multiple episodes of nonbloody, nonbilious vomiting on Sunday and Monday, so pt was told by her oncologist to again hold her Lenvima, with her last dose being Tuesday night. Pt's last dose of Keytruda was ~3 weeks ago. Pt's vomiting resolved after Monday but was followed soon after by worsening diarrhea. Pt has been taking Imodium since Wednesday, though without her diarrhea easing up. Pt states that this is different from last week when pt's diarrhea responded to the Imodium and eventually subsided.     Pt reports fatigue and generalized weakness but denies any chest pain, shortness of breath, palpitations, cough, LE edema, lightheadedness, dizziness, headaches, vision changes, urinary symptoms, melena, or BRBPR.    In the ED,  T 98.1-98.4, HR 88-95, BP /58-90, RR 16-18, SpO2 % RA.  C diff PCR and GI stool PCR collected.  66 yo woman with history of endometrial cancer (initially diagnosed as stage 2 in 7/2018) s/p TLH+BSO and adjuvant RT, now with metastatic disease to lungs (s/p multiple RLL wedge resections 10/2019) and vaginal cuff, most recently on lenvatinib (Lenvima) and pembrolizumab (Keytruda), HTN, and immunotherapy-associated thyroiditis presents with 3 days of worsening diarrhea. Pt has been having diarrhea on and off for the past 2 weeks. The diarrhea initially subsided late last week but then intensified this past Wednesday, with pt having more than 10 episodes of watery diarrhea the last 3 days, including at least 6-7 episodes while in the ED. Pt has only been able to eat rice since the diarrhea worsened. Pt notes that with one of the episodes in the ED, the toilet bowel was filled with red liquid, which pt thinks was due to recent tomato sauce she had eaten and not from blood. Pt reports that with subsequent episodes of diarrhea, the liquid became clearer and clearer, with no red seen with the last episode. Pt has chronic burning epigastric pain (~2 months), associated with a globus sensation and intermittent nausea, but pt denies any new abdominal pain. Pt also denies any associated fevers or chills. No recent antibiotic use, sick contacts, or travel. Of note, pt's Lenvima was held for one week due to pt's chronic epigastric pain, but it was resumed last Friday (5/27/22) at a lower dose of 8 mg daily instead of her usual 14 mg daily. Pt, however, began to have increasing nausea with multiple episodes of nonbloody, nonbilious vomiting on Sunday and Monday, so pt was told by her oncologist to again hold her Lenvima, with her last dose being Tuesday night. Pt's last dose of Keytruda was ~3 weeks ago. Pt's vomiting resolved after Monday but was followed soon after by worsening diarrhea. Pt has been taking Imodium since Wednesday, though without her diarrhea easing up. Pt states that this is different from last week when pt's diarrhea responded to the Imodium and eventually subsided.     Pt reports fatigue and generalized weakness but denies any chest pain, shortness of breath, palpitations, cough, LE edema, lightheadedness, dizziness, headaches, vision changes, urinary symptoms, melena, or BRBPR.    In the ED,  T 98.1-98.4, HR 88-95, BP /58-90, RR 16-18, SpO2 % RA.  C diff PCR and GI stool PCR collected.

## 2022-06-04 NOTE — CONSULT NOTE ADULT - ASSESSMENT
68 yo F with PMHx endometrial cancer (initially diagnosed as stage 2 in 7/2018) s/p TLH+BSO and adjuvant RT, now with metastatic disease to lungs (s/p multiple RLL wedge resections 10/2019) and vaginal cuff, most recently on lenvatinib (Lenvima) and pembrolizumab (Keytruda), HTN, and immunotherapy-associated thyroiditis presents with 3 days of worsening watery diarrhea.     Most recently she was on Lenvima and Keytruda. Lenvima was held for one week due to pt's chronic epigastric pain, but it was resumed last Friday (5/27/22) at a lower dose of 8 mg daily instead of her usual 14 mg daily. She had increasing nausea with multiple episodes of nonbloody, nonbilious vomiting on 5/29 and 5/30, so lenvima was held and the last dose being 5/31 Tuesday night. Pt's last dose of Keytruda was ~3 weeks ago.     #Metastatic endometrial CA   #Watery diarrhea diff including infectious vs colitis related immunocheck point inhibitor use   -held off Lenvima for now; pt will f/u with her med onc Toy Luo at [facility] [place] after d/c for further eval if restarting Lenvima and keytruda   -F/U C.diff and Stool PCR; f/u GI recommendations  -eletrolytes repletions in view of hypokalemia   -normal Cr and LFTs; please check TSH/T4 in view of previous h/o thyroiditis associated with immunotherapy   -Supportive care including IVF and nutritional support per primary team   -Recent CT c/abd on 5/25 showing Stable/slightly decreased pulmonary nodules and No evidence of recurrence or metastatic disease in the abdomen and pelvis.  -GI ppx; may hold off DVT ppx in view of FOB positive, may start AC if no contraindication per GI     Discussed with Attending Dr. Gaston Murillo PGY4 [phone]  
66 yo woman with history of endometrial cancer (initially diagnosed as stage 2 in 7/2018) s/p TLH+BSO and adjuvant RT, now with metastatic disease to lungs (s/p multiple RLL wedge resections 10/2019) and vaginal cuff, tx with carboplatin/paclitaxel until 2/2021, most recently on lenvatinib (Lenvima) and pembrolizumab (Keytruda) started 3/2021, HTN, and immunotherapy-associated thyroiditis presents with 3 days of worsening diarrhea as well as recurrent intermittent n/v, early satiety.    Impression:  #Diarrhea - persistent vs chronic; unclear exactly when first started. Previously better with imodium however now not improving. Ddx includes infectious especially with immunosuppression such as cdiff vs CMV (GI PCR neg) vs medication-induced 2/2 immunotherapy   #N/V, early satiety- broad differential including infectious (CMV, candida given also with dysphagia) vs medication induced vs central causes such as metastatic disease vs dysmotility such as gastroparesis    Recommendation:  - f/u c diff  - send stool culture  - c/w PPI as patient states this helps  - can consider reglan before meals  - should undergo EGD early next week for evaluation of upper symptoms +/- flex sig to r/o CMV if infectious w/u neg  - consider CT head to r/o central cause of n/v given known metastatic disease    Note not finalized until signed by attending.    Adelaide Land PGY-5  Gastroenterology Fellow  Pager #[phone] (JESSY) or [phone] (NS)  Available on Microsoft Teams.  Please contact on-call GI fellow via answering service ([phone]) after 5pm and before 8am, and on weekends.

## 2022-06-04 NOTE — CONSULT NOTE ADULT - ATTENDING COMMENTS
FLY Viktor is a patient with a history of endometrial carcinoma of the uterus who is admitted for abdominal pain, diarrhea and fever.   Management of her condition to be supportive and to include elements discussed with Dr Call and staff in our note above
Chronic Dysphagia, patient agreeable to EGD  Improving diarrhea, refusing flex sig  Will reevaluate in AM

## 2022-06-04 NOTE — H&P ADULT - ASSESSMENT
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 Admitted for further workup of the diarrhea and repletion of electrolytes.

## 2022-06-04 NOTE — H&P ADULT - PROBLEM SELECTOR PLAN 2
Hgb 11.5 on initial labs, 10.6 on repeat labs. Hgb 13.2 on 6/1/22. Pt with report of seeing red liquid in toilet bowl with episode of diarrhea in ED, now diarrhea clearing up.  - Pt deferred rectal exam, as pt felt like she was about to have another episode of diarrhea. No gross blood on underwear.   - FOBT ordered  - Start IV Protonix 40 mg bid for now, as pt with chronic burning epigastric pain  - Monitor H/H and transfuse to keep Hgb > 7  - Keep active T&S  - Monitor VS q4hrs  - Check iron studies, folate, vitamin B12  - GI consult emailed, f/u recs

## 2022-06-04 NOTE — H&P ADULT - NSICDXPASTMEDICALHX_GEN_ALL_CORE_FT
PAST MEDICAL HISTORY:  Carotid stenosis, left     Cerebrovascular accident (CVA) due to occlusion of carotid artery No residual deficits    Drug-induced thyroiditis     Endometrial cancer     HTN (hypertension)

## 2022-06-04 NOTE — H&P ADULT - PROBLEM SELECTOR PLAN 7
Pt with soft BPs in setting of volume loss from diarrhea.  - Hold pt's home valsartan for now  - Monitor VS q4hrs

## 2022-06-04 NOTE — H&P ADULT - NSHPREVIEWOFSYSTEMS_GEN_ALL_CORE
Constitutional: No generalized weakness, fevers, chills, or weight loss  Eyes: No visual changes, double vision, or eye pain  Ears, Nose, Mouth, Throat: No runny nose, sinus pain, ear pain, tinnitus, sore throat, dysphagia, or odynophagia  Cardiovascular: No chest pain, palpitations, or LE edema  Respiratory: No cough, wheezing, hemoptysis, or shortness of breath  Gastrointestinal: No abdominal pain, dysphagia, anorexia, nausea/vomiting, diarrhea/constipation, hematemesis, melena, or BRBPR  Genitourinary: No dysuria, frequency, urgency, or hematuria  Musculoskeletal: No joint pain, joint swelling, or decreased ROM  Skin: No pruritus, rashes, lesions, or wounds  Neurologic:  No seizures, headache, paresthesias, numbness, or limb weakness  Psychiatric: No depression, anxiety, difficulty concentrating, anhedonia, or lack of energy  Endocrine: No heat/cold intolerance, mood swings, sweats, polydipsia, or polyuria  Hematologic/lymphatic: No purpura, petechia, or prolonged or excessive bleeding after dental extraction / injury  Allergic/Immunologic: No anaphylaxis or allergic response to materials, foods, animals    Positives and pertinent negatives noted and all other systems negative. Constitutional: +Fatigue and generalized weakness. No fevers or chills.  Eyes: No visual changes, double vision, or eye pain  Ears, Nose, Mouth, Throat: +Chronic globus sensation. No runny nose, sinus pain, ear pain, tinnitus, dysphagia, or odynophagia.  Cardiovascular: No chest pain, palpitations, or LE edema  Respiratory: No shortness of breath, cough, hemoptysis, or wheezing  Gastrointestinal: +Watery diarrhea. +Chronic burning epigastic pain and intermittent nausea. No vomiting, hematemesis, melena, or BRBPR.  Genitourinary: No dysuria, frequency, urgency, or hematuria  Musculoskeletal: No back pain or joint pain, swelling, or decreased ROM  Skin: No pruritus or rashes  Neurologic: No syncope, seizures, headache, paresthesias, numbness, or limb weakness  Psychiatric: No depression, anxiety, or agitation  Endocrine: No heat/cold intolerance, mood swings, sweats, polydipsia, or polyuria  Hematologic/lymphatic: No purpura, petechia, or prolonged or excessive bleeding after dental extraction / injury    Positives and pertinent negatives noted and all other systems negative. Constitutional: +Fatigue and generalized weakness. No fevers or chills.  Eyes: No visual changes, double vision, or eye pain  Ears, Nose, Mouth, Throat: +Chronic globus sensation. No runny nose, sinus pain, ear pain, tinnitus, dysphagia, or odynophagia.  Cardiovascular: No chest pain, palpitations, or LE edema  Respiratory: No shortness of breath, cough, hemoptysis, or wheezing  Gastrointestinal: +Watery diarrhea. +Chronic burning epigastric pain and intermittent nausea. No vomiting, hematemesis, melena, or BRBPR.  Genitourinary: No dysuria, frequency, urgency, or hematuria  Musculoskeletal: No back pain or joint pain, swelling, or decreased ROM  Skin: No pruritus or rashes  Neurologic: No syncope, seizures, headache, paresthesias, numbness, or limb weakness  Psychiatric: No depression, anxiety, or agitation  Endocrine: No heat/cold intolerance, mood swings, sweats, polydipsia, or polyuria  Hematologic/lymphatic: No purpura, petechia, or prolonged or excessive bleeding after dental extraction / injury    Positives and pertinent negatives noted and all other systems negative.

## 2022-06-04 NOTE — PATIENT PROFILE ADULT - FALL HARM RISK - UNIVERSAL INTERVENTIONS
Bed in lowest position, wheels locked, appropriate side rails in place/Call bell, personal items and telephone in reach/Instruct patient to call for assistance before getting out of bed or chair/Non-slip footwear when patient is out of bed/[place] to call system/Physically safe environment - no spills, clutter or unnecessary equipment/Purposeful Proactive Rounding/Room/bathroom lighting operational, light cord in reach

## 2022-06-04 NOTE — H&P ADULT - PROBLEM SELECTOR PLAN 5
Pt with ~2 months of burning epigastric pain, associated with globus sensation and intermittent nausea. Also with recent episodes of NBNB vomiting, now resolved. Unclear etiology, though can be from esophagitis, gastritis, or PUD.  - On IV Protonix 40 mg bid as above  - Pt desiring to eat, will start soft and bite-sized diet for now. Will change to NPO if pt with recurrent red-liquid diarrhea.  - GI consult emailed for possible EGD during admission Pt with ~2 months of burning epigastric pain, associated with globus sensation and intermittent nausea. Also with recent episodes of NBNB vomiting, now resolved. Unclear etiology, though can be from esophagitis, gastritis, or PUD.  - Lipase 264. Possibly with mild pancreatitis, though can also be from volume loss in setting of diarrhea. Pt with no abdominal tenderness on exam. S/p 2L NS bolus in ED. C/w IVF with LR at 100 cc/hr x10 hrs overnight.   - On IV Protonix 40 mg bid as above  - Pt desiring to eat, will start soft and bite-sized diet for now. Will change to NPO if pt with recurrent red-liquid diarrhea.  - Pain control with PO Tylenol PRN  - GI consult emailed for possible EGD during admission, f/u recs

## 2022-06-04 NOTE — H&P ADULT - PROBLEM SELECTOR PLAN 1
Pt with diarrhea on and off x2 weeks, now with worsening diarrhea x3 days, with >10 episodes a day and watery. Unclear etiology. Pt without fevers, chills, or leukocytosis, but pt immunocompromised and immunosuppressed. Can be 2/2 infection.   - C diff PCR and GI stool PCR collected, f/u results  - Stool O&P ordered  - Pt with recent CTAP with PO and IV contrast performed on 5/25/22 with no acute abdominal pathology. Repeat CTAP with IV contrast given acutely worsening diarrhea.   - S/p 2L bolus of NS in ED. Will c/w IVF with LR at 100 cc/hr x10 hrs overnight.   - Monitor electrolytes and replete PRN as below  - Monitor stool count / BMs  - Serial abdominal exams  - Hold Imodium until infectious etiology ruled out  - Pt desiring to eat, will start soft and bite-sized diet for now. Will change to NPO if pt with recurrent red-liquid diarrhea.   - GI consult emailed, f/u recs Pt with diarrhea on and off x2 weeks, now with worsening diarrhea x3 days, with >10 episodes a day and watery. Unclear etiology. Pt without fevers, chills, or leukocytosis, but pt immunocompromised and immunosuppressed. Can be 2/2 infection.   - C diff PCR and GI stool PCR collected, f/u results  - Stool O&P ordered  - S/p 2L bolus of NS in ED. Will c/w IVF with LR at 100 cc/hr x10 hrs overnight.   - Monitor electrolytes and replete PRN as below  - Monitor stool count / BMs  - Serial abdominal exams  - Hold Imodium until infectious etiology ruled out  - Pt desiring to eat, will start soft and bite-sized diet for now. Will change to NPO if pt with recurrent red-liquid diarrhea.   - Pt with recent CTAP with PO and IV contrast performed on 5/25/22 with no acute abdominal pathology. Given iodinated IV contrast shortage, will hold off on repeat CTAP with IV contrast for now, as pt has no new abdominal pain or abdominal tenderness on exam.   - GI consult emailed, f/u recs

## 2022-06-04 NOTE — H&P ADULT - PROBLEM SELECTOR PLAN 8
TSH mildly elevated at 5.35 with free T4 wnl on 6/1/22.   - C/w pt's home levothyroxine 75 mcg daily  - Recheck TSH as outpatient once pt's acute diarrhea resolves

## 2022-06-04 NOTE — H&P ADULT - NSHPPHYSICALEXAM_GEN_ALL_CORE
Vital Signs Last 24 Hrs  T(C): 36.6 (04 Jun 2022 04:11), Max: 36.9 (03 Jun 2022 16:30)  T(F): 97.8 (04 Jun 2022 04:11), Max: 98.4 (03 Jun 2022 16:30)  HR: 82 (04 Jun 2022 04:11) (76 - 100)  BP: 127/99 (04 Jun 2022 04:11) (95/58 - 127/99)  BP(mean): --  RR: 18 (04 Jun 2022 04:11) (16 - 18)  SpO2: 98% (04 Jun 2022 04:11) (97% - 100%)    PHYSICAL EXAM:  General: Thin appearing.  Awake and alert.  No acute distress.  Head: Normocephalic, atraumatic.    Eyes: PERRL.  EOMI.  No scleral icterus.  No conjunctival pallor.  Mouth: Moist MM.  No oral thrush.  No oropharyngeal exudates.    Neck: Supple.  Full range of motion.  No JVD.  No LAD.  No thyromegaly.  Trachea midline.    Heart: RRR.  Normal S1 and S2.  No murmurs, rubs, or gallops.  No LE edema b/l.   Lungs: Nonlabored breathing.  Good inspiratory effort.  CTAB.  No wheezes, crackles, or rhonchi.    Abdomen: BS+, soft, nontender with no rebound or guarding, nondistended.  No hepatomegaly.   Rectal: Deferred as per pt's request, as pt was about to have another episode of diarrhea.   Skin: Warm and dry.  No rashes.  R chest wall chemo port with no surrounding erythema, edema, or TTP and no drainage.   Extremities: No cyanosis.  2+ peripheral pulses b/l.  Musculoskeletal: No joint deformities.  No spinal or paraspinal tenderness.  Neuro: A&Ox3.  CN II-XII intact.  5/5 motor strength in UE and LE b/l.  Tactile sensation intact in UE and LE b/l.  No focal deficits.

## 2022-06-04 NOTE — H&P ADULT - NSHPLABSRESULTS_GEN_ALL_CORE
EKG personally reviewed.  NSR at 87 bpm, no acute ischemic changes, QTc 418 ms.    Labs personally reviewed.                        10.6   6.81  )-----------( 258      ( 04 Jun 2022 06:00 )             30.7     06-03    138  |  108<H>  |  9   ----------------------------<  109<H>  2.7<LL>   |  18<L>  |  0.78    Ca    8.7      03 Jun 2022 22:34  Phos  2.9     06-03  Mg     1.50     06-03    TPro  5.6<L>  /  Alb  3.1<L>  /  TBili  0.2  /  DBili  x   /  AST  10  /  ALT  8   /  AlkPhos  89  06-03    ACC: [id number] EXAM:  XR CHEST AP OR PA 1V                        PROCEDURE DATE:  06/03/2022    ******PRELIMINARY REPORT******    ******PRELIMINARY REPORT******   INTERPRETATION:  CHEST X-RAY: AP PORTABLE  INDICATION: Evaluate port.  COMPARISON: Chest x-ray 11/11/2019.  FINDINGS:  There is a presently accessed, right chest wall port with its tip in the   SVC.  Normal heart size.  Clear lungs. Right middle lobe wedge resection sutures.  No pleural effusion or pneumothorax.    IMPRESSION:  Right chest wall port with its tip in the SVC.    Old imaging personally reviewed.   CT CHEST AND ABDOMEN/PELVIS with IV CONTRAST on 5/25/22:  FINDINGS:  CHEST:  LUNGS AND LARGE AIRWAYS: Patent central airways. Postsurgical changes of   wedge resection of the right lower lobe. Stable left upper lobe nodule   measuring 2.3 x 1.8 cm (2, 8). Slight interval decrease in right   perihilar nodule, now measuring 1.6 cm (2, 30), previously measured 1.9   cm. Stable nodular thickening along the minor fissure.  PLEURA: No pleural effusion.  VESSELS: Within normal limits.  HEART: Heart size is normal. No pericardial effusion.  MEDIASTINUM AND VIDAL: No lymphadenopathy.  CHEST WALL AND LOWER NECK: Mediport with tip in SVC.    ABDOMEN AND PELVIS:  LIVER: Steatosis. Right hepatic calcified granuloma.  BILE DUCTS: Normal caliber.  GALLBLADDER: Within normal limits.  SPLEEN: Within normal limits.  PANCREAS: Stable subcentimeter pancreatic head and tail hypodense likely   cystic lesions  ADRENALS: Within normal limits.  KIDNEYS/URETERS: No hydronephrosis. Bilateral nonobstructing intrarenal   calculi. Bilateral renal cysts and additional subcentimeter hypodensities   which are too small to characterize. Stable partially exophytic right   upper pole cyst measuring 1.5 cm with internal enhancing septations and   marrow nodule.    BLADDER: Within normal limits.  REPRODUCTIVE ORGANS: Prostatectomy.    BOWEL: No bowel obstruction. Appendix is normal.  PERITONEUM: No ascites.  VESSELS: Atherosclerotic changes.  RETROPERITONEUM/LYMPH NODES: No lymphadenopathy.  ABDOMINAL WALL: Stable appearance of the left elbow.  BONES: Within normal limits.    IMPRESSION:  Stable/slightly decreased pulmonary nodules as described above.  No evidence of recurrence or metastatic disease in the abdomen and pelvis.

## 2022-06-04 NOTE — H&P ADULT - PROBLEM SELECTOR PLAN 4
Serum Mg 1.5 on admission. EKG with no concerning changes.  - S/p IV magnesium sulfate 2 g x1 in ED  - F/u AM CMP  - Monitor serum Mg

## 2022-06-04 NOTE — H&P ADULT - PROBLEM SELECTOR PLAN 9
- DVT ppx: Hold pharmacologic ppx for now given recent red-liquid diarrhea and worsening anemia. SCDs for now.  - Diet: Soft and bite-sized. Will change to NPO if pt with recurrent red-liquid diarrhea.  - PT consult

## 2022-06-05 PROCEDURE — 99233 SBSQ HOSP IP/OBS HIGH 50: CPT

## 2022-06-05 PROCEDURE — 99222 1ST HOSP IP/OBS MODERATE 55: CPT | Mod: GC

## 2022-06-05 RX ADMIN — CHLORHEXIDINE GLUCONATE 1 APPLICATION(S): 213 SOLUTION TOPICAL at 11:03

## 2022-06-05 RX ADMIN — PANTOPRAZOLE SODIUM 40 MILLIGRAM(S): 20 TABLET, DELAYED RELEASE ORAL at 05:44

## 2022-06-05 RX ADMIN — Medication 75 MICROGRAM(S): at 05:44

## 2022-06-05 RX ADMIN — PANTOPRAZOLE SODIUM 40 MILLIGRAM(S): 20 TABLET, DELAYED RELEASE ORAL at 17:06

## 2022-06-05 NOTE — CHART NOTE - NSCHARTNOTEFT_GEN_A_CORE
Patient agreeable to EGD tomorrow however does not want to pursue flex sig at this time.  - will f/u in AM   - NPO at midnight      Adelaide Land PGY-5  Gastroenterology Fellow  Pager #[phone] (JESSY) or [phone] (NS)  Available on Microsoft Teams.  Please contact on-call GI fellow via answering service ([phone]) after 5pm and before 8am, and on weekends.

## 2022-06-05 NOTE — PHYSICAL THERAPY INITIAL EVALUATION ADULT - ADDITIONAL COMMENTS
Pt reports living in a ranch style home with 1 step to enter.  Pt lives with her dog.  No history of falls.  Does not use durable medical equipment.  Fully independent

## 2022-06-05 NOTE — PHYSICAL THERAPY INITIAL EVALUATION ADULT - PERTINENT HX OF CURRENT PROBLEM, REHAB EVAL
66 yo woman with history of endometrial cancer (initially diagnosed as stage 2 in 7/2018) s/p TLH+BSO and adjuvant RT, now with metastatic disease to lungs (s/p multiple RLL wedge resections 10/2019) and vaginal cuff, most recently on lenvatinib (Lenvima) and pembrolizumab (Keytruda), HTN, and immunotherapy-associated thyroiditis presents with 3 days of worsening diarrhea. Pt has been having diarrhea on and off for the past 2 weeks.

## 2022-06-06 ENCOUNTER — APPOINTMENT (OUTPATIENT)
Dept: INFUSION THERAPY | Facility: HOSPITAL | Age: 67
End: 2022-06-06

## 2022-06-06 ENCOUNTER — APPOINTMENT (OUTPATIENT)
Dept: HEMATOLOGY ONCOLOGY | Facility: CLINIC | Age: 67
End: 2022-06-06

## 2022-06-06 ENCOUNTER — RESULT REVIEW (OUTPATIENT)
Age: 67
End: 2022-06-06

## 2022-06-06 ENCOUNTER — TRANSCRIPTION ENCOUNTER (OUTPATIENT)
Age: 67
End: 2022-06-06

## 2022-06-06 VITALS — WEIGHT: 110.01 LBS

## 2022-06-06 LAB
ANION GAP SERPL CALC-SCNC: 12 MMOL/L — SIGNIFICANT CHANGE UP (ref 7–14)
APTT BLD: 33.9 SEC — SIGNIFICANT CHANGE UP (ref 27–36.3)
BUN SERPL-MCNC: 7 MG/DL — SIGNIFICANT CHANGE UP (ref 7–23)
CALCIUM SERPL-MCNC: 8.6 MG/DL — SIGNIFICANT CHANGE UP (ref 8.4–10.5)
CHLORIDE SERPL-SCNC: 110 MMOL/L — HIGH (ref 98–107)
CO2 SERPL-SCNC: 19 MMOL/L — LOW (ref 22–31)
CREAT SERPL-MCNC: 0.81 MG/DL — SIGNIFICANT CHANGE UP (ref 0.5–1.3)
EGFR: 79 ML/MIN/1.73M2 — SIGNIFICANT CHANGE UP
GLUCOSE SERPL-MCNC: 105 MG/DL — HIGH (ref 70–99)
HCT VFR BLD CALC: 31.3 % — LOW (ref 34.5–45)
HGB BLD-MCNC: 10.7 G/DL — LOW (ref 11.5–15.5)
INR BLD: 1.38 RATIO — HIGH (ref 0.88–1.16)
MAGNESIUM SERPL-MCNC: 1.3 MG/DL — LOW (ref 1.6–2.6)
MCHC RBC-ENTMCNC: 32.6 PG — SIGNIFICANT CHANGE UP (ref 27–34)
MCHC RBC-ENTMCNC: 34.2 GM/DL — SIGNIFICANT CHANGE UP (ref 32–36)
MCV RBC AUTO: 95.4 FL — SIGNIFICANT CHANGE UP (ref 80–100)
MRSA PCR RESULT.: SIGNIFICANT CHANGE UP
NRBC # BLD: 0 /100 WBCS — SIGNIFICANT CHANGE UP
NRBC # FLD: 0 K/UL — SIGNIFICANT CHANGE UP
PHOSPHATE SERPL-MCNC: 3.6 MG/DL — SIGNIFICANT CHANGE UP (ref 2.5–4.5)
PLATELET # BLD AUTO: 287 K/UL — SIGNIFICANT CHANGE UP (ref 150–400)
POTASSIUM SERPL-MCNC: 3.6 MMOL/L — SIGNIFICANT CHANGE UP (ref 3.5–5.3)
POTASSIUM SERPL-SCNC: 3.6 MMOL/L — SIGNIFICANT CHANGE UP (ref 3.5–5.3)
PROTHROM AB SERPL-ACNC: 16.1 SEC — HIGH (ref 10.5–13.4)
RBC # BLD: 3.28 M/UL — LOW (ref 3.8–5.2)
RBC # FLD: 13.1 % — SIGNIFICANT CHANGE UP (ref 10.3–14.5)
S AUREUS DNA NOSE QL NAA+PROBE: SIGNIFICANT CHANGE UP
SODIUM SERPL-SCNC: 141 MMOL/L — SIGNIFICANT CHANGE UP (ref 135–145)
WBC # BLD: 7.59 K/UL — SIGNIFICANT CHANGE UP (ref 3.8–10.5)
WBC # FLD AUTO: 7.59 K/UL — SIGNIFICANT CHANGE UP (ref 3.8–10.5)

## 2022-06-06 PROCEDURE — 88305 TISSUE EXAM BY PATHOLOGIST: CPT | Mod: 26

## 2022-06-06 PROCEDURE — 88342 IMHCHEM/IMCYTCHM 1ST ANTB: CPT | Mod: 26

## 2022-06-06 PROCEDURE — 43239 EGD BIOPSY SINGLE/MULTIPLE: CPT | Mod: GC

## 2022-06-06 PROCEDURE — 99239 HOSP IP/OBS DSCHRG MGMT >30: CPT

## 2022-06-06 PROCEDURE — 88341 IMHCHEM/IMCYTCHM EA ADD ANTB: CPT | Mod: 26

## 2022-06-06 RX ORDER — LENVATINIB 4 MG/1
1 CAPSULE ORAL
Qty: 0 | Refills: 0 | DISCHARGE

## 2022-06-06 RX ORDER — LEVOTHYROXINE SODIUM 125 MCG
37.5 TABLET ORAL ONCE
Refills: 0 | Status: COMPLETED | OUTPATIENT
Start: 2022-06-06 | End: 2022-06-06

## 2022-06-06 RX ORDER — MAGNESIUM SULFATE 500 MG/ML
1 VIAL (ML) INJECTION ONCE
Refills: 0 | Status: COMPLETED | OUTPATIENT
Start: 2022-06-06 | End: 2022-06-06

## 2022-06-06 RX ORDER — VALSARTAN 80 MG/1
1 TABLET ORAL
Qty: 0 | Refills: 0 | DISCHARGE

## 2022-06-06 RX ADMIN — Medication 37.5 MICROGRAM(S): at 07:07

## 2022-06-06 RX ADMIN — PANTOPRAZOLE SODIUM 40 MILLIGRAM(S): 20 TABLET, DELAYED RELEASE ORAL at 07:07

## 2022-06-06 RX ADMIN — Medication 100 GRAM(S): at 11:55

## 2022-06-06 NOTE — PROGRESS NOTE ADULT - PROBLEM SELECTOR PLAN 3
Serum K 2.7 on admission. Likely in setting of diarrhea. EKG with no concerning changes. Now resolved with IV and PO repletion
Resolved
Serum K 2.7 on admission. Likely in setting of diarrhea. EKG with no concerning changes. Now resolved with IV and PO repletion  - Patient refusing labs, will cont to encourage to monitor electrolytes

## 2022-06-06 NOTE — DIETITIAN INITIAL EVALUATION ADULT - ADD RECOMMEND
1. Monitor weights, labs, BM's, skin integrity, p.o. intake.   2. Honor food and beverage preferences within diet restriction of patient in an effort to maximize level of nutrient intake   3. Please Encourage po intake, assist with meals and menu selections, provide alternatives PRN.

## 2022-06-06 NOTE — PROGRESS NOTE ADULT - PROBLEM SELECTOR PLAN 9
- DVT ppx: Hold pharmacologic ppx for now given recent red-liquid diarrhea and worsening anemia. SCDs for now.  - Diet: Soft and bite-sized. Will change to NPO if pt with recurrent red-liquid diarrhea.  - PT consult
- DVT ppx: SCD's   - Diet: Soft and bite-sized.   - PT consult
- DVT ppx: SCD's   - Diet: Soft and bite-sized.   - PT consult

## 2022-06-06 NOTE — DIETITIAN INITIAL EVALUATION ADULT - ETIOLOGY
patient meets criteria for severe malnutrition in the context of acute on chronic illness (cancer) increased demand for nutrients

## 2022-06-06 NOTE — PROGRESS NOTE ADULT - REASON FOR ADMISSION
Diarrhea, electrolyte abnormalities

## 2022-06-06 NOTE — DISCHARGE NOTE PROVIDER - NSDCMRMEDTOKEN_GEN_ALL_CORE_FT
levothyroxine 75 mcg (0.075 mg) oral tablet: 1 tab(s) orally once a day  pantoprazole 40 mg oral delayed release tablet: 1 tab(s) orally once a day

## 2022-06-06 NOTE — PROGRESS NOTE ADULT - PROBLEM SELECTOR PLAN 7
Pt with soft BPs in setting of volume loss from diarrhea.  - Hold pt's home valsartan for now  - Monitor VS q4hrs
Pt with soft BPs in setting of volume loss from diarrhea.  - Hold pt's home valsartan for now  - Monitor VS q4hrs
Pt with soft BPs in setting of volume loss from diarrhea.  - Hold pt's home valsartan for now, bp acceptable  - Monitor VS q4hrs

## 2022-06-06 NOTE — DIETITIAN INITIAL EVALUATION ADULT - PERTINENT MEDS FT
MEDICATIONS  (STANDING):  chlorhexidine 2% Cloths 1 Application(s) Topical daily  influenza  Vaccine (HIGH DOSE) 0.7 milliLiter(s) IntraMuscular once  levothyroxine 75 MICROGram(s) Oral daily  pantoprazole  Injectable 40 milliGRAM(s) IV Push every 12 hours    MEDICATIONS  (PRN):  acetaminophen     Tablet .. 650 milliGRAM(s) Oral every 6 hours PRN Temp greater or equal to 38C (100.4F), Moderate Pain (4 - 6), Severe Pain (7 - 10)

## 2022-06-06 NOTE — DISCHARGE NOTE PROVIDER - NSDCCPCAREPLAN_GEN_ALL_CORE_FT
PRINCIPAL DISCHARGE DIAGNOSIS  Diagnosis: Diarrhea  Assessment and Plan of Treatment: You came to the hospital due to 10 days of watery diarrhea and in the prior3 days you had 10 episodes of diarrhea each day. A recent CT scan with contrast  done on 5/25/22 showed no acute abdominal pathology. Stool cultures was collected in the hospital to test for bacterial or parasite infection.  You were offered a sigmoidoscopy exam but you declined. A endoscopy was perfomed and was normal, biopsies were taken which are pending results. Follow up with GI by calling and making a appointment, you may warrant out patient motility testing. Please follow up with your primary doctor in 1-2 weeks.      SECONDARY DISCHARGE DIAGNOSES  Diagnosis: Hypokalemia  Assessment and Plan of Treatment: When you came to the hospital you had low potassium levels. During your hospital stay you were given supplemental potassium, please follow up with your primary care provider.    Diagnosis: Hypomagnesemia  Assessment and Plan of Treatment: You had low magnesium levels during your hospital stay. It has resolved, please follow up with your primary care provider.    Diagnosis: Chronic epigastric pain  Assessment and Plan of Treatment: You have had belly pain for the past 2 months. The cause of this belly pain can be due to inflammation. Further tests showed a 1cm hiatal hernia. Follow up with your primary care doctor and check to see if the hernia gets larger or painful.    Diagnosis: Endometrial cancer  Assessment and Plan of Treatment: You were intially diagnosed with stage 2 endometrial cancer in 7/2018 and subsequently treated for it. Please continue to follow Dr. Toy Blank at [facility] and control your pain with Tylenol at home.    Diagnosis: HTN (hypertension)  Assessment and Plan of Treatment: During your hospital stay your blood pressure medication was withheld due to possible adverse effects with your fluid loss from diarrhea. Your blood pressure has remained stable in your stay and continue to follow up with your primary doctor todetermine when it should be resumed.    Diagnosis: Drug-induced thyroiditis  Assessment and Plan of Treatment: During your hospital stay your TSH level was slightly elevated. Continue to take your levothyroxine as instructed.    Diagnosis: Anemia  Assessment and Plan of Treatment: During your hospital stay your blood test showed you had anemia. Repeat blood tests during your stay showed your anemia has resolved. Your stool test showed blood in it and the anemia is mostly likely the result of a GI bleed. Please follow up with your primary care doctor and if there is any blood in your stool please go the ER.        PRINCIPAL DISCHARGE DIAGNOSIS  Diagnosis: Diarrhea  Assessment and Plan of Treatment: You came to the hospital due to 10 days of watery diarrhea and in the prior3 days you had 10 episodes of diarrhea each day. A recent CT scan with contrast  done on 5/25/22 showed no acute abdominal pathology. Stool cultures was collected in the hospital to test for bacterial or parasite infection.  You were offered a sigmoidoscopy exam but you declined. A endoscopy was perfomed and was normal, biopsies were taken which are pending results. Follow up with GI by calling and making a appointment, you may warrant out patient motility testing. Please follow up with your primary doctor in 1-2 weeks.      SECONDARY DISCHARGE DIAGNOSES  Diagnosis: Anemia  Assessment and Plan of Treatment: During your hospital stay your blood test showed you had anemia. Repeat blood tests during your stay showed your anemia has resolved. Your stool test showed blood in it and the anemia is mostly likely the result of a GI bleed. Please follow up with your primary care doctor and if there is any blood in your stool please go the ER.       Diagnosis: Hypokalemia  Assessment and Plan of Treatment: When you came to the hospital you had low potassium levels. During your hospital stay you were given supplemental potassium, please follow up with your primary care provider.    Diagnosis: Hypomagnesemia  Assessment and Plan of Treatment: You had low magnesium levels during your hospital stay. It has resolved, please follow up with your primary care provider.    Diagnosis: Chronic epigastric pain  Assessment and Plan of Treatment: You have had belly pain for the past 2 months. The cause of this belly pain can be due to inflammation. Further tests showed a 1cm hiatal hernia. Follow up with your primary care doctor and check to see if the hernia gets larger or painful.    Diagnosis: Endometrial cancer  Assessment and Plan of Treatment: You were intially diagnosed with stage 2 endometrial cancer in 7/2018 and subsequently treated for it. Your Lenvima has been on hold, please continue to hold until you follow up with Dr. Toy Blank at [facility], it will be determined when/if it should bne resumed as an out patient. Continue to control your pain with Tylenol at home.    Diagnosis: HTN (hypertension)  Assessment and Plan of Treatment: During your hospital stay your blood pressure medication was withheld due to possible adverse effects with your fluid loss from diarrhea. Your blood pressure has remained stable in your stay and continue to follow up with your primary doctor todetermine when it should be resumed.    Diagnosis: Drug-induced thyroiditis  Assessment and Plan of Treatment: During your hospital stay your TSH level was slightly elevated. Continue to take your levothyroxine as instructed.

## 2022-06-06 NOTE — SWALLOW BEDSIDE ASSESSMENT ADULT - SWALLOW EVAL: DIAGNOSIS
1. Functional oral phase for thin liquids, pureed and regular solids marked by adequate oral aperture, bolus collection and timely A-P transport. 2. Functional pharyngeal phase for thin liquids, pureed, and regular solids marked by initiation of the pharyngeal swallow with hyolaryngeal elevation and excursion upon digital palpation without overt signs or symptoms of laryngeal penetration/aspiration.

## 2022-06-06 NOTE — PROGRESS NOTE ADULT - PROBLEM SELECTOR PLAN 5
Pt with ~2 months of burning epigastric pain, associated with globus sensation and intermittent nausea. Also with recent episodes of NBNB vomiting, now resolved. Unclear etiology, though can be from esophagitis, gastritis, or PUD.  - Now significantly improved with PPI, cont   - Lipase elevated to 264 but denies any epigastric TTP  [ ] EGD tomorrow
Pt with ~2 months of burning epigastric pain, associated with globus sensation and intermittent nausea. Also with recent episodes of NBNB vomiting, now resolved. Unclear etiology, though can be from esophagitis, gastritis, or PUD.  - Now significantly improved with PPI, cont   - Lipase elevated to 264 but denies any epigastric TTP
Pt with ~2 months of burning epigastric pain, associated with globus sensation and intermittent nausea. Also with recent episodes of NBNB vomiting, now resolved. Unclear etiology, though can be from esophagitis, gastritis, or PUD.  - Now significantly improved with PPI, cont   - Lipase elevated to 264 but denies any epigastric TTP  [ ] EGD showed 1 cm hiatal hernia. Gastropathy. Biopsied. Duodenopathy. Biopsied. Normal second portion of the duodenum.

## 2022-06-06 NOTE — DIETITIAN INITIAL EVALUATION ADULT - SIGNS/SYMPTOMS
<50% nutritional needs >/=5days, weight loss 8% over 1 week uterine cancer s/p chemo and immunotherapy

## 2022-06-06 NOTE — SWALLOW BEDSIDE ASSESSMENT ADULT - COMMENTS
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 Admitted for further workup of the diarrhea and repletion of electrolytes."      CXR 6/3/22 "Right chest wall port with its tip in the SVC."    Patient seen for endoscopy 6/6/22 at which time SLP evaluation was recommended. See report for details.     Patient seen for bedside swallow assessment. Received upright in bed, denied pain. Patient alert, oriented and able to follow all necessary directives Patient reported feeling like "food comes back up" and "goes down slowly" at times pointing to midsternal area. Patient denied symptoms of oropharyngeal dysphagia.

## 2022-06-06 NOTE — DISCHARGE NOTE NURSING/CASE MANAGEMENT/SOCIAL WORK - NSDCPEFALRISK_GEN_ALL_CORE
For information on Fall & Injury Prevention, visit: [email or web address] OR  [email or web address] OR  https://www.cdc.gov/steadi/patient.html

## 2022-06-06 NOTE — PROGRESS NOTE ADULT - SUBJECTIVE AND OBJECTIVE BOX
Merlin Mathew, MD   Hospitalist  Pager #[phone]    PROGRESS NOTE:     Patient is a 67y old  Female who presents with a chief complaint of Diarrhea, electrolyte abnormalities (04 Jun 2022 19:02)      SUBJECTIVE / OVERNIGHT EVENTS:   No overnight events  Patient reports diarrhea has significantly improved, stool is more formed today   Patient is not interested in flex sig at this time, is interested in EGD   Denies any N/V, abdominal pain is improved     ADDITIONAL REVIEW OF SYSTEMS:    MEDICATIONS  (STANDING):  chlorhexidine 2% Cloths 1 Application(s) Topical daily  influenza  Vaccine (HIGH DOSE) 0.7 milliLiter(s) IntraMuscular once  levothyroxine 75 MICROGram(s) Oral daily  pantoprazole  Injectable 40 milliGRAM(s) IV Push every 12 hours    MEDICATIONS  (PRN):  acetaminophen     Tablet .. 650 milliGRAM(s) Oral every 6 hours PRN Temp greater or equal to 38C (100.4F), Moderate Pain (4 - 6), Severe Pain (7 - 10)      CAPILLARY BLOOD GLUCOSE        I&O's Summary    04 Jun 2022 07:01  -  05 Jun 2022 07:00  --------------------------------------------------------  IN: 1710 mL / OUT: 7 mL / NET: 1703 mL        PHYSICAL EXAM:  Vital Signs Last 24 Hrs  T(C): 36.8 (05 Jun 2022 11:08), Max: 37.2 (04 Jun 2022 20:53)  T(F): 98.3 (05 Jun 2022 11:08), Max: 99 (04 Jun 2022 20:53)  HR: 76 (05 Jun 2022 11:08) (70 - 88)  BP: 112/65 (05 Jun 2022 11:08) (103/63 - 123/78)  BP(mean): --  RR: 18 (05 Jun 2022 11:08) (18 - 18)  SpO2: 100% (05 Jun 2022 11:08) (98% - 100%)    CONSTITUTIONAL: NAD, well-developed  RESPIRATORY: Normal respiratory effort; lungs are clear to auscultation bilaterally  CARDIOVASCULAR: Regular rate and rhythm, normal S1 and S2, no murmur/rub/gallop; No lower extremity edema; Peripheral pulses are 2+ bilaterally  ABDOMEN: Nontender to palpation, normoactive bowel sounds, no rebound/guarding; No hepatosplenomegaly  MUSCULOSKELETAL no clubbing or cyanosis of digits; no joint swelling or tenderness to palpation  PSYCH: A+O to person, place, and time; affect appropriate    LABS:                        10.6   6.81  )-----------( 258      ( 04 Jun 2022 06:00 )             30.7     06-04    138  |  112<H>  |  6<L>  ----------------------------<  105<H>  3.7   |  17<L>  |  0.74    Ca    8.2<L>      04 Jun 2022 06:00  Phos  1.9     06-04  Mg     2.10     06-04    TPro  5.0<L>  /  Alb  2.7<L>  /  TBili  0.3  /  DBili  x   /  AST  10  /  ALT  9   /  AlkPhos  85  06-04    PT/INR - ( 04 Jun 2022 06:00 )   PT: 16.4 sec;   INR: 1.41 ratio         PTT - ( 04 Jun 2022 06:00 )  PTT:31.3 sec          GI PCR Panel, Stool (collected 03 Jun 2022 23:27)  Source: .Stool Feces  Final Report (04 Jun 2022 12:43):    GI PCR Results: NOT detected    *******Please Note:*******    GI panel PCR evaluates for:    Campylobacter, Plesiomonas shigelloides, Salmonella,    Vibrio, Yersinia enterocolitica, Enteroaggregative    Escherichia coli (EAEC), Enteropathogenic E.coli (EPEC),    Enterotoxigenic E. coli (ETEC) lt/st, Shiga-like    toxin-producing E. coli (STEC) stx1/stx2,    Shigella/ Enteroinvasive E. coli (EIEC), Cryptosporidium,    Cyclospora cayetanensis, Entamoeba histolytica,    Giardia lamblia, Adenovirus F 40/41, Astrovirus,    Norovirus GI/GII, Rotavirus A, Sapovirus        RADIOLOGY & ADDITIONAL TESTS:  Results Reviewed:   Imaging Personally Reviewed:  Electrocardiogram Personally Reviewed:    COORDINATION OF CARE:  Care Discussed with Consultants/Other Providers [Y/N]:  Prior or Outpatient Records Reviewed [Y/N]:  
Patient is a 67y old  Female who presents with a chief complaint of Diarrhea, electrolyte abnormalities (05 Jun 2022 13:54)      SUBJECTIVE / OVERNIGHT EVENTS: Pt seen and examined at 12:05pm, no overnight events, pt had egd today, denies any abdominal pain, diarrhea or any other complaints, is eager to go home. No other new issues reported.    MEDICATIONS  (STANDING):  chlorhexidine 2% Cloths 1 Application(s) Topical daily  influenza  Vaccine (HIGH DOSE) 0.7 milliLiter(s) IntraMuscular once  levothyroxine 75 MICROGram(s) Oral daily  pantoprazole  Injectable 40 milliGRAM(s) IV Push every 12 hours    MEDICATIONS  (PRN):  acetaminophen     Tablet .. 650 milliGRAM(s) Oral every 6 hours PRN Temp greater or equal to 38C (100.4F), Moderate Pain (4 - 6), Severe Pain (7 - 10)      Vital Signs Last 24 Hrs  T(C): 36.8 (06 Jun 2022 10:23), Max: 36.9 (05 Jun 2022 18:40)  T(F): 98.2 (06 Jun 2022 10:23), Max: 98.5 (05 Jun 2022 18:40)  HR: 82 (06 Jun 2022 10:53) (69 - 98)  BP: 100/60 (06 Jun 2022 10:53) (75/48 - 129/88)  BP(mean): --  RR: 20 (06 Jun 2022 10:53) (17 - 20)  SpO2: 100% (06 Jun 2022 10:53) (100% - 100%)  CAPILLARY BLOOD GLUCOSE        I&O's Summary      PHYSICAL EXAM:  GENERAL: NAD, well-developed  CHEST/LUNG: Clear to auscultation bilaterally; No wheeze  HEART: Regular rate and rhythm  ABDOMEN: Soft, Nontender, Nondistended  EXTREMITIES: no LE edema  PSYCH: Calm  NEUROLOGY: AAOx3  SKIN: No rashes or lesions    LABS:                        10.7   7.59  )-----------( 287      ( 06 Jun 2022 06:24 )             31.3     06-06    141  |  110<H>  |  7   ----------------------------<  105<H>  3.6   |  19<L>  |  0.81    Ca    8.6      06 Jun 2022 06:24  Phos  3.6     06-06  Mg     1.30     06-06      PT/INR - ( 06 Jun 2022 06:24 )   PT: 16.1 sec;   INR: 1.38 ratio         PTT - ( 06 Jun 2022 06:24 )  PTT:33.9 sec          RADIOLOGY & ADDITIONAL TESTS:  < from: Upper Endoscopy (06.06.22 @ 09:00) >   - 1 cm hiatal hernia.                       - Gastropathy. Biopsied.                       - Duodenopathy. Biopsied.                       - Normal second portion of the duodenum.    < end of copied text >    Imaging Personally Reviewed:    Consultant(s) Notes Reviewed:      Care Discussed with Consultants/Other Providers:  
Merlin Mathew, MD   Hospitalist  Pager #[phone]    PROGRESS NOTE:     Patient is a 67y old  Female who presents with a chief complaint of Diarrhea, electrolyte abnormalities (04 Jun 2022 13:18)      SUBJECTIVE / OVERNIGHT EVENTS: No overnight events   Patient feels "great" this morning   Feels greatly improved with PPI and IVF   Nausea, vomiting have resolved, abdominal pain greatly improved   Continues to have diarrhea     ADDITIONAL REVIEW OF SYSTEMS:    MEDICATIONS  (STANDING):  chlorhexidine 2% Cloths 1 Application(s) Topical daily  influenza  Vaccine (HIGH DOSE) 0.7 milliLiter(s) IntraMuscular once  lactated ringers. 1000 milliLiter(s) (100 mL/Hr) IV Continuous <Continuous>  levothyroxine 75 MICROGram(s) Oral daily  pantoprazole  Injectable 40 milliGRAM(s) IV Push every 12 hours    MEDICATIONS  (PRN):  acetaminophen     Tablet .. 650 milliGRAM(s) Oral every 6 hours PRN Temp greater or equal to 38C (100.4F), Moderate Pain (4 - 6), Severe Pain (7 - 10)      CAPILLARY BLOOD GLUCOSE        I&O's Summary    03 Jun 2022 07:01  -  04 Jun 2022 07:00  --------------------------------------------------------  IN: 100 mL / OUT: 1 mL / NET: 99 mL    04 Jun 2022 07:01  -  04 Jun 2022 15:05  --------------------------------------------------------  IN: 1710 mL / OUT: 3 mL / NET: 1707 mL        PHYSICAL EXAM:  Vital Signs Last 24 Hrs  T(C): 36.7 (04 Jun 2022 14:31), Max: 36.9 (03 Jun 2022 16:30)  T(F): 98.1 (04 Jun 2022 14:31), Max: 98.4 (03 Jun 2022 16:30)  HR: 70 (04 Jun 2022 14:31) (70 - 100)  BP: 122/71 (04 Jun 2022 14:31) (95/58 - 127/99)  BP(mean): --  RR: 18 (04 Jun 2022 14:31) (16 - 18)  SpO2: 100% (04 Jun 2022 14:31) (97% - 100%)    CONSTITUTIONAL: NAD, well-developed woman   RESPIRATORY: Normal respiratory effort; lungs are clear to auscultation bilaterally  CARDIOVASCULAR: Regular rate and rhythm, normal S1 and S2, no murmur/rub/gallop; No lower extremity edema; Peripheral pulses are 2+ bilaterally  ABDOMEN: Nontender to palpation, normoactive bowel sounds, no rebound/guarding; No hepatosplenomegaly  MUSCULOSKELETAL no clubbing or cyanosis of digits; no joint swelling or tenderness to palpation  PSYCH: A+O to person, place, and time; affect appropriate    LABS:                        10.6   6.81  )-----------( 258      ( 04 Jun 2022 06:00 )             30.7     06-04    138  |  112<H>  |  6<L>  ----------------------------<  105<H>  3.7   |  17<L>  |  0.74    Ca    8.2<L>      04 Jun 2022 06:00  Phos  1.9     06-04  Mg     2.10     06-04    TPro  5.0<L>  /  Alb  2.7<L>  /  TBili  0.3  /  DBili  x   /  AST  10  /  ALT  9   /  AlkPhos  85  06-04    PT/INR - ( 04 Jun 2022 06:00 )   PT: 16.4 sec;   INR: 1.41 ratio         PTT - ( 04 Jun 2022 06:00 )  PTT:31.3 sec          GI PCR Panel, Stool (collected 03 Jun 2022 23:27)  Source: .Stool Feces  Final Report (04 Jun 2022 12:43):    GI PCR Results: NOT detected    *******Please Note:*******    GI panel PCR evaluates for:    Campylobacter, Plesiomonas shigelloides, Salmonella,    Vibrio, Yersinia enterocolitica, Enteroaggregative    Escherichia coli (EAEC), Enteropathogenic E.coli (EPEC),    Enterotoxigenic E. coli (ETEC) lt/st, Shiga-like    toxin-producing E. coli (STEC) stx1/stx2,    Shigella/ Enteroinvasive E. coli (EIEC), Cryptosporidium,    Cyclospora cayetanensis, Entamoeba histolytica,    Giardia lamblia, Adenovirus F 40/41, Astrovirus,    Norovirus GI/GII, Rotavirus A, Sapovirus        RADIOLOGY & ADDITIONAL TESTS:  Results Reviewed:   Imaging Personally Reviewed:  Electrocardiogram Personally Reviewed:    COORDINATION OF CARE:  Care Discussed with Consultants/Other Providers [Y/N]:  Prior or Outpatient Records Reviewed [Y/N]:

## 2022-06-06 NOTE — PROGRESS NOTE ADULT - PROBLEM SELECTOR PLAN 6
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  - Pt follows with Dr. Toy Blank at [facility]  - Onc consulted  - Pain control with PO Tylenol PRN (pt not on any pain meds at home)
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  - Pt follows with Dr. Toy Blank at [facility] Onc c/s   - Pain control with PO Tylenol PRN (pt not on any pain meds at home)
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  - Pt follows with Dr. Toy Blank at [facility] Onc c/s   - Pain control with PO Tylenol PRN (pt not on any pain meds at home)

## 2022-06-06 NOTE — DIETITIAN INITIAL EVALUATION ADULT - % CHANGE
Neurology Follow up Visit     Referred By: Dr. Leiva ref. provider found    Chief Complaint: Patient presents with:  Seizure Disorder (neurologic): Patient here for follow up . Patient daughter here. Denies seizures.  Valporic Acid level at [facility] 36.2 on 6/17 aneurysm surgery       Social history:    Smoking status: Former Smoker 2.00 Packs/day   Types: Cigarettes   Quit date: 1/1/1980   Smokeless tobacco: Never Used       Alcohol use No   Comment: Wine, 1 glass socially        Drug use: No       Family History Allergies    ROS:   As in HPI, the rest of the 14 system review was done and was negative      Physical Exam:   06/26/19  1456   BP: 118/68   Pulse: 76   Resp: 16   Weight: 129 lb   Height: 59\"       General: No apparent distress, well nourished, well margie Lab Results   Component Value Date    HDL 36 01/07/2016    LDL 94 01/07/2016    TRIG 349 (H) 01/07/2016     Lab Results   Component Value Date    HGB 12.9 08/03/2017    HCT 38.2 08/03/2017    MCV 93.1 08/03/2017    WBC 5.0 08/03/2017     08/03/2 6.77

## 2022-06-06 NOTE — DISCHARGE NOTE PROVIDER - DETAILS OF MALNUTRITION DIAGNOSIS/DIAGNOSES
This patient has been assessed with a concern for Malnutrition and was treated during this hospitalization for the following Nutrition diagnosis/diagnoses:     -  06/06/2022: Severe protein-calorie malnutrition

## 2022-06-06 NOTE — CHART NOTE - NSCHARTNOTEFT_GEN_A_CORE
RD attempted to see patient for LOS. Patient off unit for procedure. Will revisit at a later time as able. RD remains available, staff made aware.

## 2022-06-06 NOTE — PROGRESS NOTE ADULT - PROBLEM SELECTOR PLAN 4
Serum Mg 1.5 on admission. EKG with no concerning changes.  - S/p IV magnesium sulfate 2 g x1 in ED  Resolved
-Replete mag
Serum Mg 1.5 on admission. EKG with no concerning changes.  - S/p IV magnesium sulfate 2 g x1 in ED  Resolved

## 2022-06-06 NOTE — PROGRESS NOTE ADULT - PROBLEM SELECTOR PLAN 8
TSH mildly elevated at 5.35 with free T4 wnl on 6/1/22.   - C/w pt's home levothyroxine 75 mcg daily

## 2022-06-06 NOTE — DIETITIAN INITIAL EVALUATION ADULT - PERTINENT LABORATORY DATA
06-06    141  |  110<H>  |  7   ----------------------------<  105<H>  3.6   |  19<L>  |  0.81    Ca    8.6      06 Jun 2022 06:24  Phos  3.6     06-06  Mg     1.30     06-06

## 2022-06-06 NOTE — PROGRESS NOTE ADULT - PROBLEM SELECTOR PLAN 1
Pt with diarrhea on and off x2 weeks, now with worsening diarrhea x3 days, with >10 episodes a day and watery.  Recent CT A/P with PO and IV contrast performed on 5/25/22 with no acute abdominal pathology. Will hold off on repeat CTAP with IV contrast for now, as pt has no new abdominal pain or abdominal tenderness on exam.   - C. diff, GI PCR negative  - stool cx pending at time of dc, pt's symptoms have resolved  - Encourage PO   - Hold Imodium until infectious etiology ruled out  - GI following   - Refusing flex sig at this time though sx appear to be improving  -s/o egd showed 1 cm hiatal hernia. Gastropathy. Biopsied. Duodenopathy. Biopsied. Normal second portion of the duodenum.  -Pt wants to go home today  -Per GI to check swallow eval, if ok, pt can be dc later today, dc planning time spent in coordination 40mts ( preparing dc summary and plan)
Pt with diarrhea on and off x2 weeks, now with worsening diarrhea x3 days, with >10 episodes a day and watery.  Recent CT A/P with PO and IV contrast performed on 5/25/22 with no acute abdominal pathology. Will hold off on repeat CTAP with IV contrast for now, as pt has no new abdominal pain or abdominal tenderness on exam.   - C. diff, GI PCR negative  [ ] Stool O&P, stool cx ordered  - Encourage PO   - Hold Imodium until infectious etiology ruled out  - GI following   - Refusing flex sig at this time though sx appear to be improving
Pt with diarrhea on and off x2 weeks, now with worsening diarrhea x3 days, with >10 episodes a day and watery.  Recent CT A/P with PO and IV contrast performed on 5/25/22 with no acute abdominal pathology. Will hold off on repeat CTAP with IV contrast for now, as pt has no new abdominal pain or abdominal tenderness on exam.   [ ] C diff PCR, GI stool PCR pending   [ ] Stool O&P ordered  - S/p IVF, encourage PO   - Hold Imodium until infectious etiology ruled out  [ ] GI consulted

## 2022-06-06 NOTE — DIETITIAN INITIAL EVALUATION ADULT - ORAL INTAKE PTA/DIET HISTORY
Patient reports usually with good appetite and PO intake PTA. Likes to have small frequent meals throughout the day vs. 3 meals. Takes protein shakes during work and/or makes smoothies w/ fruits +yogurt +protein powder. However, patient was not feeling well over 1 week PTA and had vomiting and diarrhea x 4 days PTA and unable to tolerate adequate po intake and feels may have lost weight. Usual weight 118lbs, was 112lbs (6/1-when last checked) and this admission weight of 110lbs. Indicative of 8lbs weight loss over 1 week.

## 2022-06-06 NOTE — PROGRESS NOTE ADULT - ASSESSMENT
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 Admitted for further workup of the diarrhea and repletion of electrolytes. 
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 Admitted for further workup of the diarrhea and repletion of electrolytes. 
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 Admitted for further workup of the diarrhea and repletion of electrolytes.

## 2022-06-06 NOTE — DIETITIAN INITIAL EVALUATION ADULT - OTHER INFO
Patient reports appetite and PO intake improving. Observed >75% of lunch completed at bedside. Reports vomiting and diarrhea improved. Patient denies any nausea/vomiting/diarrhea/constipation or difficulty chewing and swallowing. NKFA. Avoids red meat. Takes fish, dairy and eggs. Importance of having good po intake of nutrient and protein dense foods discussed. RD reviewed High Biological Value Protein sources (i.e. chicken, turkey, beef, fish, eggs, etc.). Patient was receptive to information provided. RD remains available, patient made aware.

## 2022-06-06 NOTE — PROGRESS NOTE ADULT - PROBLEM SELECTOR PLAN 2
Hgb 11.5 on initial labs, 10.6 on repeat labs. Hgb 13.2 on 6/1/22. Pt with report of seeing red liquid in toilet bowl with episode of diarrhea in ED, now diarrhea clearing up.  - Deferred rectal exam   - FOBT positive   - Cont PPI IV   - Monitor H/H and transfuse to keep Hgb > 7- patient refusing labs today   - Patient refusing flex sig  -Hb 10.7 today
Hgb 11.5 on initial labs, 10.6 on repeat labs. Hgb 13.2 on 6/1/22. Pt with report of seeing red liquid in toilet bowl with episode of diarrhea in ED, now diarrhea clearing up.  - Deferred rectal exam   - FOBT positive   - Cont PPI IV   - Monitor H/H and transfuse to keep Hgb > 7  [ ] GI c/s
Hgb 11.5 on initial labs, 10.6 on repeat labs. Hgb 13.2 on 6/1/22. Pt with report of seeing red liquid in toilet bowl with episode of diarrhea in ED, now diarrhea clearing up.  - Deferred rectal exam   - FOBT positive   - Cont PPI IV   - Monitor H/H and transfuse to keep Hgb > 7- patient refusing labs today   - Patient refusing flex sig

## 2022-06-06 NOTE — SWALLOW BEDSIDE ASSESSMENT ADULT - ASR SWALLOW REFERRAL
Continued GI follow up at MD discretion given patient complaint of "food coming back up" prior to admission

## 2022-06-06 NOTE — DISCHARGE NOTE PROVIDER - CARE PROVIDER_API CALL
PAUL ACEVEDO  [address] [address]  Phone: [phone]  Fax: [phone]  Follow Up Time:     Malka Aguilar)  Gastroenterology; Internal Medicine  [address] [place] [id number]  Phone: [phone]  Fax: [phone]  Follow Up Time:

## 2022-06-06 NOTE — DISCHARGE NOTE PROVIDER - HOSPITAL COURSE
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 Admitted for further workup of the diarrhea and repletion of electrolytes.      Diarrhea.   -Pt with diarrhea on and off x2 weeks, now with worsening diarrhea x3 days, with >10 episodes a day and watery.  Recent CT A/P with PO and IV contrast performed on 5/25/22 with no acute abdominal pathology. Will hold off on repeat CTAP with IV contrast for now, as pt has no new abdominal pain or abdominal tenderness on exam.   - C. diff, GI PCR negative  - stool cx pending at time of dc, pt's symptoms have resolved  - Encourage PO   - Hold Imodium until infectious etiology ruled out  - GI consulted, pt's status post EDG, EDG normal.   - Refusing flex sig at this time though sx appear to be improving  -s/o egd showed 1 cm hiatal hernia. Gastropathy. Biopsied. Duodenopathy. Biopsied. Normal second portion of the duodenum.  -Per GI to check swallow eval, s/p speech and swallow eval normal diet.      Anemia.   -Hgb 11.5 on initial labs, 10.6 on repeat labs. Hgb 13.2 on 6/1/22. Pt report of seeing red liquid in toilet bowl with episode of diarrhea in ED, diarrhea clearing up.  - Deferred rectal exam   - FOBT positive   - Cont PPI IV   - Patient refusing flex sig  -Hb 10.7 today.    Hypokalemia.   - Resolved.    Hypomagnesemia.   -Repleted mag.     Chronic epigastric pain.   -Pt with ~2 months of burning epigastric pain, associated with globus sensation and intermittent nausea. Also with recent episodes of NBNB vomiting, now resolved. Unclear etiology, though can be from esophagitis, gastritis, or PUD.  - Now significantly improved with PPI, cont   - Lipase elevated to 264 but denies any epigastric TTP  -EGD showed 1 cm hiatal hernia. Gastropathy. Biopsied. Duodenopathy. Biopsied. Normal second portion of the duodenum.    Endometrial cancer.   -Diagnosed as stage 2 in 7/2018 s/p TLH+BSO and adjuvant RT, now with metastatic disease to lungs (s/p multiple RLL wedge resections 10/2019) and vaginal cuff, most recently on lenvatinib (Lenvima) and pembrolizumab (Keytruda).  - Pt follows with Dr. Toy Blank at [facility]  - Onc consulted  - Pain control with PO Tylenol PRN (pt not on any pain meds at home).    HTN (hypertension).   -Pt with soft BPs in setting of volume loss from diarrhea.  - Hold pt's home valsartan for now, bp acceptable    Drug Induced thyroiditis.    -TSH mildly elevated at 5.35 with free T4 wnl on 6/1/22.   - C/w pt's home levothyroxine 75 mcg daily    Patient was seen and is medically stable to be discharged home. Discussed with Dr. Hampton on 06/06/22. Dispo home no needs. 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 Admitted for further workup of the diarrhea and repletion of electrolytes.      Diarrhea.   -Pt with diarrhea on and off x2 weeks, now with worsening diarrhea x3 days, with >10 episodes a day and watery.  Recent CT A/P with PO and IV contrast performed on 5/25/22 with no acute abdominal pathology. Will hold off on repeat CTAP with IV contrast for now, as pt has no new abdominal pain or abdominal tenderness on exam.   - C. diff, GI PCR negative  - stool cx pending at time of dc, pt's symptoms have resolved  - Encourage PO   - Hold Imodium until infectious etiology ruled out  - GI consulted, pt's status post EDG, EDG normal.   - Refusing flex sig at this time though sx appear to be improving  -s/o egd showed 1 cm hiatal hernia. Gastropathy. Biopsied. Duodenopathy. Biopsied. Normal second portion of the duodenum.  -Per GI to check swallow eval, s/p speech and swallow eval normal diet.      Anemia.   -Hgb 11.5 on initial labs, 10.6 on repeat labs. Hgb 13.2 on 6/1/22. Pt report of seeing red liquid in toilet bowl with episode of diarrhea in ED, diarrhea clearing up.  - Deferred rectal exam   - FOBT positive   - Cont PPI IV   - Patient refusing flex sig  -Hb 10.7 today.    Hypokalemia.   - Resolved.    Hypomagnesemia.   -Repleted mag.     Chronic epigastric pain.   -Pt with ~2 months of burning epigastric pain, associated with globus sensation and intermittent nausea. Also with recent episodes of NBNB vomiting, now resolved. Unclear etiology, though can be from esophagitis, gastritis, or PUD.  - Now significantly improved with PPI, cont   - Lipase elevated to 264 but denies any epigastric TTP  -EGD showed 1 cm hiatal hernia. Gastropathy. Biopsied. Duodenopathy. Biopsied. Normal second portion of the duodenum.    Endometrial cancer.   -Diagnosed as stage 2 in 7/2018 s/p TLH+BSO and adjuvant RT, now with metastatic disease to lungs (s/p multiple RLL wedge resections 10/2019) and vaginal cuff, most recently on lenvatinib (Lenvima) and pembrolizumab (Keytruda).  - Pt follows with Dr. Toy Blank at [facility]  - Onc consulted  - Pain control with PO Tylenol PRN (pt not on any pain meds at home).    HTN (hypertension).   -Pt with soft BPs in setting of volume loss from diarrhea.  - Hold pt's home valsartan for now, bp acceptable. Continue to hold on DC     Drug Induced thyroiditis.    -TSH mildly elevated at 5.35 with free T4 wnl on 6/1/22.   - C/w pt's home levothyroxine 75 mcg daily    Patient was seen and is medically stable to be discharged home. Discussed with Dr. Hampton on 06/06/22. Dispo home no needs. 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 Admitted for further workup of the diarrhea and repletion of electrolytes.      Diarrhea.   -Pt with diarrhea on and off x2 weeks, now with worsening diarrhea x3 days, with >10 episodes a day and watery.  Recent CT A/P with PO and IV contrast performed on 5/25/22 with no acute abdominal pathology. Will hold off on repeat CTAP with IV contrast for now, as pt has no new abdominal pain or abdominal tenderness on exam.   - C. diff, GI PCR negative  - stool cx pending at time of dc, pt's symptoms have resolved  - Encourage PO   - Hold Imodium until infectious etiology ruled out  - GI consulted, pt's status post EDG, EDG normal.   - Refusing flex sig at this time though sx appear to be improving  -s/o egd showed 1 cm hiatal hernia. Gastropathy. Biopsied. Duodenopathy. Biopsied. Normal second portion of the duodenum.  -Per GI to check swallow eval, s/p speech and swallow eval normal diet.      Anemia.   -Hgb 11.5 on initial labs, 10.6 on repeat labs. Hgb 13.2 on 6/1/22. Pt report of seeing red liquid in toilet bowl with episode of diarrhea in ED, diarrhea clearing up.  - Deferred rectal exam   - FOBT positive   - Cont PPI IV   - Patient refusing flex sig  -Hb 10.7 today.    Hypokalemia.   - Resolved.    Hypomagnesemia.   -Repleted mag.     Chronic epigastric pain.   -Pt with ~2 months of burning epigastric pain, associated with globus sensation and intermittent nausea. Also with recent episodes of NBNB vomiting, now resolved. Unclear etiology, though can be from esophagitis, gastritis, or PUD.  - Now significantly improved with PPI, cont   - Lipase elevated to 264 but denies any epigastric TTP  -EGD showed 1 cm hiatal hernia. Gastropathy. Biopsied. Duodenopathy. Biopsied. Normal second portion of the duodenum.    Endometrial cancer.   -Diagnosed as stage 2 in 7/2018 s/p TLH+BSO and adjuvant RT, now with metastatic disease to lungs (s/p multiple RLL wedge resections 10/2019) and vaginal cuff, most recently on lenvatinib (Lenvima) and pembrolizumab (Keytruda).  - Pt follows with Dr. Toy Blank at [facility]  - Onc consulted  - Pain control with PO Tylenol PRN (pt not on any pain meds at home).    HTN (hypertension).   -Pt with soft BPs in setting of volume loss from diarrhea.  - Hold pt's home valsartan for now, bp acceptable. Continue to hold on DC     Drug Induced thyroiditis.    -TSH mildly elevated at 5.35 with free T4 wnl on 6/1/22.   - C/w pt's home levothyroxine 75 mcg daily  advised to f/u with  GI for bx results and pending stool studies  Patient was seen and is medically stable to be discharged home. Discussed with Dr. Hampton on 06/06/22. Dispo home no needs.

## 2022-06-07 ENCOUNTER — NON-APPOINTMENT (OUTPATIENT)
Age: 67
End: 2022-06-07

## 2022-06-08 ENCOUNTER — NON-APPOINTMENT (OUTPATIENT)
Age: 67
End: 2022-06-08

## 2022-06-08 ENCOUNTER — APPOINTMENT (OUTPATIENT)
Dept: INFUSION THERAPY | Facility: HOSPITAL | Age: 67
End: 2022-06-08

## 2022-06-08 ENCOUNTER — APPOINTMENT (OUTPATIENT)
Dept: HEMATOLOGY ONCOLOGY | Facility: CLINIC | Age: 67
End: 2022-06-08

## 2022-06-08 ENCOUNTER — OUTPATIENT (OUTPATIENT)
Dept: OUTPATIENT SERVICES | Facility: HOSPITAL | Age: 67
LOS: 1 days | Discharge: HOME | End: 2022-06-08

## 2022-06-08 DIAGNOSIS — Z90.710 ACQUIRED ABSENCE OF BOTH CERVIX AND UTERUS: Chronic | ICD-10-CM

## 2022-06-08 DIAGNOSIS — C54.1 MALIGNANT NEOPLASM OF ENDOMETRIUM: ICD-10-CM

## 2022-06-08 DIAGNOSIS — Z98.890 OTHER SPECIFIED POSTPROCEDURAL STATES: Chronic | ICD-10-CM

## 2022-06-08 PROBLEM — I63.239: Chronic | Status: ACTIVE | Noted: 2018-06-19

## 2022-06-08 PROBLEM — E06.4 DRUG-INDUCED THYROIDITIS: Chronic | Status: ACTIVE | Noted: 2022-06-04

## 2022-06-08 PROBLEM — I10 ESSENTIAL (PRIMARY) HYPERTENSION: Chronic | Status: ACTIVE | Noted: 2022-06-04

## 2022-06-08 LAB
CULTURE RESULTS: SIGNIFICANT CHANGE UP
SPECIMEN SOURCE: SIGNIFICANT CHANGE UP
SURGICAL PATHOLOGY STUDY: SIGNIFICANT CHANGE UP

## 2022-06-09 ENCOUNTER — APPOINTMENT (OUTPATIENT)
Dept: GASTROENTEROLOGY | Facility: CLINIC | Age: 67
End: 2022-06-09

## 2022-06-13 ENCOUNTER — RESULT REVIEW (OUTPATIENT)
Age: 67
End: 2022-06-13

## 2022-06-13 ENCOUNTER — APPOINTMENT (OUTPATIENT)
Dept: INFUSION THERAPY | Facility: HOSPITAL | Age: 67
End: 2022-06-13

## 2022-06-13 LAB
BASOPHILS # BLD AUTO: 0.04 K/UL — SIGNIFICANT CHANGE UP (ref 0–0.2)
BASOPHILS NFR BLD AUTO: 0.5 % — SIGNIFICANT CHANGE UP (ref 0–2)
EOSINOPHIL # BLD AUTO: 0.1 K/UL — SIGNIFICANT CHANGE UP (ref 0–0.5)
EOSINOPHIL NFR BLD AUTO: 1.2 % — SIGNIFICANT CHANGE UP (ref 0–6)
HCT VFR BLD CALC: 35.8 % — SIGNIFICANT CHANGE UP (ref 34.5–45)
HGB BLD-MCNC: 12 G/DL — SIGNIFICANT CHANGE UP (ref 11.5–15.5)
IMM GRANULOCYTES NFR BLD AUTO: 0.4 % — SIGNIFICANT CHANGE UP (ref 0–1.5)
LYMPHOCYTES # BLD AUTO: 1.33 K/UL — SIGNIFICANT CHANGE UP (ref 1–3.3)
LYMPHOCYTES # BLD AUTO: 15.7 % — SIGNIFICANT CHANGE UP (ref 13–44)
MCHC RBC-ENTMCNC: 32.9 PG — SIGNIFICANT CHANGE UP (ref 27–34)
MCHC RBC-ENTMCNC: 33.5 G/DL — SIGNIFICANT CHANGE UP (ref 32–36)
MCV RBC AUTO: 98.1 FL — SIGNIFICANT CHANGE UP (ref 80–100)
MONOCYTES # BLD AUTO: 0.97 K/UL — HIGH (ref 0–0.9)
MONOCYTES NFR BLD AUTO: 11.5 % — SIGNIFICANT CHANGE UP (ref 2–14)
NEUTROPHILS # BLD AUTO: 5.99 K/UL — SIGNIFICANT CHANGE UP (ref 1.8–7.4)
NEUTROPHILS NFR BLD AUTO: 70.7 % — SIGNIFICANT CHANGE UP (ref 43–77)
NRBC # BLD: 0 /100 WBCS — SIGNIFICANT CHANGE UP (ref 0–0)
PLATELET # BLD AUTO: 282 K/UL — SIGNIFICANT CHANGE UP (ref 150–400)
RBC # BLD: 3.65 M/UL — LOW (ref 3.8–5.2)
RBC # FLD: 13.2 % — SIGNIFICANT CHANGE UP (ref 10.3–14.5)
WBC # BLD: 8.46 K/UL — SIGNIFICANT CHANGE UP (ref 3.8–10.5)
WBC # FLD AUTO: 8.46 K/UL — SIGNIFICANT CHANGE UP (ref 3.8–10.5)

## 2022-06-14 LAB
ALBUMIN SERPL ELPH-MCNC: 3.5 G/DL — SIGNIFICANT CHANGE UP (ref 3.3–5)
ALP SERPL-CCNC: 197 U/L — HIGH (ref 40–120)
ALT FLD-CCNC: 50 U/L — HIGH (ref 10–45)
ANION GAP SERPL CALC-SCNC: 13 MMOL/L — SIGNIFICANT CHANGE UP (ref 5–17)
AST SERPL-CCNC: 47 U/L — HIGH (ref 10–40)
BILIRUB SERPL-MCNC: <0.2 MG/DL — SIGNIFICANT CHANGE UP (ref 0.2–1.2)
BUN SERPL-MCNC: 18 MG/DL — SIGNIFICANT CHANGE UP (ref 7–23)
CALCIUM SERPL-MCNC: 8.7 MG/DL — SIGNIFICANT CHANGE UP (ref 8.4–10.5)
CANCER AG125 SERPL-ACNC: 12 U/ML — SIGNIFICANT CHANGE UP
CHLORIDE SERPL-SCNC: 99 MMOL/L — SIGNIFICANT CHANGE UP (ref 96–108)
CO2 SERPL-SCNC: 26 MMOL/L — SIGNIFICANT CHANGE UP (ref 22–31)
CREAT SERPL-MCNC: 0.75 MG/DL — SIGNIFICANT CHANGE UP (ref 0.5–1.3)
EGFR: 86 ML/MIN/1.73M2 — SIGNIFICANT CHANGE UP
GLUCOSE SERPL-MCNC: 90 MG/DL — SIGNIFICANT CHANGE UP (ref 70–99)
MAGNESIUM SERPL-MCNC: 1.6 MG/DL — SIGNIFICANT CHANGE UP (ref 1.6–2.6)
POTASSIUM SERPL-MCNC: 4.5 MMOL/L — SIGNIFICANT CHANGE UP (ref 3.5–5.3)
POTASSIUM SERPL-SCNC: 4.5 MMOL/L — SIGNIFICANT CHANGE UP (ref 3.5–5.3)
PROT SERPL-MCNC: 5.6 G/DL — LOW (ref 6–8.3)
SODIUM SERPL-SCNC: 138 MMOL/L — SIGNIFICANT CHANGE UP (ref 135–145)
T4 FREE SERPL-MCNC: 1.3 NG/DL — SIGNIFICANT CHANGE UP (ref 0.9–1.8)
T4 FREE+ TSH PNL SERPL: 4.69 UIU/ML — HIGH (ref 0.27–4.2)

## 2022-06-15 ENCOUNTER — APPOINTMENT (OUTPATIENT)
Dept: INFUSION THERAPY | Facility: HOSPITAL | Age: 67
End: 2022-06-15

## 2022-06-17 ENCOUNTER — APPOINTMENT (OUTPATIENT)
Dept: GASTROENTEROLOGY | Facility: CLINIC | Age: 67
End: 2022-06-17
Payer: MEDICARE

## 2022-06-17 VITALS
DIASTOLIC BLOOD PRESSURE: 72 MMHG | SYSTOLIC BLOOD PRESSURE: 123 MMHG | TEMPERATURE: 96.7 F | WEIGHT: 114 LBS | HEIGHT: 61 IN | OXYGEN SATURATION: 99 % | HEART RATE: 119 BPM | BODY MASS INDEX: 21.52 KG/M2

## 2022-06-17 DIAGNOSIS — Z12.11 ENCOUNTER FOR SCREENING FOR MALIGNANT NEOPLASM OF COLON: ICD-10-CM

## 2022-06-17 DIAGNOSIS — R10.9 UNSPECIFIED ABDOMINAL PAIN: ICD-10-CM

## 2022-06-17 DIAGNOSIS — Z01.812 ENCOUNTER FOR PREPROCEDURAL LABORATORY EXAMINATION: ICD-10-CM

## 2022-06-17 DIAGNOSIS — Z20.822 ENCOUNTER FOR PREPROCEDURAL LABORATORY EXAMINATION: ICD-10-CM

## 2022-06-17 PROCEDURE — 99204 OFFICE O/P NEW MOD 45 MIN: CPT

## 2022-06-17 NOTE — HISTORY OF PRESENT ILLNESS
[Heartburn] : denies heartburn [Nausea] : improved nausea [Vomiting] : denies vomiting [Diarrhea] : resolved diarrhea [Constipation] : denies constipation [Yellow Skin Or Eyes (Jaundice)] : denies jaundice [Abdominal Pain] : resolved abdominal pain [Abdominal Swelling] : denies abdominal swelling [Rectal Pain] : denies rectal pain [Peptic Ulcer Disease] : peptic ulcer disease [Wt Gain ___ Lbs] : no recent weight gain [Wt Loss ___ Lbs] : no recent weight loss [GERD] : no gastroesophageal reflux disease [Hiatus Hernia] : no hiatus hernia [Pancreatitis] : no pancreatitis [Cholelithiasis] : no cholelithiasis [Kidney Stone] : no kidney stone [Inflammatory Bowel Disease] : no inflammatory bowel disease [Irritable Bowel Syndrome] : no irritable bowel syndrome [Diverticulitis] : no diverticulitis [Alcohol Abuse] : no alcohol abuse [Malignancy] : no malignancy [Abdominal Surgery] : no abdominal surgery [Appendectomy] : no appendectomy [Cholecystectomy] : no cholecystectomy [de-identified] : 67-year-old woman with metastatic endometrial cancer which she is receiving chemotherapy.  She had been on Lenvima and Keytruda.  Proxy 3 weeks ago she developed an episode of abdominal pain followed by severe watery diarrhea that resulted in fluid imbalance.  She went to the emergency room where she received IV fluids.  Since then she has had no further episodes of diarrhea.  She has occasional abdominal cramping.  While she was in the hospital she underwent an EGD that showed gastritis and a hiatus hernia.  CT of the abdomen and pelvis was unremarkable.  Her last colonoscopy was many years ago.

## 2022-06-17 NOTE — REVIEW OF SYSTEMS
[As Noted in HPI] : as noted in HPI [Abdominal Pain] : abdominal pain [Vomiting] : no vomiting [Constipation] : no constipation [Diarrhea] : diarrhea [Negative] : Heme/Lymph

## 2022-06-17 NOTE — ASSESSMENT
[FreeTextEntry1] : Acute gastroenteritis that appears to have resolved. Dietary and lifestyle modification discussed with the patient.\par Screening colonoscopy.  The importance of colon cancer screening and the colonoscopy prep was discussed with the patient.  She understands and all questions were answered.

## 2022-06-17 NOTE — PHYSICAL EXAM
[General Appearance - Alert] : alert [General Appearance - In No Acute Distress] : in no acute distress [Sclera] : the sclera and conjunctiva were normal [PERRL With Normal Accommodation] : pupils were equal in size, round, and reactive to light [Extraocular Movements] : extraocular movements were intact [Neck Appearance] : the appearance of the neck was normal [Neck Cervical Mass (___cm)] : no neck mass was observed [Jugular Venous Distention Increased] : there was no jugular-venous distention [Thyroid Diffuse Enlargement] : the thyroid was not enlarged [Thyroid Nodule] : there were no palpable thyroid nodules [Auscultation Breath Sounds / Voice Sounds] : lungs were clear to auscultation bilaterally [Heart Rate And Rhythm] : heart rate was normal and rhythm regular [Heart Sounds] : normal S1 and S2 [Heart Sounds Gallop] : no gallops [Murmurs] : no murmurs [Heart Sounds Pericardial Friction Rub] : no pericardial rub [Bowel Sounds] : normal bowel sounds [Abdomen Soft] : soft [Abdomen Tenderness] : non-tender [] : no hepato-splenomegaly [Abdomen Mass (___ Cm)] : no abdominal mass palpated [Cervical Lymph Nodes Enlarged Posterior Bilaterally] : posterior cervical [Cervical Lymph Nodes Enlarged Anterior Bilaterally] : anterior cervical [Supraclavicular Lymph Nodes Enlarged Bilaterally] : supraclavicular [No CVA Tenderness] : no ~M costovertebral angle tenderness [No Spinal Tenderness] : no spinal tenderness [Abnormal Walk] : normal gait [Nail Clubbing] : no clubbing  or cyanosis of the fingernails [Musculoskeletal - Swelling] : no joint swelling seen [Motor Tone] : muscle strength and tone were normal [Deep Tendon Reflexes (DTR)] : deep tendon reflexes were 2+ and symmetric [Sensation] : the sensory exam was normal to light touch and pinprick [No Focal Deficits] : no focal deficits [Oriented To Time, Place, And Person] : oriented to person, place, and time [Impaired Insight] : insight and judgment were intact [Affect] : the affect was normal

## 2022-06-21 ENCOUNTER — NON-APPOINTMENT (OUTPATIENT)
Age: 67
End: 2022-06-21

## 2022-06-22 ENCOUNTER — APPOINTMENT (OUTPATIENT)
Dept: HEMATOLOGY ONCOLOGY | Facility: CLINIC | Age: 67
End: 2022-06-22
Payer: MEDICARE

## 2022-06-22 VITALS
DIASTOLIC BLOOD PRESSURE: 77 MMHG | SYSTOLIC BLOOD PRESSURE: 107 MMHG | WEIGHT: 115.74 LBS | BODY MASS INDEX: 21.85 KG/M2 | TEMPERATURE: 97.1 F | HEIGHT: 60.98 IN | RESPIRATION RATE: 16 BRPM

## 2022-06-22 PROCEDURE — 99213 OFFICE O/P EST LOW 20 MIN: CPT

## 2022-06-27 ENCOUNTER — RESULT REVIEW (OUTPATIENT)
Age: 67
End: 2022-06-27

## 2022-06-27 ENCOUNTER — APPOINTMENT (OUTPATIENT)
Dept: INFUSION THERAPY | Facility: HOSPITAL | Age: 67
End: 2022-06-27

## 2022-06-27 LAB
ALBUMIN SERPL ELPH-MCNC: 3.2 G/DL — LOW (ref 3.3–5)
ALP SERPL-CCNC: 199 U/L — HIGH (ref 40–120)
ALT FLD-CCNC: 31 U/L — SIGNIFICANT CHANGE UP (ref 10–45)
ANION GAP SERPL CALC-SCNC: 10 MMOL/L — SIGNIFICANT CHANGE UP (ref 5–17)
AST SERPL-CCNC: 20 U/L — SIGNIFICANT CHANGE UP (ref 10–40)
BASOPHILS # BLD AUTO: 0.02 K/UL — SIGNIFICANT CHANGE UP (ref 0–0.2)
BASOPHILS NFR BLD AUTO: 0.3 % — SIGNIFICANT CHANGE UP (ref 0–2)
BILIRUB SERPL-MCNC: 0.2 MG/DL — SIGNIFICANT CHANGE UP (ref 0.2–1.2)
BUN SERPL-MCNC: 14 MG/DL — SIGNIFICANT CHANGE UP (ref 7–23)
CALCIUM SERPL-MCNC: 8.9 MG/DL — SIGNIFICANT CHANGE UP (ref 8.4–10.5)
CANCER AG125 SERPL-ACNC: 11 U/ML — SIGNIFICANT CHANGE UP
CHLORIDE SERPL-SCNC: 104 MMOL/L — SIGNIFICANT CHANGE UP (ref 96–108)
CO2 SERPL-SCNC: 23 MMOL/L — SIGNIFICANT CHANGE UP (ref 22–31)
CREAT SERPL-MCNC: 0.86 MG/DL — SIGNIFICANT CHANGE UP (ref 0.5–1.3)
EGFR: 73 ML/MIN/1.73M2 — SIGNIFICANT CHANGE UP
EOSINOPHIL # BLD AUTO: 0.08 K/UL — SIGNIFICANT CHANGE UP (ref 0–0.5)
EOSINOPHIL NFR BLD AUTO: 1.2 % — SIGNIFICANT CHANGE UP (ref 0–6)
GLUCOSE SERPL-MCNC: 111 MG/DL — HIGH (ref 70–99)
HCT VFR BLD CALC: 37.2 % — SIGNIFICANT CHANGE UP (ref 34.5–45)
HGB BLD-MCNC: 12.5 G/DL — SIGNIFICANT CHANGE UP (ref 11.5–15.5)
IMM GRANULOCYTES NFR BLD AUTO: 0.3 % — SIGNIFICANT CHANGE UP (ref 0–1.5)
LYMPHOCYTES # BLD AUTO: 1.23 K/UL — SIGNIFICANT CHANGE UP (ref 1–3.3)
LYMPHOCYTES # BLD AUTO: 18 % — SIGNIFICANT CHANGE UP (ref 13–44)
MAGNESIUM SERPL-MCNC: 1.7 MG/DL — SIGNIFICANT CHANGE UP (ref 1.6–2.6)
MCHC RBC-ENTMCNC: 33.1 PG — SIGNIFICANT CHANGE UP (ref 27–34)
MCHC RBC-ENTMCNC: 33.6 G/DL — SIGNIFICANT CHANGE UP (ref 32–36)
MCV RBC AUTO: 98.4 FL — SIGNIFICANT CHANGE UP (ref 80–100)
MONOCYTES # BLD AUTO: 0.89 K/UL — SIGNIFICANT CHANGE UP (ref 0–0.9)
MONOCYTES NFR BLD AUTO: 13 % — SIGNIFICANT CHANGE UP (ref 2–14)
NEUTROPHILS # BLD AUTO: 4.58 K/UL — SIGNIFICANT CHANGE UP (ref 1.8–7.4)
NEUTROPHILS NFR BLD AUTO: 67.2 % — SIGNIFICANT CHANGE UP (ref 43–77)
NRBC # BLD: 0 /100 WBCS — SIGNIFICANT CHANGE UP (ref 0–0)
PLATELET # BLD AUTO: 292 K/UL — SIGNIFICANT CHANGE UP (ref 150–400)
POTASSIUM SERPL-MCNC: 4.3 MMOL/L — SIGNIFICANT CHANGE UP (ref 3.5–5.3)
POTASSIUM SERPL-SCNC: 4.3 MMOL/L — SIGNIFICANT CHANGE UP (ref 3.5–5.3)
PROT SERPL-MCNC: 5.7 G/DL — LOW (ref 6–8.3)
RBC # BLD: 3.78 M/UL — LOW (ref 3.8–5.2)
RBC # FLD: 13.1 % — SIGNIFICANT CHANGE UP (ref 10.3–14.5)
SODIUM SERPL-SCNC: 137 MMOL/L — SIGNIFICANT CHANGE UP (ref 135–145)
T4 FREE SERPL-MCNC: 1 NG/DL — SIGNIFICANT CHANGE UP (ref 0.9–1.8)
T4 FREE+ TSH PNL SERPL: 5.82 UIU/ML — HIGH (ref 0.27–4.2)
WBC # BLD: 6.82 K/UL — SIGNIFICANT CHANGE UP (ref 3.8–10.5)
WBC # FLD AUTO: 6.82 K/UL — SIGNIFICANT CHANGE UP (ref 3.8–10.5)

## 2022-06-28 DIAGNOSIS — Z51.11 ENCOUNTER FOR ANTINEOPLASTIC CHEMOTHERAPY: ICD-10-CM

## 2022-06-28 DIAGNOSIS — R11.2 NAUSEA WITH VOMITING, UNSPECIFIED: ICD-10-CM

## 2022-06-28 LAB
APPEARANCE UR: ABNORMAL
BACTERIA # UR AUTO: NEGATIVE — SIGNIFICANT CHANGE UP
BILIRUB UR-MCNC: NEGATIVE — SIGNIFICANT CHANGE UP
COD CRY URNS QL: ABNORMAL
COLOR SPEC: SIGNIFICANT CHANGE UP
CREAT ?TM UR-MCNC: 165 MG/DL — SIGNIFICANT CHANGE UP
DIFF PNL FLD: NEGATIVE — SIGNIFICANT CHANGE UP
EPI CELLS # UR: 1 /HPF — SIGNIFICANT CHANGE UP (ref 0–5)
GLUCOSE UR QL: NEGATIVE — SIGNIFICANT CHANGE UP
HYALINE CASTS # UR AUTO: 3 /LPF — SIGNIFICANT CHANGE UP (ref 0–7)
KETONES UR-MCNC: NEGATIVE — SIGNIFICANT CHANGE UP
LEUKOCYTE ESTERASE UR-ACNC: NEGATIVE — SIGNIFICANT CHANGE UP
NITRITE UR-MCNC: NEGATIVE — SIGNIFICANT CHANGE UP
PH UR: 5.5 — SIGNIFICANT CHANGE UP (ref 5–8)
PROT ?TM UR-MCNC: 15 MG/DL — HIGH (ref 0–12)
PROT UR-MCNC: SIGNIFICANT CHANGE UP
PROT/CREAT UR-RTO: 0.1 RATIO — SIGNIFICANT CHANGE UP (ref 0–0.2)
RBC CASTS # UR COMP ASSIST: 1 /HPF — SIGNIFICANT CHANGE UP (ref 0–4)
SP GR SPEC: 1.03 — HIGH (ref 1.01–1.02)
UROBILINOGEN FLD QL: SIGNIFICANT CHANGE UP
WBC UR QL: 3 /HPF — SIGNIFICANT CHANGE UP (ref 0–5)

## 2022-06-28 NOTE — HISTORY OF PRESENT ILLNESS
[Disease: _____________________] : Disease: [unfilled] [T: ___] : T[unfilled] [N: ___] : N[unfilled] [M: ___] : M[unfilled] [AJCC Stage: ____] : AJCC Stage: [unfilled] [de-identified] : Referred by Dr. Gaytan\par \par Ms. Hoang is a 66 y/o G0 postmenopausal F who was referred to medical oncology for treatment considerations for recurrent endometrial cancer.\par She was initially diagnosed with stage II grade 2 endometrial cancer in 2018, she underwent underwent robotic assisted TLH BSO, bilateral pelvic and para-aortic sentinel LN dissection by Dr. Paras Grayson on 2018.  Her surgical pathology demonstrated pT2N0 disease with endometrial tumor measuring 4cm, FIGO 2 endometrioid adenocarcinoma, >90 myometrial invasion with involvement of cervical stroma, +LVI with IHC of MMR proteins with intact expression but pelvic wash negative for malignant cells.  On 10/2018, she completed pelvic IMRT and vaginal cuff brachytherapy with Dr. Bowling.  She did well clinically under surveillance until 2019 when she developed vaginal spotting and fluid discharge, and was evaluated by Dr. Grayson. CA-125 was 27. Concern for possible recurrence and further workup pursued.  Her 19 CT CAP demonstrate new bilateral pulmonary nodules, some with central cavitation, suspicious for metastatic disease, enhancing nodularity superior to the vaginal cuff concerning for metastatic disease, measuring 1.9x1.6cm.  On 10/30/19 she underwent thorascopic multiple RLL wedge resections with Dr. Weston and her surgical pathology c/w metastatic adenocarcinoma, consistent with patient's endometrial primary +ER HI Pax8.  She was recommended further treatment for her recurrent endometrial cancer but the patient was lost to follow up until 2020 several months before which she has noticed increasing hardness and "lumps" in her vagina, which has been progressively more painful and burning sensation. She was subsequently evaluated by Dr. Gaytan 2020 and referred to medical oncology for systemic treatment evaluation.   On presentation for initial consult 2020, she continued to have vaginal discharge and on and off spotting (not more than 1 pad a day). She felt constipated. She was taking Percocet 5-325, Tylenol and ibuprofen for vaginal pain. She had been very busy with work and family affairs, was not able to come for cancer treatment over the past months. 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 \par \par \par Patient started on Lenvima and Pembrolizumab combination therapy on 3/17/21.  Given increasing vaginal pain and increased mass involving the vaginal cuff region, she underwent palliative RT to the vaginal mass and cuff area with Dr. Bowling. She had improved pain and had been able to tolerate Lenvima without significant toxicities. I reviewed my recommendations to continue current regimen with pembrolizumab and lenvima as lenvima had been on hold for several weeks due to difficulty tolerating treatment and pain control issues. \par \par CT c/a/p done on 2021 which showed favorable response to therapy. Left inguinal node decreased in size. Vaginal mass decreased in size. Pulmonary nodules decreased in size.  \par \par Her CT c/a/p from 2021 showed pulmonary nodules. While some are stable in size, there is mild increase in pulmonary nodule in the right upper lobe.  3 x 5 mm nodular focus of higher attenuation suggesting enhancement within a right renal cyst. This is slightly better seen on the current examination although may be exaggerated by volume averaging.  [de-identified] :  ER +  NC +  PAX 8  +     MSI STABLE [de-identified] : Refuses COVID 19 vaccine  [FreeTextEntry1] :  Lenvima and Pembrolizumab (held since 6/1) [de-identified] : Nicole comes in for follow up while Lenvima and Pembrolizumab has been on hold. In the interim she saw GI for evaluation and is scheduled for a colonoscopy. She was also admitted at [facility] last week for significantly low K levels and underwent upper EGD evaluation that showed gastritis and duodenitis. Pathology is pending. She feels okay today. Her symptoms include intermittent midepigastric discomfort and gastric reflux. She has intermittent nausea. Reglan does not offer much relief. Her diarrhea has resolved. She is tolerating food better however certain foods are triggering. She otherwise denies any fevers, chills, mouth sores, CP, palpitations, cough, LOMAS, LE edema, vaginal discharge or bleeding, urinary symptoms, excessive bruising, dizziness, headaches, arthralgias, myalgias, weight changes and tries to keeps active.

## 2022-06-28 NOTE — REVIEW OF SYSTEMS
[Abdominal Pain] : abdominal pain [Negative] : Integumentary [Vision Problems] : no vision problems [Chest Pain] : no chest pain [Lower Ext Edema] : no lower extremity edema [Cough] : no cough [Vaginal Discharge] : no vaginal discharge [Joint Pain] : no joint pain [Muscle Pain] : no muscle pain [Difficulty Walking] : no difficulty walking [FreeTextEntry7] : reflux, nausea

## 2022-06-28 NOTE — ADDENDUM
[FreeTextEntry1] : I, Jaida Deena, acted solely as a scribe for Dr. Toy Blank on 06/01/2022. All medical entries made by the Scribe were at my, Dr. Toy Blank's, direction and personally dictated by me on 06/01/2022. I have reviewed the chart and agree that the record accurately reflects my personal performance of the history, physical exam, assessment and plan. I have also personally directed, reviewed, and agreed with the chart.

## 2022-06-28 NOTE — ASU PATIENT PROFILE, ADULT - VISION (WITH CORRECTIVE LENSES IF THE PATIENT USUALLY WEARS THEM):
Normal vision: sees adequately in most situations; can see medication labels, newsprint/glasses Oral Minoxidil Pregnancy And Lactation Text: This medication should only be used when clearly needed if you are pregnant, attempting to become pregnant or breast feeding.

## 2022-06-28 NOTE — RESULTS/DATA
[FreeTextEntry1] : 5/25/22 CT c/a/p\par \par FINDINGS:\par CHEST:\par LUNGS AND LARGE AIRWAYS: Patent central airways. Postsurgical changes of wedge resection of the right lower lobe. Stable left upper lobe nodule measuring 2.3 x 1.8 cm (2, 8). Slight interval decrease in right perihilar nodule, now measuring 1.6 cm (2, 30), previously measured 1.9 cm. Stable nodular thickening along the minor fissure.\par PLEURA: No pleural effusion.\par VESSELS: Within normal limits.\par HEART: Heart size is normal. No pericardial effusion.\par MEDIASTINUM AND VIDAL: No lymphadenopathy.\par CHEST WALL AND LOWER NECK: Mediport with tip in SVC.\par \par ABDOMEN AND PELVIS:\par LIVER: Steatosis. Right hepatic calcified granuloma.\par BILE DUCTS: Normal caliber.\par GALLBLADDER: Within normal limits.\par SPLEEN: Within normal limits.\par PANCREAS: Stable subcentimeter pancreatic head and tail hypodense likely cystic lesions\par ADRENALS: Within normal limits.\par KIDNEYS/URETERS: No hydronephrosis. Bilateral nonobstructing intrarenal calculi. Bilateral renal cysts and additional subcentimeter hypodensities which are too small to characterize. Stable partially exophytic right upper pole cyst measuring 1.5 cm with internal enhancing septations and marrow nodule.\par \par BLADDER: Within normal limits.\par REPRODUCTIVE ORGANS: Prostatectomy.\par \par BOWEL: No bowel obstruction. Appendix is normal.\par PERITONEUM: No ascites.\par VESSELS: Atherosclerotic changes.\par RETROPERITONEUM/LYMPH NODES: No lymphadenopathy.\par ABDOMINAL WALL: Stable appearance of the left elbow.\par BONES: Within normal limits.\par \par IMPRESSION:\par Stable/slightly decreased pulmonary nodules as described above.\par No evidence of recurrence or metastatic disease in the abdomen and pelvis.

## 2022-06-28 NOTE — ADDENDUM
[FreeTextEntry1] : I, Jaida Deena, acted solely as a scribe for Dr. Toy Blank on 06/22/2022. All medical entries made by the Scribe were at my, Dr. Toy Blank's, direction and personally dictated by me on 06/22/2022. I have reviewed the chart and agree that the record accurately reflects my personal performance of the history, physical exam, assessment and plan. I have also personally directed, reviewed, and agreed with the chart.

## 2022-06-28 NOTE — HISTORY OF PRESENT ILLNESS
[Disease: _____________________] : Disease: [unfilled] [T: ___] : T[unfilled] [N: ___] : N[unfilled] [M: ___] : M[unfilled] [AJCC Stage: ____] : AJCC Stage: [unfilled] [de-identified] : Referred by Dr. Gaytan\par \par Ms. Hoang is a 68 y/o G0 postmenopausal F who was referred to medical oncology for treatment considerations for recurrent endometrial cancer.\par She was initially diagnosed with stage II grade 2 endometrial cancer in 2018, she underwent underwent robotic assisted TLH BSO, bilateral pelvic and para-aortic sentinel LN dissection by Dr. Paras Grayson on 2018.  Her surgical pathology demonstrated pT2N0 disease with endometrial tumor measuring 4cm, FIGO 2 endometrioid adenocarcinoma, >90 myometrial invasion with involvement of cervical stroma, +LVI with IHC of MMR proteins with intact expression but pelvic wash negative for malignant cells.  On 10/2018, she completed pelvic IMRT and vaginal cuff brachytherapy with Dr. Bowling.  She did well clinically under surveillance until 2019 when she developed vaginal spotting and fluid discharge, and was evaluated by Dr. Grayson. CA-125 was 27. Concern for possible recurrence and further workup pursued.  Her 19 CT CAP demonstrate new bilateral pulmonary nodules, some with central cavitation, suspicious for metastatic disease, enhancing nodularity superior to the vaginal cuff concerning for metastatic disease, measuring 1.9x1.6cm.  On 10/30/19 she underwent thorascopic multiple RLL wedge resections with Dr. Weston and her surgical pathology c/w metastatic adenocarcinoma, consistent with patient's endometrial primary +ER [place] Pax8.  She was recommended further treatment for her recurrent endometrial cancer but the patient was lost to follow up until 2020 several months before which she has noticed increasing hardness and "lumps" in her vagina, which has been progressively more painful and burning sensation. She was subsequently evaluated by Dr. Gaytan 2020 and referred to medical oncology for systemic treatment evaluation.   On presentation for initial consult 2020, she continued to have vaginal discharge and on and off spotting (not more than 1 pad a day). She felt constipated. She was taking Percocet 5-325, Tylenol and ibuprofen for vaginal pain. She had been very busy with work and family affairs, was not able to come for cancer treatment over the past months. 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 \par \par \par Patient started on Lenvima and Pembrolizumab combination therapy on 3/17/21.  Given increasing vaginal pain and increased mass involving the vaginal cuff region, she underwent palliative RT to the vaginal mass and cuff area with Dr. Bowling. She had improved pain and had been able to tolerate Lenvima without significant toxicities. I reviewed my recommendations to continue current regimen with pembrolizumab and lenvima as lenvima had been on hold for several weeks due to difficulty tolerating treatment and pain control issues. \par \par CT c/a/p done on 2021 which showed favorable response to therapy. Left inguinal node decreased in size. Vaginal mass decreased in size. Pulmonary nodules decreased in size.  \par \par Her CT c/a/p from 2021 showed pulmonary nodules. While some are stable in size, there is mild increase in pulmonary nodule in the right upper lobe.  3 x 5 mm nodular focus of higher attenuation suggesting enhancement within a right renal cyst. This is slightly better seen on the current examination although may be exaggerated by volume averaging.  [de-identified] :  ER +  SD +  PAX 8  +     MSI STABLE [de-identified] : Refuses COVID 19 vaccine  [FreeTextEntry1] :  Lenvima and Pembrolizumab [de-identified] : Nicole comes in for follow up while on dose reduced Lenvima and Pembrolizumab and in the interim had scans which showed stable/slightly decreased pulmonary nodules as described above. No evidence of recurrence or metastatic disease in the abdomen and pelvis. \par \par She does not feel well today. Her symptoms have worsened over the past two weeks. She is very fatigued and has persistent nausea and intermittent midepigastric discomfort which radiates to her sides and around to her back. Imodium offers some relief. Carafate did not offer relief. She also continues to have reflux. She has also been having diarrhea and most recently all day yesterday until 3 am this morning. She has been unable to tolerate much solid food although she is hungry and thirsty. Certain foods are triggering. She held Lenvima for one week but restarted it on Friday. However, her symptoms did not improve while holding Lenvima. IV hydration did not help.  She otherwise denies any fevers, chills, mouth sores, CP, palpitations, cough, LOMAS, LE edema, vaginal discharge or bleeding, urinary symptoms, excessive bruising, dizziness, headaches, arthralgias, myalgias, weight changes and tries to keeps active. She also reports difficulty swallowing sometimes.

## 2022-06-28 NOTE — PHYSICAL EXAM
[Normal] : affect appropriate [Ambulatory and capable of all self care but unable to carry out any work activities] : Status 2- Ambulatory and capable of all self care but unable to carry out any work activities. Up and about more than 50% of waking hours [Mucositis] : no mucositis [Thrush] : no thrush [Vesicles] : no vesicles [de-identified] : tender. erythematous rash on her umbilicus.

## 2022-06-28 NOTE — REVIEW OF SYSTEMS
[Fatigue] : fatigue [Abdominal Pain] : abdominal pain [Skin Rash] : skin rash [Vomiting] : vomiting [Diarrhea] : diarrhea [Negative] : Genitourinary [Vision Problems] : no vision problems [Chest Pain] : no chest pain [Lower Ext Edema] : no lower extremity edema [Cough] : no cough [Vaginal Discharge] : no vaginal discharge [Joint Pain] : no joint pain [Muscle Pain] : no muscle pain [Difficulty Walking] : no difficulty walking [FreeTextEntry4] : difficulty swallowing [FreeTextEntry7] : reflux, nausea

## 2022-06-28 NOTE — PHYSICAL EXAM
[Normal] : affect appropriate [Restricted in physically strenuous activity but ambulatory and able to carry out work of a light or sedentary nature] : Status 1- Restricted in physically strenuous activity but ambulatory and able to carry out work of a light or sedentary nature, e.g., light house work, office work [Mucositis] : no mucositis [Thrush] : no thrush [Vesicles] : no vesicles [de-identified] : tender. erythematous rash on her umbilicus.

## 2022-06-29 NOTE — PRE-OP CHECKLIST - WEIGHT IN KG
49.9 Metronidazole Counseling:  I discussed with the patient the risks of metronidazole including but not limited to seizures, nausea/vomiting, a metallic taste in the mouth, nausea/vomiting and severe allergy.

## 2022-07-05 ENCOUNTER — APPOINTMENT (OUTPATIENT)
Dept: INFUSION THERAPY | Facility: HOSPITAL | Age: 67
End: 2022-07-05

## 2022-07-07 ENCOUNTER — APPOINTMENT (OUTPATIENT)
Dept: ENDOCRINOLOGY | Facility: CLINIC | Age: 67
End: 2022-07-07

## 2022-07-07 VITALS
SYSTOLIC BLOOD PRESSURE: 120 MMHG | TEMPERATURE: 98.6 F | BODY MASS INDEX: 21.71 KG/M2 | HEIGHT: 60.98 IN | OXYGEN SATURATION: 97 % | WEIGHT: 115 LBS | HEART RATE: 110 BPM | DIASTOLIC BLOOD PRESSURE: 80 MMHG

## 2022-07-07 DIAGNOSIS — E34.9 ENDOCRINE DISORDER, UNSPECIFIED: ICD-10-CM

## 2022-07-07 PROCEDURE — 99214 OFFICE O/P EST MOD 30 MIN: CPT

## 2022-07-07 RX ORDER — LEVOTHYROXINE SODIUM 0.07 MG/1
75 TABLET ORAL
Qty: 30 | Refills: 6 | Status: DISCONTINUED | COMMUNITY
Start: 2021-07-06 | End: 2022-07-07

## 2022-07-07 RX ORDER — PANTOPRAZOLE 40 MG/1
40 TABLET, DELAYED RELEASE ORAL DAILY
Qty: 30 | Refills: 3 | Status: DISCONTINUED | COMMUNITY
Start: 2022-06-01 | End: 2022-07-07

## 2022-07-07 NOTE — HISTORY OF PRESENT ILLNESS
[FreeTextEntry1] : 67 year female  f/u for hypothyroidism management. \par \par *** Jul 07, 2022 ***\par \par admitted 6 wks for diarrhea, stomach pain and hypokalemia\par had an egd done, which was normal. off the BP meds b/o blood pressure was low\par was off Lenvima x 1 mo, now is taking a smaller dose\par remains on L-thyroxine 75 mcg\par \par *** Mar 11, 2022 ***\par \par feels well, no new c/o\par on  Synthroid 75 mcg. remains on Keytruda and Lenvima- tolerates well\par labs from 3/9/22- TSH- 9.19 with FT4- 1.1. \par labs from 2/23/22- TSH- 2.52\par per patient, started taking "supplement powder" and "a collagen drink" about 2 weeks ago\par \par *** Dec 10, 2021 ***\par \par feels great. good energy level. still on  Keytruda and Lenvima\par remains on Synthroid 75 mcg\par TSH- 3.5, FT4- 1.4\par prior pit panel wnl- am cortisol 13, prolactin 15, IGF- 196\par \par *** Sep 10, 2021 ***\par \par on Synthroid 75 mcg. feels much better. Good energy, no fatigue. \par still on Keytruda and Lenvima\par Thyroid US (8/12/21)- heterogenous thyroid, no nodules\par TSH- 2.19\par am cortisol- 13, rest pending\par \par HPI:\par Ms. VICENTE  was diagnosed with endometrial cancer in 2018. She underwent ELISEO-BSO same year , followed by a pelvic IMRT. In 2019, she was found mts to lungs and pelvic LN. She completed another course of radiation therapy and recently started on Keytruda and Lenvima about 4 months ago. Her TSH early this month returned as 28.1, and she was started on Synthroid 25 mcg about 3 months ago. Her last TSH from yesterday was- 56.8 with FT4- 0.8 \par Her blood pressure was found to be elevated past 3 weeks, and she's complaining of some fatigue. \par Denies family history of thyroid cancer or history of radiation exposure to head and neck area in a childhood.\par

## 2022-07-18 ENCOUNTER — APPOINTMENT (OUTPATIENT)
Dept: INFUSION THERAPY | Facility: HOSPITAL | Age: 67
End: 2022-07-18

## 2022-07-20 ENCOUNTER — RESULT REVIEW (OUTPATIENT)
Age: 67
End: 2022-07-20

## 2022-07-20 ENCOUNTER — APPOINTMENT (OUTPATIENT)
Dept: HEMATOLOGY ONCOLOGY | Facility: CLINIC | Age: 67
End: 2022-07-20

## 2022-07-20 ENCOUNTER — APPOINTMENT (OUTPATIENT)
Dept: INFUSION THERAPY | Facility: HOSPITAL | Age: 67
End: 2022-07-20

## 2022-07-20 LAB
APPEARANCE UR: CLEAR — SIGNIFICANT CHANGE UP
BILIRUB UR-MCNC: NEGATIVE — SIGNIFICANT CHANGE UP
COLOR SPEC: YELLOW — SIGNIFICANT CHANGE UP
DIFF PNL FLD: NEGATIVE — SIGNIFICANT CHANGE UP
GLUCOSE UR QL: NEGATIVE — SIGNIFICANT CHANGE UP
KETONES UR-MCNC: NEGATIVE — SIGNIFICANT CHANGE UP
LEUKOCYTE ESTERASE UR-ACNC: NEGATIVE — SIGNIFICANT CHANGE UP
NITRITE UR-MCNC: NEGATIVE — SIGNIFICANT CHANGE UP
PH UR: 6.5 — SIGNIFICANT CHANGE UP (ref 5–8)
PROT UR-MCNC: SIGNIFICANT CHANGE UP
SP GR SPEC: 1.02 — SIGNIFICANT CHANGE UP (ref 1.01–1.02)
UROBILINOGEN FLD QL: SIGNIFICANT CHANGE UP

## 2022-07-21 LAB
CREAT ?TM UR-MCNC: 132 MG/DL — SIGNIFICANT CHANGE UP
PROT ?TM UR-MCNC: 17 MG/DL — HIGH (ref 0–12)
PROT/CREAT UR-RTO: 0.1 RATIO — SIGNIFICANT CHANGE UP (ref 0–0.2)

## 2022-07-26 ENCOUNTER — APPOINTMENT (OUTPATIENT)
Dept: GASTROENTEROLOGY | Facility: CLINIC | Age: 67
End: 2022-07-26

## 2022-07-26 VITALS
SYSTOLIC BLOOD PRESSURE: 120 MMHG | HEIGHT: 60 IN | DIASTOLIC BLOOD PRESSURE: 78 MMHG | OXYGEN SATURATION: 95 % | WEIGHT: 113 LBS | TEMPERATURE: 97.1 F | BODY MASS INDEX: 22.19 KG/M2 | HEART RATE: 111 BPM

## 2022-07-26 DIAGNOSIS — R19.7 DIARRHEA, UNSPECIFIED: ICD-10-CM

## 2022-07-26 PROCEDURE — 99214 OFFICE O/P EST MOD 30 MIN: CPT

## 2022-07-26 NOTE — ASSESSMENT
[FreeTextEntry1] : Acute gastroenteritis improved. Dietary and lifestyle modification discussed with the patient.  Screening colonoscopy scheduled.  The importance of colon cancer screening of the colonoscopy prep was discussed with the patient.  She understands and all questions were answered.\par Rectal itching.  Topical therapy prescribed.\par

## 2022-07-26 NOTE — HISTORY OF PRESENT ILLNESS
[Heartburn] : denies heartburn [Nausea] : improved nausea [Vomiting] : denies vomiting [Diarrhea] : resolved diarrhea [Constipation] : denies constipation [Yellow Skin Or Eyes (Jaundice)] : denies jaundice [Abdominal Pain] : resolved abdominal pain [Abdominal Swelling] : denies abdominal swelling [Rectal Pain] : denies rectal pain [Wt Gain ___ Lbs] : no recent weight gain [Wt Loss ___ Lbs] : no recent weight loss [GERD] : no gastroesophageal reflux disease [Hiatus Hernia] : no hiatus hernia [Peptic Ulcer Disease] : peptic ulcer disease [Pancreatitis] : no pancreatitis [Cholelithiasis] : no cholelithiasis [Kidney Stone] : no kidney stone [Inflammatory Bowel Disease] : no inflammatory bowel disease [Irritable Bowel Syndrome] : no irritable bowel syndrome [Diverticulitis] : no diverticulitis [Alcohol Abuse] : no alcohol abuse [Malignancy] : no malignancy [Abdominal Surgery] : no abdominal surgery [Appendectomy] : no appendectomy [Cholecystectomy] : no cholecystectomy [de-identified] : 67-year-old woman with metastatic endometrial cancer which she is receiving chemotherapy.  She had been on Lenvima and Keytruda.  Appoximately 3 weeks ago she developed an episode of abdominal pain followed by severe watery diarrhea that resulted in fluid imbalance.  She went to the emergency room where she received IV fluids.  Since then she has had no further episodes of diarrhea.  She has occasional abdominal cramping.  While she was in the hospital she underwent an EGD that showed gastritis and a hiatus hernia.  CT of the abdomen and pelvis was unremarkable.  Her last colonoscopy was many years ago.  Her diarrhea has improved but she has some rectal itching.  She is scheduled for a screening colonoscopy on August 5.

## 2022-08-03 ENCOUNTER — APPOINTMENT (OUTPATIENT)
Dept: HEMATOLOGY ONCOLOGY | Facility: CLINIC | Age: 67
End: 2022-08-03

## 2022-08-03 ENCOUNTER — LABORATORY RESULT (OUTPATIENT)
Age: 67
End: 2022-08-03

## 2022-08-03 VITALS
HEART RATE: 115 BPM | TEMPERATURE: 96.8 F | OXYGEN SATURATION: 99 % | HEIGHT: 60 IN | DIASTOLIC BLOOD PRESSURE: 81 MMHG | BODY MASS INDEX: 21.85 KG/M2 | SYSTOLIC BLOOD PRESSURE: 115 MMHG | WEIGHT: 111.31 LBS | RESPIRATION RATE: 20 BRPM

## 2022-08-03 PROCEDURE — 99213 OFFICE O/P EST LOW 20 MIN: CPT

## 2022-08-04 ENCOUNTER — RESULT REVIEW (OUTPATIENT)
Age: 67
End: 2022-08-04

## 2022-08-04 ENCOUNTER — APPOINTMENT (OUTPATIENT)
Dept: INFUSION THERAPY | Facility: HOSPITAL | Age: 67
End: 2022-08-04

## 2022-08-04 LAB
ALBUMIN SERPL ELPH-MCNC: 2.9 G/DL — LOW (ref 3.3–5)
ALP SERPL-CCNC: 166 U/L — HIGH (ref 40–120)
ALT FLD-CCNC: 49 U/L — HIGH (ref 10–45)
ANION GAP SERPL CALC-SCNC: 13 MMOL/L — SIGNIFICANT CHANGE UP (ref 5–17)
AST SERPL-CCNC: 36 U/L — SIGNIFICANT CHANGE UP (ref 10–40)
BILIRUB SERPL-MCNC: 0.3 MG/DL — SIGNIFICANT CHANGE UP (ref 0.2–1.2)
BUN SERPL-MCNC: 12 MG/DL — SIGNIFICANT CHANGE UP (ref 7–23)
CALCIUM SERPL-MCNC: 8.9 MG/DL — SIGNIFICANT CHANGE UP (ref 8.4–10.5)
CANCER AG125 SERPL-ACNC: 8 U/ML — SIGNIFICANT CHANGE UP
CHLORIDE SERPL-SCNC: 100 MMOL/L — SIGNIFICANT CHANGE UP (ref 96–108)
CO2 SERPL-SCNC: 25 MMOL/L — SIGNIFICANT CHANGE UP (ref 22–31)
CORTIS AM PEAK SERPL-MCNC: 19.4 UG/DL — HIGH (ref 6–18.4)
CREAT SERPL-MCNC: 0.9 MG/DL — SIGNIFICANT CHANGE UP (ref 0.5–1.3)
EGFR: 69 ML/MIN/1.73M2 — SIGNIFICANT CHANGE UP
GLUCOSE SERPL-MCNC: 95 MG/DL — SIGNIFICANT CHANGE UP (ref 70–99)
HCT VFR BLD CALC: 43.7 % — SIGNIFICANT CHANGE UP (ref 34.5–45)
HGB BLD-MCNC: 15.1 G/DL — SIGNIFICANT CHANGE UP (ref 11.5–15.5)
MAGNESIUM SERPL-MCNC: 1.7 MG/DL — SIGNIFICANT CHANGE UP (ref 1.6–2.6)
MCHC RBC-ENTMCNC: 33.5 PG — SIGNIFICANT CHANGE UP (ref 27–34)
MCHC RBC-ENTMCNC: 34.6 G/DL — SIGNIFICANT CHANGE UP (ref 32–36)
MCV RBC AUTO: 96.9 FL — SIGNIFICANT CHANGE UP (ref 80–100)
PLATELET # BLD AUTO: 303 K/UL — SIGNIFICANT CHANGE UP (ref 150–400)
POTASSIUM SERPL-MCNC: 4.7 MMOL/L — SIGNIFICANT CHANGE UP (ref 3.5–5.3)
POTASSIUM SERPL-SCNC: 4.7 MMOL/L — SIGNIFICANT CHANGE UP (ref 3.5–5.3)
PROLACTIN SERPL-MCNC: 40.1 NG/ML — HIGH (ref 3.4–24.1)
PROT SERPL-MCNC: 5.6 G/DL — LOW (ref 6–8.3)
RBC # BLD: 4.51 M/UL — SIGNIFICANT CHANGE UP (ref 3.8–5.2)
RBC # FLD: 12.9 % — SIGNIFICANT CHANGE UP (ref 10.3–14.5)
SODIUM SERPL-SCNC: 138 MMOL/L — SIGNIFICANT CHANGE UP (ref 135–145)
T4 FREE SERPL-MCNC: 1.1 NG/DL — SIGNIFICANT CHANGE UP (ref 0.9–1.8)
TSH SERPL-MCNC: 16.5 UIU/ML — HIGH (ref 0.27–4.2)
WBC # BLD: 7.32 K/UL — SIGNIFICANT CHANGE UP (ref 3.8–10.5)
WBC # FLD AUTO: 7.32 K/UL — SIGNIFICANT CHANGE UP (ref 3.8–10.5)

## 2022-08-05 ENCOUNTER — OUTPATIENT (OUTPATIENT)
Dept: OUTPATIENT SERVICES | Facility: HOSPITAL | Age: 67
LOS: 1 days | Discharge: HOME | End: 2022-08-05

## 2022-08-05 ENCOUNTER — APPOINTMENT (OUTPATIENT)
Dept: GASTROENTEROLOGY | Facility: HOSPITAL | Age: 67
End: 2022-08-05

## 2022-08-05 ENCOUNTER — RESULT REVIEW (OUTPATIENT)
Age: 67
End: 2022-08-05

## 2022-08-05 VITALS
TEMPERATURE: 97 F | DIASTOLIC BLOOD PRESSURE: 87 MMHG | RESPIRATION RATE: 17 BRPM | OXYGEN SATURATION: 100 % | HEART RATE: 101 BPM | SYSTOLIC BLOOD PRESSURE: 117 MMHG

## 2022-08-05 VITALS
DIASTOLIC BLOOD PRESSURE: 87 MMHG | SYSTOLIC BLOOD PRESSURE: 109 MMHG | HEART RATE: 88 BPM | OXYGEN SATURATION: 100 % | RESPIRATION RATE: 20 BRPM

## 2022-08-05 DIAGNOSIS — C54.1 MALIGNANT NEOPLASM OF ENDOMETRIUM: ICD-10-CM

## 2022-08-05 DIAGNOSIS — Z98.890 OTHER SPECIFIED POSTPROCEDURAL STATES: Chronic | ICD-10-CM

## 2022-08-05 DIAGNOSIS — Z90.710 ACQUIRED ABSENCE OF BOTH CERVIX AND UTERUS: Chronic | ICD-10-CM

## 2022-08-05 DIAGNOSIS — R19.7 DIARRHEA, UNSPECIFIED: ICD-10-CM

## 2022-08-05 PROCEDURE — 88341 IMHCHEM/IMCYTCHM EA ADD ANTB: CPT | Mod: 26

## 2022-08-05 PROCEDURE — 88342 IMHCHEM/IMCYTCHM 1ST ANTB: CPT | Mod: 26

## 2022-08-05 PROCEDURE — G0121 COLON CA SCRN NOT HI RSK IND: CPT

## 2022-08-05 PROCEDURE — 88305 TISSUE EXAM BY PATHOLOGIST: CPT | Mod: 26

## 2022-08-05 RX ORDER — SODIUM CHLORIDE 9 MG/ML
500 INJECTION, SOLUTION INTRAVENOUS
Refills: 0 | Status: DISCONTINUED | OUTPATIENT
Start: 2022-08-05 | End: 2022-08-19

## 2022-08-05 RX ADMIN — Medication 300 UNIT(S): at 13:53

## 2022-08-05 NOTE — ASU PATIENT PROFILE, ADULT - FALL HARM RISK - CONCLUSION
RT UA IV infiltrated. Area puffy and cool to touch. IV removed. Will continue to monitor. Universal Safety Interventions

## 2022-08-06 LAB
GLIADIN PEPTIDE IGA SER-ACNC: 5.5 UNITS — SIGNIFICANT CHANGE UP
GLIADIN PEPTIDE IGA SER-ACNC: NEGATIVE — SIGNIFICANT CHANGE UP
GLIADIN PEPTIDE IGG SER-ACNC: <5 UNITS — SIGNIFICANT CHANGE UP
GLIADIN PEPTIDE IGG SER-ACNC: NEGATIVE — SIGNIFICANT CHANGE UP
TTG IGA SER-ACNC: 3.5 U/ML — SIGNIFICANT CHANGE UP
TTG IGA SER-ACNC: NEGATIVE — SIGNIFICANT CHANGE UP
TTG IGG SER IA-ACNC: NEGATIVE — SIGNIFICANT CHANGE UP
TTG IGG SER-ACNC: <1.2 U/ML — SIGNIFICANT CHANGE UP

## 2022-08-09 LAB
ENDOMYSIUM IGA TITR SER IF: NEGATIVE — SIGNIFICANT CHANGE UP
ENDOMYSIUM IGA TITR SER: SIGNIFICANT CHANGE UP
SURGICAL PATHOLOGY STUDY: SIGNIFICANT CHANGE UP

## 2022-08-10 ENCOUNTER — RESULT REVIEW (OUTPATIENT)
Age: 67
End: 2022-08-10

## 2022-08-10 ENCOUNTER — APPOINTMENT (OUTPATIENT)
Dept: HEMATOLOGY ONCOLOGY | Facility: CLINIC | Age: 67
End: 2022-08-10

## 2022-08-10 ENCOUNTER — APPOINTMENT (OUTPATIENT)
Dept: INFUSION THERAPY | Facility: HOSPITAL | Age: 67
End: 2022-08-10

## 2022-08-10 LAB
APPEARANCE UR: ABNORMAL
BILIRUB UR-MCNC: NEGATIVE — SIGNIFICANT CHANGE UP
COLOR SPEC: YELLOW — SIGNIFICANT CHANGE UP
DIFF PNL FLD: NEGATIVE — SIGNIFICANT CHANGE UP
GLUCOSE UR QL: NEGATIVE — SIGNIFICANT CHANGE UP
KETONES UR-MCNC: NEGATIVE — SIGNIFICANT CHANGE UP
LEUKOCYTE ESTERASE UR-ACNC: NEGATIVE — SIGNIFICANT CHANGE UP
NITRITE UR-MCNC: NEGATIVE — SIGNIFICANT CHANGE UP
PH UR: 6 — SIGNIFICANT CHANGE UP (ref 5–8)
PROT UR-MCNC: SIGNIFICANT CHANGE UP
SP GR SPEC: 1.03 — HIGH (ref 1.01–1.02)
UROBILINOGEN FLD QL: SIGNIFICANT CHANGE UP

## 2022-08-11 ENCOUNTER — FORM ENCOUNTER (OUTPATIENT)
Age: 67
End: 2022-08-11

## 2022-08-11 DIAGNOSIS — Z51.11 ENCOUNTER FOR ANTINEOPLASTIC CHEMOTHERAPY: ICD-10-CM

## 2022-08-11 LAB
BACTERIA # UR AUTO: NEGATIVE — SIGNIFICANT CHANGE UP
COD CRY URNS QL: ABNORMAL
CREAT ?TM UR-MCNC: 152 MG/DL — SIGNIFICANT CHANGE UP
EPI CELLS # UR: 1 /HPF — SIGNIFICANT CHANGE UP (ref 0–5)
HYALINE CASTS # UR AUTO: 0 /LPF — SIGNIFICANT CHANGE UP (ref 0–7)
PROT ?TM UR-MCNC: 20 MG/DL — HIGH (ref 0–12)
PROT/CREAT UR-RTO: 0.1 RATIO — SIGNIFICANT CHANGE UP (ref 0–0.2)
RBC CASTS # UR COMP ASSIST: 4 /HPF — SIGNIFICANT CHANGE UP (ref 0–4)
WBC UR QL: 5 /HPF — SIGNIFICANT CHANGE UP (ref 0–5)

## 2022-08-12 ENCOUNTER — APPOINTMENT (OUTPATIENT)
Dept: GYNECOLOGIC ONCOLOGY | Facility: CLINIC | Age: 67
End: 2022-08-12

## 2022-08-12 VITALS
HEART RATE: 98 BPM | RESPIRATION RATE: 16 BRPM | OXYGEN SATURATION: 99 % | DIASTOLIC BLOOD PRESSURE: 85 MMHG | WEIGHT: 111 LBS | BODY MASS INDEX: 21.79 KG/M2 | SYSTOLIC BLOOD PRESSURE: 123 MMHG | HEIGHT: 60 IN

## 2022-08-12 DIAGNOSIS — N76.2 ACUTE VULVITIS: ICD-10-CM

## 2022-08-12 PROCEDURE — 99214 OFFICE O/P EST MOD 30 MIN: CPT

## 2022-08-16 NOTE — REVIEW OF SYSTEMS
[Negative] : Allergic/Immunologic [Recent Change In Weight] : ~T recent weight change [Diarrhea] : diarrhea [Vision Problems] : no vision problems [Chest Pain] : no chest pain [Lower Ext Edema] : no lower extremity edema [Cough] : no cough [Vaginal Discharge] : no vaginal discharge [Joint Pain] : no joint pain [Muscle Pain] : no muscle pain [Difficulty Walking] : no difficulty walking [FreeTextEntry2] : weight change [FreeTextEntry7] : reflux, pressure while having bowel movements [de-identified] : itching and burning to anus and skin of perineum

## 2022-08-16 NOTE — HISTORY OF PRESENT ILLNESS
[Disease: _____________________] : Disease: [unfilled] [T: ___] : T[unfilled] [N: ___] : N[unfilled] [M: ___] : M[unfilled] [AJCC Stage: ____] : AJCC Stage: [unfilled] [de-identified] : Referred by Dr. Gaytan\par \par Ms. Hoang is a 68 y/o G0 postmenopausal F who was referred to medical oncology for treatment considerations for recurrent endometrial cancer.\par She was initially diagnosed with stage II grade 2 endometrial cancer in 2018, she underwent underwent robotic assisted TLH BSO, bilateral pelvic and para-aortic sentinel LN dissection by Dr. Paras Grayson on 2018.  Her surgical pathology demonstrated pT2N0 disease with endometrial tumor measuring 4cm, FIGO 2 endometrioid adenocarcinoma, >90 myometrial invasion with involvement of cervical stroma, +LVI with IHC of MMR proteins with intact expression but pelvic wash negative for malignant cells.  On 10/2018, she completed pelvic IMRT and vaginal cuff brachytherapy with Dr. Bowling.  She did well clinically under surveillance until 2019 when she developed vaginal spotting and fluid discharge, and was evaluated by Dr. Grayson. CA-125 was 27. Concern for possible recurrence and further workup pursued.  Her 19 CT CAP demonstrate new bilateral pulmonary nodules, some with central cavitation, suspicious for metastatic disease, enhancing nodularity superior to the vaginal cuff concerning for metastatic disease, measuring 1.9x1.6cm.  On 10/30/19 she underwent thorascopic multiple RLL wedge resections with Dr. Weston and her surgical pathology c/w metastatic adenocarcinoma, consistent with patient's endometrial primary +ER [place] Pax8.  She was recommended further treatment for her recurrent endometrial cancer but the patient was lost to follow up until 2020 several months before which she has noticed increasing hardness and "lumps" in her vagina, which has been progressively more painful and burning sensation. She was subsequently evaluated by Dr. Gaytan 2020 and referred to medical oncology for systemic treatment evaluation.   On presentation for initial consult 2020, she continued to have vaginal discharge and on and off spotting (not more than 1 pad a day). She felt constipated. She was taking Percocet 5-325, Tylenol and ibuprofen for vaginal pain. She had been very busy with work and family affairs, was not able to come for cancer treatment over the past months. 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 \par \par \par Patient started on Lenvima and Pembrolizumab combination therapy on 3/17/21.  Given increasing vaginal pain and increased mass involving the vaginal cuff region, she underwent palliative RT to the vaginal mass and cuff area with Dr. Bowling. She had improved pain and had been able to tolerate Lenvima without significant toxicities. I reviewed my recommendations to continue current regimen with pembrolizumab and lenvima as lenvima had been on hold for several weeks due to difficulty tolerating treatment and pain control issues. \par \par CT c/a/p done on 2021 which showed favorable response to therapy. Left inguinal node decreased in size. Vaginal mass decreased in size. Pulmonary nodules decreased in size.  \par \par Her CT c/a/p from 2021 showed pulmonary nodules. While some are stable in size, there is mild increase in pulmonary nodule in the right upper lobe.  3 x 5 mm nodular focus of higher attenuation suggesting enhancement within a right renal cyst. This is slightly better seen on the current examination although may be exaggerated by volume averaging.  [de-identified] :  ER +  IN +  PAX 8  +     MSI STABLE [de-identified] : Refuses COVID 19 vaccine  [FreeTextEntry1] :  Lenvima and Pembrolizumab (held since 6/1) [de-identified] : Patient presents for follow up while on treatment with Lenvima and Pembrolizumab. She was previously admitted to [facility] for significantly low K levels and underwent upper EGD evaluation that showed gastritis and duodenitis. Pathology without H.Pylori. She has continued to follow with Dr. Jim who noted improvement to gastroenteritis and prescribed cortisone for rectal itching. Colonoscopy scheduled for 8/5/22. She also continues to follow up with endocrinology. \par \par She presents today with ongoing GI complaints. Notes frequency of loose bowel movements with associated pressure and burning pains when going to the bathroom. She notes intermittent itching and burning to the anus and perineum. Denies constipation or hard stools. Notes she goes to the bathroom multiple times a day. Denies tenderness to the abdomen. Denies fevers/chills, SOB, cough, pelvic pain. She remains active and is able to carry out all  ADLs.

## 2022-08-16 NOTE — ASSESSMENT
Addended by: BEN FISHER on: 7/9/2020 04:13 PM     Modules accepted: Orders     [Palliative] : Goals of care discussed with patient: Palliative [Palliative Care Plan] : not applicable at this time [FreeTextEntry1] : \par \par

## 2022-08-17 LAB
CANDIDA VAG CYTO: NOT DETECTED
G VAGINALIS+PREV SP MTYP VAG QL MICRO: NOT DETECTED
T VAGINALIS VAG QL WET PREP: NOT DETECTED

## 2022-08-22 ENCOUNTER — APPOINTMENT (OUTPATIENT)
Dept: HEMATOLOGY ONCOLOGY | Facility: CLINIC | Age: 67
End: 2022-08-22

## 2022-08-22 ENCOUNTER — RESULT REVIEW (OUTPATIENT)
Age: 67
End: 2022-08-22

## 2022-08-22 ENCOUNTER — APPOINTMENT (OUTPATIENT)
Dept: INFUSION THERAPY | Facility: HOSPITAL | Age: 67
End: 2022-08-22

## 2022-08-22 ENCOUNTER — NON-APPOINTMENT (OUTPATIENT)
Age: 67
End: 2022-08-22

## 2022-08-22 LAB
ALBUMIN SERPL ELPH-MCNC: 2.4 G/DL — LOW (ref 3.3–5)
ALP SERPL-CCNC: 248 U/L — HIGH (ref 40–120)
ALT FLD-CCNC: 48 U/L — HIGH (ref 10–45)
ANION GAP SERPL CALC-SCNC: 13 MMOL/L — SIGNIFICANT CHANGE UP (ref 5–17)
AST SERPL-CCNC: 38 U/L — SIGNIFICANT CHANGE UP (ref 10–40)
BILIRUB SERPL-MCNC: 0.2 MG/DL — SIGNIFICANT CHANGE UP (ref 0.2–1.2)
BUN SERPL-MCNC: 14 MG/DL — SIGNIFICANT CHANGE UP (ref 7–23)
CALCIUM SERPL-MCNC: 8.3 MG/DL — LOW (ref 8.4–10.5)
CHLORIDE SERPL-SCNC: 98 MMOL/L — SIGNIFICANT CHANGE UP (ref 96–108)
CO2 SERPL-SCNC: 27 MMOL/L — SIGNIFICANT CHANGE UP (ref 22–31)
CREAT SERPL-MCNC: 0.97 MG/DL — SIGNIFICANT CHANGE UP (ref 0.5–1.3)
EGFR: 63 ML/MIN/1.73M2 — SIGNIFICANT CHANGE UP
GLUCOSE SERPL-MCNC: 107 MG/DL — HIGH (ref 70–99)
HCT VFR BLD CALC: 41.3 % — SIGNIFICANT CHANGE UP (ref 34.5–45)
HGB BLD-MCNC: 14.4 G/DL — SIGNIFICANT CHANGE UP (ref 11.5–15.5)
MAGNESIUM SERPL-MCNC: 1.6 MG/DL — SIGNIFICANT CHANGE UP (ref 1.6–2.6)
MCHC RBC-ENTMCNC: 33.1 PG — SIGNIFICANT CHANGE UP (ref 27–34)
MCHC RBC-ENTMCNC: 34.9 G/DL — SIGNIFICANT CHANGE UP (ref 32–36)
MCV RBC AUTO: 94.9 FL — SIGNIFICANT CHANGE UP (ref 80–100)
PLATELET # BLD AUTO: 388 K/UL — SIGNIFICANT CHANGE UP (ref 150–400)
POTASSIUM SERPL-MCNC: 4.2 MMOL/L — SIGNIFICANT CHANGE UP (ref 3.5–5.3)
POTASSIUM SERPL-SCNC: 4.2 MMOL/L — SIGNIFICANT CHANGE UP (ref 3.5–5.3)
PROT SERPL-MCNC: 4.5 G/DL — LOW (ref 6–8.3)
RBC # BLD: 4.35 M/UL — SIGNIFICANT CHANGE UP (ref 3.8–5.2)
RBC # FLD: 12.7 % — SIGNIFICANT CHANGE UP (ref 10.3–14.5)
SODIUM SERPL-SCNC: 138 MMOL/L — SIGNIFICANT CHANGE UP (ref 135–145)
WBC # BLD: 9.54 K/UL — SIGNIFICANT CHANGE UP (ref 3.8–10.5)
WBC # FLD AUTO: 9.54 K/UL — SIGNIFICANT CHANGE UP (ref 3.8–10.5)

## 2022-08-22 PROCEDURE — 99214 OFFICE O/P EST MOD 30 MIN: CPT

## 2022-08-23 DIAGNOSIS — E86.0 DEHYDRATION: ICD-10-CM

## 2022-08-23 DIAGNOSIS — R11.2 NAUSEA WITH VOMITING, UNSPECIFIED: ICD-10-CM

## 2022-08-24 NOTE — REVIEW OF SYSTEMS
[Recent Change In Weight] : ~T recent weight change [Diarrhea] : diarrhea [Negative] : Allergic/Immunologic [Vision Problems] : no vision problems [Chest Pain] : no chest pain [Lower Ext Edema] : no lower extremity edema [Cough] : no cough [Vaginal Discharge] : no vaginal discharge [Joint Pain] : no joint pain [Muscle Pain] : no muscle pain [Difficulty Walking] : no difficulty walking [FreeTextEntry2] : weight change [FreeTextEntry7] : reflux [de-identified] : itching and burning to anus and skin of perineum

## 2022-08-24 NOTE — HISTORY OF PRESENT ILLNESS
[Disease: _____________________] : Disease: [unfilled] [T: ___] : T[unfilled] [N: ___] : N[unfilled] [M: ___] : M[unfilled] [AJCC Stage: ____] : AJCC Stage: [unfilled] [de-identified] : Referred by Dr. Gaytan\par \par Ms. Hoang is a 68 y/o G0 postmenopausal F who was referred to medical oncology for treatment considerations for recurrent endometrial cancer.\par She was initially diagnosed with stage II grade 2 endometrial cancer in 2018, she underwent underwent robotic assisted TLH BSO, bilateral pelvic and para-aortic sentinel LN dissection by Dr. Paras Grayson on 2018.  Her surgical pathology demonstrated pT2N0 disease with endometrial tumor measuring 4cm, FIGO 2 endometrioid adenocarcinoma, >90 myometrial invasion with involvement of cervical stroma, +LVI with IHC of MMR proteins with intact expression but pelvic wash negative for malignant cells.  On 10/2018, she completed pelvic IMRT and vaginal cuff brachytherapy with Dr. Bowling.  She did well clinically under surveillance until 2019 when she developed vaginal spotting and fluid discharge, and was evaluated by Dr. Grayson. CA-125 was 27. Concern for possible recurrence and further workup pursued.  Her 19 CT CAP demonstrate new bilateral pulmonary nodules, some with central cavitation, suspicious for metastatic disease, enhancing nodularity superior to the vaginal cuff concerning for metastatic disease, measuring 1.9x1.6cm.  On 10/30/19 she underwent thorascopic multiple RLL wedge resections with Dr. Weston and her surgical pathology c/w metastatic adenocarcinoma, consistent with patient's endometrial primary +ER SC Pax8.  She was recommended further treatment for her recurrent endometrial cancer but the patient was lost to follow up until 2020 several months before which she has noticed increasing hardness and "lumps" in her vagina, which has been progressively more painful and burning sensation. She was subsequently evaluated by Dr. Gaytan 2020 and referred to medical oncology for systemic treatment evaluation.   On presentation for initial consult 2020, she continued to have vaginal discharge and on and off spotting (not more than 1 pad a day). She felt constipated. She was taking Percocet 5-325, Tylenol and ibuprofen for vaginal pain. She had been very busy with work and family affairs, was not able to come for cancer treatment over the past months. 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 \par \par \par Patient started on Lenvima and Pembrolizumab combination therapy on 3/17/21.  Given increasing vaginal pain and increased mass involving the vaginal cuff region, she underwent palliative RT to the vaginal mass and cuff area with Dr. Bowling. She had improved pain and had been able to tolerate Lenvima without significant toxicities. I reviewed my recommendations to continue current regimen with pembrolizumab and lenvima as lenvima had been on hold for several weeks due to difficulty tolerating treatment and pain control issues. \par \par CT c/a/p done on 2021 which showed favorable response to therapy. Left inguinal node decreased in size. Vaginal mass decreased in size. Pulmonary nodules decreased in size.  \par \par Her CT c/a/p from 2021 showed pulmonary nodules. While some are stable in size, there is mild increase in pulmonary nodule in the right upper lobe.  3 x 5 mm nodular focus of higher attenuation suggesting enhancement within a right renal cyst. This is slightly better seen on the current examination although may be exaggerated by volume averaging.  [de-identified] :  ER +  MA +  PAX 8  +     MSI STABLE [de-identified] : Refuses COVID 19 vaccine  [FreeTextEntry1] :  Lenvima and Pembrolizumab [de-identified] : Patient is here for urgent visit for 2 weeks of diarrhea which has been very uncomfortable. \par She says it is similar to the diarrhea she had in March which resolved after her hospitalization in May.\par She has been taking one dose of Immodium twice per day with no relief.\par She has many (greater than 10) nonbloody watery BMs per day.\par She is unable to eat or drink without having to go immediately to have a BM.\par She also has nausea, no vomiting.\par She has dyspepsia and abdominal cramping with BMs.\par She had a colonoscopy on 8/5 with Dr. Jim which showed colitis.\par She denies fever, chills, chest pain, SOB, bleeding, swelling, rash.\par

## 2022-08-24 NOTE — REASON FOR VISIT
[Follow-Up Visit] : a follow-up [Urgent Visit] : an urgent  [FreeTextEntry2] : Recurrent Endometrial Cancer , diarrhea

## 2022-08-24 NOTE — PHYSICAL EXAM
[Restricted in physically strenuous activity but ambulatory and able to carry out work of a light or sedentary nature] : Status 1- Restricted in physically strenuous activity but ambulatory and able to carry out work of a light or sedentary nature, e.g., light house work, office work [Normal] : affect appropriate [Mucositis] : no mucositis [Thrush] : no thrush [Vesicles] : no vesicles [de-identified] : EOMI, anicteric  [de-identified] : normal respiratory effort, no audible breath sounds

## 2022-08-24 NOTE — ASSESSMENT
[Palliative] : Goals of care discussed with patient: Palliative [Palliative Care Plan] : not applicable at this time [FreeTextEntry1] : 67 year old woman with relapsed endometrial cancer on treatment with Keytruda and Lenvima here for diarrhea for 2 weeks.\par Continue to hold Lenvima while diarrhea persists.\par Discussed use of Immodium and patient given written instructions.\par Will follow up in 24 hours with update of symptoms,  If no relief with consider steroids.\par IVF as needed.\par CMP and CBC done and reviewed - done.\par RTC 1 week.

## 2022-08-25 ENCOUNTER — NON-APPOINTMENT (OUTPATIENT)
Age: 67
End: 2022-08-25

## 2022-08-25 ENCOUNTER — RESULT REVIEW (OUTPATIENT)
Age: 67
End: 2022-08-25

## 2022-08-25 ENCOUNTER — APPOINTMENT (OUTPATIENT)
Dept: INFUSION THERAPY | Facility: HOSPITAL | Age: 67
End: 2022-08-25

## 2022-08-25 LAB — T4 FREE+ TSH PNL SERPL: 5.04 UIU/ML — HIGH (ref 0.27–4.2)

## 2022-08-26 NOTE — END OF VISIT
[FreeTextEntry3] : Written by Hannah Canela, acting as a scribe for Dr. Samaria Hoskinspar This note accurately reflects the work and decisions made by me.

## 2022-08-26 NOTE — PHYSICAL EXAM
[Absent] : Adnexa(ae): Absent [Normal] : Bimanual Exam: Normal [de-identified] : no lymphadenopathy [de-identified] : marked shortening [de-identified] : Hannah Canela Medical assistant chaperoned during gynecologic exam.

## 2022-08-26 NOTE — REASON FOR VISIT
[FreeTextEntry1] : [place] Location\par \par [facility] [place]. [phone]\par [address] [place] [id number] \par \par Recurrent metastatic endometrial cancer\par \par Lenvima and Pembrolizumab combination therapy on 3/17/21

## 2022-08-26 NOTE — HISTORY OF PRESENT ILLNESS
[FreeTextEntry1] : 67 year old returns for interval oncologic surveillance offering no new complaints including no abdominopelvic pain, vaginal or rectal bleeding, chest pain or shortness of breath. Normal bladder function.\par \par Last saw Med/Onc on 8/3/22 -Continue Lenvima (10 mg QD) and Pembro at current doses, treatment was on hold from 6/1-6/22/22, has since restarted\par -Patient states she will retry Medical Marijuana and a lower dose\par -Advised f/u Dr. Umana for pain control and symptoms management\par \par Rad/Onc last seen 5/12/22. \par \par \par CT C/A/P (5/25/22) -\par FINDINGS:\par CHEST:\par LUNGS AND LARGE AIRWAYS: Patent central airways. Postsurgical changes of wedge resection of the right lower lobe. Stable left upper lobe nodule measuring 2.3 x 1.8 cm (2, 8). Slight interval decrease in right perihilar nodule, now measuring 1.6 cm (2, 30), previously measured 1.9 cm. Stable nodular thickening along the minor fissure.\par PLEURA: No pleural effusion.\par VESSELS: Within normal limits.\par HEART: Heart size is normal. No pericardial effusion.\par MEDIASTINUM AND VIDAL: No lymphadenopathy.\par CHEST WALL AND LOWER NECK: Mediport with tip in SVC.\par \par ABDOMEN AND PELVIS:\par LIVER: Steatosis. Right hepatic calcified granuloma.\par BILE DUCTS: Normal caliber.\par GALLBLADDER: Within normal limits.\par SPLEEN: Within normal limits.\par PANCREAS: Stable subcentimeter pancreatic head and tail hypodense likely cystic lesions\par ADRENALS: Within normal limits.\par KIDNEYS/URETERS: No hydronephrosis. Bilateral nonobstructing intrarenal calculi. Bilateral renal cysts and additional subcentimeter hypodensities which are too small to characterize. Stable partially exophytic right upper pole cyst measuring 1.5 cm with internal enhancing septations and marrow nodule.\par \par BLADDER: Within normal limits.\par REPRODUCTIVE ORGANS: Prostatectomy.\par \par BOWEL: No bowel obstruction. Appendix is normal.\par PERITONEUM: No ascites.\par VESSELS: Atherosclerotic changes.\par RETROPERITONEUM/LYMPH NODES: No lymphadenopathy.\par ABDOMINAL WALL: Stable appearance of the left elbow.\par BONES: Within normal limits.\par \par IMPRESSION:\par Stable/slightly decreased pulmonary nodules as described above.\par No evidence of recurrence or metastatic disease in the abdomen and pelvis.\par \par \par \par Health Maintenance:\par Mammo (11/16/21) - BI-RADS 3, probably benign \par Colonoscopy (8/5/22) repeat in 2 years\par \par  (4/4/22) - 8\par

## 2022-08-26 NOTE — ASSESSMENT
[FreeTextEntry1] : 67 year old who has a history of recurrent endometrial cancer. The patient is doing well from a gynecological standpoint, on Lenvima & Pembro. Current treatment regimen with very favorable effect, however, her Lenvima was decreased to 8 mg 1x daily, due to possible side effects (diarrhea). Patient receives Keytruda q3 weeks. She notes she had a colonoscopy on 8/5/22 and states she's still "leaking" from her anus, she states it is not blood or stool and is more than likely mucus. I informed her she should reach out to Dr Jim's office to make them aware. She also notes diarrhea since March, that she thinks may be caused by her dental implant and not the lenvima. She had tried floristor, which her endocrinologist recommended and she states that made the diarrhea worse. No abnormality noted on her colonoscopy that would be cause for the diarrhea. The patient also noted a burning toward the front of her vulva, nothing of note on exam, but affirm swab was done to r/o any BV or yeast. I offered a steroid cream to be used when needed, betamethasone, which I will send to the pharmacy today. Patient to return to the office in 4 months for exam. Patient states she understands and will comply.

## 2022-08-31 ENCOUNTER — APPOINTMENT (OUTPATIENT)
Dept: INFUSION THERAPY | Facility: HOSPITAL | Age: 67
End: 2022-08-31

## 2022-08-31 ENCOUNTER — APPOINTMENT (OUTPATIENT)
Dept: HEMATOLOGY ONCOLOGY | Facility: CLINIC | Age: 67
End: 2022-08-31

## 2022-08-31 VITALS
DIASTOLIC BLOOD PRESSURE: 80 MMHG | HEIGHT: 60 IN | OXYGEN SATURATION: 99 % | WEIGHT: 113.54 LBS | BODY MASS INDEX: 22.29 KG/M2 | SYSTOLIC BLOOD PRESSURE: 113 MMHG | TEMPERATURE: 97.5 F | RESPIRATION RATE: 16 BRPM | HEART RATE: 95 BPM

## 2022-08-31 PROCEDURE — 99213 OFFICE O/P EST LOW 20 MIN: CPT

## 2022-08-31 RX ORDER — AMOXICILLIN 875 MG/1
875 TABLET, FILM COATED ORAL
Qty: 14 | Refills: 0 | Status: DISCONTINUED | COMMUNITY
Start: 2022-03-29 | End: 2022-08-31

## 2022-09-01 NOTE — HISTORY OF PRESENT ILLNESS
[Disease: _____________________] : Disease: [unfilled] [T: ___] : T[unfilled] [N: ___] : N[unfilled] [M: ___] : M[unfilled] [AJCC Stage: ____] : AJCC Stage: [unfilled] [de-identified] : Referred by Dr. Gaytan\par \par Ms. Hoang is a 68 y/o G0 postmenopausal F who was referred to medical oncology for treatment considerations for recurrent endometrial cancer.\par She was initially diagnosed with stage II grade 2 endometrial cancer in 2018, she underwent underwent robotic assisted TLH BSO, bilateral pelvic and para-aortic sentinel LN dissection by Dr. Paras Grayson on 2018.  Her surgical pathology demonstrated pT2N0 disease with endometrial tumor measuring 4cm, FIGO 2 endometrioid adenocarcinoma, >90 myometrial invasion with involvement of cervical stroma, +LVI with IHC of MMR proteins with intact expression but pelvic wash negative for malignant cells.  On 10/2018, she completed pelvic IMRT and vaginal cuff brachytherapy with Dr. Bowling.  She did well clinically under surveillance until 2019 when she developed vaginal spotting and fluid discharge, and was evaluated by Dr. Grayson. CA-125 was 27. Concern for possible recurrence and further workup pursued.  Her 19 CT CAP demonstrate new bilateral pulmonary nodules, some with central cavitation, suspicious for metastatic disease, enhancing nodularity superior to the vaginal cuff concerning for metastatic disease, measuring 1.9x1.6cm.  On 10/30/19 she underwent thorascopic multiple RLL wedge resections with Dr. Weston and her surgical pathology c/w metastatic adenocarcinoma, consistent with patient's endometrial primary +ER CT Pax8.  She was recommended further treatment for her recurrent endometrial cancer but the patient was lost to follow up until 2020 several months before which she has noticed increasing hardness and "lumps" in her vagina, which has been progressively more painful and burning sensation. She was subsequently evaluated by Dr. Gaytan 2020 and referred to medical oncology for systemic treatment evaluation.   On presentation for initial consult 2020, she continued to have vaginal discharge and on and off spotting (not more than 1 pad a day). She felt constipated. She was taking Percocet 5-325, Tylenol and ibuprofen for vaginal pain. She had been very busy with work and family affairs, was not able to come for cancer treatment over the past months. 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 \par \par \par Patient started on Lenvima and Pembrolizumab combination therapy on 3/17/21.  Given increasing vaginal pain and increased mass involving the vaginal cuff region, she underwent palliative RT to the vaginal mass and cuff area with Dr. Bowling. She had improved pain and had been able to tolerate Lenvima without significant toxicities. I reviewed my recommendations to continue current regimen with pembrolizumab and lenvima as lenvima had been on hold for several weeks due to difficulty tolerating treatment and pain control issues. \par \par CT c/a/p done on 2021 which showed favorable response to therapy. Left inguinal node decreased in size. Vaginal mass decreased in size. Pulmonary nodules decreased in size.  \par \par Her CT c/a/p from 2021 showed pulmonary nodules. While some are stable in size, there is mild increase in pulmonary nodule in the right upper lobe.  3 x 5 mm nodular focus of higher attenuation suggesting enhancement within a right renal cyst. This is slightly better seen on the current examination although may be exaggerated by volume averaging.  [de-identified] :  ER +  CT +  PAX 8  +     MSI STABLE [de-identified] : Refuses COVID 19 vaccine  [FreeTextEntry1] :  Lenvima and Pembrolizumab [de-identified] : Patient is here for follow up.  Treatment with Pembro today held due to diarrhea.  She also continue to hold Lenvima.  She reports that since starting the steroids she is seeing some improvement with the diarrhea,  Frequency has lessened and stool has become more formed.  She has more energy and can eat more.  She is no longer using Immodium.  She reports new b/l lower extremity edema that started about one day after starting the steroids. She denies any leg pain.  She denies fever, chills, chest pain, SOB, cough, mucositis, abdominal pain, nausea, vomiting, bleeding, rash.

## 2022-09-01 NOTE — PHYSICAL EXAM
[Restricted in physically strenuous activity but ambulatory and able to carry out work of a light or sedentary nature] : Status 1- Restricted in physically strenuous activity but ambulatory and able to carry out work of a light or sedentary nature, e.g., light house work, office work [Normal] : affect appropriate [Mucositis] : no mucositis [Thrush] : no thrush [Vesicles] : no vesicles [de-identified] : EOMI, anicteric  [de-identified] : normal respiratory effort, no audible breath sounds

## 2022-09-01 NOTE — ASSESSMENT
[Palliative] : Goals of care discussed with patient: Palliative [Palliative Care Plan] : not applicable at this time [FreeTextEntry1] : 67 year old woman with relapsed endometrial cancer on Keytruda and Lenvima with immunotherapy induced colitis.\par Has had some improvement with steroids 1mg/kg.\par Will begin taper tomorrow, continue PPI.\par Will continue to hold treatment until symptoms improve further and able to taper off steroids.\par Will get CT scans done to re-evaluate status of disease for treatment planning.\par Leg swelling likely due to steroids but will get US b/l LE to rule out DVT.\par RTC 2-3 weeks after imaging.

## 2022-09-01 NOTE — REVIEW OF SYSTEMS
[Recent Change In Weight] : ~T recent weight change [Diarrhea] : diarrhea [Negative] : Allergic/Immunologic [Vision Problems] : no vision problems [Chest Pain] : no chest pain [Lower Ext Edema] : no lower extremity edema [Cough] : no cough [Vaginal Discharge] : no vaginal discharge [Joint Pain] : no joint pain [Muscle Pain] : no muscle pain [Difficulty Walking] : no difficulty walking [FreeTextEntry2] : weight change [FreeTextEntry7] : reflux

## 2022-09-06 ENCOUNTER — APPOINTMENT (OUTPATIENT)
Dept: GASTROENTEROLOGY | Facility: CLINIC | Age: 67
End: 2022-09-06

## 2022-09-06 VITALS
BODY MASS INDEX: 21.99 KG/M2 | SYSTOLIC BLOOD PRESSURE: 119 MMHG | OXYGEN SATURATION: 98 % | WEIGHT: 112 LBS | HEART RATE: 111 BPM | TEMPERATURE: 98 F | HEIGHT: 60 IN | DIASTOLIC BLOOD PRESSURE: 86 MMHG

## 2022-09-06 DIAGNOSIS — K52.9 NONINFECTIVE GASTROENTERITIS AND COLITIS, UNSPECIFIED: ICD-10-CM

## 2022-09-06 PROCEDURE — 99214 OFFICE O/P EST MOD 30 MIN: CPT

## 2022-09-06 NOTE — ASSESSMENT
[FreeTextEntry1] : Complex patient with recurrent endometrial carcinoma and acute colitis. Dietary and lifestyle modification discussed with the patient.  Monitor patient's symptoms after she completes the course of prednisone.\par

## 2022-09-06 NOTE — HISTORY OF PRESENT ILLNESS
[de-identified] : 67-year-old woman with developed diarrhea while on chemotherapy for recurrent endometrial carcinoma.  The chemotherapy was discontinued and she is now on immunotherapy.  She was also placed on a tapering dose of oral prednisone 50 mg daily.  She underwent a screening colonoscopy on 8/5 that showed focal areas of colitis.  Pathology is consistent with inflammatory bowel disease.  Her diarrhea is improving on the prednisone and off the chemotherapy.  Her stools are soft and less frequent.  She denies rectal bleeding.

## 2022-09-08 ENCOUNTER — RESULT REVIEW (OUTPATIENT)
Age: 67
End: 2022-09-08

## 2022-09-08 ENCOUNTER — APPOINTMENT (OUTPATIENT)
Dept: MAMMOGRAPHY | Facility: IMAGING CENTER | Age: 67
End: 2022-09-08

## 2022-09-08 ENCOUNTER — APPOINTMENT (OUTPATIENT)
Dept: ULTRASOUND IMAGING | Facility: IMAGING CENTER | Age: 67
End: 2022-09-08

## 2022-09-08 ENCOUNTER — OUTPATIENT (OUTPATIENT)
Dept: OUTPATIENT SERVICES | Facility: HOSPITAL | Age: 67
LOS: 1 days | End: 2022-09-08
Payer: MEDICARE

## 2022-09-08 DIAGNOSIS — Z98.890 OTHER SPECIFIED POSTPROCEDURAL STATES: Chronic | ICD-10-CM

## 2022-09-08 DIAGNOSIS — Z90.710 ACQUIRED ABSENCE OF BOTH CERVIX AND UTERUS: Chronic | ICD-10-CM

## 2022-09-08 DIAGNOSIS — Z00.00 ENCOUNTER FOR GENERAL ADULT MEDICAL EXAMINATION WITHOUT ABNORMAL FINDINGS: ICD-10-CM

## 2022-09-08 PROCEDURE — 77065 DX MAMMO INCL CAD UNI: CPT | Mod: 26,RT

## 2022-09-08 PROCEDURE — G0279: CPT | Mod: 26

## 2022-09-08 PROCEDURE — 77065 DX MAMMO INCL CAD UNI: CPT

## 2022-09-08 PROCEDURE — G0279: CPT

## 2022-09-09 ENCOUNTER — APPOINTMENT (OUTPATIENT)
Dept: CT IMAGING | Facility: IMAGING CENTER | Age: 67
End: 2022-09-09

## 2022-09-09 ENCOUNTER — OUTPATIENT (OUTPATIENT)
Dept: OUTPATIENT SERVICES | Facility: HOSPITAL | Age: 67
LOS: 1 days | End: 2022-09-09
Payer: MEDICARE

## 2022-09-09 DIAGNOSIS — C54.1 MALIGNANT NEOPLASM OF ENDOMETRIUM: ICD-10-CM

## 2022-09-09 DIAGNOSIS — Z98.890 OTHER SPECIFIED POSTPROCEDURAL STATES: Chronic | ICD-10-CM

## 2022-09-09 DIAGNOSIS — Z90.710 ACQUIRED ABSENCE OF BOTH CERVIX AND UTERUS: Chronic | ICD-10-CM

## 2022-09-09 PROCEDURE — 71260 CT THORAX DX C+: CPT

## 2022-09-09 PROCEDURE — 74177 CT ABD & PELVIS W/CONTRAST: CPT

## 2022-09-09 PROCEDURE — 74177 CT ABD & PELVIS W/CONTRAST: CPT | Mod: 26,MH

## 2022-09-09 PROCEDURE — 71260 CT THORAX DX C+: CPT | Mod: 26,MH

## 2022-09-12 ENCOUNTER — OUTPATIENT (OUTPATIENT)
Dept: OUTPATIENT SERVICES | Facility: HOSPITAL | Age: 67
LOS: 1 days | End: 2022-09-12
Payer: MEDICARE

## 2022-09-12 ENCOUNTER — APPOINTMENT (OUTPATIENT)
Dept: ULTRASOUND IMAGING | Facility: CLINIC | Age: 67
End: 2022-09-12

## 2022-09-12 DIAGNOSIS — Z98.890 OTHER SPECIFIED POSTPROCEDURAL STATES: Chronic | ICD-10-CM

## 2022-09-12 DIAGNOSIS — Z90.710 ACQUIRED ABSENCE OF BOTH CERVIX AND UTERUS: Chronic | ICD-10-CM

## 2022-09-12 DIAGNOSIS — C54.1 MALIGNANT NEOPLASM OF ENDOMETRIUM: ICD-10-CM

## 2022-09-12 PROCEDURE — 93970 EXTREMITY STUDY: CPT

## 2022-09-12 PROCEDURE — 93970 EXTREMITY STUDY: CPT | Mod: 26

## 2022-09-14 ENCOUNTER — RESULT REVIEW (OUTPATIENT)
Age: 67
End: 2022-09-14

## 2022-09-14 ENCOUNTER — APPOINTMENT (OUTPATIENT)
Dept: HEMATOLOGY ONCOLOGY | Facility: CLINIC | Age: 67
End: 2022-09-14

## 2022-09-14 ENCOUNTER — APPOINTMENT (OUTPATIENT)
Dept: INFUSION THERAPY | Facility: HOSPITAL | Age: 67
End: 2022-09-14

## 2022-09-14 VITALS
WEIGHT: 111.31 LBS | TEMPERATURE: 97.6 F | RESPIRATION RATE: 16 BRPM | DIASTOLIC BLOOD PRESSURE: 76 MMHG | BODY MASS INDEX: 21.85 KG/M2 | OXYGEN SATURATION: 97 % | SYSTOLIC BLOOD PRESSURE: 113 MMHG | HEART RATE: 121 BPM | HEIGHT: 60 IN

## 2022-09-14 LAB
ALBUMIN SERPL ELPH-MCNC: 3.3 G/DL — SIGNIFICANT CHANGE UP (ref 3.3–5)
ALP SERPL-CCNC: 395 U/L — HIGH (ref 40–120)
ALT FLD-CCNC: 135 U/L — HIGH (ref 10–45)
ANION GAP SERPL CALC-SCNC: 13 MMOL/L — SIGNIFICANT CHANGE UP (ref 5–17)
AST SERPL-CCNC: 52 U/L — HIGH (ref 10–40)
BILIRUB SERPL-MCNC: 0.4 MG/DL — SIGNIFICANT CHANGE UP (ref 0.2–1.2)
BUN SERPL-MCNC: 20 MG/DL — SIGNIFICANT CHANGE UP (ref 7–23)
CALCIUM SERPL-MCNC: 8.8 MG/DL — SIGNIFICANT CHANGE UP (ref 8.4–10.5)
CHLORIDE SERPL-SCNC: 103 MMOL/L — SIGNIFICANT CHANGE UP (ref 96–108)
CO2 SERPL-SCNC: 22 MMOL/L — SIGNIFICANT CHANGE UP (ref 22–31)
CREAT SERPL-MCNC: 0.85 MG/DL — SIGNIFICANT CHANGE UP (ref 0.5–1.3)
EGFR: 74 ML/MIN/1.73M2 — SIGNIFICANT CHANGE UP
GLUCOSE SERPL-MCNC: 98 MG/DL — SIGNIFICANT CHANGE UP (ref 70–99)
HCT VFR BLD CALC: 38.2 % — SIGNIFICANT CHANGE UP (ref 34.5–45)
HGB BLD-MCNC: 12.7 G/DL — SIGNIFICANT CHANGE UP (ref 11.5–15.5)
MAGNESIUM SERPL-MCNC: 1.7 MG/DL — SIGNIFICANT CHANGE UP (ref 1.6–2.6)
MCHC RBC-ENTMCNC: 33.2 G/DL — SIGNIFICANT CHANGE UP (ref 32–36)
MCHC RBC-ENTMCNC: 33.2 PG — SIGNIFICANT CHANGE UP (ref 27–34)
MCV RBC AUTO: 100 FL — SIGNIFICANT CHANGE UP (ref 80–100)
PLATELET # BLD AUTO: 283 K/UL — SIGNIFICANT CHANGE UP (ref 150–400)
POTASSIUM SERPL-MCNC: 4.9 MMOL/L — SIGNIFICANT CHANGE UP (ref 3.5–5.3)
POTASSIUM SERPL-SCNC: 4.9 MMOL/L — SIGNIFICANT CHANGE UP (ref 3.5–5.3)
PROT SERPL-MCNC: 5.7 G/DL — LOW (ref 6–8.3)
RBC # BLD: 3.82 M/UL — SIGNIFICANT CHANGE UP (ref 3.8–5.2)
RBC # FLD: 13.1 % — SIGNIFICANT CHANGE UP (ref 10.3–14.5)
SODIUM SERPL-SCNC: 138 MMOL/L — SIGNIFICANT CHANGE UP (ref 135–145)
T4 FREE+ TSH PNL SERPL: 1.45 UIU/ML — SIGNIFICANT CHANGE UP (ref 0.27–4.2)
WBC # BLD: 6.75 K/UL — SIGNIFICANT CHANGE UP (ref 3.8–10.5)
WBC # FLD AUTO: 6.75 K/UL — SIGNIFICANT CHANGE UP (ref 3.8–10.5)

## 2022-09-14 PROCEDURE — 99214 OFFICE O/P EST MOD 30 MIN: CPT

## 2022-09-22 ENCOUNTER — RESULT REVIEW (OUTPATIENT)
Age: 67
End: 2022-09-22

## 2022-09-22 ENCOUNTER — APPOINTMENT (OUTPATIENT)
Dept: INFUSION THERAPY | Facility: HOSPITAL | Age: 67
End: 2022-09-22

## 2022-09-22 LAB
ALBUMIN SERPL ELPH-MCNC: 2.9 G/DL — LOW (ref 3.3–5)
ALP SERPL-CCNC: 231 U/L — HIGH (ref 40–120)
ALT FLD-CCNC: 38 U/L — SIGNIFICANT CHANGE UP (ref 10–45)
ANION GAP SERPL CALC-SCNC: 11 MMOL/L — SIGNIFICANT CHANGE UP (ref 5–17)
AST SERPL-CCNC: 18 U/L — SIGNIFICANT CHANGE UP (ref 10–40)
BILIRUB SERPL-MCNC: 0.6 MG/DL — SIGNIFICANT CHANGE UP (ref 0.2–1.2)
BUN SERPL-MCNC: 16 MG/DL — SIGNIFICANT CHANGE UP (ref 7–23)
CALCIUM SERPL-MCNC: 8.4 MG/DL — SIGNIFICANT CHANGE UP (ref 8.4–10.5)
CHLORIDE SERPL-SCNC: 99 MMOL/L — SIGNIFICANT CHANGE UP (ref 96–108)
CO2 SERPL-SCNC: 26 MMOL/L — SIGNIFICANT CHANGE UP (ref 22–31)
CREAT SERPL-MCNC: 0.82 MG/DL — SIGNIFICANT CHANGE UP (ref 0.5–1.3)
EGFR: 77 ML/MIN/1.73M2 — SIGNIFICANT CHANGE UP
GLUCOSE SERPL-MCNC: 103 MG/DL — HIGH (ref 70–99)
POTASSIUM SERPL-MCNC: 4.2 MMOL/L — SIGNIFICANT CHANGE UP (ref 3.5–5.3)
POTASSIUM SERPL-SCNC: 4.2 MMOL/L — SIGNIFICANT CHANGE UP (ref 3.5–5.3)
PROT SERPL-MCNC: 5.6 G/DL — LOW (ref 6–8.3)
SODIUM SERPL-SCNC: 137 MMOL/L — SIGNIFICANT CHANGE UP (ref 135–145)

## 2022-09-28 ENCOUNTER — APPOINTMENT (OUTPATIENT)
Dept: HEMATOLOGY ONCOLOGY | Facility: CLINIC | Age: 67
End: 2022-09-28

## 2022-09-28 ENCOUNTER — APPOINTMENT (OUTPATIENT)
Dept: INFUSION THERAPY | Facility: HOSPITAL | Age: 67
End: 2022-09-28

## 2022-09-28 VITALS
RESPIRATION RATE: 16 BRPM | BODY MASS INDEX: 22.94 KG/M2 | HEIGHT: 60 IN | SYSTOLIC BLOOD PRESSURE: 100 MMHG | DIASTOLIC BLOOD PRESSURE: 69 MMHG | HEART RATE: 100 BPM | WEIGHT: 116.82 LBS | OXYGEN SATURATION: 97 % | TEMPERATURE: 97.1 F

## 2022-09-28 PROCEDURE — 99214 OFFICE O/P EST MOD 30 MIN: CPT

## 2022-09-29 ENCOUNTER — APPOINTMENT (OUTPATIENT)
Dept: HEMATOLOGY ONCOLOGY | Facility: CLINIC | Age: 67
End: 2022-09-29

## 2022-10-04 ENCOUNTER — OUTPATIENT (OUTPATIENT)
Dept: OUTPATIENT SERVICES | Facility: HOSPITAL | Age: 67
LOS: 1 days | Discharge: HOME | End: 2022-10-04

## 2022-10-04 DIAGNOSIS — Z98.890 OTHER SPECIFIED POSTPROCEDURAL STATES: Chronic | ICD-10-CM

## 2022-10-04 DIAGNOSIS — Z90.710 ACQUIRED ABSENCE OF BOTH CERVIX AND UTERUS: Chronic | ICD-10-CM

## 2022-10-04 DIAGNOSIS — C54.1 MALIGNANT NEOPLASM OF ENDOMETRIUM: ICD-10-CM

## 2022-10-06 ENCOUNTER — APPOINTMENT (OUTPATIENT)
Dept: CARDIOLOGY | Facility: CLINIC | Age: 67
End: 2022-10-06

## 2022-10-06 PROCEDURE — 93356 MYOCRD STRAIN IMG SPCKL TRCK: CPT

## 2022-10-06 PROCEDURE — 93306 TTE W/DOPPLER COMPLETE: CPT

## 2022-10-07 NOTE — PHYSICAL EXAM
[Restricted in physically strenuous activity but ambulatory and able to carry out work of a light or sedentary nature] : Status 1- Restricted in physically strenuous activity but ambulatory and able to carry out work of a light or sedentary nature, e.g., light house work, office work [Normal] : affect appropriate [Mucositis] : no mucositis [Thrush] : no thrush [Vesicles] : no vesicles [de-identified] : EOMI, anicteric  [de-identified] : normal respiratory effort, no audible breath sounds

## 2022-10-07 NOTE — REVIEW OF SYSTEMS
[Recent Change In Weight] : ~T recent weight change [Diarrhea] : diarrhea [Negative] : Allergic/Immunologic [Vision Problems] : no vision problems [Chest Pain] : no chest pain [Lower Ext Edema] : no lower extremity edema [Cough] : no cough [Vaginal Discharge] : no vaginal discharge [Joint Pain] : no joint pain [Muscle Pain] : no muscle pain [Difficulty Walking] : no difficulty walking [FreeTextEntry2] : weight change [FreeTextEntry7] : reflux, pressure while having bowel movements [de-identified] : itching and burning to anus and skin of perineum

## 2022-10-07 NOTE — RESULTS/DATA
[FreeTextEntry1] : CT CAP 9/9/22:\par \par FINDINGS:\par CHEST:\par LUNGS AND LARGE AIRWAYS: Patent central airways. Postsurgical changes in the right lower lobe. Scattered pulmonary nodules with reference lesions as follows:\par \par Left upper lobe nodule (2:8) 2.4 x 1.9 cm, previously 2.3 x 1.6 cm 5/25/2022 and 2.0 x 1.8 cm 3/1/2022.\par \par Right perihilar nodule (2:29) 1.6 x 1.0 cm, previously 1.4 x 1.3 cm 5/25/2022.\par \par PLEURA: No pleural effusion.\par VESSELS: Atherosclerotic changes of the aorta.\par HEART: Heart size is normal. No pericardial effusion.\par MEDIASTINUM AND VIDAL: No lymphadenopathy.\par CHEST WALL AND LOWER NECK: Right chest wall MediPort with catheter tip at the cavoatrial junction.\par \par ABDOMEN AND PELVIS:\par LIVER: Diffuse steatosis with a stable low density focus along the ligamentum teres likely related to perfusional change.\par BILE DUCTS: Normal caliber.\par GALLBLADDER: Within normal limits.\par SPLEEN: Within normal limits.\par PANCREAS: A few scattered subcentimeter cysts. Main pancreatic duct is normal in caliber.\par ADRENALS: Within normal limits.\par KIDNEYS/URETERS: Bilateral renal cysts including cysts with thin septations. A right upper pole renal cyst measuring 2.9 cm with internal septation and a 3 mm nodular focus along a septation, stable from prior imaging dating to at least August 2021. Continued follow-up is recommended.\par \par BLADDER: Within normal limits.\par REPRODUCTIVE ORGANS: Hysterectomy. Nodularity adjacent to the right vaginal cuff and abutting the bladder (2:139) 2.5 x 1.1 cm demonstrating progressive increase in size from prior examinations. Mild nodularity along the left labia is unchanged.\par \par BOWEL:  No bowel obstruction. Thickening of the descending and rectosigmoid colon consistent with known history of colitis.\par PERITONEUM: No ascites.\par VESSELS: Atherosclerotic changes.\par RETROPERITONEUM/LYMPH NODES: No lymphadenopathy.\par ABDOMINAL WALL: Within normal limits.\par BONES: Degenerative changes.\par \par IMPRESSION:\par \par Pulmonary disease without significant change.\par \par Progressive interval increase soft tissue nodularity along the right vaginal cuff and abutting the bladder.\par \par Colitis of the descending and rectosigmoid colon.

## 2022-10-07 NOTE — HISTORY OF PRESENT ILLNESS
[Disease: _____________________] : Disease: [unfilled] [T: ___] : T[unfilled] [N: ___] : N[unfilled] [M: ___] : M[unfilled] [AJCC Stage: ____] : AJCC Stage: [unfilled] [de-identified] : Referred by Dr. Gaytan\par \par Ms. Hoang is a 68 y/o G0 postmenopausal F who was referred to medical oncology for treatment considerations for recurrent endometrial cancer.\par She was initially diagnosed with stage II grade 2 endometrial cancer in 2018, she underwent underwent robotic assisted TLH BSO, bilateral pelvic and para-aortic sentinel LN dissection by Dr. Paras Grayson on 2018.  Her surgical pathology demonstrated pT2N0 disease with endometrial tumor measuring 4cm, FIGO 2 endometrioid adenocarcinoma, >90 myometrial invasion with involvement of cervical stroma, +LVI with IHC of MMR proteins with intact expression but pelvic wash negative for malignant cells.  On 10/2018, she completed pelvic IMRT and vaginal cuff brachytherapy with Dr. Bowling.  She did well clinically under surveillance until 2019 when she developed vaginal spotting and fluid discharge, and was evaluated by Dr. Grayson. CA-125 was 27. Concern for possible recurrence and further workup pursued.  Her 19 CT CAP demonstrate new bilateral pulmonary nodules, some with central cavitation, suspicious for metastatic disease, enhancing nodularity superior to the vaginal cuff concerning for metastatic disease, measuring 1.9x1.6cm.  On 10/30/19 she underwent thorascopic multiple RLL wedge resections with Dr. Weston and her surgical pathology c/w metastatic adenocarcinoma, consistent with patient's endometrial primary +ER NM Pax8.  She was recommended further treatment for her recurrent endometrial cancer but the patient was lost to follow up until 2020 several months before which she has noticed increasing hardness and "lumps" in her vagina, which has been progressively more painful and burning sensation. She was subsequently evaluated by Dr. Gaytan 2020 and referred to medical oncology for systemic treatment evaluation.   On presentation for initial consult 2020, she continued to have vaginal discharge and on and off spotting (not more than 1 pad a day). She felt constipated. She was taking Percocet 5-325, Tylenol and ibuprofen for vaginal pain. She had been very busy with work and family affairs, was not able to come for cancer treatment over the past months. 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 \par \par \par Patient started on Lenvima and Pembrolizumab combination therapy on 3/17/21.  Given increasing vaginal pain and increased mass involving the vaginal cuff region, she underwent palliative RT to the vaginal mass and cuff area with Dr. Bowling. She had improved pain and had been able to tolerate Lenvima without significant toxicities. I reviewed my recommendations to continue current regimen with pembrolizumab and lenvima as lenvima had been on hold for several weeks due to difficulty tolerating treatment and pain control issues. \par \par CT c/a/p done on 2021 which showed favorable response to therapy. Left inguinal node decreased in size. Vaginal mass decreased in size. Pulmonary nodules decreased in size.  \par \par Her CT c/a/p from 2021 showed pulmonary nodules. While some are stable in size, there is mild increase in pulmonary nodule in the right upper lobe.  3 x 5 mm nodular focus of higher attenuation suggesting enhancement within a right renal cyst. This is slightly better seen on the current examination although may be exaggerated by volume averaging.  [de-identified] :  ER +  PA +  PAX 8  +     MSI STABLE [de-identified] : Refuses COVID 19 vaccine  [FreeTextEntry1] :  Lenvima and Pembrolizumab (held since 6/1) [de-identified] : Angela presents for f/u. Treatment with pembrolizumab has been on hold due to diarrhea/colitis. Since starting steroids bowel movements have improved with decreased loose bowel movements. She is on a steroid taper. She notes intermittent itching and burning to the anus and perineum. Denies constipation or hard stools. Notes she goes to the bathroom multiple times a day which seems to be dependent on the type of food she eats as well. She underwent CT scans in the interim. Denies tenderness to the abdomen. Denies fevers/chills, SOB, cough, pelvic pain. She remains active and is able to carry out all  ADLs.

## 2022-10-10 ENCOUNTER — RESULT REVIEW (OUTPATIENT)
Age: 67
End: 2022-10-10

## 2022-10-10 ENCOUNTER — APPOINTMENT (OUTPATIENT)
Dept: INFUSION THERAPY | Facility: HOSPITAL | Age: 67
End: 2022-10-10

## 2022-10-10 LAB
BASOPHILS # BLD AUTO: 0.04 K/UL — SIGNIFICANT CHANGE UP (ref 0–0.2)
BASOPHILS NFR BLD AUTO: 0.4 % — SIGNIFICANT CHANGE UP (ref 0–2)
EOSINOPHIL # BLD AUTO: 0.02 K/UL — SIGNIFICANT CHANGE UP (ref 0–0.5)
EOSINOPHIL NFR BLD AUTO: 0.2 % — SIGNIFICANT CHANGE UP (ref 0–6)
HCT VFR BLD CALC: 37.2 % — SIGNIFICANT CHANGE UP (ref 34.5–45)
HGB BLD-MCNC: 12.9 G/DL — SIGNIFICANT CHANGE UP (ref 11.5–15.5)
IMM GRANULOCYTES NFR BLD AUTO: 0.5 % — SIGNIFICANT CHANGE UP (ref 0–0.9)
LYMPHOCYTES # BLD AUTO: 1.9 K/UL — SIGNIFICANT CHANGE UP (ref 1–3.3)
LYMPHOCYTES # BLD AUTO: 18.4 % — SIGNIFICANT CHANGE UP (ref 13–44)
MCHC RBC-ENTMCNC: 32.3 PG — SIGNIFICANT CHANGE UP (ref 27–34)
MCHC RBC-ENTMCNC: 34.7 G/DL — SIGNIFICANT CHANGE UP (ref 32–36)
MCV RBC AUTO: 94.4 FL — SIGNIFICANT CHANGE UP (ref 80–100)
MONOCYTES # BLD AUTO: 1.02 K/UL — HIGH (ref 0–0.9)
MONOCYTES NFR BLD AUTO: 9.9 % — SIGNIFICANT CHANGE UP (ref 2–14)
NEUTROPHILS # BLD AUTO: 7.28 K/UL — SIGNIFICANT CHANGE UP (ref 1.8–7.4)
NEUTROPHILS NFR BLD AUTO: 70.6 % — SIGNIFICANT CHANGE UP (ref 43–77)
NRBC # BLD: 0 /100 WBCS — SIGNIFICANT CHANGE UP (ref 0–0)
PLATELET # BLD AUTO: 342 K/UL — SIGNIFICANT CHANGE UP (ref 150–400)
RBC # BLD: 4 M/UL — SIGNIFICANT CHANGE UP (ref 3.8–5.2)
RBC # FLD: 13.4 % — SIGNIFICANT CHANGE UP (ref 10.3–14.5)
WBC # BLD: 10.31 K/UL — SIGNIFICANT CHANGE UP (ref 3.8–10.5)
WBC # FLD AUTO: 10.31 K/UL — SIGNIFICANT CHANGE UP (ref 3.8–10.5)

## 2022-10-11 ENCOUNTER — NON-APPOINTMENT (OUTPATIENT)
Age: 67
End: 2022-10-11

## 2022-10-11 DIAGNOSIS — Z51.11 ENCOUNTER FOR ANTINEOPLASTIC CHEMOTHERAPY: ICD-10-CM

## 2022-10-11 DIAGNOSIS — R11.2 NAUSEA WITH VOMITING, UNSPECIFIED: ICD-10-CM

## 2022-10-19 ENCOUNTER — APPOINTMENT (OUTPATIENT)
Dept: ENDOCRINOLOGY | Facility: CLINIC | Age: 67
End: 2022-10-19

## 2022-10-19 VITALS
BODY MASS INDEX: 22.38 KG/M2 | DIASTOLIC BLOOD PRESSURE: 60 MMHG | SYSTOLIC BLOOD PRESSURE: 112 MMHG | HEIGHT: 60 IN | WEIGHT: 114 LBS | HEART RATE: 64 BPM | OXYGEN SATURATION: 98 % | RESPIRATION RATE: 16 BRPM | TEMPERATURE: 98 F

## 2022-10-19 PROCEDURE — 99214 OFFICE O/P EST MOD 30 MIN: CPT

## 2022-10-19 NOTE — HISTORY OF PRESENT ILLNESS
[FreeTextEntry1] : 67 year female  f/u for hypothyroidism management. \par \par *** Oct 19, 2022 ***\par \par had to go on steroids x 2 wks b/o loose BM. felt swollen while on Prednisone. stopped about 6 wks ago\par started a new chemo for a recurrent endometrial cancer\par Tirosint was not approved.  Switch to Unithroid 112 mcg.\par Last TSH- 1.45 \par prior 16.5, prl- 40\par \par *** Jul 07, 2022 ***\par \par admitted 6 wks for diarrhea, stomach pain and hypokalemia\par had an egd done, which was normal. off the BP meds b/o blood pressure was low\par was off Lenvima x 1 mo, now is taking a smaller dose\par remains on L-thyroxine 75 mcg\par \par *** Mar 11, 2022 ***\par \par feels well, no new c/o\par on  Synthroid 75 mcg. remains on Keytruda and Lenvima- tolerates well\par labs from 3/9/22- TSH- 9.19 with FT4- 1.1. \par labs from 2/23/22- TSH- 2.52\par per patient, started taking "supplement powder" and "a collagen drink" about 2 weeks ago\par \par *** Dec 10, 2021 ***\par \par feels great. good energy level. still on  Keytruda and Lenvima\par remains on Synthroid 75 mcg\par TSH- 3.5, FT4- 1.4\par prior pit panel wnl- am cortisol 13, prolactin 15, IGF- 196\par \par *** Sep 10, 2021 ***\par \par on Synthroid 75 mcg. feels much better. Good energy, no fatigue. \par still on Keytruda and Lenvima\par Thyroid US (8/12/21)- heterogenous thyroid, no nodules\par TSH- 2.19\par am cortisol- 13, rest pending\par \par HPI:\par Ms. VICENTE  was diagnosed with endometrial cancer in 2018. She underwent ELISEO-BSO same year , followed by a pelvic IMRT. In 2019, she was found mts to lungs and pelvic LN. She completed another course of radiation therapy and recently started on Keytruda and Lenvima about 4 months ago. Her TSH early this month returned as 28.1, and she was started on Synthroid 25 mcg about 3 months ago. Her last TSH from yesterday was- 56.8 with FT4- 0.8 \par Her blood pressure was found to be elevated past 3 weeks, and she's complaining of some fatigue. \par Denies family history of thyroid cancer or history of radiation exposure to head and neck area in a childhood.\par

## 2022-10-19 NOTE — ASSESSMENT
[FreeTextEntry1] : 1. Acquired hypothyroidism, secondary to use of check point and TK inhibitors\par - unclear why such a drastic TSH change.not sure if related to her stomach issues\par - hold all supplements \par - continue Unithroid 112 mcg\par - periodical screen for pituitary deficiencies, type 1 diabetes, adrenal insufficiency. \par \par 2. Hyperprolactinemia\par could be secondary to hypothyroidism, but will retest next week along with a thyroid panel at Dr. Blank's office\par RTC 3 mos, or sooner prn.\par \par

## 2022-10-21 ENCOUNTER — APPOINTMENT (OUTPATIENT)
Dept: GASTROENTEROLOGY | Facility: CLINIC | Age: 67
End: 2022-10-21

## 2022-10-21 VITALS
WEIGHT: 114 LBS | TEMPERATURE: 97.2 F | HEART RATE: 114 BPM | BODY MASS INDEX: 22.38 KG/M2 | SYSTOLIC BLOOD PRESSURE: 111 MMHG | HEIGHT: 60 IN | OXYGEN SATURATION: 99 % | DIASTOLIC BLOOD PRESSURE: 74 MMHG

## 2022-10-21 PROCEDURE — 99214 OFFICE O/P EST MOD 30 MIN: CPT

## 2022-10-21 RX ORDER — POTASSIUM CHLORIDE 1500 MG/1
20 TABLET, FILM COATED, EXTENDED RELEASE ORAL
Qty: 10 | Refills: 0 | Status: DISCONTINUED | COMMUNITY
Start: 2022-06-03 | End: 2022-10-21

## 2022-10-21 RX ORDER — PREDNISONE 50 MG/1
50 TABLET ORAL
Qty: 7 | Refills: 0 | Status: COMPLETED | COMMUNITY
Start: 2022-08-24 | End: 2022-10-21

## 2022-10-21 RX ORDER — DIPHENOXYLATE HYDROCHLORIDE AND ATROPINE SULFATE 2.5; .025 MG/1; MG/1
2.5-0.025 TABLET ORAL 4 TIMES DAILY
Qty: 12 | Refills: 1 | Status: DISCONTINUED | COMMUNITY
Start: 2022-06-03 | End: 2022-10-21

## 2022-10-21 RX ORDER — SUCRALFATE 1 G/10ML
1 SUSPENSION ORAL
Qty: 1 | Refills: 0 | Status: DISCONTINUED | COMMUNITY
Start: 2022-05-16 | End: 2022-10-21

## 2022-10-21 RX ORDER — SODIUM SULFATE, POTASSIUM SULFATE, MAGNESIUM SULFATE 17.5; 3.13; 1.6 G/ML; G/ML; G/ML
17.5-3.13-1.6 SOLUTION, CONCENTRATE ORAL
Qty: 1 | Refills: 0 | Status: COMPLETED | COMMUNITY
Start: 2022-06-17 | End: 2022-10-21

## 2022-10-21 RX ORDER — VALSARTAN 160 MG/1
160 TABLET, COATED ORAL DAILY
Qty: 90 | Refills: 2 | Status: DISCONTINUED | COMMUNITY
Start: 2021-07-23 | End: 2022-10-21

## 2022-10-21 RX ORDER — LEVOTHYROXINE SODIUM 100 UG/1
100 CAPSULE ORAL DAILY
Qty: 90 | Refills: 1 | Status: DISCONTINUED | COMMUNITY
Start: 2022-07-07 | End: 2022-10-21

## 2022-10-21 RX ORDER — LENVATINIB 4 MG/1
2 X 4 CAPSULE ORAL
Qty: 60 | Refills: 2 | Status: DISCONTINUED | COMMUNITY
Start: 2021-06-01 | End: 2022-10-21

## 2022-10-21 NOTE — HISTORY OF PRESENT ILLNESS
[FreeTextEntry1] : 67-year-old woman originally seen for diarrhea.  She was receiving chemotherapy for endometrial carcinoma.  She was started on prednisone and the chemotherapy was discontinued.  She underwent a colonoscopy on August 5, 2022 that showed focal areas of colitis.  Her diarrhea has stopped.  Her bowel movements are now soft but regular.  She denies rectal bleeding, melena or hematemesis.

## 2022-10-21 NOTE — PHYSICAL EXAM
[Cervical Lymph Nodes Enlarged Posterior Bilaterally] : no posterior cervical lymphadenopathy [Supraclavicular Lymph Nodes Enlarged Bilaterally] : no supraclavicular lymphadenopathy [No CVA Tenderness] : no CVA  tenderness [Abnormal Walk] : normal gait [No Focal Deficits] : no focal deficits [Normal] : oriented to person, place, and time

## 2022-10-21 NOTE — ASSESSMENT
[FreeTextEntry1] : Acute gastroenteritis/colitis.  Patient's diarrhea is most likely induced by her chemotherapy.  No further treatment necessary at the present time.  This was discussed with the patient.  She understands and all questions were answered.

## 2022-10-24 ENCOUNTER — RESULT REVIEW (OUTPATIENT)
Age: 67
End: 2022-10-24

## 2022-10-24 ENCOUNTER — APPOINTMENT (OUTPATIENT)
Dept: INFUSION THERAPY | Facility: HOSPITAL | Age: 67
End: 2022-10-24

## 2022-10-24 ENCOUNTER — APPOINTMENT (OUTPATIENT)
Dept: HEMATOLOGY ONCOLOGY | Facility: CLINIC | Age: 67
End: 2022-10-24

## 2022-10-24 VITALS
HEART RATE: 123 BPM | OXYGEN SATURATION: 99 % | SYSTOLIC BLOOD PRESSURE: 93 MMHG | TEMPERATURE: 97.5 F | DIASTOLIC BLOOD PRESSURE: 70 MMHG | BODY MASS INDEX: 21.96 KG/M2 | RESPIRATION RATE: 16 BRPM | WEIGHT: 112.44 LBS

## 2022-10-24 LAB
ALBUMIN SERPL ELPH-MCNC: 2.3 G/DL — LOW (ref 3.3–5)
ALP SERPL-CCNC: 279 U/L — HIGH (ref 40–120)
ALT FLD-CCNC: 61 U/L — HIGH (ref 10–45)
ANION GAP SERPL CALC-SCNC: 10 MMOL/L — SIGNIFICANT CHANGE UP (ref 5–17)
AST SERPL-CCNC: 47 U/L — HIGH (ref 10–40)
BILIRUB SERPL-MCNC: 0.2 MG/DL — SIGNIFICANT CHANGE UP (ref 0.2–1.2)
BUN SERPL-MCNC: 11 MG/DL — SIGNIFICANT CHANGE UP (ref 7–23)
CALCIUM SERPL-MCNC: 8 MG/DL — LOW (ref 8.4–10.5)
CHLORIDE SERPL-SCNC: 107 MMOL/L — SIGNIFICANT CHANGE UP (ref 96–108)
CO2 SERPL-SCNC: 22 MMOL/L — SIGNIFICANT CHANGE UP (ref 22–31)
CREAT SERPL-MCNC: 0.59 MG/DL — SIGNIFICANT CHANGE UP (ref 0.5–1.3)
EGFR: 98 ML/MIN/1.73M2 — SIGNIFICANT CHANGE UP
GLUCOSE SERPL-MCNC: 105 MG/DL — HIGH (ref 70–99)
HCT VFR BLD CALC: 27.8 % — LOW (ref 34.5–45)
HGB BLD-MCNC: 9.2 G/DL — LOW (ref 11.5–15.5)
MAGNESIUM SERPL-MCNC: 1.4 MG/DL — LOW (ref 1.6–2.6)
MCHC RBC-ENTMCNC: 32.7 PG — SIGNIFICANT CHANGE UP (ref 27–34)
MCHC RBC-ENTMCNC: 33.1 G/DL — SIGNIFICANT CHANGE UP (ref 32–36)
MCV RBC AUTO: 98.9 FL — SIGNIFICANT CHANGE UP (ref 80–100)
PLATELET # BLD AUTO: 187 K/UL — SIGNIFICANT CHANGE UP (ref 150–400)
POTASSIUM SERPL-MCNC: 3.8 MMOL/L — SIGNIFICANT CHANGE UP (ref 3.5–5.3)
POTASSIUM SERPL-SCNC: 3.8 MMOL/L — SIGNIFICANT CHANGE UP (ref 3.5–5.3)
PROT SERPL-MCNC: 4.9 G/DL — LOW (ref 6–8.3)
RBC # BLD: 2.81 M/UL — LOW (ref 3.8–5.2)
RBC # FLD: 12.8 % — SIGNIFICANT CHANGE UP (ref 10.3–14.5)
SODIUM SERPL-SCNC: 139 MMOL/L — SIGNIFICANT CHANGE UP (ref 135–145)
WBC # BLD: 1.12 K/UL — LOW (ref 3.8–10.5)
WBC # FLD AUTO: 1.12 K/UL — LOW (ref 3.8–10.5)

## 2022-10-24 PROCEDURE — 99214 OFFICE O/P EST MOD 30 MIN: CPT

## 2022-10-24 NOTE — H&P PST ADULT - NSANTHTIREDRD_ENT_A_CORE
24 yo Romansh speaking Male, with PMH alcohol abuse, s/p Left temporal parietal craniotomy noted in imaging, presented  with altered mental status and seizure that lasted about 2-4 minutes.  In the ED, pt with low grade fever, tachycardic   Admitted to ICU for severe metabolic derangements, CIWA protocol with PRN ativan. Pt was started on antibiotics and IV fluids  Started on lactulose for elevated amonnia level.  Lactate trended down to normal levels. GI and Hepatology was consulted for transaminitis and hyperbilirubinemia. Patient was started on zosyn for possible cholangitis. CT abd showed hepatomegaly with biliary sludge and mild ascites. MRCP was recommended.  Noted with  Hb  7.3,  started on protonix drip and octreotide drip. Patient received one unit PRBC and one unit FFP.  GI on board recommending EGD which did not show any pathology. Flexible sigmoidoscopy showed rectal varices which was cauterized.  switched to ceftriaxone for SBP prophylaxis per ID.  Vit K for 3 days for elevated INR. He was started on prednisone 40mg daily for 28 days for alcoholic hepatitis.   Patient hospital course further complicated by fever, and rising leucocytosis. ID consulted.  blood culture 10/16 NGTD, stool studies no pathogens, Cdfif negative. CT abdomen and pelvis- 1. Marked hepatomegaly/steatosis, unchanged. 2.  Ascites/mesenteric edema and subcutaneous edema, minimally progressed. 3.  Equivocal ascending colonic mural prominence raises the possibility of portal colopathy or inflammation   BCR/ ABL sent pending results. INR 3.16,  liver biopsy plan for biopsy cancelled due to elevated INT. Vit K po continued   Pt seen at bedside, alert, awake, confused to person and place. wbc slowly downtrending, 48 today. f/u INR in am    No

## 2022-10-25 DIAGNOSIS — E86.0 DEHYDRATION: ICD-10-CM

## 2022-10-25 LAB — CANCER AG125 SERPL-ACNC: 24 U/ML — SIGNIFICANT CHANGE UP

## 2022-10-26 NOTE — PHYSICAL EXAM
[Restricted in physically strenuous activity but ambulatory and able to carry out work of a light or sedentary nature] : Status 1- Restricted in physically strenuous activity but ambulatory and able to carry out work of a light or sedentary nature, e.g., light house work, office work [Normal] : affect appropriate [Mucositis] : no mucositis [Thrush] : no thrush [Vesicles] : no vesicles [de-identified] : EOMI, anicteric  [de-identified] : normal respiratory effort, no audible breath sounds

## 2022-10-26 NOTE — HISTORY OF PRESENT ILLNESS
[Disease: _____________________] : Disease: [unfilled] [T: ___] : T[unfilled] [N: ___] : N[unfilled] [M: ___] : M[unfilled] [AJCC Stage: ____] : AJCC Stage: [unfilled] [de-identified] : Referred by Dr. Gaytan\par \par Ms. Hoang is a 66 y/o G0 postmenopausal F who was referred to medical oncology for treatment considerations for recurrent endometrial cancer.\par She was initially diagnosed with stage II grade 2 endometrial cancer in 2018, she underwent underwent robotic assisted TLH BSO, bilateral pelvic and para-aortic sentinel LN dissection by Dr. Paras Grayson on 2018.  Her surgical pathology demonstrated pT2N0 disease with endometrial tumor measuring 4cm, FIGO 2 endometrioid adenocarcinoma, >90 myometrial invasion with involvement of cervical stroma, +LVI with IHC of MMR proteins with intact expression but pelvic wash negative for malignant cells.  On 10/2018, she completed pelvic IMRT and vaginal cuff brachytherapy with Dr. Bowling.  She did well clinically under surveillance until 2019 when she developed vaginal spotting and fluid discharge, and was evaluated by Dr. Grayson. CA-125 was 27. Concern for possible recurrence and further workup pursued.  Her 19 CT CAP demonstrate new bilateral pulmonary nodules, some with central cavitation, suspicious for metastatic disease, enhancing nodularity superior to the vaginal cuff concerning for metastatic disease, measuring 1.9x1.6cm.  On 10/30/19 she underwent thorascopic multiple RLL wedge resections with Dr. Weston and her surgical pathology c/w metastatic adenocarcinoma, consistent with patient's endometrial primary +ER NV Pax8.  She was recommended further treatment for her recurrent endometrial cancer but the patient was lost to follow up until 2020 several months before which she has noticed increasing hardness and "lumps" in her vagina, which has been progressively more painful and burning sensation. She was subsequently evaluated by Dr. Gaytan 2020 and referred to medical oncology for systemic treatment evaluation.   On presentation for initial consult 2020, she continued to have vaginal discharge and on and off spotting (not more than 1 pad a day). She felt constipated. She was taking Percocet 5-325, Tylenol and ibuprofen for vaginal pain. She had been very busy with work and family affairs, was not able to come for cancer treatment over the past months. 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 \par \par \par Patient started on Lenvima and Pembrolizumab combination therapy on 3/17/21.  Given increasing vaginal pain and increased mass involving the vaginal cuff region, she underwent palliative RT to the vaginal mass and cuff area with Dr. Bowling. She had improved pain and had been able to tolerate Lenvima without significant toxicities. I reviewed my recommendations to continue current regimen with pembrolizumab and lenvima as lenvima had been on hold for several weeks due to difficulty tolerating treatment and pain control issues. \par \par CT c/a/p done on 2021 which showed favorable response to therapy. Left inguinal node decreased in size. Vaginal mass decreased in size. Pulmonary nodules decreased in size.  \par \par Her CT c/a/p from 2021 showed pulmonary nodules. While some are stable in size, there is mild increase in pulmonary nodule in the right upper lobe.  3 x 5 mm nodular focus of higher attenuation suggesting enhancement within a right renal cyst. This is slightly better seen on the current examination although may be exaggerated by volume averaging.  [de-identified] :  ER +  KS +  PAX 8  +     MSI STABLE [de-identified] : Refuses COVID 19 vaccine  [FreeTextEntry1] :  Lenvima and Pembrolizumab (held since 6/1) [de-identified] : Patient here for follow up after colitis flare. Patient reports improved bowel movements. If she is mindful of what she eats - eats very bland, graze eats with small frequent meals, and no dairy - she does not feel that she has issues with bowels. Reports good appetite, eating and drinking well. Had small amount of dairy yesterday at a friends house and she had diarrhea all night long. She took Imodium and the diarrhea resolved. Patient reports b/l LE swelling, fluctuates throughout the day, denies pain. She remains active and is able to carry out all ADLs. Denies fever, chills, mouth sores, CP, palpitations, cough, LOMAS, n/v/d/c, abdominal pain, LE edema, vaginal discharge or bleeding, urinary symptoms, excessive bruising, dizziness, headaches, arthralgias, myalgias. \par

## 2022-10-26 NOTE — REVIEW OF SYSTEMS
[Recent Change In Weight] : ~T recent weight change [Diarrhea] : diarrhea [Negative] : Allergic/Immunologic [Vision Problems] : no vision problems [Chest Pain] : no chest pain [Lower Ext Edema] : no lower extremity edema [Cough] : no cough [Vaginal Discharge] : no vaginal discharge [Joint Pain] : no joint pain [Muscle Pain] : no muscle pain [Difficulty Walking] : no difficulty walking [FreeTextEntry2] : weight change [FreeTextEntry7] : reflux, pressure while having bowel movements [de-identified] : itching and burning to anus and skin of perineum

## 2022-10-31 NOTE — REVIEW OF SYSTEMS
[Fatigue] : fatigue [Mucosal Pain] : mucosal pain [Negative] : Integumentary [Recent Change In Weight] : ~T no recent weight change [Vision Problems] : no vision problems [Chest Pain] : no chest pain [Lower Ext Edema] : no lower extremity edema [Cough] : no cough [Diarrhea] : no diarrhea [Vaginal Discharge] : no vaginal discharge [Joint Pain] : no joint pain [Muscle Pain] : no muscle pain [Difficulty Walking] : no difficulty walking [FreeTextEntry2] : weight change

## 2022-10-31 NOTE — PHYSICAL EXAM
[Restricted in physically strenuous activity but ambulatory and able to carry out work of a light or sedentary nature] : Status 1- Restricted in physically strenuous activity but ambulatory and able to carry out work of a light or sedentary nature, e.g., light house work, office work [Normal] : affect appropriate [Mucositis] : no mucositis [Thrush] : no thrush [Vesicles] : no vesicles [de-identified] : EOMI, anicteric  [de-identified] : normal respiratory effort, no audible breath sounds

## 2022-10-31 NOTE — HISTORY OF PRESENT ILLNESS
[Disease: _____________________] : Disease: [unfilled] [T: ___] : T[unfilled] [N: ___] : N[unfilled] [M: ___] : M[unfilled] [AJCC Stage: ____] : AJCC Stage: [unfilled] [de-identified] : Referred by Dr. Gaytan\par \par Ms. Hoang is a 68 y/o G0 postmenopausal F who was referred to medical oncology for treatment considerations for recurrent endometrial cancer.\par She was initially diagnosed with stage II grade 2 endometrial cancer in 2018, she underwent underwent robotic assisted TLH BSO, bilateral pelvic and para-aortic sentinel LN dissection by Dr. Paras Grayson on 2018.  Her surgical pathology demonstrated pT2N0 disease with endometrial tumor measuring 4cm, FIGO 2 endometrioid adenocarcinoma, >90 myometrial invasion with involvement of cervical stroma, +LVI with IHC of MMR proteins with intact expression but pelvic wash negative for malignant cells.  On 10/2018, she completed pelvic IMRT and vaginal cuff brachytherapy with Dr. Bowling.  She did well clinically under surveillance until 2019 when she developed vaginal spotting and fluid discharge, and was evaluated by Dr. Grayson. CA-125 was 27. Concern for possible recurrence and further workup pursued.  Her 19 CT CAP demonstrate new bilateral pulmonary nodules, some with central cavitation, suspicious for metastatic disease, enhancing nodularity superior to the vaginal cuff concerning for metastatic disease, measuring 1.9x1.6cm.  On 10/30/19 she underwent thorascopic multiple RLL wedge resections with Dr. Weston and her surgical pathology c/w metastatic adenocarcinoma, consistent with patient's endometrial primary +ER MD Pax8.  She was recommended further treatment for her recurrent endometrial cancer but the patient was lost to follow up until 2020 several months before which she has noticed increasing hardness and "lumps" in her vagina, which has been progressively more painful and burning sensation. She was subsequently evaluated by Dr. Gaytan 2020 and referred to medical oncology for systemic treatment evaluation.   On presentation for initial consult 2020, she continued to have vaginal discharge and on and off spotting (not more than 1 pad a day). She felt constipated. She was taking Percocet 5-325, Tylenol and ibuprofen for vaginal pain. She had been very busy with work and family affairs, was not able to come for cancer treatment over the past months. 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 \par \par \par Patient started on Lenvima and Pembrolizumab combination therapy on 3/17/21.  Given increasing vaginal pain and increased mass involving the vaginal cuff region, she underwent palliative RT to the vaginal mass and cuff area with Dr. Bowling. She had improved pain and had been able to tolerate Lenvima without significant toxicities. I reviewed my recommendations to continue current regimen with pembrolizumab and lenvima as lenvima had been on hold for several weeks due to difficulty tolerating treatment and pain control issues. \par \par CT c/a/p done on 2021 which showed favorable response to therapy. Left inguinal node decreased in size. Vaginal mass decreased in size. Pulmonary nodules decreased in size.  \par \par Her CT c/a/p from 2021 showed pulmonary nodules. While some are stable in size, there is mild increase in pulmonary nodule in the right upper lobe.  3 x 5 mm nodular focus of higher attenuation suggesting enhancement within a right renal cyst. This is slightly better seen on the current examination although may be exaggerated by volume averaging.  [de-identified] :  ER +  KS +  PAX 8  +     MSI STABLE [de-identified] : Refuses COVID 19 vaccine  [FreeTextEntry1] :  Lenvima and Pembrolizumab (held since 6/1) [de-identified] : Patient presents today for routine follow up after C1 Doxil. Patient reports mild fatigue, spends more time on the couch. Reports one mouth ulcer causing pain when opening mouth. Reports good appetite, eating and drinking well with regular bowel movements. If she feels constipated she will take 3 stool softeners and she will have a bowel movement. Her b/l LE edema is improving as well as her facial puffiness. She is currently fasting because her endocrinologist ordered blood work for today so she feels extra tired and a little nauseous. Denies fever, chills, mouth sores, CP, palpitations, cough, LOMAS, n/v/d/c, abdominal pain, LE edema, vaginal discharge or bleeding, urinary symptoms, excessive bruising, dizziness, headaches, arthralgias, myalgias.  \par

## 2022-11-07 ENCOUNTER — RESULT REVIEW (OUTPATIENT)
Age: 67
End: 2022-11-07

## 2022-11-07 ENCOUNTER — NON-APPOINTMENT (OUTPATIENT)
Age: 67
End: 2022-11-07

## 2022-11-07 ENCOUNTER — APPOINTMENT (OUTPATIENT)
Dept: HEMATOLOGY ONCOLOGY | Facility: CLINIC | Age: 67
End: 2022-11-07

## 2022-11-07 ENCOUNTER — APPOINTMENT (OUTPATIENT)
Dept: INFUSION THERAPY | Facility: HOSPITAL | Age: 67
End: 2022-11-07

## 2022-11-07 DIAGNOSIS — Z51.5 ENCOUNTER FOR PALLIATIVE CARE: ICD-10-CM

## 2022-11-07 DIAGNOSIS — G89.3 NEOPLASM RELATED PAIN (ACUTE) (CHRONIC): ICD-10-CM

## 2022-11-07 DIAGNOSIS — R11.0 NAUSEA: ICD-10-CM

## 2022-11-07 LAB
ALBUMIN SERPL ELPH-MCNC: 2.4 G/DL — LOW (ref 3.3–5)
ALP SERPL-CCNC: 223 U/L — HIGH (ref 40–120)
ALT FLD-CCNC: 32 U/L — SIGNIFICANT CHANGE UP (ref 10–45)
ANION GAP SERPL CALC-SCNC: 12 MMOL/L — SIGNIFICANT CHANGE UP (ref 5–17)
AST SERPL-CCNC: 42 U/L — HIGH (ref 10–40)
BASOPHILS # BLD AUTO: 0.05 K/UL — SIGNIFICANT CHANGE UP (ref 0–0.2)
BASOPHILS NFR BLD AUTO: 0.7 % — SIGNIFICANT CHANGE UP (ref 0–2)
BILIRUB SERPL-MCNC: <0.2 MG/DL — SIGNIFICANT CHANGE UP (ref 0.2–1.2)
BUN SERPL-MCNC: 12 MG/DL — SIGNIFICANT CHANGE UP (ref 7–23)
CALCIUM SERPL-MCNC: 8.1 MG/DL — LOW (ref 8.4–10.5)
CHLORIDE SERPL-SCNC: 103 MMOL/L — SIGNIFICANT CHANGE UP (ref 96–108)
CO2 SERPL-SCNC: 23 MMOL/L — SIGNIFICANT CHANGE UP (ref 22–31)
CREAT SERPL-MCNC: 0.64 MG/DL — SIGNIFICANT CHANGE UP (ref 0.5–1.3)
EGFR: 96 ML/MIN/1.73M2 — SIGNIFICANT CHANGE UP
EOSINOPHIL # BLD AUTO: 0.01 K/UL — SIGNIFICANT CHANGE UP (ref 0–0.5)
EOSINOPHIL NFR BLD AUTO: 0.1 % — SIGNIFICANT CHANGE UP (ref 0–6)
GLUCOSE SERPL-MCNC: 78 MG/DL — SIGNIFICANT CHANGE UP (ref 70–99)
HCT VFR BLD CALC: 36.5 % — SIGNIFICANT CHANGE UP (ref 34.5–45)
HGB BLD-MCNC: 12.3 G/DL — SIGNIFICANT CHANGE UP (ref 11.5–15.5)
IMM GRANULOCYTES NFR BLD AUTO: 1.3 % — HIGH (ref 0–0.9)
LYMPHOCYTES # BLD AUTO: 1.43 K/UL — SIGNIFICANT CHANGE UP (ref 1–3.3)
LYMPHOCYTES # BLD AUTO: 20.5 % — SIGNIFICANT CHANGE UP (ref 13–44)
MCHC RBC-ENTMCNC: 32.7 PG — SIGNIFICANT CHANGE UP (ref 27–34)
MCHC RBC-ENTMCNC: 33.8 G/DL — SIGNIFICANT CHANGE UP (ref 32–36)
MCV RBC AUTO: 96.8 FL — SIGNIFICANT CHANGE UP (ref 80–100)
MONOCYTES # BLD AUTO: 1.04 K/UL — HIGH (ref 0–0.9)
MONOCYTES NFR BLD AUTO: 14.9 % — HIGH (ref 2–14)
NEUTROPHILS # BLD AUTO: 4.34 K/UL — SIGNIFICANT CHANGE UP (ref 1.8–7.4)
NEUTROPHILS NFR BLD AUTO: 62.5 % — SIGNIFICANT CHANGE UP (ref 43–77)
NRBC # BLD: 0 /100 WBCS — SIGNIFICANT CHANGE UP (ref 0–0)
PLATELET # BLD AUTO: 422 K/UL — HIGH (ref 150–400)
POTASSIUM SERPL-MCNC: 3.9 MMOL/L — SIGNIFICANT CHANGE UP (ref 3.5–5.3)
POTASSIUM SERPL-SCNC: 3.9 MMOL/L — SIGNIFICANT CHANGE UP (ref 3.5–5.3)
PROT SERPL-MCNC: 5.2 G/DL — LOW (ref 6–8.3)
RBC # BLD: 3.79 M/UL — LOW (ref 3.8–5.2)
RBC # FLD: 15.4 % — HIGH (ref 10.3–14.5)
SODIUM SERPL-SCNC: 138 MMOL/L — SIGNIFICANT CHANGE UP (ref 135–145)
T4 FREE+ TSH PNL SERPL: 0.55 UIU/ML — SIGNIFICANT CHANGE UP (ref 0.27–4.2)
WBC # BLD: 6.96 K/UL — SIGNIFICANT CHANGE UP (ref 3.8–10.5)
WBC # FLD AUTO: 6.96 K/UL — SIGNIFICANT CHANGE UP (ref 3.8–10.5)

## 2022-11-07 PROCEDURE — 99213 OFFICE O/P EST LOW 20 MIN: CPT

## 2022-11-09 PROBLEM — R11.0 NAUSEA: Status: ACTIVE | Noted: 2021-05-28

## 2022-11-09 PROBLEM — Z51.5 ENCOUNTER FOR PALLIATIVE CARE: Status: ACTIVE | Noted: 2021-05-28

## 2022-11-09 PROBLEM — G89.3 PAIN, NEOPLASM-RELATED: Status: ACTIVE | Noted: 2021-05-28

## 2022-11-09 NOTE — ASSESSMENT
[FreeTextEntry1] : 67yoF with:\par \par 1. Recurrent endometrial cancer - on Doxil.  Follow up with Med Onc.\par \par 2. Neoplasm-related pain - C/w PRN acetaminophen 650mg. Cautioned on MDD.\par - Medical cannabis recertification completed today. May c/w PRN medical cannabis. \par \par 3. Nausea - C/w PRN Ondansetron.\par \par 4. Encounter for palliative care \par - HCP on file.\par - All questions answered.  Empathetic listening and emotional support provided. \par \par Follow up PRN, call sooner with questions or issues.\par \par  \par

## 2022-11-09 NOTE — DATA REVIEWED
[FreeTextEntry1] : CT C/A/P (09/09/2022)\par \par CHEST:\par LUNGS AND LARGE AIRWAYS: Patent central airways. Postsurgical changes in the right lower lobe. Scattered pulmonary nodules with reference lesions as follows:\par \par Left upper lobe nodule (2:8) 2.4 x 1.9 cm, previously 2.3 x 1.6 cm 5/25/2022 and 2.0 x 1.8 cm 3/1/2022.\par \par Right perihilar nodule (2:29) 1.6 x 1.0 cm, previously 1.4 x 1.3 cm 5/25/2022.\par \par PLEURA: No pleural effusion.\par VESSELS: Atherosclerotic changes of the aorta.\par HEART: Heart size is normal. No pericardial effusion.\par MEDIASTINUM AND VIDAL: No lymphadenopathy.\par CHEST WALL AND LOWER NECK: Right chest wall MediPort with catheter tip at the cavoatrial junction.\par \par ABDOMEN AND PELVIS:\par LIVER: Diffuse steatosis with a stable low density focus along the ligamentum teres likely related to perfusional change.\par BILE DUCTS: Normal caliber.\par GALLBLADDER: Within normal limits.\par SPLEEN: Within normal limits.\par PANCREAS: A few scattered subcentimeter cysts. Main pancreatic duct is normal in caliber.\par ADRENALS: Within normal limits.\par KIDNEYS/URETERS: Bilateral renal cysts including cysts with thin septations. A right upper pole renal cyst measuring 2.9 cm with internal septation and a 3 mm nodular focus along a septation, stable from prior imaging dating to at least August 2021. Continued follow-up is recommended.\par \par BLADDER: Within normal limits.\par REPRODUCTIVE ORGANS: Hysterectomy. Nodularity adjacent to the right vaginal cuff and abutting the bladder (2:139) 2.5 x 1.1 cm demonstrating progressive increase in size from prior examinations. Mild nodularity along the left labia is unchanged.\par \par BOWEL:  No bowel obstruction. Thickening of the descending and rectosigmoid colon consistent with known history of colitis.\par PERITONEUM: No ascites.\par VESSELS: Atherosclerotic changes.\par RETROPERITONEUM/LYMPH NODES: No lymphadenopathy.\par ABDOMINAL WALL: Within normal limits.\par BONES: Degenerative changes.\par \par IMPRESSION:\par - Pulmonary disease without significant change.\par - Progressive interval increase soft tissue nodularity along the right vaginal cuff and abutting the bladder.\par - Colitis of the descending and rectosigmoid colon.

## 2022-11-09 NOTE — HISTORY OF PRESENT ILLNESS
[FreeTextEntry1] : 67yoF with recurrent endometrial cancer presents for follow-up palliative care visit, referred by Oncology.  \par PMH significant for PVD.\par \par Patient initially diagnosed with stage II endometrioid endometrial cancer s/p TLHBSO and adjuvant pelvic IMRT and vaginal cuff brachytherapy in 2018 with pulmonary and vaginal recurrence 2019 with enlarging pulmonary nodules s/p wedge resection x3 and subsequent loss to follow up and re-presentation in 2020 with disease progression in the vagina, pelvic lymph nodes, and lungs. She has since received Carbo/Taxol with mixed response but has most recently experienced progression through therapy at the vagina and REMBERTO anterior to mediastinum. Most recently on lenvima and pembrolizumab.   She self-discontinued lenvima due to side effects including voice changes.   Reports her voice returned to baseline with discontinuation. \par \par Main reason for which patient presents today is pain.  She has been experiencing vaginal/pelvic pain which became progressively worse over the past few weeks.   She endorses increasing vaginal hardness and "lumps".  Pain is described as constant burning, throbbing. Reports pain as high as 15-20/10 previously.\par \par Her pain has been difficult to manage due to opioid sensitivity.  She has trialed MS IR, tramadol and oxycodone but discontinued due to pruritus, nightmares, decreased appetite. MS ER was initiated but patient did not like how it made her feel.  PRN hydromorphone was recommended but patient was reluctant to utilize.  She was started on methadone 2.5mg BID on .  She reports significant improvement.   Experienced some intermittent nausea which was controlled with PRN metoclopramide.  \par \par S/P 5 fractions of RT in May.\par \par Interval History (2022):\par Patient presents for follow-up for medical cannabis recertification, last seen 2021. Lenvima and Pembrolizumab discontinued due to treatment-associated diarrhea/colitis which improved with prednisone and discontinuation of treatment. She is now on Doxil. She reports some treatment-related fatigue. She reports return of intermittent vaginal/pelvic pain described as "tugging" which is controlled with PRN acetaminophen 650mg once daily, at most. She recently re-trialed medical cannabis for neoplasm-pain and found partial benefit. She wishes to be recertified for medical cannabis. \par \par ROS\par + stable weight \par + treatment-related fatigue\par + appetite is good - is now avoiding dairy\par + CIPN of b/l toes - stable \par + fatigue \par + treatment-related diarrhea - resolved but finds she has to follow strict diet to prevent loose BMs\par Denies nausea, vomiting, constipation, diarrhea, trouble sleeping, depressed mood.\par All other ROS as outlined or noncontributory. \par \par Patient is .  Her spouse  due to testicular cancer in early . Her closest relative was her sister who passed away over a year ago. She is Roman Catholic, is a born again Sabianist. She has a few close friends that check in on her.  [place]. Her family is not aware of her diagnosis.\par \par I-Stop Ref#: [id number]

## 2022-11-17 ENCOUNTER — APPOINTMENT (OUTPATIENT)
Dept: RADIATION ONCOLOGY | Facility: CLINIC | Age: 67
End: 2022-11-17

## 2022-11-17 VITALS
WEIGHT: 115.52 LBS | OXYGEN SATURATION: 99 % | HEART RATE: 123 BPM | RESPIRATION RATE: 18 BRPM | DIASTOLIC BLOOD PRESSURE: 80 MMHG | BODY MASS INDEX: 22.56 KG/M2 | SYSTOLIC BLOOD PRESSURE: 119 MMHG | TEMPERATURE: 37 F

## 2022-11-17 PROCEDURE — 99213 OFFICE O/P EST LOW 20 MIN: CPT | Mod: GC

## 2022-11-17 NOTE — REVIEW OF SYSTEMS
[Night Sweats] : night sweats [Fatigue] : fatigue [Constipation] : constipation [Negative] : Heme/Lymph [Constipation: Grade 1 - Occasional or intermittent symptoms; occasional use of stool softeners, laxatives, dietary modification, or enema] : Constipation: Grade 1 - Occasional or intermittent symptoms; occasional use of stool softeners, laxatives, dietary modification, or enema [Dysphagia: Grade 0] : Dysphagia: Grade 0 [Nausea: Grade 1 - Loss of appetite without alteration in eating habits] : Nausea: Grade 1 - Loss of appetite without alteration in eating habits [Vomiting: Grade 0] : Vomiting: Grade 0 [Fatigue: Grade 1 - Fatigue relieved by rest] : Fatigue: Grade 1 - Fatigue relieved by rest

## 2022-11-21 ENCOUNTER — RESULT REVIEW (OUTPATIENT)
Age: 67
End: 2022-11-21

## 2022-11-21 ENCOUNTER — APPOINTMENT (OUTPATIENT)
Dept: HEMATOLOGY ONCOLOGY | Facility: CLINIC | Age: 67
End: 2022-11-21

## 2022-11-21 ENCOUNTER — APPOINTMENT (OUTPATIENT)
Dept: INFUSION THERAPY | Facility: HOSPITAL | Age: 67
End: 2022-11-21

## 2022-11-21 VITALS
RESPIRATION RATE: 16 BRPM | HEART RATE: 103 BPM | DIASTOLIC BLOOD PRESSURE: 76 MMHG | SYSTOLIC BLOOD PRESSURE: 113 MMHG | OXYGEN SATURATION: 98 % | WEIGHT: 117.95 LBS | BODY MASS INDEX: 23.04 KG/M2 | TEMPERATURE: 96.6 F

## 2022-11-21 LAB
ALBUMIN SERPL ELPH-MCNC: 3.1 G/DL — LOW (ref 3.3–5)
ALP SERPL-CCNC: 177 U/L — HIGH (ref 40–120)
ALT FLD-CCNC: 28 U/L — SIGNIFICANT CHANGE UP (ref 10–45)
ANION GAP SERPL CALC-SCNC: 11 MMOL/L — SIGNIFICANT CHANGE UP (ref 5–17)
AST SERPL-CCNC: 29 U/L — SIGNIFICANT CHANGE UP (ref 10–40)
BILIRUB SERPL-MCNC: 0.3 MG/DL — SIGNIFICANT CHANGE UP (ref 0.2–1.2)
BUN SERPL-MCNC: 15 MG/DL — SIGNIFICANT CHANGE UP (ref 7–23)
CALCIUM SERPL-MCNC: 8.5 MG/DL — SIGNIFICANT CHANGE UP (ref 8.4–10.5)
CHLORIDE SERPL-SCNC: 103 MMOL/L — SIGNIFICANT CHANGE UP (ref 96–108)
CO2 SERPL-SCNC: 25 MMOL/L — SIGNIFICANT CHANGE UP (ref 22–31)
CREAT SERPL-MCNC: 0.64 MG/DL — SIGNIFICANT CHANGE UP (ref 0.5–1.3)
EGFR: 96 ML/MIN/1.73M2 — SIGNIFICANT CHANGE UP
GLUCOSE SERPL-MCNC: 101 MG/DL — HIGH (ref 70–99)
HCT VFR BLD CALC: 31.6 % — LOW (ref 34.5–45)
HGB BLD-MCNC: 10.8 G/DL — LOW (ref 11.5–15.5)
MAGNESIUM SERPL-MCNC: 1.6 MG/DL — SIGNIFICANT CHANGE UP (ref 1.6–2.6)
MCHC RBC-ENTMCNC: 32.7 PG — SIGNIFICANT CHANGE UP (ref 27–34)
MCHC RBC-ENTMCNC: 34.2 G/DL — SIGNIFICANT CHANGE UP (ref 32–36)
MCV RBC AUTO: 95.8 FL — SIGNIFICANT CHANGE UP (ref 80–100)
PLATELET # BLD AUTO: 237 K/UL — SIGNIFICANT CHANGE UP (ref 150–400)
POTASSIUM SERPL-MCNC: 3.9 MMOL/L — SIGNIFICANT CHANGE UP (ref 3.5–5.3)
POTASSIUM SERPL-SCNC: 3.9 MMOL/L — SIGNIFICANT CHANGE UP (ref 3.5–5.3)
PROT SERPL-MCNC: 5.9 G/DL — LOW (ref 6–8.3)
RBC # BLD: 3.3 M/UL — LOW (ref 3.8–5.2)
RBC # FLD: 14.7 % — HIGH (ref 10.3–14.5)
SODIUM SERPL-SCNC: 138 MMOL/L — SIGNIFICANT CHANGE UP (ref 135–145)
WBC # BLD: 4.49 K/UL — SIGNIFICANT CHANGE UP (ref 3.8–10.5)
WBC # FLD AUTO: 4.49 K/UL — SIGNIFICANT CHANGE UP (ref 3.8–10.5)

## 2022-11-21 PROCEDURE — 99214 OFFICE O/P EST MOD 30 MIN: CPT

## 2022-11-21 NOTE — DISEASE MANAGEMENT
[N/A] : Currently not applicable [FreeTextEntry4] : recurrent endometrial cancer [TTNM] : x [NTNM] : x [MTNM] : 1

## 2022-11-21 NOTE — PHYSICAL EXAM
[de-identified] : significant vaginal stenosis allowing digital exam of approximately 2 cm. Unable to go in any further to palpate any lesion.

## 2022-11-21 NOTE — HISTORY OF PRESENT ILLNESS
[FreeTextEntry1] : Ms. Hoang is a 67 year old woman with metastatic endometrial cancer (prior FIGO stage 2) with pulmonary and vaginal recurrence in 2019 disease progression in 8/2020 in the vagina, pelvic lymph nodes, and lungs.\par  \par History of Present Illness:\par She initially presented reporting vaginal spotting and pelvic pains x1 year. She did not pursue treatment due to insurance and personal issues. Pap smear (4/24/18) revealed YAIR favor neoplasm. Pelvic sonogram revealed a thickened 2.7-3.7 cm lining.\par \par 5/21/18 - D&C hysteroscopy: well differentiated endometrioid adenocarcinoma.\par  \par 6/5/18 - Biopsy - Endometrial curettings: Well differentiated endometrioid adenocarcinoma. \par \par 6/8/18 - PET CT: Diffuse endometrial hypermetabolism corresponds to known malignancy. No evidence of metastatic disease.\par \par 7/2/18 - RA TLH, BSO, b/l pelvic and para-aortic SLND, cystoscopy: Pathology was significant for a 4 cm endometrial mass with LVSI with right ovarian hilum invasion, >90% myometrial invasion, lower uterine segment and cervical stromal invasion. There was was no pathological lymph node metastases.\par \par 8/31/18 - 10/5/18 - IMRT Pelvis to a total dose of 4,500 cGy in 25 fractions.\par \par 10/12/18 - 10/19/18 - VBT to a total dose of 1,200 cGy in 2 fractions.\par \par 9/12/19 - She presented to GYN ONC reporting leaking a thin yellowish fluid from the vagina x1 month and noted small amount of blood when wiping after urination. On exam she had a moderate amount of clear fluid in vault, and blood. 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 S/p hysterectomy. Nodularity is noted superior to the vaginal cuff, concerning for metastatic disease.\par \par 10/30/19 - Thoracoscopy, resection of several pulmonary nodules and biopsy: +ER [place] Pax8\par  - Right lower lobe wedge: Consistent with Metastatic Adenocarcinoma.\par  - Right lower lobe wedge #2: Lung parenchyma with nodular fibrosis, resolved pneumonitis.\par  - Right lower lobe wedge #3: Consistent with Metastatic Adenocarcinoma.\par  - Right upper lobe wedge: Lung parenchyma with nodular fibrosis.\par \par 7/31/20 - Presented noting a hardness in her vagina and more recently palpated a mass in the vagina which is painful & associated with burning. There was bleeding from the mass with associated with vaginal discharge, slight odor. On exam, mass at left groin ~ 4 cm ("1/2 darion") and protruding from surface without ulceration of the overlying skin.\par \par 8/21/20 - 2/11/21 - 7 cycles carbo/taxol, cycle 8 stopped due to reaction during carboplatin infusion.\par \par 2/27/21 - CT C/A/P: Left upper lobe nodule abutting the mediastinum is increased in size. Otherwise stable pulmonary nodules. Interval enlargement of necrotic vaginal mass and left inguinal lymph node.\par \par 3/17/21 - Began treatment with pembrolizumab and lenvima. \par \par 5/25/21 - 6/1/21 - Radiation therapy to the vaginal canal to a total dose of 2,000 cGy in 5 fractions for palliation of large vaginal canal mass. \par \par 8/19/21 - CT C/A/P: shows favorable response to therapy, decreased size of left inguinal LN and vaginal mass, pulmonary nodules decreased in size.\par \par 11/16/21 - Mammo/US: Persistent partially obscured ovoid nodular mass in the upper outer posterior right breast by mammography, without sonographic correlate. BI-RADS 3.\par \par 3/1/22 - CT C/A/P: Mild increase in size of the left apical pulmonary nodule now with internal cavitation representing treatment-related changes. Stability of the remaining pulmonary metastases. No imaging evidence of locally recurrent or metastatic disease involving the abdomen or pelvis. Similar left vulvar lesion which could be attributable to Bartholin's gland cyst. Bilateral complex renal cystic lesions with mural nodularity. \par Continue on keytruda/LEnvima regimenbut hospitalized with enteritis..\par 4/18/22 - : 6 U/mL. \par 10/28/22 - : 24 U/mL\par Imaging in September,2022 interpreted as essentially stable pulmonary diseas with increase  in vaginal cuff nodularity.\par \par She presents today for routine follow-up. \par Today she is feeling well. Denies any pain. Reports burning, itching and redness in the vaginal area. Using CVS cream and aquaphor with some relief noted. No urinary or bowel complaints. No vaginal discharge/bleeding.\par \par She continues to follow with Dr. Toy Blank and is currently on Doxil, completed course 2. She had experienced fatigue, some nausea which is improved with caron seltzer. She does get sores from the doxil. She is planned to get another cycle of Doxil and then have a repeat CT scan.\par \par She notes that she has had gradually increasing pain in the vaginal canal that occurred after coming off the lenvima. Now she states that the pain has subsided and gone away after starting the Doxil and completion of the second cycle.

## 2022-11-22 LAB
CANCER AG125 SERPL-ACNC: 20 U/ML — SIGNIFICANT CHANGE UP
T4 FREE+ TSH PNL SERPL: 2.04 UIU/ML — SIGNIFICANT CHANGE UP (ref 0.27–4.2)

## 2022-11-28 NOTE — PHYSICAL EXAM
[Restricted in physically strenuous activity but ambulatory and able to carry out work of a light or sedentary nature] : Status 1- Restricted in physically strenuous activity but ambulatory and able to carry out work of a light or sedentary nature, e.g., light house work, office work [Normal] : affect appropriate [Mucositis] : no mucositis [Thrush] : no thrush [Vesicles] : no vesicles [de-identified] : EOMI, anicteric  [de-identified] : normal respiratory effort, no audible breath sounds

## 2022-11-28 NOTE — HISTORY OF PRESENT ILLNESS
[Disease: _____________________] : Disease: [unfilled] [T: ___] : T[unfilled] [N: ___] : N[unfilled] [M: ___] : M[unfilled] [AJCC Stage: ____] : AJCC Stage: [unfilled] [de-identified] : Referred by Dr. Gaytan\par \par Ms. Hoang is a 68 y/o G0 postmenopausal F who was referred to medical oncology for treatment considerations for recurrent endometrial cancer.\par She was initially diagnosed with stage II grade 2 endometrial cancer in 2018, she underwent underwent robotic assisted TLH BSO, bilateral pelvic and para-aortic sentinel LN dissection by Dr. Paras Grayson on 2018.  Her surgical pathology demonstrated pT2N0 disease with endometrial tumor measuring 4cm, FIGO 2 endometrioid adenocarcinoma, >90 myometrial invasion with involvement of cervical stroma, +LVI with IHC of MMR proteins with intact expression but pelvic wash negative for malignant cells.  On 10/2018, she completed pelvic IMRT and vaginal cuff brachytherapy with Dr. Bowling.  She did well clinically under surveillance until 2019 when she developed vaginal spotting and fluid discharge, and was evaluated by Dr. Grayson. CA-125 was 27. Concern for possible recurrence and further workup pursued.  Her 19 CT CAP demonstrate new bilateral pulmonary nodules, some with central cavitation, suspicious for metastatic disease, enhancing nodularity superior to the vaginal cuff concerning for metastatic disease, measuring 1.9x1.6cm.  On 10/30/19 she underwent thorascopic multiple RLL wedge resections with Dr. Weston and her surgical pathology c/w metastatic adenocarcinoma, consistent with patient's endometrial primary +ER [place] Pax8.  She was recommended further treatment for her recurrent endometrial cancer but the patient was lost to follow up until 2020 several months before which she has noticed increasing hardness and "lumps" in her vagina, which has been progressively more painful and burning sensation. She was subsequently evaluated by Dr. Gaytan 2020 and referred to medical oncology for systemic treatment evaluation.   On presentation for initial consult 2020, she continued to have vaginal discharge and on and off spotting (not more than 1 pad a day). She felt constipated. She was taking Percocet 5-325, Tylenol and ibuprofen for vaginal pain. She had been very busy with work and family affairs, was not able to come for cancer treatment over the past months. 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 \par \par \par Patient started on Lenvima and Pembrolizumab combination therapy on 3/17/21.  Given increasing vaginal pain and increased mass involving the vaginal cuff region, she underwent palliative RT to the vaginal mass and cuff area with Dr. Bowling. She had improved pain and had been able to tolerate Lenvima without significant toxicities. I reviewed my recommendations to continue current regimen with pembrolizumab and lenvima as lenvima had been on hold for several weeks due to difficulty tolerating treatment and pain control issues. \par \par CT c/a/p done on 2021 which showed favorable response to therapy. Left inguinal node decreased in size. Vaginal mass decreased in size. Pulmonary nodules decreased in size.  \par \par Her CT c/a/p from 2021 showed pulmonary nodules. While some are stable in size, there is mild increase in pulmonary nodule in the right upper lobe.  3 x 5 mm nodular focus of higher attenuation suggesting enhancement within a right renal cyst. This is slightly better seen on the current examination although may be exaggerated by volume averaging.  [de-identified] :  ER +  NM +  PAX 8  +     MSI STABLE [de-identified] : Refuses COVID 19 vaccine  [FreeTextEntry1] :  Lenvima and Pembrolizumab (held since 6/1) [de-identified] : Patient presents today for routine follow up after C2 Doxil. Patient reports mild fatigue that lasts 2 weeks, spends more time on the couch. After the initial 2 weeks she is able to bounce back and feels like herself again and is staying active. Reports intermittent nausea, relieved by caron seltzer. Reports constipation, relieved by OTC senna. In the interim she saw Dr. Bowling in radiation, will f/u after CT scans. She is eating and drinking well. Denies fever, chills, mouth sores, CP, palpitations, cough, LOMAS, n/v/d/c, abdominal pain, LE edema, vaginal discharge or bleeding, urinary symptoms, excessive bruising, dizziness, headaches, arthralgias, myalgias.

## 2022-11-28 NOTE — REVIEW OF SYSTEMS
[Fatigue] : fatigue [Mucosal Pain] : mucosal pain [Negative] : Allergic/Immunologic [Recent Change In Weight] : ~T no recent weight change [Vision Problems] : no vision problems [Chest Pain] : no chest pain [Lower Ext Edema] : no lower extremity edema [Cough] : no cough [Diarrhea] : no diarrhea [Vaginal Discharge] : no vaginal discharge [Joint Pain] : no joint pain [Muscle Pain] : no muscle pain [Difficulty Walking] : no difficulty walking [FreeTextEntry2] : weight change

## 2022-11-30 ENCOUNTER — RESULT REVIEW (OUTPATIENT)
Age: 67
End: 2022-11-30

## 2022-11-30 ENCOUNTER — OUTPATIENT (OUTPATIENT)
Dept: OUTPATIENT SERVICES | Facility: HOSPITAL | Age: 67
LOS: 1 days | Discharge: HOME | End: 2022-11-30

## 2022-11-30 DIAGNOSIS — Z90.710 ACQUIRED ABSENCE OF BOTH CERVIX AND UTERUS: Chronic | ICD-10-CM

## 2022-11-30 DIAGNOSIS — Z98.890 OTHER SPECIFIED POSTPROCEDURAL STATES: Chronic | ICD-10-CM

## 2022-11-30 DIAGNOSIS — C54.1 MALIGNANT NEOPLASM OF ENDOMETRIUM: ICD-10-CM

## 2022-12-05 ENCOUNTER — RESULT REVIEW (OUTPATIENT)
Age: 67
End: 2022-12-05

## 2022-12-05 ENCOUNTER — APPOINTMENT (OUTPATIENT)
Dept: INFUSION THERAPY | Facility: HOSPITAL | Age: 67
End: 2022-12-05

## 2022-12-05 LAB
BASOPHILS # BLD AUTO: 0.06 K/UL — SIGNIFICANT CHANGE UP (ref 0–0.2)
BASOPHILS NFR BLD AUTO: 1 % — SIGNIFICANT CHANGE UP (ref 0–2)
EOSINOPHIL # BLD AUTO: 0.06 K/UL — SIGNIFICANT CHANGE UP (ref 0–0.5)
EOSINOPHIL NFR BLD AUTO: 1 % — SIGNIFICANT CHANGE UP (ref 0–6)
HCT VFR BLD CALC: 34.8 % — SIGNIFICANT CHANGE UP (ref 34.5–45)
HGB BLD-MCNC: 12 G/DL — SIGNIFICANT CHANGE UP (ref 11.5–15.5)
LYMPHOCYTES # BLD AUTO: 1.17 K/UL — SIGNIFICANT CHANGE UP (ref 1–3.3)
LYMPHOCYTES # BLD AUTO: 20 % — SIGNIFICANT CHANGE UP (ref 13–44)
MCHC RBC-ENTMCNC: 32.9 PG — SIGNIFICANT CHANGE UP (ref 27–34)
MCHC RBC-ENTMCNC: 34.5 G/DL — SIGNIFICANT CHANGE UP (ref 32–36)
MCV RBC AUTO: 95.3 FL — SIGNIFICANT CHANGE UP (ref 80–100)
MONOCYTES # BLD AUTO: 0.94 K/UL — HIGH (ref 0–0.9)
MONOCYTES NFR BLD AUTO: 16 % — HIGH (ref 2–14)
MYELOCYTES NFR BLD: 1 % — HIGH (ref 0–0)
NEUTROPHILS # BLD AUTO: 3.58 K/UL — SIGNIFICANT CHANGE UP (ref 1.8–7.4)
NEUTROPHILS NFR BLD AUTO: 61 % — SIGNIFICANT CHANGE UP (ref 43–77)
NRBC # BLD: 0 /100 — SIGNIFICANT CHANGE UP (ref 0–0)
NRBC # BLD: SIGNIFICANT CHANGE UP /100 WBCS (ref 0–0)
PLAT MORPH BLD: NORMAL — SIGNIFICANT CHANGE UP
PLATELET # BLD AUTO: 314 K/UL — SIGNIFICANT CHANGE UP (ref 150–400)
RBC # BLD: 3.65 M/UL — LOW (ref 3.8–5.2)
RBC # FLD: 15.3 % — HIGH (ref 10.3–14.5)
RBC BLD AUTO: SIGNIFICANT CHANGE UP
WBC # BLD: 5.87 K/UL — SIGNIFICANT CHANGE UP (ref 3.8–10.5)
WBC # FLD AUTO: 5.87 K/UL — SIGNIFICANT CHANGE UP (ref 3.8–10.5)

## 2022-12-06 DIAGNOSIS — Z51.11 ENCOUNTER FOR ANTINEOPLASTIC CHEMOTHERAPY: ICD-10-CM

## 2022-12-06 DIAGNOSIS — R11.2 NAUSEA WITH VOMITING, UNSPECIFIED: ICD-10-CM

## 2022-12-16 ENCOUNTER — APPOINTMENT (OUTPATIENT)
Dept: GYNECOLOGIC ONCOLOGY | Facility: CLINIC | Age: 67
End: 2022-12-16
Payer: MEDICARE

## 2022-12-16 VITALS
HEART RATE: 113 BPM | WEIGHT: 122 LBS | HEIGHT: 60 IN | BODY MASS INDEX: 23.95 KG/M2 | OXYGEN SATURATION: 100 % | DIASTOLIC BLOOD PRESSURE: 90 MMHG | SYSTOLIC BLOOD PRESSURE: 141 MMHG

## 2022-12-16 PROCEDURE — 99213 OFFICE O/P EST LOW 20 MIN: CPT

## 2022-12-16 NOTE — HISTORY OF PRESENT ILLNESS
[Disease: _____________________] : Disease: [unfilled] [T: ___] : T[unfilled] [N: ___] : N[unfilled] [M: ___] : M[unfilled] [AJCC Stage: ____] : AJCC Stage: [unfilled] [de-identified] : Referred by Dr. Gaytan\par \par Ms. Hoang is a 67 y/o G0 postmenopausal F who was referred to medical oncology for treatment considerations for recurrent endometrial cancer.\par She was initially diagnosed with stage II grade 2 endometrial cancer in 2018, she underwent underwent robotic assisted TLH BSO, bilateral pelvic and para-aortic sentinel LN dissection by Dr. Paras Grayson on 2018.  Her surgical pathology demonstrated pT2N0 disease with endometrial tumor measuring 4cm, FIGO 2 endometrioid adenocarcinoma, >90 myometrial invasion with involvement of cervical stroma, +LVI with IHC of MMR proteins with intact expression but pelvic wash negative for malignant cells.  On 10/2018, she completed pelvic IMRT and vaginal cuff brachytherapy with Dr. Bowling.  She did well clinically under surveillance until 2019 when she developed vaginal spotting and fluid discharge, and was evaluated by Dr. Grayson. CA-125 was 27. Concern for possible recurrence and further workup pursued.  Her 19 CT CAP demonstrate new bilateral pulmonary nodules, some with central cavitation, suspicious for metastatic disease, enhancing nodularity superior to the vaginal cuff concerning for metastatic disease, measuring 1.9x1.6cm.  On 10/30/19 she underwent thorascopic multiple RLL wedge resections with Dr. Weston and her surgical pathology c/w metastatic adenocarcinoma, consistent with patient's endometrial primary +ER [place] Pax8.  She was recommended further treatment for her recurrent endometrial cancer but the patient was lost to follow up until 2020 several months before which she has noticed increasing hardness and "lumps" in her vagina, which has been progressively more painful and burning sensation. She was subsequently evaluated by Dr. Gaytan 2020 and referred to medical oncology for systemic treatment evaluation.   On presentation for initial consult 2020, she continued to have vaginal discharge and on and off spotting (not more than 1 pad a day). She felt constipated. She was taking Percocet 5-325, Tylenol and ibuprofen for vaginal pain. She had been very busy with work and family affairs, was not able to come for cancer treatment over the past months. 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 \par \par \par Patient started on Lenvima and Pembrolizumab combination therapy on 3/17/21.  Given increasing vaginal pain and increased mass involving the vaginal cuff region, she underwent palliative RT to the vaginal mass and cuff area with Dr. Bowling. She had improved pain and had been able to tolerate Lenvima without significant toxicities. I reviewed my recommendations to continue current regimen with pembrolizumab and lenvima as lenvima had been on hold for several weeks due to difficulty tolerating treatment and pain control issues. \par \par CT c/a/p done on 2021 which showed favorable response to therapy. Left inguinal node decreased in size. Vaginal mass decreased in size. Pulmonary nodules decreased in size.  \par \par Her CT c/a/p from 2021 showed pulmonary nodules. While some are stable in size, there is mild increase in pulmonary nodule in the right upper lobe.  3 x 5 mm nodular focus of higher attenuation suggesting enhancement within a right renal cyst. This is slightly better seen on the current examination although may be exaggerated by volume averaging.  [de-identified] :  ER +  [place] +  PAX 8  +     MSI STABLE [de-identified] : Refuses COVID 19 vaccine  [FreeTextEntry1] :  Lenvima and Pembrolizumab [de-identified] : Nicole comes in for follow up while on dose reduced Lenvima and Pembrolizumab. She admits to not taking Lenvima at times due to fatigue. She feels well, but fatigued.  She notes feeling nauseous for one hour after taking valsartan. Resolves on its own and she declines anti nausea medications. She denies any mouth sores, CP, palpitations, cough, LOMAS, n/v/d/c, abdominal pain, LE edema, vaginal discharge or bleeding, excessive bruising, dizziness, headaches, arthralgias, myalgias, weight/appetite changes.\par \par She has returned to work as a hairdresser and this has brought purpose for her and she is staying active.\par \par \par \par  Paramedian Forehead Flap Text: A decision was made to reconstruct the defect utilizing an interpolation axial flap and a staged reconstruction.  A telfa template was made of the defect.  This telfa template was then used to outline the paramedian forehead pedicle flap.  The donor area for the pedicle flap was then injected with anesthesia.  The flap was excised through the skin and subcutaneous tissue down to the layer of the underlying musculature.  The pedicle flap was carefully excised within this deep plane to maintain its blood supply.  The edges of the donor site were undermined.   The donor site was closed in a primary fashion.  The pedicle was then rotated into position and sutured.  Once the tube was sutured into place, adequate blood supply was confirmed with blanching and refill.  The pedicle was then wrapped with xeroform gauze and dressed appropriately with a telfa and gauze bandage to ensure continued blood supply and protect the attached pedicle.

## 2022-12-19 ENCOUNTER — RESULT REVIEW (OUTPATIENT)
Age: 67
End: 2022-12-19

## 2022-12-19 ENCOUNTER — APPOINTMENT (OUTPATIENT)
Dept: HEMATOLOGY ONCOLOGY | Facility: CLINIC | Age: 67
End: 2022-12-19

## 2022-12-19 ENCOUNTER — APPOINTMENT (OUTPATIENT)
Dept: INFUSION THERAPY | Facility: HOSPITAL | Age: 67
End: 2022-12-19

## 2022-12-19 VITALS
WEIGHT: 122.8 LBS | BODY MASS INDEX: 23.98 KG/M2 | OXYGEN SATURATION: 98 % | TEMPERATURE: 97 F | RESPIRATION RATE: 16 BRPM | HEART RATE: 100 BPM | SYSTOLIC BLOOD PRESSURE: 116 MMHG | DIASTOLIC BLOOD PRESSURE: 81 MMHG

## 2022-12-19 LAB
ALBUMIN SERPL ELPH-MCNC: 3.4 G/DL — SIGNIFICANT CHANGE UP (ref 3.3–5)
ALP SERPL-CCNC: 146 U/L — HIGH (ref 40–120)
ALT FLD-CCNC: 26 U/L — SIGNIFICANT CHANGE UP (ref 10–45)
ANION GAP SERPL CALC-SCNC: 10 MMOL/L — SIGNIFICANT CHANGE UP (ref 5–17)
AST SERPL-CCNC: 25 U/L — SIGNIFICANT CHANGE UP (ref 10–40)
BILIRUB SERPL-MCNC: 0.3 MG/DL — SIGNIFICANT CHANGE UP (ref 0.2–1.2)
BUN SERPL-MCNC: 15 MG/DL — SIGNIFICANT CHANGE UP (ref 7–23)
CALCIUM SERPL-MCNC: 9.3 MG/DL — SIGNIFICANT CHANGE UP (ref 8.4–10.5)
CHLORIDE SERPL-SCNC: 103 MMOL/L — SIGNIFICANT CHANGE UP (ref 96–108)
CO2 SERPL-SCNC: 25 MMOL/L — SIGNIFICANT CHANGE UP (ref 22–31)
CREAT SERPL-MCNC: 0.79 MG/DL — SIGNIFICANT CHANGE UP (ref 0.5–1.3)
EGFR: 81 ML/MIN/1.73M2 — SIGNIFICANT CHANGE UP
GLUCOSE SERPL-MCNC: 113 MG/DL — HIGH (ref 70–99)
HCT VFR BLD CALC: 34.1 % — LOW (ref 34.5–45)
HGB BLD-MCNC: 11.5 G/DL — SIGNIFICANT CHANGE UP (ref 11.5–15.5)
MAGNESIUM SERPL-MCNC: 1.7 MG/DL — SIGNIFICANT CHANGE UP (ref 1.6–2.6)
MCHC RBC-ENTMCNC: 33.5 PG — SIGNIFICANT CHANGE UP (ref 27–34)
MCHC RBC-ENTMCNC: 33.7 G/DL — SIGNIFICANT CHANGE UP (ref 32–36)
MCV RBC AUTO: 99.4 FL — SIGNIFICANT CHANGE UP (ref 80–100)
PLATELET # BLD AUTO: 295 K/UL — SIGNIFICANT CHANGE UP (ref 150–400)
POTASSIUM SERPL-MCNC: 4.4 MMOL/L — SIGNIFICANT CHANGE UP (ref 3.5–5.3)
POTASSIUM SERPL-SCNC: 4.4 MMOL/L — SIGNIFICANT CHANGE UP (ref 3.5–5.3)
PROT SERPL-MCNC: 6.1 G/DL — SIGNIFICANT CHANGE UP (ref 6–8.3)
RBC # BLD: 3.43 M/UL — LOW (ref 3.8–5.2)
RBC # FLD: 14.5 % — SIGNIFICANT CHANGE UP (ref 10.3–14.5)
SODIUM SERPL-SCNC: 139 MMOL/L — SIGNIFICANT CHANGE UP (ref 135–145)
WBC # BLD: 5.24 K/UL — SIGNIFICANT CHANGE UP (ref 3.8–10.5)
WBC # FLD AUTO: 5.24 K/UL — SIGNIFICANT CHANGE UP (ref 3.8–10.5)

## 2022-12-19 PROCEDURE — 99213 OFFICE O/P EST LOW 20 MIN: CPT

## 2022-12-20 ENCOUNTER — APPOINTMENT (OUTPATIENT)
Dept: CT IMAGING | Facility: IMAGING CENTER | Age: 67
End: 2022-12-20

## 2022-12-20 ENCOUNTER — OUTPATIENT (OUTPATIENT)
Dept: OUTPATIENT SERVICES | Facility: HOSPITAL | Age: 67
LOS: 1 days | End: 2022-12-20
Payer: MEDICARE

## 2022-12-20 DIAGNOSIS — Z98.890 OTHER SPECIFIED POSTPROCEDURAL STATES: Chronic | ICD-10-CM

## 2022-12-20 DIAGNOSIS — Z90.710 ACQUIRED ABSENCE OF BOTH CERVIX AND UTERUS: Chronic | ICD-10-CM

## 2022-12-20 DIAGNOSIS — C54.1 MALIGNANT NEOPLASM OF ENDOMETRIUM: ICD-10-CM

## 2022-12-20 LAB
CANCER AG125 SERPL-ACNC: 20 U/ML — SIGNIFICANT CHANGE UP
T4 FREE+ TSH PNL SERPL: 0.74 UIU/ML — SIGNIFICANT CHANGE UP (ref 0.27–4.2)

## 2022-12-20 PROCEDURE — 74177 CT ABD & PELVIS W/CONTRAST: CPT | Mod: 26,MH

## 2022-12-20 PROCEDURE — 71260 CT THORAX DX C+: CPT | Mod: 26,MH

## 2022-12-20 PROCEDURE — 74177 CT ABD & PELVIS W/CONTRAST: CPT

## 2022-12-20 PROCEDURE — 71260 CT THORAX DX C+: CPT

## 2022-12-27 NOTE — PHYSICAL EXAM
[Restricted in physically strenuous activity but ambulatory and able to carry out work of a light or sedentary nature] : Status 1- Restricted in physically strenuous activity but ambulatory and able to carry out work of a light or sedentary nature, e.g., light house work, office work [Normal] : affect appropriate [Mucositis] : no mucositis [Thrush] : no thrush [Vesicles] : no vesicles [de-identified] : EOMI, anicteric  [de-identified] : normal respiratory effort, no audible breath sounds

## 2022-12-27 NOTE — HISTORY OF PRESENT ILLNESS
[Disease: _____________________] : Disease: [unfilled] [T: ___] : T[unfilled] [N: ___] : N[unfilled] [M: ___] : M[unfilled] [AJCC Stage: ____] : AJCC Stage: [unfilled] [de-identified] :  ER +  [place] +  PAX 8  +     MSI STABLE [de-identified] : Referred by Dr. Gaytan\par \par Ms. Hoang is a 66 y/o G0 postmenopausal F who was referred to medical oncology for treatment considerations for recurrent endometrial cancer.\par She was initially diagnosed with stage II grade 2 endometrial cancer in 2018, she underwent underwent robotic assisted TLH BSO, bilateral pelvic and para-aortic sentinel LN dissection by Dr. Paras Grayson on 2018.  Her surgical pathology demonstrated pT2N0 disease with endometrial tumor measuring 4cm, FIGO 2 endometrioid adenocarcinoma, >90 myometrial invasion with involvement of cervical stroma, +LVI with IHC of MMR proteins with intact expression but pelvic wash negative for malignant cells.  On 10/2018, she completed pelvic IMRT and vaginal cuff brachytherapy with Dr. Bowling.  She did well clinically under surveillance until 2019 when she developed vaginal spotting and fluid discharge, and was evaluated by Dr. Grayson. CA-125 was 27. Concern for possible recurrence and further workup pursued.  Her 19 CT CAP demonstrate new bilateral pulmonary nodules, some with central cavitation, suspicious for metastatic disease, enhancing nodularity superior to the vaginal cuff concerning for metastatic disease, measuring 1.9x1.6cm.  On 10/30/19 she underwent thorascopic multiple RLL wedge resections with Dr. Weston and her surgical pathology c/w metastatic adenocarcinoma, consistent with patient's endometrial primary +ER [place] Pax8.  She was recommended further treatment for her recurrent endometrial cancer but the patient was lost to follow up until 2020 several months before which she has noticed increasing hardness and "lumps" in her vagina, which has been progressively more painful and burning sensation. She was subsequently evaluated by Dr. Gaytan 2020 and referred to medical oncology for systemic treatment evaluation.   On presentation for initial consult 2020, she continued to have vaginal discharge and on and off spotting (not more than 1 pad a day). She felt constipated. She was taking Percocet 5-325, Tylenol and ibuprofen for vaginal pain. She had been very busy with work and family affairs, was not able to come for cancer treatment over the past months. 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 \par \par \par Patient started on Lenvima and Pembrolizumab combination therapy on 3/17/21.  Given increasing vaginal pain and increased mass involving the vaginal cuff region, she underwent palliative RT to the vaginal mass and cuff area with Dr. Bowling. She had improved pain and had been able to tolerate Lenvima without significant toxicities. I reviewed my recommendations to continue current regimen with pembrolizumab and lenvima as lenvima had been on hold for several weeks due to difficulty tolerating treatment and pain control issues. \par \par CT c/a/p done on 2021 which showed favorable response to therapy. Left inguinal node decreased in size. Vaginal mass decreased in size. Pulmonary nodules decreased in size.  \par \par Her CT c/a/p from 2021 showed pulmonary nodules. While some are stable in size, there is mild increase in pulmonary nodule in the right upper lobe.  3 x 5 mm nodular focus of higher attenuation suggesting enhancement within a right renal cyst. This is slightly better seen on the current examination although may be exaggerated by volume averaging.  [de-identified] : Refuses COVID 19 vaccine  [FreeTextEntry1] :  Lenvima and Pembrolizumab (held since 6/1) [de-identified] : Patient presents today for routine follow up after C3 Doxil. Patient feels fatigued first week after chemotherapy and spends a lot of time on the couch. Reports intermittent nausea in the afternoon. She reports noticing her skin getting darker. After the first week her energy level returns to normal and is more active at home. Patient is feeling well overall and staying active. Reports good appetite, eating and drinking well with regular bowel movements. Denies fever, chills, mouth sores, CP, palpitations, cough, LOMAS, n/v/d/c, abdominal pain, LE edema, vaginal discharge or bleeding, urinary symptoms, excessive bruising, dizziness, headaches, arthralgias, myalgias.

## 2023-01-01 ENCOUNTER — NON-APPOINTMENT (OUTPATIENT)
Age: 68
End: 2023-01-01

## 2023-01-01 ENCOUNTER — APPOINTMENT (OUTPATIENT)
Dept: INFUSION THERAPY | Facility: HOSPITAL | Age: 68
End: 2023-01-01

## 2023-01-01 ENCOUNTER — RESULT REVIEW (OUTPATIENT)
Age: 68
End: 2023-01-01

## 2023-01-01 ENCOUNTER — APPOINTMENT (OUTPATIENT)
Dept: HEMATOLOGY ONCOLOGY | Facility: CLINIC | Age: 68
End: 2023-01-01

## 2023-01-01 ENCOUNTER — OUTPATIENT (OUTPATIENT)
Dept: OUTPATIENT SERVICES | Facility: HOSPITAL | Age: 68
LOS: 1 days | Discharge: HOME | End: 2023-01-01

## 2023-01-01 ENCOUNTER — APPOINTMENT (OUTPATIENT)
Dept: CT IMAGING | Facility: IMAGING CENTER | Age: 68
End: 2023-01-01
Payer: MEDICARE

## 2023-01-01 ENCOUNTER — OUTPATIENT (OUTPATIENT)
Dept: OUTPATIENT SERVICES | Facility: HOSPITAL | Age: 68
LOS: 1 days | End: 2023-01-01
Payer: MEDICARE

## 2023-01-01 ENCOUNTER — FORM ENCOUNTER (OUTPATIENT)
Age: 68
End: 2023-01-01

## 2023-01-01 ENCOUNTER — APPOINTMENT (OUTPATIENT)
Dept: HEMATOLOGY ONCOLOGY | Facility: CLINIC | Age: 68
End: 2023-01-01
Payer: MEDICARE

## 2023-01-01 ENCOUNTER — APPOINTMENT (OUTPATIENT)
Dept: CT IMAGING | Facility: IMAGING CENTER | Age: 68
End: 2023-01-01

## 2023-01-01 ENCOUNTER — APPOINTMENT (OUTPATIENT)
Dept: ENDOCRINOLOGY | Facility: CLINIC | Age: 68
End: 2023-01-01
Payer: MEDICARE

## 2023-01-01 ENCOUNTER — LABORATORY RESULT (OUTPATIENT)
Age: 68
End: 2023-01-01

## 2023-01-01 ENCOUNTER — APPOINTMENT (OUTPATIENT)
Dept: ULTRASOUND IMAGING | Facility: HOSPITAL | Age: 68
End: 2023-01-01

## 2023-01-01 ENCOUNTER — APPOINTMENT (OUTPATIENT)
Dept: GYNECOLOGIC ONCOLOGY | Facility: CLINIC | Age: 68
End: 2023-01-01

## 2023-01-01 ENCOUNTER — APPOINTMENT (OUTPATIENT)
Dept: INTERVENTIONAL RADIOLOGY/VASCULAR | Facility: CLINIC | Age: 68
End: 2023-01-01
Payer: MEDICARE

## 2023-01-01 ENCOUNTER — RX RENEWAL (OUTPATIENT)
Age: 68
End: 2023-01-01

## 2023-01-01 VITALS
TEMPERATURE: 98.9 F | OXYGEN SATURATION: 98 % | RESPIRATION RATE: 16 BRPM | SYSTOLIC BLOOD PRESSURE: 151 MMHG | HEART RATE: 123 BPM | DIASTOLIC BLOOD PRESSURE: 101 MMHG

## 2023-01-01 VITALS
DIASTOLIC BLOOD PRESSURE: 97 MMHG | RESPIRATION RATE: 16 BRPM | HEIGHT: 60 IN | SYSTOLIC BLOOD PRESSURE: 164 MMHG | WEIGHT: 122.36 LBS | HEART RATE: 70 BPM | TEMPERATURE: 97.2 F | BODY MASS INDEX: 24.02 KG/M2 | OXYGEN SATURATION: 96 %

## 2023-01-01 VITALS
DIASTOLIC BLOOD PRESSURE: 90 MMHG | OXYGEN SATURATION: 99 % | TEMPERATURE: 97.5 F | SYSTOLIC BLOOD PRESSURE: 141 MMHG | HEART RATE: 91 BPM | RESPIRATION RATE: 16 BRPM | WEIGHT: 125.66 LBS | BODY MASS INDEX: 24.54 KG/M2

## 2023-01-01 VITALS
WEIGHT: 123.68 LBS | TEMPERATURE: 97.3 F | BODY MASS INDEX: 24.28 KG/M2 | OXYGEN SATURATION: 99 % | SYSTOLIC BLOOD PRESSURE: 142 MMHG | HEART RATE: 93 BPM | RESPIRATION RATE: 16 BRPM | DIASTOLIC BLOOD PRESSURE: 93 MMHG | HEIGHT: 60 IN

## 2023-01-01 VITALS
SYSTOLIC BLOOD PRESSURE: 115 MMHG | DIASTOLIC BLOOD PRESSURE: 72 MMHG | HEART RATE: 76 BPM | OXYGEN SATURATION: 100 % | RESPIRATION RATE: 17 BRPM

## 2023-01-01 VITALS
BODY MASS INDEX: 24.67 KG/M2 | TEMPERATURE: 96.4 F | OXYGEN SATURATION: 97 % | RESPIRATION RATE: 16 BRPM | SYSTOLIC BLOOD PRESSURE: 152 MMHG | HEIGHT: 60 IN | HEART RATE: 100 BPM | WEIGHT: 125.64 LBS | DIASTOLIC BLOOD PRESSURE: 92 MMHG

## 2023-01-01 VITALS
RESPIRATION RATE: 15 BRPM | TEMPERATURE: 97 F | OXYGEN SATURATION: 100 % | DIASTOLIC BLOOD PRESSURE: 60 MMHG | HEART RATE: 77 BPM | SYSTOLIC BLOOD PRESSURE: 99 MMHG

## 2023-01-01 VITALS
WEIGHT: 123.68 LBS | OXYGEN SATURATION: 97 % | HEIGHT: 60 IN | RESPIRATION RATE: 17 BRPM | HEART RATE: 100 BPM | BODY MASS INDEX: 24.28 KG/M2 | DIASTOLIC BLOOD PRESSURE: 85 MMHG | TEMPERATURE: 97.3 F | SYSTOLIC BLOOD PRESSURE: 134 MMHG

## 2023-01-01 VITALS
DIASTOLIC BLOOD PRESSURE: 80 MMHG | OXYGEN SATURATION: 99 % | SYSTOLIC BLOOD PRESSURE: 121 MMHG | WEIGHT: 119 LBS | TEMPERATURE: 98.5 F | HEIGHT: 60 IN | BODY MASS INDEX: 23.36 KG/M2 | HEART RATE: 93 BPM

## 2023-01-01 VITALS
SYSTOLIC BLOOD PRESSURE: 123 MMHG | OXYGEN SATURATION: 99 % | DIASTOLIC BLOOD PRESSURE: 82 MMHG | HEART RATE: 111 BPM | RESPIRATION RATE: 16 BRPM | TEMPERATURE: 97 F | BODY MASS INDEX: 23.25 KG/M2 | WEIGHT: 119.05 LBS

## 2023-01-01 VITALS
BODY MASS INDEX: 24.54 KG/M2 | OXYGEN SATURATION: 97 % | DIASTOLIC BLOOD PRESSURE: 96 MMHG | SYSTOLIC BLOOD PRESSURE: 144 MMHG | RESPIRATION RATE: 17 BRPM | HEIGHT: 60 IN | HEART RATE: 62 BPM | WEIGHT: 125 LBS | TEMPERATURE: 97.2 F

## 2023-01-01 VITALS
OXYGEN SATURATION: 99 % | BODY MASS INDEX: 23.25 KG/M2 | SYSTOLIC BLOOD PRESSURE: 132 MMHG | HEART RATE: 105 BPM | DIASTOLIC BLOOD PRESSURE: 87 MMHG | TEMPERATURE: 97.5 F | WEIGHT: 119.05 LBS | RESPIRATION RATE: 16 BRPM

## 2023-01-01 VITALS
WEIGHT: 123 LBS | OXYGEN SATURATION: 99 % | HEART RATE: 74 BPM | DIASTOLIC BLOOD PRESSURE: 70 MMHG | TEMPERATURE: 97.6 F | BODY MASS INDEX: 24.15 KG/M2 | RESPIRATION RATE: 16 BRPM | SYSTOLIC BLOOD PRESSURE: 120 MMHG | HEIGHT: 60 IN

## 2023-01-01 VITALS
TEMPERATURE: 96.9 F | SYSTOLIC BLOOD PRESSURE: 138 MMHG | BODY MASS INDEX: 23.04 KG/M2 | RESPIRATION RATE: 16 BRPM | DIASTOLIC BLOOD PRESSURE: 95 MMHG | WEIGHT: 117.95 LBS | HEART RATE: 119 BPM | OXYGEN SATURATION: 98 %

## 2023-01-01 VITALS
WEIGHT: 122.33 LBS | TEMPERATURE: 97 F | OXYGEN SATURATION: 97 % | SYSTOLIC BLOOD PRESSURE: 129 MMHG | BODY MASS INDEX: 23.9 KG/M2 | DIASTOLIC BLOOD PRESSURE: 87 MMHG | RESPIRATION RATE: 16 BRPM | HEART RATE: 90 BPM

## 2023-01-01 DIAGNOSIS — Z90.710 ACQUIRED ABSENCE OF BOTH CERVIX AND UTERUS: Chronic | ICD-10-CM

## 2023-01-01 DIAGNOSIS — Z87.440 PERSONAL HISTORY OF URINARY (TRACT) INFECTIONS: ICD-10-CM

## 2023-01-01 DIAGNOSIS — Z98.890 OTHER SPECIFIED POSTPROCEDURAL STATES: Chronic | ICD-10-CM

## 2023-01-01 DIAGNOSIS — Z51.11 ENCOUNTER FOR ANTINEOPLASTIC CHEMOTHERAPY: ICD-10-CM

## 2023-01-01 DIAGNOSIS — C54.1 MALIGNANT NEOPLASM OF ENDOMETRIUM: ICD-10-CM

## 2023-01-01 DIAGNOSIS — E86.0 DEHYDRATION: ICD-10-CM

## 2023-01-01 DIAGNOSIS — Z86.73 PERSONAL HISTORY OF TRANSIENT ISCHEMIC ATTACK (TIA), AND CEREBRAL INFARCTION W/OUT RESIDUAL DEFICITS: ICD-10-CM

## 2023-01-01 DIAGNOSIS — R11.2 NAUSEA WITH VOMITING, UNSPECIFIED: ICD-10-CM

## 2023-01-01 DIAGNOSIS — Z00.00 ENCOUNTER FOR GENERAL ADULT MEDICAL EXAMINATION WITHOUT ABNORMAL FINDINGS: ICD-10-CM

## 2023-01-01 DIAGNOSIS — E22.1 HYPERPROLACTINEMIA: ICD-10-CM

## 2023-01-01 DIAGNOSIS — R16.0 HEPATOMEGALY, NOT ELSEWHERE CLASSIFIED: ICD-10-CM

## 2023-01-01 DIAGNOSIS — Z91.041 RADIOGRAPHIC DYE ALLERGY STATUS: ICD-10-CM

## 2023-01-01 DIAGNOSIS — R93.89 ABNORMAL FINDINGS ON DIAGNOSTIC IMAGING OF OTHER SPECIFIED BODY STRUCTURES: ICD-10-CM

## 2023-01-01 DIAGNOSIS — Z51.89 ENCOUNTER FOR OTHER SPECIFIED AFTERCARE: ICD-10-CM

## 2023-01-01 DIAGNOSIS — E03.9 HYPOTHYROIDISM, UNSPECIFIED: ICD-10-CM

## 2023-01-01 DIAGNOSIS — K52.9 NONINFECTIVE GASTROENTERITIS AND COLITIS, UNSPECIFIED: ICD-10-CM

## 2023-01-01 DIAGNOSIS — Z60.2 PROBLEMS RELATED TO LIVING ALONE: ICD-10-CM

## 2023-01-01 DIAGNOSIS — R91.1 SOLITARY PULMONARY NODULE: ICD-10-CM

## 2023-01-01 LAB
ALBUMIN SERPL ELPH-MCNC: 3.7 G/DL — SIGNIFICANT CHANGE UP (ref 3.3–5)
ALBUMIN SERPL ELPH-MCNC: 3.7 G/DL — SIGNIFICANT CHANGE UP (ref 3.3–5)
ALBUMIN SERPL ELPH-MCNC: 3.8 G/DL — SIGNIFICANT CHANGE UP (ref 3.3–5)
ALBUMIN SERPL ELPH-MCNC: 3.9 G/DL — SIGNIFICANT CHANGE UP (ref 3.3–5)
ALBUMIN SERPL ELPH-MCNC: 4 G/DL — SIGNIFICANT CHANGE UP (ref 3.3–5)
ALBUMIN SERPL ELPH-MCNC: 4 G/DL — SIGNIFICANT CHANGE UP (ref 3.3–5)
ALBUMIN SERPL ELPH-MCNC: 4.1 G/DL — SIGNIFICANT CHANGE UP (ref 3.3–5)
ALBUMIN SERPL ELPH-MCNC: 4.2 G/DL — SIGNIFICANT CHANGE UP (ref 3.3–5)
ALBUMIN SERPL ELPH-MCNC: 4.4 G/DL — SIGNIFICANT CHANGE UP (ref 3.3–5)
ALP SERPL-CCNC: 110 U/L — SIGNIFICANT CHANGE UP (ref 40–120)
ALP SERPL-CCNC: 112 U/L — SIGNIFICANT CHANGE UP (ref 40–120)
ALP SERPL-CCNC: 112 U/L — SIGNIFICANT CHANGE UP (ref 40–120)
ALP SERPL-CCNC: 113 U/L — SIGNIFICANT CHANGE UP (ref 40–120)
ALP SERPL-CCNC: 120 U/L — SIGNIFICANT CHANGE UP (ref 40–120)
ALP SERPL-CCNC: 120 U/L — SIGNIFICANT CHANGE UP (ref 40–120)
ALP SERPL-CCNC: 124 U/L — HIGH (ref 40–120)
ALP SERPL-CCNC: 129 U/L — HIGH (ref 40–120)
ALP SERPL-CCNC: 136 U/L — HIGH (ref 40–120)
ALP SERPL-CCNC: 138 U/L — HIGH (ref 40–120)
ALP SERPL-CCNC: 140 U/L — HIGH (ref 40–120)
ALP SERPL-CCNC: 155 U/L — HIGH (ref 40–120)
ALP SERPL-CCNC: 177 U/L — HIGH (ref 40–120)
ALP SERPL-CCNC: 184 U/L — HIGH (ref 40–120)
ALP SERPL-CCNC: 236 U/L — HIGH (ref 40–120)
ALT FLD-CCNC: 11 U/L — SIGNIFICANT CHANGE UP (ref 10–45)
ALT FLD-CCNC: 13 U/L — SIGNIFICANT CHANGE UP (ref 10–45)
ALT FLD-CCNC: 13 U/L — SIGNIFICANT CHANGE UP (ref 10–45)
ALT FLD-CCNC: 16 U/L — SIGNIFICANT CHANGE UP (ref 10–45)
ALT FLD-CCNC: 17 U/L — SIGNIFICANT CHANGE UP (ref 10–45)
ALT FLD-CCNC: 18 U/L — SIGNIFICANT CHANGE UP (ref 10–45)
ALT FLD-CCNC: 19 U/L — SIGNIFICANT CHANGE UP (ref 10–45)
ALT FLD-CCNC: 20 U/L — SIGNIFICANT CHANGE UP (ref 10–45)
ALT FLD-CCNC: 23 U/L — SIGNIFICANT CHANGE UP (ref 10–45)
ALT FLD-CCNC: 28 U/L — SIGNIFICANT CHANGE UP (ref 10–45)
ANION GAP SERPL CALC-SCNC: 10 MMOL/L — SIGNIFICANT CHANGE UP (ref 5–17)
ANION GAP SERPL CALC-SCNC: 11 MMOL/L — SIGNIFICANT CHANGE UP (ref 5–17)
ANION GAP SERPL CALC-SCNC: 12 MMOL/L — SIGNIFICANT CHANGE UP (ref 5–17)
ANION GAP SERPL CALC-SCNC: 13 MMOL/L — SIGNIFICANT CHANGE UP (ref 5–17)
ANION GAP SERPL CALC-SCNC: 14 MMOL/L — SIGNIFICANT CHANGE UP (ref 5–17)
ANION GAP SERPL CALC-SCNC: 14 MMOL/L — SIGNIFICANT CHANGE UP (ref 5–17)
ANION GAP SERPL CALC-SCNC: 15 MMOL/L — SIGNIFICANT CHANGE UP (ref 5–17)
ANION GAP SERPL CALC-SCNC: 16 MMOL/L — SIGNIFICANT CHANGE UP (ref 5–17)
ANION GAP SERPL CALC-SCNC: 16 MMOL/L — SIGNIFICANT CHANGE UP (ref 5–17)
APPEARANCE UR: CLEAR — SIGNIFICANT CHANGE UP
APPEARANCE: CLEAR
APTT BLD: 38.8 SEC — HIGH (ref 27.5–35.5)
AST SERPL-CCNC: 18 U/L — SIGNIFICANT CHANGE UP (ref 10–40)
AST SERPL-CCNC: 21 U/L — SIGNIFICANT CHANGE UP (ref 10–40)
AST SERPL-CCNC: 22 U/L — SIGNIFICANT CHANGE UP (ref 10–40)
AST SERPL-CCNC: 22 U/L — SIGNIFICANT CHANGE UP (ref 10–40)
AST SERPL-CCNC: 24 U/L — SIGNIFICANT CHANGE UP (ref 10–40)
AST SERPL-CCNC: 25 U/L — SIGNIFICANT CHANGE UP (ref 10–40)
AST SERPL-CCNC: 26 U/L — SIGNIFICANT CHANGE UP (ref 10–40)
BACTERIA # UR AUTO: NEGATIVE /HPF — SIGNIFICANT CHANGE UP
BASOPHILS # BLD AUTO: 0.02 K/UL — SIGNIFICANT CHANGE UP (ref 0–0.2)
BASOPHILS # BLD AUTO: 0.02 K/UL — SIGNIFICANT CHANGE UP (ref 0–0.2)
BASOPHILS # BLD AUTO: 0.03 K/UL — SIGNIFICANT CHANGE UP (ref 0–0.2)
BASOPHILS # BLD AUTO: 0.04 K/UL — SIGNIFICANT CHANGE UP (ref 0–0.2)
BASOPHILS # BLD AUTO: 0.04 K/UL — SIGNIFICANT CHANGE UP (ref 0–0.2)
BASOPHILS # BLD AUTO: 0.06 K/UL — SIGNIFICANT CHANGE UP (ref 0–0.2)
BASOPHILS # BLD AUTO: 0.07 K/UL — SIGNIFICANT CHANGE UP (ref 0–0.2)
BASOPHILS NFR BLD AUTO: 0.3 % — SIGNIFICANT CHANGE UP (ref 0–2)
BASOPHILS NFR BLD AUTO: 0.4 % — SIGNIFICANT CHANGE UP (ref 0–2)
BASOPHILS NFR BLD AUTO: 0.5 % — SIGNIFICANT CHANGE UP (ref 0–2)
BASOPHILS NFR BLD AUTO: 0.6 % — SIGNIFICANT CHANGE UP (ref 0–2)
BASOPHILS NFR BLD AUTO: 0.7 % — SIGNIFICANT CHANGE UP (ref 0–2)
BASOPHILS NFR BLD AUTO: 0.8 % — SIGNIFICANT CHANGE UP (ref 0–2)
BASOPHILS NFR BLD AUTO: 1 % — SIGNIFICANT CHANGE UP (ref 0–2)
BASOPHILS NFR BLD AUTO: 1.1 % — SIGNIFICANT CHANGE UP (ref 0–2)
BASOPHILS NFR BLD AUTO: 1.1 % — SIGNIFICANT CHANGE UP (ref 0–2)
BILIRUB SERPL-MCNC: 0.2 MG/DL — SIGNIFICANT CHANGE UP (ref 0.2–1.2)
BILIRUB SERPL-MCNC: 0.3 MG/DL — SIGNIFICANT CHANGE UP (ref 0.2–1.2)
BILIRUB SERPL-MCNC: 0.3 MG/DL — SIGNIFICANT CHANGE UP (ref 0.2–1.2)
BILIRUB SERPL-MCNC: <0.2 MG/DL — SIGNIFICANT CHANGE UP (ref 0.2–1.2)
BILIRUB UR-MCNC: NEGATIVE — SIGNIFICANT CHANGE UP
BILIRUBIN URINE: NEGATIVE
BLOOD URINE: NEGATIVE
BUN SERPL-MCNC: 11 MG/DL — SIGNIFICANT CHANGE UP (ref 7–23)
BUN SERPL-MCNC: 16 MG/DL — SIGNIFICANT CHANGE UP (ref 7–23)
BUN SERPL-MCNC: 17 MG/DL — SIGNIFICANT CHANGE UP (ref 7–23)
BUN SERPL-MCNC: 18 MG/DL — SIGNIFICANT CHANGE UP (ref 7–23)
BUN SERPL-MCNC: 18 MG/DL — SIGNIFICANT CHANGE UP (ref 7–23)
BUN SERPL-MCNC: 19 MG/DL — SIGNIFICANT CHANGE UP (ref 7–23)
BUN SERPL-MCNC: 20 MG/DL — SIGNIFICANT CHANGE UP (ref 7–23)
BUN SERPL-MCNC: 20 MG/DL — SIGNIFICANT CHANGE UP (ref 7–23)
BUN SERPL-MCNC: 22 MG/DL — SIGNIFICANT CHANGE UP (ref 7–23)
BUN SERPL-MCNC: 22 MG/DL — SIGNIFICANT CHANGE UP (ref 7–23)
BUN SERPL-MCNC: 25 MG/DL — HIGH (ref 7–23)
CALCIUM SERPL-MCNC: 10 MG/DL — SIGNIFICANT CHANGE UP (ref 8.4–10.5)
CALCIUM SERPL-MCNC: 8.9 MG/DL — SIGNIFICANT CHANGE UP (ref 8.4–10.5)
CALCIUM SERPL-MCNC: 9.2 MG/DL — SIGNIFICANT CHANGE UP (ref 8.4–10.5)
CALCIUM SERPL-MCNC: 9.3 MG/DL — SIGNIFICANT CHANGE UP (ref 8.4–10.5)
CALCIUM SERPL-MCNC: 9.4 MG/DL — SIGNIFICANT CHANGE UP (ref 8.4–10.5)
CALCIUM SERPL-MCNC: 9.5 MG/DL — SIGNIFICANT CHANGE UP (ref 8.4–10.5)
CALCIUM SERPL-MCNC: 9.6 MG/DL — SIGNIFICANT CHANGE UP (ref 8.4–10.5)
CALCIUM SERPL-MCNC: 9.7 MG/DL — SIGNIFICANT CHANGE UP (ref 8.4–10.5)
CALCIUM SERPL-MCNC: 9.7 MG/DL — SIGNIFICANT CHANGE UP (ref 8.4–10.5)
CANCER AG125 SERPL-ACNC: 10 U/ML — SIGNIFICANT CHANGE UP
CANCER AG125 SERPL-ACNC: 12 U/ML — SIGNIFICANT CHANGE UP
CANCER AG125 SERPL-ACNC: 18 U/ML — SIGNIFICANT CHANGE UP
CANCER AG125 SERPL-ACNC: 22 U/ML — SIGNIFICANT CHANGE UP
CANCER AG125 SERPL-ACNC: 23 U/ML — SIGNIFICANT CHANGE UP
CANCER AG125 SERPL-ACNC: 23 U/ML — SIGNIFICANT CHANGE UP
CANCER AG125 SERPL-ACNC: 33 U/ML — SIGNIFICANT CHANGE UP
CANCER AG125 SERPL-ACNC: 33 U/ML — SIGNIFICANT CHANGE UP
CANCER AG125 SERPL-ACNC: 35 U/ML — SIGNIFICANT CHANGE UP
CANCER AG125 SERPL-ACNC: 9 U/ML — SIGNIFICANT CHANGE UP
CANCER AG125 SERPL-ACNC: 96 U/ML — HIGH
CANCER AG125 SERPL-ACNC: 96 U/ML — HIGH
CAST: 0 /LPF — SIGNIFICANT CHANGE UP (ref 0–4)
CHLORIDE SERPL-SCNC: 102 MMOL/L — SIGNIFICANT CHANGE UP (ref 96–108)
CHLORIDE SERPL-SCNC: 103 MMOL/L — SIGNIFICANT CHANGE UP (ref 96–108)
CHLORIDE SERPL-SCNC: 104 MMOL/L — SIGNIFICANT CHANGE UP (ref 96–108)
CHLORIDE SERPL-SCNC: 104 MMOL/L — SIGNIFICANT CHANGE UP (ref 96–108)
CHLORIDE SERPL-SCNC: 105 MMOL/L — SIGNIFICANT CHANGE UP (ref 96–108)
CHLORIDE SERPL-SCNC: 106 MMOL/L — SIGNIFICANT CHANGE UP (ref 96–108)
CHLORIDE SERPL-SCNC: 106 MMOL/L — SIGNIFICANT CHANGE UP (ref 96–108)
CHLORIDE SERPL-SCNC: 107 MMOL/L — SIGNIFICANT CHANGE UP (ref 96–108)
CHLORIDE SERPL-SCNC: 99 MMOL/L — SIGNIFICANT CHANGE UP (ref 96–108)
CHLORIDE SERPL-SCNC: 99 MMOL/L — SIGNIFICANT CHANGE UP (ref 96–108)
CO2 SERPL-SCNC: 22 MMOL/L — SIGNIFICANT CHANGE UP (ref 22–31)
CO2 SERPL-SCNC: 23 MMOL/L — SIGNIFICANT CHANGE UP (ref 22–31)
CO2 SERPL-SCNC: 24 MMOL/L — SIGNIFICANT CHANGE UP (ref 22–31)
CO2 SERPL-SCNC: 25 MMOL/L — SIGNIFICANT CHANGE UP (ref 22–31)
COLOR SPEC: SIGNIFICANT CHANGE UP
COLOR SPEC: YELLOW — SIGNIFICANT CHANGE UP
COLOR: YELLOW
CORE LAB BIOPSY: NORMAL
CREAT ?TM UR-MCNC: 103 MG/DL — SIGNIFICANT CHANGE UP
CREAT ?TM UR-MCNC: 103 MG/DL — SIGNIFICANT CHANGE UP
CREAT ?TM UR-MCNC: 22 MG/DL — SIGNIFICANT CHANGE UP
CREAT ?TM UR-MCNC: 25 MG/DL — SIGNIFICANT CHANGE UP
CREAT ?TM UR-MCNC: 30 MG/DL — SIGNIFICANT CHANGE UP
CREAT ?TM UR-MCNC: 30 MG/DL — SIGNIFICANT CHANGE UP
CREAT ?TM UR-MCNC: 39 MG/DL — SIGNIFICANT CHANGE UP
CREAT ?TM UR-MCNC: 44 MG/DL — SIGNIFICANT CHANGE UP
CREAT ?TM UR-MCNC: 59 MG/DL — SIGNIFICANT CHANGE UP
CREAT ?TM UR-MCNC: 79 MG/DL — SIGNIFICANT CHANGE UP
CREAT ?TM UR-MCNC: 86 MG/DL — SIGNIFICANT CHANGE UP
CREAT SERPL-MCNC: 0.69 MG/DL — SIGNIFICANT CHANGE UP (ref 0.5–1.3)
CREAT SERPL-MCNC: 0.72 MG/DL — SIGNIFICANT CHANGE UP (ref 0.5–1.3)
CREAT SERPL-MCNC: 0.73 MG/DL — SIGNIFICANT CHANGE UP (ref 0.5–1.3)
CREAT SERPL-MCNC: 0.77 MG/DL — SIGNIFICANT CHANGE UP (ref 0.5–1.3)
CREAT SERPL-MCNC: 0.78 MG/DL — SIGNIFICANT CHANGE UP (ref 0.5–1.3)
CREAT SERPL-MCNC: 0.8 MG/DL — SIGNIFICANT CHANGE UP (ref 0.5–1.3)
CREAT SERPL-MCNC: 0.83 MG/DL — SIGNIFICANT CHANGE UP (ref 0.5–1.3)
CREAT SERPL-MCNC: 0.83 MG/DL — SIGNIFICANT CHANGE UP (ref 0.5–1.3)
CREAT SERPL-MCNC: 0.9 MG/DL — SIGNIFICANT CHANGE UP (ref 0.5–1.3)
CREAT SERPL-MCNC: 0.96 MG/DL — SIGNIFICANT CHANGE UP (ref 0.5–1.3)
DIFF PNL FLD: NEGATIVE — SIGNIFICANT CHANGE UP
EGFR: 64 ML/MIN/1.73M2 — SIGNIFICANT CHANGE UP
EGFR: 69 ML/MIN/1.73M2 — SIGNIFICANT CHANGE UP
EGFR: 76 ML/MIN/1.73M2 — SIGNIFICANT CHANGE UP
EGFR: 76 ML/MIN/1.73M2 — SIGNIFICANT CHANGE UP
EGFR: 79 ML/MIN/1.73M2 — SIGNIFICANT CHANGE UP
EGFR: 82 ML/MIN/1.73M2 — SIGNIFICANT CHANGE UP
EGFR: 83 ML/MIN/1.73M2 — SIGNIFICANT CHANGE UP
EGFR: 88 ML/MIN/1.73M2 — SIGNIFICANT CHANGE UP
EGFR: 90 ML/MIN/1.73M2 — SIGNIFICANT CHANGE UP
EGFR: 93 ML/MIN/1.73M2 — SIGNIFICANT CHANGE UP
EOSINOPHIL # BLD AUTO: 0.04 K/UL — SIGNIFICANT CHANGE UP (ref 0–0.5)
EOSINOPHIL # BLD AUTO: 0.06 K/UL — SIGNIFICANT CHANGE UP (ref 0–0.5)
EOSINOPHIL # BLD AUTO: 0.08 K/UL — SIGNIFICANT CHANGE UP (ref 0–0.5)
EOSINOPHIL # BLD AUTO: 0.1 K/UL — SIGNIFICANT CHANGE UP (ref 0–0.5)
EOSINOPHIL # BLD AUTO: 0.11 K/UL — SIGNIFICANT CHANGE UP (ref 0–0.5)
EOSINOPHIL # BLD AUTO: 0.12 K/UL — SIGNIFICANT CHANGE UP (ref 0–0.5)
EOSINOPHIL # BLD AUTO: 0.12 K/UL — SIGNIFICANT CHANGE UP (ref 0–0.5)
EOSINOPHIL # BLD AUTO: 0.13 K/UL — SIGNIFICANT CHANGE UP (ref 0–0.5)
EOSINOPHIL # BLD AUTO: 0.13 K/UL — SIGNIFICANT CHANGE UP (ref 0–0.5)
EOSINOPHIL # BLD AUTO: 0.2 K/UL — SIGNIFICANT CHANGE UP (ref 0–0.5)
EOSINOPHIL NFR BLD AUTO: 0.6 % — SIGNIFICANT CHANGE UP (ref 0–6)
EOSINOPHIL NFR BLD AUTO: 1.4 % — SIGNIFICANT CHANGE UP (ref 0–6)
EOSINOPHIL NFR BLD AUTO: 1.5 % — SIGNIFICANT CHANGE UP (ref 0–6)
EOSINOPHIL NFR BLD AUTO: 1.5 % — SIGNIFICANT CHANGE UP (ref 0–6)
EOSINOPHIL NFR BLD AUTO: 1.6 % — SIGNIFICANT CHANGE UP (ref 0–6)
EOSINOPHIL NFR BLD AUTO: 1.7 % — SIGNIFICANT CHANGE UP (ref 0–6)
EOSINOPHIL NFR BLD AUTO: 1.8 % — SIGNIFICANT CHANGE UP (ref 0–6)
EOSINOPHIL NFR BLD AUTO: 2.1 % — SIGNIFICANT CHANGE UP (ref 0–6)
EOSINOPHIL NFR BLD AUTO: 2.1 % — SIGNIFICANT CHANGE UP (ref 0–6)
EOSINOPHIL NFR BLD AUTO: 3.9 % — SIGNIFICANT CHANGE UP (ref 0–6)
GLUCOSE QUALITATIVE U: NEGATIVE MG/DL
GLUCOSE SERPL-MCNC: 101 MG/DL — HIGH (ref 70–99)
GLUCOSE SERPL-MCNC: 102 MG/DL — HIGH (ref 70–99)
GLUCOSE SERPL-MCNC: 103 MG/DL — HIGH (ref 70–99)
GLUCOSE SERPL-MCNC: 104 MG/DL — HIGH (ref 70–99)
GLUCOSE SERPL-MCNC: 104 MG/DL — HIGH (ref 70–99)
GLUCOSE SERPL-MCNC: 110 MG/DL — HIGH (ref 70–99)
GLUCOSE SERPL-MCNC: 114 MG/DL — HIGH (ref 70–99)
GLUCOSE SERPL-MCNC: 122 MG/DL — HIGH (ref 70–99)
GLUCOSE SERPL-MCNC: 128 MG/DL — HIGH (ref 70–99)
GLUCOSE SERPL-MCNC: 135 MG/DL — HIGH (ref 70–99)
GLUCOSE SERPL-MCNC: 135 MG/DL — HIGH (ref 70–99)
GLUCOSE SERPL-MCNC: 91 MG/DL — SIGNIFICANT CHANGE UP (ref 70–99)
GLUCOSE SERPL-MCNC: 96 MG/DL — SIGNIFICANT CHANGE UP (ref 70–99)
GLUCOSE SERPL-MCNC: 98 MG/DL — SIGNIFICANT CHANGE UP (ref 70–99)
GLUCOSE UR QL: NEGATIVE MG/DL — SIGNIFICANT CHANGE UP
GLUCOSE UR QL: NEGATIVE — SIGNIFICANT CHANGE UP
HCT VFR BLD CALC: 33.3 % — LOW (ref 34.5–45)
HCT VFR BLD CALC: 34 % — LOW (ref 34.5–45)
HCT VFR BLD CALC: 34.1 % — LOW (ref 34.5–45)
HCT VFR BLD CALC: 34.4 % — LOW (ref 34.5–45)
HCT VFR BLD CALC: 34.5 % — SIGNIFICANT CHANGE UP (ref 34.5–45)
HCT VFR BLD CALC: 34.7 % — SIGNIFICANT CHANGE UP (ref 34.5–45)
HCT VFR BLD CALC: 34.7 % — SIGNIFICANT CHANGE UP (ref 34.5–45)
HCT VFR BLD CALC: 35.2 % — SIGNIFICANT CHANGE UP (ref 34.5–45)
HCT VFR BLD CALC: 35.4 % — SIGNIFICANT CHANGE UP (ref 34.5–45)
HCT VFR BLD CALC: 35.4 % — SIGNIFICANT CHANGE UP (ref 34.5–45)
HCT VFR BLD CALC: 35.8 % — SIGNIFICANT CHANGE UP (ref 34.5–45)
HCT VFR BLD CALC: 35.8 % — SIGNIFICANT CHANGE UP (ref 34.5–45)
HCT VFR BLD CALC: 36.5 % — SIGNIFICANT CHANGE UP (ref 34.5–45)
HCT VFR BLD CALC: 37.1 % — SIGNIFICANT CHANGE UP (ref 34.5–45)
HCT VFR BLD CALC: 37.3 % — SIGNIFICANT CHANGE UP (ref 34.5–45)
HCT VFR BLD CALC: 37.3 % — SIGNIFICANT CHANGE UP (ref 34.5–45)
HCT VFR BLD CALC: 37.5 % — SIGNIFICANT CHANGE UP (ref 34.5–45)
HCT VFR BLD CALC: 38 % — SIGNIFICANT CHANGE UP (ref 34.5–45)
HCT VFR BLD CALC: 38.2 % — SIGNIFICANT CHANGE UP (ref 34.5–45)
HCT VFR BLD CALC: 38.4 % — SIGNIFICANT CHANGE UP (ref 34.5–45)
HCT VFR BLD CALC: 38.8 % — SIGNIFICANT CHANGE UP (ref 34.5–45)
HCT VFR BLD CALC: 38.8 % — SIGNIFICANT CHANGE UP (ref 34.5–45)
HCT VFR BLD CALC: 39.1 % — SIGNIFICANT CHANGE UP (ref 34.5–45)
HCT VFR BLD CALC: 40 % — SIGNIFICANT CHANGE UP (ref 34.5–45)
HGB BLD-MCNC: 10.9 G/DL — LOW (ref 11.5–15.5)
HGB BLD-MCNC: 11.4 G/DL — LOW (ref 11.5–15.5)
HGB BLD-MCNC: 11.5 G/DL — SIGNIFICANT CHANGE UP (ref 11.5–15.5)
HGB BLD-MCNC: 11.7 G/DL — SIGNIFICANT CHANGE UP (ref 11.5–15.5)
HGB BLD-MCNC: 11.7 G/DL — SIGNIFICANT CHANGE UP (ref 11.5–15.5)
HGB BLD-MCNC: 11.8 G/DL — SIGNIFICANT CHANGE UP (ref 11.5–15.5)
HGB BLD-MCNC: 11.9 G/DL — SIGNIFICANT CHANGE UP (ref 11.5–15.5)
HGB BLD-MCNC: 11.9 G/DL — SIGNIFICANT CHANGE UP (ref 11.5–15.5)
HGB BLD-MCNC: 12 G/DL — SIGNIFICANT CHANGE UP (ref 11.5–15.5)
HGB BLD-MCNC: 12.3 G/DL — SIGNIFICANT CHANGE UP (ref 11.5–15.5)
HGB BLD-MCNC: 12.4 G/DL — SIGNIFICANT CHANGE UP (ref 11.5–15.5)
HGB BLD-MCNC: 12.6 G/DL — SIGNIFICANT CHANGE UP (ref 11.5–15.5)
HGB BLD-MCNC: 12.8 G/DL — SIGNIFICANT CHANGE UP (ref 11.5–15.5)
HGB BLD-MCNC: 13.2 G/DL — SIGNIFICANT CHANGE UP (ref 11.5–15.5)
HGB BLD-MCNC: 13.2 G/DL — SIGNIFICANT CHANGE UP (ref 11.5–15.5)
HGB BLD-MCNC: 13.3 G/DL — SIGNIFICANT CHANGE UP (ref 11.5–15.5)
HGB BLD-MCNC: 13.4 G/DL — SIGNIFICANT CHANGE UP (ref 11.5–15.5)
HGB BLD-MCNC: 13.4 G/DL — SIGNIFICANT CHANGE UP (ref 11.5–15.5)
HGB BLD-MCNC: 13.6 G/DL — SIGNIFICANT CHANGE UP (ref 11.5–15.5)
HGB BLD-MCNC: 13.6 G/DL — SIGNIFICANT CHANGE UP (ref 11.5–15.5)
IMM GRANULOCYTES NFR BLD AUTO: 0.3 % — SIGNIFICANT CHANGE UP (ref 0–0.9)
IMM GRANULOCYTES NFR BLD AUTO: 0.4 % — SIGNIFICANT CHANGE UP (ref 0–0.9)
IMM GRANULOCYTES NFR BLD AUTO: 0.4 % — SIGNIFICANT CHANGE UP (ref 0–0.9)
IMM GRANULOCYTES NFR BLD AUTO: 0.5 % — SIGNIFICANT CHANGE UP (ref 0–0.9)
IMM GRANULOCYTES NFR BLD AUTO: 0.6 % — SIGNIFICANT CHANGE UP (ref 0–0.9)
IMM GRANULOCYTES NFR BLD AUTO: 0.7 % — SIGNIFICANT CHANGE UP (ref 0–0.9)
IMM GRANULOCYTES NFR BLD AUTO: 1.3 % — HIGH (ref 0–0.9)
IMM GRANULOCYTES NFR BLD AUTO: 2.4 % — HIGH (ref 0–0.9)
IMM GRANULOCYTES NFR BLD AUTO: 2.4 % — HIGH (ref 0–0.9)
IMM GRANULOCYTES NFR BLD AUTO: 4.3 % — HIGH (ref 0–0.9)
INR BLD: 0.98 RATIO — SIGNIFICANT CHANGE UP (ref 0.88–1.16)
INR BLD: 0.99 RATIO — SIGNIFICANT CHANGE UP (ref 0.85–1.18)
INR BLD: 0.99 RATIO — SIGNIFICANT CHANGE UP (ref 0.85–1.18)
KETONES UR-MCNC: NEGATIVE MG/DL — SIGNIFICANT CHANGE UP
KETONES UR-MCNC: NEGATIVE — SIGNIFICANT CHANGE UP
KETONES URINE: NEGATIVE MG/DL
LEUKOCYTE ESTERASE UR-ACNC: ABNORMAL
LEUKOCYTE ESTERASE UR-ACNC: NEGATIVE — SIGNIFICANT CHANGE UP
LEUKOCYTE ESTERASE URINE: ABNORMAL
LYMPHOCYTES # BLD AUTO: 1.04 K/UL — SIGNIFICANT CHANGE UP (ref 1–3.3)
LYMPHOCYTES # BLD AUTO: 1.09 K/UL — SIGNIFICANT CHANGE UP (ref 1–3.3)
LYMPHOCYTES # BLD AUTO: 1.09 K/UL — SIGNIFICANT CHANGE UP (ref 1–3.3)
LYMPHOCYTES # BLD AUTO: 1.25 K/UL — SIGNIFICANT CHANGE UP (ref 1–3.3)
LYMPHOCYTES # BLD AUTO: 1.26 K/UL — SIGNIFICANT CHANGE UP (ref 1–3.3)
LYMPHOCYTES # BLD AUTO: 1.32 K/UL — SIGNIFICANT CHANGE UP (ref 1–3.3)
LYMPHOCYTES # BLD AUTO: 1.33 K/UL — SIGNIFICANT CHANGE UP (ref 1–3.3)
LYMPHOCYTES # BLD AUTO: 1.35 K/UL — SIGNIFICANT CHANGE UP (ref 1–3.3)
LYMPHOCYTES # BLD AUTO: 1.44 K/UL — SIGNIFICANT CHANGE UP (ref 1–3.3)
LYMPHOCYTES # BLD AUTO: 1.44 K/UL — SIGNIFICANT CHANGE UP (ref 1–3.3)
LYMPHOCYTES # BLD AUTO: 1.5 K/UL — SIGNIFICANT CHANGE UP (ref 1–3.3)
LYMPHOCYTES # BLD AUTO: 1.66 K/UL — SIGNIFICANT CHANGE UP (ref 1–3.3)
LYMPHOCYTES # BLD AUTO: 16.2 % — SIGNIFICANT CHANGE UP (ref 13–44)
LYMPHOCYTES # BLD AUTO: 19.3 % — SIGNIFICANT CHANGE UP (ref 13–44)
LYMPHOCYTES # BLD AUTO: 20.1 % — SIGNIFICANT CHANGE UP (ref 13–44)
LYMPHOCYTES # BLD AUTO: 20.7 % — SIGNIFICANT CHANGE UP (ref 13–44)
LYMPHOCYTES # BLD AUTO: 22.4 % — SIGNIFICANT CHANGE UP (ref 13–44)
LYMPHOCYTES # BLD AUTO: 23.5 % — SIGNIFICANT CHANGE UP (ref 13–44)
LYMPHOCYTES # BLD AUTO: 23.5 % — SIGNIFICANT CHANGE UP (ref 13–44)
LYMPHOCYTES # BLD AUTO: 23.9 % — SIGNIFICANT CHANGE UP (ref 13–44)
LYMPHOCYTES # BLD AUTO: 24.1 % — SIGNIFICANT CHANGE UP (ref 13–44)
LYMPHOCYTES # BLD AUTO: 29.4 % — SIGNIFICANT CHANGE UP (ref 13–44)
LYMPHOCYTES # BLD AUTO: 29.4 % — SIGNIFICANT CHANGE UP (ref 13–44)
LYMPHOCYTES # BLD AUTO: 33.2 % — SIGNIFICANT CHANGE UP (ref 13–44)
MAGNESIUM SERPL-MCNC: 1.7 MG/DL — SIGNIFICANT CHANGE UP (ref 1.6–2.6)
MAGNESIUM SERPL-MCNC: 1.7 MG/DL — SIGNIFICANT CHANGE UP (ref 1.6–2.6)
MAGNESIUM SERPL-MCNC: 1.8 MG/DL — SIGNIFICANT CHANGE UP (ref 1.6–2.6)
MAGNESIUM SERPL-MCNC: 1.9 MG/DL — SIGNIFICANT CHANGE UP (ref 1.6–2.6)
MAGNESIUM SERPL-MCNC: 2 MG/DL — SIGNIFICANT CHANGE UP (ref 1.6–2.6)
MAGNESIUM SERPL-MCNC: 2.1 MG/DL — SIGNIFICANT CHANGE UP (ref 1.6–2.6)
MCHC RBC-ENTMCNC: 30.5 PG — SIGNIFICANT CHANGE UP (ref 27–34)
MCHC RBC-ENTMCNC: 30.5 PG — SIGNIFICANT CHANGE UP (ref 27–34)
MCHC RBC-ENTMCNC: 31.1 PG — SIGNIFICANT CHANGE UP (ref 27–34)
MCHC RBC-ENTMCNC: 31.2 PG — SIGNIFICANT CHANGE UP (ref 27–34)
MCHC RBC-ENTMCNC: 31.4 PG — SIGNIFICANT CHANGE UP (ref 27–34)
MCHC RBC-ENTMCNC: 31.4 PG — SIGNIFICANT CHANGE UP (ref 27–34)
MCHC RBC-ENTMCNC: 31.5 PG — SIGNIFICANT CHANGE UP (ref 27–34)
MCHC RBC-ENTMCNC: 31.5 PG — SIGNIFICANT CHANGE UP (ref 27–34)
MCHC RBC-ENTMCNC: 31.6 PG — SIGNIFICANT CHANGE UP (ref 27–34)
MCHC RBC-ENTMCNC: 31.7 PG — SIGNIFICANT CHANGE UP (ref 27–34)
MCHC RBC-ENTMCNC: 31.7 PG — SIGNIFICANT CHANGE UP (ref 27–34)
MCHC RBC-ENTMCNC: 31.8 PG — SIGNIFICANT CHANGE UP (ref 27–34)
MCHC RBC-ENTMCNC: 31.8 PG — SIGNIFICANT CHANGE UP (ref 27–34)
MCHC RBC-ENTMCNC: 31.9 PG — SIGNIFICANT CHANGE UP (ref 27–34)
MCHC RBC-ENTMCNC: 31.9 PG — SIGNIFICANT CHANGE UP (ref 27–34)
MCHC RBC-ENTMCNC: 32 PG — SIGNIFICANT CHANGE UP (ref 27–34)
MCHC RBC-ENTMCNC: 32 PG — SIGNIFICANT CHANGE UP (ref 27–34)
MCHC RBC-ENTMCNC: 32.1 PG — SIGNIFICANT CHANGE UP (ref 27–34)
MCHC RBC-ENTMCNC: 32.2 PG — SIGNIFICANT CHANGE UP (ref 27–34)
MCHC RBC-ENTMCNC: 32.3 PG — SIGNIFICANT CHANGE UP (ref 27–34)
MCHC RBC-ENTMCNC: 32.4 PG — SIGNIFICANT CHANGE UP (ref 27–34)
MCHC RBC-ENTMCNC: 32.6 G/DL — SIGNIFICANT CHANGE UP (ref 32–36)
MCHC RBC-ENTMCNC: 32.7 G/DL — SIGNIFICANT CHANGE UP (ref 32–36)
MCHC RBC-ENTMCNC: 32.7 GM/DL — SIGNIFICANT CHANGE UP (ref 32–36)
MCHC RBC-ENTMCNC: 32.7 GM/DL — SIGNIFICANT CHANGE UP (ref 32–36)
MCHC RBC-ENTMCNC: 33 G/DL — SIGNIFICANT CHANGE UP (ref 32–36)
MCHC RBC-ENTMCNC: 33 G/DL — SIGNIFICANT CHANGE UP (ref 32–36)
MCHC RBC-ENTMCNC: 33.1 G/DL — SIGNIFICANT CHANGE UP (ref 32–36)
MCHC RBC-ENTMCNC: 33.4 G/DL — SIGNIFICANT CHANGE UP (ref 32–36)
MCHC RBC-ENTMCNC: 33.4 G/DL — SIGNIFICANT CHANGE UP (ref 32–36)
MCHC RBC-ENTMCNC: 33.5 G/DL — SIGNIFICANT CHANGE UP (ref 32–36)
MCHC RBC-ENTMCNC: 33.8 G/DL — SIGNIFICANT CHANGE UP (ref 32–36)
MCHC RBC-ENTMCNC: 33.9 G/DL — SIGNIFICANT CHANGE UP (ref 32–36)
MCHC RBC-ENTMCNC: 33.9 G/DL — SIGNIFICANT CHANGE UP (ref 32–36)
MCHC RBC-ENTMCNC: 34 G/DL — SIGNIFICANT CHANGE UP (ref 32–36)
MCHC RBC-ENTMCNC: 34.3 G/DL — SIGNIFICANT CHANGE UP (ref 32–36)
MCHC RBC-ENTMCNC: 34.4 G/DL — SIGNIFICANT CHANGE UP (ref 32–36)
MCHC RBC-ENTMCNC: 34.5 G/DL — SIGNIFICANT CHANGE UP (ref 32–36)
MCHC RBC-ENTMCNC: 34.6 G/DL — SIGNIFICANT CHANGE UP (ref 32–36)
MCHC RBC-ENTMCNC: 34.7 G/DL — SIGNIFICANT CHANGE UP (ref 32–36)
MCHC RBC-ENTMCNC: 34.8 G/DL — SIGNIFICANT CHANGE UP (ref 32–36)
MCHC RBC-ENTMCNC: 34.8 G/DL — SIGNIFICANT CHANGE UP (ref 32–36)
MCHC RBC-ENTMCNC: 35.1 G/DL — SIGNIFICANT CHANGE UP (ref 32–36)
MCV RBC AUTO: 91.8 FL — SIGNIFICANT CHANGE UP (ref 80–100)
MCV RBC AUTO: 91.8 FL — SIGNIFICANT CHANGE UP (ref 80–100)
MCV RBC AUTO: 91.9 FL — SIGNIFICANT CHANGE UP (ref 80–100)
MCV RBC AUTO: 91.9 FL — SIGNIFICANT CHANGE UP (ref 80–100)
MCV RBC AUTO: 92.5 FL — SIGNIFICANT CHANGE UP (ref 80–100)
MCV RBC AUTO: 93.4 FL — SIGNIFICANT CHANGE UP (ref 80–100)
MCV RBC AUTO: 93.4 FL — SIGNIFICANT CHANGE UP (ref 80–100)
MCV RBC AUTO: 93.5 FL — SIGNIFICANT CHANGE UP (ref 80–100)
MCV RBC AUTO: 93.7 FL — SIGNIFICANT CHANGE UP (ref 80–100)
MCV RBC AUTO: 93.7 FL — SIGNIFICANT CHANGE UP (ref 80–100)
MCV RBC AUTO: 93.8 FL — SIGNIFICANT CHANGE UP (ref 80–100)
MCV RBC AUTO: 94.3 FL — SIGNIFICANT CHANGE UP (ref 80–100)
MCV RBC AUTO: 94.3 FL — SIGNIFICANT CHANGE UP (ref 80–100)
MCV RBC AUTO: 94.4 FL — SIGNIFICANT CHANGE UP (ref 80–100)
MCV RBC AUTO: 94.5 FL — SIGNIFICANT CHANGE UP (ref 80–100)
MCV RBC AUTO: 94.5 FL — SIGNIFICANT CHANGE UP (ref 80–100)
MCV RBC AUTO: 94.7 FL — SIGNIFICANT CHANGE UP (ref 80–100)
MCV RBC AUTO: 94.9 FL — SIGNIFICANT CHANGE UP (ref 80–100)
MCV RBC AUTO: 95 FL — SIGNIFICANT CHANGE UP (ref 80–100)
MCV RBC AUTO: 95.2 FL — SIGNIFICANT CHANGE UP (ref 80–100)
MCV RBC AUTO: 95.5 FL — SIGNIFICANT CHANGE UP (ref 80–100)
MCV RBC AUTO: 95.5 FL — SIGNIFICANT CHANGE UP (ref 80–100)
MCV RBC AUTO: 96.5 FL — SIGNIFICANT CHANGE UP (ref 80–100)
MONOCYTES # BLD AUTO: 0.4 K/UL — SIGNIFICANT CHANGE UP (ref 0–0.9)
MONOCYTES # BLD AUTO: 0.4 K/UL — SIGNIFICANT CHANGE UP (ref 0–0.9)
MONOCYTES # BLD AUTO: 0.41 K/UL — SIGNIFICANT CHANGE UP (ref 0–0.9)
MONOCYTES # BLD AUTO: 0.56 K/UL — SIGNIFICANT CHANGE UP (ref 0–0.9)
MONOCYTES # BLD AUTO: 0.65 K/UL — SIGNIFICANT CHANGE UP (ref 0–0.9)
MONOCYTES # BLD AUTO: 0.66 K/UL — SIGNIFICANT CHANGE UP (ref 0–0.9)
MONOCYTES # BLD AUTO: 0.67 K/UL — SIGNIFICANT CHANGE UP (ref 0–0.9)
MONOCYTES # BLD AUTO: 0.7 K/UL — SIGNIFICANT CHANGE UP (ref 0–0.9)
MONOCYTES # BLD AUTO: 0.71 K/UL — SIGNIFICANT CHANGE UP (ref 0–0.9)
MONOCYTES # BLD AUTO: 0.71 K/UL — SIGNIFICANT CHANGE UP (ref 0–0.9)
MONOCYTES # BLD AUTO: 0.84 K/UL — SIGNIFICANT CHANGE UP (ref 0–0.9)
MONOCYTES # BLD AUTO: 1.24 K/UL — HIGH (ref 0–0.9)
MONOCYTES NFR BLD AUTO: 10.8 % — SIGNIFICANT CHANGE UP (ref 2–14)
MONOCYTES NFR BLD AUTO: 10.8 % — SIGNIFICANT CHANGE UP (ref 2–14)
MONOCYTES NFR BLD AUTO: 11.6 % — SIGNIFICANT CHANGE UP (ref 2–14)
MONOCYTES NFR BLD AUTO: 11.6 % — SIGNIFICANT CHANGE UP (ref 2–14)
MONOCYTES NFR BLD AUTO: 11.7 % — SIGNIFICANT CHANGE UP (ref 2–14)
MONOCYTES NFR BLD AUTO: 12.2 % — SIGNIFICANT CHANGE UP (ref 2–14)
MONOCYTES NFR BLD AUTO: 17.1 % — HIGH (ref 2–14)
MONOCYTES NFR BLD AUTO: 17.6 % — HIGH (ref 2–14)
MONOCYTES NFR BLD AUTO: 7.9 % — SIGNIFICANT CHANGE UP (ref 2–14)
MONOCYTES NFR BLD AUTO: 8.4 % — SIGNIFICANT CHANGE UP (ref 2–14)
MONOCYTES NFR BLD AUTO: 9.7 % — SIGNIFICANT CHANGE UP (ref 2–14)
MONOCYTES NFR BLD AUTO: 9.9 % — SIGNIFICANT CHANGE UP (ref 2–14)
NEUTROPHILS # BLD AUTO: 1.81 K/UL — SIGNIFICANT CHANGE UP (ref 1.8–7.4)
NEUTROPHILS # BLD AUTO: 2.12 K/UL — SIGNIFICANT CHANGE UP (ref 1.8–7.4)
NEUTROPHILS # BLD AUTO: 2.12 K/UL — SIGNIFICANT CHANGE UP (ref 1.8–7.4)
NEUTROPHILS # BLD AUTO: 3.47 K/UL — SIGNIFICANT CHANGE UP (ref 1.8–7.4)
NEUTROPHILS # BLD AUTO: 3.49 K/UL — SIGNIFICANT CHANGE UP (ref 1.8–7.4)
NEUTROPHILS # BLD AUTO: 3.63 K/UL — SIGNIFICANT CHANGE UP (ref 1.8–7.4)
NEUTROPHILS # BLD AUTO: 3.63 K/UL — SIGNIFICANT CHANGE UP (ref 1.8–7.4)
NEUTROPHILS # BLD AUTO: 3.66 K/UL — SIGNIFICANT CHANGE UP (ref 1.8–7.4)
NEUTROPHILS # BLD AUTO: 4.04 K/UL — SIGNIFICANT CHANGE UP (ref 1.8–7.4)
NEUTROPHILS # BLD AUTO: 4.44 K/UL — SIGNIFICANT CHANGE UP (ref 1.8–7.4)
NEUTROPHILS # BLD AUTO: 4.48 K/UL — SIGNIFICANT CHANGE UP (ref 1.8–7.4)
NEUTROPHILS # BLD AUTO: 5.65 K/UL — SIGNIFICANT CHANGE UP (ref 1.8–7.4)
NEUTROPHILS NFR BLD AUTO: 45.6 % — SIGNIFICANT CHANGE UP (ref 43–77)
NEUTROPHILS NFR BLD AUTO: 55.6 % — SIGNIFICANT CHANGE UP (ref 43–77)
NEUTROPHILS NFR BLD AUTO: 57.1 % — SIGNIFICANT CHANGE UP (ref 43–77)
NEUTROPHILS NFR BLD AUTO: 57.1 % — SIGNIFICANT CHANGE UP (ref 43–77)
NEUTROPHILS NFR BLD AUTO: 59.3 % — SIGNIFICANT CHANGE UP (ref 43–77)
NEUTROPHILS NFR BLD AUTO: 59.3 % — SIGNIFICANT CHANGE UP (ref 43–77)
NEUTROPHILS NFR BLD AUTO: 61.4 % — SIGNIFICANT CHANGE UP (ref 43–77)
NEUTROPHILS NFR BLD AUTO: 64.6 % — SIGNIFICANT CHANGE UP (ref 43–77)
NEUTROPHILS NFR BLD AUTO: 65.1 % — SIGNIFICANT CHANGE UP (ref 43–77)
NEUTROPHILS NFR BLD AUTO: 65.2 % — SIGNIFICANT CHANGE UP (ref 43–77)
NEUTROPHILS NFR BLD AUTO: 67.3 % — SIGNIFICANT CHANGE UP (ref 43–77)
NEUTROPHILS NFR BLD AUTO: 73.2 % — SIGNIFICANT CHANGE UP (ref 43–77)
NITRITE UR-MCNC: NEGATIVE — SIGNIFICANT CHANGE UP
NITRITE URINE: NEGATIVE
NRBC # BLD: 0 /100 WBCS — SIGNIFICANT CHANGE UP (ref 0–0)
PH UR: 5 — SIGNIFICANT CHANGE UP (ref 5–8)
PH UR: 5.5 — SIGNIFICANT CHANGE UP (ref 5–8)
PH UR: 6 — SIGNIFICANT CHANGE UP (ref 5–8)
PH UR: 6.5 — SIGNIFICANT CHANGE UP (ref 5–8)
PH UR: 7.5 — SIGNIFICANT CHANGE UP (ref 5–8)
PH UR: 7.5 — SIGNIFICANT CHANGE UP (ref 5–8)
PH URINE: 6
PLATELET # BLD AUTO: 190 K/UL — SIGNIFICANT CHANGE UP (ref 150–400)
PLATELET # BLD AUTO: 221 K/UL — SIGNIFICANT CHANGE UP (ref 150–400)
PLATELET # BLD AUTO: 234 K/UL — SIGNIFICANT CHANGE UP (ref 150–400)
PLATELET # BLD AUTO: 239 K/UL — SIGNIFICANT CHANGE UP (ref 150–400)
PLATELET # BLD AUTO: 246 K/UL — SIGNIFICANT CHANGE UP (ref 150–400)
PLATELET # BLD AUTO: 246 K/UL — SIGNIFICANT CHANGE UP (ref 150–400)
PLATELET # BLD AUTO: 249 K/UL — SIGNIFICANT CHANGE UP (ref 150–400)
PLATELET # BLD AUTO: 257 K/UL — SIGNIFICANT CHANGE UP (ref 150–400)
PLATELET # BLD AUTO: 262 K/UL — SIGNIFICANT CHANGE UP (ref 150–400)
PLATELET # BLD AUTO: 262 K/UL — SIGNIFICANT CHANGE UP (ref 150–400)
PLATELET # BLD AUTO: 263 K/UL — SIGNIFICANT CHANGE UP (ref 150–400)
PLATELET # BLD AUTO: 279 K/UL — SIGNIFICANT CHANGE UP (ref 150–400)
PLATELET # BLD AUTO: 282 K/UL — SIGNIFICANT CHANGE UP (ref 150–400)
PLATELET # BLD AUTO: 282 K/UL — SIGNIFICANT CHANGE UP (ref 150–400)
PLATELET # BLD AUTO: 285 K/UL — SIGNIFICANT CHANGE UP (ref 150–400)
PLATELET # BLD AUTO: 301 K/UL — SIGNIFICANT CHANGE UP (ref 150–400)
PLATELET # BLD AUTO: 303 K/UL — SIGNIFICANT CHANGE UP (ref 150–400)
PLATELET # BLD AUTO: 303 K/UL — SIGNIFICANT CHANGE UP (ref 150–400)
PLATELET # BLD AUTO: 305 K/UL — SIGNIFICANT CHANGE UP (ref 150–400)
PLATELET # BLD AUTO: 318 K/UL — SIGNIFICANT CHANGE UP (ref 150–400)
PLATELET # BLD AUTO: 320 K/UL — SIGNIFICANT CHANGE UP (ref 150–400)
PLATELET # BLD AUTO: 331 K/UL — SIGNIFICANT CHANGE UP (ref 150–400)
POTASSIUM SERPL-MCNC: 3.9 MMOL/L — SIGNIFICANT CHANGE UP (ref 3.5–5.3)
POTASSIUM SERPL-MCNC: 4 MMOL/L — SIGNIFICANT CHANGE UP (ref 3.5–5.3)
POTASSIUM SERPL-MCNC: 4.1 MMOL/L — SIGNIFICANT CHANGE UP (ref 3.5–5.3)
POTASSIUM SERPL-MCNC: 4.2 MMOL/L — SIGNIFICANT CHANGE UP (ref 3.5–5.3)
POTASSIUM SERPL-MCNC: 4.3 MMOL/L — SIGNIFICANT CHANGE UP (ref 3.5–5.3)
POTASSIUM SERPL-MCNC: 4.3 MMOL/L — SIGNIFICANT CHANGE UP (ref 3.5–5.3)
POTASSIUM SERPL-MCNC: 4.5 MMOL/L — SIGNIFICANT CHANGE UP (ref 3.5–5.3)
POTASSIUM SERPL-MCNC: 4.6 MMOL/L — SIGNIFICANT CHANGE UP (ref 3.5–5.3)
POTASSIUM SERPL-MCNC: 4.7 MMOL/L — SIGNIFICANT CHANGE UP (ref 3.5–5.3)
POTASSIUM SERPL-SCNC: 3.9 MMOL/L — SIGNIFICANT CHANGE UP (ref 3.5–5.3)
POTASSIUM SERPL-SCNC: 4 MMOL/L — SIGNIFICANT CHANGE UP (ref 3.5–5.3)
POTASSIUM SERPL-SCNC: 4.1 MMOL/L — SIGNIFICANT CHANGE UP (ref 3.5–5.3)
POTASSIUM SERPL-SCNC: 4.2 MMOL/L — SIGNIFICANT CHANGE UP (ref 3.5–5.3)
POTASSIUM SERPL-SCNC: 4.3 MMOL/L — SIGNIFICANT CHANGE UP (ref 3.5–5.3)
POTASSIUM SERPL-SCNC: 4.3 MMOL/L — SIGNIFICANT CHANGE UP (ref 3.5–5.3)
POTASSIUM SERPL-SCNC: 4.5 MMOL/L — SIGNIFICANT CHANGE UP (ref 3.5–5.3)
POTASSIUM SERPL-SCNC: 4.6 MMOL/L — SIGNIFICANT CHANGE UP (ref 3.5–5.3)
POTASSIUM SERPL-SCNC: 4.7 MMOL/L — SIGNIFICANT CHANGE UP (ref 3.5–5.3)
PROT ?TM UR-MCNC: 14 MG/DL — HIGH (ref 0–12)
PROT ?TM UR-MCNC: 14 MG/DL — HIGH (ref 0–12)
PROT ?TM UR-MCNC: 22 MG/DL — HIGH (ref 0–12)
PROT ?TM UR-MCNC: 22 MG/DL — HIGH (ref 0–12)
PROT ?TM UR-MCNC: 38 MG/DL — HIGH (ref 0–12)
PROT ?TM UR-MCNC: 38 MG/DL — HIGH (ref 0–12)
PROT ?TM UR-MCNC: 4 MG/DL — SIGNIFICANT CHANGE UP (ref 0–12)
PROT ?TM UR-MCNC: 4 MG/DL — SIGNIFICANT CHANGE UP (ref 0–12)
PROT ?TM UR-MCNC: 6 MG/DL — SIGNIFICANT CHANGE UP (ref 0–12)
PROT ?TM UR-MCNC: 7 MG/DL — SIGNIFICANT CHANGE UP (ref 0–12)
PROT ?TM UR-MCNC: 8 MG/DL — SIGNIFICANT CHANGE UP (ref 0–12)
PROT ?TM UR-MCNC: 9 MG/DL — SIGNIFICANT CHANGE UP (ref 0–12)
PROT ?TM UR-MCNC: 9 MG/DL — SIGNIFICANT CHANGE UP (ref 0–12)
PROT SERPL-MCNC: 6.4 G/DL — SIGNIFICANT CHANGE UP (ref 6–8.3)
PROT SERPL-MCNC: 6.7 G/DL — SIGNIFICANT CHANGE UP (ref 6–8.3)
PROT SERPL-MCNC: 6.8 G/DL — SIGNIFICANT CHANGE UP (ref 6–8.3)
PROT SERPL-MCNC: 6.9 G/DL — SIGNIFICANT CHANGE UP (ref 6–8.3)
PROT SERPL-MCNC: 6.9 G/DL — SIGNIFICANT CHANGE UP (ref 6–8.3)
PROT SERPL-MCNC: 7 G/DL — SIGNIFICANT CHANGE UP (ref 6–8.3)
PROT SERPL-MCNC: 7.1 G/DL — SIGNIFICANT CHANGE UP (ref 6–8.3)
PROT SERPL-MCNC: 7.1 G/DL — SIGNIFICANT CHANGE UP (ref 6–8.3)
PROT SERPL-MCNC: 7.2 G/DL — SIGNIFICANT CHANGE UP (ref 6–8.3)
PROT SERPL-MCNC: 7.6 G/DL — SIGNIFICANT CHANGE UP (ref 6–8.3)
PROT UR-MCNC: 30 MG/DL
PROT UR-MCNC: 30 MG/DL
PROT UR-MCNC: NEGATIVE MG/DL — SIGNIFICANT CHANGE UP
PROT UR-MCNC: NEGATIVE — SIGNIFICANT CHANGE UP
PROT/CREAT UR-RTO: 0.1 RATIO — SIGNIFICANT CHANGE UP (ref 0–0.2)
PROT/CREAT UR-RTO: 0.2 RATIO — SIGNIFICANT CHANGE UP (ref 0–0.2)
PROT/CREAT UR-RTO: 0.3 RATIO — HIGH (ref 0–0.2)
PROT/CREAT UR-RTO: 0.4 RATIO — HIGH (ref 0–0.2)
PROTEIN URINE: 30 MG/DL
PROTHROM AB SERPL-ACNC: 11.3 SEC — SIGNIFICANT CHANGE UP (ref 9.5–13)
PROTHROM AB SERPL-ACNC: 11.3 SEC — SIGNIFICANT CHANGE UP (ref 9.5–13)
PROTHROM AB SERPL-ACNC: 11.4 SEC — SIGNIFICANT CHANGE UP (ref 10.5–13.4)
RBC # BLD: 3.45 M/UL — LOW (ref 3.8–5.2)
RBC # BLD: 3.56 M/UL — LOW (ref 3.8–5.2)
RBC # BLD: 3.61 M/UL — LOW (ref 3.8–5.2)
RBC # BLD: 3.64 M/UL — LOW (ref 3.8–5.2)
RBC # BLD: 3.66 M/UL — LOW (ref 3.8–5.2)
RBC # BLD: 3.68 M/UL — LOW (ref 3.8–5.2)
RBC # BLD: 3.7 M/UL — LOW (ref 3.8–5.2)
RBC # BLD: 3.73 M/UL — LOW (ref 3.8–5.2)
RBC # BLD: 3.78 M/UL — LOW (ref 3.8–5.2)
RBC # BLD: 3.78 M/UL — LOW (ref 3.8–5.2)
RBC # BLD: 3.82 M/UL — SIGNIFICANT CHANGE UP (ref 3.8–5.2)
RBC # BLD: 3.83 M/UL — SIGNIFICANT CHANGE UP (ref 3.8–5.2)
RBC # BLD: 3.83 M/UL — SIGNIFICANT CHANGE UP (ref 3.8–5.2)
RBC # BLD: 3.92 M/UL — SIGNIFICANT CHANGE UP (ref 3.8–5.2)
RBC # BLD: 3.94 M/UL — SIGNIFICANT CHANGE UP (ref 3.8–5.2)
RBC # BLD: 3.95 M/UL — SIGNIFICANT CHANGE UP (ref 3.8–5.2)
RBC # BLD: 3.97 M/UL — SIGNIFICANT CHANGE UP (ref 3.8–5.2)
RBC # BLD: 4.07 M/UL — SIGNIFICANT CHANGE UP (ref 3.8–5.2)
RBC # BLD: 4.11 M/UL — SIGNIFICANT CHANGE UP (ref 3.8–5.2)
RBC # BLD: 4.13 M/UL — SIGNIFICANT CHANGE UP (ref 3.8–5.2)
RBC # BLD: 4.18 M/UL — SIGNIFICANT CHANGE UP (ref 3.8–5.2)
RBC # BLD: 4.21 M/UL — SIGNIFICANT CHANGE UP (ref 3.8–5.2)
RBC # BLD: 4.22 M/UL — SIGNIFICANT CHANGE UP (ref 3.8–5.2)
RBC # BLD: 4.22 M/UL — SIGNIFICANT CHANGE UP (ref 3.8–5.2)
RBC # BLD: 4.26 M/UL — SIGNIFICANT CHANGE UP (ref 3.8–5.2)
RBC # BLD: 4.26 M/UL — SIGNIFICANT CHANGE UP (ref 3.8–5.2)
RBC # FLD: 13 % — SIGNIFICANT CHANGE UP (ref 10.3–14.5)
RBC # FLD: 13.1 % — SIGNIFICANT CHANGE UP (ref 10.3–14.5)
RBC # FLD: 13.3 % — SIGNIFICANT CHANGE UP (ref 10.3–14.5)
RBC # FLD: 13.3 % — SIGNIFICANT CHANGE UP (ref 10.3–14.5)
RBC # FLD: 13.4 % — SIGNIFICANT CHANGE UP (ref 10.3–14.5)
RBC # FLD: 13.5 % — SIGNIFICANT CHANGE UP (ref 10.3–14.5)
RBC # FLD: 13.5 % — SIGNIFICANT CHANGE UP (ref 10.3–14.5)
RBC # FLD: 13.6 % — SIGNIFICANT CHANGE UP (ref 10.3–14.5)
RBC # FLD: 14 % — SIGNIFICANT CHANGE UP (ref 10.3–14.5)
RBC # FLD: 14 % — SIGNIFICANT CHANGE UP (ref 10.3–14.5)
RBC # FLD: 14.1 % — SIGNIFICANT CHANGE UP (ref 10.3–14.5)
RBC # FLD: 14.4 % — SIGNIFICANT CHANGE UP (ref 10.3–14.5)
RBC # FLD: 14.5 % — SIGNIFICANT CHANGE UP (ref 10.3–14.5)
RBC # FLD: 14.5 % — SIGNIFICANT CHANGE UP (ref 10.3–14.5)
RBC # FLD: 14.6 % — HIGH (ref 10.3–14.5)
RBC # FLD: 15.2 % — HIGH (ref 10.3–14.5)
RBC CASTS # UR COMP ASSIST: 1 /HPF — SIGNIFICANT CHANGE UP (ref 0–4)
SODIUM SERPL-SCNC: 139 MMOL/L — SIGNIFICANT CHANGE UP (ref 135–145)
SODIUM SERPL-SCNC: 140 MMOL/L — SIGNIFICANT CHANGE UP (ref 135–145)
SODIUM SERPL-SCNC: 141 MMOL/L — SIGNIFICANT CHANGE UP (ref 135–145)
SODIUM SERPL-SCNC: 142 MMOL/L — SIGNIFICANT CHANGE UP (ref 135–145)
SP GR SPEC: 1.01 — SIGNIFICANT CHANGE UP (ref 1–1.03)
SP GR SPEC: 1.02 — SIGNIFICANT CHANGE UP (ref 1.01–1.02)
SP GR SPEC: 1.02 — SIGNIFICANT CHANGE UP (ref 1–1.03)
SPECIFIC GRAVITY URINE: 1.02
SQUAMOUS # UR AUTO: 0 /HPF — SIGNIFICANT CHANGE UP (ref 0–5)
T3 SERPL-MCNC: 99 NG/DL — SIGNIFICANT CHANGE UP (ref 80–200)
T4 FREE SERPL-MCNC: 1.5 NG/DL — SIGNIFICANT CHANGE UP (ref 0.9–1.8)
T4 FREE SERPL-MCNC: 1.6 NG/DL — SIGNIFICANT CHANGE UP (ref 0.9–1.8)
T4 FREE+ TSH PNL SERPL: 0.18 UIU/ML — LOW (ref 0.27–4.2)
T4 FREE+ TSH PNL SERPL: 0.39 UIU/ML — SIGNIFICANT CHANGE UP (ref 0.27–4.2)
T4 FREE+ TSH PNL SERPL: 1.09 UIU/ML — SIGNIFICANT CHANGE UP (ref 0.27–4.2)
T4 FREE+ TSH PNL SERPL: 1.34 UIU/ML — SIGNIFICANT CHANGE UP (ref 0.27–4.2)
TSH SERPL-MCNC: 0.16 UIU/ML — LOW (ref 0.27–4.2)
UROBILINOGEN FLD QL: 0.2 MG/DL — SIGNIFICANT CHANGE UP (ref 0.2–1)
UROBILINOGEN FLD QL: 1 MG/DL — SIGNIFICANT CHANGE UP (ref 0.2–1)
UROBILINOGEN FLD QL: 1 MG/DL — SIGNIFICANT CHANGE UP (ref 0.2–1)
UROBILINOGEN FLD QL: SIGNIFICANT CHANGE UP
UROBILINOGEN URINE: 0.2 MG/DL
WBC # BLD: 2.07 K/UL — LOW (ref 3.8–10.5)
WBC # BLD: 2.77 K/UL — LOW (ref 3.8–10.5)
WBC # BLD: 2.87 K/UL — LOW (ref 3.8–10.5)
WBC # BLD: 2.94 K/UL — LOW (ref 3.8–10.5)
WBC # BLD: 22.74 K/UL — HIGH (ref 3.8–10.5)
WBC # BLD: 3.13 K/UL — LOW (ref 3.8–10.5)
WBC # BLD: 3.32 K/UL — LOW (ref 3.8–10.5)
WBC # BLD: 3.71 K/UL — LOW (ref 3.8–10.5)
WBC # BLD: 3.71 K/UL — LOW (ref 3.8–10.5)
WBC # BLD: 3.97 K/UL — SIGNIFICANT CHANGE UP (ref 3.8–10.5)
WBC # BLD: 5.18 K/UL — SIGNIFICANT CHANGE UP (ref 3.8–10.5)
WBC # BLD: 5.5 K/UL — SIGNIFICANT CHANGE UP (ref 3.8–10.5)
WBC # BLD: 5.63 K/UL — SIGNIFICANT CHANGE UP (ref 3.8–10.5)
WBC # BLD: 5.65 K/UL — SIGNIFICANT CHANGE UP (ref 3.8–10.5)
WBC # BLD: 6.09 K/UL — SIGNIFICANT CHANGE UP (ref 3.8–10.5)
WBC # BLD: 6.13 K/UL — SIGNIFICANT CHANGE UP (ref 3.8–10.5)
WBC # BLD: 6.13 K/UL — SIGNIFICANT CHANGE UP (ref 3.8–10.5)
WBC # BLD: 6.88 K/UL — SIGNIFICANT CHANGE UP (ref 3.8–10.5)
WBC # BLD: 6.88 K/UL — SIGNIFICANT CHANGE UP (ref 3.8–10.5)
WBC # BLD: 6.97 K/UL — SIGNIFICANT CHANGE UP (ref 3.8–10.5)
WBC # BLD: 6.97 K/UL — SIGNIFICANT CHANGE UP (ref 3.8–10.5)
WBC # BLD: 7.26 K/UL — SIGNIFICANT CHANGE UP (ref 3.8–10.5)
WBC # BLD: 7.32 K/UL — SIGNIFICANT CHANGE UP (ref 3.8–10.5)
WBC # BLD: 7.71 K/UL — SIGNIFICANT CHANGE UP (ref 3.8–10.5)
WBC # BLD: 9.61 K/UL — SIGNIFICANT CHANGE UP (ref 3.8–10.5)
WBC # BLD: 9.61 K/UL — SIGNIFICANT CHANGE UP (ref 3.8–10.5)
WBC # FLD AUTO: 2.07 K/UL — LOW (ref 3.8–10.5)
WBC # FLD AUTO: 2.77 K/UL — LOW (ref 3.8–10.5)
WBC # FLD AUTO: 2.87 K/UL — LOW (ref 3.8–10.5)
WBC # FLD AUTO: 2.94 K/UL — LOW (ref 3.8–10.5)
WBC # FLD AUTO: 22.74 K/UL — HIGH (ref 3.8–10.5)
WBC # FLD AUTO: 3.13 K/UL — LOW (ref 3.8–10.5)
WBC # FLD AUTO: 3.32 K/UL — LOW (ref 3.8–10.5)
WBC # FLD AUTO: 3.71 K/UL — LOW (ref 3.8–10.5)
WBC # FLD AUTO: 3.71 K/UL — LOW (ref 3.8–10.5)
WBC # FLD AUTO: 3.97 K/UL — SIGNIFICANT CHANGE UP (ref 3.8–10.5)
WBC # FLD AUTO: 5.18 K/UL — SIGNIFICANT CHANGE UP (ref 3.8–10.5)
WBC # FLD AUTO: 5.5 K/UL — SIGNIFICANT CHANGE UP (ref 3.8–10.5)
WBC # FLD AUTO: 5.63 K/UL — SIGNIFICANT CHANGE UP (ref 3.8–10.5)
WBC # FLD AUTO: 5.65 K/UL — SIGNIFICANT CHANGE UP (ref 3.8–10.5)
WBC # FLD AUTO: 6.09 K/UL — SIGNIFICANT CHANGE UP (ref 3.8–10.5)
WBC # FLD AUTO: 6.13 K/UL — SIGNIFICANT CHANGE UP (ref 3.8–10.5)
WBC # FLD AUTO: 6.13 K/UL — SIGNIFICANT CHANGE UP (ref 3.8–10.5)
WBC # FLD AUTO: 6.88 K/UL — SIGNIFICANT CHANGE UP (ref 3.8–10.5)
WBC # FLD AUTO: 6.88 K/UL — SIGNIFICANT CHANGE UP (ref 3.8–10.5)
WBC # FLD AUTO: 6.97 K/UL — SIGNIFICANT CHANGE UP (ref 3.8–10.5)
WBC # FLD AUTO: 6.97 K/UL — SIGNIFICANT CHANGE UP (ref 3.8–10.5)
WBC # FLD AUTO: 7.26 K/UL — SIGNIFICANT CHANGE UP (ref 3.8–10.5)
WBC # FLD AUTO: 7.32 K/UL — SIGNIFICANT CHANGE UP (ref 3.8–10.5)
WBC # FLD AUTO: 7.71 K/UL — SIGNIFICANT CHANGE UP (ref 3.8–10.5)
WBC # FLD AUTO: 9.61 K/UL — SIGNIFICANT CHANGE UP (ref 3.8–10.5)
WBC # FLD AUTO: 9.61 K/UL — SIGNIFICANT CHANGE UP (ref 3.8–10.5)
WBC UR QL: 0 /HPF — SIGNIFICANT CHANGE UP (ref 0–5)
WBC UR QL: 0 /HPF — SIGNIFICANT CHANGE UP (ref 0–5)
WBC UR QL: 4 /HPF — SIGNIFICANT CHANGE UP (ref 0–5)

## 2023-01-01 PROCEDURE — 99214 OFFICE O/P EST MOD 30 MIN: CPT

## 2023-01-01 PROCEDURE — 74177 CT ABD & PELVIS W/CONTRAST: CPT

## 2023-01-01 PROCEDURE — 99213 OFFICE O/P EST LOW 20 MIN: CPT

## 2023-01-01 PROCEDURE — 71260 CT THORAX DX C+: CPT

## 2023-01-01 PROCEDURE — 71260 CT THORAX DX C+: CPT | Mod: 26,MH

## 2023-01-01 PROCEDURE — 76857 US EXAM PELVIC LIMITED: CPT

## 2023-01-01 PROCEDURE — 99204 OFFICE O/P NEW MOD 45 MIN: CPT

## 2023-01-01 PROCEDURE — 74177 CT ABD & PELVIS W/CONTRAST: CPT | Mod: 26,MH

## 2023-01-01 PROCEDURE — 76857 US EXAM PELVIC LIMITED: CPT | Mod: 26

## 2023-01-01 RX ORDER — SULFAMETHOXAZOLE AND TRIMETHOPRIM 800; 160 MG/1; MG/1
800-160 TABLET ORAL
Qty: 10 | Refills: 0 | Status: DISCONTINUED | COMMUNITY
Start: 2023-01-01 | End: 2023-01-01

## 2023-01-01 RX ORDER — CLOBETASOL PROPIONATE 0.5 MG/G
0.05 OINTMENT TOPICAL TWICE DAILY
Qty: 15 | Refills: 0 | Status: DISCONTINUED | COMMUNITY
Start: 2023-01-01 | End: 2023-01-01

## 2023-01-01 RX ORDER — PANTOPRAZOLE 40 MG/1
40 TABLET, DELAYED RELEASE ORAL DAILY
Qty: 30 | Refills: 2 | Status: DISCONTINUED | COMMUNITY
Start: 2023-03-06 | End: 2023-01-01

## 2023-01-01 RX ORDER — PREDNISONE 50 MG/1
50 TABLET ORAL
Qty: 3 | Refills: 0 | Status: COMPLETED | COMMUNITY
Start: 2023-01-01 | End: 2023-01-01

## 2023-01-01 RX ORDER — DIPHENHYDRAMINE HYDROCHLORIDE AND LIDOCAINE HYDROCHLORIDE AND ALUMINUM HYDROXIDE AND MAGNESIUM HYDRO
KIT EVERY 6 HOURS
Qty: 1 | Refills: 2 | Status: DISCONTINUED | COMMUNITY
Start: 2021-08-11 | End: 2023-01-01

## 2023-01-01 RX ORDER — BETAMETHASONE DIPROPIONATE 0.5 MG/G
0.05 OINTMENT TOPICAL DAILY
Qty: 1 | Refills: 0 | Status: DISCONTINUED | COMMUNITY
Start: 2022-08-12 | End: 2023-01-01

## 2023-01-01 RX ORDER — FUROSEMIDE 20 MG/1
20 TABLET ORAL DAILY
Qty: 30 | Refills: 0 | Status: DISCONTINUED | COMMUNITY
Start: 2022-10-11 | End: 2023-01-01

## 2023-01-01 RX ORDER — HYDROCORTISONE 25 MG/G
2.5 CREAM TOPICAL
Qty: 1 | Refills: 3 | Status: DISCONTINUED | COMMUNITY
Start: 2022-07-26 | End: 2023-01-01

## 2023-01-01 RX ORDER — METHYLPREDNISOLONE 32 MG/1
32 TABLET ORAL
Qty: 2 | Refills: 1 | Status: DISCONTINUED | COMMUNITY
Start: 2023-01-01 | End: 2023-01-01

## 2023-01-01 RX ORDER — DIPHENHYDRAMINE HCL 50 MG/1
50 CAPSULE ORAL
Qty: 1 | Refills: 0 | Status: DISCONTINUED | COMMUNITY
Start: 2023-01-01 | End: 2023-01-01

## 2023-01-01 RX ORDER — LEVOTHYROXINE SODIUM 112 UG/1
112 TABLET ORAL
Qty: 90 | Refills: 2 | Status: DISCONTINUED | COMMUNITY
Start: 2023-01-01 | End: 2023-01-01

## 2023-01-01 RX ORDER — BENZONATATE 100 MG/1
100 CAPSULE ORAL 3 TIMES DAILY
Qty: 30 | Refills: 2 | Status: DISCONTINUED | COMMUNITY
Start: 2023-01-01 | End: 2023-01-01

## 2023-01-01 RX ORDER — ONDANSETRON 8 MG/1
8 TABLET ORAL
Qty: 30 | Refills: 2 | Status: ACTIVE | COMMUNITY
Start: 2022-08-25 | End: 1900-01-01

## 2023-01-01 RX ORDER — METOCLOPRAMIDE 10 MG/1
10 TABLET ORAL EVERY 6 HOURS
Qty: 30 | Refills: 1 | Status: DISCONTINUED | COMMUNITY
Start: 2022-06-01 | End: 2023-01-01

## 2023-01-01 RX ORDER — KETOCONAZOLE 20.5 MG/ML
2 SHAMPOO, SUSPENSION TOPICAL
Qty: 1 | Refills: 0 | Status: DISCONTINUED | COMMUNITY
Start: 2022-04-04 | End: 2023-01-01

## 2023-01-01 RX ORDER — METOCLOPRAMIDE 10 MG/1
10 TABLET ORAL EVERY 6 HOURS
Qty: 30 | Refills: 2 | Status: COMPLETED | COMMUNITY
Start: 2023-01-01 | End: 2023-01-01

## 2023-01-01 RX ORDER — HYDROCODONE BITARTRATE AND HOMATROPINE METHYLBROMIDE 1.5; 5 MG/5ML; MG/5ML
5-1.5 SOLUTION ORAL
Qty: 200 | Refills: 0 | Status: ACTIVE | COMMUNITY
Start: 2023-01-01 | End: 1900-01-01

## 2023-01-01 SDOH — SOCIAL STABILITY - SOCIAL INSECURITY: PROBLEMS RELATED TO LIVING ALONE: Z60.2

## 2023-01-03 ENCOUNTER — NON-APPOINTMENT (OUTPATIENT)
Age: 68
End: 2023-01-03

## 2023-01-12 ENCOUNTER — RESULT REVIEW (OUTPATIENT)
Age: 68
End: 2023-01-12

## 2023-01-12 ENCOUNTER — APPOINTMENT (OUTPATIENT)
Dept: HEMATOLOGY ONCOLOGY | Facility: CLINIC | Age: 68
End: 2023-01-12
Payer: MEDICARE

## 2023-01-12 ENCOUNTER — APPOINTMENT (OUTPATIENT)
Dept: INFUSION THERAPY | Facility: HOSPITAL | Age: 68
End: 2023-01-12

## 2023-01-12 VITALS
DIASTOLIC BLOOD PRESSURE: 77 MMHG | TEMPERATURE: 97 F | WEIGHT: 120.15 LBS | SYSTOLIC BLOOD PRESSURE: 109 MMHG | HEIGHT: 60 IN | HEART RATE: 89 BPM | RESPIRATION RATE: 16 BRPM | OXYGEN SATURATION: 97 % | BODY MASS INDEX: 23.59 KG/M2

## 2023-01-12 LAB
ALBUMIN SERPL ELPH-MCNC: 3.7 G/DL — SIGNIFICANT CHANGE UP (ref 3.3–5)
ALP SERPL-CCNC: 138 U/L — HIGH (ref 40–120)
ALT FLD-CCNC: 26 U/L — SIGNIFICANT CHANGE UP (ref 10–45)
ANION GAP SERPL CALC-SCNC: 10 MMOL/L — SIGNIFICANT CHANGE UP (ref 5–17)
AST SERPL-CCNC: 27 U/L — SIGNIFICANT CHANGE UP (ref 10–40)
BILIRUB SERPL-MCNC: 0.3 MG/DL — SIGNIFICANT CHANGE UP (ref 0.2–1.2)
BUN SERPL-MCNC: 19 MG/DL — SIGNIFICANT CHANGE UP (ref 7–23)
CALCIUM SERPL-MCNC: 9.1 MG/DL — SIGNIFICANT CHANGE UP (ref 8.4–10.5)
CHLORIDE SERPL-SCNC: 106 MMOL/L — SIGNIFICANT CHANGE UP (ref 96–108)
CO2 SERPL-SCNC: 24 MMOL/L — SIGNIFICANT CHANGE UP (ref 22–31)
CREAT SERPL-MCNC: 0.72 MG/DL — SIGNIFICANT CHANGE UP (ref 0.5–1.3)
EGFR: 90 ML/MIN/1.73M2 — SIGNIFICANT CHANGE UP
GLUCOSE SERPL-MCNC: 131 MG/DL — HIGH (ref 70–99)
HCT VFR BLD CALC: 35.3 % — SIGNIFICANT CHANGE UP (ref 34.5–45)
HGB BLD-MCNC: 11.9 G/DL — SIGNIFICANT CHANGE UP (ref 11.5–15.5)
MAGNESIUM SERPL-MCNC: 1.7 MG/DL — SIGNIFICANT CHANGE UP (ref 1.6–2.6)
MCHC RBC-ENTMCNC: 32.6 PG — SIGNIFICANT CHANGE UP (ref 27–34)
MCHC RBC-ENTMCNC: 33.7 G/DL — SIGNIFICANT CHANGE UP (ref 32–36)
MCV RBC AUTO: 96.7 FL — SIGNIFICANT CHANGE UP (ref 80–100)
PLATELET # BLD AUTO: 253 K/UL — SIGNIFICANT CHANGE UP (ref 150–400)
POTASSIUM SERPL-MCNC: 3.6 MMOL/L — SIGNIFICANT CHANGE UP (ref 3.5–5.3)
POTASSIUM SERPL-SCNC: 3.6 MMOL/L — SIGNIFICANT CHANGE UP (ref 3.5–5.3)
PROT SERPL-MCNC: 6.7 G/DL — SIGNIFICANT CHANGE UP (ref 6–8.3)
RBC # BLD: 3.65 M/UL — LOW (ref 3.8–5.2)
RBC # FLD: 13.2 % — SIGNIFICANT CHANGE UP (ref 10.3–14.5)
SODIUM SERPL-SCNC: 139 MMOL/L — SIGNIFICANT CHANGE UP (ref 135–145)
WBC # BLD: 5.17 K/UL — SIGNIFICANT CHANGE UP (ref 3.8–10.5)
WBC # FLD AUTO: 5.17 K/UL — SIGNIFICANT CHANGE UP (ref 3.8–10.5)

## 2023-01-12 PROCEDURE — 99214 OFFICE O/P EST MOD 30 MIN: CPT

## 2023-01-13 DIAGNOSIS — E86.0 DEHYDRATION: ICD-10-CM

## 2023-01-13 LAB
CANCER AG125 SERPL-ACNC: 20 U/ML — SIGNIFICANT CHANGE UP
T4 FREE SERPL-MCNC: 2 NG/DL — HIGH (ref 0.9–1.8)
T4 FREE+ TSH PNL SERPL: 0.31 UIU/ML — SIGNIFICANT CHANGE UP (ref 0.27–4.2)

## 2023-01-16 NOTE — PHYSICAL EXAM
[Chaperone Present] : A chaperone was present in the examining room during all aspects of the physical examination [Absent] : Adnexa(ae): Absent [Normal] : no adenopathy noted in inguinal lymph nodes [FreeTextEntry1] : Monae Palmer, Medical assistant chaperoned during gynecologic exam.  [de-identified] : Pressure on exam, nontender [de-identified] : Slightly palpable L inguinal LN [de-identified] : Shortened vagina to ~1 cm [de-identified] : No nodularity, no discreet cul de sac mass

## 2023-01-16 NOTE — ASSESSMENT
[FreeTextEntry1] : 66 y/o patient with recurrent, metastatic endometrial cancer. The patient is doing well based on today's exam, continue Doxil.

## 2023-01-16 NOTE — REASON FOR VISIT
[FreeTextEntry1] : [place] Location \par \par [facility] [place]. [phone]\par [address] [place] [id number] \par \par Recurrent metastatic endometrial cancer\par Lenvima and Pembrolizumab combination therapy on 3/17/21 \par POD on CT 9/2022\par Patient was started on Doxil 10/10/22

## 2023-01-16 NOTE — HISTORY OF PRESENT ILLNESS
[FreeTextEntry1] : 67 year old returns for interval oncologic surveillance offering no new complaints including no abdominopelvic pain, vaginal or rectal bleeding, chest pain or shortness of breath. Normal bladder function. \par \par Patient was being treated with Pembrolizumab and Lenvima since 3/17/21 with stability of disease. Tx was held after August due to immunotherapy -associated diarrhea/colitis. \par \par CT C/A/P 9/9/22 revealed pulmonary disease without significant change. Progressive interval increase soft tissue nodularity along the right vaginal cuff and abutting the bladder. Colitis of the descending and rectosigmoid colon.\par \par Pembro/lenvima was discontinued due to POD. Patient was started on Doxil 10/10/22, s/p C3 on 12/5.\par \par Ca125 11/21/22 was 20 \par \par Patient has upcoming CT scheduled for 12/20/22\par \par \par HM: \par Mammogram 9/8/22: BI-RADS 3 recommended mammogram in 2 months \par Colonoscopy 8/5/22 \par \par \par Patient is happy with her new [facility].

## 2023-01-17 ENCOUNTER — APPOINTMENT (OUTPATIENT)
Dept: ENDOCRINOLOGY | Facility: CLINIC | Age: 68
End: 2023-01-17

## 2023-01-17 NOTE — PHYSICAL EXAM
[Mucositis] : no mucositis [Thrush] : no thrush [Vesicles] : no vesicles [de-identified] : EOMI, anicteric  [de-identified] : normal respiratory effort, no audible breath sounds

## 2023-01-17 NOTE — HISTORY OF PRESENT ILLNESS
[de-identified] : Referred by Dr. Gaytan\par \par Ms. Hoang is a 66 y/o G0 postmenopausal F who was referred to medical oncology for treatment considerations for recurrent endometrial cancer.\par She was initially diagnosed with stage II grade 2 endometrial cancer in 2018, she underwent underwent robotic assisted TLH BSO, bilateral pelvic and para-aortic sentinel LN dissection by Dr. Paras Grayson on 2018.  Her surgical pathology demonstrated pT2N0 disease with endometrial tumor measuring 4cm, FIGO 2 endometrioid adenocarcinoma, >90 myometrial invasion with involvement of cervical stroma, +LVI with IHC of MMR proteins with intact expression but pelvic wash negative for malignant cells.  On 10/2018, she completed pelvic IMRT and vaginal cuff brachytherapy with Dr. Bowling.  She did well clinically under surveillance until 2019 when she developed vaginal spotting and fluid discharge, and was evaluated by Dr. Grayson. CA-125 was 27. Concern for possible recurrence and further workup pursued.  Her 19 CT CAP demonstrate new bilateral pulmonary nodules, some with central cavitation, suspicious for metastatic disease, enhancing nodularity superior to the vaginal cuff concerning for metastatic disease, measuring 1.9x1.6cm.  On 10/30/19 she underwent thorascopic multiple RLL wedge resections with Dr. Weston and her surgical pathology c/w metastatic adenocarcinoma, consistent with patient's endometrial primary +ER [place] Pax8.  She was recommended further treatment for her recurrent endometrial cancer but the patient was lost to follow up until 2020 several months before which she has noticed increasing hardness and "lumps" in her vagina, which has been progressively more painful and burning sensation. She was subsequently evaluated by Dr. Gaytan 2020 and referred to medical oncology for systemic treatment evaluation.   On presentation for initial consult 2020, she continued to have vaginal discharge and on and off spotting (not more than 1 pad a day). She felt constipated. She was taking Percocet 5-325, Tylenol and ibuprofen for vaginal pain. She had been very busy with work and family affairs, was not able to come for cancer treatment over the past months. 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 \par \par \par Patient started on Lenvima and Pembrolizumab combination therapy on 3/17/21.  Given increasing vaginal pain and increased mass involving the vaginal cuff region, she underwent palliative RT to the vaginal mass and cuff area with Dr. Bowling. She had improved pain and had been able to tolerate Lenvima without significant toxicities. I reviewed my recommendations to continue current regimen with pembrolizumab and lenvima as lenvima had been on hold for several weeks due to difficulty tolerating treatment and pain control issues. \par \par CT c/a/p done on 2021 which showed favorable response to therapy. Left inguinal node decreased in size. Vaginal mass decreased in size. Pulmonary nodules decreased in size.  \par \par Her CT c/a/p from 2021 showed pulmonary nodules. While some are stable in size, there is mild increase in pulmonary nodule in the right upper lobe.  3 x 5 mm nodular focus of higher attenuation suggesting enhancement within a right renal cyst. This is slightly better seen on the current examination although may be exaggerated by volume averaging.  [de-identified] :  ER +  AL +  PAX 8  +     MSI STABLE [de-identified] : Refuses COVID 19 vaccine  [FreeTextEntry1] :  Lenvima and Pembrolizumab (held since 6/1) [de-identified] : Patient presents today after C4 Doxil. In the interim, she had CT scans on 12/23. Patient feels fatigued first week after chemotherapy with improvement in the subsequent weeks. Reports intermittent nausea in the afternoon. Since last visit she developed URI symptoms with sore throat, cough and generalized weakness. Patient is feeling better overall and staying active, eating/drinking better. She has regular bowel movements. Denies fever, chills, mouth sores, CP, palpitations, cough, LOMAS, n/v/d/c, abdominal pain, LE edema, vaginal discharge or bleeding, urinary symptoms, excessive bruising, dizziness, headaches, arthralgias, myalgias. \par

## 2023-01-17 NOTE — REVIEW OF SYSTEMS
[Recent Change In Weight] : ~T no recent weight change [Vision Problems] : no vision problems [Chest Pain] : no chest pain [Lower Ext Edema] : no lower extremity edema [Cough] : no cough [Diarrhea] : no diarrhea [Vaginal Discharge] : no vaginal discharge [Joint Pain] : no joint pain [Muscle Pain] : no muscle pain [Difficulty Walking] : no difficulty walking [FreeTextEntry2] : weight change

## 2023-01-19 ENCOUNTER — RESULT REVIEW (OUTPATIENT)
Age: 68
End: 2023-01-19

## 2023-01-19 ENCOUNTER — APPOINTMENT (OUTPATIENT)
Dept: INFUSION THERAPY | Facility: HOSPITAL | Age: 68
End: 2023-01-19

## 2023-01-19 LAB
BASOPHILS # BLD AUTO: 0.01 K/UL — SIGNIFICANT CHANGE UP (ref 0–0.2)
BASOPHILS NFR BLD AUTO: 0.2 % — SIGNIFICANT CHANGE UP (ref 0–2)
EOSINOPHIL # BLD AUTO: 0.1 K/UL — SIGNIFICANT CHANGE UP (ref 0–0.5)
EOSINOPHIL NFR BLD AUTO: 1.7 % — SIGNIFICANT CHANGE UP (ref 0–6)
HCT VFR BLD CALC: 32.9 % — LOW (ref 34.5–45)
HGB BLD-MCNC: 11.2 G/DL — LOW (ref 11.5–15.5)
IMM GRANULOCYTES NFR BLD AUTO: 0.7 % — SIGNIFICANT CHANGE UP (ref 0–0.9)
LYMPHOCYTES # BLD AUTO: 0.74 K/UL — LOW (ref 1–3.3)
LYMPHOCYTES # BLD AUTO: 12.6 % — LOW (ref 13–44)
MCHC RBC-ENTMCNC: 32.6 PG — SIGNIFICANT CHANGE UP (ref 27–34)
MCHC RBC-ENTMCNC: 34 G/DL — SIGNIFICANT CHANGE UP (ref 32–36)
MCV RBC AUTO: 95.6 FL — SIGNIFICANT CHANGE UP (ref 80–100)
MONOCYTES # BLD AUTO: 0.77 K/UL — SIGNIFICANT CHANGE UP (ref 0–0.9)
MONOCYTES NFR BLD AUTO: 13.2 % — SIGNIFICANT CHANGE UP (ref 2–14)
NEUTROPHILS # BLD AUTO: 4.19 K/UL — SIGNIFICANT CHANGE UP (ref 1.8–7.4)
NEUTROPHILS NFR BLD AUTO: 71.6 % — SIGNIFICANT CHANGE UP (ref 43–77)
NRBC # BLD: 0 /100 WBCS — SIGNIFICANT CHANGE UP (ref 0–0)
PLATELET # BLD AUTO: 217 K/UL — SIGNIFICANT CHANGE UP (ref 150–400)
RBC # BLD: 3.44 M/UL — LOW (ref 3.8–5.2)
RBC # FLD: 13.2 % — SIGNIFICANT CHANGE UP (ref 10.3–14.5)
WBC # BLD: 5.85 K/UL — SIGNIFICANT CHANGE UP (ref 3.8–10.5)
WBC # FLD AUTO: 5.85 K/UL — SIGNIFICANT CHANGE UP (ref 3.8–10.5)

## 2023-02-07 ENCOUNTER — NON-APPOINTMENT (OUTPATIENT)
Age: 68
End: 2023-02-07

## 2023-02-08 ENCOUNTER — OUTPATIENT (OUTPATIENT)
Dept: OUTPATIENT SERVICES | Facility: HOSPITAL | Age: 68
LOS: 1 days | Discharge: HOME | End: 2023-02-08

## 2023-02-08 DIAGNOSIS — Z98.890 OTHER SPECIFIED POSTPROCEDURAL STATES: Chronic | ICD-10-CM

## 2023-02-08 DIAGNOSIS — C54.1 MALIGNANT NEOPLASM OF ENDOMETRIUM: ICD-10-CM

## 2023-02-08 DIAGNOSIS — Z90.710 ACQUIRED ABSENCE OF BOTH CERVIX AND UTERUS: Chronic | ICD-10-CM

## 2023-02-13 ENCOUNTER — RESULT REVIEW (OUTPATIENT)
Age: 68
End: 2023-02-13

## 2023-02-13 ENCOUNTER — APPOINTMENT (OUTPATIENT)
Dept: INFUSION THERAPY | Facility: HOSPITAL | Age: 68
End: 2023-02-13

## 2023-02-13 ENCOUNTER — APPOINTMENT (OUTPATIENT)
Dept: HEMATOLOGY ONCOLOGY | Facility: CLINIC | Age: 68
End: 2023-02-13
Payer: MEDICARE

## 2023-02-13 VITALS
WEIGHT: 121.03 LBS | HEART RATE: 68 BPM | HEIGHT: 60 IN | TEMPERATURE: 97.4 F | BODY MASS INDEX: 23.76 KG/M2 | OXYGEN SATURATION: 97 % | RESPIRATION RATE: 16 BRPM | SYSTOLIC BLOOD PRESSURE: 126 MMHG | DIASTOLIC BLOOD PRESSURE: 83 MMHG

## 2023-02-13 LAB
ALBUMIN SERPL ELPH-MCNC: 3.8 G/DL — SIGNIFICANT CHANGE UP (ref 3.3–5)
ALP SERPL-CCNC: 160 U/L — HIGH (ref 40–120)
ALT FLD-CCNC: 28 U/L — SIGNIFICANT CHANGE UP (ref 10–45)
ANION GAP SERPL CALC-SCNC: 12 MMOL/L — SIGNIFICANT CHANGE UP (ref 5–17)
AST SERPL-CCNC: 25 U/L — SIGNIFICANT CHANGE UP (ref 10–40)
BILIRUB SERPL-MCNC: 0.2 MG/DL — SIGNIFICANT CHANGE UP (ref 0.2–1.2)
BUN SERPL-MCNC: 20 MG/DL — SIGNIFICANT CHANGE UP (ref 7–23)
CALCIUM SERPL-MCNC: 9.5 MG/DL — SIGNIFICANT CHANGE UP (ref 8.4–10.5)
CANCER AG125 SERPL-ACNC: 25 U/ML — SIGNIFICANT CHANGE UP
CHLORIDE SERPL-SCNC: 106 MMOL/L — SIGNIFICANT CHANGE UP (ref 96–108)
CO2 SERPL-SCNC: 20 MMOL/L — LOW (ref 22–31)
CREAT SERPL-MCNC: 0.71 MG/DL — SIGNIFICANT CHANGE UP (ref 0.5–1.3)
EGFR: 92 ML/MIN/1.73M2 — SIGNIFICANT CHANGE UP
GLUCOSE SERPL-MCNC: 107 MG/DL — HIGH (ref 70–99)
HCT VFR BLD CALC: 34.2 % — LOW (ref 34.5–45)
HGB BLD-MCNC: 11.5 G/DL — SIGNIFICANT CHANGE UP (ref 11.5–15.5)
MAGNESIUM SERPL-MCNC: 1.7 MG/DL — SIGNIFICANT CHANGE UP (ref 1.6–2.6)
MCHC RBC-ENTMCNC: 32.3 PG — SIGNIFICANT CHANGE UP (ref 27–34)
MCHC RBC-ENTMCNC: 33.6 G/DL — SIGNIFICANT CHANGE UP (ref 32–36)
MCV RBC AUTO: 96.1 FL — SIGNIFICANT CHANGE UP (ref 80–100)
PLATELET # BLD AUTO: 348 K/UL — SIGNIFICANT CHANGE UP (ref 150–400)
POTASSIUM SERPL-MCNC: 4.4 MMOL/L — SIGNIFICANT CHANGE UP (ref 3.5–5.3)
POTASSIUM SERPL-SCNC: 4.4 MMOL/L — SIGNIFICANT CHANGE UP (ref 3.5–5.3)
PROT SERPL-MCNC: 6.5 G/DL — SIGNIFICANT CHANGE UP (ref 6–8.3)
RBC # BLD: 3.56 M/UL — LOW (ref 3.8–5.2)
RBC # FLD: 13.6 % — SIGNIFICANT CHANGE UP (ref 10.3–14.5)
SODIUM SERPL-SCNC: 138 MMOL/L — SIGNIFICANT CHANGE UP (ref 135–145)
T4 FREE+ TSH PNL SERPL: 0.11 UIU/ML — LOW (ref 0.27–4.2)
WBC # BLD: 4.25 K/UL — SIGNIFICANT CHANGE UP (ref 3.8–10.5)
WBC # FLD AUTO: 4.25 K/UL — SIGNIFICANT CHANGE UP (ref 3.8–10.5)

## 2023-02-13 PROCEDURE — 99214 OFFICE O/P EST MOD 30 MIN: CPT

## 2023-02-16 ENCOUNTER — RESULT REVIEW (OUTPATIENT)
Age: 68
End: 2023-02-16

## 2023-02-16 ENCOUNTER — APPOINTMENT (OUTPATIENT)
Dept: INFUSION THERAPY | Facility: HOSPITAL | Age: 68
End: 2023-02-16

## 2023-02-16 LAB
BASOPHILS # BLD AUTO: 0.03 K/UL — SIGNIFICANT CHANGE UP (ref 0–0.2)
BASOPHILS NFR BLD AUTO: 0.7 % — SIGNIFICANT CHANGE UP (ref 0–2)
EOSINOPHIL # BLD AUTO: 0.01 K/UL — SIGNIFICANT CHANGE UP (ref 0–0.5)
EOSINOPHIL NFR BLD AUTO: 0.2 % — SIGNIFICANT CHANGE UP (ref 0–6)
HCT VFR BLD CALC: 34.4 % — LOW (ref 34.5–45)
HGB BLD-MCNC: 11.9 G/DL — SIGNIFICANT CHANGE UP (ref 11.5–15.5)
IMM GRANULOCYTES NFR BLD AUTO: 0.9 % — SIGNIFICANT CHANGE UP (ref 0–0.9)
LYMPHOCYTES # BLD AUTO: 0.99 K/UL — LOW (ref 1–3.3)
LYMPHOCYTES # BLD AUTO: 22.3 % — SIGNIFICANT CHANGE UP (ref 13–44)
MCHC RBC-ENTMCNC: 32.5 PG — SIGNIFICANT CHANGE UP (ref 27–34)
MCHC RBC-ENTMCNC: 34.6 G/DL — SIGNIFICANT CHANGE UP (ref 32–36)
MCV RBC AUTO: 94 FL — SIGNIFICANT CHANGE UP (ref 80–100)
MONOCYTES # BLD AUTO: 0.73 K/UL — SIGNIFICANT CHANGE UP (ref 0–0.9)
MONOCYTES NFR BLD AUTO: 16.5 % — HIGH (ref 2–14)
NEUTROPHILS # BLD AUTO: 2.63 K/UL — SIGNIFICANT CHANGE UP (ref 1.8–7.4)
NEUTROPHILS NFR BLD AUTO: 59.4 % — SIGNIFICANT CHANGE UP (ref 43–77)
NRBC # BLD: 0 /100 WBCS — SIGNIFICANT CHANGE UP (ref 0–0)
PLATELET # BLD AUTO: 236 K/UL — SIGNIFICANT CHANGE UP (ref 150–400)
RBC # BLD: 3.66 M/UL — LOW (ref 3.8–5.2)
RBC # FLD: 13.9 % — SIGNIFICANT CHANGE UP (ref 10.3–14.5)
WBC # BLD: 4.43 K/UL — SIGNIFICANT CHANGE UP (ref 3.8–10.5)
WBC # FLD AUTO: 4.43 K/UL — SIGNIFICANT CHANGE UP (ref 3.8–10.5)

## 2023-02-17 ENCOUNTER — RESULT REVIEW (OUTPATIENT)
Age: 68
End: 2023-02-17

## 2023-02-17 DIAGNOSIS — Z51.11 ENCOUNTER FOR ANTINEOPLASTIC CHEMOTHERAPY: ICD-10-CM

## 2023-02-17 DIAGNOSIS — R11.2 NAUSEA WITH VOMITING, UNSPECIFIED: ICD-10-CM

## 2023-02-21 NOTE — HISTORY OF PRESENT ILLNESS
[Disease: _____________________] : Disease: [unfilled] [T: ___] : T[unfilled] [N: ___] : N[unfilled] [M: ___] : M[unfilled] [AJCC Stage: ____] : AJCC Stage: [unfilled] [de-identified] : Referred by Dr. Gaytan\par \par Ms. Hoang is a 66 y/o G0 postmenopausal F who was referred to medical oncology for treatment considerations for recurrent endometrial cancer.\par She was initially diagnosed with stage II grade 2 endometrial cancer in 2018, she underwent underwent robotic assisted TLH BSO, bilateral pelvic and para-aortic sentinel LN dissection by Dr. Paras Grayson on 2018.  Her surgical pathology demonstrated pT2N0 disease with endometrial tumor measuring 4cm, FIGO 2 endometrioid adenocarcinoma, >90 myometrial invasion with involvement of cervical stroma, +LVI with IHC of MMR proteins with intact expression but pelvic wash negative for malignant cells.  On 10/2018, she completed pelvic IMRT and vaginal cuff brachytherapy with Dr. Bowling.  She did well clinically under surveillance until 2019 when she developed vaginal spotting and fluid discharge, and was evaluated by Dr. Grayson. CA-125 was 27. Concern for possible recurrence and further workup pursued.  Her 19 CT CAP demonstrate new bilateral pulmonary nodules, some with central cavitation, suspicious for metastatic disease, enhancing nodularity superior to the vaginal cuff concerning for metastatic disease, measuring 1.9x1.6cm.  On 10/30/19 she underwent thorascopic multiple RLL wedge resections with Dr. Weston and her surgical pathology c/w metastatic adenocarcinoma, consistent with patient's endometrial primary +ER [place] Pax8.  She was recommended further treatment for her recurrent endometrial cancer but the patient was lost to follow up until 2020 several months before which she has noticed increasing hardness and "lumps" in her vagina, which has been progressively more painful and burning sensation. She was subsequently evaluated by Dr. Gaytan 2020 and referred to medical oncology for systemic treatment evaluation.   On presentation for initial consult 2020, she continued to have vaginal discharge and on and off spotting (not more than 1 pad a day). She felt constipated. She was taking Percocet 5-325, Tylenol and ibuprofen for vaginal pain. She had been very busy with work and family affairs, was not able to come for cancer treatment over the past months. 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 \par \par \par Patient started on Lenvima and Pembrolizumab combination therapy on 3/17/21.  Given increasing vaginal pain and increased mass involving the vaginal cuff region, she underwent palliative RT to the vaginal mass and cuff area with Dr. Bowling. She had improved pain and had been able to tolerate Lenvima without significant toxicities. I reviewed my recommendations to continue current regimen with pembrolizumab and lenvima as lenvima had been on hold for several weeks due to difficulty tolerating treatment and pain control issues. \par \par CT c/a/p done on 2021 which showed favorable response to therapy. Left inguinal node decreased in size. Vaginal mass decreased in size. Pulmonary nodules decreased in size.  \par \par Her CT c/a/p from 2021 showed pulmonary nodules. While some are stable in size, there is mild increase in pulmonary nodule in the right upper lobe.  3 x 5 mm nodular focus of higher attenuation suggesting enhancement within a right renal cyst. This is slightly better seen on the current examination although may be exaggerated by volume averaging.  [de-identified] :  ER +  ID +  PAX 8  +     MSI STABLE [de-identified] : Refuses COVID 19 vaccine  [FreeTextEntry1] :  Lenvima and Pembrolizumab (held since 6/1) --> Doxil started  [de-identified] : Patient presents today after C5 Doxil. Last treatment we delayed x 1 week due to URI. She reports 2 episodes of diarrhea this morning, resolved with imodium. She tolerated cycle 5 of doxil well overall, reports mild fatigue, constipation relieved by OTC stool softeners, night sweats. She feels that this treatment has been overall better tolerated than previous regimens. She states she has a good appetite, eating and drinking well, reports weight gain since last visit. Denies fever, chills, mouth sores, CP, palpitations, cough, LOMAS, n/v/d, abdominal pain, LE edema, vaginal discharge or bleeding, urinary symptoms, excessive bruising, dizziness, headaches, arthralgias, myalgias.

## 2023-02-21 NOTE — RESULTS/DATA
[FreeTextEntry1] : 1/4/22\par CT chest/abd/pelvis: \par \par FINDINGS:\par CHEST:\par LUNGS AND LARGE AIRWAYS: Patent central airways. Post surgical changes in the right lower lobe related to prior wedge resection. Redemonstrated scattered pulmonary nodules. Several have increased in size from the prior exam of 11/16/2021, while others have remained stable in size. Reference nodules as below:\par *  Left upper lobe nodule located anteromedially measuring 1.1 x 0.8 cm (series 5 image 40), previously 1.0 x 0.8 cm.\par *  Right upper lobe nodule located posteromedially measuring 1.8 x 1.7 cm (series 5 image 40), previously 1.3 x 1.2 cm.\par *  Left upper lobe nodule at the apex demonstrating spiculation measuring 2.0 x 1.4 cm (series 5 image 12), which no longer demonstrates cavitation as it had on the prior exam of 11/16/2021, at which time it measured 1.8 x 1.3 cm.\par *  Right upper lobe nodule measuring 1.5 x 1.5 cm (series 5 image 43), previously 0.9 x 0.8 cm.\par PLEURA: No pleural effusion.\par VESSELS: Right chest wall port, catheter tip terminating at the cavoatrial junction.\par HEART: Heart size is normal. No pericardial effusion.\par MEDIASTINUM AND VIDAL: No lymphadenopathy.\par CHEST WALL AND LOWER NECK: Right chest wall port.\par \par ABDOMEN AND PELVIS:\par LIVER: Within normal limits.\par BILE DUCTS: Mild prominence of the common bile duct to 8 mm in caliber, unchanged since 9/17/2019.\par GALLBLADDER: Within normal limits.\par SPLEEN: Within normal limits.\par PANCREAS: Hypoattenuating pancreatic head lesion measuring 8 x 6 mm, unchanged from the prior exam of 9/17/2019. An additional hypoattenuating lesion in the pancreatic tail measuring 5 x 4 mm (series 2 image 67) is also unchanged from 9/17/2019. No main pancreatic duct dilation.\par ADRENALS: Within normal limits.\par KIDNEYS/URETERS: Cyst within the upper pole of the right kidney measuring 3.5 x 2.2 cm and contains septation that measures 2 mm in thickness (series 602 image 61). This is without significant interval change from 11/16/2021 when remeasured in similar fashion, and previously measured 3.1 x 2.0 cm on 9/17/2019 without change in septal thickening. There is a cyst within the left lower pole that measures 3.5 x 2.7 cm and contains a septation measuring 2 mm in thickness (series 601 image 37), also without significant interval change from the prior exam of 11/16/2021 and previously measuring 3.0 x 2.4 cm on 9/17/2019 without change of septal thickening. Additional bilateral renal cysts, as well as subcentimeter hypodense lesions that are too small to characterize. No hydronephrosis.\par \par BLADDER: Underdistended.\par REPRODUCTIVE ORGANS: Hysterectomy. Ill-defined nodularity along the left lateral inferior aspect of the vagina measuring approximately 1.9 x 1.4 cm (series 2 image 153), corresponding to the site of previously described necrotic mass on 5/18/2021. This previously measured approximately 2.7 x 1.9 cm on 11/16/2021, and 2.9 x 2.4 cm on 8/19/2021.\par \par BOWEL: No bowel obstruction. Appendix is normal.\par PERITONEUM: No ascites.\par VESSELS: Within normal limits.\par RETROPERITONEUM/LYMPH NODES: No interval change in a left inguinal lymph node, measures 9 x 9 mm (series 2 image 148).\par ABDOMINAL WALL: Within normal limits.\par BONES: Degenerative changes. Osseous demineralization. Unchanged mild loss of height of the superior endplate of T3.\par \par IMPRESSION:\par Bilateral pulmonary nodules, several showing interval increase in size since 11/16/2021, while others have remained stable.\par \par Ill-defined nodularity along the left lateral inferior aspect of the vagina, which has been decreasing in size over the last several CT exams.\par \par Unchanged left inguinal lymph node.\par  Staging Info: By selecting yes to the question above you will include information on AJCC 8 tumor staging in your Mohs note. Information on tumor staging will be automatically added for SCCs on the head and neck. AJCC 8 includes tumor size, tumor depth, perineural involvement and bone invasion.

## 2023-02-21 NOTE — PHYSICAL EXAM
[Restricted in physically strenuous activity but ambulatory and able to carry out work of a light or sedentary nature] : Status 1- Restricted in physically strenuous activity but ambulatory and able to carry out work of a light or sedentary nature, e.g., light house work, office work [Normal] : affect appropriate [Mucositis] : no mucositis [Thrush] : no thrush [Vesicles] : no vesicles [de-identified] : EOMI, anicteric  [de-identified] : normal respiratory effort, no audible breath sounds

## 2023-03-01 ENCOUNTER — OUTPATIENT (OUTPATIENT)
Dept: OUTPATIENT SERVICES | Facility: HOSPITAL | Age: 68
LOS: 1 days | End: 2023-03-01
Payer: MEDICARE

## 2023-03-01 ENCOUNTER — APPOINTMENT (OUTPATIENT)
Dept: CT IMAGING | Facility: IMAGING CENTER | Age: 68
End: 2023-03-01
Payer: MEDICARE

## 2023-03-01 DIAGNOSIS — Z98.890 OTHER SPECIFIED POSTPROCEDURAL STATES: Chronic | ICD-10-CM

## 2023-03-01 DIAGNOSIS — C54.1 MALIGNANT NEOPLASM OF ENDOMETRIUM: ICD-10-CM

## 2023-03-01 DIAGNOSIS — Z90.710 ACQUIRED ABSENCE OF BOTH CERVIX AND UTERUS: Chronic | ICD-10-CM

## 2023-03-01 PROCEDURE — 71260 CT THORAX DX C+: CPT

## 2023-03-01 PROCEDURE — 74177 CT ABD & PELVIS W/CONTRAST: CPT

## 2023-03-01 PROCEDURE — 71260 CT THORAX DX C+: CPT | Mod: 26,MH

## 2023-03-01 PROCEDURE — 74177 CT ABD & PELVIS W/CONTRAST: CPT | Mod: 26,MH

## 2023-03-06 ENCOUNTER — RESULT REVIEW (OUTPATIENT)
Age: 68
End: 2023-03-06

## 2023-03-06 ENCOUNTER — APPOINTMENT (OUTPATIENT)
Dept: HEMATOLOGY ONCOLOGY | Facility: CLINIC | Age: 68
End: 2023-03-06
Payer: MEDICARE

## 2023-03-06 ENCOUNTER — APPOINTMENT (OUTPATIENT)
Dept: INFUSION THERAPY | Facility: HOSPITAL | Age: 68
End: 2023-03-06

## 2023-03-06 VITALS
HEIGHT: 60 IN | OXYGEN SATURATION: 99 % | WEIGHT: 121.01 LBS | TEMPERATURE: 97 F | RESPIRATION RATE: 17 BRPM | BODY MASS INDEX: 23.76 KG/M2 | DIASTOLIC BLOOD PRESSURE: 91 MMHG | HEART RATE: 130 BPM | SYSTOLIC BLOOD PRESSURE: 134 MMHG

## 2023-03-06 LAB
ALBUMIN SERPL ELPH-MCNC: 4 G/DL — SIGNIFICANT CHANGE UP (ref 3.3–5)
ALP SERPL-CCNC: 157 U/L — HIGH (ref 40–120)
ALT FLD-CCNC: 27 U/L — SIGNIFICANT CHANGE UP (ref 10–45)
ANION GAP SERPL CALC-SCNC: 11 MMOL/L — SIGNIFICANT CHANGE UP (ref 5–17)
AST SERPL-CCNC: 20 U/L — SIGNIFICANT CHANGE UP (ref 10–40)
BILIRUB SERPL-MCNC: 0.2 MG/DL — SIGNIFICANT CHANGE UP (ref 0.2–1.2)
BUN SERPL-MCNC: 17 MG/DL — SIGNIFICANT CHANGE UP (ref 7–23)
CALCIUM SERPL-MCNC: 9.6 MG/DL — SIGNIFICANT CHANGE UP (ref 8.4–10.5)
CHLORIDE SERPL-SCNC: 103 MMOL/L — SIGNIFICANT CHANGE UP (ref 96–108)
CO2 SERPL-SCNC: 25 MMOL/L — SIGNIFICANT CHANGE UP (ref 22–31)
CREAT SERPL-MCNC: 0.71 MG/DL — SIGNIFICANT CHANGE UP (ref 0.5–1.3)
EGFR: 92 ML/MIN/1.73M2 — SIGNIFICANT CHANGE UP
GLUCOSE SERPL-MCNC: 111 MG/DL — HIGH (ref 70–99)
HCT VFR BLD CALC: 34.8 % — SIGNIFICANT CHANGE UP (ref 34.5–45)
HGB BLD-MCNC: 11.8 G/DL — SIGNIFICANT CHANGE UP (ref 11.5–15.5)
MAGNESIUM SERPL-MCNC: 1.9 MG/DL — SIGNIFICANT CHANGE UP (ref 1.6–2.6)
MCHC RBC-ENTMCNC: 32.2 PG — SIGNIFICANT CHANGE UP (ref 27–34)
MCHC RBC-ENTMCNC: 33.9 G/DL — SIGNIFICANT CHANGE UP (ref 32–36)
MCV RBC AUTO: 95.1 FL — SIGNIFICANT CHANGE UP (ref 80–100)
PLATELET # BLD AUTO: 260 K/UL — SIGNIFICANT CHANGE UP (ref 150–400)
POTASSIUM SERPL-MCNC: 4.6 MMOL/L — SIGNIFICANT CHANGE UP (ref 3.5–5.3)
POTASSIUM SERPL-SCNC: 4.6 MMOL/L — SIGNIFICANT CHANGE UP (ref 3.5–5.3)
PROT SERPL-MCNC: 6.7 G/DL — SIGNIFICANT CHANGE UP (ref 6–8.3)
RBC # BLD: 3.66 M/UL — LOW (ref 3.8–5.2)
RBC # FLD: 14.5 % — SIGNIFICANT CHANGE UP (ref 10.3–14.5)
SODIUM SERPL-SCNC: 140 MMOL/L — SIGNIFICANT CHANGE UP (ref 135–145)
WBC # BLD: 3.11 K/UL — LOW (ref 3.8–10.5)
WBC # FLD AUTO: 3.11 K/UL — LOW (ref 3.8–10.5)

## 2023-03-06 PROCEDURE — 99214 OFFICE O/P EST MOD 30 MIN: CPT

## 2023-03-07 LAB
CANCER AG125 SERPL-ACNC: 30 U/ML — SIGNIFICANT CHANGE UP
T4 FREE+ TSH PNL SERPL: 0.05 UIU/ML — LOW (ref 0.27–4.2)

## 2023-03-16 ENCOUNTER — APPOINTMENT (OUTPATIENT)
Dept: HEMATOLOGY ONCOLOGY | Facility: CLINIC | Age: 68
End: 2023-03-16

## 2023-03-16 ENCOUNTER — APPOINTMENT (OUTPATIENT)
Dept: INFUSION THERAPY | Facility: HOSPITAL | Age: 68
End: 2023-03-16

## 2023-03-17 ENCOUNTER — APPOINTMENT (OUTPATIENT)
Dept: GYNECOLOGIC ONCOLOGY | Facility: CLINIC | Age: 68
End: 2023-03-17
Payer: MEDICARE

## 2023-03-17 PROCEDURE — 99214 OFFICE O/P EST MOD 30 MIN: CPT | Mod: 25

## 2023-03-17 PROCEDURE — 57100 BIOPSY VAGINAL MUCOSA SIMPLE: CPT

## 2023-03-17 NOTE — PROCEDURE
[Other ___] : [unfilled] [Other: ___] : [unfilled] [Patient] : the patient [Verbal Consent] : verbal consent was obtained prior to the procedure and is detailed in the patient's record [Silver Nitrate] : silver nitrate [Yes] : the specimen was sent to pathology [No Complications] : none [Tolerated Well] : the patient tolerated the procedure well [Post procedure instructions and information given] : post procedure instructions and information given and reviewed with patient.

## 2023-03-28 NOTE — PHYSICAL EXAM
[Restricted in physically strenuous activity but ambulatory and able to carry out work of a light or sedentary nature] : Status 1- Restricted in physically strenuous activity but ambulatory and able to carry out work of a light or sedentary nature, e.g., light house work, office work [Normal] : affect appropriate [Mucositis] : no mucositis [Thrush] : no thrush [Vesicles] : no vesicles [de-identified] : EOMI, anicteric  [de-identified] : normal respiratory effort, no audible breath sounds

## 2023-03-28 NOTE — HISTORY OF PRESENT ILLNESS
[Disease: _____________________] : Disease: [unfilled] [T: ___] : T[unfilled] [N: ___] : N[unfilled] [M: ___] : M[unfilled] [AJCC Stage: ____] : AJCC Stage: [unfilled] [de-identified] : Referred by Dr. Gaytan\par \par Ms. Hoang is a 68 y/o G0 postmenopausal F who was referred to medical oncology for treatment considerations for recurrent endometrial cancer.\par She was initially diagnosed with stage II grade 2 endometrial cancer in 2018, she underwent underwent robotic assisted TLH BSO, bilateral pelvic and para-aortic sentinel LN dissection by Dr. Paras Grayson on 2018.  Her surgical pathology demonstrated pT2N0 disease with endometrial tumor measuring 4cm, FIGO 2 endometrioid adenocarcinoma, >90 myometrial invasion with involvement of cervical stroma, +LVI with IHC of MMR proteins with intact expression but pelvic wash negative for malignant cells.  On 10/2018, she completed pelvic IMRT and vaginal cuff brachytherapy with Dr. Bowling.  She did well clinically under surveillance until 2019 when she developed vaginal spotting and fluid discharge, and was evaluated by Dr. Grayson. CA-125 was 27. Concern for possible recurrence and further workup pursued.  Her 19 CT CAP demonstrate new bilateral pulmonary nodules, some with central cavitation, suspicious for metastatic disease, enhancing nodularity superior to the vaginal cuff concerning for metastatic disease, measuring 1.9x1.6cm.  On 10/30/19 she underwent thorascopic multiple RLL wedge resections with Dr. Weston and her surgical pathology c/w metastatic adenocarcinoma, consistent with patient's endometrial primary +ER CT Pax8.  She was recommended further treatment for her recurrent endometrial cancer but the patient was lost to follow up until 2020 several months before which she has noticed increasing hardness and "lumps" in her vagina, which has been progressively more painful and burning sensation. She was subsequently evaluated by Dr. Gaytan 2020 and referred to medical oncology for systemic treatment evaluation.   On presentation for initial consult 2020, she continued to have vaginal discharge and on and off spotting (not more than 1 pad a day). She felt constipated. She was taking Percocet 5-325, Tylenol and ibuprofen for vaginal pain. She had been very busy with work and family affairs, was not able to come for cancer treatment over the past months. 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 \par \par \par Patient started on Lenvima and Pembrolizumab combination therapy on 3/17/21.  Given increasing vaginal pain and increased mass involving the vaginal cuff region, she underwent palliative RT to the vaginal mass and cuff area with Dr. Bowling. She had improved pain and had been able to tolerate Lenvima without significant toxicities. I reviewed my recommendations to continue current regimen with pembrolizumab and lenvima as lenvima had been on hold for several weeks due to difficulty tolerating treatment and pain control issues. \par \par CT c/a/p done on 2021 which showed favorable response to therapy. Left inguinal node decreased in size. Vaginal mass decreased in size. Pulmonary nodules decreased in size.  \par \par Her CT c/a/p from 2021 showed pulmonary nodules. While some are stable in size, there is mild increase in pulmonary nodule in the right upper lobe.  3 x 5 mm nodular focus of higher attenuation suggesting enhancement within a right renal cyst. This is slightly better seen on the current examination although may be exaggerated by volume averaging.  [de-identified] :  ER +  ND +  PAX 8  +     MSI STABLE [de-identified] : Refuses COVID 19 vaccine  [FreeTextEntry1] :  Lenvima and Pembrolizumab (held since 6/1) --> Doxil started  [de-identified] : Patient presents today after C6 Doxil. In the interim patient had CT scans, that demonstrates progressive disease. Angela is feeling well overall without any new symptoms ro report. She has been tolerating the last cycle of chemotherapy with increased fatigue during the first week of two following treatment, which resolves during the 3rd week and she is able to keep active. She has a good appetite overall and is eating/drinking without n/v. Intermittent constipation, stable. She denies fevers, chills, n/v, abdominal pain, neuropathy, vaginal discharge or bleeding, urinary symptoms, cough, CP, SOB.\par

## 2023-04-06 NOTE — REASON FOR VISIT
[FreeTextEntry1] : [place] Location \par \par [facility] [place] [phone]\par [address] [place] [id number] \par \par Recurrent metastatic endometrial cancer\par Lenvima and Pembrolizumab combination therapy on 3/17/21 \par POD on CT 9/2022\par Patient was started on Doxil 10/10/22 \par CT 3/1/2023

## 2023-04-06 NOTE — ASSESSMENT
[FreeTextEntry1] : Patient feels that when she was Lenvima and Pembro she was doing well. Patient asked if she could go back on it. Advised patient that, I will message Dr. Blank to discuss possibly reintroducing Lenvima again. Patient reports being off treatment for months prior to POD because she had to get over diarrhea at that time. Advised patient we will need to timeline it to make sure. Clinical trial was presented to patient, as treatment option. I will further discuss this with Dr. Blank to come up with patients tx plan. \par \par L vaginal apex biopsy was obtained today. Explained to the patient that if it non-diagnostic she will likely  need to have an IR biopsy performed. If biopsy is adequate and diagnostic will need to send for [facility] testing. Will wait for biopsy results to result to discuss further tx. If we need to proceed with IR biopsy, would recommend Dr. Au at [facility] \par \par Patient feels a little burning, otherwise feels okay. No VB. Admits to back pain from Doxil has no resolved. \par Patient feels like sometimes her heart just starts pounding. \par \par Patient reports just wanting to get treatment. Explained targeted therapy based on cells is important.

## 2023-04-06 NOTE — END OF VISIT
[FreeTextEntry3] : Written by Emily Chatman, acting as a scribe for Dr. Samaria Verduzco This note accurately reflects the work and decisions made by me.

## 2023-04-06 NOTE — PHYSICAL EXAM
[Chaperone Present] : A chaperone was present in the examining room during all aspects of the physical examination [Absent] : Adnexa(ae): Absent [Normal] : Bimanual Exam: Normal [FreeTextEntry1] : Emily Chatman Medical assistant chaperoned during gynecologic exam.  [de-identified] : Vaginal length shortened to 1 cm, exam is limited  [de-identified] : Deferred

## 2023-04-06 NOTE — HISTORY OF PRESENT ILLNESS
[FreeTextEntry1] : 67 year old returns for interval oncologic surveillance offering no new complaints including no abdominopelvic pain, vaginal or rectal bleeding, chest pain or shortness of breath. Normal bladder function. \par \par Patient was being treated with Pembrolizumab and Lenvima since 3/17/21 with stability of disease. Tx was held after August due to immunotherapy -associated diarrhea/colitis. \par \par CT C/A/P 9/9/22 revealed pulmonary disease without significant change. Progressive interval increase soft tissue nodularity along the right vaginal cuff and abutting the bladder. Colitis of the descending and rectosigmoid colon.\par \par Pembro/lenvima was discontinued due to POD. Patient was started on Doxil 10/10/22, s/p C3 on 12/5.\par \par CT C/A/P 12/20/2022: IMPRESSION:\par New and increased size of nodules in the lungs with reference lesions as described above.\par The nodularity adjacent to the right vaginal cuff and abutting the bladder is unchanged.\par Colitis of the descending and rectosigmoid colon, decreased from prior exam.\par \par CT Chest abdomen and pelvis 03/01/23 IMPRESSION:\par Increase in size of soft tissue density at right vaginal cuff inseparable from the bladder and presumably the cause of right moderate right-sided hydroureteronephrosis concerning for tumor implant. Wall thickening in the right side of the bladder, more apparent on the current study.\par Increase in pulmonary nodules as above. Stable left hilar lymph node\par Rectal wall thickening with surrounding soft tissue stranding was present previously. This may represent post radiation changes of there has been therapy to the pelvis.

## 2023-04-17 NOTE — HISTORY OF PRESENT ILLNESS
[FreeTextEntry1] : 68 year female  f/u for hypothyroidism management. \par \par *** Apr 17, 2023 ***\par \par starting a new chemo tomorrow (Abraxane with Avastin)\par feels well overall, good energy\par on the new dose Unithroid 100 mcg 6.5/week for about 4-5 weeks \par last TSH- 0.05\par \par *** Oct 19, 2022 ***\par \par had to go on steroids x 2 wks b/o loose BM. felt swollen while on Prednisone. stopped about 6 wks ago\par started a new chemo for a recurrent endometrial cancer\par Tirosint was not approved.  Switch to Unithroid 112 mcg.\par Last TSH- 1.45 \par prior 16.5, prl- 40\par \par *** Jul 07, 2022 ***\par \par admitted 6 wks for diarrhea, stomach pain and hypokalemia\par had an egd done, which was normal. off the BP meds b/o blood pressure was low\par was off Lenvima x 1 mo, now is taking a smaller dose\par remains on L-thyroxine 75 mcg\par \par *** Mar 11, 2022 ***\par \par feels well, no new c/o\par on  Synthroid 75 mcg. remains on Keytruda and Lenvima- tolerates well\par labs from 3/9/22- TSH- 9.19 with FT4- 1.1. \par labs from 2/23/22- TSH- 2.52\par per patient, started taking "supplement powder" and "a collagen drink" about 2 weeks ago\par \par *** Dec 10, 2021 ***\par \par feels great. good energy level. still on  Keytruda and Lenvima\par remains on Synthroid 75 mcg\par TSH- 3.5, FT4- 1.4\par prior pit panel wnl- am cortisol 13, prolactin 15, IGF- 196\par \par *** Sep 10, 2021 ***\par \par on Synthroid 75 mcg. feels much better. Good energy, no fatigue. \par still on Keytruda and Lenvima\par Thyroid US (8/12/21)- heterogenous thyroid, no nodules\par TSH- 2.19\par am cortisol- 13, rest pending\par \par HPI:\par Ms. VICENTE  was diagnosed with endometrial cancer in 2018. She underwent ELISEO-BSO same year , followed by a pelvic IMRT. In 2019, she was found mts to lungs and pelvic LN. She completed another course of radiation therapy and recently started on Keytruda and Lenvima about 4 months ago. Her TSH early this month returned as 28.1, and she was started on Synthroid 25 mcg about 3 months ago. Her last TSH from yesterday was- 56.8 with FT4- 0.8 \par Her blood pressure was found to be elevated past 3 weeks, and she's complaining of some fatigue. \par Denies family history of thyroid cancer or history of radiation exposure to head and neck area in a childhood.\par

## 2023-04-17 NOTE — ASSESSMENT
[FreeTextEntry1] : 1. Acquired hypothyroidism, secondary to use of check point and TK inhibitors\par - unclear why such a drastic TSH change.not sure if related to her stomach issues\par - hold all supplements \par - continue Unithroid 100 mcg 6.5/week \par - periodical screen for pituitary deficiencies, type 1 diabetes, adrenal insufficiency. \par \par 2. Hyperprolactinemia\par could be secondary to hypothyroidism, but will retest next week along with a thyroid panel at Dr. Blank's office\par RTC 3 mos, or sooner prn.\par \par

## 2023-04-23 NOTE — HISTORY OF PRESENT ILLNESS
[Disease: _____________________] : Disease: [unfilled] [T: ___] : T[unfilled] [N: ___] : N[unfilled] [M: ___] : M[unfilled] [AJCC Stage: ____] : AJCC Stage: [unfilled] [de-identified] : Referred by Dr. Gaytan\par \par Ms. Hoang is a 68 y/o G0 postmenopausal F who was referred to medical oncology for treatment considerations for recurrent endometrial cancer.\par She was initially diagnosed with stage II grade 2 endometrial cancer in 2018, she underwent underwent robotic assisted TLH BSO, bilateral pelvic and para-aortic sentinel LN dissection by Dr. Paras Grayson on 2018.  Her surgical pathology demonstrated pT2N0 disease with endometrial tumor measuring 4cm, FIGO 2 endometrioid adenocarcinoma, >90 myometrial invasion with involvement of cervical stroma, +LVI with IHC of MMR proteins with intact expression but pelvic wash negative for malignant cells.  On 10/2018, she completed pelvic IMRT and vaginal cuff brachytherapy with Dr. Bowling.  She did well clinically under surveillance until 2019 when she developed vaginal spotting and fluid discharge, and was evaluated by Dr. Grayson. CA-125 was 27. Concern for possible recurrence and further workup pursued.  Her 19 CT CAP demonstrate new bilateral pulmonary nodules, some with central cavitation, suspicious for metastatic disease, enhancing nodularity superior to the vaginal cuff concerning for metastatic disease, measuring 1.9x1.6cm.  On 10/30/19 she underwent thorascopic multiple RLL wedge resections with Dr. Weston and her surgical pathology c/w metastatic adenocarcinoma, consistent with patient's endometrial primary +ER [place] Pax8.  She was recommended further treatment for her recurrent endometrial cancer but the patient was lost to follow up until 2020 several months before which she has noticed increasing hardness and "lumps" in her vagina, which has been progressively more painful and burning sensation. She was subsequently evaluated by Dr. Gaytan 2020 and referred to medical oncology for systemic treatment evaluation.   On presentation for initial consult 2020, she continued to have vaginal discharge and on and off spotting (not more than 1 pad a day). She felt constipated. She was taking Percocet 5-325, Tylenol and ibuprofen for vaginal pain. She had been very busy with work and family affairs, was not able to come for cancer treatment over the past months. 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 \par \par \par Patient started on Lenvima and Pembrolizumab combination therapy on 3/17/21.  Given increasing vaginal pain and increased mass involving the vaginal cuff region, she underwent palliative RT to the vaginal mass and cuff area with Dr. Bowling. She had improved pain and had been able to tolerate Lenvima without significant toxicities. I reviewed my recommendations to continue current regimen with pembrolizumab and lenvima as lenvima had been on hold for several weeks due to difficulty tolerating treatment and pain control issues. \par \par CT c/a/p done on 2021 which showed favorable response to therapy. Left inguinal node decreased in size. Vaginal mass decreased in size. Pulmonary nodules decreased in size.  \par \par Her CT c/a/p from 2021 showed pulmonary nodules. While some are stable in size, there is mild increase in pulmonary nodule in the right upper lobe.  3 x 5 mm nodular focus of higher attenuation suggesting enhancement within a right renal cyst. This is slightly better seen on the current examination although may be exaggerated by volume averaging. \par \par She was treated with carboplatin and paclitaxel from 20 - 21.\par \par She developed disease progression involving the vaginal mass and in REMBERTO anterior to the mediastinum. She was seen by Dr. Bowling and underwent radiation to the vaginal canal to a total dose of 2000 cGy in 5 fraction. 21-21. \par \par She was subsequently treated with pembrolizumab and lenvima since 3/17/22 with stability of disease. Treatment was held after August due to immunotherapy-associated dirrhea/colitis.\par \par CT scans subsequently showed disease progression. Pembrolizumab/lenvima discontinued and treatment options reviewed with the patient. \par \par Angela is currently treatment with Doxil s/p C6 on 23 \par \par We reviewed the findings from the most recent CT scans, which demonstrates progressive disease involving the lung nodules and vaginal cuff lesion. \par \par Foundation testing revealed \par TMB 9, \par MSI - indeterminate. \par \par  [de-identified] :  ER +  SD +  PAX 8  +     MSI STABLE [de-identified] : Refuses COVID 19 vaccine  [FreeTextEntry1] :  Lenvima and Pembrolizumab (held since 6/1) --> Doxil started  [de-identified] : Patient presents today for routine follow up. In the interim, patient had biopsy with Dr Gaytan, benign polyp. Patient reports feeling well. She states she had residual pain after the vaginal biopsy, has been taking tylenol PRN. She reports good appetite, eating and drinking well. Denies fever, chills, mouth sores, CP, palpitations, cough, LOMAS, n/v/d/c, abdominal pain, LE edema, vaginal discharge or bleeding, urinary symptoms, excessive bruising, dizziness, headaches, arthralgias, myalgias.  \par \par \par \par \par

## 2023-04-23 NOTE — PHYSICAL EXAM
[Restricted in physically strenuous activity but ambulatory and able to carry out work of a light or sedentary nature] : Status 1- Restricted in physically strenuous activity but ambulatory and able to carry out work of a light or sedentary nature, e.g., light house work, office work [Normal] : affect appropriate [Mucositis] : no mucositis [Thrush] : no thrush [Vesicles] : no vesicles [de-identified] : EOMI, anicteric  [de-identified] : normal respiratory effort, no audible breath sounds

## 2023-05-15 NOTE — PHYSICAL EXAM
[Restricted in physically strenuous activity but ambulatory and able to carry out work of a light or sedentary nature] : Status 1- Restricted in physically strenuous activity but ambulatory and able to carry out work of a light or sedentary nature, e.g., light house work, office work [Normal] : affect appropriate [Mucositis] : no mucositis [Thrush] : no thrush [Vesicles] : no vesicles [de-identified] : EOMI, anicteric  [de-identified] : normal respiratory effort, no audible breath sounds

## 2023-05-15 NOTE — HISTORY OF PRESENT ILLNESS
[Disease: _____________________] : Disease: [unfilled] [T: ___] : T[unfilled] [N: ___] : N[unfilled] [M: ___] : M[unfilled] [AJCC Stage: ____] : AJCC Stage: [unfilled] [de-identified] : Referred by Dr. Gaytan\par \par Ms. Hoang is a 68 y/o G0 postmenopausal F who was referred to medical oncology for treatment considerations for recurrent endometrial cancer.\par She was initially diagnosed with stage II grade 2 endometrial cancer in 2018, she underwent underwent robotic assisted TLH BSO, bilateral pelvic and para-aortic sentinel LN dissection by Dr. Paras Grayson on 2018.  Her surgical pathology demonstrated pT2N0 disease with endometrial tumor measuring 4cm, FIGO 2 endometrioid adenocarcinoma, >90 myometrial invasion with involvement of cervical stroma, +LVI with IHC of MMR proteins with intact expression but pelvic wash negative for malignant cells.  On 10/2018, she completed pelvic IMRT and vaginal cuff brachytherapy with Dr. Bowling.  She did well clinically under surveillance until 2019 when she developed vaginal spotting and fluid discharge, and was evaluated by Dr. Grayson. CA-125 was 27. Concern for possible recurrence and further workup pursued.  Her 19 CT CAP demonstrate new bilateral pulmonary nodules, some with central cavitation, suspicious for metastatic disease, enhancing nodularity superior to the vaginal cuff concerning for metastatic disease, measuring 1.9x1.6cm.  On 10/30/19 she underwent thorascopic multiple RLL wedge resections with Dr. Weston and her surgical pathology c/w metastatic adenocarcinoma, consistent with patient's endometrial primary +ER [place] Pax8.  She was recommended further treatment for her recurrent endometrial cancer but the patient was lost to follow up until 2020 several months before which she has noticed increasing hardness and "lumps" in her vagina, which has been progressively more painful and burning sensation. She was subsequently evaluated by Dr. Gaytan 2020 and referred to medical oncology for systemic treatment evaluation.   On presentation for initial consult 2020, she continued to have vaginal discharge and on and off spotting (not more than 1 pad a day). She felt constipated. She was taking Percocet 5-325, Tylenol and ibuprofen for vaginal pain. She had been very busy with work and family affairs, was not able to come for cancer treatment over the past months. 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 \par \par \par Patient started on Lenvima and Pembrolizumab combination therapy on 3/17/21.  Given increasing vaginal pain and increased mass involving the vaginal cuff region, she underwent palliative RT to the vaginal mass and cuff area with Dr. Bowling. She had improved pain and had been able to tolerate Lenvima without significant toxicities. I reviewed my recommendations to continue current regimen with pembrolizumab and lenvima as lenvima had been on hold for several weeks due to difficulty tolerating treatment and pain control issues. \par \par CT c/a/p done on 2021 which showed favorable response to therapy. Left inguinal node decreased in size. Vaginal mass decreased in size. Pulmonary nodules decreased in size.  \par \par Her CT c/a/p from 2021 showed pulmonary nodules. While some are stable in size, there is mild increase in pulmonary nodule in the right upper lobe.  3 x 5 mm nodular focus of higher attenuation suggesting enhancement within a right renal cyst. This is slightly better seen on the current examination although may be exaggerated by volume averaging. \par \par She was treated with carboplatin and paclitaxel from 20 - 21.\par \par She developed disease progression involving the vaginal mass and in REMBERTO anterior to the mediastinum. She was seen by Dr. Bowling and underwent radiation to the vaginal canal to a total dose of 2000 cGy in 5 fraction. 21-21. \par \par She was subsequently treated with pembrolizumab and lenvima since 3/17/22 with stability of disease. Treatment was held after August due to immunotherapy-associated dirrhea/colitis.\par \par CT scans subsequently showed disease progression. Pembrolizumab/lenvima discontinued and treatment options reviewed with the patient. \par \par Angela is currently treatment with Doxil s/p C6 on 23 \par \par We reviewed the findings from the most recent CT scans, which demonstrates progressive disease involving the lung nodules and vaginal cuff lesion. \par \par Foundation testing revealed \par TMB 9, \par MSI - indeterminate. \par \par  [de-identified] :  ER +  [place] +  PAX 8  +     MSI STABLE [de-identified] : Refuses COVID 19 vaccine  [FreeTextEntry1] :  Lenvima and Pembrolizumab (held since 6/1) --> Doxil started  [de-identified] : Patient presents today for routine follow up after starting new chemotherapy with Abraxane zirabev q3 weeks, s/p C2 on 4/18. She reports fatigue,  severe b/l hip, knee, ankle pain started on day 2, was taking PRN tylneol with ineffective relief. These symptoms lated for approximately 8 days. She also reports one episode of nausea/vomiting and reports that she experienced constipation. She is now eating/drinking without n/v, and having regular BMs. She still has fatigue but it is slowly improving. She was unable to get US-guided biopsy due to difficulty passing the biopsy probe. She denies fevers, chills, abdominal pain, neuropathy, vaginal discharge or bleeding, urinary symptoms, cough, CP, SOB.\par \par \par

## 2023-06-05 NOTE — REVIEW OF SYSTEMS
[Fatigue] : fatigue [Mucosal Pain] : mucosal pain [Negative] : Allergic/Immunologic [Recent Change In Weight] : ~T no recent weight change [Vision Problems] : no vision problems [Chest Pain] : no chest pain [Lower Ext Edema] : no lower extremity edema [Cough] : no cough [Vaginal Discharge] : no vaginal discharge [Joint Pain] : no joint pain [Muscle Pain] : no muscle pain [Difficulty Walking] : no difficulty walking [FreeTextEntry2] : weight change

## 2023-06-05 NOTE — PHYSICAL EXAM
[Restricted in physically strenuous activity but ambulatory and able to carry out work of a light or sedentary nature] : Status 1- Restricted in physically strenuous activity but ambulatory and able to carry out work of a light or sedentary nature, e.g., light house work, office work [Normal] : affect appropriate [Mucositis] : no mucositis [Thrush] : no thrush [Vesicles] : no vesicles [de-identified] : EOMI, anicteric  [de-identified] : normal respiratory effort, no audible breath sounds

## 2023-06-05 NOTE — HISTORY OF PRESENT ILLNESS
[Disease: _____________________] : Disease: [unfilled] [T: ___] : T[unfilled] [N: ___] : N[unfilled] [M: ___] : M[unfilled] [AJCC Stage: ____] : AJCC Stage: [unfilled] [de-identified] : Referred by Dr. Gaytan\par \par Ms. Hoang is a 68 y/o G0 postmenopausal F who was referred to medical oncology for treatment considerations for recurrent endometrial cancer.\par She was initially diagnosed with stage II grade 2 endometrial cancer in 2018, she underwent underwent robotic assisted TLH BSO, bilateral pelvic and para-aortic sentinel LN dissection by Dr. Paras Grayson on 2018.  Her surgical pathology demonstrated pT2N0 disease with endometrial tumor measuring 4cm, FIGO 2 endometrioid adenocarcinoma, >90 myometrial invasion with involvement of cervical stroma, +LVI with IHC of MMR proteins with intact expression but pelvic wash negative for malignant cells.  On 10/2018, she completed pelvic IMRT and vaginal cuff brachytherapy with Dr. Bowling.  She did well clinically under surveillance until 2019 when she developed vaginal spotting and fluid discharge, and was evaluated by Dr. Grayson. CA-125 was 27. Concern for possible recurrence and further workup pursued.  Her 19 CT CAP demonstrate new bilateral pulmonary nodules, some with central cavitation, suspicious for metastatic disease, enhancing nodularity superior to the vaginal cuff concerning for metastatic disease, measuring 1.9x1.6cm.  On 10/30/19 she underwent thorascopic multiple RLL wedge resections with Dr. Weston and her surgical pathology c/w metastatic adenocarcinoma, consistent with patient's endometrial primary +ER [place] Pax8.  She was recommended further treatment for her recurrent endometrial cancer but the patient was lost to follow up until 2020 several months before which she has noticed increasing hardness and "lumps" in her vagina, which has been progressively more painful and burning sensation. She was subsequently evaluated by Dr. Gaytan 2020 and referred to medical oncology for systemic treatment evaluation.   On presentation for initial consult 2020, she continued to have vaginal discharge and on and off spotting (not more than 1 pad a day). She felt constipated. She was taking Percocet 5-325, Tylenol and ibuprofen for vaginal pain. She had been very busy with work and family affairs, was not able to come for cancer treatment over the past months. 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 \par \par \par Patient started on Lenvima and Pembrolizumab combination therapy on 3/17/21.  Given increasing vaginal pain and increased mass involving the vaginal cuff region, she underwent palliative RT to the vaginal mass and cuff area with Dr. Bowling. She had improved pain and had been able to tolerate Lenvima without significant toxicities. I reviewed my recommendations to continue current regimen with pembrolizumab and lenvima as lenvima had been on hold for several weeks due to difficulty tolerating treatment and pain control issues. \par \par CT c/a/p done on 2021 which showed favorable response to therapy. Left inguinal node decreased in size. Vaginal mass decreased in size. Pulmonary nodules decreased in size.  \par \par Her CT c/a/p from 2021 showed pulmonary nodules. While some are stable in size, there is mild increase in pulmonary nodule in the right upper lobe.  3 x 5 mm nodular focus of higher attenuation suggesting enhancement within a right renal cyst. This is slightly better seen on the current examination although may be exaggerated by volume averaging. \par \par She was treated with carboplatin and paclitaxel from 20 - 21.\par \par She developed disease progression involving the vaginal mass and in REMBERTO anterior to the mediastinum. She was seen by Dr. Bowling and underwent radiation to the vaginal canal to a total dose of 2000 cGy in 5 fraction. 21-21. \par \par She was subsequently treated with pembrolizumab and lenvima since 3/17/22 with stability of disease. Treatment was held after August due to immunotherapy-associated dirrhea/colitis.\par \par CT scans subsequently showed disease progression. Pembrolizumab/lenvima discontinued and treatment options reviewed with the patient. \par \par Angela is currently treatment with Doxil s/p C6 on 23 \par \par We reviewed the findings from the most recent CT scans, which demonstrates progressive disease involving the lung nodules and vaginal cuff lesion. \par \par Foundation testing revealed \par TMB 9, \par MSI - indeterminate. \par \par  [de-identified] :  ER +  [place] +  PAX 8  +     MSI STABLE [de-identified] : Refuses COVID 19 vaccine  [FreeTextEntry1] :  Lenvima and Pembrolizumab (held since 6/1) --> Doxil started  [de-identified] : Patient presents today for routine follow up after starting new chemotherapy with Abraxane/zirabev q3 weeks, s/p C3 on 5/9. This cycle the benadryl changed to IVPB, and more tolerable. Reports this cycle was better than the last, but still have symptoms that started on day 4 after treatment. Had sharp pains throughout abdomen. Has joint pain, improves with warm baths. Intermittent chest pains, "stinging", resolves on own,  denies SOB. Constipation - taking senna nightly, smooth move tea, and colace. Her symptoms improved/resolved 1 week after treatment. Denies fever, chills, mouth sores, palpitations, cough, LOMAS, n/v/d, LE edema, vaginal discharge or bleeding, urinary symptoms, excessive bruising, dizziness, headaches.  \par \par \par

## 2023-06-15 NOTE — HISTORY OF PRESENT ILLNESS
[Disease: _____________________] : Disease: [unfilled] [T: ___] : T[unfilled] [N: ___] : N[unfilled] [M: ___] : M[unfilled] [AJCC Stage: ____] : AJCC Stage: [unfilled] [de-identified] : Referred by Dr. Gaytan\par \par Ms. Hoang is a 68 y/o G0 postmenopausal F who was referred to medical oncology for treatment considerations for recurrent endometrial cancer.\par She was initially diagnosed with stage II grade 2 endometrial cancer in 2018, she underwent underwent robotic assisted TLH BSO, bilateral pelvic and para-aortic sentinel LN dissection by Dr. Paras Grayson on 2018.  Her surgical pathology demonstrated pT2N0 disease with endometrial tumor measuring 4cm, FIGO 2 endometrioid adenocarcinoma, >90 myometrial invasion with involvement of cervical stroma, +LVI with IHC of MMR proteins with intact expression but pelvic wash negative for malignant cells.  On 10/2018, she completed pelvic IMRT and vaginal cuff brachytherapy with Dr. Bowling.  She did well clinically under surveillance until 2019 when she developed vaginal spotting and fluid discharge, and was evaluated by Dr. Grayson. CA-125 was 27. Concern for possible recurrence and further workup pursued.  Her 19 CT CAP demonstrate new bilateral pulmonary nodules, some with central cavitation, suspicious for metastatic disease, enhancing nodularity superior to the vaginal cuff concerning for metastatic disease, measuring 1.9x1.6cm.  On 10/30/19 she underwent thorascopic multiple RLL wedge resections with Dr. Weston and her surgical pathology c/w metastatic adenocarcinoma, consistent with patient's endometrial primary +ER [place] Pax8.  She was recommended further treatment for her recurrent endometrial cancer but the patient was lost to follow up until 2020 several months before which she has noticed increasing hardness and "lumps" in her vagina, which has been progressively more painful and burning sensation. She was subsequently evaluated by Dr. Gaytan 2020 and referred to medical oncology for systemic treatment evaluation.   On presentation for initial consult 2020, she continued to have vaginal discharge and on and off spotting (not more than 1 pad a day). She felt constipated. She was taking Percocet 5-325, Tylenol and ibuprofen for vaginal pain. She had been very busy with work and family affairs, was not able to come for cancer treatment over the past months. 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 \par \par \par Patient started on Lenvima and Pembrolizumab combination therapy on 3/17/21.  Given increasing vaginal pain and increased mass involving the vaginal cuff region, she underwent palliative RT to the vaginal mass and cuff area with Dr. Bowling. She had improved pain and had been able to tolerate Lenvima without significant toxicities. I reviewed my recommendations to continue current regimen with pembrolizumab and lenvima as lenvima had been on hold for several weeks due to difficulty tolerating treatment and pain control issues. \par \par CT c/a/p done on 2021 which showed favorable response to therapy. Left inguinal node decreased in size. Vaginal mass decreased in size. Pulmonary nodules decreased in size.  \par \par Her CT c/a/p from 2021 showed pulmonary nodules. While some are stable in size, there is mild increase in pulmonary nodule in the right upper lobe.  3 x 5 mm nodular focus of higher attenuation suggesting enhancement within a right renal cyst. This is slightly better seen on the current examination although may be exaggerated by volume averaging. \par \par She was treated with carboplatin and paclitaxel from 20 - 21.\par \par She developed disease progression involving the vaginal mass and in REMBERTO anterior to the mediastinum. She was seen by Dr. Bowling and underwent radiation to the vaginal canal to a total dose of 2000 cGy in 5 fraction. 21-21. \par \par She was subsequently treated with pembrolizumab and lenvima since 3/17/22 with stability of disease. Treatment was held after August due to immunotherapy-associated dirrhea/colitis.\par \par CT scans subsequently showed disease progression. Pembrolizumab/lenvima discontinued and treatment options reviewed with the patient. \par \par Angela is currently treatment with Doxil s/p C6 on 23 \par \par We reviewed the findings from the most recent CT scans, which demonstrates progressive disease involving the lung nodules and vaginal cuff lesion. \par \par Foundation testing revealed \par TMB 9, \par MSI - indeterminate. \par \par  [de-identified] :  ER +  [place] +  PAX 8  +     MSI STABLE [de-identified] : Refuses COVID 19 vaccine  [FreeTextEntry1] :  Lenvima and Pembrolizumab (held since 6/1) --> Doxil started  [de-identified] : Patient presents today for routine follow up after starting new chemotherapy with Abraxane/zirabev q3 weeks, s/p C4 on 6/6.\par CT scans scheduled for 6/20\par \par \par \par \par

## 2023-06-15 NOTE — PHYSICAL EXAM
[Restricted in physically strenuous activity but ambulatory and able to carry out work of a light or sedentary nature] : Status 1- Restricted in physically strenuous activity but ambulatory and able to carry out work of a light or sedentary nature, e.g., light house work, office work [Normal] : affect appropriate [Mucositis] : no mucositis [Thrush] : no thrush [Vesicles] : no vesicles [de-identified] : EOMI, anicteric  [de-identified] : normal respiratory effort, no audible breath sounds

## 2023-07-10 NOTE — HISTORY OF PRESENT ILLNESS
[Disease: _____________________] : Disease: [unfilled] [T: ___] : T[unfilled] [N: ___] : N[unfilled] [M: ___] : M[unfilled] [AJCC Stage: ____] : AJCC Stage: [unfilled] [de-identified] : Referred by Dr. Gaytan\par \par Ms. Hoang is a 66 y/o G0 postmenopausal F who was referred to medical oncology for treatment considerations for recurrent endometrial cancer.\par She was initially diagnosed with stage II grade 2 endometrial cancer in 2018, she underwent underwent robotic assisted TLH BSO, bilateral pelvic and para-aortic sentinel LN dissection by Dr. Paras Grayson on 2018.  Her surgical pathology demonstrated pT2N0 disease with endometrial tumor measuring 4cm, FIGO 2 endometrioid adenocarcinoma, >90 myometrial invasion with involvement of cervical stroma, +LVI with IHC of MMR proteins with intact expression but pelvic wash negative for malignant cells.  On 10/2018, she completed pelvic IMRT and vaginal cuff brachytherapy with Dr. Bowling.  She did well clinically under surveillance until 2019 when she developed vaginal spotting and fluid discharge, and was evaluated by Dr. Grayson. CA-125 was 27. Concern for possible recurrence and further workup pursued.  Her 19 CT CAP demonstrate new bilateral pulmonary nodules, some with central cavitation, suspicious for metastatic disease, enhancing nodularity superior to the vaginal cuff concerning for metastatic disease, measuring 1.9x1.6cm.  On 10/30/19 she underwent thorascopic multiple RLL wedge resections with Dr. Weston and her surgical pathology c/w metastatic adenocarcinoma, consistent with patient's endometrial primary +ER [place] Pax8.  She was recommended further treatment for her recurrent endometrial cancer but the patient was lost to follow up until 2020 several months before which she has noticed increasing hardness and "lumps" in her vagina, which has been progressively more painful and burning sensation. She was subsequently evaluated by Dr. Gaytan 2020 and referred to medical oncology for systemic treatment evaluation.   On presentation for initial consult 2020, she continued to have vaginal discharge and on and off spotting (not more than 1 pad a day). She felt constipated. She was taking Percocet 5-325, Tylenol and ibuprofen for vaginal pain. She had been very busy with work and family affairs, was not able to come for cancer treatment over the past months. 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 \par \par \par Patient started on Lenvima and Pembrolizumab combination therapy on 3/17/21.  Given increasing vaginal pain and increased mass involving the vaginal cuff region, she underwent palliative RT to the vaginal mass and cuff area with Dr. Bowling. She had improved pain and had been able to tolerate Lenvima without significant toxicities. I reviewed my recommendations to continue current regimen with pembrolizumab and lenvima as lenvima had been on hold for several weeks due to difficulty tolerating treatment and pain control issues. \par \par CT c/a/p done on 2021 which showed favorable response to therapy. Left inguinal node decreased in size. Vaginal mass decreased in size. Pulmonary nodules decreased in size.  \par \par Her CT c/a/p from 2021 showed pulmonary nodules. While some are stable in size, there is mild increase in pulmonary nodule in the right upper lobe.  3 x 5 mm nodular focus of higher attenuation suggesting enhancement within a right renal cyst. This is slightly better seen on the current examination although may be exaggerated by volume averaging. \par \par She was treated with carboplatin and paclitaxel from 20 - 21.\par \par She developed disease progression involving the vaginal mass and in REMBERTO anterior to the mediastinum. She was seen by Dr. Bowling and underwent radiation to the vaginal canal to a total dose of 2000 cGy in 5 fraction. 21-21. \par \par She was subsequently treated with pembrolizumab and lenvima since 3/17/22 with stability of disease. Treatment was held after August due to immunotherapy-associated dirrhea/colitis.\par \par CT scans subsequently showed disease progression. Pembrolizumab/lenvima discontinued and treatment options reviewed with the patient. \par \par Angela is currently treatment with Doxil s/p C6 on 23 \par \par We reviewed the findings from the most recent CT scans, which demonstrates progressive disease involving the lung nodules and vaginal cuff lesion. \par \par Foundation testing revealed \par TMB 9, \par MSI - indeterminate. \par \par  [de-identified] :  ER +  [place] +  PAX 8  +     MSI STABLE [de-identified] : Refuses COVID 19 vaccine  [FreeTextEntry1] :  Lenvima and Pembrolizumab (held since 6/1) --> Doxil started  [de-identified] : Patient presents today for routine follow up after starting new chemotherapy with Abraxane/zirabev q3 weeks, s/p C4 on 6/27.\par CT scans done on 6/20 revealed decrease in size of soft tissue mass at right vaginal cuff and Left lung mass stable, increased cavitation. \par Patient is feeling well overall and staying active.\par Reports good appetite, eating and drinking well with regular bowel movements. \par Has intermittent arthralgias, relieved with PRN tylenol. \par \par \par \par \par \par

## 2023-07-17 PROBLEM — E03.9 HYPOTHYROID: Status: ACTIVE | Noted: 2021-07-06

## 2023-07-17 PROBLEM — E22.1 HYPERPROLACTINEMIA: Status: ACTIVE | Noted: 2022-10-19

## 2023-07-17 NOTE — HISTORY OF PRESENT ILLNESS
[FreeTextEntry1] : 68 year female  f/u for hypothyroidism management. \par \par *** Jul 17, 2023 ***\par \par feels ok, staying on a new chemo. tumor markers are going down\par remains on Unithroid 100 mcg 6.5/week \par labs from 6/27/23- TSH - 1.09\par \par *** Apr 17, 2023 ***\par \par starting a new chemo tomorrow (Abraxane with Avastin)\par feels well overall, good energy\par on the new dose Unithroid 100 mcg 6.5/week for about 4-5 weeks \par last TSH- 0.05\par \par *** Oct 19, 2022 ***\par \par had to go on steroids x 2 wks b/o loose BM. felt swollen while on Prednisone. stopped about 6 wks ago\par started a new chemo for a recurrent endometrial cancer\par Tirosint was not approved.  Switch to Unithroid 112 mcg.\par Last TSH- 1.45 \par prior 16.5, prl- 40\par \par *** Jul 07, 2022 ***\par \par admitted 6 wks for diarrhea, stomach pain and hypokalemia\par had an egd done, which was normal. off the BP meds b/o blood pressure was low\par was off Lenvima x 1 mo, now is taking a smaller dose\par remains on L-thyroxine 75 mcg\par \par *** Mar 11, 2022 ***\par \par feels well, no new c/o\par on  Synthroid 75 mcg. remains on Keytruda and Lenvima- tolerates well\par labs from 3/9/22- TSH- 9.19 with FT4- 1.1. \par labs from 2/23/22- TSH- 2.52\par per patient, started taking "supplement powder" and "a collagen drink" about 2 weeks ago\par \par *** Dec 10, 2021 ***\par \par feels great. good energy level. still on  Keytruda and Lenvima\par remains on Synthroid 75 mcg\par TSH- 3.5, FT4- 1.4\par prior pit panel wnl- am cortisol 13, prolactin 15, IGF- 196\par \par *** Sep 10, 2021 ***\par \par on Synthroid 75 mcg. feels much better. Good energy, no fatigue. \par still on Keytruda and Lenvima\par Thyroid US (8/12/21)- heterogenous thyroid, no nodules\par TSH- 2.19\par am cortisol- 13, rest pending\par \par HPI:\par Ms. VICENTE  was diagnosed with endometrial cancer in 2018. She underwent ELISEO-BSO same year , followed by a pelvic IMRT. In 2019, she was found mts to lungs and pelvic LN. She completed another course of radiation therapy and recently started on Keytruda and Lenvima about 4 months ago. Her TSH early this month returned as 28.1, and she was started on Synthroid 25 mcg about 3 months ago. Her last TSH from yesterday was- 56.8 with FT4- 0.8 \par Her blood pressure was found to be elevated past 3 weeks, and she's complaining of some fatigue. \par Denies family history of thyroid cancer or history of radiation exposure to head and neck area in a childhood.\par

## 2023-07-17 NOTE — ASSESSMENT
[FreeTextEntry1] : 1. Acquired hypothyroidism, secondary to use of check point and TK inhibitors\par - unclear why such a drastic TSH change; not sure if related to her stomach issues\par - hold all supplements \par - continue Unithroid 100 mcg 6.5/week \par - periodical screen for pituitary deficiencies, type 1 diabetes, adrenal insufficiency. \par \par 2. Hyperprolactinemia\par could be secondary to hypothyroidism, but will retest in 2 weeks at Dr. Blank's office\par RTC 6 mos, or sooner prn.\par \par

## 2023-07-26 NOTE — PHYSICAL EXAM
[Restricted in physically strenuous activity but ambulatory and able to carry out work of a light or sedentary nature] : Status 1- Restricted in physically strenuous activity but ambulatory and able to carry out work of a light or sedentary nature, e.g., light house work, office work [Mucositis] : no mucositis [Thrush] : no thrush [Vesicles] : no vesicles [Normal] : affect appropriate [de-identified] : EOMI, anicteric  [de-identified] : normal respiratory effort, no audible breath sounds

## 2023-07-26 NOTE — ASSESSMENT
[FreeTextEntry1] : \par \par  [Palliative] : Goals of care discussed with patient: Palliative [Palliative Care Plan] : not applicable at this time

## 2023-07-26 NOTE — HISTORY OF PRESENT ILLNESS
[Disease: _____________________] : Disease: [unfilled] [T: ___] : T[unfilled] [N: ___] : N[unfilled] [M: ___] : M[unfilled] [AJCC Stage: ____] : AJCC Stage: [unfilled] [de-identified] : Referred by Dr. Gaytan\par \par Ms. Hoang is a 68 y/o G0 postmenopausal F who was referred to medical oncology for treatment considerations for recurrent endometrial cancer.\par She was initially diagnosed with stage II grade 2 endometrial cancer in 2018, she underwent underwent robotic assisted TLH BSO, bilateral pelvic and para-aortic sentinel LN dissection by Dr. Paras Grayson on 2018.  Her surgical pathology demonstrated pT2N0 disease with endometrial tumor measuring 4cm, FIGO 2 endometrioid adenocarcinoma, >90 myometrial invasion with involvement of cervical stroma, +LVI with IHC of MMR proteins with intact expression but pelvic wash negative for malignant cells.  On 10/2018, she completed pelvic IMRT and vaginal cuff brachytherapy with Dr. Bowling.  She did well clinically under surveillance until 2019 when she developed vaginal spotting and fluid discharge, and was evaluated by Dr. Grayson. CA-125 was 27. Concern for possible recurrence and further workup pursued.  Her 19 CT CAP demonstrate new bilateral pulmonary nodules, some with central cavitation, suspicious for metastatic disease, enhancing nodularity superior to the vaginal cuff concerning for metastatic disease, measuring 1.9x1.6cm.  On 10/30/19 she underwent thorascopic multiple RLL wedge resections with Dr. Weston and her surgical pathology c/w metastatic adenocarcinoma, consistent with patient's endometrial primary +ER AK Pax8.  She was recommended further treatment for her recurrent endometrial cancer but the patient was lost to follow up until 2020 several months before which she has noticed increasing hardness and "lumps" in her vagina, which has been progressively more painful and burning sensation. She was subsequently evaluated by Dr. Gaytan 2020 and referred to medical oncology for systemic treatment evaluation.   On presentation for initial consult 2020, she continued to have vaginal discharge and on and off spotting (not more than 1 pad a day). She felt constipated. She was taking Percocet 5-325, Tylenol and ibuprofen for vaginal pain. She had been very busy with work and family affairs, was not able to come for cancer treatment over the past months. 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 \par \par \par Patient started on Lenvima and Pembrolizumab combination therapy on 3/17/21.  Given increasing vaginal pain and increased mass involving the vaginal cuff region, she underwent palliative RT to the vaginal mass and cuff area with Dr. Bowling. She had improved pain and had been able to tolerate Lenvima without significant toxicities. I reviewed my recommendations to continue current regimen with pembrolizumab and lenvima as lenvima had been on hold for several weeks due to difficulty tolerating treatment and pain control issues. \par \par CT c/a/p done on 2021 which showed favorable response to therapy. Left inguinal node decreased in size. Vaginal mass decreased in size. Pulmonary nodules decreased in size.  \par \par Her CT c/a/p from 2021 showed pulmonary nodules. While some are stable in size, there is mild increase in pulmonary nodule in the right upper lobe.  3 x 5 mm nodular focus of higher attenuation suggesting enhancement within a right renal cyst. This is slightly better seen on the current examination although may be exaggerated by volume averaging. \par \par She was treated with carboplatin and paclitaxel from 20 - 21.\par \par She developed disease progression involving the vaginal mass and in REMBERTO anterior to the mediastinum. She was seen by Dr. Bowling and underwent radiation to the vaginal canal to a total dose of 2000 cGy in 5 fraction. 21-21. \par \par She was subsequently treated with pembrolizumab and lenvima since 3/17/22 with stability of disease. Treatment was held after August due to immunotherapy-associated dirrhea/colitis.\par \par CT scans subsequently showed disease progression. Pembrolizumab/lenvima discontinued and treatment options reviewed with the patient. \par \par Angela is currently treatment with Doxil s/p C6 on 23 \par \par We reviewed the findings from the most recent CT scans, which demonstrates progressive disease involving the lung nodules and vaginal cuff lesion. \par \par Foundation testing revealed \par TMB 9, \par MSI - indeterminate. \par \par  [de-identified] :  ER +  RI +  PAX 8  +     MSI STABLE [de-identified] : Refuses COVID 19 vaccine  [FreeTextEntry1] :  Lenvima and Pembrolizumab (held since 6/1) --> Doxil started  [de-identified] : Patient presents today for routine follow up after starting new chemotherapy with Abraxane/zirabev q3 weeks, s/p C5 on 7/18.\par \par \par \par \par \par

## 2023-07-26 NOTE — REVIEW OF SYSTEMS
[Fatigue] : fatigue [Recent Change In Weight] : ~T no recent weight change [Vision Problems] : no vision problems [Mucosal Pain] : mucosal pain [Chest Pain] : no chest pain [Lower Ext Edema] : no lower extremity edema [Cough] : no cough [Vaginal Discharge] : no vaginal discharge [Joint Pain] : no joint pain [Muscle Pain] : no muscle pain [Difficulty Walking] : no difficulty walking [Negative] : Allergic/Immunologic [FreeTextEntry2] : weight change

## 2023-08-02 NOTE — ASU PATIENT PROFILE, ADULT - HOW PATIENT ADDRESSED, PROFILE
Per patient stating that her insurance is denying the Ozempic because her A1c is fine.     She is requesting the nurse to call her back to speak with her about this      [phone] Angela

## 2023-08-21 NOTE — PHYSICAL EXAM
[Restricted in physically strenuous activity but ambulatory and able to carry out work of a light or sedentary nature] : Status 1- Restricted in physically strenuous activity but ambulatory and able to carry out work of a light or sedentary nature, e.g., light house work, office work [Normal] : affect appropriate [Mucositis] : no mucositis [Thrush] : no thrush [Vesicles] : no vesicles [de-identified] : EOMI, anicteric  [de-identified] : normal respiratory effort, no audible breath sounds

## 2023-08-21 NOTE — HISTORY OF PRESENT ILLNESS
[Disease: _____________________] : Disease: [unfilled] [T: ___] : T[unfilled] [N: ___] : N[unfilled] [M: ___] : M[unfilled] [AJCC Stage: ____] : AJCC Stage: [unfilled] [de-identified] : Referred by Dr. Gaytan\par  \par  Ms. Hoang is a 66 y/o G0 postmenopausal F who was referred to medical oncology for treatment considerations for recurrent endometrial cancer.\par  She was initially diagnosed with stage II grade 2 endometrial cancer in 2018, she underwent underwent robotic assisted TLH BSO, bilateral pelvic and para-aortic sentinel LN dissection by Dr. Paras Grayson on 2018.  Her surgical pathology demonstrated pT2N0 disease with endometrial tumor measuring 4cm, FIGO 2 endometrioid adenocarcinoma, >90 myometrial invasion with involvement of cervical stroma, +LVI with IHC of MMR proteins with intact expression but pelvic wash negative for malignant cells.  On 10/2018, she completed pelvic IMRT and vaginal cuff brachytherapy with Dr. Bowling.  She did well clinically under surveillance until 2019 when she developed vaginal spotting and fluid discharge, and was evaluated by Dr. Grayson. CA-125 was 27. Concern for possible recurrence and further workup pursued.  Her 19 CT CAP demonstrate new bilateral pulmonary nodules, some with central cavitation, suspicious for metastatic disease, enhancing nodularity superior to the vaginal cuff concerning for metastatic disease, measuring 1.9x1.6cm.  On 10/30/19 she underwent thorascopic multiple RLL wedge resections with Dr. Weston and her surgical pathology c/w metastatic adenocarcinoma, consistent with patient's endometrial primary +ER AK Pax8.  She was recommended further treatment for her recurrent endometrial cancer but the patient was lost to follow up until 2020 several months before which she has noticed increasing hardness and "lumps" in her vagina, which has been progressively more painful and burning sensation. She was subsequently evaluated by Dr. Gaytan 2020 and referred to medical oncology for systemic treatment evaluation.   On presentation for initial consult 2020, she continued to have vaginal discharge and on and off spotting (not more than 1 pad a day). She felt constipated. She was taking Percocet 5-325, Tylenol and ibuprofen for vaginal pain. She had been very busy with work and family affairs, was not able to come for cancer treatment over the past months. 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 \par  \par  \par  Patient started on Lenvima and Pembrolizumab combination therapy on 3/17/21.  Given increasing vaginal pain and increased mass involving the vaginal cuff region, she underwent palliative RT to the vaginal mass and cuff area with Dr. Bowling. She had improved pain and had been able to tolerate Lenvima without significant toxicities. I reviewed my recommendations to continue current regimen with pembrolizumab and lenvima as lenvima had been on hold for several weeks due to difficulty tolerating treatment and pain control issues. \par  \par  CT c/a/p done on 2021 which showed favorable response to therapy. Left inguinal node decreased in size. Vaginal mass decreased in size. Pulmonary nodules decreased in size.  \par  \par  Her CT c/a/p from 2021 showed pulmonary nodules. While some are stable in size, there is mild increase in pulmonary nodule in the right upper lobe.  3 x 5 mm nodular focus of higher attenuation suggesting enhancement within a right renal cyst. This is slightly better seen on the current examination although may be exaggerated by volume averaging. \par  \par  She was treated with carboplatin and paclitaxel from 20 - 21.\par  \par  She developed disease progression involving the vaginal mass and in REMBERTO anterior to the mediastinum. She was seen by Dr. Bowling and underwent radiation to the vaginal canal to a total dose of 2000 cGy in 5 fraction. 21-21. \par  \par  She was subsequently treated with pembrolizumab and lenvima since 3/17/22 with stability of disease. Treatment was held after August due to immunotherapy-associated dirrhea/colitis.\par  \par  CT scans subsequently showed disease progression. Pembrolizumab/lenvima discontinued and treatment options reviewed with the patient. \par  \par  Angela is currently treatment with Doxil s/p C6 on 23 \par  \par  We reviewed the findings from the most recent CT scans, which demonstrates progressive disease involving the lung nodules and vaginal cuff lesion. \par  \par  Foundation testing revealed \par  TMB 9, \par  MSI - indeterminate. \par  \par   [de-identified] :  ER +  [place] +  PAX 8  +     MSI STABLE [de-identified] : Refuses COVID 19 vaccine  [FreeTextEntry1] :  Lenvima and Pembrolizumab (held since 6/1) --> Doxil started  [de-identified] : Patient presents today for routine follow up after starting new chemotherapy with Abraxane/zirabev q3 weeks, s/p C6 on 8/8  Has persistent neuropathy in b/l feet.  Had more constipation after this cycle.

## 2023-10-09 NOTE — H&P PST ADULT - NS MD HP INPLANTS MED DEV
MEDICATION REFILL REQUEST      Name of Medication:  Atorvastatin   Dose:  40 mg  Frequency:  daily   Quantity:    Days' supply:  90      Pharmacy Name/Location:  [address] [place] [address] REFILL REQUEST      Name of Medication:  lisinopril   Dose:  5 mg  Frequency:  daily   Quantity:    Days' supply:  90      Pharmacy Name/Location:  [facility] None

## 2023-10-20 NOTE — REASON FOR VISIT
[Follow-Up Visit] : a follow-up [FreeTextEntry2] : Recurrent Endometrial Cancer  Purse String (Intermediate) Text: Given the location of the defect and the characteristics of the surrounding skin a purse string intermediate closure was deemed most appropriate.  Undermining was performed circumferentially around the surgical defect.  A purse string suture was then placed and tightened.

## 2023-11-15 NOTE — ASU PATIENT PROFILE, ADULT - PRO ARRIVE FROM
Acute Care - Occupational Therapy Treatment Note   [facility]     Patient Name: Berta Beatty  : 1968  MRN: [id number]  Today's Date: 11/15/Christopher Le MD;  Location: [facility] Jose Tao MD;  Location: [facility];  Service: Orthopedics;  Laterality: Right;    HYSTERECTOMY      INTERVENTIONAL RADIOLOGY PROCEDURE Bilateral 2020    Procedure: CAROTID CEREBRAL ANGIOGRAM BILATERAL;  Surgeon: Kristian Johnston MD;  Location:  FRANCISCO JAVIER CATH Evelyn Nguyen RN 11/06/23 -     April Gardiner April Sapp OT GO 1                 April Sapp OT  11/15/2023   home

## 2023-12-13 NOTE — PHYSICAL EXAM
[Fully active, able to carry on all pre-disease performance without restriction] : Status 0 - Fully active, able to carry on all pre-disease performance without restriction [Normal] : affect appropriate [Mucositis] : no mucositis [Thrush] : no thrush [Vesicles] : no vesicles [de-identified] : normal respiratory effort, no audible breath sounds

## 2023-12-13 NOTE — HISTORY OF PRESENT ILLNESS
[Disease: _____________________] : Disease: [unfilled] [T: ___] : T[unfilled] [N: ___] : N[unfilled] [M: ___] : M[unfilled] [AJCC Stage: ____] : AJCC Stage: [unfilled] [de-identified] : Referred by Dr. Gaytan\par  \par  Ms. Hoang is a 66 y/o G0 postmenopausal F who was referred to medical oncology for treatment considerations for recurrent endometrial cancer.\par  She was initially diagnosed with stage II grade 2 endometrial cancer in 2018, she underwent underwent robotic assisted TLH BSO, bilateral pelvic and para-aortic sentinel LN dissection by Dr. Paras Grayson on 2018.  Her surgical pathology demonstrated pT2N0 disease with endometrial tumor measuring 4cm, FIGO 2 endometrioid adenocarcinoma, >90 myometrial invasion with involvement of cervical stroma, +LVI with IHC of MMR proteins with intact expression but pelvic wash negative for malignant cells.  On 10/2018, she completed pelvic IMRT and vaginal cuff brachytherapy with Dr. Bowling.  She did well clinically under surveillance until 2019 when she developed vaginal spotting and fluid discharge, and was evaluated by Dr. Grayson. CA-125 was 27. Concern for possible recurrence and further workup pursued.  Her 19 CT CAP demonstrate new bilateral pulmonary nodules, some with central cavitation, suspicious for metastatic disease, enhancing nodularity superior to the vaginal cuff concerning for metastatic disease, measuring 1.9x1.6cm.  On 10/30/19 she underwent thorascopic multiple RLL wedge resections with Dr. Weston and her surgical pathology c/w metastatic adenocarcinoma, consistent with patient's endometrial primary +ER NM Pax8.  She was recommended further treatment for her recurrent endometrial cancer but the patient was lost to follow up until 2020 several months before which she has noticed increasing hardness and "lumps" in her vagina, which has been progressively more painful and burning sensation. She was subsequently evaluated by Dr. Gaytan 2020 and referred to medical oncology for systemic treatment evaluation.   On presentation for initial consult 2020, she continued to have vaginal discharge and on and off spotting (not more than 1 pad a day). She felt constipated. She was taking Percocet 5-325, Tylenol and ibuprofen for vaginal pain. She had been very busy with work and family affairs, was not able to come for cancer treatment over the past months. 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 \par  \par  \par  Patient started on Lenvima and Pembrolizumab combination therapy on 3/17/21.  Given increasing vaginal pain and increased mass involving the vaginal cuff region, she underwent palliative RT to the vaginal mass and cuff area with Dr. Bowling. She had improved pain and had been able to tolerate Lenvima without significant toxicities. I reviewed my recommendations to continue current regimen with pembrolizumab and lenvima as lenvima had been on hold for several weeks due to difficulty tolerating treatment and pain control issues. \par  \par  CT c/a/p done on 2021 which showed favorable response to therapy. Left inguinal node decreased in size. Vaginal mass decreased in size. Pulmonary nodules decreased in size.  \par  \par  Her CT c/a/p from 2021 showed pulmonary nodules. While some are stable in size, there is mild increase in pulmonary nodule in the right upper lobe.  3 x 5 mm nodular focus of higher attenuation suggesting enhancement within a right renal cyst. This is slightly better seen on the current examination although may be exaggerated by volume averaging. \par  \par  She was treated with carboplatin and paclitaxel from 20 - 21.\par  \par  She developed disease progression involving the vaginal mass and in REMBERTO anterior to the mediastinum. She was seen by Dr. Bowling and underwent radiation to the vaginal canal to a total dose of 2000 cGy in 5 fraction. 21-21. \par  \par  She was subsequently treated with pembrolizumab and lenvima since 3/17/22 with stability of disease. Treatment was held after August due to immunotherapy-associated dirrhea/colitis.\par  \par  CT scans subsequently showed disease progression. Pembrolizumab/lenvima discontinued and treatment options reviewed with the patient. \par  \par  Angela is currently treatment with Doxil s/p C6 on 23 \par  \par  We reviewed the findings from the most recent CT scans, which demonstrates progressive disease involving the lung nodules and vaginal cuff lesion. \par  \par  Foundation testing revealed \par  TMB 9, \par  MSI - indeterminate. \par  \par   [de-identified] :  ER +  MN +  PAX 8  +     MSI STABLE [de-identified] : Refuses COVID 19 vaccine  [FreeTextEntry1] :  Lenvima and Pembrolizumab (held since 6/1) --> Doxil started  [de-identified] : Patient presents today for follow up after CT scans done on 12/2/23.  Being seen today in the treatment room while receiving IV fluids.  CT scans revealed POD in multiple areas, enlarging lung mass in left upper lobe, enlarging liver metastasis, mild increase in right vaginal mass  Having new left shoulder/back pain.  Denies fever, chills, mouth sores, CP, palpitations, LOMAS, n/v/d/c, abdominal pain, LE edema, vaginal discharge or bleeding, urinary symptoms, excessive bruising, dizziness, headaches, arthralgias, myalgias.

## 2023-12-13 NOTE — REVIEW OF SYSTEMS
[Fatigue] : fatigue [Recent Change In Weight] : ~T recent weight change [Cough] : cough [Negative] : Allergic/Immunologic [Chills] : no chills [Fever] : no fever [Night Sweats] : no night sweats [Vision Problems] : no vision problems [Loss of Hearing] : no loss of hearing [Nosebleeds] : no nosebleeds [Hoarseness] : no hoarseness [Mucosal Pain] : no mucosal pain [Chest Pain] : no chest pain [Lower Ext Edema] : no lower extremity edema [Shortness Of Breath] : no shortness of breath [Wheezing] : no wheezing [SOB on Exertion] : no shortness of breath during exertion [Vaginal Discharge] : no vaginal discharge [Joint Pain] : no joint pain [Muscle Pain] : no muscle pain [Difficulty Walking] : no difficulty walking [FreeTextEntry4] : nasal congestion, increased mucus [de-identified] : intermittent neuropathy to b/l legs, stable

## 2023-12-20 PROBLEM — R91.1 LEFT UPPER LOBE PULMONARY NODULE: Status: ACTIVE | Noted: 2023-01-01

## 2023-12-20 PROBLEM — Z87.440 HISTORY OF URINARY TRACT INFECTION: Status: RESOLVED | Noted: 2020-10-22 | Resolved: 2023-01-01

## 2023-12-20 PROBLEM — K52.9 ACUTE GASTROENTERITIS: Status: RESOLVED | Noted: 2022-06-17 | Resolved: 2023-01-01

## 2023-12-20 PROBLEM — R93.89 ABNORMAL CHEST CT: Status: ACTIVE | Noted: 2023-01-01

## 2023-12-20 PROBLEM — Z91.041 ALLERGIC TO IV CONTRAST: Status: ACTIVE | Noted: 2023-01-01

## 2023-12-21 PROBLEM — R16.0 LIVER MASS: Status: ACTIVE | Noted: 2023-01-01

## 2023-12-21 PROBLEM — Z86.73 PERSONAL HISTORY OF TIA (TRANSIENT ISCHEMIC ATTACK): Status: RESOLVED | Noted: 2023-01-01 | Resolved: 2023-01-01

## 2023-12-21 PROBLEM — Z60.2 LIVES ALONE WITH HELP AVAILABLE: Status: ACTIVE | Noted: 2023-01-01

## 2023-12-21 NOTE — PHYSICAL EXAM
[Alert] : alert [No Acute Distress] : no acute distress [Well Nourished] : well nourished [Well Developed] : well developed [Healthy Appearance] : healthy appearance [Normal Voice/Communication] : normal voice communication [No Respiratory Distress] : no respiratory distress [Normal Rate and Effort] : normal respiratory rhythm and effort [No Accessory Muscle Use] : no accessory muscle use [Normal Gait] : normal gait [Oriented x3] : oriented to person, place, and time [Normal Insight/Judgement] : insight and judgment were intact [Normal Affect] : the affect was normal [Normal Mood] : the mood was normal [Recent Memory Normal] : recent memory was not impaired [Remote Memory Normal] : remote memory was not impaired [Restricted in physically strenuous activity but ambulatory and able to carry out work of a light or sedentary nature] : Restricted in physically strenuous activity but ambulatory and able to carry out work of a light or sedentary nature, e.g., light house work, office work

## 2023-12-26 NOTE — ASSESSMENT
[Other: _____] : [unfilled] [FreeTextEntry1] : Ms. Hoang is a 67 y/o with hx of HTN, TIA, hypothyroidism, and metastatic endometrial cancer who presents today for consultation for left lung biopsy.    1. Abnormal Chest CT - pt with hx of metastatic endometrial CA and enlarging REMBERTO nodule - 12/2/23 Chest CT demonstrated enlarging cavitary lung mass in the left upper lobe with interval development of lymphadenopathy in the AP window, highly concerning for neoplasm with metastatic disease.  - imaging reviewed and d/w patient - there is concern that the enlarging REMBERTO lung mass is a primary neoplasm, instead of metastatic lesion, so biopsy is requested by oncology.  Biopsy is also requested to determine if patient is eligible for clinical trials vs initiating new chemotherapy. - Given location of mass, percutaneous biopsy will be technically challenging and may not produce adequate sample.  Reached out to Dr. Blank to discuss biopsy of enlarged mediastinal lymph nodes or EBUS of the REMBERTO mass.   - Also discussed option of biopsy of different site, such as the enlarging liver lesion.   - After lengthy discussion with Dr. Blank, pt to be referred to interventional pulmonologist to determine if REMBERTO mass or mediastinal LN can be accessed by endobronchial approach to determine if lung mass primary neoplasm. - In the interim, will proceed with biopsy of liver mass at this time, to obtain tissue for possible help aid in treatment plan since pt has been off of treatment for 6 weeks.  - if lung mass unable to be biopsied by pulmonary, can re-evaluate for possible percutaneous biopsy.  - I reviewed image guided targeted percutaneous liver biopsy procedure, including the risks (infection, bleeding, etc), benefits, alternatives, and expected post procedure course. - labs will done at outpatient [facility] lab - pt to f/u with Dr. Blank to discuss results - contact information provided for Dr. Eduard Miller/Dr. Sullivan   2. Iodinated Contrast Allergy - pt denies hx of iodine contrast reaction - reports hx of IV contrast with CT scans in past with no allergic reaction - reports "burning/tickling" sensation in throat when drinking barium for study in past - discussed contrast is not utilized for biopsy  3.  HTN - reports recent dx of HTN, believed to be 2/2 side effects of treatment - started on Valsartan approximately 1 week ago - b/p today elevated, reporting home measurements to be running similar - pt received call from doctor at time of consult and will be having amlodipine added to regimen.   I have provided the patient the opportunity to ask questions and have answered them to their satisfaction.  They are encouraged to contact our office with any further questions, concerns, or issues.  I, Dr. Phan Gardiner, personally performed the evaluation & management (E/M) services for this new patient. The E/M includes conducting initial evaluation, assessing all conditions, and establishing the plan of care. Today, my ACP, Monae KNOWLES, was present to observe my evaluation & management services for this patient to be followed, going forward.

## 2023-12-26 NOTE — HISTORY OF PRESENT ILLNESS
[FreeTextEntry1] : Ms. Hoang is a 69 y/o with hx of HTN, TIA, hypothyroidism, and metastatic endometrial cancer who presents today for consultation for left lung biopsy.    Pt diagnosed with stage II endometrioid adenocarcinoma in 2018.  She is s/p TLH-BSO and staging surgery in 7/2018 followed by adjuvant RT, with biopsy proven metastatic disease involving lung and vaginal cuff nodularity.  Of note, lung biopsy c/b pneumothorax requiring chest tube insertion.    She was treated with carboplatin and paclitaxel from 8/14/20 - 2/11/21. She subsequently developed disease progression involving the vaginal mass and in REMBERTO anterior to the mediastinum. She was seen by Dr. Bowling and underwent radiation to the vaginal canal to a total dose of 2000 cGy in 5 fraction. 5/25/21-6/1/21.  She was subsequently treated with pembrolizumab and lenvima since 3/17/21 with stability of disease. Treatment was held in August due to immunotherapy-associated diarrhea/colitis. CT scans subsequently showed disease progression. Pembrolizumab/lenvima discontinued and treatment options reviewed with the patient including clinical trials. She received treatment with Doxil s/p C6 on 2/16/23, now discontinued due to further POD.  Her most recent treatment was with abraxane and zirabev, which started 4/18/23.  Her last treatment was 6 weeks ago and was held due to COVID infection.  She has also had issues with sinus infection and post nasal drip, which has caused her to have persistent voice hoarseness.   Recent imaging revealed POD and enlargement in REMBERTO lung lesion, with the concern for possible primary neoplasm, so she is referred to IR for REMBERTO lung biopsy.  Denies dyspnea, hemoptysis, or cough.   Hem Onc Dr. Toy Blank Rad Onc Dr. Bowling  GYN Onc Dr. Gaytan

## 2024-01-01 ENCOUNTER — INPATIENT (INPATIENT)
Facility: HOSPITAL | Age: 69
LOS: 16 days | Discharge: HOSPICE | End: 2024-03-15
Attending: INTERNAL MEDICINE | Admitting: INTERNAL MEDICINE
Payer: MEDICARE

## 2024-01-01 ENCOUNTER — APPOINTMENT (OUTPATIENT)
Dept: INFUSION THERAPY | Facility: HOSPITAL | Age: 69
End: 2024-01-01

## 2024-01-01 ENCOUNTER — NON-APPOINTMENT (OUTPATIENT)
Age: 69
End: 2024-01-01

## 2024-01-01 ENCOUNTER — RESULT REVIEW (OUTPATIENT)
Age: 69
End: 2024-01-01

## 2024-01-01 ENCOUNTER — APPOINTMENT (OUTPATIENT)
Dept: HEMATOLOGY ONCOLOGY | Facility: CLINIC | Age: 69
End: 2024-01-01

## 2024-01-01 ENCOUNTER — OUTPATIENT (OUTPATIENT)
Dept: OUTPATIENT SERVICES | Facility: HOSPITAL | Age: 69
LOS: 1 days | Discharge: HOME | End: 2024-01-01
Payer: MEDICARE

## 2024-01-01 ENCOUNTER — TRANSCRIPTION ENCOUNTER (OUTPATIENT)
Age: 69
End: 2024-01-01

## 2024-01-01 ENCOUNTER — APPOINTMENT (OUTPATIENT)
Dept: ENDOCRINOLOGY | Facility: CLINIC | Age: 69
End: 2024-01-01

## 2024-01-01 ENCOUNTER — APPOINTMENT (OUTPATIENT)
Dept: HEMATOLOGY ONCOLOGY | Facility: CLINIC | Age: 69
End: 2024-01-01
Payer: MEDICARE

## 2024-01-01 ENCOUNTER — INPATIENT (INPATIENT)
Facility: HOSPITAL | Age: 69
LOS: 4 days | Discharge: HOME | End: 2024-02-21
Attending: HOSPITALIST | Admitting: HOSPITALIST
Payer: MEDICARE

## 2024-01-01 ENCOUNTER — INPATIENT (INPATIENT)
Facility: HOSPITAL | Age: 69
LOS: 0 days | Discharge: HOME | End: 2024-01-08
Attending: STUDENT IN AN ORGANIZED HEALTH CARE EDUCATION/TRAINING PROGRAM | Admitting: STUDENT IN AN ORGANIZED HEALTH CARE EDUCATION/TRAINING PROGRAM
Payer: MEDICARE

## 2024-01-01 ENCOUNTER — OUTPATIENT (OUTPATIENT)
Dept: OUTPATIENT SERVICES | Facility: HOSPITAL | Age: 69
LOS: 1 days | Discharge: HOME | End: 2024-01-01

## 2024-01-01 ENCOUNTER — APPOINTMENT (OUTPATIENT)
Dept: PULMONOLOGY | Facility: CLINIC | Age: 69
End: 2024-01-01

## 2024-01-01 ENCOUNTER — APPOINTMENT (OUTPATIENT)
Dept: PULMONOLOGY | Facility: HOSPITAL | Age: 69
End: 2024-01-01

## 2024-01-01 ENCOUNTER — OUTPATIENT (OUTPATIENT)
Dept: OUTPATIENT SERVICES | Facility: HOSPITAL | Age: 69
LOS: 1 days | End: 2024-01-01
Payer: MEDICARE

## 2024-01-01 VITALS
HEART RATE: 117 BPM | HEIGHT: 61 IN | RESPIRATION RATE: 18 BRPM | TEMPERATURE: 98 F | SYSTOLIC BLOOD PRESSURE: 124 MMHG | DIASTOLIC BLOOD PRESSURE: 74 MMHG | OXYGEN SATURATION: 100 %

## 2024-01-01 VITALS
TEMPERATURE: 98 F | WEIGHT: 111.99 LBS | HEART RATE: 102 BPM | DIASTOLIC BLOOD PRESSURE: 74 MMHG | OXYGEN SATURATION: 99 % | HEIGHT: 61 IN | RESPIRATION RATE: 18 BRPM | SYSTOLIC BLOOD PRESSURE: 103 MMHG

## 2024-01-01 VITALS
RESPIRATION RATE: 18 BRPM | HEART RATE: 99 BPM | OXYGEN SATURATION: 99 % | DIASTOLIC BLOOD PRESSURE: 70 MMHG | TEMPERATURE: 98 F | SYSTOLIC BLOOD PRESSURE: 101 MMHG

## 2024-01-01 VITALS
RESPIRATION RATE: 17 BRPM | DIASTOLIC BLOOD PRESSURE: 71 MMHG | SYSTOLIC BLOOD PRESSURE: 104 MMHG | HEART RATE: 119 BPM | HEIGHT: 61 IN | TEMPERATURE: 98 F | OXYGEN SATURATION: 100 %

## 2024-01-01 VITALS
SYSTOLIC BLOOD PRESSURE: 126 MMHG | TEMPERATURE: 98 F | RESPIRATION RATE: 20 BRPM | HEART RATE: 125 BPM | HEIGHT: 61 IN | DIASTOLIC BLOOD PRESSURE: 81 MMHG | OXYGEN SATURATION: 100 % | WEIGHT: 110.01 LBS

## 2024-01-01 VITALS
SYSTOLIC BLOOD PRESSURE: 121 MMHG | HEIGHT: 60 IN | BODY MASS INDEX: 22.38 KG/M2 | DIASTOLIC BLOOD PRESSURE: 83 MMHG | HEART RATE: 127 BPM | WEIGHT: 113.98 LBS | RESPIRATION RATE: 16 BRPM | TEMPERATURE: 97.4 F | OXYGEN SATURATION: 99 %

## 2024-01-01 VITALS
DIASTOLIC BLOOD PRESSURE: 85 MMHG | OXYGEN SATURATION: 97 % | BODY MASS INDEX: 22.46 KG/M2 | SYSTOLIC BLOOD PRESSURE: 127 MMHG | WEIGHT: 114.42 LBS | HEIGHT: 60 IN | TEMPERATURE: 96.9 F | RESPIRATION RATE: 16 BRPM | HEART RATE: 62 BPM

## 2024-01-01 VITALS
RESPIRATION RATE: 18 BRPM | SYSTOLIC BLOOD PRESSURE: 119 MMHG | HEART RATE: 85 BPM | DIASTOLIC BLOOD PRESSURE: 71 MMHG | TEMPERATURE: 98 F | OXYGEN SATURATION: 98 %

## 2024-01-01 VITALS — OXYGEN SATURATION: 98 %

## 2024-01-01 DIAGNOSIS — Z90.710 ACQUIRED ABSENCE OF BOTH CERVIX AND UTERUS: Chronic | ICD-10-CM

## 2024-01-01 DIAGNOSIS — Z29.9 ENCOUNTER FOR PROPHYLACTIC MEASURES, UNSPECIFIED: ICD-10-CM

## 2024-01-01 DIAGNOSIS — R91.1 SOLITARY PULMONARY NODULE: ICD-10-CM

## 2024-01-01 DIAGNOSIS — Z98.890 OTHER SPECIFIED POSTPROCEDURAL STATES: Chronic | ICD-10-CM

## 2024-01-01 DIAGNOSIS — C54.1 MALIGNANT NEOPLASM OF ENDOMETRIUM: ICD-10-CM

## 2024-01-01 DIAGNOSIS — Z45.2 ENCOUNTER FOR ADJUSTMENT AND MANAGEMENT OF VASCULAR ACCESS DEVICE: Chronic | ICD-10-CM

## 2024-01-01 DIAGNOSIS — G89.3 NEOPLASM RELATED PAIN (ACUTE) (CHRONIC): ICD-10-CM

## 2024-01-01 DIAGNOSIS — Z51.5 ENCOUNTER FOR PALLIATIVE CARE: ICD-10-CM

## 2024-01-01 DIAGNOSIS — E03.9 HYPOTHYROIDISM, UNSPECIFIED: ICD-10-CM

## 2024-01-01 DIAGNOSIS — K59.00 CONSTIPATION, UNSPECIFIED: ICD-10-CM

## 2024-01-01 DIAGNOSIS — D72.829 ELEVATED WHITE BLOOD CELL COUNT, UNSPECIFIED: ICD-10-CM

## 2024-01-01 DIAGNOSIS — I63.239 CEREBRAL INFARCTION DUE TO UNSPECIFIED OCCLUSION OR STENOSIS OF UNSPECIFIED CAROTID ARTERY: ICD-10-CM

## 2024-01-01 DIAGNOSIS — N13.30 UNSPECIFIED HYDRONEPHROSIS: ICD-10-CM

## 2024-01-01 DIAGNOSIS — R62.7 ADULT FAILURE TO THRIVE: ICD-10-CM

## 2024-01-01 DIAGNOSIS — R11.2 NAUSEA WITH VOMITING, UNSPECIFIED: ICD-10-CM

## 2024-01-01 DIAGNOSIS — I10 ESSENTIAL (PRIMARY) HYPERTENSION: ICD-10-CM

## 2024-01-01 DIAGNOSIS — Z87.898 PERSONAL HISTORY OF OTHER SPECIFIED CONDITIONS: ICD-10-CM

## 2024-01-01 DIAGNOSIS — R06.09 OTHER FORMS OF DYSPNEA: ICD-10-CM

## 2024-01-01 DIAGNOSIS — E86.0 DEHYDRATION: ICD-10-CM

## 2024-01-01 DIAGNOSIS — C55 MALIGNANT NEOPLASM OF UTERUS, PART UNSPECIFIED: ICD-10-CM

## 2024-01-01 DIAGNOSIS — R91.8 OTHER NONSPECIFIC ABNORMAL FINDING OF LUNG FIELD: ICD-10-CM

## 2024-01-01 DIAGNOSIS — Z71.89 OTHER SPECIFIED COUNSELING: ICD-10-CM

## 2024-01-01 DIAGNOSIS — J98.59 OTHER DISEASES OF MEDIASTINUM, NOT ELSEWHERE CLASSIFIED: ICD-10-CM

## 2024-01-01 DIAGNOSIS — R52 PAIN, UNSPECIFIED: ICD-10-CM

## 2024-01-01 DIAGNOSIS — Z51.11 ENCOUNTER FOR ANTINEOPLASTIC CHEMOTHERAPY: ICD-10-CM

## 2024-01-01 DIAGNOSIS — Z86.73 PERSONAL HISTORY OF TRANSIENT ISCHEMIC ATTACK (TIA), AND CEREBRAL INFARCTION WITHOUT RESIDUAL DEFICITS: ICD-10-CM

## 2024-01-01 DIAGNOSIS — E87.6 HYPOKALEMIA: ICD-10-CM

## 2024-01-01 LAB
-  CEFTRIAXONE: SIGNIFICANT CHANGE UP
-  CEFTRIAXONE: SIGNIFICANT CHANGE UP
-  PENICILLIN: SIGNIFICANT CHANGE UP
-  PENICILLIN: SIGNIFICANT CHANGE UP
-  VANCOMYCIN: SIGNIFICANT CHANGE UP
-  VANCOMYCIN: SIGNIFICANT CHANGE UP
A1C WITH ESTIMATED AVERAGE GLUCOSE RESULT: 5.1 % — SIGNIFICANT CHANGE UP (ref 4–5.6)
ADD ON TEST-SPECIMEN IN LAB: SIGNIFICANT CHANGE UP
ALBUMIN SERPL ELPH-MCNC: 2.5 G/DL — LOW (ref 3.3–5)
ALBUMIN SERPL ELPH-MCNC: 2.6 G/DL — LOW (ref 3.3–5)
ALBUMIN SERPL ELPH-MCNC: 2.7 G/DL — LOW (ref 3.3–5)
ALBUMIN SERPL ELPH-MCNC: 2.8 G/DL — LOW (ref 3.3–5)
ALBUMIN SERPL ELPH-MCNC: 2.9 G/DL — LOW (ref 3.3–5)
ALBUMIN SERPL ELPH-MCNC: 2.9 G/DL — LOW (ref 3.3–5)
ALBUMIN SERPL ELPH-MCNC: 3.1 G/DL — LOW (ref 3.3–5)
ALBUMIN SERPL ELPH-MCNC: 3.3 G/DL — SIGNIFICANT CHANGE UP (ref 3.3–5)
ALBUMIN SERPL ELPH-MCNC: 3.4 G/DL — SIGNIFICANT CHANGE UP (ref 3.3–5)
ALP SERPL-CCNC: 108 U/L — SIGNIFICANT CHANGE UP (ref 40–120)
ALP SERPL-CCNC: 108 U/L — SIGNIFICANT CHANGE UP (ref 40–120)
ALP SERPL-CCNC: 121 U/L — HIGH (ref 40–120)
ALP SERPL-CCNC: 133 U/L — HIGH (ref 40–120)
ALP SERPL-CCNC: 137 U/L — HIGH (ref 40–120)
ALP SERPL-CCNC: 140 U/L — HIGH (ref 40–120)
ALP SERPL-CCNC: 141 U/L — HIGH (ref 40–120)
ALP SERPL-CCNC: 144 U/L — HIGH (ref 40–120)
ALP SERPL-CCNC: 151 U/L — HIGH (ref 40–120)
ALP SERPL-CCNC: 154 U/L — HIGH (ref 40–120)
ALP SERPL-CCNC: 164 U/L — HIGH (ref 40–120)
ALP SERPL-CCNC: 175 U/L — HIGH (ref 40–120)
ALP SERPL-CCNC: 179 U/L — HIGH (ref 40–120)
ALP SERPL-CCNC: 182 U/L — HIGH (ref 40–120)
ALP SERPL-CCNC: 187 U/L — HIGH (ref 40–120)
ALP SERPL-CCNC: 189 U/L — HIGH (ref 40–120)
ALP SERPL-CCNC: 189 U/L — HIGH (ref 40–120)
ALP SERPL-CCNC: 190 U/L — HIGH (ref 40–120)
ALP SERPL-CCNC: 218 U/L — HIGH (ref 40–120)
ALP SERPL-CCNC: 223 U/L — HIGH (ref 40–120)
ALP SERPL-CCNC: 238 U/L — HIGH (ref 40–120)
ALT FLD-CCNC: 10 U/L — SIGNIFICANT CHANGE UP (ref 4–33)
ALT FLD-CCNC: 11 U/L — SIGNIFICANT CHANGE UP (ref 10–45)
ALT FLD-CCNC: 11 U/L — SIGNIFICANT CHANGE UP (ref 10–45)
ALT FLD-CCNC: 11 U/L — SIGNIFICANT CHANGE UP (ref 4–33)
ALT FLD-CCNC: 11 U/L — SIGNIFICANT CHANGE UP (ref 4–33)
ALT FLD-CCNC: 12 U/L — SIGNIFICANT CHANGE UP (ref 4–33)
ALT FLD-CCNC: 14 U/L — SIGNIFICANT CHANGE UP (ref 4–33)
ALT FLD-CCNC: 5 U/L — SIGNIFICANT CHANGE UP (ref 4–33)
ALT FLD-CCNC: 5 U/L — SIGNIFICANT CHANGE UP (ref 4–33)
ALT FLD-CCNC: 6 U/L — SIGNIFICANT CHANGE UP (ref 4–33)
ALT FLD-CCNC: 7 U/L — SIGNIFICANT CHANGE UP (ref 4–33)
ALT FLD-CCNC: 9 U/L — SIGNIFICANT CHANGE UP (ref 4–33)
ANION GAP SERPL CALC-SCNC: 10 MMOL/L — SIGNIFICANT CHANGE UP (ref 7–14)
ANION GAP SERPL CALC-SCNC: 11 MMOL/L — SIGNIFICANT CHANGE UP (ref 7–14)
ANION GAP SERPL CALC-SCNC: 12 MMOL/L — SIGNIFICANT CHANGE UP (ref 5–17)
ANION GAP SERPL CALC-SCNC: 12 MMOL/L — SIGNIFICANT CHANGE UP (ref 7–14)
ANION GAP SERPL CALC-SCNC: 13 MMOL/L — SIGNIFICANT CHANGE UP (ref 7–14)
ANION GAP SERPL CALC-SCNC: 14 MMOL/L — SIGNIFICANT CHANGE UP (ref 7–14)
ANION GAP SERPL CALC-SCNC: 15 MMOL/L — HIGH (ref 7–14)
ANION GAP SERPL CALC-SCNC: 16 MMOL/L — HIGH (ref 7–14)
ANION GAP SERPL CALC-SCNC: 17 MMOL/L — SIGNIFICANT CHANGE UP (ref 5–17)
ANION GAP SERPL CALC-SCNC: 9 MMOL/L — SIGNIFICANT CHANGE UP (ref 7–14)
ANION GAP SERPL CALC-SCNC: 9 MMOL/L — SIGNIFICANT CHANGE UP (ref 7–14)
APPEARANCE UR: CLEAR — SIGNIFICANT CHANGE UP
APTT BLD: 32.1 SEC — SIGNIFICANT CHANGE UP (ref 24.5–35.6)
APTT BLD: 32.2 SEC — SIGNIFICANT CHANGE UP (ref 24.5–35.6)
APTT BLD: 32.2 SEC — SIGNIFICANT CHANGE UP (ref 24.5–35.6)
APTT BLD: 36.9 SEC — HIGH (ref 24.5–35.6)
AST SERPL-CCNC: 12 U/L — SIGNIFICANT CHANGE UP (ref 4–32)
AST SERPL-CCNC: 12 U/L — SIGNIFICANT CHANGE UP (ref 4–32)
AST SERPL-CCNC: 16 U/L — SIGNIFICANT CHANGE UP (ref 4–32)
AST SERPL-CCNC: 16 U/L — SIGNIFICANT CHANGE UP (ref 4–32)
AST SERPL-CCNC: 17 U/L — SIGNIFICANT CHANGE UP (ref 4–32)
AST SERPL-CCNC: 18 U/L — SIGNIFICANT CHANGE UP (ref 4–32)
AST SERPL-CCNC: 19 U/L — SIGNIFICANT CHANGE UP (ref 4–32)
AST SERPL-CCNC: 20 U/L — SIGNIFICANT CHANGE UP (ref 4–32)
AST SERPL-CCNC: 21 U/L — SIGNIFICANT CHANGE UP (ref 4–32)
AST SERPL-CCNC: 22 U/L — SIGNIFICANT CHANGE UP (ref 4–32)
AST SERPL-CCNC: 23 U/L — SIGNIFICANT CHANGE UP (ref 10–40)
AST SERPL-CCNC: 23 U/L — SIGNIFICANT CHANGE UP (ref 10–40)
AST SERPL-CCNC: 25 U/L — SIGNIFICANT CHANGE UP (ref 4–32)
AST SERPL-CCNC: 34 U/L — HIGH (ref 4–32)
BASE EXCESS BLDV CALC-SCNC: 6.5 MMOL/L — HIGH (ref -2–3)
BASOPHILS # BLD AUTO: 0.01 K/UL — SIGNIFICANT CHANGE UP (ref 0–0.2)
BASOPHILS # BLD AUTO: 0.01 K/UL — SIGNIFICANT CHANGE UP (ref 0–0.2)
BASOPHILS # BLD AUTO: 0.02 K/UL — SIGNIFICANT CHANGE UP (ref 0–0.2)
BASOPHILS # BLD AUTO: 0.03 K/UL — SIGNIFICANT CHANGE UP (ref 0–0.2)
BASOPHILS # BLD AUTO: 0.04 K/UL — SIGNIFICANT CHANGE UP (ref 0–0.2)
BASOPHILS # BLD AUTO: 0.04 K/UL — SIGNIFICANT CHANGE UP (ref 0–0.2)
BASOPHILS # BLD AUTO: 0.05 K/UL — SIGNIFICANT CHANGE UP (ref 0–0.2)
BASOPHILS NFR BLD AUTO: 0.1 % — SIGNIFICANT CHANGE UP (ref 0–2)
BASOPHILS NFR BLD AUTO: 0.1 % — SIGNIFICANT CHANGE UP (ref 0–2)
BASOPHILS NFR BLD AUTO: 0.2 % — SIGNIFICANT CHANGE UP (ref 0–2)
BASOPHILS NFR BLD AUTO: 0.3 % — SIGNIFICANT CHANGE UP (ref 0–2)
BASOPHILS NFR BLD AUTO: 0.4 % — SIGNIFICANT CHANGE UP (ref 0–2)
BASOPHILS NFR BLD AUTO: 0.5 % — SIGNIFICANT CHANGE UP (ref 0–2)
BILIRUB SERPL-MCNC: 0.2 MG/DL — SIGNIFICANT CHANGE UP (ref 0.2–1.2)
BILIRUB SERPL-MCNC: 0.3 MG/DL — SIGNIFICANT CHANGE UP (ref 0.2–1.2)
BILIRUB SERPL-MCNC: 0.4 MG/DL — SIGNIFICANT CHANGE UP (ref 0.2–1.2)
BILIRUB SERPL-MCNC: 0.5 MG/DL — SIGNIFICANT CHANGE UP (ref 0.2–1.2)
BILIRUB SERPL-MCNC: 0.5 MG/DL — SIGNIFICANT CHANGE UP (ref 0.2–1.2)
BILIRUB SERPL-MCNC: <0.2 MG/DL — SIGNIFICANT CHANGE UP (ref 0.2–1.2)
BILIRUB UR-MCNC: NEGATIVE — SIGNIFICANT CHANGE UP
BLD GP AB SCN SERPL QL: NEGATIVE — SIGNIFICANT CHANGE UP
BLOOD GAS VENOUS COMPREHENSIVE RESULT: SIGNIFICANT CHANGE UP
BUN SERPL-MCNC: 10 MG/DL — SIGNIFICANT CHANGE UP (ref 7–23)
BUN SERPL-MCNC: 11 MG/DL — SIGNIFICANT CHANGE UP (ref 7–23)
BUN SERPL-MCNC: 12 MG/DL — SIGNIFICANT CHANGE UP (ref 7–23)
BUN SERPL-MCNC: 13 MG/DL — SIGNIFICANT CHANGE UP (ref 7–23)
BUN SERPL-MCNC: 14 MG/DL — SIGNIFICANT CHANGE UP (ref 7–23)
BUN SERPL-MCNC: 14 MG/DL — SIGNIFICANT CHANGE UP (ref 7–23)
BUN SERPL-MCNC: 15 MG/DL — SIGNIFICANT CHANGE UP (ref 7–23)
BUN SERPL-MCNC: 16 MG/DL — SIGNIFICANT CHANGE UP (ref 7–23)
BUN SERPL-MCNC: 16 MG/DL — SIGNIFICANT CHANGE UP (ref 7–23)
BUN SERPL-MCNC: 17 MG/DL — SIGNIFICANT CHANGE UP (ref 7–23)
BUN SERPL-MCNC: 18 MG/DL — SIGNIFICANT CHANGE UP (ref 7–23)
BUN SERPL-MCNC: 19 MG/DL — SIGNIFICANT CHANGE UP (ref 7–23)
BUN SERPL-MCNC: 20 MG/DL — SIGNIFICANT CHANGE UP (ref 7–23)
BUN SERPL-MCNC: 21 MG/DL — SIGNIFICANT CHANGE UP (ref 7–23)
BUN SERPL-MCNC: 21 MG/DL — SIGNIFICANT CHANGE UP (ref 7–23)
BUN SERPL-MCNC: 7 MG/DL — SIGNIFICANT CHANGE UP (ref 7–23)
BUN SERPL-MCNC: 9 MG/DL — SIGNIFICANT CHANGE UP (ref 7–23)
CALCIUM SERPL-MCNC: 8.6 MG/DL — SIGNIFICANT CHANGE UP (ref 8.4–10.5)
CALCIUM SERPL-MCNC: 8.6 MG/DL — SIGNIFICANT CHANGE UP (ref 8.4–10.5)
CALCIUM SERPL-MCNC: 8.8 MG/DL — SIGNIFICANT CHANGE UP (ref 8.4–10.5)
CALCIUM SERPL-MCNC: 8.9 MG/DL — SIGNIFICANT CHANGE UP (ref 8.4–10.5)
CALCIUM SERPL-MCNC: 9 MG/DL — SIGNIFICANT CHANGE UP (ref 8.4–10.5)
CALCIUM SERPL-MCNC: 9 MG/DL — SIGNIFICANT CHANGE UP (ref 8.4–10.5)
CALCIUM SERPL-MCNC: 9.1 MG/DL — SIGNIFICANT CHANGE UP (ref 8.4–10.5)
CALCIUM SERPL-MCNC: 9.2 MG/DL — SIGNIFICANT CHANGE UP (ref 8.4–10.5)
CALCIUM SERPL-MCNC: 9.3 MG/DL — SIGNIFICANT CHANGE UP (ref 8.4–10.5)
CALCIUM SERPL-MCNC: 9.4 MG/DL — SIGNIFICANT CHANGE UP (ref 8.4–10.5)
CALCIUM SERPL-MCNC: 9.5 MG/DL — SIGNIFICANT CHANGE UP (ref 8.4–10.5)
CALCIUM SERPL-MCNC: 9.7 MG/DL — SIGNIFICANT CHANGE UP (ref 8.4–10.5)
CANCER AG125 SERPL-ACNC: 199 U/ML — HIGH
CANCER AG125 SERPL-ACNC: 271 U/ML — HIGH
CHLORIDE BLDV-SCNC: 96 MMOL/L — SIGNIFICANT CHANGE UP (ref 96–108)
CHLORIDE SERPL-SCNC: 100 MMOL/L — SIGNIFICANT CHANGE UP (ref 98–107)
CHLORIDE SERPL-SCNC: 100 MMOL/L — SIGNIFICANT CHANGE UP (ref 98–107)
CHLORIDE SERPL-SCNC: 101 MMOL/L — SIGNIFICANT CHANGE UP (ref 98–107)
CHLORIDE SERPL-SCNC: 102 MMOL/L — SIGNIFICANT CHANGE UP (ref 98–107)
CHLORIDE SERPL-SCNC: 105 MMOL/L — SIGNIFICANT CHANGE UP (ref 98–107)
CHLORIDE SERPL-SCNC: 105 MMOL/L — SIGNIFICANT CHANGE UP (ref 98–107)
CHLORIDE SERPL-SCNC: 91 MMOL/L — LOW (ref 98–107)
CHLORIDE SERPL-SCNC: 92 MMOL/L — LOW (ref 98–107)
CHLORIDE SERPL-SCNC: 93 MMOL/L — LOW (ref 98–107)
CHLORIDE SERPL-SCNC: 94 MMOL/L — LOW (ref 98–107)
CHLORIDE SERPL-SCNC: 95 MMOL/L — LOW (ref 98–107)
CHLORIDE SERPL-SCNC: 95 MMOL/L — LOW (ref 98–107)
CHLORIDE SERPL-SCNC: 96 MMOL/L — SIGNIFICANT CHANGE UP (ref 96–108)
CHLORIDE SERPL-SCNC: 97 MMOL/L — LOW (ref 98–107)
CHLORIDE SERPL-SCNC: 98 MMOL/L — SIGNIFICANT CHANGE UP (ref 96–108)
CHLORIDE SERPL-SCNC: 98 MMOL/L — SIGNIFICANT CHANGE UP (ref 98–107)
CHLORIDE SERPL-SCNC: 99 MMOL/L — SIGNIFICANT CHANGE UP (ref 98–107)
CHLORIDE SERPL-SCNC: 99 MMOL/L — SIGNIFICANT CHANGE UP (ref 98–107)
CHLORIDE UR-SCNC: 68 MMOL/L — SIGNIFICANT CHANGE UP
CO2 BLDV-SCNC: 32.7 MMOL/L — HIGH (ref 22–26)
CO2 SERPL-SCNC: 22 MMOL/L — SIGNIFICANT CHANGE UP (ref 22–31)
CO2 SERPL-SCNC: 22 MMOL/L — SIGNIFICANT CHANGE UP (ref 22–31)
CO2 SERPL-SCNC: 23 MMOL/L — SIGNIFICANT CHANGE UP (ref 22–31)
CO2 SERPL-SCNC: 24 MMOL/L — SIGNIFICANT CHANGE UP (ref 22–31)
CO2 SERPL-SCNC: 25 MMOL/L — SIGNIFICANT CHANGE UP (ref 22–31)
CO2 SERPL-SCNC: 26 MMOL/L — SIGNIFICANT CHANGE UP (ref 22–31)
CO2 SERPL-SCNC: 27 MMOL/L — SIGNIFICANT CHANGE UP (ref 22–31)
CO2 SERPL-SCNC: 28 MMOL/L — SIGNIFICANT CHANGE UP (ref 22–31)
CO2 SERPL-SCNC: 29 MMOL/L — SIGNIFICANT CHANGE UP (ref 22–31)
CO2 SERPL-SCNC: 31 MMOL/L — SIGNIFICANT CHANGE UP (ref 22–31)
CO2 SERPL-SCNC: 32 MMOL/L — HIGH (ref 22–31)
COLOR SPEC: YELLOW — SIGNIFICANT CHANGE UP
CREAT ?TM UR-MCNC: 139 MG/DL — SIGNIFICANT CHANGE UP
CREAT SERPL-MCNC: 0.63 MG/DL — SIGNIFICANT CHANGE UP (ref 0.5–1.3)
CREAT SERPL-MCNC: 0.64 MG/DL — SIGNIFICANT CHANGE UP (ref 0.5–1.3)
CREAT SERPL-MCNC: 0.64 MG/DL — SIGNIFICANT CHANGE UP (ref 0.5–1.3)
CREAT SERPL-MCNC: 0.69 MG/DL — SIGNIFICANT CHANGE UP (ref 0.5–1.3)
CREAT SERPL-MCNC: 0.7 MG/DL — SIGNIFICANT CHANGE UP (ref 0.5–1.3)
CREAT SERPL-MCNC: 0.7 MG/DL — SIGNIFICANT CHANGE UP (ref 0.5–1.3)
CREAT SERPL-MCNC: 0.72 MG/DL — SIGNIFICANT CHANGE UP (ref 0.5–1.3)
CREAT SERPL-MCNC: 0.74 MG/DL — SIGNIFICANT CHANGE UP (ref 0.5–1.3)
CREAT SERPL-MCNC: 0.76 MG/DL — SIGNIFICANT CHANGE UP (ref 0.5–1.3)
CREAT SERPL-MCNC: 0.79 MG/DL — SIGNIFICANT CHANGE UP (ref 0.5–1.3)
CREAT SERPL-MCNC: 0.84 MG/DL — SIGNIFICANT CHANGE UP (ref 0.5–1.3)
CREAT SERPL-MCNC: 0.85 MG/DL — SIGNIFICANT CHANGE UP (ref 0.5–1.3)
CREAT SERPL-MCNC: 0.86 MG/DL — SIGNIFICANT CHANGE UP (ref 0.5–1.3)
CREAT SERPL-MCNC: 0.93 MG/DL — SIGNIFICANT CHANGE UP (ref 0.5–1.3)
CREAT SERPL-MCNC: 0.94 MG/DL — SIGNIFICANT CHANGE UP (ref 0.5–1.3)
CREAT SERPL-MCNC: 0.95 MG/DL — SIGNIFICANT CHANGE UP (ref 0.5–1.3)
CREAT SERPL-MCNC: 0.96 MG/DL — SIGNIFICANT CHANGE UP (ref 0.5–1.3)
CREAT SERPL-MCNC: 1.01 MG/DL — SIGNIFICANT CHANGE UP (ref 0.5–1.3)
CREAT SERPL-MCNC: 1.04 MG/DL — SIGNIFICANT CHANGE UP (ref 0.5–1.3)
CREAT SERPL-MCNC: 1.06 MG/DL — SIGNIFICANT CHANGE UP (ref 0.5–1.3)
CREAT SERPL-MCNC: 1.07 MG/DL — SIGNIFICANT CHANGE UP (ref 0.5–1.3)
CREAT SERPL-MCNC: 1.1 MG/DL — SIGNIFICANT CHANGE UP (ref 0.5–1.3)
CREAT SERPL-MCNC: 1.12 MG/DL — SIGNIFICANT CHANGE UP (ref 0.5–1.3)
CREAT SERPL-MCNC: 1.14 MG/DL — SIGNIFICANT CHANGE UP (ref 0.5–1.3)
CREAT SERPL-MCNC: 1.26 MG/DL — SIGNIFICANT CHANGE UP (ref 0.5–1.3)
CRP SERPL-MCNC: 44.4 MG/L — HIGH
CULTURE RESULTS: ABNORMAL
CULTURE RESULTS: ABNORMAL
CULTURE RESULTS: NO GROWTH — SIGNIFICANT CHANGE UP
CULTURE RESULTS: SIGNIFICANT CHANGE UP
DIFF PNL FLD: NEGATIVE — SIGNIFICANT CHANGE UP
EGFR: 46 ML/MIN/1.73M2 — LOW
EGFR: 52 ML/MIN/1.73M2 — LOW
EGFR: 53 ML/MIN/1.73M2 — LOW
EGFR: 54 ML/MIN/1.73M2 — LOW
EGFR: 56 ML/MIN/1.73M2 — LOW
EGFR: 57 ML/MIN/1.73M2 — LOW
EGFR: 58 ML/MIN/1.73M2 — LOW
EGFR: 60 ML/MIN/1.73M2 — SIGNIFICANT CHANGE UP
EGFR: 64 ML/MIN/1.73M2 — SIGNIFICANT CHANGE UP
EGFR: 65 ML/MIN/1.73M2 — SIGNIFICANT CHANGE UP
EGFR: 66 ML/MIN/1.73M2 — SIGNIFICANT CHANGE UP
EGFR: 73 ML/MIN/1.73M2 — SIGNIFICANT CHANGE UP
EGFR: 74 ML/MIN/1.73M2 — SIGNIFICANT CHANGE UP
EGFR: 75 ML/MIN/1.73M2 — SIGNIFICANT CHANGE UP
EGFR: 80 ML/MIN/1.73M2 — SIGNIFICANT CHANGE UP
EGFR: 84 ML/MIN/1.73M2 — SIGNIFICANT CHANGE UP
EGFR: 87 ML/MIN/1.73M2 — SIGNIFICANT CHANGE UP
EGFR: 90 ML/MIN/1.73M2 — SIGNIFICANT CHANGE UP
EGFR: 93 ML/MIN/1.73M2 — SIGNIFICANT CHANGE UP
EGFR: 95 ML/MIN/1.73M2 — SIGNIFICANT CHANGE UP
EOSINOPHIL # BLD AUTO: 0 K/UL — SIGNIFICANT CHANGE UP (ref 0–0.5)
EOSINOPHIL # BLD AUTO: 0 K/UL — SIGNIFICANT CHANGE UP (ref 0–0.5)
EOSINOPHIL # BLD AUTO: 0.01 K/UL — SIGNIFICANT CHANGE UP (ref 0–0.5)
EOSINOPHIL # BLD AUTO: 0.01 K/UL — SIGNIFICANT CHANGE UP (ref 0–0.5)
EOSINOPHIL # BLD AUTO: 0.03 K/UL — SIGNIFICANT CHANGE UP (ref 0–0.5)
EOSINOPHIL # BLD AUTO: 0.03 K/UL — SIGNIFICANT CHANGE UP (ref 0–0.5)
EOSINOPHIL # BLD AUTO: 0.04 K/UL — SIGNIFICANT CHANGE UP (ref 0–0.5)
EOSINOPHIL # BLD AUTO: 0.05 K/UL — SIGNIFICANT CHANGE UP (ref 0–0.5)
EOSINOPHIL # BLD AUTO: 0.07 K/UL — SIGNIFICANT CHANGE UP (ref 0–0.5)
EOSINOPHIL # BLD AUTO: 0.07 K/UL — SIGNIFICANT CHANGE UP (ref 0–0.5)
EOSINOPHIL # BLD AUTO: 0.08 K/UL — SIGNIFICANT CHANGE UP (ref 0–0.5)
EOSINOPHIL # BLD AUTO: 0.15 K/UL — SIGNIFICANT CHANGE UP (ref 0–0.5)
EOSINOPHIL # BLD AUTO: 0.15 K/UL — SIGNIFICANT CHANGE UP (ref 0–0.5)
EOSINOPHIL NFR BLD AUTO: 0 % — SIGNIFICANT CHANGE UP (ref 0–6)
EOSINOPHIL NFR BLD AUTO: 0 % — SIGNIFICANT CHANGE UP (ref 0–6)
EOSINOPHIL NFR BLD AUTO: 0.1 % — SIGNIFICANT CHANGE UP (ref 0–6)
EOSINOPHIL NFR BLD AUTO: 0.1 % — SIGNIFICANT CHANGE UP (ref 0–6)
EOSINOPHIL NFR BLD AUTO: 0.2 % — SIGNIFICANT CHANGE UP (ref 0–6)
EOSINOPHIL NFR BLD AUTO: 0.3 % — SIGNIFICANT CHANGE UP (ref 0–6)
EOSINOPHIL NFR BLD AUTO: 0.5 % — SIGNIFICANT CHANGE UP (ref 0–6)
EOSINOPHIL NFR BLD AUTO: 0.6 % — SIGNIFICANT CHANGE UP (ref 0–6)
EOSINOPHIL NFR BLD AUTO: 0.8 % — SIGNIFICANT CHANGE UP (ref 0–6)
EOSINOPHIL NFR BLD AUTO: 1.4 % — SIGNIFICANT CHANGE UP (ref 0–6)
EOSINOPHIL NFR BLD AUTO: 1.5 % — SIGNIFICANT CHANGE UP (ref 0–6)
ESTIMATED AVERAGE GLUCOSE: 100 — SIGNIFICANT CHANGE UP
FERRITIN SERPL-MCNC: 455 NG/ML — HIGH (ref 13–330)
FLUAV AG NPH QL: SIGNIFICANT CHANGE UP
FLUAV AG NPH QL: SIGNIFICANT CHANGE UP
FLUBV AG NPH QL: SIGNIFICANT CHANGE UP
FLUBV AG NPH QL: SIGNIFICANT CHANGE UP
GAS PNL BLDV: 131 MMOL/L — LOW (ref 136–145)
GLUCOSE BLDV-MCNC: 102 MG/DL — HIGH (ref 70–99)
GLUCOSE SERPL-MCNC: 101 MG/DL — HIGH (ref 70–99)
GLUCOSE SERPL-MCNC: 102 MG/DL — HIGH (ref 70–99)
GLUCOSE SERPL-MCNC: 103 MG/DL — HIGH (ref 70–99)
GLUCOSE SERPL-MCNC: 103 MG/DL — HIGH (ref 70–99)
GLUCOSE SERPL-MCNC: 104 MG/DL — HIGH (ref 70–99)
GLUCOSE SERPL-MCNC: 104 MG/DL — HIGH (ref 70–99)
GLUCOSE SERPL-MCNC: 105 MG/DL — HIGH (ref 70–99)
GLUCOSE SERPL-MCNC: 106 MG/DL — HIGH (ref 70–99)
GLUCOSE SERPL-MCNC: 107 MG/DL — HIGH (ref 70–99)
GLUCOSE SERPL-MCNC: 108 MG/DL — HIGH (ref 70–99)
GLUCOSE SERPL-MCNC: 108 MG/DL — HIGH (ref 70–99)
GLUCOSE SERPL-MCNC: 110 MG/DL — HIGH (ref 70–99)
GLUCOSE SERPL-MCNC: 111 MG/DL — HIGH (ref 70–99)
GLUCOSE SERPL-MCNC: 115 MG/DL — HIGH (ref 70–99)
GLUCOSE SERPL-MCNC: 117 MG/DL — HIGH (ref 70–99)
GLUCOSE SERPL-MCNC: 124 MG/DL — HIGH (ref 70–99)
GLUCOSE SERPL-MCNC: 130 MG/DL — HIGH (ref 70–99)
GLUCOSE SERPL-MCNC: 166 MG/DL — HIGH (ref 70–99)
GLUCOSE SERPL-MCNC: 90 MG/DL — SIGNIFICANT CHANGE UP (ref 70–99)
GLUCOSE SERPL-MCNC: 92 MG/DL — SIGNIFICANT CHANGE UP (ref 70–99)
GLUCOSE SERPL-MCNC: 96 MG/DL — SIGNIFICANT CHANGE UP (ref 70–99)
GLUCOSE SERPL-MCNC: 98 MG/DL — SIGNIFICANT CHANGE UP (ref 70–99)
GLUCOSE UR QL: NEGATIVE MG/DL — SIGNIFICANT CHANGE UP
GRAM STN FLD: ABNORMAL
GRAM STN FLD: ABNORMAL
GRAM STN FLD: SIGNIFICANT CHANGE UP
HCO3 BLDV-SCNC: 31 MMOL/L — HIGH (ref 22–29)
HCT VFR BLD CALC: 24.3 % — LOW (ref 34.5–45)
HCT VFR BLD CALC: 26.6 % — LOW (ref 34.5–45)
HCT VFR BLD CALC: 26.7 % — LOW (ref 34.5–45)
HCT VFR BLD CALC: 26.8 % — LOW (ref 34.5–45)
HCT VFR BLD CALC: 27.2 % — LOW (ref 34.5–45)
HCT VFR BLD CALC: 27.3 % — LOW (ref 34.5–45)
HCT VFR BLD CALC: 27.3 % — LOW (ref 34.5–45)
HCT VFR BLD CALC: 27.4 % — LOW (ref 34.5–45)
HCT VFR BLD CALC: 27.5 % — LOW (ref 34.5–45)
HCT VFR BLD CALC: 27.7 % — LOW (ref 34.5–45)
HCT VFR BLD CALC: 27.8 % — LOW (ref 34.5–45)
HCT VFR BLD CALC: 28.6 % — LOW (ref 34.5–45)
HCT VFR BLD CALC: 28.7 % — LOW (ref 34.5–45)
HCT VFR BLD CALC: 29.6 % — LOW (ref 34.5–45)
HCT VFR BLD CALC: 30.8 % — LOW (ref 34.5–45)
HCT VFR BLD CALC: 30.8 % — LOW (ref 34.5–45)
HCT VFR BLD CALC: 31.4 % — LOW (ref 34.5–45)
HCT VFR BLD CALC: 31.8 % — LOW (ref 34.5–45)
HCT VFR BLD CALC: 32.1 % — LOW (ref 34.5–45)
HCT VFR BLD CALC: 33.2 % — LOW (ref 34.5–45)
HCT VFR BLD CALC: 33.2 % — LOW (ref 34.5–45)
HCT VFR BLD CALC: 33.6 % — LOW (ref 34.5–45)
HCT VFR BLD CALC: 35 % — SIGNIFICANT CHANGE UP (ref 34.5–45)
HCT VFR BLD CALC: 36.6 % — SIGNIFICANT CHANGE UP (ref 34.5–45)
HCT VFR BLD CALC: 37 % — SIGNIFICANT CHANGE UP (ref 34.5–45)
HCT VFR BLD CALC: 37.6 % — SIGNIFICANT CHANGE UP (ref 34.5–45)
HCT VFR BLD CALC: 37.6 % — SIGNIFICANT CHANGE UP (ref 34.5–45)
HCT VFR BLDA CALC: 32 % — LOW (ref 34.5–46.5)
HCV AB S/CO SERPL IA: 0.08 S/CO — SIGNIFICANT CHANGE UP (ref 0–0.99)
HCV AB SERPL-IMP: SIGNIFICANT CHANGE UP
HGB BLD CALC-MCNC: 10.8 G/DL — LOW (ref 11.7–16.1)
HGB BLD-MCNC: 10.2 G/DL — LOW (ref 11.5–15.5)
HGB BLD-MCNC: 10.5 G/DL — LOW (ref 11.5–15.5)
HGB BLD-MCNC: 10.9 G/DL — LOW (ref 11.5–15.5)
HGB BLD-MCNC: 10.9 G/DL — LOW (ref 11.5–15.5)
HGB BLD-MCNC: 11.1 G/DL — LOW (ref 11.5–15.5)
HGB BLD-MCNC: 11.3 G/DL — LOW (ref 11.5–15.5)
HGB BLD-MCNC: 11.5 G/DL — SIGNIFICANT CHANGE UP (ref 11.5–15.5)
HGB BLD-MCNC: 12.4 G/DL — SIGNIFICANT CHANGE UP (ref 11.5–15.5)
HGB BLD-MCNC: 12.5 G/DL — SIGNIFICANT CHANGE UP (ref 11.5–15.5)
HGB BLD-MCNC: 12.6 G/DL — SIGNIFICANT CHANGE UP (ref 11.5–15.5)
HGB BLD-MCNC: 12.6 G/DL — SIGNIFICANT CHANGE UP (ref 11.5–15.5)
HGB BLD-MCNC: 8 G/DL — LOW (ref 11.5–15.5)
HGB BLD-MCNC: 8.6 G/DL — LOW (ref 11.5–15.5)
HGB BLD-MCNC: 8.6 G/DL — LOW (ref 11.5–15.5)
HGB BLD-MCNC: 8.9 G/DL — LOW (ref 11.5–15.5)
HGB BLD-MCNC: 9 G/DL — LOW (ref 11.5–15.5)
HGB BLD-MCNC: 9.1 G/DL — LOW (ref 11.5–15.5)
HGB BLD-MCNC: 9.2 G/DL — LOW (ref 11.5–15.5)
HGB BLD-MCNC: 9.3 G/DL — LOW (ref 11.5–15.5)
HGB BLD-MCNC: 9.7 G/DL — LOW (ref 11.5–15.5)
HGB BLD-MCNC: 9.9 G/DL — LOW (ref 11.5–15.5)
IANC: 10.57 K/UL — HIGH (ref 1.8–7.4)
IANC: 12 K/UL — HIGH (ref 1.8–7.4)
IANC: 4.66 K/UL — SIGNIFICANT CHANGE UP (ref 1.8–7.4)
IANC: 4.66 K/UL — SIGNIFICANT CHANGE UP (ref 1.8–7.4)
IANC: 5.28 K/UL — SIGNIFICANT CHANGE UP (ref 1.8–7.4)
IANC: 5.28 K/UL — SIGNIFICANT CHANGE UP (ref 1.8–7.4)
IANC: 5.42 K/UL — SIGNIFICANT CHANGE UP (ref 1.8–7.4)
IANC: 6.12 K/UL — SIGNIFICANT CHANGE UP (ref 1.8–7.4)
IANC: 6.66 K/UL — SIGNIFICANT CHANGE UP (ref 1.8–7.4)
IANC: 6.9 K/UL — SIGNIFICANT CHANGE UP (ref 1.8–7.4)
IANC: 6.93 K/UL — SIGNIFICANT CHANGE UP (ref 1.8–7.4)
IANC: 7.43 K/UL — HIGH (ref 1.8–7.4)
IANC: 7.96 K/UL — HIGH (ref 1.8–7.4)
IANC: 7.98 K/UL — HIGH (ref 1.8–7.4)
IANC: 8.42 K/UL — HIGH (ref 1.8–7.4)
IANC: 9.34 K/UL — HIGH (ref 1.8–7.4)
IMM GRANULOCYTES NFR BLD AUTO: 0.1 % — SIGNIFICANT CHANGE UP (ref 0–0.9)
IMM GRANULOCYTES NFR BLD AUTO: 0.4 % — SIGNIFICANT CHANGE UP (ref 0–0.9)
IMM GRANULOCYTES NFR BLD AUTO: 0.5 % — SIGNIFICANT CHANGE UP (ref 0–0.9)
IMM GRANULOCYTES NFR BLD AUTO: 0.6 % — SIGNIFICANT CHANGE UP (ref 0–0.9)
IMM GRANULOCYTES NFR BLD AUTO: 0.6 % — SIGNIFICANT CHANGE UP (ref 0–0.9)
IMM GRANULOCYTES NFR BLD AUTO: 0.7 % — SIGNIFICANT CHANGE UP (ref 0–0.9)
IMM GRANULOCYTES NFR BLD AUTO: 0.8 % — SIGNIFICANT CHANGE UP (ref 0–0.9)
IMM GRANULOCYTES NFR BLD AUTO: 0.8 % — SIGNIFICANT CHANGE UP (ref 0–0.9)
IMM GRANULOCYTES NFR BLD AUTO: 0.9 % — SIGNIFICANT CHANGE UP (ref 0–0.9)
IMM GRANULOCYTES NFR BLD AUTO: 0.9 % — SIGNIFICANT CHANGE UP (ref 0–0.9)
IMM GRANULOCYTES NFR BLD AUTO: 1 % — HIGH (ref 0–0.9)
IMM GRANULOCYTES NFR BLD AUTO: 1.1 % — HIGH (ref 0–0.9)
INR BLD: 1.04 RATIO — SIGNIFICANT CHANGE UP (ref 0.85–1.18)
INR BLD: 1.06 RATIO — SIGNIFICANT CHANGE UP (ref 0.85–1.18)
INR BLD: 1.07 RATIO — SIGNIFICANT CHANGE UP (ref 0.85–1.18)
INR BLD: 1.07 RATIO — SIGNIFICANT CHANGE UP (ref 0.85–1.18)
IRON SATN MFR SERPL: 15 UG/DL — LOW (ref 30–160)
IRON SATN MFR SERPL: 8 % — LOW (ref 14–50)
KETONES UR-MCNC: ABNORMAL MG/DL
LACTATE BLDV-MCNC: 1.6 MMOL/L — SIGNIFICANT CHANGE UP (ref 0.5–2)
LEUKOCYTE ESTERASE UR-ACNC: NEGATIVE — SIGNIFICANT CHANGE UP
LIDOCAIN IGE QN: 10 U/L — SIGNIFICANT CHANGE UP (ref 7–60)
LIDOCAIN IGE QN: 12 U/L — SIGNIFICANT CHANGE UP (ref 7–60)
LIDOCAIN IGE QN: 12 U/L — SIGNIFICANT CHANGE UP (ref 7–60)
LYMPHOCYTES # BLD AUTO: 0.53 K/UL — LOW (ref 1–3.3)
LYMPHOCYTES # BLD AUTO: 0.55 K/UL — LOW (ref 1–3.3)
LYMPHOCYTES # BLD AUTO: 0.55 K/UL — LOW (ref 1–3.3)
LYMPHOCYTES # BLD AUTO: 0.62 K/UL — LOW (ref 1–3.3)
LYMPHOCYTES # BLD AUTO: 0.7 K/UL — LOW (ref 1–3.3)
LYMPHOCYTES # BLD AUTO: 0.76 K/UL — LOW (ref 1–3.3)
LYMPHOCYTES # BLD AUTO: 0.83 K/UL — LOW (ref 1–3.3)
LYMPHOCYTES # BLD AUTO: 0.85 K/UL — LOW (ref 1–3.3)
LYMPHOCYTES # BLD AUTO: 0.86 K/UL — LOW (ref 1–3.3)
LYMPHOCYTES # BLD AUTO: 0.99 K/UL — LOW (ref 1–3.3)
LYMPHOCYTES # BLD AUTO: 1.07 K/UL — SIGNIFICANT CHANGE UP (ref 1–3.3)
LYMPHOCYTES # BLD AUTO: 1.23 K/UL — SIGNIFICANT CHANGE UP (ref 1–3.3)
LYMPHOCYTES # BLD AUTO: 1.23 K/UL — SIGNIFICANT CHANGE UP (ref 1–3.3)
LYMPHOCYTES # BLD AUTO: 1.24 K/UL — SIGNIFICANT CHANGE UP (ref 1–3.3)
LYMPHOCYTES # BLD AUTO: 1.24 K/UL — SIGNIFICANT CHANGE UP (ref 1–3.3)
LYMPHOCYTES # BLD AUTO: 1.29 K/UL — SIGNIFICANT CHANGE UP (ref 1–3.3)
LYMPHOCYTES # BLD AUTO: 1.47 K/UL — SIGNIFICANT CHANGE UP (ref 1–3.3)
LYMPHOCYTES # BLD AUTO: 11.7 % — LOW (ref 13–44)
LYMPHOCYTES # BLD AUTO: 12.2 % — LOW (ref 13–44)
LYMPHOCYTES # BLD AUTO: 14.2 % — SIGNIFICANT CHANGE UP (ref 13–44)
LYMPHOCYTES # BLD AUTO: 16.8 % — SIGNIFICANT CHANGE UP (ref 13–44)
LYMPHOCYTES # BLD AUTO: 16.8 % — SIGNIFICANT CHANGE UP (ref 13–44)
LYMPHOCYTES # BLD AUTO: 18.5 % — SIGNIFICANT CHANGE UP (ref 13–44)
LYMPHOCYTES # BLD AUTO: 18.5 % — SIGNIFICANT CHANGE UP (ref 13–44)
LYMPHOCYTES # BLD AUTO: 5.1 % — LOW (ref 13–44)
LYMPHOCYTES # BLD AUTO: 6.4 % — LOW (ref 13–44)
LYMPHOCYTES # BLD AUTO: 6.4 % — LOW (ref 13–44)
LYMPHOCYTES # BLD AUTO: 6.7 % — LOW (ref 13–44)
LYMPHOCYTES # BLD AUTO: 6.7 % — LOW (ref 13–44)
LYMPHOCYTES # BLD AUTO: 7 % — LOW (ref 13–44)
LYMPHOCYTES # BLD AUTO: 7.5 % — LOW (ref 13–44)
LYMPHOCYTES # BLD AUTO: 8.9 % — LOW (ref 13–44)
LYMPHOCYTES # BLD AUTO: 9.6 % — LOW (ref 13–44)
LYMPHOCYTES # BLD AUTO: 9.8 % — LOW (ref 13–44)
MAGNESIUM SERPL-MCNC: 1.6 MG/DL — SIGNIFICANT CHANGE UP (ref 1.6–2.6)
MAGNESIUM SERPL-MCNC: 1.7 MG/DL — SIGNIFICANT CHANGE UP (ref 1.6–2.6)
MAGNESIUM SERPL-MCNC: 1.8 MG/DL — SIGNIFICANT CHANGE UP (ref 1.6–2.6)
MAGNESIUM SERPL-MCNC: 1.9 MG/DL — SIGNIFICANT CHANGE UP (ref 1.6–2.6)
MAGNESIUM SERPL-MCNC: 2 MG/DL — SIGNIFICANT CHANGE UP (ref 1.6–2.6)
MAGNESIUM SERPL-MCNC: 2.2 MG/DL — SIGNIFICANT CHANGE UP (ref 1.6–2.6)
MCHC RBC-ENTMCNC: 28.9 PG — SIGNIFICANT CHANGE UP (ref 27–34)
MCHC RBC-ENTMCNC: 29.2 PG — SIGNIFICANT CHANGE UP (ref 27–34)
MCHC RBC-ENTMCNC: 29.5 PG — SIGNIFICANT CHANGE UP (ref 27–34)
MCHC RBC-ENTMCNC: 29.5 PG — SIGNIFICANT CHANGE UP (ref 27–34)
MCHC RBC-ENTMCNC: 29.7 PG — SIGNIFICANT CHANGE UP (ref 27–34)
MCHC RBC-ENTMCNC: 29.7 PG — SIGNIFICANT CHANGE UP (ref 27–34)
MCHC RBC-ENTMCNC: 29.8 PG — SIGNIFICANT CHANGE UP (ref 27–34)
MCHC RBC-ENTMCNC: 30.2 PG — SIGNIFICANT CHANGE UP (ref 27–34)
MCHC RBC-ENTMCNC: 30.3 PG — SIGNIFICANT CHANGE UP (ref 27–34)
MCHC RBC-ENTMCNC: 30.4 PG — SIGNIFICANT CHANGE UP (ref 27–34)
MCHC RBC-ENTMCNC: 30.4 PG — SIGNIFICANT CHANGE UP (ref 27–34)
MCHC RBC-ENTMCNC: 30.5 PG — SIGNIFICANT CHANGE UP (ref 27–34)
MCHC RBC-ENTMCNC: 30.6 PG — SIGNIFICANT CHANGE UP (ref 27–34)
MCHC RBC-ENTMCNC: 30.7 PG — SIGNIFICANT CHANGE UP (ref 27–34)
MCHC RBC-ENTMCNC: 30.8 PG — SIGNIFICANT CHANGE UP (ref 27–34)
MCHC RBC-ENTMCNC: 30.9 PG — SIGNIFICANT CHANGE UP (ref 27–34)
MCHC RBC-ENTMCNC: 31.2 PG — SIGNIFICANT CHANGE UP (ref 27–34)
MCHC RBC-ENTMCNC: 31.3 PG — SIGNIFICANT CHANGE UP (ref 27–34)
MCHC RBC-ENTMCNC: 32.1 GM/DL — SIGNIFICANT CHANGE UP (ref 32–36)
MCHC RBC-ENTMCNC: 32.1 GM/DL — SIGNIFICANT CHANGE UP (ref 32–36)
MCHC RBC-ENTMCNC: 32.3 GM/DL — SIGNIFICANT CHANGE UP (ref 32–36)
MCHC RBC-ENTMCNC: 32.5 GM/DL — SIGNIFICANT CHANGE UP (ref 32–36)
MCHC RBC-ENTMCNC: 32.6 GM/DL — SIGNIFICANT CHANGE UP (ref 32–36)
MCHC RBC-ENTMCNC: 32.7 GM/DL — SIGNIFICANT CHANGE UP (ref 32–36)
MCHC RBC-ENTMCNC: 32.7 GM/DL — SIGNIFICANT CHANGE UP (ref 32–36)
MCHC RBC-ENTMCNC: 32.8 GM/DL — SIGNIFICANT CHANGE UP (ref 32–36)
MCHC RBC-ENTMCNC: 32.9 GM/DL — SIGNIFICANT CHANGE UP (ref 32–36)
MCHC RBC-ENTMCNC: 32.9 GM/DL — SIGNIFICANT CHANGE UP (ref 32–36)
MCHC RBC-ENTMCNC: 33.2 GM/DL — SIGNIFICANT CHANGE UP (ref 32–36)
MCHC RBC-ENTMCNC: 33.2 GM/DL — SIGNIFICANT CHANGE UP (ref 32–36)
MCHC RBC-ENTMCNC: 33.3 GM/DL — SIGNIFICANT CHANGE UP (ref 32–36)
MCHC RBC-ENTMCNC: 33.4 GM/DL — SIGNIFICANT CHANGE UP (ref 32–36)
MCHC RBC-ENTMCNC: 33.5 GM/DL — SIGNIFICANT CHANGE UP (ref 32–36)
MCHC RBC-ENTMCNC: 33.6 G/DL — SIGNIFICANT CHANGE UP (ref 32–36)
MCHC RBC-ENTMCNC: 33.8 G/DL — SIGNIFICANT CHANGE UP (ref 32–36)
MCHC RBC-ENTMCNC: 33.8 GM/DL — SIGNIFICANT CHANGE UP (ref 32–36)
MCHC RBC-ENTMCNC: 33.9 G/DL — SIGNIFICANT CHANGE UP (ref 32–36)
MCHC RBC-ENTMCNC: 34 GM/DL — SIGNIFICANT CHANGE UP (ref 32–36)
MCHC RBC-ENTMCNC: 34.1 GM/DL — SIGNIFICANT CHANGE UP (ref 32–36)
MCV RBC AUTO: 88.4 FL — SIGNIFICANT CHANGE UP (ref 80–100)
MCV RBC AUTO: 89.2 FL — SIGNIFICANT CHANGE UP (ref 80–100)
MCV RBC AUTO: 89.2 FL — SIGNIFICANT CHANGE UP (ref 80–100)
MCV RBC AUTO: 89.4 FL — SIGNIFICANT CHANGE UP (ref 80–100)
MCV RBC AUTO: 89.9 FL — SIGNIFICANT CHANGE UP (ref 80–100)
MCV RBC AUTO: 90.2 FL — SIGNIFICANT CHANGE UP (ref 80–100)
MCV RBC AUTO: 90.3 FL — SIGNIFICANT CHANGE UP (ref 80–100)
MCV RBC AUTO: 90.3 FL — SIGNIFICANT CHANGE UP (ref 80–100)
MCV RBC AUTO: 90.5 FL — SIGNIFICANT CHANGE UP (ref 80–100)
MCV RBC AUTO: 90.6 FL — SIGNIFICANT CHANGE UP (ref 80–100)
MCV RBC AUTO: 91.1 FL — SIGNIFICANT CHANGE UP (ref 80–100)
MCV RBC AUTO: 91.3 FL — SIGNIFICANT CHANGE UP (ref 80–100)
MCV RBC AUTO: 91.4 FL — SIGNIFICANT CHANGE UP (ref 80–100)
MCV RBC AUTO: 91.4 FL — SIGNIFICANT CHANGE UP (ref 80–100)
MCV RBC AUTO: 91.5 FL — SIGNIFICANT CHANGE UP (ref 80–100)
MCV RBC AUTO: 91.5 FL — SIGNIFICANT CHANGE UP (ref 80–100)
MCV RBC AUTO: 91.6 FL — SIGNIFICANT CHANGE UP (ref 80–100)
MCV RBC AUTO: 91.6 FL — SIGNIFICANT CHANGE UP (ref 80–100)
MCV RBC AUTO: 91.7 FL — SIGNIFICANT CHANGE UP (ref 80–100)
MCV RBC AUTO: 91.7 FL — SIGNIFICANT CHANGE UP (ref 80–100)
MCV RBC AUTO: 91.9 FL — SIGNIFICANT CHANGE UP (ref 80–100)
MCV RBC AUTO: 92.3 FL — SIGNIFICANT CHANGE UP (ref 80–100)
MCV RBC AUTO: 92.3 FL — SIGNIFICANT CHANGE UP (ref 80–100)
MCV RBC AUTO: 92.6 FL — SIGNIFICANT CHANGE UP (ref 80–100)
MCV RBC AUTO: 93 FL — SIGNIFICANT CHANGE UP (ref 80–100)
MCV RBC AUTO: 93.5 FL — SIGNIFICANT CHANGE UP (ref 80–100)
MCV RBC AUTO: 94.1 FL — SIGNIFICANT CHANGE UP (ref 80–100)
METHOD TYPE: SIGNIFICANT CHANGE UP
METHOD TYPE: SIGNIFICANT CHANGE UP
MONOCYTES # BLD AUTO: 0.72 K/UL — SIGNIFICANT CHANGE UP (ref 0–0.9)
MONOCYTES # BLD AUTO: 0.74 K/UL — SIGNIFICANT CHANGE UP (ref 0–0.9)
MONOCYTES # BLD AUTO: 0.74 K/UL — SIGNIFICANT CHANGE UP (ref 0–0.9)
MONOCYTES # BLD AUTO: 0.79 K/UL — SIGNIFICANT CHANGE UP (ref 0–0.9)
MONOCYTES # BLD AUTO: 0.88 K/UL — SIGNIFICANT CHANGE UP (ref 0–0.9)
MONOCYTES # BLD AUTO: 0.96 K/UL — HIGH (ref 0–0.9)
MONOCYTES # BLD AUTO: 0.96 K/UL — HIGH (ref 0–0.9)
MONOCYTES # BLD AUTO: 0.97 K/UL — HIGH (ref 0–0.9)
MONOCYTES # BLD AUTO: 1.04 K/UL — HIGH (ref 0–0.9)
MONOCYTES # BLD AUTO: 1.07 K/UL — HIGH (ref 0–0.9)
MONOCYTES # BLD AUTO: 1.12 K/UL — HIGH (ref 0–0.9)
MONOCYTES # BLD AUTO: 1.17 K/UL — HIGH (ref 0–0.9)
MONOCYTES # BLD AUTO: 1.17 K/UL — HIGH (ref 0–0.9)
MONOCYTES # BLD AUTO: 1.18 K/UL — HIGH (ref 0–0.9)
MONOCYTES # BLD AUTO: 1.21 K/UL — HIGH (ref 0–0.9)
MONOCYTES NFR BLD AUTO: 10.1 % — SIGNIFICANT CHANGE UP (ref 2–14)
MONOCYTES NFR BLD AUTO: 10.1 % — SIGNIFICANT CHANGE UP (ref 2–14)
MONOCYTES NFR BLD AUTO: 10.6 % — SIGNIFICANT CHANGE UP (ref 2–14)
MONOCYTES NFR BLD AUTO: 10.7 % — SIGNIFICANT CHANGE UP (ref 2–14)
MONOCYTES NFR BLD AUTO: 11.1 % — SIGNIFICANT CHANGE UP (ref 2–14)
MONOCYTES NFR BLD AUTO: 11.2 % — SIGNIFICANT CHANGE UP (ref 2–14)
MONOCYTES NFR BLD AUTO: 11.4 % — SIGNIFICANT CHANGE UP (ref 2–14)
MONOCYTES NFR BLD AUTO: 11.4 % — SIGNIFICANT CHANGE UP (ref 2–14)
MONOCYTES NFR BLD AUTO: 12.2 % — SIGNIFICANT CHANGE UP (ref 2–14)
MONOCYTES NFR BLD AUTO: 13.6 % — SIGNIFICANT CHANGE UP (ref 2–14)
MONOCYTES NFR BLD AUTO: 7 % — SIGNIFICANT CHANGE UP (ref 2–14)
MONOCYTES NFR BLD AUTO: 8.7 % — SIGNIFICANT CHANGE UP (ref 2–14)
MONOCYTES NFR BLD AUTO: 8.8 % — SIGNIFICANT CHANGE UP (ref 2–14)
MONOCYTES NFR BLD AUTO: 9.2 % — SIGNIFICANT CHANGE UP (ref 2–14)
MONOCYTES NFR BLD AUTO: 9.4 % — SIGNIFICANT CHANGE UP (ref 2–14)
MRSA PCR RESULT.: SIGNIFICANT CHANGE UP
NEUTROPHILS # BLD AUTO: 10.57 K/UL — HIGH (ref 1.8–7.4)
NEUTROPHILS # BLD AUTO: 12 K/UL — HIGH (ref 1.8–7.4)
NEUTROPHILS # BLD AUTO: 4.66 K/UL — SIGNIFICANT CHANGE UP (ref 1.8–7.4)
NEUTROPHILS # BLD AUTO: 4.66 K/UL — SIGNIFICANT CHANGE UP (ref 1.8–7.4)
NEUTROPHILS # BLD AUTO: 5.28 K/UL — SIGNIFICANT CHANGE UP (ref 1.8–7.4)
NEUTROPHILS # BLD AUTO: 5.28 K/UL — SIGNIFICANT CHANGE UP (ref 1.8–7.4)
NEUTROPHILS # BLD AUTO: 5.42 K/UL — SIGNIFICANT CHANGE UP (ref 1.8–7.4)
NEUTROPHILS # BLD AUTO: 6.12 K/UL — SIGNIFICANT CHANGE UP (ref 1.8–7.4)
NEUTROPHILS # BLD AUTO: 6.66 K/UL — SIGNIFICANT CHANGE UP (ref 1.8–7.4)
NEUTROPHILS # BLD AUTO: 6.9 K/UL — SIGNIFICANT CHANGE UP (ref 1.8–7.4)
NEUTROPHILS # BLD AUTO: 6.93 K/UL — SIGNIFICANT CHANGE UP (ref 1.8–7.4)
NEUTROPHILS # BLD AUTO: 7.43 K/UL — HIGH (ref 1.8–7.4)
NEUTROPHILS # BLD AUTO: 7.66 K/UL — HIGH (ref 1.8–7.4)
NEUTROPHILS # BLD AUTO: 7.96 K/UL — HIGH (ref 1.8–7.4)
NEUTROPHILS # BLD AUTO: 7.98 K/UL — HIGH (ref 1.8–7.4)
NEUTROPHILS # BLD AUTO: 8.42 K/UL — HIGH (ref 1.8–7.4)
NEUTROPHILS # BLD AUTO: 9.34 K/UL — HIGH (ref 1.8–7.4)
NEUTROPHILS NFR BLD AUTO: 69.7 % — SIGNIFICANT CHANGE UP (ref 43–77)
NEUTROPHILS NFR BLD AUTO: 69.7 % — SIGNIFICANT CHANGE UP (ref 43–77)
NEUTROPHILS NFR BLD AUTO: 72.2 % — SIGNIFICANT CHANGE UP (ref 43–77)
NEUTROPHILS NFR BLD AUTO: 72.2 % — SIGNIFICANT CHANGE UP (ref 43–77)
NEUTROPHILS NFR BLD AUTO: 74.1 % — SIGNIFICANT CHANGE UP (ref 43–77)
NEUTROPHILS NFR BLD AUTO: 75.1 % — SIGNIFICANT CHANGE UP (ref 43–77)
NEUTROPHILS NFR BLD AUTO: 76.2 % — SIGNIFICANT CHANGE UP (ref 43–77)
NEUTROPHILS NFR BLD AUTO: 76.9 % — SIGNIFICANT CHANGE UP (ref 43–77)
NEUTROPHILS NFR BLD AUTO: 77.2 % — HIGH (ref 43–77)
NEUTROPHILS NFR BLD AUTO: 77.4 % — HIGH (ref 43–77)
NEUTROPHILS NFR BLD AUTO: 77.9 % — HIGH (ref 43–77)
NEUTROPHILS NFR BLD AUTO: 80.2 % — HIGH (ref 43–77)
NEUTROPHILS NFR BLD AUTO: 80.7 % — HIGH (ref 43–77)
NEUTROPHILS NFR BLD AUTO: 82.9 % — HIGH (ref 43–77)
NEUTROPHILS NFR BLD AUTO: 83 % — HIGH (ref 43–77)
NEUTROPHILS NFR BLD AUTO: 83.4 % — HIGH (ref 43–77)
NEUTROPHILS NFR BLD AUTO: 87.3 % — HIGH (ref 43–77)
NIGHT BLUE STAIN TISS: SIGNIFICANT CHANGE UP
NITRITE UR-MCNC: NEGATIVE — SIGNIFICANT CHANGE UP
NON-GYNECOLOGICAL CYTOLOGY STUDY: SIGNIFICANT CHANGE UP
NRBC # BLD: 0 /100 WBCS — SIGNIFICANT CHANGE UP (ref 0–0)
NRBC # FLD: 0 K/UL — SIGNIFICANT CHANGE UP (ref 0–0)
NT-PROBNP SERPL-SCNC: 431 PG/ML — HIGH
ORGANISM # SPEC MICROSCOPIC CNT: ABNORMAL
OSMOLALITY SERPL: 278 MOSM/KG — SIGNIFICANT CHANGE UP (ref 275–295)
OSMOLALITY UR: 572 MOSM/KG — SIGNIFICANT CHANGE UP (ref 50–1200)
PCO2 BLDV: 45 MMHG — SIGNIFICANT CHANGE UP (ref 39–52)
PH BLDV: 7.45 — HIGH (ref 7.32–7.43)
PH UR: 8.5 (ref 5–8)
PHOSPHATE SERPL-MCNC: 2.5 MG/DL — SIGNIFICANT CHANGE UP (ref 2.5–4.5)
PHOSPHATE SERPL-MCNC: 2.7 MG/DL — SIGNIFICANT CHANGE UP (ref 2.5–4.5)
PHOSPHATE SERPL-MCNC: 2.8 MG/DL — SIGNIFICANT CHANGE UP (ref 2.5–4.5)
PHOSPHATE SERPL-MCNC: 3 MG/DL — SIGNIFICANT CHANGE UP (ref 2.5–4.5)
PHOSPHATE SERPL-MCNC: 3.1 MG/DL — SIGNIFICANT CHANGE UP (ref 2.5–4.5)
PHOSPHATE SERPL-MCNC: 3.2 MG/DL — SIGNIFICANT CHANGE UP (ref 2.5–4.5)
PHOSPHATE SERPL-MCNC: 3.3 MG/DL — SIGNIFICANT CHANGE UP (ref 2.5–4.5)
PHOSPHATE SERPL-MCNC: 3.3 MG/DL — SIGNIFICANT CHANGE UP (ref 2.5–4.5)
PHOSPHATE SERPL-MCNC: 3.4 MG/DL — SIGNIFICANT CHANGE UP (ref 2.5–4.5)
PHOSPHATE SERPL-MCNC: 3.4 MG/DL — SIGNIFICANT CHANGE UP (ref 2.5–4.5)
PHOSPHATE SERPL-MCNC: 3.5 MG/DL — SIGNIFICANT CHANGE UP (ref 2.5–4.5)
PHOSPHATE SERPL-MCNC: 3.5 MG/DL — SIGNIFICANT CHANGE UP (ref 2.5–4.5)
PHOSPHATE SERPL-MCNC: 3.7 MG/DL — SIGNIFICANT CHANGE UP (ref 2.5–4.5)
PHOSPHATE SERPL-MCNC: 3.7 MG/DL — SIGNIFICANT CHANGE UP (ref 2.5–4.5)
PHOSPHATE SERPL-MCNC: 3.8 MG/DL — SIGNIFICANT CHANGE UP (ref 2.5–4.5)
PLATELET # BLD AUTO: 229 K/UL — SIGNIFICANT CHANGE UP (ref 150–400)
PLATELET # BLD AUTO: 266 K/UL — SIGNIFICANT CHANGE UP (ref 150–400)
PLATELET # BLD AUTO: 273 K/UL — SIGNIFICANT CHANGE UP (ref 150–400)
PLATELET # BLD AUTO: 273 K/UL — SIGNIFICANT CHANGE UP (ref 150–400)
PLATELET # BLD AUTO: 281 K/UL — SIGNIFICANT CHANGE UP (ref 150–400)
PLATELET # BLD AUTO: 283 K/UL — SIGNIFICANT CHANGE UP (ref 150–400)
PLATELET # BLD AUTO: 284 K/UL — SIGNIFICANT CHANGE UP (ref 150–400)
PLATELET # BLD AUTO: 284 K/UL — SIGNIFICANT CHANGE UP (ref 150–400)
PLATELET # BLD AUTO: 287 K/UL — SIGNIFICANT CHANGE UP (ref 150–400)
PLATELET # BLD AUTO: 291 K/UL — SIGNIFICANT CHANGE UP (ref 150–400)
PLATELET # BLD AUTO: 298 K/UL — SIGNIFICANT CHANGE UP (ref 150–400)
PLATELET # BLD AUTO: 299 K/UL — SIGNIFICANT CHANGE UP (ref 150–400)
PLATELET # BLD AUTO: 308 K/UL — SIGNIFICANT CHANGE UP (ref 150–400)
PLATELET # BLD AUTO: 309 K/UL — SIGNIFICANT CHANGE UP (ref 150–400)
PLATELET # BLD AUTO: 309 K/UL — SIGNIFICANT CHANGE UP (ref 150–400)
PLATELET # BLD AUTO: 314 K/UL — SIGNIFICANT CHANGE UP (ref 150–400)
PLATELET # BLD AUTO: 317 K/UL — SIGNIFICANT CHANGE UP (ref 150–400)
PLATELET # BLD AUTO: 322 K/UL — SIGNIFICANT CHANGE UP (ref 150–400)
PLATELET # BLD AUTO: 340 K/UL — SIGNIFICANT CHANGE UP (ref 150–400)
PLATELET # BLD AUTO: 346 K/UL — SIGNIFICANT CHANGE UP (ref 150–400)
PLATELET # BLD AUTO: 354 K/UL — SIGNIFICANT CHANGE UP (ref 150–400)
PLATELET # BLD AUTO: 358 K/UL — SIGNIFICANT CHANGE UP (ref 150–400)
PLATELET # BLD AUTO: 362 K/UL — SIGNIFICANT CHANGE UP (ref 150–400)
PLATELET # BLD AUTO: 368 K/UL — SIGNIFICANT CHANGE UP (ref 150–400)
PLATELET # BLD AUTO: 371 K/UL — SIGNIFICANT CHANGE UP (ref 150–400)
PLATELET # BLD AUTO: 383 K/UL — SIGNIFICANT CHANGE UP (ref 150–400)
PLATELET # BLD AUTO: 416 K/UL — HIGH (ref 150–400)
PO2 BLDV: 40 MMHG — SIGNIFICANT CHANGE UP (ref 25–45)
POTASSIUM BLDV-SCNC: 3.8 MMOL/L — SIGNIFICANT CHANGE UP (ref 3.5–5.1)
POTASSIUM SERPL-MCNC: 2.8 MMOL/L — CRITICAL LOW (ref 3.5–5.3)
POTASSIUM SERPL-MCNC: 3 MMOL/L — LOW (ref 3.5–5.3)
POTASSIUM SERPL-MCNC: 3.4 MMOL/L — LOW (ref 3.5–5.3)
POTASSIUM SERPL-MCNC: 3.8 MMOL/L — SIGNIFICANT CHANGE UP (ref 3.5–5.3)
POTASSIUM SERPL-MCNC: 3.9 MMOL/L — SIGNIFICANT CHANGE UP (ref 3.5–5.3)
POTASSIUM SERPL-MCNC: 4 MMOL/L — SIGNIFICANT CHANGE UP (ref 3.5–5.3)
POTASSIUM SERPL-MCNC: 4.1 MMOL/L — SIGNIFICANT CHANGE UP (ref 3.5–5.3)
POTASSIUM SERPL-MCNC: 4.1 MMOL/L — SIGNIFICANT CHANGE UP (ref 3.5–5.3)
POTASSIUM SERPL-MCNC: 4.2 MMOL/L — SIGNIFICANT CHANGE UP (ref 3.5–5.3)
POTASSIUM SERPL-MCNC: 4.3 MMOL/L — SIGNIFICANT CHANGE UP (ref 3.5–5.3)
POTASSIUM SERPL-MCNC: 4.4 MMOL/L — SIGNIFICANT CHANGE UP (ref 3.5–5.3)
POTASSIUM SERPL-MCNC: 4.6 MMOL/L — SIGNIFICANT CHANGE UP (ref 3.5–5.3)
POTASSIUM SERPL-MCNC: 4.6 MMOL/L — SIGNIFICANT CHANGE UP (ref 3.5–5.3)
POTASSIUM SERPL-MCNC: 4.7 MMOL/L — SIGNIFICANT CHANGE UP (ref 3.5–5.3)
POTASSIUM SERPL-MCNC: 4.7 MMOL/L — SIGNIFICANT CHANGE UP (ref 3.5–5.3)
POTASSIUM SERPL-SCNC: 2.8 MMOL/L — CRITICAL LOW (ref 3.5–5.3)
POTASSIUM SERPL-SCNC: 3 MMOL/L — LOW (ref 3.5–5.3)
POTASSIUM SERPL-SCNC: 3.4 MMOL/L — LOW (ref 3.5–5.3)
POTASSIUM SERPL-SCNC: 3.8 MMOL/L — SIGNIFICANT CHANGE UP (ref 3.5–5.3)
POTASSIUM SERPL-SCNC: 3.9 MMOL/L — SIGNIFICANT CHANGE UP (ref 3.5–5.3)
POTASSIUM SERPL-SCNC: 4 MMOL/L — SIGNIFICANT CHANGE UP (ref 3.5–5.3)
POTASSIUM SERPL-SCNC: 4.1 MMOL/L — SIGNIFICANT CHANGE UP (ref 3.5–5.3)
POTASSIUM SERPL-SCNC: 4.1 MMOL/L — SIGNIFICANT CHANGE UP (ref 3.5–5.3)
POTASSIUM SERPL-SCNC: 4.2 MMOL/L — SIGNIFICANT CHANGE UP (ref 3.5–5.3)
POTASSIUM SERPL-SCNC: 4.3 MMOL/L — SIGNIFICANT CHANGE UP (ref 3.5–5.3)
POTASSIUM SERPL-SCNC: 4.4 MMOL/L — SIGNIFICANT CHANGE UP (ref 3.5–5.3)
POTASSIUM SERPL-SCNC: 4.6 MMOL/L — SIGNIFICANT CHANGE UP (ref 3.5–5.3)
POTASSIUM SERPL-SCNC: 4.6 MMOL/L — SIGNIFICANT CHANGE UP (ref 3.5–5.3)
POTASSIUM SERPL-SCNC: 4.7 MMOL/L — SIGNIFICANT CHANGE UP (ref 3.5–5.3)
POTASSIUM SERPL-SCNC: 4.7 MMOL/L — SIGNIFICANT CHANGE UP (ref 3.5–5.3)
POTASSIUM UR-SCNC: 43.6 MMOL/L — SIGNIFICANT CHANGE UP
PROCALCITONIN SERPL-MCNC: 0.09 NG/ML — SIGNIFICANT CHANGE UP (ref 0.02–0.1)
PROCALCITONIN SERPL-MCNC: 0.26 NG/ML — HIGH (ref 0.02–0.1)
PROT SERPL-MCNC: 5.8 G/DL — LOW (ref 6–8.3)
PROT SERPL-MCNC: 5.8 G/DL — LOW (ref 6–8.3)
PROT SERPL-MCNC: 6 G/DL — SIGNIFICANT CHANGE UP (ref 6–8.3)
PROT SERPL-MCNC: 6 G/DL — SIGNIFICANT CHANGE UP (ref 6–8.3)
PROT SERPL-MCNC: 6.1 G/DL — SIGNIFICANT CHANGE UP (ref 6–8.3)
PROT SERPL-MCNC: 6.2 G/DL — SIGNIFICANT CHANGE UP (ref 6–8.3)
PROT SERPL-MCNC: 6.3 G/DL — SIGNIFICANT CHANGE UP (ref 6–8.3)
PROT SERPL-MCNC: 6.4 G/DL — SIGNIFICANT CHANGE UP (ref 6–8.3)
PROT SERPL-MCNC: 6.5 G/DL — SIGNIFICANT CHANGE UP (ref 6–8.3)
PROT SERPL-MCNC: 6.7 G/DL — SIGNIFICANT CHANGE UP (ref 6–8.3)
PROT SERPL-MCNC: 6.9 G/DL — SIGNIFICANT CHANGE UP (ref 6–8.3)
PROT SERPL-MCNC: 7.2 G/DL — SIGNIFICANT CHANGE UP (ref 6–8.3)
PROT SERPL-MCNC: 7.3 G/DL — SIGNIFICANT CHANGE UP (ref 6–8.3)
PROT UR-MCNC: SIGNIFICANT CHANGE UP MG/DL
PROTHROM AB SERPL-ACNC: 11.7 SEC — SIGNIFICANT CHANGE UP (ref 9.5–13)
PROTHROM AB SERPL-ACNC: 11.8 SEC — SIGNIFICANT CHANGE UP (ref 9.5–13)
PROTHROM AB SERPL-ACNC: 12.1 SEC — SIGNIFICANT CHANGE UP (ref 9.5–13)
PROTHROM AB SERPL-ACNC: 12.1 SEC — SIGNIFICANT CHANGE UP (ref 9.5–13)
RBC # BLD: 2.69 M/UL — LOW (ref 3.8–5.2)
RBC # BLD: 2.92 M/UL — LOW (ref 3.8–5.2)
RBC # BLD: 2.97 M/UL — LOW (ref 3.8–5.2)
RBC # BLD: 2.97 M/UL — LOW (ref 3.8–5.2)
RBC # BLD: 2.98 M/UL — LOW (ref 3.8–5.2)
RBC # BLD: 2.99 M/UL — LOW (ref 3.8–5.2)
RBC # BLD: 2.99 M/UL — LOW (ref 3.8–5.2)
RBC # BLD: 3.02 M/UL — LOW (ref 3.8–5.2)
RBC # BLD: 3.05 M/UL — LOW (ref 3.8–5.2)
RBC # BLD: 3.06 M/UL — LOW (ref 3.8–5.2)
RBC # BLD: 3.08 M/UL — LOW (ref 3.8–5.2)
RBC # BLD: 3.1 M/UL — LOW (ref 3.8–5.2)
RBC # BLD: 3.14 M/UL — LOW (ref 3.8–5.2)
RBC # BLD: 3.24 M/UL — LOW (ref 3.8–5.2)
RBC # BLD: 3.36 M/UL — LOW (ref 3.8–5.2)
RBC # BLD: 3.36 M/UL — LOW (ref 3.8–5.2)
RBC # BLD: 3.38 M/UL — LOW (ref 3.8–5.2)
RBC # BLD: 3.48 M/UL — LOW (ref 3.8–5.2)
RBC # BLD: 3.57 M/UL — LOW (ref 3.8–5.2)
RBC # BLD: 3.6 M/UL — LOW (ref 3.8–5.2)
RBC # BLD: 3.67 M/UL — LOW (ref 3.8–5.2)
RBC # BLD: 3.67 M/UL — LOW (ref 3.8–5.2)
RBC # BLD: 3.79 M/UL — LOW (ref 3.8–5.2)
RBC # BLD: 4.01 M/UL — SIGNIFICANT CHANGE UP (ref 3.8–5.2)
RBC # BLD: 4.04 M/UL — SIGNIFICANT CHANGE UP (ref 3.8–5.2)
RBC # BLD: 4.11 M/UL — SIGNIFICANT CHANGE UP (ref 3.8–5.2)
RBC # BLD: 4.11 M/UL — SIGNIFICANT CHANGE UP (ref 3.8–5.2)
RBC # FLD: 12.9 % — SIGNIFICANT CHANGE UP (ref 10.3–14.5)
RBC # FLD: 13.1 % — SIGNIFICANT CHANGE UP (ref 10.3–14.5)
RBC # FLD: 13.2 % — SIGNIFICANT CHANGE UP (ref 10.3–14.5)
RBC # FLD: 13.2 % — SIGNIFICANT CHANGE UP (ref 10.3–14.5)
RBC # FLD: 13.4 % — SIGNIFICANT CHANGE UP (ref 10.3–14.5)
RBC # FLD: 13.5 % — SIGNIFICANT CHANGE UP (ref 10.3–14.5)
RBC # FLD: 13.9 % — SIGNIFICANT CHANGE UP (ref 10.3–14.5)
RBC # FLD: 14.1 % — SIGNIFICANT CHANGE UP (ref 10.3–14.5)
RBC # FLD: 14.1 % — SIGNIFICANT CHANGE UP (ref 10.3–14.5)
RBC # FLD: 14.4 % — SIGNIFICANT CHANGE UP (ref 10.3–14.5)
RBC # FLD: 14.5 % — SIGNIFICANT CHANGE UP (ref 10.3–14.5)
RBC # FLD: 14.6 % — HIGH (ref 10.3–14.5)
RBC # FLD: 14.7 % — HIGH (ref 10.3–14.5)
RBC # FLD: 14.7 % — HIGH (ref 10.3–14.5)
RBC # FLD: 14.8 % — HIGH (ref 10.3–14.5)
RBC # FLD: 14.9 % — HIGH (ref 10.3–14.5)
RH IG SCN BLD-IMP: POSITIVE — SIGNIFICANT CHANGE UP
RSV RNA NPH QL NAA+NON-PROBE: SIGNIFICANT CHANGE UP
RSV RNA NPH QL NAA+NON-PROBE: SIGNIFICANT CHANGE UP
S AUREUS DNA NOSE QL NAA+PROBE: SIGNIFICANT CHANGE UP
SAO2 % BLDV: 69.3 % — SIGNIFICANT CHANGE UP (ref 67–88)
SARS-COV-2 RNA SPEC QL NAA+PROBE: SIGNIFICANT CHANGE UP
SODIUM SERPL-SCNC: 130 MMOL/L — LOW (ref 135–145)
SODIUM SERPL-SCNC: 130 MMOL/L — LOW (ref 135–145)
SODIUM SERPL-SCNC: 131 MMOL/L — LOW (ref 135–145)
SODIUM SERPL-SCNC: 132 MMOL/L — LOW (ref 135–145)
SODIUM SERPL-SCNC: 133 MMOL/L — LOW (ref 135–145)
SODIUM SERPL-SCNC: 134 MMOL/L — LOW (ref 135–145)
SODIUM SERPL-SCNC: 135 MMOL/L — SIGNIFICANT CHANGE UP (ref 135–145)
SODIUM SERPL-SCNC: 137 MMOL/L — SIGNIFICANT CHANGE UP (ref 135–145)
SODIUM SERPL-SCNC: 137 MMOL/L — SIGNIFICANT CHANGE UP (ref 135–145)
SODIUM SERPL-SCNC: 139 MMOL/L — SIGNIFICANT CHANGE UP (ref 135–145)
SODIUM SERPL-SCNC: 140 MMOL/L — SIGNIFICANT CHANGE UP (ref 135–145)
SODIUM SERPL-SCNC: 141 MMOL/L — SIGNIFICANT CHANGE UP (ref 135–145)
SODIUM UR-SCNC: 78 MMOL/L — SIGNIFICANT CHANGE UP
SP GR SPEC: 1.02 — SIGNIFICANT CHANGE UP (ref 1–1.03)
SPECIMEN SOURCE: SIGNIFICANT CHANGE UP
T4 FREE SERPL-MCNC: 1.3 NG/DL — SIGNIFICANT CHANGE UP (ref 0.9–1.8)
T4 FREE SERPL-MCNC: 1.5 NG/DL — SIGNIFICANT CHANGE UP (ref 0.9–1.8)
T4 FREE+ TSH PNL SERPL: 0.81 UIU/ML — SIGNIFICANT CHANGE UP (ref 0.27–4.2)
TIBC SERPL-MCNC: 178 UG/DL — LOW (ref 220–430)
TROPONIN T, HIGH SENSITIVITY RESULT: 10 NG/L — SIGNIFICANT CHANGE UP
TROPONIN T, HIGH SENSITIVITY RESULT: 9 NG/L — SIGNIFICANT CHANGE UP
TSH SERPL-MCNC: 0.39 UIU/ML — SIGNIFICANT CHANGE UP (ref 0.27–4.2)
TSH SERPL-MCNC: 2.44 UIU/ML — SIGNIFICANT CHANGE UP (ref 0.27–4.2)
UIBC SERPL-MCNC: 163 UG/DL — SIGNIFICANT CHANGE UP (ref 110–370)
UROBILINOGEN FLD QL: 0.2 MG/DL — SIGNIFICANT CHANGE UP (ref 0.2–1)
WBC # BLD: 10.06 K/UL — SIGNIFICANT CHANGE UP (ref 3.8–10.5)
WBC # BLD: 10.28 K/UL — SIGNIFICANT CHANGE UP (ref 3.8–10.5)
WBC # BLD: 10.33 K/UL — SIGNIFICANT CHANGE UP (ref 3.8–10.5)
WBC # BLD: 10.61 K/UL — HIGH (ref 3.8–10.5)
WBC # BLD: 10.77 K/UL — HIGH (ref 3.8–10.5)
WBC # BLD: 10.86 K/UL — HIGH (ref 3.8–10.5)
WBC # BLD: 10.91 K/UL — HIGH (ref 3.8–10.5)
WBC # BLD: 11.27 K/UL — HIGH (ref 3.8–10.5)
WBC # BLD: 11.38 K/UL — HIGH (ref 3.8–10.5)
WBC # BLD: 12.07 K/UL — HIGH (ref 3.8–10.5)
WBC # BLD: 12.73 K/UL — HIGH (ref 3.8–10.5)
WBC # BLD: 13.74 K/UL — HIGH (ref 3.8–10.5)
WBC # BLD: 13.77 K/UL — HIGH (ref 3.8–10.5)
WBC # BLD: 5.89 K/UL — SIGNIFICANT CHANGE UP (ref 3.8–10.5)
WBC # BLD: 6.7 K/UL — SIGNIFICANT CHANGE UP (ref 3.8–10.5)
WBC # BLD: 6.7 K/UL — SIGNIFICANT CHANGE UP (ref 3.8–10.5)
WBC # BLD: 7.11 K/UL — SIGNIFICANT CHANGE UP (ref 3.8–10.5)
WBC # BLD: 7.32 K/UL — SIGNIFICANT CHANGE UP (ref 3.8–10.5)
WBC # BLD: 7.32 K/UL — SIGNIFICANT CHANGE UP (ref 3.8–10.5)
WBC # BLD: 7.86 K/UL — SIGNIFICANT CHANGE UP (ref 3.8–10.5)
WBC # BLD: 8.28 K/UL — SIGNIFICANT CHANGE UP (ref 3.8–10.5)
WBC # BLD: 8.31 K/UL — SIGNIFICANT CHANGE UP (ref 3.8–10.5)
WBC # BLD: 8.59 K/UL — SIGNIFICANT CHANGE UP (ref 3.8–10.5)
WBC # BLD: 8.85 K/UL — SIGNIFICANT CHANGE UP (ref 3.8–10.5)
WBC # BLD: 9.36 K/UL — SIGNIFICANT CHANGE UP (ref 3.8–10.5)
WBC # BLD: 9.67 K/UL — SIGNIFICANT CHANGE UP (ref 3.8–10.5)
WBC # BLD: 9.85 K/UL — SIGNIFICANT CHANGE UP (ref 3.8–10.5)
WBC # FLD AUTO: 10.06 K/UL — SIGNIFICANT CHANGE UP (ref 3.8–10.5)
WBC # FLD AUTO: 10.28 K/UL — SIGNIFICANT CHANGE UP (ref 3.8–10.5)
WBC # FLD AUTO: 10.33 K/UL — SIGNIFICANT CHANGE UP (ref 3.8–10.5)
WBC # FLD AUTO: 10.61 K/UL — HIGH (ref 3.8–10.5)
WBC # FLD AUTO: 10.77 K/UL — HIGH (ref 3.8–10.5)
WBC # FLD AUTO: 10.86 K/UL — HIGH (ref 3.8–10.5)
WBC # FLD AUTO: 10.91 K/UL — HIGH (ref 3.8–10.5)
WBC # FLD AUTO: 11.27 K/UL — HIGH (ref 3.8–10.5)
WBC # FLD AUTO: 11.38 K/UL — HIGH (ref 3.8–10.5)
WBC # FLD AUTO: 12.07 K/UL — HIGH (ref 3.8–10.5)
WBC # FLD AUTO: 12.73 K/UL — HIGH (ref 3.8–10.5)
WBC # FLD AUTO: 13.74 K/UL — HIGH (ref 3.8–10.5)
WBC # FLD AUTO: 13.77 K/UL — HIGH (ref 3.8–10.5)
WBC # FLD AUTO: 5.89 K/UL — SIGNIFICANT CHANGE UP (ref 3.8–10.5)
WBC # FLD AUTO: 6.7 K/UL — SIGNIFICANT CHANGE UP (ref 3.8–10.5)
WBC # FLD AUTO: 6.7 K/UL — SIGNIFICANT CHANGE UP (ref 3.8–10.5)
WBC # FLD AUTO: 7.11 K/UL — SIGNIFICANT CHANGE UP (ref 3.8–10.5)
WBC # FLD AUTO: 7.32 K/UL — SIGNIFICANT CHANGE UP (ref 3.8–10.5)
WBC # FLD AUTO: 7.32 K/UL — SIGNIFICANT CHANGE UP (ref 3.8–10.5)
WBC # FLD AUTO: 7.86 K/UL — SIGNIFICANT CHANGE UP (ref 3.8–10.5)
WBC # FLD AUTO: 8.28 K/UL — SIGNIFICANT CHANGE UP (ref 3.8–10.5)
WBC # FLD AUTO: 8.31 K/UL — SIGNIFICANT CHANGE UP (ref 3.8–10.5)
WBC # FLD AUTO: 8.59 K/UL — SIGNIFICANT CHANGE UP (ref 3.8–10.5)
WBC # FLD AUTO: 8.85 K/UL — SIGNIFICANT CHANGE UP (ref 3.8–10.5)
WBC # FLD AUTO: 9.36 K/UL — SIGNIFICANT CHANGE UP (ref 3.8–10.5)
WBC # FLD AUTO: 9.67 K/UL — SIGNIFICANT CHANGE UP (ref 3.8–10.5)
WBC # FLD AUTO: 9.85 K/UL — SIGNIFICANT CHANGE UP (ref 3.8–10.5)

## 2024-01-01 PROCEDURE — 88342 IMHCHEM/IMCYTCHM 1ST ANTB: CPT | Mod: 26

## 2024-01-01 PROCEDURE — 99233 SBSQ HOSP IP/OBS HIGH 50: CPT

## 2024-01-01 PROCEDURE — 93010 ELECTROCARDIOGRAM REPORT: CPT

## 2024-01-01 PROCEDURE — 77307 TELETHX ISODOSE PLAN CPLX: CPT | Mod: 26

## 2024-01-01 PROCEDURE — 99232 SBSQ HOSP IP/OBS MODERATE 35: CPT

## 2024-01-01 PROCEDURE — 99223 1ST HOSP IP/OBS HIGH 75: CPT

## 2024-01-01 PROCEDURE — 31624 DX BRONCHOSCOPE/LAVAGE: CPT | Mod: GC

## 2024-01-01 PROCEDURE — 99222 1ST HOSP IP/OBS MODERATE 55: CPT

## 2024-01-01 PROCEDURE — 71046 X-RAY EXAM CHEST 2 VIEWS: CPT | Mod: 26

## 2024-01-01 PROCEDURE — 31653 BRONCH EBUS SAMPLNG 3/> NODE: CPT | Mod: GC

## 2024-01-01 PROCEDURE — 99285 EMERGENCY DEPT VISIT HI MDM: CPT | Mod: GC

## 2024-01-01 PROCEDURE — 31623 DX BRONCHOSCOPE/BRUSH: CPT | Mod: GC

## 2024-01-01 PROCEDURE — 71045 X-RAY EXAM CHEST 1 VIEW: CPT | Mod: 26

## 2024-01-01 PROCEDURE — 88173 CYTOPATH EVAL FNA REPORT: CPT | Mod: 26

## 2024-01-01 PROCEDURE — 77280 THER RAD SIMULAJ FIELD SMPL: CPT | Mod: 26

## 2024-01-01 PROCEDURE — 88360 TUMOR IMMUNOHISTOCHEM/MANUAL: CPT | Mod: 26,59

## 2024-01-01 PROCEDURE — 31628 BRONCHOSCOPY/LUNG BX EACH: CPT | Mod: GC

## 2024-01-01 PROCEDURE — 77334 RADIATION TREATMENT AID(S): CPT | Mod: 26

## 2024-01-01 PROCEDURE — 77290 THER RAD SIMULAJ FIELD CPLX: CPT | Mod: 26

## 2024-01-01 PROCEDURE — 99285 EMERGENCY DEPT VISIT HI MDM: CPT

## 2024-01-01 PROCEDURE — 74019 RADEX ABDOMEN 2 VIEWS: CPT | Mod: 26

## 2024-01-01 PROCEDURE — 99285 EMERGENCY DEPT VISIT HI MDM: CPT | Mod: FS

## 2024-01-01 PROCEDURE — 93306 TTE W/DOPPLER COMPLETE: CPT | Mod: 26

## 2024-01-01 PROCEDURE — 31645 BRNCHSC W/THER ASPIR 1ST: CPT | Mod: GC

## 2024-01-01 PROCEDURE — 99239 HOSP IP/OBS DSCHRG MGMT >30: CPT

## 2024-01-01 PROCEDURE — 88172 CYTP DX EVAL FNA 1ST EA SITE: CPT | Mod: 26

## 2024-01-01 PROCEDURE — 31629 BRONCHOSCOPY/NEEDLE BX EACH: CPT | Mod: GC

## 2024-01-01 PROCEDURE — 99214 OFFICE O/P EST MOD 30 MIN: CPT

## 2024-01-01 PROCEDURE — 88341 IMHCHEM/IMCYTCHM EA ADD ANTB: CPT | Mod: 26

## 2024-01-01 PROCEDURE — 88112 CYTOPATH CELL ENHANCE TECH: CPT | Mod: 26,59

## 2024-01-01 PROCEDURE — 77263 THER RADIOLOGY TX PLNG CPLX: CPT

## 2024-01-01 PROCEDURE — 88305 TISSUE EXAM BY PATHOLOGIST: CPT | Mod: 26

## 2024-01-01 PROCEDURE — 31627 NAVIGATIONAL BRONCHOSCOPY: CPT | Mod: GC

## 2024-01-01 PROCEDURE — 71250 CT THORAX DX C-: CPT | Mod: 26,MA

## 2024-01-01 PROCEDURE — 31654 BRONCH EBUS IVNTJ PERPH LES: CPT | Mod: GC

## 2024-01-01 PROCEDURE — 74176 CT ABD & PELVIS W/O CONTRAST: CPT | Mod: 26

## 2024-01-01 PROCEDURE — 71275 CT ANGIOGRAPHY CHEST: CPT | Mod: 26,MA

## 2024-01-01 PROCEDURE — 99497 ADVNCD CARE PLAN 30 MIN: CPT | Mod: 25

## 2024-01-01 PROCEDURE — 99215 OFFICE O/P EST HI 40 MIN: CPT

## 2024-01-01 PROCEDURE — 71250 CT THORAX DX C-: CPT | Mod: 26

## 2024-01-01 PROCEDURE — 99223 1ST HOSP IP/OBS HIGH 75: CPT | Mod: GC

## 2024-01-01 PROCEDURE — 99238 HOSP IP/OBS DSCHRG MGMT 30/<: CPT

## 2024-01-01 RX ORDER — LORATADINE 10 MG/1
10 TABLET ORAL ONCE
Refills: 0 | Status: COMPLETED | OUTPATIENT
Start: 2024-01-01 | End: 2024-01-01

## 2024-01-01 RX ORDER — LEVOTHYROXINE SODIUM 125 MCG
1 TABLET ORAL
Refills: 0 | DISCHARGE

## 2024-01-01 RX ORDER — VALSARTAN 80 MG/1
80 TABLET ORAL DAILY
Refills: 0 | Status: DISCONTINUED | OUTPATIENT
Start: 2024-01-01 | End: 2024-01-01

## 2024-01-01 RX ORDER — METOCLOPRAMIDE HCL 10 MG
10 TABLET ORAL THREE TIMES A DAY
Refills: 0 | Status: DISCONTINUED | OUTPATIENT
Start: 2024-01-01 | End: 2024-01-01

## 2024-01-01 RX ORDER — SODIUM CHLORIDE 9 MG/ML
4 INJECTION INTRAMUSCULAR; INTRAVENOUS; SUBCUTANEOUS
Qty: 240 | Refills: 0
Start: 2024-01-01 | End: 2024-03-21

## 2024-01-01 RX ORDER — MIRTAZAPINE 45 MG/1
1 TABLET, ORALLY DISINTEGRATING ORAL
Qty: 0 | Refills: 0 | DISCHARGE
Start: 2024-01-01

## 2024-01-01 RX ORDER — LIDOCAINE 4 G/100G
1 CREAM TOPICAL ONCE
Refills: 0 | Status: COMPLETED | OUTPATIENT
Start: 2024-01-01 | End: 2024-01-01

## 2024-01-01 RX ORDER — LEVOTHYROXINE SODIUM 125 MCG
100 TABLET ORAL DAILY
Refills: 0 | Status: DISCONTINUED | OUTPATIENT
Start: 2024-01-01 | End: 2024-01-01

## 2024-01-01 RX ORDER — ONDANSETRON 8 MG/1
8 TABLET, FILM COATED ORAL ONCE
Refills: 0 | Status: COMPLETED | OUTPATIENT
Start: 2024-01-01 | End: 2024-01-01

## 2024-01-01 RX ORDER — HYDROMORPHONE HYDROCHLORIDE 2 MG/ML
0.2 INJECTION INTRAMUSCULAR; INTRAVENOUS; SUBCUTANEOUS EVERY 6 HOURS
Refills: 0 | Status: DISCONTINUED | OUTPATIENT
Start: 2024-01-01 | End: 2024-01-01

## 2024-01-01 RX ORDER — ONDANSETRON 8 MG/1
4 TABLET, FILM COATED ORAL ONCE
Refills: 0 | Status: COMPLETED | OUTPATIENT
Start: 2024-01-01 | End: 2024-01-01

## 2024-01-01 RX ORDER — IPRATROPIUM/ALBUTEROL SULFATE 18-103MCG
3 AEROSOL WITH ADAPTER (GRAM) INHALATION
Qty: 60 | Refills: 0
Start: 2024-01-01 | End: 2024-03-21

## 2024-01-01 RX ORDER — IPRATROPIUM/ALBUTEROL SULFATE 18-103MCG
3 AEROSOL WITH ADAPTER (GRAM) INHALATION
Qty: 180 | Refills: 0
Start: 2024-01-01 | End: 2024-03-21

## 2024-01-01 RX ORDER — LEVOTHYROXINE SODIUM 125 MCG
1 TABLET ORAL
Qty: 0 | Refills: 0 | DISCHARGE

## 2024-01-01 RX ORDER — ACETAMINOPHEN 500 MG
1000 TABLET ORAL ONCE
Refills: 0 | Status: COMPLETED | OUTPATIENT
Start: 2024-01-01 | End: 2024-01-01

## 2024-01-01 RX ORDER — ACETAMINOPHEN 500 MG
650 TABLET ORAL EVERY 6 HOURS
Refills: 0 | Status: DISCONTINUED | OUTPATIENT
Start: 2024-01-01 | End: 2024-01-01

## 2024-01-01 RX ORDER — VALSARTAN 80 MG/1
160 TABLET ORAL DAILY
Refills: 0 | Status: DISCONTINUED | OUTPATIENT
Start: 2024-01-01 | End: 2024-01-01

## 2024-01-01 RX ORDER — HYDROMORPHONE HYDROCHLORIDE 2 MG/ML
2 INJECTION INTRAMUSCULAR; INTRAVENOUS; SUBCUTANEOUS EVERY 6 HOURS
Refills: 0 | Status: DISCONTINUED | OUTPATIENT
Start: 2024-01-01 | End: 2024-01-01

## 2024-01-01 RX ORDER — MORPHINE SULFATE 50 MG/1
15 CAPSULE, EXTENDED RELEASE ORAL
Refills: 0 | Status: DISCONTINUED | OUTPATIENT
Start: 2024-01-01 | End: 2024-01-01

## 2024-01-01 RX ORDER — POLYETHYLENE GLYCOL 3350 17 G/17G
17 POWDER, FOR SOLUTION ORAL
Qty: 0 | Refills: 0 | DISCHARGE
Start: 2024-01-01

## 2024-01-01 RX ORDER — SENNA PLUS 8.6 MG/1
2 TABLET ORAL DAILY
Refills: 0 | Status: DISCONTINUED | OUTPATIENT
Start: 2024-01-01 | End: 2024-01-01

## 2024-01-01 RX ORDER — MINERAL OIL
133 OIL (ML) MISCELLANEOUS ONCE
Refills: 0 | Status: COMPLETED | OUTPATIENT
Start: 2024-01-01 | End: 2024-01-01

## 2024-01-01 RX ORDER — METOCLOPRAMIDE HCL 10 MG
5 TABLET ORAL EVERY 12 HOURS
Refills: 0 | Status: DISCONTINUED | OUTPATIENT
Start: 2024-01-01 | End: 2024-01-01

## 2024-01-01 RX ORDER — ACETAMINOPHEN 500 MG
750 TABLET ORAL ONCE
Refills: 0 | Status: COMPLETED | OUTPATIENT
Start: 2024-01-01 | End: 2024-01-01

## 2024-01-01 RX ORDER — LANOLIN ALCOHOL/MO/W.PET/CERES
3 CREAM (GRAM) TOPICAL AT BEDTIME
Refills: 0 | Status: DISCONTINUED | OUTPATIENT
Start: 2024-01-01 | End: 2024-01-01

## 2024-01-01 RX ORDER — SODIUM CHLORIDE 9 MG/ML
1 INJECTION INTRAMUSCULAR; INTRAVENOUS; SUBCUTANEOUS
Qty: 0 | Refills: 0 | DISCHARGE
Start: 2024-01-01

## 2024-01-01 RX ORDER — METOCLOPRAMIDE HCL 10 MG
10 TABLET ORAL EVERY 8 HOURS
Refills: 0 | Status: DISCONTINUED | OUTPATIENT
Start: 2024-01-01 | End: 2024-01-01

## 2024-01-01 RX ORDER — ALPRAZOLAM 0.25 MG/1
0.25 TABLET ORAL
Qty: 10 | Refills: 0 | Status: ACTIVE | COMMUNITY
Start: 2024-01-01 | End: 1900-01-01

## 2024-01-01 RX ORDER — ACETAMINOPHEN, DEXTROMETHORPHAN HYDROBROMIDE, DOXYLAMINE SUCCINATE, AND PHENYLEPHRINE HYDROCHLORIDE 650; 20; 12.5; 1 MG/30ML; MG/30ML; MG/30ML; MG/30ML
2 SOLUTION ORAL
Refills: 0 | DISCHARGE

## 2024-01-01 RX ORDER — OXYCODONE HYDROCHLORIDE 5 MG/1
2.5 TABLET ORAL ONCE
Refills: 0 | Status: DISCONTINUED | OUTPATIENT
Start: 2024-01-01 | End: 2024-01-01

## 2024-01-01 RX ORDER — KETOROLAC TROMETHAMINE 30 MG/ML
15 SYRINGE (ML) INJECTION EVERY 6 HOURS
Refills: 0 | Status: DISCONTINUED | OUTPATIENT
Start: 2024-01-01 | End: 2024-01-01

## 2024-01-01 RX ORDER — MORPHINE SULFATE 50 MG/1
1 CAPSULE, EXTENDED RELEASE ORAL
Qty: 0 | Refills: 0 | DISCHARGE
Start: 2024-01-01

## 2024-01-01 RX ORDER — FENTANYL CITRATE 50 UG/ML
25 INJECTION INTRAVENOUS
Refills: 0 | Status: DISCONTINUED | OUTPATIENT
Start: 2024-01-01 | End: 2024-01-01

## 2024-01-01 RX ORDER — MIRTAZAPINE 45 MG/1
15 TABLET, ORALLY DISINTEGRATING ORAL AT BEDTIME
Refills: 0 | Status: DISCONTINUED | OUTPATIENT
Start: 2024-01-01 | End: 2024-01-01

## 2024-01-01 RX ORDER — ENOXAPARIN SODIUM 100 MG/ML
40 INJECTION SUBCUTANEOUS EVERY 24 HOURS
Refills: 0 | Status: DISCONTINUED | OUTPATIENT
Start: 2024-01-01 | End: 2024-01-01

## 2024-01-01 RX ORDER — KETOROLAC TROMETHAMINE 30 MG/ML
15 SYRINGE (ML) INJECTION ONCE
Refills: 0 | Status: DISCONTINUED | OUTPATIENT
Start: 2024-01-01 | End: 2024-01-01

## 2024-01-01 RX ORDER — CHLORHEXIDINE GLUCONATE 213 G/1000ML
1 SOLUTION TOPICAL DAILY
Refills: 0 | Status: DISCONTINUED | OUTPATIENT
Start: 2024-01-01 | End: 2024-01-01

## 2024-01-01 RX ORDER — DIPHENHYDRAMINE HCL 50 MG
50 CAPSULE ORAL ONCE
Refills: 0 | Status: DISCONTINUED | OUTPATIENT
Start: 2024-01-01 | End: 2024-01-01

## 2024-01-01 RX ORDER — CYCLOBENZAPRINE HYDROCHLORIDE 10 MG/1
1 TABLET, FILM COATED ORAL
Qty: 0 | Refills: 0 | DISCHARGE
Start: 2024-01-01

## 2024-01-01 RX ORDER — SODIUM CHLORIDE 9 MG/ML
1000 INJECTION INTRAMUSCULAR; INTRAVENOUS; SUBCUTANEOUS ONCE
Refills: 0 | Status: COMPLETED | OUTPATIENT
Start: 2024-01-01 | End: 2024-01-01

## 2024-01-01 RX ORDER — KETOROLAC TROMETHAMINE 30 MG/ML
10 SYRINGE (ML) INJECTION EVERY 6 HOURS
Refills: 0 | Status: DISCONTINUED | OUTPATIENT
Start: 2024-01-01 | End: 2024-01-01

## 2024-01-01 RX ORDER — POTASSIUM CHLORIDE 20 MEQ
10 PACKET (EA) ORAL
Refills: 0 | Status: COMPLETED | OUTPATIENT
Start: 2024-01-01 | End: 2024-01-01

## 2024-01-01 RX ORDER — HYDROMORPHONE HYDROCHLORIDE 2 MG/ML
2 INJECTION INTRAMUSCULAR; INTRAVENOUS; SUBCUTANEOUS EVERY 4 HOURS
Refills: 0 | Status: DISCONTINUED | OUTPATIENT
Start: 2024-01-01 | End: 2024-01-01

## 2024-01-01 RX ORDER — POTASSIUM CHLORIDE 20 MEQ
40 PACKET (EA) ORAL ONCE
Refills: 0 | Status: COMPLETED | OUTPATIENT
Start: 2024-01-01 | End: 2024-01-01

## 2024-01-01 RX ORDER — POTASSIUM CHLORIDE 20 MEQ
20 PACKET (EA) ORAL ONCE
Refills: 0 | Status: COMPLETED | OUTPATIENT
Start: 2024-01-01 | End: 2024-01-01

## 2024-01-01 RX ORDER — AMLODIPINE BESYLATE 2.5 MG/1
5 TABLET ORAL DAILY
Refills: 0 | Status: DISCONTINUED | OUTPATIENT
Start: 2024-01-01 | End: 2024-01-01

## 2024-01-01 RX ORDER — HYDROMORPHONE HYDROCHLORIDE 2 MG/ML
0.2 INJECTION INTRAMUSCULAR; INTRAVENOUS; SUBCUTANEOUS ONCE
Refills: 0 | Status: DISCONTINUED | OUTPATIENT
Start: 2024-01-01 | End: 2024-01-01

## 2024-01-01 RX ORDER — ASPIRIN/CALCIUM CARB/MAGNESIUM 324 MG
1 TABLET ORAL
Qty: 0 | Refills: 0 | DISCHARGE

## 2024-01-01 RX ORDER — FLUTICASONE PROPIONATE 50 MCG
1 SPRAY, SUSPENSION NASAL
Qty: 1 | Refills: 0
Start: 2024-01-01 | End: 2024-03-21

## 2024-01-01 RX ORDER — ALBUTEROL 90 UG/1
2 AEROSOL, METERED ORAL EVERY 6 HOURS
Refills: 0 | Status: DISCONTINUED | OUTPATIENT
Start: 2024-01-01 | End: 2024-01-01

## 2024-01-01 RX ORDER — POTASSIUM CHLORIDE 1500 MG/1
20 TABLET, FILM COATED, EXTENDED RELEASE ORAL DAILY
Qty: 3 | Refills: 0 | Status: ACTIVE | COMMUNITY
Start: 2024-01-01 | End: 1900-01-01

## 2024-01-01 RX ORDER — HYDROMORPHONE HYDROCHLORIDE 2 MG/ML
0.4 INJECTION INTRAMUSCULAR; INTRAVENOUS; SUBCUTANEOUS EVERY 6 HOURS
Refills: 0 | Status: DISCONTINUED | OUTPATIENT
Start: 2024-01-01 | End: 2024-01-01

## 2024-01-01 RX ORDER — SENNA PLUS 8.6 MG/1
2 TABLET ORAL
Refills: 0 | Status: DISCONTINUED | OUTPATIENT
Start: 2024-01-01 | End: 2024-01-01

## 2024-01-01 RX ORDER — KETOROLAC TROMETHAMINE 30 MG/ML
1 SYRINGE (ML) INJECTION
Qty: 28 | Refills: 0
Start: 2024-01-01 | End: 2024-01-01

## 2024-01-01 RX ORDER — SODIUM CHLORIDE 9 MG/ML
4 INJECTION INTRAMUSCULAR; INTRAVENOUS; SUBCUTANEOUS
Qty: 16 | Refills: 0
Start: 2024-01-01 | End: 2024-03-21

## 2024-01-01 RX ORDER — ACETAMINOPHEN 500 MG
760 TABLET ORAL ONCE
Refills: 0 | Status: DISCONTINUED | OUTPATIENT
Start: 2024-01-01 | End: 2024-01-01

## 2024-01-01 RX ORDER — SENNA PLUS 8.6 MG/1
2 TABLET ORAL
Qty: 0 | Refills: 0 | DISCHARGE
Start: 2024-01-01

## 2024-01-01 RX ORDER — LANOLIN ALCOHOL/MO/W.PET/CERES
1 CREAM (GRAM) TOPICAL
Qty: 0 | Refills: 0 | DISCHARGE
Start: 2024-01-01

## 2024-01-01 RX ORDER — MORPHINE SULFATE 50 MG/1
2 CAPSULE, EXTENDED RELEASE ORAL EVERY 4 HOURS
Refills: 0 | Status: DISCONTINUED | OUTPATIENT
Start: 2024-01-01 | End: 2024-01-01

## 2024-01-01 RX ORDER — ONDANSETRON 8 MG/1
4 TABLET, FILM COATED ORAL EVERY 8 HOURS
Refills: 0 | Status: DISCONTINUED | OUTPATIENT
Start: 2024-01-01 | End: 2024-01-01

## 2024-01-01 RX ORDER — VALSARTAN 80 MG/1
1 TABLET ORAL
Qty: 0 | Refills: 0 | DISCHARGE
Start: 2024-01-01

## 2024-01-01 RX ORDER — SODIUM CHLORIDE 9 MG/ML
500 INJECTION, SOLUTION INTRAVENOUS ONCE
Refills: 0 | Status: COMPLETED | OUTPATIENT
Start: 2024-01-01 | End: 2024-01-01

## 2024-01-01 RX ORDER — CYCLOBENZAPRINE HYDROCHLORIDE 10 MG/1
5 TABLET, FILM COATED ORAL EVERY 8 HOURS
Refills: 0 | Status: DISCONTINUED | OUTPATIENT
Start: 2024-01-01 | End: 2024-01-01

## 2024-01-01 RX ORDER — SODIUM CHLORIDE 9 MG/ML
1000 INJECTION INTRAMUSCULAR; INTRAVENOUS; SUBCUTANEOUS
Refills: 0 | Status: DISCONTINUED | OUTPATIENT
Start: 2024-01-01 | End: 2024-01-01

## 2024-01-01 RX ORDER — FLUTICASONE PROPIONATE 50 MCG
1 SPRAY, SUSPENSION NASAL
Refills: 0 | Status: DISCONTINUED | OUTPATIENT
Start: 2024-01-01 | End: 2024-01-01

## 2024-01-01 RX ORDER — AMLODIPINE BESYLATE 2.5 MG/1
1 TABLET ORAL
Refills: 0 | DISCHARGE

## 2024-01-01 RX ORDER — PIPERACILLIN AND TAZOBACTAM 4; .5 G/20ML; G/20ML
3.38 INJECTION, POWDER, LYOPHILIZED, FOR SOLUTION INTRAVENOUS EVERY 8 HOURS
Refills: 0 | Status: DISCONTINUED | OUTPATIENT
Start: 2024-01-01 | End: 2024-01-01

## 2024-01-01 RX ORDER — HYDROMORPHONE HYDROCHLORIDE 2 MG/ML
0.4 INJECTION INTRAMUSCULAR; INTRAVENOUS; SUBCUTANEOUS EVERY 4 HOURS
Refills: 0 | Status: DISCONTINUED | OUTPATIENT
Start: 2024-01-01 | End: 2024-01-01

## 2024-01-01 RX ORDER — ALTEPLASE 100 MG
2 KIT INTRAVENOUS ONCE
Refills: 0 | Status: COMPLETED | OUTPATIENT
Start: 2024-01-01 | End: 2024-01-01

## 2024-01-01 RX ORDER — MAGNESIUM SULFATE 500 MG/ML
2 VIAL (ML) INJECTION ONCE
Refills: 0 | Status: COMPLETED | OUTPATIENT
Start: 2024-01-01 | End: 2024-01-01

## 2024-01-01 RX ORDER — ACETAMINOPHEN 500 MG
2 TABLET ORAL
Qty: 0 | Refills: 0 | DISCHARGE
Start: 2024-01-01

## 2024-01-01 RX ORDER — POLYETHYLENE GLYCOL 3350 17 G/17G
17 POWDER, FOR SOLUTION ORAL EVERY 12 HOURS
Refills: 0 | Status: DISCONTINUED | OUTPATIENT
Start: 2024-01-01 | End: 2024-01-01

## 2024-01-01 RX ORDER — VALSARTAN 80 MG/1
40 TABLET ORAL DAILY
Refills: 0 | Status: DISCONTINUED | OUTPATIENT
Start: 2024-01-01 | End: 2024-01-01

## 2024-01-01 RX ORDER — PANTOPRAZOLE SODIUM 20 MG/1
1 TABLET, DELAYED RELEASE ORAL
Qty: 0 | Refills: 0 | DISCHARGE

## 2024-01-01 RX ORDER — LORATADINE 10 MG/1
10 TABLET ORAL ONCE
Refills: 0 | Status: DISCONTINUED | OUTPATIENT
Start: 2024-01-01 | End: 2024-01-01

## 2024-01-01 RX ORDER — ONDANSETRON 8 MG/1
1 TABLET, FILM COATED ORAL
Qty: 25 | Refills: 0
Start: 2024-01-01

## 2024-01-01 RX ORDER — POLYETHYLENE GLYCOL 3350 17 G/17G
17 POWDER, FOR SOLUTION ORAL DAILY
Refills: 0 | Status: DISCONTINUED | OUTPATIENT
Start: 2024-01-01 | End: 2024-01-01

## 2024-01-01 RX ORDER — KETOROLAC TROMETHAMINE 30 MG/ML
30 SYRINGE (ML) INJECTION EVERY 6 HOURS
Refills: 0 | Status: DISCONTINUED | OUTPATIENT
Start: 2024-01-01 | End: 2024-01-01

## 2024-01-01 RX ORDER — KETOROLAC TROMETHAMINE 30 MG/ML
30 SYRINGE (ML) INJECTION ONCE
Refills: 0 | Status: DISCONTINUED | OUTPATIENT
Start: 2024-01-01 | End: 2024-01-01

## 2024-01-01 RX ORDER — IPRATROPIUM/ALBUTEROL SULFATE 18-103MCG
3 AEROSOL WITH ADAPTER (GRAM) INHALATION EVERY 12 HOURS
Refills: 0 | Status: DISCONTINUED | OUTPATIENT
Start: 2024-01-01 | End: 2024-01-01

## 2024-01-01 RX ORDER — LEVOTHYROXINE SODIUM 125 MCG
1 TABLET ORAL
Qty: 0 | Refills: 0 | DISCHARGE
Start: 2024-01-01

## 2024-01-01 RX ORDER — FAMOTIDINE 10 MG/ML
20 INJECTION INTRAVENOUS ONCE
Refills: 0 | Status: COMPLETED | OUTPATIENT
Start: 2024-01-01 | End: 2024-01-01

## 2024-01-01 RX ORDER — PANTOPRAZOLE 20 MG/1
20 TABLET, DELAYED RELEASE ORAL DAILY
Qty: 30 | Refills: 3 | Status: ACTIVE | COMMUNITY
Start: 2024-01-01 | End: 1900-01-01

## 2024-01-01 RX ORDER — PIPERACILLIN AND TAZOBACTAM 4; .5 G/20ML; G/20ML
3.38 INJECTION, POWDER, LYOPHILIZED, FOR SOLUTION INTRAVENOUS ONCE
Refills: 0 | Status: COMPLETED | OUTPATIENT
Start: 2024-01-01 | End: 2024-01-01

## 2024-01-01 RX ORDER — MORPHINE SULFATE 50 MG/1
1 CAPSULE, EXTENDED RELEASE ORAL EVERY 4 HOURS
Refills: 0 | Status: DISCONTINUED | OUTPATIENT
Start: 2024-01-01 | End: 2024-01-01

## 2024-01-01 RX ORDER — HYDROMORPHONE HYDROCHLORIDE 2 MG/ML
0.4 INJECTION INTRAMUSCULAR; INTRAVENOUS; SUBCUTANEOUS
Refills: 0 | Status: DISCONTINUED | OUTPATIENT
Start: 2024-01-01 | End: 2024-01-01

## 2024-01-01 RX ORDER — METOCLOPRAMIDE HCL 10 MG
10 TABLET ORAL ONCE
Refills: 0 | Status: COMPLETED | OUTPATIENT
Start: 2024-01-01 | End: 2024-01-01

## 2024-01-01 RX ORDER — VALSARTAN 80 MG/1
1 TABLET ORAL
Refills: 0 | DISCHARGE

## 2024-01-01 RX ORDER — HYDROMORPHONE HYDROCHLORIDE 2 MG/ML
1 INJECTION INTRAMUSCULAR; INTRAVENOUS; SUBCUTANEOUS
Qty: 0 | Refills: 0 | DISCHARGE
Start: 2024-01-01

## 2024-01-01 RX ORDER — PANTOPRAZOLE SODIUM 20 MG/1
1 TABLET, DELAYED RELEASE ORAL
Qty: 0 | Refills: 0 | DISCHARGE
Start: 2024-01-01

## 2024-01-01 RX ORDER — AMLODIPINE BESYLATE 5 MG/1
5 TABLET ORAL DAILY
Qty: 30 | Refills: 2 | Status: ACTIVE | COMMUNITY
Start: 2023-01-01 | End: 1900-01-01

## 2024-01-01 RX ORDER — HYDROMORPHONE HYDROCHLORIDE 2 MG/ML
0.5 INJECTION INTRAMUSCULAR; INTRAVENOUS; SUBCUTANEOUS
Refills: 0 | Status: DISCONTINUED | OUTPATIENT
Start: 2024-01-01 | End: 2024-01-01

## 2024-01-01 RX ORDER — PANTOPRAZOLE SODIUM 20 MG/1
40 TABLET, DELAYED RELEASE ORAL
Refills: 0 | Status: DISCONTINUED | OUTPATIENT
Start: 2024-01-01 | End: 2024-01-01

## 2024-01-01 RX ORDER — LACTULOSE 10 G/15ML
20 SOLUTION ORAL EVERY 8 HOURS
Refills: 0 | Status: DISCONTINUED | OUTPATIENT
Start: 2024-01-01 | End: 2024-01-01

## 2024-01-01 RX ORDER — METOCLOPRAMIDE HCL 10 MG
1 TABLET ORAL
Qty: 0 | Refills: 0 | DISCHARGE
Start: 2024-01-01

## 2024-01-01 RX ORDER — SODIUM CHLORIDE 9 MG/ML
1 INJECTION INTRAMUSCULAR; INTRAVENOUS; SUBCUTANEOUS
Refills: 0 | Status: DISCONTINUED | OUTPATIENT
Start: 2024-01-01 | End: 2024-01-01

## 2024-01-01 RX ORDER — INFLUENZA VIRUS VACCINE 15; 15; 15; 15 UG/.5ML; UG/.5ML; UG/.5ML; UG/.5ML
0.7 SUSPENSION INTRAMUSCULAR ONCE
Refills: 0 | Status: DISCONTINUED | OUTPATIENT
Start: 2024-01-01 | End: 2024-01-01

## 2024-01-01 RX ORDER — VALSARTAN 160 MG/1
160 TABLET, COATED ORAL
Qty: 30 | Refills: 2 | Status: ACTIVE | COMMUNITY
Start: 2023-01-01 | End: 1900-01-01

## 2024-01-01 RX ORDER — ALBUTEROL 90 UG/1
2 AEROSOL, METERED ORAL
Qty: 0 | Refills: 0 | DISCHARGE
Start: 2024-01-01

## 2024-01-01 RX ORDER — SODIUM CHLORIDE 9 MG/ML
4 INJECTION INTRAMUSCULAR; INTRAVENOUS; SUBCUTANEOUS EVERY 12 HOURS
Refills: 0 | Status: DISCONTINUED | OUTPATIENT
Start: 2024-01-01 | End: 2024-01-01

## 2024-01-01 RX ORDER — BENZONATATE 200 MG/1
200 CAPSULE ORAL
Qty: 21 | Refills: 0 | Status: ACTIVE | COMMUNITY
Start: 2023-01-01

## 2024-01-01 RX ORDER — LEVOTHYROXINE SODIUM 100 UG/1
100 TABLET ORAL
Qty: 90 | Refills: 0 | Status: ACTIVE | COMMUNITY
Start: 2022-08-09 | End: 1900-01-01

## 2024-01-01 RX ADMIN — SODIUM CHLORIDE 4 MILLILITER(S): 9 INJECTION INTRAMUSCULAR; INTRAVENOUS; SUBCUTANEOUS at 10:13

## 2024-01-01 RX ADMIN — VALSARTAN 40 MILLIGRAM(S): 80 TABLET ORAL at 07:18

## 2024-01-01 RX ADMIN — PIPERACILLIN AND TAZOBACTAM 25 GRAM(S): 4; .5 INJECTION, POWDER, LYOPHILIZED, FOR SOLUTION INTRAVENOUS at 23:02

## 2024-01-01 RX ADMIN — ENOXAPARIN SODIUM 40 MILLIGRAM(S): 100 INJECTION SUBCUTANEOUS at 14:19

## 2024-01-01 RX ADMIN — HYDROMORPHONE HYDROCHLORIDE 0.4 MILLIGRAM(S): 2 INJECTION INTRAMUSCULAR; INTRAVENOUS; SUBCUTANEOUS at 12:38

## 2024-01-01 RX ADMIN — CHLORHEXIDINE GLUCONATE 1 APPLICATION(S): 213 SOLUTION TOPICAL at 15:32

## 2024-01-01 RX ADMIN — HYDROMORPHONE HYDROCHLORIDE 0.4 MILLIGRAM(S): 2 INJECTION INTRAMUSCULAR; INTRAVENOUS; SUBCUTANEOUS at 11:45

## 2024-01-01 RX ADMIN — FAMOTIDINE 20 MILLIGRAM(S): 10 INJECTION INTRAVENOUS at 22:37

## 2024-01-01 RX ADMIN — HYDROMORPHONE HYDROCHLORIDE 0.4 MILLIGRAM(S): 2 INJECTION INTRAMUSCULAR; INTRAVENOUS; SUBCUTANEOUS at 15:36

## 2024-01-01 RX ADMIN — HYDROMORPHONE HYDROCHLORIDE 0.4 MILLIGRAM(S): 2 INJECTION INTRAMUSCULAR; INTRAVENOUS; SUBCUTANEOUS at 11:40

## 2024-01-01 RX ADMIN — Medication 1 SPRAY(S): at 06:05

## 2024-01-01 RX ADMIN — Medication 10 MILLIGRAM(S): at 06:42

## 2024-01-01 RX ADMIN — PANTOPRAZOLE SODIUM 40 MILLIGRAM(S): 20 TABLET, DELAYED RELEASE ORAL at 07:20

## 2024-01-01 RX ADMIN — MORPHINE SULFATE 15 MILLIGRAM(S): 50 CAPSULE, EXTENDED RELEASE ORAL at 17:11

## 2024-01-01 RX ADMIN — Medication 650 MILLIGRAM(S): at 00:35

## 2024-01-01 RX ADMIN — HYDROMORPHONE HYDROCHLORIDE 0.4 MILLIGRAM(S): 2 INJECTION INTRAMUSCULAR; INTRAVENOUS; SUBCUTANEOUS at 10:18

## 2024-01-01 RX ADMIN — Medication 3 MILLIGRAM(S): at 22:17

## 2024-01-01 RX ADMIN — Medication 15 MILLIGRAM(S): at 08:36

## 2024-01-01 RX ADMIN — ONDANSETRON 4 MILLIGRAM(S): 8 TABLET, FILM COATED ORAL at 11:44

## 2024-01-01 RX ADMIN — HYDROMORPHONE HYDROCHLORIDE 0.4 MILLIGRAM(S): 2 INJECTION INTRAMUSCULAR; INTRAVENOUS; SUBCUTANEOUS at 11:38

## 2024-01-01 RX ADMIN — HYDROMORPHONE HYDROCHLORIDE 0.4 MILLIGRAM(S): 2 INJECTION INTRAMUSCULAR; INTRAVENOUS; SUBCUTANEOUS at 12:39

## 2024-01-01 RX ADMIN — Medication 10 MILLIGRAM(S): at 05:20

## 2024-01-01 RX ADMIN — Medication 200 MILLIGRAM(S): at 18:53

## 2024-01-01 RX ADMIN — Medication 1 SPRAY(S): at 07:17

## 2024-01-01 RX ADMIN — POLYETHYLENE GLYCOL 3350 17 GRAM(S): 17 POWDER, FOR SOLUTION ORAL at 05:31

## 2024-01-01 RX ADMIN — HYDROMORPHONE HYDROCHLORIDE 0.4 MILLIGRAM(S): 2 INJECTION INTRAMUSCULAR; INTRAVENOUS; SUBCUTANEOUS at 21:33

## 2024-01-01 RX ADMIN — Medication 1 SPRAY(S): at 18:10

## 2024-01-01 RX ADMIN — Medication 1 SPRAY(S): at 05:54

## 2024-01-01 RX ADMIN — PIPERACILLIN AND TAZOBACTAM 25 GRAM(S): 4; .5 INJECTION, POWDER, LYOPHILIZED, FOR SOLUTION INTRAVENOUS at 12:27

## 2024-01-01 RX ADMIN — ENOXAPARIN SODIUM 40 MILLIGRAM(S): 100 INJECTION SUBCUTANEOUS at 15:30

## 2024-01-01 RX ADMIN — SODIUM CHLORIDE 1 GRAM(S): 9 INJECTION INTRAMUSCULAR; INTRAVENOUS; SUBCUTANEOUS at 05:45

## 2024-01-01 RX ADMIN — Medication 15 MILLIGRAM(S): at 09:50

## 2024-01-01 RX ADMIN — VALSARTAN 40 MILLIGRAM(S): 80 TABLET ORAL at 06:37

## 2024-01-01 RX ADMIN — CHLORHEXIDINE GLUCONATE 1 APPLICATION(S): 213 SOLUTION TOPICAL at 12:20

## 2024-01-01 RX ADMIN — SODIUM CHLORIDE 1 GRAM(S): 9 INJECTION INTRAMUSCULAR; INTRAVENOUS; SUBCUTANEOUS at 18:45

## 2024-01-01 RX ADMIN — HYDROMORPHONE HYDROCHLORIDE 0.2 MILLIGRAM(S): 2 INJECTION INTRAMUSCULAR; INTRAVENOUS; SUBCUTANEOUS at 12:31

## 2024-01-01 RX ADMIN — HYDROMORPHONE HYDROCHLORIDE 0.5 MILLIGRAM(S): 2 INJECTION INTRAMUSCULAR; INTRAVENOUS; SUBCUTANEOUS at 15:42

## 2024-01-01 RX ADMIN — Medication 100 MICROGRAM(S): at 05:25

## 2024-01-01 RX ADMIN — VALSARTAN 160 MILLIGRAM(S): 80 TABLET ORAL at 05:54

## 2024-01-01 RX ADMIN — ENOXAPARIN SODIUM 40 MILLIGRAM(S): 100 INJECTION SUBCUTANEOUS at 14:12

## 2024-01-01 RX ADMIN — HYDROMORPHONE HYDROCHLORIDE 0.4 MILLIGRAM(S): 2 INJECTION INTRAMUSCULAR; INTRAVENOUS; SUBCUTANEOUS at 11:39

## 2024-01-01 RX ADMIN — Medication 100 MILLIGRAM(S): at 14:56

## 2024-01-01 RX ADMIN — PIPERACILLIN AND TAZOBACTAM 25 GRAM(S): 4; .5 INJECTION, POWDER, LYOPHILIZED, FOR SOLUTION INTRAVENOUS at 06:24

## 2024-01-01 RX ADMIN — HYDROMORPHONE HYDROCHLORIDE 0.4 MILLIGRAM(S): 2 INJECTION INTRAMUSCULAR; INTRAVENOUS; SUBCUTANEOUS at 21:45

## 2024-01-01 RX ADMIN — HYDROMORPHONE HYDROCHLORIDE 0.4 MILLIGRAM(S): 2 INJECTION INTRAMUSCULAR; INTRAVENOUS; SUBCUTANEOUS at 15:46

## 2024-01-01 RX ADMIN — Medication 100 MICROGRAM(S): at 06:56

## 2024-01-01 RX ADMIN — HYDROMORPHONE HYDROCHLORIDE 0.4 MILLIGRAM(S): 2 INJECTION INTRAMUSCULAR; INTRAVENOUS; SUBCUTANEOUS at 19:08

## 2024-01-01 RX ADMIN — SODIUM CHLORIDE 1 GRAM(S): 9 INJECTION INTRAMUSCULAR; INTRAVENOUS; SUBCUTANEOUS at 05:55

## 2024-01-01 RX ADMIN — MIRTAZAPINE 15 MILLIGRAM(S): 45 TABLET, ORALLY DISINTEGRATING ORAL at 22:51

## 2024-01-01 RX ADMIN — Medication 1 SPRAY(S): at 18:21

## 2024-01-01 RX ADMIN — CHLORHEXIDINE GLUCONATE 1 APPLICATION(S): 213 SOLUTION TOPICAL at 15:05

## 2024-01-01 RX ADMIN — MORPHINE SULFATE 15 MILLIGRAM(S): 50 CAPSULE, EXTENDED RELEASE ORAL at 18:07

## 2024-01-01 RX ADMIN — PANTOPRAZOLE SODIUM 40 MILLIGRAM(S): 20 TABLET, DELAYED RELEASE ORAL at 06:57

## 2024-01-01 RX ADMIN — VALSARTAN 40 MILLIGRAM(S): 80 TABLET ORAL at 06:45

## 2024-01-01 RX ADMIN — SODIUM CHLORIDE 1000 MILLILITER(S): 9 INJECTION INTRAMUSCULAR; INTRAVENOUS; SUBCUTANEOUS at 22:38

## 2024-01-01 RX ADMIN — Medication 100 MILLIGRAM(S): at 18:46

## 2024-01-01 RX ADMIN — HYDROMORPHONE HYDROCHLORIDE 0.4 MILLIGRAM(S): 2 INJECTION INTRAMUSCULAR; INTRAVENOUS; SUBCUTANEOUS at 07:54

## 2024-01-01 RX ADMIN — HYDROMORPHONE HYDROCHLORIDE 0.4 MILLIGRAM(S): 2 INJECTION INTRAMUSCULAR; INTRAVENOUS; SUBCUTANEOUS at 09:18

## 2024-01-01 RX ADMIN — Medication 400 MILLIGRAM(S): at 23:50

## 2024-01-01 RX ADMIN — Medication 30 MILLIGRAM(S): at 01:43

## 2024-01-01 RX ADMIN — Medication 10 MILLIGRAM(S): at 13:35

## 2024-01-01 RX ADMIN — HYDROMORPHONE HYDROCHLORIDE 0.2 MILLIGRAM(S): 2 INJECTION INTRAMUSCULAR; INTRAVENOUS; SUBCUTANEOUS at 14:15

## 2024-01-01 RX ADMIN — PANTOPRAZOLE SODIUM 40 MILLIGRAM(S): 20 TABLET, DELAYED RELEASE ORAL at 07:17

## 2024-01-01 RX ADMIN — HYDROMORPHONE HYDROCHLORIDE 0.4 MILLIGRAM(S): 2 INJECTION INTRAMUSCULAR; INTRAVENOUS; SUBCUTANEOUS at 18:36

## 2024-01-01 RX ADMIN — HYDROMORPHONE HYDROCHLORIDE 0.5 MILLIGRAM(S): 2 INJECTION INTRAMUSCULAR; INTRAVENOUS; SUBCUTANEOUS at 02:27

## 2024-01-01 RX ADMIN — PIPERACILLIN AND TAZOBACTAM 25 GRAM(S): 4; .5 INJECTION, POWDER, LYOPHILIZED, FOR SOLUTION INTRAVENOUS at 17:59

## 2024-01-01 RX ADMIN — Medication 10 MILLIGRAM(S): at 12:27

## 2024-01-01 RX ADMIN — Medication 10 MILLIGRAM(S): at 05:44

## 2024-01-01 RX ADMIN — ENOXAPARIN SODIUM 40 MILLIGRAM(S): 100 INJECTION SUBCUTANEOUS at 18:13

## 2024-01-01 RX ADMIN — Medication 15 MILLIGRAM(S): at 09:40

## 2024-01-01 RX ADMIN — Medication 10 MILLIGRAM(S): at 07:19

## 2024-01-01 RX ADMIN — HYDROMORPHONE HYDROCHLORIDE 0.4 MILLIGRAM(S): 2 INJECTION INTRAMUSCULAR; INTRAVENOUS; SUBCUTANEOUS at 02:04

## 2024-01-01 RX ADMIN — SODIUM CHLORIDE 75 MILLILITER(S): 9 INJECTION INTRAMUSCULAR; INTRAVENOUS; SUBCUTANEOUS at 09:30

## 2024-01-01 RX ADMIN — HYDROMORPHONE HYDROCHLORIDE 0.4 MILLIGRAM(S): 2 INJECTION INTRAMUSCULAR; INTRAVENOUS; SUBCUTANEOUS at 04:30

## 2024-01-01 RX ADMIN — HYDROMORPHONE HYDROCHLORIDE 0.4 MILLIGRAM(S): 2 INJECTION INTRAMUSCULAR; INTRAVENOUS; SUBCUTANEOUS at 02:40

## 2024-01-01 RX ADMIN — HYDROMORPHONE HYDROCHLORIDE 0.4 MILLIGRAM(S): 2 INJECTION INTRAMUSCULAR; INTRAVENOUS; SUBCUTANEOUS at 04:47

## 2024-01-01 RX ADMIN — VALSARTAN 80 MILLIGRAM(S): 80 TABLET ORAL at 18:13

## 2024-01-01 RX ADMIN — VALSARTAN 40 MILLIGRAM(S): 80 TABLET ORAL at 05:27

## 2024-01-01 RX ADMIN — ALBUTEROL 2 PUFF(S): 90 AEROSOL, METERED ORAL at 21:12

## 2024-01-01 RX ADMIN — Medication 100 MICROGRAM(S): at 07:03

## 2024-01-01 RX ADMIN — Medication 1 SPRAY(S): at 07:13

## 2024-01-01 RX ADMIN — PIPERACILLIN AND TAZOBACTAM 25 GRAM(S): 4; .5 INJECTION, POWDER, LYOPHILIZED, FOR SOLUTION INTRAVENOUS at 21:47

## 2024-01-01 RX ADMIN — SODIUM CHLORIDE 1 GRAM(S): 9 INJECTION INTRAMUSCULAR; INTRAVENOUS; SUBCUTANEOUS at 18:00

## 2024-01-01 RX ADMIN — Medication 10 MILLIGRAM(S): at 05:55

## 2024-01-01 RX ADMIN — Medication 100 MICROGRAM(S): at 07:04

## 2024-01-01 RX ADMIN — ENOXAPARIN SODIUM 40 MILLIGRAM(S): 100 INJECTION SUBCUTANEOUS at 13:50

## 2024-01-01 RX ADMIN — VALSARTAN 40 MILLIGRAM(S): 80 TABLET ORAL at 07:10

## 2024-01-01 RX ADMIN — Medication 10 MILLIGRAM(S): at 18:20

## 2024-01-01 RX ADMIN — CHLORHEXIDINE GLUCONATE 1 APPLICATION(S): 213 SOLUTION TOPICAL at 11:44

## 2024-01-01 RX ADMIN — ONDANSETRON 4 MILLIGRAM(S): 8 TABLET, FILM COATED ORAL at 22:17

## 2024-01-01 RX ADMIN — Medication 100 MICROGRAM(S): at 06:57

## 2024-01-01 RX ADMIN — VALSARTAN 80 MILLIGRAM(S): 80 TABLET ORAL at 06:16

## 2024-01-01 RX ADMIN — Medication 1 SPRAY(S): at 06:37

## 2024-01-01 RX ADMIN — Medication 3 MILLILITER(S): at 09:04

## 2024-01-01 RX ADMIN — HYDROMORPHONE HYDROCHLORIDE 0.4 MILLIGRAM(S): 2 INJECTION INTRAMUSCULAR; INTRAVENOUS; SUBCUTANEOUS at 21:54

## 2024-01-01 RX ADMIN — Medication 10 MILLIGRAM(S): at 14:30

## 2024-01-01 RX ADMIN — Medication 15 MILLIGRAM(S): at 10:10

## 2024-01-01 RX ADMIN — Medication 1 SPRAY(S): at 06:25

## 2024-01-01 RX ADMIN — Medication 300 MILLIGRAM(S): at 23:42

## 2024-01-01 RX ADMIN — LIDOCAINE 1 PATCH: 4 CREAM TOPICAL at 07:23

## 2024-01-01 RX ADMIN — HYDROMORPHONE HYDROCHLORIDE 0.5 MILLIGRAM(S): 2 INJECTION INTRAMUSCULAR; INTRAVENOUS; SUBCUTANEOUS at 06:51

## 2024-01-01 RX ADMIN — PIPERACILLIN AND TAZOBACTAM 25 GRAM(S): 4; .5 INJECTION, POWDER, LYOPHILIZED, FOR SOLUTION INTRAVENOUS at 06:43

## 2024-01-01 RX ADMIN — Medication 10 MILLIGRAM(S): at 07:08

## 2024-01-01 RX ADMIN — VALSARTAN 160 MILLIGRAM(S): 80 TABLET ORAL at 06:35

## 2024-01-01 RX ADMIN — ONDANSETRON 4 MILLIGRAM(S): 8 TABLET, FILM COATED ORAL at 09:28

## 2024-01-01 RX ADMIN — HYDROMORPHONE HYDROCHLORIDE 0.4 MILLIGRAM(S): 2 INJECTION INTRAMUSCULAR; INTRAVENOUS; SUBCUTANEOUS at 08:17

## 2024-01-01 RX ADMIN — Medication 400 MILLIGRAM(S): at 00:57

## 2024-01-01 RX ADMIN — HYDROMORPHONE HYDROCHLORIDE 0.4 MILLIGRAM(S): 2 INJECTION INTRAMUSCULAR; INTRAVENOUS; SUBCUTANEOUS at 22:55

## 2024-01-01 RX ADMIN — ONDANSETRON 4 MILLIGRAM(S): 8 TABLET, FILM COATED ORAL at 16:11

## 2024-01-01 RX ADMIN — Medication 10 MILLIGRAM(S): at 22:51

## 2024-01-01 RX ADMIN — MIRTAZAPINE 15 MILLIGRAM(S): 45 TABLET, ORALLY DISINTEGRATING ORAL at 21:47

## 2024-01-01 RX ADMIN — HYDROMORPHONE HYDROCHLORIDE 2 MILLIGRAM(S): 2 INJECTION INTRAMUSCULAR; INTRAVENOUS; SUBCUTANEOUS at 22:25

## 2024-01-01 RX ADMIN — Medication 25 GRAM(S): at 12:07

## 2024-01-01 RX ADMIN — Medication 1000 MILLIGRAM(S): at 00:20

## 2024-01-01 RX ADMIN — Medication 1 SPRAY(S): at 18:19

## 2024-01-01 RX ADMIN — Medication 300 MILLIGRAM(S): at 21:57

## 2024-01-01 RX ADMIN — Medication 100 MICROGRAM(S): at 05:20

## 2024-01-01 RX ADMIN — HYDROMORPHONE HYDROCHLORIDE 0.5 MILLIGRAM(S): 2 INJECTION INTRAMUSCULAR; INTRAVENOUS; SUBCUTANEOUS at 20:15

## 2024-01-01 RX ADMIN — Medication 1 SPRAY(S): at 05:45

## 2024-01-01 RX ADMIN — HYDROMORPHONE HYDROCHLORIDE 0.4 MILLIGRAM(S): 2 INJECTION INTRAMUSCULAR; INTRAVENOUS; SUBCUTANEOUS at 20:09

## 2024-01-01 RX ADMIN — SODIUM CHLORIDE 1 GRAM(S): 9 INJECTION INTRAMUSCULAR; INTRAVENOUS; SUBCUTANEOUS at 07:19

## 2024-01-01 RX ADMIN — Medication 40 MILLIGRAM(S): at 02:14

## 2024-01-01 RX ADMIN — Medication 30 MILLILITER(S): at 11:47

## 2024-01-01 RX ADMIN — PANTOPRAZOLE SODIUM 40 MILLIGRAM(S): 20 TABLET, DELAYED RELEASE ORAL at 06:43

## 2024-01-01 RX ADMIN — ENOXAPARIN SODIUM 40 MILLIGRAM(S): 100 INJECTION SUBCUTANEOUS at 13:12

## 2024-01-01 RX ADMIN — MORPHINE SULFATE 15 MILLIGRAM(S): 50 CAPSULE, EXTENDED RELEASE ORAL at 08:42

## 2024-01-01 RX ADMIN — Medication 100 MICROGRAM(S): at 07:17

## 2024-01-01 RX ADMIN — HYDROMORPHONE HYDROCHLORIDE 0.5 MILLIGRAM(S): 2 INJECTION INTRAMUSCULAR; INTRAVENOUS; SUBCUTANEOUS at 10:00

## 2024-01-01 RX ADMIN — HYDROMORPHONE HYDROCHLORIDE 0.4 MILLIGRAM(S): 2 INJECTION INTRAMUSCULAR; INTRAVENOUS; SUBCUTANEOUS at 13:34

## 2024-01-01 RX ADMIN — HYDROMORPHONE HYDROCHLORIDE 0.4 MILLIGRAM(S): 2 INJECTION INTRAMUSCULAR; INTRAVENOUS; SUBCUTANEOUS at 12:00

## 2024-01-01 RX ADMIN — Medication 1 SPRAY(S): at 17:40

## 2024-01-01 RX ADMIN — Medication 0.5 MILLIGRAM(S): at 02:05

## 2024-01-01 RX ADMIN — Medication 40 MILLIGRAM(S): at 19:15

## 2024-01-01 RX ADMIN — Medication 1000 MILLIGRAM(S): at 19:03

## 2024-01-01 RX ADMIN — Medication 5 MILLIGRAM(S): at 14:57

## 2024-01-01 RX ADMIN — HYDROMORPHONE HYDROCHLORIDE 0.4 MILLIGRAM(S): 2 INJECTION INTRAMUSCULAR; INTRAVENOUS; SUBCUTANEOUS at 18:21

## 2024-01-01 RX ADMIN — Medication 1 SPRAY(S): at 05:21

## 2024-01-01 RX ADMIN — VALSARTAN 40 MILLIGRAM(S): 80 TABLET ORAL at 07:20

## 2024-01-01 RX ADMIN — Medication 1 SPRAY(S): at 18:31

## 2024-01-01 RX ADMIN — Medication 300 UNIT(S): at 17:08

## 2024-01-01 RX ADMIN — Medication 30 MILLIGRAM(S): at 17:46

## 2024-01-01 RX ADMIN — HYDROMORPHONE HYDROCHLORIDE 0.4 MILLIGRAM(S): 2 INJECTION INTRAMUSCULAR; INTRAVENOUS; SUBCUTANEOUS at 16:07

## 2024-01-01 RX ADMIN — HYDROMORPHONE HYDROCHLORIDE 2 MILLIGRAM(S): 2 INJECTION INTRAMUSCULAR; INTRAVENOUS; SUBCUTANEOUS at 23:22

## 2024-01-01 RX ADMIN — HYDROMORPHONE HYDROCHLORIDE 0.4 MILLIGRAM(S): 2 INJECTION INTRAMUSCULAR; INTRAVENOUS; SUBCUTANEOUS at 21:55

## 2024-01-01 RX ADMIN — Medication 100 MILLIGRAM(S): at 03:50

## 2024-01-01 RX ADMIN — HYDROMORPHONE HYDROCHLORIDE 0.4 MILLIGRAM(S): 2 INJECTION INTRAMUSCULAR; INTRAVENOUS; SUBCUTANEOUS at 12:05

## 2024-01-01 RX ADMIN — Medication 10 MILLIGRAM(S): at 21:47

## 2024-01-01 RX ADMIN — PIPERACILLIN AND TAZOBACTAM 25 GRAM(S): 4; .5 INJECTION, POWDER, LYOPHILIZED, FOR SOLUTION INTRAVENOUS at 05:54

## 2024-01-01 RX ADMIN — Medication 15 MILLIGRAM(S): at 18:20

## 2024-01-01 RX ADMIN — SODIUM CHLORIDE 1 GRAM(S): 9 INJECTION INTRAMUSCULAR; INTRAVENOUS; SUBCUTANEOUS at 06:24

## 2024-01-01 RX ADMIN — HYDROMORPHONE HYDROCHLORIDE 0.4 MILLIGRAM(S): 2 INJECTION INTRAMUSCULAR; INTRAVENOUS; SUBCUTANEOUS at 04:15

## 2024-01-01 RX ADMIN — ENOXAPARIN SODIUM 40 MILLIGRAM(S): 100 INJECTION SUBCUTANEOUS at 17:08

## 2024-01-01 RX ADMIN — SODIUM CHLORIDE 1 GRAM(S): 9 INJECTION INTRAMUSCULAR; INTRAVENOUS; SUBCUTANEOUS at 18:10

## 2024-01-01 RX ADMIN — Medication 100 MICROGRAM(S): at 05:55

## 2024-01-01 RX ADMIN — PANTOPRAZOLE SODIUM 40 MILLIGRAM(S): 20 TABLET, DELAYED RELEASE ORAL at 05:55

## 2024-01-01 RX ADMIN — Medication 100 MICROGRAM(S): at 07:14

## 2024-01-01 RX ADMIN — ENOXAPARIN SODIUM 40 MILLIGRAM(S): 100 INJECTION SUBCUTANEOUS at 14:21

## 2024-01-01 RX ADMIN — Medication 15 MILLIGRAM(S): at 22:17

## 2024-01-01 RX ADMIN — HYDROMORPHONE HYDROCHLORIDE 0.4 MILLIGRAM(S): 2 INJECTION INTRAMUSCULAR; INTRAVENOUS; SUBCUTANEOUS at 22:02

## 2024-01-01 RX ADMIN — Medication 1 SPRAY(S): at 17:16

## 2024-01-01 RX ADMIN — Medication 1 SPRAY(S): at 06:44

## 2024-01-01 RX ADMIN — SENNA PLUS 2 TABLET(S): 8.6 TABLET ORAL at 17:49

## 2024-01-01 RX ADMIN — LORATADINE 10 MILLIGRAM(S): 10 TABLET ORAL at 21:24

## 2024-01-01 RX ADMIN — Medication 400 MILLIGRAM(S): at 06:47

## 2024-01-01 RX ADMIN — HYDROMORPHONE HYDROCHLORIDE 0.2 MILLIGRAM(S): 2 INJECTION INTRAMUSCULAR; INTRAVENOUS; SUBCUTANEOUS at 12:50

## 2024-01-01 RX ADMIN — Medication 10 MILLIGRAM(S): at 21:44

## 2024-01-01 RX ADMIN — LIDOCAINE 1 PATCH: 4 CREAM TOPICAL at 23:42

## 2024-01-01 RX ADMIN — HYDROMORPHONE HYDROCHLORIDE 0.5 MILLIGRAM(S): 2 INJECTION INTRAMUSCULAR; INTRAVENOUS; SUBCUTANEOUS at 19:50

## 2024-01-01 RX ADMIN — CHLORHEXIDINE GLUCONATE 1 APPLICATION(S): 213 SOLUTION TOPICAL at 12:28

## 2024-01-01 RX ADMIN — MORPHINE SULFATE 15 MILLIGRAM(S): 50 CAPSULE, EXTENDED RELEASE ORAL at 06:25

## 2024-01-01 RX ADMIN — HYDROMORPHONE HYDROCHLORIDE 0.4 MILLIGRAM(S): 2 INJECTION INTRAMUSCULAR; INTRAVENOUS; SUBCUTANEOUS at 15:21

## 2024-01-01 RX ADMIN — HYDROMORPHONE HYDROCHLORIDE 0.4 MILLIGRAM(S): 2 INJECTION INTRAMUSCULAR; INTRAVENOUS; SUBCUTANEOUS at 13:24

## 2024-01-01 RX ADMIN — HYDROMORPHONE HYDROCHLORIDE 0.4 MILLIGRAM(S): 2 INJECTION INTRAMUSCULAR; INTRAVENOUS; SUBCUTANEOUS at 04:07

## 2024-01-01 RX ADMIN — HYDROMORPHONE HYDROCHLORIDE 0.2 MILLIGRAM(S): 2 INJECTION INTRAMUSCULAR; INTRAVENOUS; SUBCUTANEOUS at 12:25

## 2024-01-01 RX ADMIN — ENOXAPARIN SODIUM 40 MILLIGRAM(S): 100 INJECTION SUBCUTANEOUS at 12:27

## 2024-01-01 RX ADMIN — HYDROMORPHONE HYDROCHLORIDE 0.4 MILLIGRAM(S): 2 INJECTION INTRAMUSCULAR; INTRAVENOUS; SUBCUTANEOUS at 07:34

## 2024-01-01 RX ADMIN — SODIUM CHLORIDE 1 GRAM(S): 9 INJECTION INTRAMUSCULAR; INTRAVENOUS; SUBCUTANEOUS at 17:16

## 2024-01-01 RX ADMIN — CHLORHEXIDINE GLUCONATE 1 APPLICATION(S): 213 SOLUTION TOPICAL at 14:59

## 2024-01-01 RX ADMIN — Medication 15 MILLIGRAM(S): at 10:20

## 2024-01-01 RX ADMIN — Medication 10 MILLIGRAM(S): at 21:14

## 2024-01-01 RX ADMIN — SODIUM CHLORIDE 75 MILLILITER(S): 9 INJECTION INTRAMUSCULAR; INTRAVENOUS; SUBCUTANEOUS at 09:08

## 2024-01-01 RX ADMIN — Medication 100 MICROGRAM(S): at 07:20

## 2024-01-01 RX ADMIN — SODIUM CHLORIDE 1 GRAM(S): 9 INJECTION INTRAMUSCULAR; INTRAVENOUS; SUBCUTANEOUS at 06:57

## 2024-01-01 RX ADMIN — SENNA PLUS 2 TABLET(S): 8.6 TABLET ORAL at 18:45

## 2024-01-01 RX ADMIN — ENOXAPARIN SODIUM 40 MILLIGRAM(S): 100 INJECTION SUBCUTANEOUS at 14:38

## 2024-01-01 RX ADMIN — SODIUM CHLORIDE 1 GRAM(S): 9 INJECTION INTRAMUSCULAR; INTRAVENOUS; SUBCUTANEOUS at 05:19

## 2024-01-01 RX ADMIN — VALSARTAN 40 MILLIGRAM(S): 80 TABLET ORAL at 06:05

## 2024-01-01 RX ADMIN — PIPERACILLIN AND TAZOBACTAM 25 GRAM(S): 4; .5 INJECTION, POWDER, LYOPHILIZED, FOR SOLUTION INTRAVENOUS at 23:58

## 2024-01-01 RX ADMIN — HYDROMORPHONE HYDROCHLORIDE 2 MILLIGRAM(S): 2 INJECTION INTRAMUSCULAR; INTRAVENOUS; SUBCUTANEOUS at 00:47

## 2024-01-01 RX ADMIN — POLYETHYLENE GLYCOL 3350 17 GRAM(S): 17 POWDER, FOR SOLUTION ORAL at 17:43

## 2024-01-01 RX ADMIN — Medication 1 SPRAY(S): at 17:11

## 2024-01-01 RX ADMIN — SODIUM CHLORIDE 1 GRAM(S): 9 INJECTION INTRAMUSCULAR; INTRAVENOUS; SUBCUTANEOUS at 17:39

## 2024-01-01 RX ADMIN — Medication 10 MILLIGRAM(S): at 21:45

## 2024-01-01 RX ADMIN — ONDANSETRON 4 MILLIGRAM(S): 8 TABLET, FILM COATED ORAL at 08:36

## 2024-01-01 RX ADMIN — Medication 1 SPRAY(S): at 07:02

## 2024-01-01 RX ADMIN — HYDROMORPHONE HYDROCHLORIDE 2 MILLIGRAM(S): 2 INJECTION INTRAMUSCULAR; INTRAVENOUS; SUBCUTANEOUS at 18:56

## 2024-01-01 RX ADMIN — Medication 100 MICROGRAM(S): at 07:18

## 2024-01-01 RX ADMIN — CHLORHEXIDINE GLUCONATE 1 APPLICATION(S): 213 SOLUTION TOPICAL at 14:46

## 2024-01-01 RX ADMIN — SODIUM CHLORIDE 100 MILLILITER(S): 9 INJECTION INTRAMUSCULAR; INTRAVENOUS; SUBCUTANEOUS at 13:50

## 2024-01-01 RX ADMIN — HYDROMORPHONE HYDROCHLORIDE 0.4 MILLIGRAM(S): 2 INJECTION INTRAMUSCULAR; INTRAVENOUS; SUBCUTANEOUS at 06:34

## 2024-01-01 RX ADMIN — Medication 10 MILLIGRAM(S): at 15:34

## 2024-01-01 RX ADMIN — Medication 1000 MILLIGRAM(S): at 10:30

## 2024-01-01 RX ADMIN — Medication 10 MILLIGRAM(S): at 21:58

## 2024-01-01 RX ADMIN — CHLORHEXIDINE GLUCONATE 1 APPLICATION(S): 213 SOLUTION TOPICAL at 11:59

## 2024-01-01 RX ADMIN — VALSARTAN 40 MILLIGRAM(S): 80 TABLET ORAL at 18:39

## 2024-01-01 RX ADMIN — Medication 100 MILLIEQUIVALENT(S): at 03:13

## 2024-01-01 RX ADMIN — HYDROMORPHONE HYDROCHLORIDE 0.5 MILLIGRAM(S): 2 INJECTION INTRAMUSCULAR; INTRAVENOUS; SUBCUTANEOUS at 16:42

## 2024-01-01 RX ADMIN — Medication 100 MILLIEQUIVALENT(S): at 02:02

## 2024-01-01 RX ADMIN — HYDROMORPHONE HYDROCHLORIDE 0.4 MILLIGRAM(S): 2 INJECTION INTRAMUSCULAR; INTRAVENOUS; SUBCUTANEOUS at 05:11

## 2024-01-01 RX ADMIN — HYDROMORPHONE HYDROCHLORIDE 0.4 MILLIGRAM(S): 2 INJECTION INTRAMUSCULAR; INTRAVENOUS; SUBCUTANEOUS at 04:31

## 2024-01-01 RX ADMIN — HYDROMORPHONE HYDROCHLORIDE 0.4 MILLIGRAM(S): 2 INJECTION INTRAMUSCULAR; INTRAVENOUS; SUBCUTANEOUS at 12:55

## 2024-01-01 RX ADMIN — SODIUM CHLORIDE 1000 MILLILITER(S): 9 INJECTION, SOLUTION INTRAVENOUS at 11:27

## 2024-01-01 RX ADMIN — SODIUM CHLORIDE 1 GRAM(S): 9 INJECTION INTRAMUSCULAR; INTRAVENOUS; SUBCUTANEOUS at 06:43

## 2024-01-01 RX ADMIN — VALSARTAN 40 MILLIGRAM(S): 80 TABLET ORAL at 07:17

## 2024-01-01 RX ADMIN — Medication 1 SPRAY(S): at 17:56

## 2024-01-01 RX ADMIN — HYDROMORPHONE HYDROCHLORIDE 2 MILLIGRAM(S): 2 INJECTION INTRAMUSCULAR; INTRAVENOUS; SUBCUTANEOUS at 22:48

## 2024-01-01 RX ADMIN — HYDROMORPHONE HYDROCHLORIDE 0.4 MILLIGRAM(S): 2 INJECTION INTRAMUSCULAR; INTRAVENOUS; SUBCUTANEOUS at 14:34

## 2024-01-01 RX ADMIN — Medication 10 MILLIGRAM(S): at 18:56

## 2024-01-01 RX ADMIN — HYDROMORPHONE HYDROCHLORIDE 0.5 MILLIGRAM(S): 2 INJECTION INTRAMUSCULAR; INTRAVENOUS; SUBCUTANEOUS at 07:04

## 2024-01-01 RX ADMIN — Medication 600 MILLIGRAM(S): at 18:37

## 2024-01-01 RX ADMIN — VALSARTAN 40 MILLIGRAM(S): 80 TABLET ORAL at 06:57

## 2024-01-01 RX ADMIN — Medication 1 SPRAY(S): at 18:37

## 2024-01-01 RX ADMIN — MORPHINE SULFATE 15 MILLIGRAM(S): 50 CAPSULE, EXTENDED RELEASE ORAL at 18:45

## 2024-01-01 RX ADMIN — Medication 30 MILLIGRAM(S): at 16:50

## 2024-01-01 RX ADMIN — Medication 100 MICROGRAM(S): at 06:44

## 2024-01-01 RX ADMIN — Medication 100 MILLIGRAM(S): at 20:42

## 2024-01-01 RX ADMIN — HYDROMORPHONE HYDROCHLORIDE 0.4 MILLIGRAM(S): 2 INJECTION INTRAMUSCULAR; INTRAVENOUS; SUBCUTANEOUS at 14:24

## 2024-01-01 RX ADMIN — Medication 100 MICROGRAM(S): at 06:05

## 2024-01-01 RX ADMIN — PIPERACILLIN AND TAZOBACTAM 25 GRAM(S): 4; .5 INJECTION, POWDER, LYOPHILIZED, FOR SOLUTION INTRAVENOUS at 07:18

## 2024-01-01 RX ADMIN — Medication 300 MILLIGRAM(S): at 00:39

## 2024-01-01 RX ADMIN — HYDROMORPHONE HYDROCHLORIDE 0.4 MILLIGRAM(S): 2 INJECTION INTRAMUSCULAR; INTRAVENOUS; SUBCUTANEOUS at 04:11

## 2024-01-01 RX ADMIN — HYDROMORPHONE HYDROCHLORIDE 2 MILLIGRAM(S): 2 INJECTION INTRAMUSCULAR; INTRAVENOUS; SUBCUTANEOUS at 01:42

## 2024-01-01 RX ADMIN — HYDROMORPHONE HYDROCHLORIDE 0.4 MILLIGRAM(S): 2 INJECTION INTRAMUSCULAR; INTRAVENOUS; SUBCUTANEOUS at 04:45

## 2024-01-01 RX ADMIN — HYDROMORPHONE HYDROCHLORIDE 0.5 MILLIGRAM(S): 2 INJECTION INTRAMUSCULAR; INTRAVENOUS; SUBCUTANEOUS at 09:11

## 2024-01-01 RX ADMIN — HYDROMORPHONE HYDROCHLORIDE 0.4 MILLIGRAM(S): 2 INJECTION INTRAMUSCULAR; INTRAVENOUS; SUBCUTANEOUS at 23:02

## 2024-01-01 RX ADMIN — Medication 15 MILLIGRAM(S): at 21:26

## 2024-01-01 RX ADMIN — Medication 100 MICROGRAM(S): at 06:24

## 2024-01-01 RX ADMIN — Medication 300 MILLIGRAM(S): at 00:14

## 2024-01-01 RX ADMIN — HYDROMORPHONE HYDROCHLORIDE 0.4 MILLIGRAM(S): 2 INJECTION INTRAMUSCULAR; INTRAVENOUS; SUBCUTANEOUS at 21:39

## 2024-01-01 RX ADMIN — HYDROMORPHONE HYDROCHLORIDE 0.2 MILLIGRAM(S): 2 INJECTION INTRAMUSCULAR; INTRAVENOUS; SUBCUTANEOUS at 13:44

## 2024-01-01 RX ADMIN — Medication 1 SPRAY(S): at 17:03

## 2024-01-01 RX ADMIN — HYDROMORPHONE HYDROCHLORIDE 0.4 MILLIGRAM(S): 2 INJECTION INTRAMUSCULAR; INTRAVENOUS; SUBCUTANEOUS at 00:21

## 2024-01-01 RX ADMIN — Medication 1 SPRAY(S): at 05:25

## 2024-01-01 RX ADMIN — Medication 1 SPRAY(S): at 06:57

## 2024-01-01 RX ADMIN — ENOXAPARIN SODIUM 40 MILLIGRAM(S): 100 INJECTION SUBCUTANEOUS at 15:04

## 2024-01-01 RX ADMIN — HYDROMORPHONE HYDROCHLORIDE 0.4 MILLIGRAM(S): 2 INJECTION INTRAMUSCULAR; INTRAVENOUS; SUBCUTANEOUS at 21:40

## 2024-01-01 RX ADMIN — Medication 650 MILLIGRAM(S): at 23:23

## 2024-01-01 RX ADMIN — ENOXAPARIN SODIUM 40 MILLIGRAM(S): 100 INJECTION SUBCUTANEOUS at 13:34

## 2024-01-01 RX ADMIN — Medication 30 MILLILITER(S): at 11:39

## 2024-01-01 RX ADMIN — CHLORHEXIDINE GLUCONATE 1 APPLICATION(S): 213 SOLUTION TOPICAL at 12:18

## 2024-01-01 RX ADMIN — HYDROMORPHONE HYDROCHLORIDE 0.4 MILLIGRAM(S): 2 INJECTION INTRAMUSCULAR; INTRAVENOUS; SUBCUTANEOUS at 22:36

## 2024-01-01 RX ADMIN — PIPERACILLIN AND TAZOBACTAM 25 GRAM(S): 4; .5 INJECTION, POWDER, LYOPHILIZED, FOR SOLUTION INTRAVENOUS at 22:50

## 2024-01-01 RX ADMIN — Medication 1000 MILLIGRAM(S): at 07:17

## 2024-01-01 RX ADMIN — ENOXAPARIN SODIUM 40 MILLIGRAM(S): 100 INJECTION SUBCUTANEOUS at 14:33

## 2024-01-01 RX ADMIN — Medication 30 MILLIGRAM(S): at 11:43

## 2024-01-01 RX ADMIN — Medication 10 MILLIGRAM(S): at 05:30

## 2024-01-01 RX ADMIN — Medication 3 MILLIGRAM(S): at 21:50

## 2024-01-01 RX ADMIN — Medication 100 MICROGRAM(S): at 05:44

## 2024-01-01 RX ADMIN — Medication 3 MILLILITER(S): at 20:06

## 2024-01-01 RX ADMIN — PANTOPRAZOLE SODIUM 40 MILLIGRAM(S): 20 TABLET, DELAYED RELEASE ORAL at 05:25

## 2024-01-01 RX ADMIN — HYDROMORPHONE HYDROCHLORIDE 2 MILLIGRAM(S): 2 INJECTION INTRAMUSCULAR; INTRAVENOUS; SUBCUTANEOUS at 23:25

## 2024-01-01 RX ADMIN — Medication 100 MILLIEQUIVALENT(S): at 00:45

## 2024-01-01 RX ADMIN — VALSARTAN 40 MILLIGRAM(S): 80 TABLET ORAL at 05:44

## 2024-01-01 RX ADMIN — Medication 300 UNIT(S): at 17:03

## 2024-01-01 RX ADMIN — FAMOTIDINE 20 MILLIGRAM(S): 10 INJECTION INTRAVENOUS at 02:15

## 2024-01-01 RX ADMIN — SODIUM CHLORIDE 1 GRAM(S): 9 INJECTION INTRAMUSCULAR; INTRAVENOUS; SUBCUTANEOUS at 07:18

## 2024-01-01 RX ADMIN — MIRTAZAPINE 15 MILLIGRAM(S): 45 TABLET, ORALLY DISINTEGRATING ORAL at 21:44

## 2024-01-01 RX ADMIN — Medication 3 MILLIGRAM(S): at 21:57

## 2024-01-01 RX ADMIN — Medication 10 MILLIGRAM(S): at 14:26

## 2024-01-01 RX ADMIN — Medication 100 MICROGRAM(S): at 05:54

## 2024-01-01 RX ADMIN — Medication 30 MILLILITER(S): at 18:05

## 2024-01-01 RX ADMIN — PANTOPRAZOLE SODIUM 40 MILLIGRAM(S): 20 TABLET, DELAYED RELEASE ORAL at 05:44

## 2024-01-01 RX ADMIN — Medication 15 MILLIGRAM(S): at 16:10

## 2024-01-01 RX ADMIN — Medication 100 MICROGRAM(S): at 06:38

## 2024-01-01 RX ADMIN — Medication 1 SPRAY(S): at 18:13

## 2024-01-01 RX ADMIN — Medication 10 MILLIGRAM(S): at 07:18

## 2024-01-01 RX ADMIN — Medication 10 MILLIGRAM(S): at 13:50

## 2024-01-01 RX ADMIN — SODIUM CHLORIDE 1 GRAM(S): 9 INJECTION INTRAMUSCULAR; INTRAVENOUS; SUBCUTANEOUS at 17:45

## 2024-01-01 RX ADMIN — ALBUTEROL 2 PUFF(S): 90 AEROSOL, METERED ORAL at 23:09

## 2024-01-01 RX ADMIN — Medication 750 MILLIGRAM(S): at 01:39

## 2024-01-01 RX ADMIN — PANTOPRAZOLE SODIUM 40 MILLIGRAM(S): 20 TABLET, DELAYED RELEASE ORAL at 06:24

## 2024-01-01 RX ADMIN — PIPERACILLIN AND TAZOBACTAM 25 GRAM(S): 4; .5 INJECTION, POWDER, LYOPHILIZED, FOR SOLUTION INTRAVENOUS at 15:27

## 2024-01-01 RX ADMIN — Medication 750 MILLIGRAM(S): at 00:44

## 2024-01-01 RX ADMIN — Medication 100 MICROGRAM(S): at 14:21

## 2024-01-01 RX ADMIN — VALSARTAN 40 MILLIGRAM(S): 80 TABLET ORAL at 07:01

## 2024-01-01 RX ADMIN — HYDROMORPHONE HYDROCHLORIDE 0.4 MILLIGRAM(S): 2 INJECTION INTRAMUSCULAR; INTRAVENOUS; SUBCUTANEOUS at 01:44

## 2024-01-01 RX ADMIN — Medication 10 MILLIGRAM(S): at 06:24

## 2024-01-01 RX ADMIN — HYDROMORPHONE HYDROCHLORIDE 0.4 MILLIGRAM(S): 2 INJECTION INTRAMUSCULAR; INTRAVENOUS; SUBCUTANEOUS at 05:02

## 2024-01-01 RX ADMIN — Medication 15 MILLIGRAM(S): at 04:24

## 2024-01-01 RX ADMIN — Medication 3 MILLILITER(S): at 10:13

## 2024-01-01 RX ADMIN — HYDROMORPHONE HYDROCHLORIDE 0.4 MILLIGRAM(S): 2 INJECTION INTRAMUSCULAR; INTRAVENOUS; SUBCUTANEOUS at 16:01

## 2024-01-01 RX ADMIN — Medication 30 MILLIGRAM(S): at 19:31

## 2024-01-01 RX ADMIN — Medication 15 MILLIGRAM(S): at 22:32

## 2024-01-01 RX ADMIN — Medication 1 SPRAY(S): at 17:35

## 2024-01-01 RX ADMIN — HYDROMORPHONE HYDROCHLORIDE 0.4 MILLIGRAM(S): 2 INJECTION INTRAMUSCULAR; INTRAVENOUS; SUBCUTANEOUS at 07:17

## 2024-01-01 RX ADMIN — ENOXAPARIN SODIUM 40 MILLIGRAM(S): 100 INJECTION SUBCUTANEOUS at 17:28

## 2024-01-01 RX ADMIN — Medication 400 MILLIGRAM(S): at 09:58

## 2024-01-01 RX ADMIN — POLYETHYLENE GLYCOL 3350 17 GRAM(S): 17 POWDER, FOR SOLUTION ORAL at 18:53

## 2024-01-01 RX ADMIN — MORPHINE SULFATE 15 MILLIGRAM(S): 50 CAPSULE, EXTENDED RELEASE ORAL at 06:42

## 2024-01-01 RX ADMIN — Medication 1 SPRAY(S): at 17:46

## 2024-01-01 RX ADMIN — HYDROMORPHONE HYDROCHLORIDE 0.4 MILLIGRAM(S): 2 INJECTION INTRAMUSCULAR; INTRAVENOUS; SUBCUTANEOUS at 16:37

## 2024-01-01 RX ADMIN — HYDROMORPHONE HYDROCHLORIDE 0.4 MILLIGRAM(S): 2 INJECTION INTRAMUSCULAR; INTRAVENOUS; SUBCUTANEOUS at 01:21

## 2024-01-01 RX ADMIN — Medication 100 MICROGRAM(S): at 06:35

## 2024-01-01 RX ADMIN — Medication 30 MILLIGRAM(S): at 01:28

## 2024-01-01 RX ADMIN — HYDROMORPHONE HYDROCHLORIDE 2 MILLIGRAM(S): 2 INJECTION INTRAMUSCULAR; INTRAVENOUS; SUBCUTANEOUS at 21:48

## 2024-01-01 RX ADMIN — Medication 10 MILLIGRAM(S): at 11:39

## 2024-01-01 RX ADMIN — ENOXAPARIN SODIUM 40 MILLIGRAM(S): 100 INJECTION SUBCUTANEOUS at 18:04

## 2024-01-01 RX ADMIN — Medication 1 SPRAY(S): at 18:54

## 2024-01-01 RX ADMIN — HYDROMORPHONE HYDROCHLORIDE 0.4 MILLIGRAM(S): 2 INJECTION INTRAMUSCULAR; INTRAVENOUS; SUBCUTANEOUS at 01:04

## 2024-01-01 RX ADMIN — HYDROMORPHONE HYDROCHLORIDE 0.2 MILLIGRAM(S): 2 INJECTION INTRAMUSCULAR; INTRAVENOUS; SUBCUTANEOUS at 12:10

## 2024-01-01 RX ADMIN — HYDROMORPHONE HYDROCHLORIDE 0.4 MILLIGRAM(S): 2 INJECTION INTRAMUSCULAR; INTRAVENOUS; SUBCUTANEOUS at 05:07

## 2024-01-01 RX ADMIN — Medication 10 MILLIGRAM(S): at 21:48

## 2024-01-01 RX ADMIN — ONDANSETRON 8 MILLIGRAM(S): 8 TABLET, FILM COATED ORAL at 22:37

## 2024-01-01 RX ADMIN — HYDROMORPHONE HYDROCHLORIDE 0.4 MILLIGRAM(S): 2 INJECTION INTRAMUSCULAR; INTRAVENOUS; SUBCUTANEOUS at 03:54

## 2024-01-01 RX ADMIN — Medication 10 MILLIGRAM(S): at 23:03

## 2024-01-01 RX ADMIN — ONDANSETRON 4 MILLIGRAM(S): 8 TABLET, FILM COATED ORAL at 21:57

## 2024-01-01 RX ADMIN — Medication 15 MILLIGRAM(S): at 21:11

## 2024-01-01 RX ADMIN — CHLORHEXIDINE GLUCONATE 1 APPLICATION(S): 213 SOLUTION TOPICAL at 13:14

## 2024-01-01 RX ADMIN — VALSARTAN 160 MILLIGRAM(S): 80 TABLET ORAL at 07:04

## 2024-01-01 RX ADMIN — SODIUM CHLORIDE 1 GRAM(S): 9 INJECTION INTRAMUSCULAR; INTRAVENOUS; SUBCUTANEOUS at 05:25

## 2024-01-01 RX ADMIN — Medication 100 MICROGRAM(S): at 06:18

## 2024-01-01 RX ADMIN — HYDROMORPHONE HYDROCHLORIDE 0.4 MILLIGRAM(S): 2 INJECTION INTRAMUSCULAR; INTRAVENOUS; SUBCUTANEOUS at 12:21

## 2024-01-01 RX ADMIN — HYDROMORPHONE HYDROCHLORIDE 0.4 MILLIGRAM(S): 2 INJECTION INTRAMUSCULAR; INTRAVENOUS; SUBCUTANEOUS at 19:09

## 2024-01-01 RX ADMIN — PANTOPRAZOLE SODIUM 40 MILLIGRAM(S): 20 TABLET, DELAYED RELEASE ORAL at 07:01

## 2024-01-01 RX ADMIN — SODIUM CHLORIDE 75 MILLILITER(S): 9 INJECTION INTRAMUSCULAR; INTRAVENOUS; SUBCUTANEOUS at 02:12

## 2024-01-01 RX ADMIN — Medication 100 MILLIGRAM(S): at 06:42

## 2024-01-01 RX ADMIN — MIRTAZAPINE 15 MILLIGRAM(S): 45 TABLET, ORALLY DISINTEGRATING ORAL at 23:03

## 2024-01-01 RX ADMIN — SODIUM CHLORIDE 1 GRAM(S): 9 INJECTION INTRAMUSCULAR; INTRAVENOUS; SUBCUTANEOUS at 17:11

## 2024-01-01 RX ADMIN — HYDROMORPHONE HYDROCHLORIDE 0.4 MILLIGRAM(S): 2 INJECTION INTRAMUSCULAR; INTRAVENOUS; SUBCUTANEOUS at 17:40

## 2024-01-01 RX ADMIN — MORPHINE SULFATE 15 MILLIGRAM(S): 50 CAPSULE, EXTENDED RELEASE ORAL at 07:18

## 2024-01-01 RX ADMIN — Medication 1 SPRAY(S): at 07:19

## 2024-01-01 RX ADMIN — CHLORHEXIDINE GLUCONATE 1 APPLICATION(S): 213 SOLUTION TOPICAL at 12:05

## 2024-01-01 RX ADMIN — SODIUM CHLORIDE 4 MILLILITER(S): 9 INJECTION INTRAMUSCULAR; INTRAVENOUS; SUBCUTANEOUS at 09:05

## 2024-01-01 RX ADMIN — SODIUM CHLORIDE 4 MILLILITER(S): 9 INJECTION INTRAMUSCULAR; INTRAVENOUS; SUBCUTANEOUS at 20:06

## 2024-01-01 RX ADMIN — Medication 650 MILLIGRAM(S): at 01:05

## 2024-01-01 RX ADMIN — PANTOPRAZOLE SODIUM 40 MILLIGRAM(S): 20 TABLET, DELAYED RELEASE ORAL at 07:18

## 2024-01-01 RX ADMIN — SODIUM CHLORIDE 75 MILLILITER(S): 9 INJECTION INTRAMUSCULAR; INTRAVENOUS; SUBCUTANEOUS at 21:11

## 2024-01-01 RX ADMIN — Medication 15 MILLIGRAM(S): at 15:50

## 2024-01-01 RX ADMIN — Medication 15 MILLIGRAM(S): at 09:00

## 2024-01-01 RX ADMIN — MORPHINE SULFATE 15 MILLIGRAM(S): 50 CAPSULE, EXTENDED RELEASE ORAL at 08:28

## 2024-01-01 RX ADMIN — Medication 100 MICROGRAM(S): at 05:22

## 2024-01-01 RX ADMIN — HYDROMORPHONE HYDROCHLORIDE 0.4 MILLIGRAM(S): 2 INJECTION INTRAMUSCULAR; INTRAVENOUS; SUBCUTANEOUS at 18:53

## 2024-01-01 RX ADMIN — Medication 400 MILLIGRAM(S): at 18:37

## 2024-01-01 RX ADMIN — AMLODIPINE BESYLATE 5 MILLIGRAM(S): 2.5 TABLET ORAL at 07:02

## 2024-01-01 RX ADMIN — ONDANSETRON 4 MILLIGRAM(S): 8 TABLET, FILM COATED ORAL at 03:48

## 2024-01-01 RX ADMIN — Medication 20 MILLIEQUIVALENT(S): at 09:58

## 2024-01-01 RX ADMIN — HYDROMORPHONE HYDROCHLORIDE 2 MILLIGRAM(S): 2 INJECTION INTRAMUSCULAR; INTRAVENOUS; SUBCUTANEOUS at 19:47

## 2024-01-01 RX ADMIN — Medication 100 MILLIGRAM(S): at 22:34

## 2024-01-01 RX ADMIN — CYCLOBENZAPRINE HYDROCHLORIDE 5 MILLIGRAM(S): 10 TABLET, FILM COATED ORAL at 12:38

## 2024-01-01 RX ADMIN — HYDROMORPHONE HYDROCHLORIDE 0.5 MILLIGRAM(S): 2 INJECTION INTRAMUSCULAR; INTRAVENOUS; SUBCUTANEOUS at 01:52

## 2024-01-01 RX ADMIN — Medication 30 MILLIGRAM(S): at 16:35

## 2024-01-01 RX ADMIN — ONDANSETRON 4 MILLIGRAM(S): 8 TABLET, FILM COATED ORAL at 07:40

## 2024-01-01 RX ADMIN — CHLORHEXIDINE GLUCONATE 1 APPLICATION(S): 213 SOLUTION TOPICAL at 13:51

## 2024-01-01 RX ADMIN — LACTULOSE 20 GRAM(S): 10 SOLUTION ORAL at 21:58

## 2024-01-01 RX ADMIN — Medication 15 MILLIGRAM(S): at 04:34

## 2024-01-01 RX ADMIN — Medication 10 MILLIGRAM(S): at 09:27

## 2024-01-01 RX ADMIN — VALSARTAN 40 MILLIGRAM(S): 80 TABLET ORAL at 06:56

## 2024-01-01 RX ADMIN — VALSARTAN 40 MILLIGRAM(S): 80 TABLET ORAL at 05:19

## 2024-01-01 RX ADMIN — ENOXAPARIN SODIUM 40 MILLIGRAM(S): 100 INJECTION SUBCUTANEOUS at 18:32

## 2024-01-01 RX ADMIN — Medication 650 MILLIGRAM(S): at 23:53

## 2024-01-01 RX ADMIN — Medication 100 MILLIGRAM(S): at 08:25

## 2024-01-01 RX ADMIN — Medication 10 MILLIGRAM(S): at 17:38

## 2024-01-01 RX ADMIN — VALSARTAN 160 MILLIGRAM(S): 80 TABLET ORAL at 07:02

## 2024-01-01 RX ADMIN — Medication 600 MILLIGRAM(S): at 17:17

## 2024-01-01 RX ADMIN — SODIUM CHLORIDE 1 GRAM(S): 9 INJECTION INTRAMUSCULAR; INTRAVENOUS; SUBCUTANEOUS at 18:53

## 2024-01-01 RX ADMIN — MORPHINE SULFATE 15 MILLIGRAM(S): 50 CAPSULE, EXTENDED RELEASE ORAL at 17:35

## 2024-01-01 RX ADMIN — HYDROMORPHONE HYDROCHLORIDE 0.5 MILLIGRAM(S): 2 INJECTION INTRAMUSCULAR; INTRAVENOUS; SUBCUTANEOUS at 01:24

## 2024-01-01 RX ADMIN — Medication 30 MILLIGRAM(S): at 11:58

## 2024-01-01 RX ADMIN — ONDANSETRON 8 MILLIGRAM(S): 8 TABLET, FILM COATED ORAL at 14:38

## 2024-01-01 RX ADMIN — CHLORHEXIDINE GLUCONATE 1 APPLICATION(S): 213 SOLUTION TOPICAL at 11:36

## 2024-01-01 RX ADMIN — Medication 100 MICROGRAM(S): at 07:01

## 2024-01-01 RX ADMIN — VALSARTAN 40 MILLIGRAM(S): 80 TABLET ORAL at 05:23

## 2024-01-01 RX ADMIN — PIPERACILLIN AND TAZOBACTAM 200 GRAM(S): 4; .5 INJECTION, POWDER, LYOPHILIZED, FOR SOLUTION INTRAVENOUS at 12:30

## 2024-01-01 RX ADMIN — CHLORHEXIDINE GLUCONATE 1 APPLICATION(S): 213 SOLUTION TOPICAL at 14:32

## 2024-01-01 RX ADMIN — Medication 750 MILLIGRAM(S): at 22:13

## 2024-01-01 RX ADMIN — Medication 1 SPRAY(S): at 05:55

## 2024-01-01 RX ADMIN — Medication 40 MILLIEQUIVALENT(S): at 00:45

## 2024-01-01 RX ADMIN — Medication 10 MILLIGRAM(S): at 13:24

## 2024-01-01 RX ADMIN — HYDROMORPHONE HYDROCHLORIDE 0.4 MILLIGRAM(S): 2 INJECTION INTRAMUSCULAR; INTRAVENOUS; SUBCUTANEOUS at 07:12

## 2024-01-01 RX ADMIN — HYDROMORPHONE HYDROCHLORIDE 2 MILLIGRAM(S): 2 INJECTION INTRAMUSCULAR; INTRAVENOUS; SUBCUTANEOUS at 20:18

## 2024-01-01 RX ADMIN — Medication 1 SPRAY(S): at 18:46

## 2024-01-01 RX ADMIN — POLYETHYLENE GLYCOL 3350 17 GRAM(S): 17 POWDER, FOR SOLUTION ORAL at 18:46

## 2024-01-01 RX ADMIN — HYDROMORPHONE HYDROCHLORIDE 0.5 MILLIGRAM(S): 2 INJECTION INTRAMUSCULAR; INTRAVENOUS; SUBCUTANEOUS at 02:12

## 2024-01-01 RX ADMIN — ENOXAPARIN SODIUM 40 MILLIGRAM(S): 100 INJECTION SUBCUTANEOUS at 13:24

## 2024-01-01 RX ADMIN — ENOXAPARIN SODIUM 40 MILLIGRAM(S): 100 INJECTION SUBCUTANEOUS at 14:59

## 2024-01-01 RX ADMIN — SODIUM CHLORIDE 1000 MILLILITER(S): 9 INJECTION INTRAMUSCULAR; INTRAVENOUS; SUBCUTANEOUS at 02:14

## 2024-01-01 RX ADMIN — Medication 1 SPRAY(S): at 17:44

## 2024-01-01 RX ADMIN — Medication 1 SPRAY(S): at 18:00

## 2024-01-05 NOTE — H&P PST ADULT - HISTORY OF PRESENT ILLNESS
67y/o female presents for preop eval for scheduled galaxy robotic navigation endobronchial ultrasound bronchoscopy brachioradial endobronchial ultrasound bronchoscopy/linear.  Pt states h/o uterine cancer, h/o hysterectomy  & radiation 2015 and was being monitored for lung nodule.  67y/o female presents for preop eval for scheduled galaxy robotic navigation endobronchial ultrasound bronchoscopy brachioradial endobronchial ultrasound bronchoscopy/linear.  Pt states h/o uterine cancer, h/o hysterectomy  & radiation 2015 and h/o recurrent uterine cancer.  h/o lung nodule on imaging study. 69y/o female presents for preop eval for scheduled galaxy robotic navigation endobronchial ultrasound bronchoscopy brachioradial endobronchial ultrasound bronchoscopy/linear.  Pt states h/o uterine cancer, h/o hysterectomy  & radiation 2015 and h/o recurrent uterine cancer.  h/o lung nodule on imaging study.

## 2024-01-05 NOTE — H&P PST ADULT - PROBLEM SELECTOR PLAN 1
scheduled galaxy robotic navigation endobronchial ultrasound bronchoscopy brachioradial endobronchial ultrasound bronchoscopy/linear.   Written & verbal preop instructions, gi prophylaxis.  Pt verbalized good understanding.  Teach back done on surgical soap instructions. scheduled galaxy robotic navigation endobronchial ultrasound bronchoscopy brachioradial endobronchial ultrasound bronchoscopy/linear.   Written & verbal preop instructions, gi prophylaxis.  Pt verbalized good understanding.  Teach back done on surgical soap instructions.  case discussed with Dr Sosa

## 2024-01-05 NOTE — H&P PST ADULT - NSICDXPASTSURGICALHX_GEN_ALL_CORE_FT
PAST SURGICAL HISTORY:  Encounter for care related to vascular access port     H/O dilation and curettage     History of hand surgery repair from dog bite 2000    History of lumpectomy of right breast benign    S/P hysterectomy     S/P myomectomy

## 2024-01-05 NOTE — H&P PST ADULT - NSICDXPASTMEDICALHX_GEN_ALL_CORE_FT
PAST MEDICAL HISTORY:  Carotid stenosis, left     Cerebrovascular accident (CVA) due to occlusion of carotid artery No residual deficits    Drug-induced thyroiditis     Endometrial cancer     HTN (hypertension)     Hypothyroidism

## 2024-01-05 NOTE — H&P PST ADULT - NEGATIVE FEMALE-SPECIFIC SYMPTOMS
no abnormal vaginal bleeding/no pelvic pain refer to hpi/no abnormal vaginal bleeding/no pelvic pain

## 2024-01-07 NOTE — ASSESSMENT
[Palliative] : Goals of care discussed with patient: Palliative [Palliative Care Plan] : not applicable at this time [FreeTextEntry1] :  Patient seen with and plan discussed with Dr. Lee Aryles Hedjar, MD, PGY-6 Hematology/Oncology Fellow [facility]

## 2024-01-07 NOTE — HISTORY OF PRESENT ILLNESS
[Disease: _____________________] : Disease: [unfilled] [T: ___] : T[unfilled] [N: ___] : N[unfilled] [M: ___] : M[unfilled] [AJCC Stage: ____] : AJCC Stage: [unfilled] [de-identified] : Referred by Dr. Gaytan  Ms. Hoang is a 67 y/o G0 postmenopausal F who was referred to medical oncology for treatment considerations for recurrent endometrial cancer. She was initially diagnosed with stage II grade 2 endometrial cancer in 2018, she underwent underwent a robotic assisted TLH BSO, bilateral pelvic and para-aortic sentinel LN dissection by Dr. Paras Grayson on 2018.  Her surgical pathology demonstrated pT2N0 disease with endometrial tumor measuring 4cm, FIGO 2 endometrioid adenocarcinoma, >90 myometrial invasion with involvement of cervical stroma, +LVI with IHC of MMR proteins with intact expression but pelvic wash negative for malignant cells.  On 10/2018, she completed pelvic IMRT and vaginal cuff brachytherapy with Dr. Bowling.  She did well clinically under surveillance until 2019 when she developed vaginal spotting and fluid discharge, and was evaluated by Dr. Grayson. CA-125 was 27. Concern for possible recurrence and further workup pursued. Her 19 CT CAP demonstrate new bilateral pulmonary nodules, some with central cavitation, suspicious for metastatic disease, enhancing nodularity superior to the vaginal cuff concerning for metastatic disease, measuring 1.9x1.6cm.  On 10/30/19 she underwent thorascopic multiple RLL wedge resections with Dr. Weston and her surgical pathology c/w metastatic adenocarcinoma, consistent with patient's endometrial primary +ER ID [id number].  She was recommended further treatment for her recurrent endometrial cancer but the patient was lost to follow up until 2020 several months before which she has noticed increasing hardness and "lumps" in her vagina, which has been progressively more painful and burning sensation. She was subsequently evaluated by Dr. Gaytan 2020 and referred to medical oncology for systemic treatment evaluation. On presentation for initial consult 2020, she continued to have vaginal discharge and on and off spotting (not more than 1 pad a day). She felt constipated. She was taking Percocet 5-325, Tylenol and ibuprofen for vaginal pain. She had been very busy with work and family affairs, was not able to come for cancer treatment over the past months.   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     She was treated with carboplatin and paclitaxel from 20 - 21.  She developed disease progression involving the vaginal mass and in REMBERTO anterior to the mediastinum. She began treatment with pembrolizumb and Lenvima on 3/17/21. Given increasing vaginal pain and increased mass involving the vaginal cuff region, she underwent palliative RT to the vaginal mass and cuff area with Dr. Bowling, to a total dose of 2000 cGy in 5 fraction, from 21-21. Pembro/Lenvima was briefly held during RT.  She had improved pain and was restarted and continued on pembrolizumab and Lenvima. CT c/a/p done on 2021 which showed favorable response to therapy. Left inguinal node decreased in size. Vaginal mass decreased in size. Pulmonary nodules decreased in size. Her CT c/a/p from 2021 showed continued pulmonary nodules. While some are stable in size, there is mild increase in pulmonary nodule in the right upper lobe. 3 x 5 mm nodular focus of higher attenuation suggesting enhancement within a right renal cyst. This is slightly better seen on the current examination although may be exaggerated by volume averaging.  She continued on pembrolizumab and Lenvima with stability of disease until 2022, when treatment was held due to immunotherapy-associated diarrhea/colitis. CT scans subsequently showed disease progression. Pembrolizumab/Lenvima discontinued and treatment options reviewed with the patient. She then began Doxil on 10/10/22, completing C6 on 23. However, subsequent scans showed POD in the vaginal cuff and lung nodules.  She was switched to Abraxane/Zirabev, C1 on 23. Disease was stable until CT scans 23 showing POD in one of her left lung nodules, the vaginal cuff lesion, and a new hepatic lesion.   Foundation testing revealed  TMB 9,  MSI - indeterminate [de-identified] :  ER +  MD +  PAX 8  +     MSI STABLE [FreeTextEntry1] : Since metastatic recurrence: Carbo/Taxol 8/14/20 - 2/11/21 --> POD --> Lenvima/Pembro --> 5 fx RT to vaginal cuff 5/25-6/1/23 (pembro held during RT, Lenvima held June/July 2021), continued Lenvima/Pembro --> immunotherapy-induced colitis + POD --> Doxil started 10/2022 --> POD, switched to Abrazane/Zirabev since 4/2023 --> POD 12/2023 [de-identified] : Refuses COVID 19 vaccine  [de-identified] : Patient presents today for follow up. She is pending lung bx 1/8 (Monday) by Interventional pulm. She got COVID in November and has had a lack of voice and postnasal drip since then. It is slightly improving, but still very much persistent. She denies nasal congestion, but has a lot of mucus coming out of her mouth. She has tried nasal saline without improvement. She denies much cough, but feels her chest is "tight" when she goes out in the cold and exerts herself. She also has been having L shoulder and axillary pain since then as well. She does feel like solid foods get stuck in her throat, which is a problem she has had for years but worsening slightly recently. Her appetite has been poor. She has nausea, but [place] helps. Denies fever, chills, mouth sores, CP, palpitations, SOB at rest, n/v/d/c, abdominal pain, LE edema, vaginal discharge or bleeding, urinary symptoms, excessive bruising, dizziness, headaches, arthralgias, myalgias.

## 2024-01-07 NOTE — ED ADULT TRIAGE NOTE - CHIEF COMPLAINT QUOTE
Sent by Dr. Miller (pulmonologist) for repeat CT (lesion on L lung). Pt c/o aching pain in L upper back. History of endometrial cancer s/p chemo 7 weeks ago, HTN, CVA with no residual deficits.

## 2024-01-07 NOTE — CHART NOTE - NSCHARTNOTEFT_GEN_A_CORE
69y/o female with PMH HTN, hypothyroidism, pulmonary nodule, uterine cancer s/p hysterctomy and radiation (recurrent) presents prior to bronchoscopy with Dr. Miller tomorrow 1/8.      #Solitary pulmonary nodule.   - scheduled for Galaxy robotic navigational bronchoscopy tomorrow with Dr. Miller  - please obtain high res CT chest with contrast (0.625 mm cuts) STAT  - NPO midnight  - please hold DVT ppx  - please obtain full set of AM labs (CBC, CMP, coags, type and screen)        Please call pulmonary with any questions.      Deandra Flores MD  Pulmonary and Critical Care Fellow 69y/o female with PMH HTN, hypothyroidism, pulmonary nodule, uterine cancer s/p hysterctomy and radiation (recurrent) presents prior to bronchoscopy with Dr. Miller tomorrow 1/8.      #Solitary pulmonary nodule.   - scheduled for Galaxy robotic navigational bronchoscopy tomorrow with Dr. Miller  - please obtain high res CT chest with contrast (0.625 mm cuts) STAT  - NPO midnight  - please hold DVT ppx  - please obtain full set of AM labs (CBC, CMP, coags, type and screen)  - admit to medicine      Please call pulmonary with any questions.      Deandra Flores MD  Pulmonary and Critical Care Fellow 67y/o female with PMH HTN, hypothyroidism, pulmonary nodule, uterine cancer s/p hysterctomy and radiation (recurrent) presents prior to bronchoscopy with Dr. Miller tomorrow 1/8.      #Solitary pulmonary nodule.   - scheduled for Galaxy robotic navigational bronchoscopy tomorrow with Dr. Miller  - please obtain high res CT chest without contrast (0.625 mm cuts) STAT  - NPO midnight  - please hold DVT ppx  - please obtain full set of AM labs (CBC, CMP, coags, type and screen)  - admit to medicine      Please call pulmonary with any questions.      Deandra Flores MD  Pulmonary and Critical Care Fellow

## 2024-01-07 NOTE — REVIEW OF SYSTEMS
[Fatigue] : fatigue [Recent Change In Weight] : ~T recent weight change [Dysphagia] : dysphagia [Negative] : Allergic/Immunologic [Fever] : no fever [Chills] : no chills [Night Sweats] : no night sweats [Loss of Hearing] : no loss of hearing [Vision Problems] : no vision problems [Nosebleeds] : no nosebleeds [Mucosal Pain] : no mucosal pain [Hoarseness] : no hoarseness [Chest Pain] : no chest pain [Lower Ext Edema] : no lower extremity edema [Shortness Of Breath] : no shortness of breath [Wheezing] : no wheezing [Cough] : no cough [SOB on Exertion] : no shortness of breath during exertion [Vaginal Discharge] : no vaginal discharge [Joint Pain] : no joint pain [Difficulty Walking] : no difficulty walking [Muscle Pain] : no muscle pain [de-identified] : intermittent neuropathy to b/l legs, stable [FreeTextEntry4] : mucus from mouth - postnasal drip

## 2024-01-07 NOTE — ED PROVIDER NOTE - ATTENDING CONTRIBUTION TO CARE
Attending MD Samuel:  I performed a history and physical exam of the patient and discussed their management with the resident. I reviewed the resident's note and agree with the documented findings and plan of care. My medical decision making and observations are found above.

## 2024-01-07 NOTE — ED ADULT NURSE NOTE - NSFALLUNIVINTERV_ED_ALL_ED
Bed/Stretcher in lowest position, wheels locked, appropriate side rails in place/Call bell, personal items and telephone in reach/Instruct patient to call for assistance before getting out of bed/chair/stretcher/Non-slip footwear applied when patient is off stretcher/[place] to call system/Physically safe environment - no spills, clutter or unnecessary equipment/Purposeful proactive rounding/Room/bathroom lighting operational, light cord in reach Bed/Stretcher in lowest position, wheels locked, appropriate side rails in place/Call bell, personal items and telephone in reach/Instruct patient to call for assistance before getting out of bed/chair/stretcher/Non-slip footwear applied when patient is off stretcher/[place] to call system/Physically safe environment - no spills, clutter or unnecessary equipment/Purposeful proactive rounding/Room/bathroom lighting operational, light cord in reach

## 2024-01-07 NOTE — ED ADULT NURSE NOTE - OBJECTIVE STATEMENT
Pt is A&O X4, coherent, breathing spontaneously, symmetrical, unlabored. Pt was sent to ED by Dr. Miller (pulmonologist) for repeat CT (lesion on L lung). Pt c/o aching pain in L upper back. History of endometrial cancer s/p chemo 7 weeks ago. Labs drawn and sent. Port a Cath accessed with 20 g 0.75 mm patent and flushed, no blood return at this time. MD made aware. Plan of care in progress

## 2024-01-07 NOTE — ED ADULT NURSE NOTE - IS THE PATIENT ABLE TO BE SCREENED?
Spoke to Ms. Mega and scheduled an appt with christos, patient confirmed Date and time.  Patient also stated that when she takes the Cozaar she feels light headed and dizzy. She states that her blood pressure range from 120's-130's/ 70's-80's.  Instructed patient that I will inform Dr. Robles about the symptoms.  Patient states understanding and instructed to call the office for any questions or concerns.   Yes

## 2024-01-07 NOTE — ED PROVIDER NOTE - PHYSICAL EXAMINATION
Exam as stated below:   CONSTITUTIONAL: In NAD.   SKIN: Warm dry. No rashes noted. port noted CDI   HEAD: NCAT.   EYES: No scleral icterus. Conjunctiva pink.  ENT: Posterior pharynx with no erythema.  NECK: No ttp.    CARD: RRR. No murmurs.  RESP: Clear to ausculation b/l. No Crackles noted. No Wheezing noted.  ABD: Soft. Non-tender. Not distended.   MSK: No pedal edema. No calf tenderness.  NEURO: UE/LE grossly intact. Motor UE/LE sensation grossly intact. CN II-XII grossly intact.   PSYCH: Cooperative, appropriate.

## 2024-01-07 NOTE — PHYSICAL EXAM
[Fully active, able to carry on all pre-disease performance without restriction] : Status 0 - Fully active, able to carry on all pre-disease performance without restriction [Normal] : affect appropriate [Mucositis] : no mucositis [Thrush] : no thrush [de-identified] : normal respiratory effort, no audible breath sounds  [Vesicles] : no vesicles

## 2024-01-07 NOTE — ED PROVIDER NOTE - CLINICAL SUMMARY MEDICAL DECISION MAKING FREE TEXT BOX
MDM/Summary/DDx (includes but is not limited to):  this is a 68-year-old female with CVA endometrial cancer currently with vascular access port, scheduled for [facility] bronchoscopy tomorrow.  Orders were placed to evaluate with thin CT slices.  Does not seem infectious in nature.  Patient largely asymptomatic.  Patient with anxiety due to situation, will give small dose of alprazolam.   Discussed with the patient CT scans in the past with Benadryl allergy, she states that she has been premedicated with steroids but last time did not receive the Benadryl and there was no reaction.  Risks and benefits of giving the Benadryl versus not given the Benadryl were discussed,  the patient would like to forego the Benadryl.     Labs:  presurgical labs including not limited to  CBC CMP PT PTT type and screen flu COVID  VBG  Imaging: CTAP, XR   Tx: Supportive, pain/nausea medications as pt requires/requests.  Consults/Resources: none at this time   Dispo: admit to med     Triage note reviewed. VS reviewed. EKG reviewed and documented in "RESULTS" section, if possible at given time.     DDx in MDM includes the most likely ddx, but is not limited to solely what is listed. Clinical course may alter/deviate from the above plan. When possible, progress notes written, as needed, and are included in "PROGRESS NOTE" section below.       Medical, family, and social determinants of health reviewed and discussed w/ pt/family/caretaker, when allowable, and is incorporated into note above, whenever possible. MDM/Summary/DDx (includes but is not limited to):  this is a 68-year-old female with CVA endometrial cancer currently with vascular access port, scheduled for [facility] bronchoscopy tomorrow.  Orders were placed to evaluate with thin CT slices.  Does not seem infectious in nature.  Patient largely asymptomatic.  Patient with anxiety due to situation, will give small dose of alprazolam.   Discussed with the patient CT scans in the past with Benadryl allergy, she states that she has been premedicated with steroids but last time did not receive the Benadryl and there was no reaction.  Risks and benefits of giving the Benadryl versus not given the Benadryl were discussed,  the patient would like to forego the Benadryl.     Labs:  presurgical labs including not limited to  CBC CMP PT PTT type and screen flu COVID  VBG  Imaging: CTAP, XR   Tx: Supportive, pain/nausea medications as pt requires/requests.  Consults/Resources: none at this time   Dispo: admit to med     Triage note reviewed. VS reviewed. EKG reviewed and documented in "RESULTS" section, if possible at given time.     DDx in MDM includes the most likely ddx, but is not limited to solely what is listed. Clinical course may alter/deviate from the above plan. When possible, progress notes written, as needed, and are included in "PROGRESS NOTE" section below.       Medical, family, and social determinants of health reviewed and discussed w/ pt/family/caretaker, when allowable, and is incorporated into note above, whenever possible.    Attending MD Samuel.  Agree with above.  Pt well appearing, mentating, amenable to plan of care.  Hx of prev allergy pretx but never pretxed with benadryl, only with steroids.  Pt amenable ot this pretreatment alone today without benadryl.  Planned imaging and admission for planned procedure.

## 2024-01-07 NOTE — ED PROVIDER NOTE - OBJECTIVE STATEMENT
HPI & ROS: 68-year-old female with a history of CVA, thyroiditis, endometrial cancer, currently with a vascular access port, sent in for possible [facility] surgery tomorrow.  Noted chart note, will request presurgical labs also with thin cuts per directing team.  Patient stable not in pain.  Patient having anxiety due to her procedure and logistics.  Patient would like to trial a small dose of alprazolam or anxiolytic.  No fevers no chills no nausea no vomiting.  No cough no congestion.  Patient with port.

## 2024-01-07 NOTE — ED PROVIDER NOTE - PROGRESS NOTE DETAILS
Attending MD Samuel. Pulm calling and recommending non-con CT with thin slices.  CT order changed, pt updated re: non-contrast study. MARCELLA Carreon PGY-2: Scans were completed, discussed with pulmonology over the phone the utility of thin slices.  Called radiology and discussed with staff, they recommend not scanning the patient once again for thin slices and the slices obtained are adequate.  Repeat blood work needed to be done as we accessed the port and some traumatic hemolysis.

## 2024-01-08 PROBLEM — E03.9 HYPOTHYROIDISM, UNSPECIFIED: Chronic | Status: ACTIVE | Noted: 2024-01-01

## 2024-01-08 NOTE — H&P ADULT - NSHPREVIEWOFSYSTEMS_GEN_ALL_CORE
REVIEW OF SYSTEMS:    CONSTITUTIONAL: No weakness, fevers or chills  EYES: No visual changes or eye discharge  ENT: No rhinorrhea or sore throat  NECK: No pain or stiffness  RESPIRATORY: No cough, wheezing, hemoptysis; No shortness of breath  CARDIOVASCULAR: No chest pain or palpitations; No lower extremity edema  GASTROINTESTINAL: No abdominal or epigastric pain. No nausea, vomiting, or hematemesis; No diarrhea or constipation. No melena or hematochezia.  BACK: +REMBERTO scapula pain, No midline back pain  GENITOURINARY: No dysuria, frequency or hematuria  NEUROLOGICAL: No numbness or weakness  SKIN: No itching, burning, rashes, or lesions

## 2024-01-08 NOTE — DISCHARGE NOTE PROVIDER - HOSPITAL COURSE
This is a 68F presents to the hospital for bronchoscopy for her REMBERTO nodule. Patient underwent bronchoscopy 1/8. She had a post procedural CXR. She was cleared by interventional pulmonology. She will follow up her results outpatient.     Plan of care was discussed with ACP and patient

## 2024-01-08 NOTE — PROGRESS NOTE ADULT - PROBLEM SELECTOR PLAN 6
DVT ppx - SCDs, holding pharmacological VTE given patient planned for bronchoscopy today  Diet - NPO for now  Activity - OOB with assistance, increase as tolerated    Fall and aspiration precautions DVT ppx - SCDs, holding pharmacological VTE given patient planned for bronchoscopy today  Diet - NPO for now  Activity - OOB with assistance, increase as tolerated    Fall and aspiration precautions    Patient seen as second touch

## 2024-01-08 NOTE — DISCHARGE NOTE NURSING/CASE MANAGEMENT/SOCIAL WORK - PATIENT PORTAL LINK FT
You can access the FollowMyHealth Patient Portal offered by [facility] by registering at the following website: [email or web address]. By joining [facility]’s FollowMyHealth portal, you will also be able to view your health information using other applications (apps) compatible with our system. You can access the FollowMyHealth Patient Portal offered by [facility] by registering at the following website: [email or web address]. By joining [facility]’s FollowMyHealth portal, you will also be able to view your health information using other applications (apps) compatible with our system.

## 2024-01-08 NOTE — H&P ADULT - NSHPOUTPATIENTPROVIDERS_GEN_ALL_CORE
Pulm - Dr. Eduard Miller ([facility])  Onc - Dr. Tasneem Blank ([facility]) Pulm - Dr. Eduard Miller ([facility])  Onc - Dr. Tasneem Blank ([facility])

## 2024-01-08 NOTE — DISCHARGE NOTE NURSING/CASE MANAGEMENT/SOCIAL WORK - NSDCPEFALRISK_GEN_ALL_CORE
For information on Fall & Injury Prevention, visit: [email or web address] OR  [email or web address] OR  https://www.cdc.gov/steadi/patient.html For information on Fall & Injury Prevention, visit: [email or web address] OR  [email or web address] OR  https://www.cdc.gov/steadi/patient.html

## 2024-01-08 NOTE — DISCHARGE NOTE PROVIDER - ATTENDING DISCHARGE PHYSICAL EXAMINATION:
Constitutional: Elderly woman, NAD  HEENT: sclera non-icteric, neck supple, trachea midline, no masses, no JVD, MMM  Respiratory: CTA b/l, good air entry b/l, no wheezing, no rhonchi, no rales, without accessory muscle use and no intercostal retractions  Cardiovascular: RRR, normal S1S2, no M/R/G  Gastrointestinal: soft, NTND, no masses palpable, BS normal  Extremities: Warm, well perfused, pulses equal bilateral upper and lower extremities, no edema, no clubbing  Neurological: AAOx3, CN Grossly intact  Skin: Normal temperature, warm, dry

## 2024-01-08 NOTE — H&P ADULT - ASSESSMENT
This is a 68F with history as above who presents to the hospital for bronchoscopy for her REMBERTO nodule.

## 2024-01-08 NOTE — DISCHARGE NOTE NURSING/CASE MANAGEMENT/SOCIAL WORK - NSDCFUADDAPPT_GEN_ALL_CORE_FT
Please follow up with Dr. Miller for post procedure follow up. [address].  Please follow up with Dr. Miller for post procedure follow up. [address].

## 2024-01-08 NOTE — DISCHARGE NOTE PROVIDER - NSDCFUADDAPPT_GEN_ALL_CORE_FT
Please follow up with Dr. Miller for post procedure follow up. [address].  Please follow up with Dr. Miller for post procedure follow up. [address].

## 2024-01-08 NOTE — ED ADULT NURSE REASSESSMENT NOTE - NS ED NURSE REASSESS COMMENT FT1
Patient port accessed by previous RN. Flushing well with good blood return. Medicated as per orders. Pt. ambulatory with steady gait to bathroom. Resting comfortably.

## 2024-01-08 NOTE — CONSULT NOTE ADULT - SUBJECTIVE AND OBJECTIVE BOX
HPI:    68F with history of metastatic Endometrial cancer on chemotherapy (last therapy on Oct 26 2023 with abraxane/zirabev), CVA/TIA without residual deficits, Medication induced thyroiditis now with hypothyroidism, and HTN who presents to the hospital for bronchoscopy of her REMBERTO nodule. Patient states that she has had a REMBERTO nodule for several months but has been enlarging. Unknown if the REMBERTO nodule is related to her endometrial cancer or a separate primary neoplasm. Patient reports some L upper back/scapula pain for the past few weeks but no midline spine pain. Denies SOB. Reports a persistent mild dry cough since having COVID in November 2023, also with post nasal gtt since then. No other acute complaints.     On arrival to the ED her vitals were T 98.2, P 119 -> 87, /71, RR 17, O2 sat 100% RA. Her lab work did not show significant abnormalities. She was given NS 1L, ativan 0.5mg IVP x1, pepcid 20mg IVP x1, solu-medrol 40mg IVP x1.           PAST MEDICAL & SURGICAL HISTORY:  Carotid stenosis, left      Cerebrovascular accident (CVA) due to occlusion of carotid artery  No residual deficits      Endometrial cancer      HTN (hypertension)      Drug-induced thyroiditis      Hypothyroidism      H/O dilation and curettage      S/P myomectomy      History of hand surgery  repair from dog bite 2000      History of lumpectomy of right breast  benign      S/P hysterectomy      Encounter for care related to vascular access port          FAMILY HISTORY:  Family history of CLL (chronic lymphoid leukemia)    Family history of breast cancer in female    Family history of hypertension        SOCIAL HISTORY:  Smoking:  none   EtOH Use:  Marital Status:  Occupation:  Exposures:  Recent Travel:    Allergies    contrast media (iodine-based) (Other)  Benadryl (Other)  gabapentin (Other)    Intolerances    Taxol (Other)      HOME MEDICATIONS:    REVIEW OF SYSTEMS:  Constitutional: No fevers or chills. No weight loss. No fatigue or generalised malaise.  Eyes: No itching or discharge from the eyes  ENT: No ear pain. No ear discharge. No nasal congestion. No post nasal drip. No epistaxis. No throat pain. No sore throat. No difficulty swallowing.   CV: No chest pain. No palpitations. No lightheadedness or dizziness.   Resp: No dyspnea at rest. No dyspnea on exertion. No orthopnea. No wheezing. No cough. No stridor. No sputum production. No chest pain with respiration.  GI: No nausea. No vomiting. No diarrhea.  MSK: No joint pain or pain in any extremities  Integumentary: No skin lesions. No pedal edema.  Neurological: No gross motor weakness. No sensory changes.    [ ] All other systems negative  [ ] Unable to assess ROS because ________    OBJECTIVE:  ICU Vital Signs Last 24 Hrs  T(C): 36.6 (08 Jan 2024 08:59), Max: 36.8 (07 Jan 2024 18:13)  T(F): 97.8 (08 Jan 2024 08:59), Max: 98.2 (07 Jan 2024 18:13)  HR: 111 (08 Jan 2024 08:59) (87 - 119)  BP: 128/87 (08 Jan 2024 08:59) (104/71 - 136/84)  BP(mean): --  ABP: --  ABP(mean): --  RR: 18 (08 Jan 2024 08:59) (16 - 18)  SpO2: 98% (08 Jan 2024 08:59) (97% - 100%)    O2 Parameters below as of 08 Jan 2024 07:11  Patient On (Oxygen Delivery Method): room air              CAPILLARY BLOOD GLUCOSE          PHYSICAL EXAM:  General: Awake, alert, oriented X 3.   HEENT: Atraumatic, normocephalic.                  No tonsillar or pharyngeal exudates.  Lymph Nodes: No palpable lymphadenopathy  Neck: No JVD. No carotid bruit.   Respiratory: Normal chest expansion                         Normal percussion                         Normal and equal air entry                         No wheeze, rhonchi or rales.  Cardiovascular: S1 S2 normal. No murmurs, rubs or gallops.   Abdomen: Soft, non-tender, non-distended. No organomegaly.  Extremities: Warm to touch. Peripheral pulse palpable. No pedal edema.       HOSPITAL MEDICATIONS:  MEDICATIONS  (STANDING):  amLODIPine   Tablet 5 milliGRAM(s) Oral daily  levothyroxine 100 MICROGram(s) Oral daily  valsartan 160 milliGRAM(s) Oral daily    MEDICATIONS  (PRN):  acetaminophen     Tablet .. 650 milliGRAM(s) Oral every 6 hours PRN Temp greater or equal to 38C (100.4F), Mild Pain (1 - 3)  acetaminophen   Oral Liquid .. 760 milliGRAM(s) Oral once PRN Mild Pain (1 - 3)  aluminum hydroxide/magnesium hydroxide/simethicone Suspension 30 milliLiter(s) Oral every 4 hours PRN Dyspepsia  fentaNYL    Injectable 25 MICROGram(s) IV Push every 5 minutes PRN Severe Pain (7 - 10)  melatonin 3 milliGRAM(s) Oral at bedtime PRN Insomnia  ondansetron Injectable 4 milliGRAM(s) IV Push every 8 hours PRN Nausea and/or Vomiting  ondansetron Injectable 4 milliGRAM(s) IV Push once PRN Nausea and/or Vomiting  oxyCODONE    Solution 2.5 milliGRAM(s) Oral once PRN Moderate Pain (4 - 6)      LABS:                        12.6   6.70  )-----------( 284      ( 07 Jan 2024 23:50 )             37.6     01-07    139  |  102  |  17  ----------------------------<  102<H>  3.9   |  22  |  0.70    Ca    9.5      07 Jan 2024 23:38    TPro  6.4  /  Alb  3.3  /  TBili  <0.2  /  DBili  x   /  AST  12  /  ALT  5   /  AlkPhos  108  01-07    PT/INR - ( 07 Jan 2024 23:50 )   PT: 12.1 sec;   INR: 1.07 ratio         PTT - ( 07 Jan 2024 23:50 )  PTT:32.2 sec  Urinalysis Basic - ( 07 Jan 2024 23:38 )    Color: x / Appearance: x / SG: x / pH: x  Gluc: 102 mg/dL / Ketone: x  / Bili: x / Urobili: x   Blood: x / Protein: x / Nitrite: x   Leuk Esterase: x / RBC: x / WBC x   Sq Epi: x / Non Sq Epi: x / Bacteria: x        Venous Blood Gas:  01-07 @ 22:18  7.35/42/49/23/80.4  VBG Lactate: 1.2      MICROBIOLOGY:     RADIOLOGY:  [ ] Reviewed and interpreted by me    Point of Care Ultrasound Findings;    PFT:    EKG:

## 2024-01-08 NOTE — H&P ADULT - NSHPPHYSICALEXAM_GEN_ALL_CORE
Vital Signs Last 24 Hrs  T(C): 36.8 (08 Jan 2024 07:11), Max: 36.8 (07 Jan 2024 18:13)  T(F): 98.2 (08 Jan 2024 07:11), Max: 98.2 (07 Jan 2024 18:13)  HR: 87 (08 Jan 2024 07:11) (87 - 119)  BP: 136/84 (08 Jan 2024 07:11) (104/71 - 136/84)  BP(mean): --  RR: 18 (08 Jan 2024 07:11) (16 - 18)  SpO2: 97% (08 Jan 2024 07:11) (97% - 100%)    Parameters below as of 08 Jan 2024 07:11  Patient On (Oxygen Delivery Method): room air    GENERAL: No acute distress, well-developed  EYES: EOMI, PERRL, conjunctiva and sclera clear  ENT: Neck supple, moist mucosa  CHEST/LUNG: Clear to auscultation bilaterally; No wheeze, equal breath sounds bilaterally, R chest wall mediport c/d/i  BACK: No spinal tenderness, no scapula pain currently  HEART: Regular rate and rhythm; No murmurs, rubs, or gallops  ABDOMEN: Soft, Nontender, Nondistended; Bowel sounds present  EXTREMITIES: +DP/PT/Radial pulses, No clubbing, cyanosis, or edema  PSYCH: Nl behavior, nl affect  NEUROLOGY: AAOx3, non-focal  SKIN: Normal color, No rashes or lesions

## 2024-01-08 NOTE — H&P ADULT - PROBLEM SELECTOR PLAN 5
- Clarify if patient is on aspirin or statin therapy for her history of TIA  - No residual deficits, no current neurological findings on examination

## 2024-01-08 NOTE — CONSULT NOTE ADULT - ASSESSMENT
69y/o female with PMH HTN, hypothyroidism, pulmonary nodule, uterine cancer s/p hysterctomy and radiation (recurrent) presents for bronchoscopy    now s/p robotic bronchoscopy, transbronchial biopsy, BAL. EBUS, LN bx    Okay for discharge from hospital  once CXR reviewed  follow up with Dr. Miller outpatient

## 2024-01-08 NOTE — H&P ADULT - HISTORY OF PRESENT ILLNESS
This is a 68F with history of metastatic Endometrial cancer on chemotherapy (last therapy on Oct 26 2023 with abraxane/zirabev), CVA/TIA without residual deficits, Medication induced thyroiditis now with hypothyroidism, and HTN who presents to the hospital for bronchoscopy of her REMBERTO nodule. Patient states that she has had a REMBERTO nodule for several months but has been enlarging. Unknown if the REMBERTO nodule is related to her endometrial cancer or a separate primary neoplasm. Patient reports some L upper back/scapula pain for the past few weeks but no midline spine pain. Denies SOB. Reports a persistent mild dry cough since having COVID in November 2023, also with post nasal gtt since then. No other acute complaints.     On arrival to the ED her vitals were T 98.2, P 119 -> 87, /71, RR 17, O2 sat 100% RA. Her lab work did not show significant abnormalities. She was given NS 1L, ativan 0.5mg IVP x1, pepcid 20mg IVP x1, solu-medrol 40mg IVP x1.

## 2024-01-08 NOTE — H&P ADULT - PROBLEM SELECTOR PLAN 1
- Patient with worsening REMBERTO nodule as outpatient, concerning for possible primary neoplasm vs metastatic focus from endometrial cancer  - for bronchoscopy today  - Will keep NPO, hold pharmacological VTE ppx  - Patient with history of TIA but denies history of CHF, CAD/stents, Diabetes Mellitus/Insulin use, her RCRI score currently is 1, her EKG does not show any acute ischemic findings, she reports being able to climb a flight of stairs at home without chest pain/SOB/palpitations/dizziness/syncope -> currently is a low risk patient for her planned low risk procedure

## 2024-01-08 NOTE — DISCHARGE NOTE PROVIDER - NSDCMRMEDTOKEN_GEN_ALL_CORE_FT
amLODIPine 5 mg oral tablet: 1 tab(s) orally once a day am  Unithroid 100 mcg (0.1 mg) oral tablet: 1 tab(s) orally once a day am  valsartan 160 mg oral capsule: 1 cap(s) orally once a day am

## 2024-01-08 NOTE — H&P ADULT - PROBLEM SELECTOR PLAN 6
DVT ppx - SCDs, holding pharmacological VTE given patient planned for bronchoscopy today  Diet - NPO for now  Activity - OOB with assistance, increase as tolerated    Fall and aspiration precautions

## 2024-01-08 NOTE — H&P ADULT - NSHPLABSRESULTS_GEN_ALL_CORE
LABS and ADDITIONAL STUDIES:                        12.6   6.70  )-----------( 284      ( 07 Jan 2024 23:50 )             37.6     139  |  102  |  17  ----------------------------<  102<H>     01-07  3.9   |  22  |  0.70    Ca    9.5      07 Jan 2024 23:38    TPro  6.4  /  Alb  3.3  /  TBili  <0.2  /  DBili  x   /  AST  12  /  ALT  5   /  AlkPhos  108  01-07    PT/INR: 12.1/1.07 (01-07-24 @ 23:50)  PTT: 32.2 (01-07-24 @ 23:50)    22:18 - VBG - pH: 7.35  | pCO2: 42    | pO2: 49    | Lactate: 1.2    EKG - Sinus tachycardia, rate 116, QTc 433, TWI aVL (present on prior), no significant ST-T wave changes

## 2024-01-08 NOTE — DISCHARGE NOTE PROVIDER - CARE PROVIDER_API CALL
Eduard Miller  Critical Care Medicine  [address] [place] [address], [facility] [address]  [place] [address]  Phone: [phone]  Fax: [phone]  Follow Up Time:    Eduard Miller  Critical Care Medicine  [address] [place] [address], [facility] [address]  [place] [address]  Phone: [phone]  Fax: [phone]  Follow Up Time:

## 2024-01-08 NOTE — DISCHARGE NOTE PROVIDER - NSDCCPCAREPLAN_GEN_ALL_CORE_FT
PRINCIPAL DISCHARGE DIAGNOSIS  Diagnosis: Nodule of left lung  Assessment and Plan of Treatment: You were admitted with a lung nodule. You had a biopsy done with bronchoscopy. Please follow up with your outpatient provider on the results of this test.     PRINCIPAL DISCHARGE DIAGNOSIS  Diagnosis: Nodule of left lung  Assessment and Plan of Treatment: You were admitted with a lung nodule. You had a biopsy done with bronchoscopy. Please follow up with your outpatient provider on the results of this test.  You will need to scheduled a follow up appt with pulm -transbronchial biopsy, BAL. EBUS, LN bx      SECONDARY DISCHARGE DIAGNOSES  Diagnosis: Endometrial cancer  Assessment and Plan of Treatment: - Outpatient follow up with Dr. Tasneem Blank.      Diagnosis: History of TIA (transient ischemic attack)  Assessment and Plan of Treatment: No residual deficits, no current neurological findings on examination.

## 2024-01-08 NOTE — PROGRESS NOTE ADULT - SUBJECTIVE AND OBJECTIVE BOX
***THIS NOTE IS INCOMPLETE***    Alissa Hargrove MD   [employer] Teams, Pager [phone]    INTERVAL HPI/OVERNIGHT EVENTS:  Patient was seen and examined at bedside. As per nurse and patient, no o/n events, patient resting comfortably. No complaints at this time. Patient denies: fever, chills, dizziness, weakness, HA, Changes in vision, CP, palpitations, SOB, cough, N/V/D/C, dysuria, changes in bowel movements, LE edema. ROS otherwise negative.    VITAL SIGNS:  T(F): 97.8 (01-08-24 @ 08:59)  HR: 111 (01-08-24 @ 08:59)  BP: 128/87 (01-08-24 @ 08:59)  RR: 18 (01-08-24 @ 08:59)  SpO2: 98% (01-08-24 @ 08:59)  Wt(kg): --    PHYSICAL EXAM:    Constitutional: WDWN, NAD  HEENT: PERRL, EOMI, sclera non-icteric, neck supple, trachea midline, no masses, no JVD, MMM, good dentition  Respiratory: CTA b/l, good air entry b/l, no wheezing, no rhonchi, no rales, without accessory muscle use and no intercostal retractions  Cardiovascular: RRR, normal S1S2, no M/R/G  Gastrointestinal: soft, NTND, no masses palpable, BS normal  Extremities: Warm, well perfused, pulses equal bilateral upper and lower extremities, no edema, no clubbing  Neurological: AAOx3, CN Grossly intact  Skin: Normal temperature, warm, dry    MEDICATIONS  (STANDING):  amLODIPine   Tablet 5 milliGRAM(s) Oral daily  lactated ringers Bolus 500 milliLiter(s) IV Bolus once  levothyroxine 100 MICROGram(s) Oral daily  valsartan 160 milliGRAM(s) Oral daily    MEDICATIONS  (PRN):  acetaminophen     Tablet .. 650 milliGRAM(s) Oral every 6 hours PRN Temp greater or equal to 38C (100.4F), Mild Pain (1 - 3)  aluminum hydroxide/magnesium hydroxide/simethicone Suspension 30 milliLiter(s) Oral every 4 hours PRN Dyspepsia  melatonin 3 milliGRAM(s) Oral at bedtime PRN Insomnia  ondansetron Injectable 4 milliGRAM(s) IV Push every 8 hours PRN Nausea and/or Vomiting      Allergies    contrast media (iodine-based) (Other)  Benadryl (Other)  gabapentin (Other)    Intolerances    Taxol (Other)      LABS:                        12.6   6.70  )-----------( 284      ( 07 Jan 2024 23:50 )             37.6     01-07    139  |  102  |  17  ----------------------------<  102<H>  3.9   |  22  |  0.70    Ca    9.5      07 Jan 2024 23:38    TPro  6.4  /  Alb  3.3  /  TBili  <0.2  /  DBili  x   /  AST  12  /  ALT  5   /  AlkPhos  108  01-07    PT/INR - ( 07 Jan 2024 23:50 )   PT: 12.1 sec;   INR: 1.07 ratio         PTT - ( 07 Jan 2024 23:50 )  PTT:32.2 sec  Urinalysis Basic - ( 07 Jan 2024 23:38 )    Color: x / Appearance: x / SG: x / pH: x  Gluc: 102 mg/dL / Ketone: x  / Bili: x / Urobili: x   Blood: x / Protein: x / Nitrite: x   Leuk Esterase: x / RBC: x / WBC x   Sq Epi: x / Non Sq Epi: x / Bacteria: x        RADIOLOGY & ADDITIONAL TESTS:  Reviewed ***THIS NOTE IS INCOMPLETE***    Alissa Hargrove MD   [employer] Teams, Pager [phone]    INTERVAL HPI/OVERNIGHT EVENTS:  Patient was seen and examined at bedside. As per nurse and patient, no o/n events, patient resting comfortably. No complaints at this time. Patient denies: fever, chills, dizziness, weakness, HA, Changes in vision, CP, palpitations, SOB, cough, N/V/D/C, dysuria, changes in bowel movements, LE edema. ROS otherwise negative.    VITAL SIGNS:  T(F): 97.8 (01-08-24 @ 08:59)  HR: 111 (01-08-24 @ 08:59)  BP: 128/87 (01-08-24 @ 08:59)  RR: 18 (01-08-24 @ 08:59)  SpO2: 98% (01-08-24 @ 08:59)  Wt(kg): --    PHYSICAL EXAM:    Constitutional: WDWN, NAD  HEENT: PERRL, EOMI, sclera non-icteric, neck supple, trachea midline, no masses, no JVD, MMM, good dentition  Respiratory: CTA b/l, good air entry b/l, no wheezing, no rhonchi, no rales, without accessory muscle use and no intercostal retractions  Cardiovascular: RRR, normal S1S2, no M/R/G  Gastrointestinal: soft, NTND, no masses palpable, BS normal  Extremities: Warm, well perfused, pulses equal bilateral upper and lower extremities, no edema, no clubbing  Neurological: AAOx3, CN Grossly intact  Skin: Normal temperature, warm, dry    MEDICATIONS  (STANDING):  amLODIPine   Tablet 5 milliGRAM(s) Oral daily  lactated ringers Bolus 500 milliLiter(s) IV Bolus once  levothyroxine 100 MICROGram(s) Oral daily  valsartan 160 milliGRAM(s) Oral daily    MEDICATIONS  (PRN):  acetaminophen     Tablet .. 650 milliGRAM(s) Oral every 6 hours PRN Temp greater or equal to 38C (100.4F), Mild Pain (1 - 3)  aluminum hydroxide/magnesium hydroxide/simethicone Suspension 30 milliLiter(s) Oral every 4 hours PRN Dyspepsia  melatonin 3 milliGRAM(s) Oral at bedtime PRN Insomnia  ondansetron Injectable 4 milliGRAM(s) IV Push every 8 hours PRN Nausea and/or Vomiting      Allergies    contrast media (iodine-based) (Other)  Benadryl (Other)  gabapentin (Other)    Intolerances    Taxol (Other)      LABS:                        12.6   6.70  )-----------( 284      ( 07 Jan 2024 23:50 )             37.6     01-07    139  |  102  |  17  ----------------------------<  102<H>  3.9   |  22  |  0.70    Ca    9.5      07 Jan 2024 23:38    TPro  6.4  /  Alb  3.3  /  TBili  <0.2  /  DBili  x   /  AST  12  /  ALT  5   /  AlkPhos  108  01-07    PT/INR - ( 07 Jan 2024 23:50 )   PT: 12.1 sec;   INR: 1.07 ratio         PTT - ( 07 Jan 2024 23:50 )  PTT:32.2 sec  Urinalysis Basic - ( 07 Jan 2024 23:38 )    Color: x / Appearance: x / SG: x / pH: x  Gluc: 102 mg/dL / Ketone: x  / Bili: x / Urobili: x   Blood: x / Protein: x / Nitrite: x   Leuk Esterase: x / RBC: x / WBC x   Sq Epi: x / Non Sq Epi: x / Bacteria: x        RADIOLOGY & ADDITIONAL TESTS:  Reviewed

## 2024-01-26 PROBLEM — C54.1 ADENOCARCINOMA OF THE ENDOMETRIUM/UTERUS: Status: ACTIVE | Noted: 2018-08-06

## 2024-01-29 NOTE — REVIEW OF SYSTEMS
[Fever] : no fever [Chills] : no chills [Night Sweats] : no night sweats [Fatigue] : fatigue [Recent Change In Weight] : ~T recent weight change [Vision Problems] : no vision problems [Dysphagia] : dysphagia [Loss of Hearing] : no loss of hearing [Nosebleeds] : no nosebleeds [Hoarseness] : no hoarseness [Mucosal Pain] : no mucosal pain [Chest Pain] : no chest pain [Lower Ext Edema] : no lower extremity edema [Shortness Of Breath] : no shortness of breath [Wheezing] : no wheezing [Cough] : no cough [SOB on Exertion] : no shortness of breath during exertion [Vaginal Discharge] : no vaginal discharge [Joint Pain] : no joint pain [Muscle Pain] : no muscle pain [Difficulty Walking] : no difficulty walking [Negative] : Allergic/Immunologic [FreeTextEntry4] : mucus from mouth - postnasal drip [de-identified] : intermittent neuropathy to b/l legs, stable

## 2024-01-29 NOTE — HISTORY OF PRESENT ILLNESS
[Disease: _____________________] : Disease: [unfilled] [T: ___] : T[unfilled] [N: ___] : N[unfilled] [M: ___] : M[unfilled] [AJCC Stage: ____] : AJCC Stage: [unfilled] [de-identified] : Referred by Dr. Gaytan  Ms. Hoang is a 69 y/o G0 postmenopausal F who was referred to medical oncology for treatment considerations for recurrent endometrial cancer. She was initially diagnosed with stage II grade 2 endometrial cancer in 2018, she underwent underwent a robotic assisted TLH BSO, bilateral pelvic and para-aortic sentinel LN dissection by Dr. Paras Grayson on 2018.  Her surgical pathology demonstrated pT2N0 disease with endometrial tumor measuring 4cm, FIGO 2 endometrioid adenocarcinoma, >90 myometrial invasion with involvement of cervical stroma, +LVI with IHC of MMR proteins with intact expression but pelvic wash negative for malignant cells.  On 10/2018, she completed pelvic IMRT and vaginal cuff brachytherapy with Dr. Bowling.  She did well clinically under surveillance until 2019 when she developed vaginal spotting and fluid discharge, and was evaluated by Dr. Grayson. CA-125 was 27. Concern for possible recurrence and further workup pursued. Her 19 CT CAP demonstrate new bilateral pulmonary nodules, some with central cavitation, suspicious for metastatic disease, enhancing nodularity superior to the vaginal cuff concerning for metastatic disease, measuring 1.9x1.6cm.  On 10/30/19 she underwent thorascopic multiple RLL wedge resections with Dr. Weston and her surgical pathology c/w metastatic adenocarcinoma, consistent with patient's endometrial primary +ER SC Pax8.  She was recommended further treatment for her recurrent endometrial cancer but the patient was lost to follow up until 2020 several months before which she has noticed increasing hardness and "lumps" in her vagina, which has been progressively more painful and burning sensation. She was subsequently evaluated by Dr. Gaytan 2020 and referred to medical oncology for systemic treatment evaluation. On presentation for initial consult 2020, she continued to have vaginal discharge and on and off spotting (not more than 1 pad a day). She felt constipated. She was taking Percocet 5-325, Tylenol and ibuprofen for vaginal pain. She had been very busy with work and family affairs, was not able to come for cancer treatment over the past months.   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     She was treated with carboplatin and paclitaxel from 20 - 21.  She developed disease progression involving the vaginal mass and in REMBERTO anterior to the mediastinum. She began treatment with pembrolizumb and Lenvima on 3/17/21. Given increasing vaginal pain and increased mass involving the vaginal cuff region, she underwent palliative RT to the vaginal mass and cuff area with Dr. Bowling, to a total dose of 2000 cGy in 5 fraction, from 21-21. Pembro/Lenvima was briefly held during RT.  She had improved pain and was restarted and continued on pembrolizumab and Lenvima. CT c/a/p done on 2021 which showed favorable response to therapy. Left inguinal node decreased in size. Vaginal mass decreased in size. Pulmonary nodules decreased in size. Her CT c/a/p from 2021 showed continued pulmonary nodules. While some are stable in size, there is mild increase in pulmonary nodule in the right upper lobe. 3 x 5 mm nodular focus of higher attenuation suggesting enhancement within a right renal cyst. This is slightly better seen on the current examination although may be exaggerated by volume averaging.  She continued on pembrolizumab and Lenvima with stability of disease until 2022, when treatment was held due to immunotherapy-associated diarrhea/colitis. CT scans subsequently showed disease progression. Pembrolizumab/Lenvima discontinued and treatment options reviewed with the patient. She then began Doxil on 10/10/22, completing C6 on 23. However, subsequent scans showed POD in the vaginal cuff and lung nodules.  She was switched to Abraxane/Zirabev, C1 on 23. Disease was stable until CT scans 23 showing POD in one of her left lung nodules, the vaginal cuff lesion, and a new hepatic lesion.   Foundation testing revealed  TMB 9,  MSI - indeterminate [de-identified] :  ER +  RI +  PAX 8  +     MSI STABLE [FreeTextEntry1] : Since metastatic recurrence: Carbo/Taxol 8/14/20 - 2/11/21 --> POD --> Lenvima/Pembro --> 5 fx RT to vaginal cuff 5/25-6/1/23 (pembro held during RT, Lenvima held June/July 2021), continued Lenvima/Pembro --> immunotherapy-induced colitis + POD --> Doxil started 10/2022 --> POD, switched to Abrazane/Zirabev since 4/2023 --> POD 12/2023 [de-identified] : Patient presents today for follow up. She underwent lung bx 1/8 by Interventional pulm which confirmed metastatic endometrial.  Appetite remains the same, wt is stable. Pt mentions hoarseness remains the same, improved after the bx. Denies fever, chills, mouth sores, CP, palpitations, SOB at rest, n/v/d/c, abdominal pain, LE edema, vaginal discharge or bleeding, urinary symptoms, excessive bruising, dizziness, headaches, arthralgias, myalgias.

## 2024-01-29 NOTE — PHYSICAL EXAM
[Fully active, able to carry on all pre-disease performance without restriction] : Status 0 - Fully active, able to carry on all pre-disease performance without restriction [Mucositis] : no mucositis [Thrush] : no thrush [Vesicles] : no vesicles [Normal] : affect appropriate [de-identified] : normal respiratory effort, no audible breath sounds

## 2024-02-16 PROBLEM — C54.1 RECURRENT CARCINOMA OF ENDOMETRIUM: Status: ACTIVE | Noted: 2020-07-31

## 2024-02-16 NOTE — ED ADULT NURSE NOTE - OBJECTIVE STATEMENT
Pt arrives to room 12. Pt is A and Ox4 and ambulatory. HX: uterine cancer, last chemo 2 weeks ago. Pt arrives to the ED complaining of shortness of breath for the past 2 weeks. Pt states she has minor chest pain 2 out of 10, non radiating. Pt endorses she woke up this morning and the shortness of breath was worse. Airway is patent, respirations are even and unlabored. Pt sating 100% on room air. Pt denies headaches, dizziness, n/v, GI/ symptoms, numbness and tingling.  Pt has a right chest port that is not accessed. Awaiting provider orders Plan of care ongoing, safety maintained.

## 2024-02-16 NOTE — HISTORY OF PRESENT ILLNESS
[Disease: _____________________] : Disease: [unfilled] [T: ___] : T[unfilled] [N: ___] : N[unfilled] [M: ___] : M[unfilled] [AJCC Stage: ____] : AJCC Stage: [unfilled] [de-identified] : Referred by Dr. Gaytan  Ms. Hoang is a 67 y/o G0 postmenopausal F who was referred to medical oncology for treatment considerations for recurrent endometrial cancer. She was initially diagnosed with stage II grade 2 endometrial cancer in 2018, she underwent underwent a robotic assisted TLH BSO, bilateral pelvic and para-aortic sentinel LN dissection by Dr. Paras Grayson on 2018.  Her surgical pathology demonstrated pT2N0 disease with endometrial tumor measuring 4cm, FIGO 2 endometrioid adenocarcinoma, >90 myometrial invasion with involvement of cervical stroma, +LVI with IHC of MMR proteins with intact expression but pelvic wash negative for malignant cells.  On 10/2018, she completed pelvic IMRT and vaginal cuff brachytherapy with Dr. Bowling.  She did well clinically under surveillance until 2019 when she developed vaginal spotting and fluid discharge, and was evaluated by Dr. Grayson. CA-125 was 27. Concern for possible recurrence and further workup pursued. Her 19 CT CAP demonstrate new bilateral pulmonary nodules, some with central cavitation, suspicious for metastatic disease, enhancing nodularity superior to the vaginal cuff concerning for metastatic disease, measuring 1.9x1.6cm.  On 10/30/19 she underwent thorascopic multiple RLL wedge resections with Dr. Weston and her surgical pathology c/w metastatic adenocarcinoma, consistent with patient's endometrial primary +ER MS Pax8.  She was recommended further treatment for her recurrent endometrial cancer but the patient was lost to follow up until 2020 several months before which she has noticed increasing hardness and "lumps" in her vagina, which has been progressively more painful and burning sensation. She was subsequently evaluated by Dr. Gaytan 2020 and referred to medical oncology for systemic treatment evaluation. On presentation for initial consult 2020, she continued to have vaginal discharge and on and off spotting (not more than 1 pad a day). She felt constipated. She was taking Percocet 5-325, Tylenol and ibuprofen for vaginal pain. She had been very busy with work and family affairs, was not able to come for cancer treatment over the past months.   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     She was treated with carboplatin and paclitaxel from 20 - 21.  She developed disease progression involving the vaginal mass and in REMBERTO anterior to the mediastinum. She began treatment with pembrolizumb and Lenvima on 3/17/21. Given increasing vaginal pain and increased mass involving the vaginal cuff region, she underwent palliative RT to the vaginal mass and cuff area with Dr. Bowling, to a total dose of 2000 cGy in 5 fraction, from 21-21. Pembro/Lenvima was briefly held during RT.  She had improved pain and was restarted and continued on pembrolizumab and Lenvima. CT c/a/p done on 2021 which showed favorable response to therapy. Left inguinal node decreased in size. Vaginal mass decreased in size. Pulmonary nodules decreased in size. Her CT c/a/p from 2021 showed continued pulmonary nodules. While some are stable in size, there is mild increase in pulmonary nodule in the right upper lobe. 3 x 5 mm nodular focus of higher attenuation suggesting enhancement within a right renal cyst. This is slightly better seen on the current examination although may be exaggerated by volume averaging.  She continued on pembrolizumab and Lenvima with stability of disease until 2022, when treatment was held due to immunotherapy-associated diarrhea/colitis. CT scans subsequently showed disease progression. Pembrolizumab/Lenvima discontinued and treatment options reviewed with the patient. She then began Doxil on 10/10/22, completing C6 on 23. However, subsequent scans showed POD in the vaginal cuff and lung nodules.  She was switched to Abraxane/Zirabev, C1 on 23. Disease was stable until CT scans 23 showing POD in one of her left lung nodules, the vaginal cuff lesion, and a new hepatic lesion.   Foundation testing revealed  TMB 9,  MSI - indeterminate [de-identified] :  ER +  VT +  PAX 8  +     MSI STABLE [FreeTextEntry1] : Since metastatic recurrence: Carbo/Taxol 8/14/20 - 2/11/21 --> POD --> Lenvima/Pembro --> 5 fx RT to vaginal cuff 5/25-6/1/23 (pembro held during RT, Lenvima held June/July 2021), continued Lenvima/Pembro --> immunotherapy-induced colitis + POD --> Doxil started 10/2022 --> POD, switched to Abrazane/Zirabev since 4/2023 --> POD 12/2023 --> Started Docetaxel on 2/1/24 [de-identified] : Patient presents for routine follow up while receiving IV fluids, started Docetaxel on 2/1/24.  Angela has been feeling fatigued at home, attributes to the treatment and dehydration. Reports the fatigue is most severe days 2-3 after treatment, mostly on her couch all day with associated decreased appetite. After day 3 her fatigue and appetite significantly improved.  She reports feeling down and depressed, has anxiety when she has to leave her house but is able to overcome anxiety and carry out daily activities.  Reports persistent post nasal drop since November, started saline nasal rinse, and nasal spray yesterday. Today her symptoms are much improved Has nausea related to post nasal drip, takes caron-seltzer with relief.  Reports constipation x 1 week, currently taking colace and probiotic. Reports positive flatus Has increased tingling in b/l feet, intermittent. Denies fever, chills, mouth sores, CP, palpitations, cough, LOMAS, vomiting, diarrhea, abdominal pain, LE edema, vaginal discharge or bleeding, urinary symptoms, excessive bruising, dizziness, headaches.

## 2024-02-16 NOTE — ED PROVIDER NOTE - PHYSICAL EXAMINATION
General:cachectic appearing, pale, interactive however talks in a whispered voice  HEENT: EOMI, PERRLA, normal mucosa, normal oropharynx, no lesions on the lips or on oral mucosa, normal external ear  Neck: supple, no lymphadenopathy, full range of motion, no nuchal rigidity  CV: RRR, normal S1 and S2 with no murmur, capillary refill less than two seconds  Resp: lungs CTA b/l, good aeration bilaterally, symmetric chest wall   Abd: non-distended, soft, non-tender  : no CVA tenderness  MSK: full range of motion, no cyanosis, no edema, no clubbing, no immobility  Neuro: CN II-XII grossly intact, muscle strength 5/5 in all extremities, normal gait  Skin: no rashes, skin intact

## 2024-02-16 NOTE — ED ADULT NURSE NOTE - NSFALLUNIVINTERV_ED_ALL_ED
Bed/Stretcher in lowest position, wheels locked, appropriate side rails in place/Call bell, personal items and telephone in reach/Instruct patient to call for assistance before getting out of bed/chair/stretcher/Non-slip footwear applied when patient is off stretcher/[place] to call system/Physically safe environment - no spills, clutter or unnecessary equipment/Purposeful proactive rounding/Room/bathroom lighting operational, light cord in reach

## 2024-02-16 NOTE — ED PROVIDER NOTE - ATTENDING CONTRIBUTION TO CARE
Brief HPI:  68-year-old female past medical history of uterine cancer on chemotherapy (last treatment 2 weeks ago) metastases to lung status post bronchoscopy with biopsy 1/8/2024, hypothyroidism, CVA presents with shortness of breath for 2 weeks.  Patient states shortness of breath is exertional, associated with fatigue.  Denies fever, chills, chest pain, nausea, vomiting, sweating, leg swelling.  Never had shortness of breath like this in past.  No history of DVT or PE.    Vitals:   Reviewed    Exam:    GEN:  Non-toxic appearing, non-distressed, speaking full sentences, non-diaphoretic, AAOx3  HEENT:  NCAT, neck supple, EOMI, PERRLA, sclera anicteric, no conjunctival pallor or injection, no stridor, normal voice, no tonsillar exudate, uvula midline  CV:  regular rhythm and rate, s1/s2 audible, no murmurs, rubs or gallops, peripheral pulses 2+ and symmetric  PULM:  non-labored respirations, lungs clear to auscultation bilaterally, no wheezes, crackles or rales  ABD:  non distended, non-tender, no rebound, no guarding, negative Marti's sign, bowel sounds normal, no cvat  MSK:  no gross deformity, non-tender extremities and joints, range of motion grossly normal appearing, no extremity edema, extremities warm and well perfused   NEURO:  AAOx3, CN II-XII intact, motor 5/5 in upper and lower extremities bilaterally, sensation grossly intact in extremities and trunk, finger to nose testing wnl, no nystagmus, negative Romberg, no pronator drift, no gait deficit  SKIN:  warm, dry, no rash or vesicles     A/P:  68-year-old female past medical history of uterine cancer on chemotherapy (last treatment 2 weeks ago) metastases to lung status post bronchoscopy with biopsy 1/8/2024, hypothyroidism, CVA presents with shortness of breath for 2 weeks.  Vital signs stable, afebrile, no hypoxemia.  Lungs clear to auscultation bilaterally.  Patient appears euvolemic.  EKG nonischemic.  Low concern for anginal equivalent.  PE considered given history of malignancy.  No clear infectious etiology.  Will send labs, chest x-ray, CT PE.  Disposition pending.

## 2024-02-16 NOTE — PHYSICAL EXAM
[Fully active, able to carry on all pre-disease performance without restriction] : Status 0 - Fully active, able to carry on all pre-disease performance without restriction [Normal] : affect appropriate [Mucositis] : no mucositis [Thrush] : no thrush [Vesicles] : no vesicles [de-identified] : normal respiratory effort, no audible breath sounds  [de-identified] : raised cystic papule on left side of back.

## 2024-02-16 NOTE — ED PROVIDER NOTE - PROGRESS NOTE DETAILS
Shira Kasper MD (PGY3): CT w/ interval increase in masses and lymphadenopathy with compression of pulm vasculature. hypokalemic, repleted. patient endorsed to hospitalist.

## 2024-02-16 NOTE — ED PROVIDER NOTE - CLINICAL SUMMARY MEDICAL DECISION MAKING FREE TEXT BOX
Patient is a 69 y/o F PMHx uterine cancer (on chemo, last 2 weeks ago) with metastases to the lungs and liver, hypothyroid, CVA w/o residual deficits presents c/o SOB. States she has had the SOB for at least one month and started around the time that she had her lung biopsy. She notes around 2 weeks ago after her last round of chemotherapy she started to feel worsening of her SOB. Denies cough, fever, chills. States she has had "a sinus infection with post nasal drip since december when I had Covid". Notes she has had a "raspy whisper voice" for about 2 months "because of post nasal drip" which she notes irritates her stomach sometimes. Denies chest pain however states she feels some chest tightness over her upper chest (points to her clavicles) when walking. Dyspnea is worse with exertion however still tolerating mild activities.  Patient denies any nausea, vomiting, diarrhea, constipation, painful urination, new rashes.  VSS, tachycardic to 118 at rest. EKG Sinus tachycardia no FUENTES, STD  On exam patient does not have any w/r/r, no increased WOB or acc muscle use.   DDx includes but not limited to- there is a moderate suspicion or PE iso history of cancer, cva and tachycardic at rest and dyspnea without strong alternative diagnosis, will obtain CT PE and pretreat as patient has had a reaction in the past.   ACS less likely but iso dyspnea worse on exertion and chest pressure will obtain trop to r/o MI. Will also send basic labs to look for anemia.

## 2024-02-16 NOTE — ED ADULT TRIAGE NOTE - CHIEF COMPLAINT QUOTE
pt complains SOB and chest tightness starting this morning. pt breathing even and unlabored on room air . pt hx of uterine CA currently receiving chemo last treatment was 2 weeks ago. pt noted to have a R chest port not accessed.

## 2024-02-16 NOTE — REVIEW OF SYSTEMS
[Fatigue] : fatigue [Dysphagia] : dysphagia [Negative] : Allergic/Immunologic [Anxiety] : anxiety [Depression] : depression [Fever] : no fever [Chills] : no chills [Night Sweats] : no night sweats [Recent Change In Weight] : ~T no recent weight change [Vision Problems] : no vision problems [Loss of Hearing] : no loss of hearing [Nosebleeds] : no nosebleeds [Hoarseness] : no hoarseness [Mucosal Pain] : no mucosal pain [Chest Pain] : no chest pain [Lower Ext Edema] : no lower extremity edema [Shortness Of Breath] : no shortness of breath [Wheezing] : no wheezing [Cough] : no cough [SOB on Exertion] : no shortness of breath during exertion [Vaginal Discharge] : no vaginal discharge [Joint Pain] : no joint pain [Muscle Pain] : no muscle pain [Difficulty Walking] : no difficulty walking [Suicidal] : not suicidal [Insomnia] : no insomnia [FreeTextEntry4] : postnasal drip [de-identified] : increased tingling to b/l legs, intermittent

## 2024-02-16 NOTE — ED PROVIDER NOTE - OBJECTIVE STATEMENT
Patient is a 69 y/o F PMHx uterine cancer presents for dyspnea Patient is a 67 y/o F PMHx uterine cancer (on chemo, last 2 weeks ago) with metastases to the lungs, liver presents c/o SOB.   Notes she has had a "raspy whisper voice" for about 2 months "because of post nasal drip" which she notes irritates her stomach sometimes. Patient is a 69 y/o F PMHx uterine cancer (on chemo, last 2 weeks ago) with metastases to the lungs and liver, hypothyroid, CVA w/o residual deficits presents c/o SOB. States she has had the SOB for at least one month and started around the time that she had her lung biopsy. She notes around 2 weeks ago after her last round of chemotherapy she started to feel worsening of her SOB. Denies cough, fever, chills. States she has had "a sinus infection with post nasal drip since december when I had Covid". Notes she has had a "raspy whisper voice" for about 2 months "because of post nasal drip" which she notes irritates her stomach sometimes. Denies chest pain however states she feels some chest tightness over her upper chest (points to her clavicles) when walking. Dyspnea is worse with exertion however still tolerating mild activities.  Patient denies any nausea, vomiting, diarrhea, constipation, painful urination, new rashes.

## 2024-02-17 NOTE — H&P ADULT - PROBLEM SELECTOR PLAN 6
-Urology consult for R hydronephrosis. No MANJU  -Hold further IVF for now as she does have trace LE edema

## 2024-02-17 NOTE — H&P ADULT - ASSESSMENT
69 y/o F PMHx uterine cancer (on chemo, last 2 weeks ago) with metastases to the lungs and liver, hypothyroid, HTN, CVA w/o residual deficits presents c/o worsening exertional dyspnea.  Patient is currently on room air and not in acute respiratory failure.  Patient is admitted for workup of worsening exertional dyspnea.  Differentials include cardiomyopathy (ie chemotherapy-induced cardiomyopathy), pulmonary disease from worsening pulmonary metastasis pulmonary bronchial and pulmonary artery compression from mediastinal lymphadenopathy VS underlying infection from left apical mass.

## 2024-02-17 NOTE — H&P ADULT - NSHPLABSRESULTS_GEN_ALL_CORE
02-17    140  |  102  |  9   ----------------------------<  166<H>  4.2   |  26  |  0.63    Ca    9.1      17 Feb 2024 05:26  Phos  2.5     02-17  Mg     1.80     02-17    TPro  6.7  /  Alb  3.1<L>  /  TBili  0.3  /  DBili  x   /  AST  17  /  ALT  7   /  AlkPhos  140<H>  02-16                            10.9   7.11  )-----------( 308      ( 16 Feb 2024 19:15 )             32.1     LIVER FUNCTIONS - ( 16 Feb 2024 19:15 )  Alb: 3.1 g/dL / Pro: 6.7 g/dL / ALK PHOS: 140 U/L / ALT: 7 U/L / AST: 17 U/L / GGT: x                 Urinalysis Basic - ( 17 Feb 2024 05:26 )    Color: x / Appearance: x / SG: x / pH: x  Gluc: 166 mg/dL / Ketone: x  / Bili: x / Urobili: x   Blood: x / Protein: x / Nitrite: x   Leuk Esterase: x / RBC: x / WBC x   Sq Epi: x / Non Sq Epi: x / Bacteria: x    EKG:  Initial EKG could not be located; repeat EKG NSR qtc 424    Image: CTA PE: IMPRESSION:  No pulmonary embolism.  Left apical mass with central cavitation is larger in size when compared to 1/8/2024.  Increased mediastinal lymphadenopathy, with extrinsic compression of the left pulmonary artery and left upper lobe bronchi.  Progression of pulmonary nodules and hepatic metastasis. Severe right hydronephrosis.

## 2024-02-17 NOTE — H&P ADULT - PROBLEM SELECTOR PLAN 1
Differentials include cardiomyopathy (ie chemotherapy-induced cardiomyopathy), pulmonary disease from worsening pulmonary metastasis pulmonary bronchial and pulmonary artery compression from mediastinal lymphadenopathy VS underlying infection from left apical mass.  -Echo pending  -If echo normal, will need pulmonary and cardiothoracic surgery regarding possible compression syndrome from mass  -ProBNP pending

## 2024-02-17 NOTE — H&P ADULT - NSHPREVIEWOFSYSTEMS_GEN_ALL_CORE
REVIEW OF SYSTEMS:    CONSTITUTIONAL: No fevers or chills  EYES/ENT: No visual changes;  No dysphagia  NECK: No pain or stiffness  RESPIRATORY: No cough, wheezing, hemoptysis; +shortness of breath  CARDIOVASCULAR: No chest pain or palpitations; No lower extremity edema  GASTROINTESTINAL: No abdominal or epigastric pain. No nausea, vomiting, or hematemesis; No diarrhea or constipation. No melena or hematochezia.  GENITOURINARY: No dysuria, frequency or hematuria  NEUROLOGICAL: No numbness or weakness  SKIN: No itching, burning, rashes, or lesions   PSYCH: No auditory or visual hallucinations  All other review of systems is negative unless indicated above.

## 2024-02-17 NOTE — H&P ADULT - TREATMENT GUIDELINE COMMENT
DNR/DNI, Limited medical interventions (allow BIPAP), No feeding tubes, Trial of IVF and use of antibiotics to be determined. No HD

## 2024-02-17 NOTE — ED ADULT NURSE REASSESSMENT NOTE - NS ED NURSE REASSESS COMMENT FT1
Mobile Critical Care RN: R chest wall port accessed using sterile technique. Flushing with +blood return.
Report received from RN. Pt resting comfortably in room 12. Pt denies physical complaints at this time. Airway is patent, respirations are even and unlabored. Plan of care ongoing, safety maintained.
Second unit of PRBC finished. Pt at baseline mental status, breathing even and unlabored in  bed. Pt denies chest pain, SOB, dizziness, headache, blurry vision, chills and s/s of transfusion reaction. Bed in lowest position, call bell within reach.
Pt received in on acute distress, A&Ox3, speaking in full sentences, resting calmly and comfortably. Pt walked to restroom with steady gait. Vital signs updated, pt pending admission MD and bed assignment. Will continue to monitor.

## 2024-02-17 NOTE — H&P ADULT - PROBLEM SELECTOR PLAN 3
CTA with increased mediastinal lymphadenopathy with extrinsic compression of left pulmonary artery with left apical mass with central cavitation  -If echo normal, will need pulmonary and cardiothoracic surgery regarding possible compression syndrome from mass

## 2024-02-17 NOTE — H&P ADULT - HISTORY OF PRESENT ILLNESS
67 y/o F PMHx uterine cancer (on chemo, last 2 weeks ago) with metastases to the lungs and liver, hypothyroid, HTN, CVA w/o residual deficits presents c/o SOB. States she has had the SOB for at least one month and started around the time that she had her lung biopsy. She notes around 2 weeks ago after her last round of chemotherapy she started to feel worsening of her SOB.  2 weeks ago, she was walking around the neighborhood with her dog and now, she is only able to walk down the [place] before she gets short of breath.  Denies cough, fever, chills, chest pain, abdominal pain, nausea, vomiting, diarrhea, neck pain, headaches, neck swelling. States she has had "a sinus infection with post nasal drip since December when she had Covid. Notes she has had a "raspy whisper voice" for about 2 months "because of post nasal drip".   Of note, patient states that she does not take aspirin since the CVA from many years ago.  Her last echo was a few years ago.  She has been on cancer treatment for the last 3 years and unknown what stage of therapy this is.  In the ED, tachycardia .  Labs significant for hypokalemia K2.8, Hgb 10.9.  Initial EKG could not be located; repeat EKG NSR***CTA without PE; did note progression of pulmonary nodules, hepatic metastasis, severe right hydronephrosis, increased mediastinal lymphadenopathy with extrinsic compression of left pulmonary artery with left apical mass with central cavitation. Currently no dyspnea at rest.

## 2024-02-17 NOTE — H&P ADULT - PROBLEM SELECTOR PLAN 2
CTA without PE; did note progression of pulmonary nodules. Likely progression of cancer  --If echo normal, will need pulmonary and cardiothoracic surgery regarding possible compression syndrome from mass  -Oncology consult regarding further cancer treatment  -She did have Bronch in Jan with negative BALs for acid fast bacteria  -procalcitonin pending

## 2024-02-17 NOTE — H&P ADULT - PROBLEM SELECTOR PLAN 4
Oncology consult regarding further cancer treatment  DNR/DNI per conversation with patient. MOLST filled

## 2024-02-17 NOTE — H&P ADULT - NSHPPHYSICALEXAM_GEN_ALL_CORE
PHYSICAL EXAM:  GENERAL: NAD, comfortable but frail appearing  HEAD:  Atraumatic, Normocephalic  EYES: EOMI, PERRL, conjunctiva and sclera clear  NECK: Supple, No JVD  CHEST/LUNG: Clear to auscultation bilaterally; No wheezes, rales or rhonchi  HEART: Regular rate and rhythm; No murmurs, rubs, or gallops, (+)S1, S2  ABDOMEN: Soft, Nontender, Nondistended; Normal Bowel sounds   EXTREMITIES:  2+ Peripheral Pulses, No clubbing, cyanosis. Trave bilateral edema to ankles only  PSYCH: normal mood and affect  NEUROLOGY: AAOx3, non-focal  SKIN: No rashes or lesions

## 2024-02-17 NOTE — H&P ADULT - CONVERSATION DETAILS
I spoke extensively to the patient at bedside regarding overall prognosis due to advanced age and comorbidities including metastatic cancer. MOLST form also discussed and I explained to the patient what DNR/DNI is, as well as the contents of the MOLST form.  Patient is DNR/DNI, Limited medical interventions (allow BIPAP), No feeding tubes, Trial of IVF and use of antibiotics to be determined. Patient and family expresses understanding of the checked boxes and agreeable to the MOLST form. Signed by patient and physician; witnessed by RN. All questions answered to the best of satisfaction.

## 2024-02-18 NOTE — CONSULT NOTE ADULT - SUBJECTIVE AND OBJECTIVE BOX
HPI:  67 y/o F PMHx uterine cancer (on chemo, last 2 weeks ago) with metastases to the lungs and liver, hypothyroid, HTN, CVA w/o residual deficits presents c/o SOB. States she has had the SOB for at least one month and started around the time that she had her lung biopsy. She notes around 2 weeks ago after her last round of chemotherapy she started to feel worsening of her SOB.  2 weeks ago, she was walking around the neighborhood with her dog and now, she is only able to walk down the [place] before she gets short of breath.  Denies cough, fever, chills, chest pain, abdominal pain, nausea, vomiting, diarrhea, neck pain, headaches, neck swelling. States she has had "a sinus infection with post nasal drip since December when she had Covid. Notes she has had a "raspy whisper voice" for about 2 months "because of post nasal drip".   Of note, patient states that she does not take aspirin since the CVA from many years ago.  Her last echo was a few years ago.  She has been on cancer treatment for the last 3 years and unknown what stage of therapy this is.  In the ED, tachycardia .  Labs significant for hypokalemia K2.8, Hgb 10.9.  Initial EKG could not be located; repeat EKG NSR***CTA without PE; did note progression of pulmonary nodules, hepatic metastasis, severe right hydronephrosis, increased mediastinal lymphadenopathy with extrinsic compression of left pulmonary artery with left apical mass with central cavitation. Currently no dyspnea at rest. (17 Feb 2024 13:00)     Oncology history:   She was treated with carboplatin and paclitaxel from 8/14/20 - 2/11/21  She developed disease progression involving the vaginal mass and in REMBERTO anterior to the mediastinum. She began treatment with pembrolizumb and Lenvima on 3/17/21. Given increasing vaginal pain and increased mass involving the vaginal cuff region, she underwent palliative RT to the vaginal mass and cuff area with Dr. Bowling, to a total dose of 2000 cGy in 5 fraction, from 5/25/21-6/1/21. Pembro/Lenvima was briefly held during RT.  She had improved pain and was restarted and continued on pembrolizumab and Lenvima.  CT c/a/p done on 8/19/2021 which showed favorable response to therapy. Left inguinal node decreased in size. Vaginal mass decreased in size. Pulmonary nodules decreased in size. Her CT c/a/p from 11/16/2021 showed continued pulmonary nodules. While some are stable in size, there is mild increase in pulmonary nodule in the right upper lobe. 3 x 5 mm nodular focus of higher attenuation suggesting enhancement within a right renal cyst. This is slightly better seen on the current examination although may be exaggerated by volume averaging.  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 CT scans subsequently showed disease progression. Pembrolizumab/Lenvima discontinued and treatment options reviewed with the patient. She then began Doxil on 10/10/22, completing C6 on 2/16/23. However, subsequent scans showed POD in the vaginal cuff and lung nodules.  She was switched to Abraxane/Zirabev, C1 on 4/18/23. Disease was stable until CT scans 12/2/23 showing POD in one of her left lung nodules, the vaginal cuff lesion, and a new hepatic lesion.      PAST MEDICAL & SURGICAL HISTORY:  Carotid stenosis, left      Cerebrovascular accident (CVA) due to occlusion of carotid artery  No residual deficits      Endometrial cancer      HTN (hypertension)      Drug-induced thyroiditis      Hypothyroidism      H/O dilation and curettage      S/P myomectomy      History of hand surgery  repair from dog bite 2000      History of lumpectomy of right breast  benign      S/P hysterectomy      Encounter for care related to vascular access port          Allergies    Benadryl (Other)  contrast media (iodine-based) (Other)  gabapentin (Other)    Intolerances    Taxol (Other)      MEDICATIONS  (STANDING):  enoxaparin Injectable 40 milliGRAM(s) SubCutaneous every 24 hours  influenza  Vaccine (HIGH DOSE) 0.7 milliLiter(s) IntraMuscular once  levothyroxine 100 MICROGram(s) Oral daily  valsartan 80 milliGRAM(s) Oral daily    MEDICATIONS  (PRN):  acetaminophen     Tablet .. 650 milliGRAM(s) Oral every 6 hours PRN Temp greater or equal to 38C (100.4F), Mild Pain (1 - 3)  aluminum hydroxide/magnesium hydroxide/simethicone Suspension 30 milliLiter(s) Oral every 4 hours PRN Dyspepsia  melatonin 3 milliGRAM(s) Oral at bedtime PRN Insomnia  ondansetron Injectable 4 milliGRAM(s) IV Push every 8 hours PRN Nausea and/or Vomiting      FAMILY HISTORY:  Family history of CLL (chronic lymphoid leukemia)    Family history of breast cancer in female    Family history of hypertension        SOCIAL HISTORY: No EtOH, no tobacco    REVIEW OF SYSTEMS:    CONSTITUTIONAL: No weakness, fevers or chills  EYES/ENT: No visual changes;  No vertigo or throat pain   NECK: No pain or stiffness  RESPIRATORY: No cough, wheezing, hemoptysis; No shortness of breath  CARDIOVASCULAR: No chest pain or palpitations  GASTROINTESTINAL: No abdominal or epigastric pain. No nausea, vomiting, or hematemesis; No diarrhea or constipation. No melena or hematochezia.  GENITOURINARY: No dysuria, frequency or hematuria  NEUROLOGICAL: No numbness or weakness  SKIN: No itching, burning, rashes, or lesions   All other review of systems is negative unless indicated above.      Weight (kg): 54.8 (02-18 @ 06:15)    T(F): 97.5 (02-18-24 @ 06:15), Max: 98.3 (02-17-24 @ 12:38)  HR: 93 (02-18-24 @ 06:15)  BP: 139/94 (02-18-24 @ 06:15)  RR: 18 (02-18-24 @ 06:15)  SpO2: 99% (02-18-24 @ 06:15)  Wt(kg): --    GENERAL: NAD, well-developed  HEAD:  Atraumatic, Normocephalic  EYES: EOMI, PERRLA, conjunctiva and sclera clear  NECK: Supple, No JVD  CHEST/LUNG: Clear to auscultation bilaterally; No wheeze  HEART: Regular rate and rhythm; No murmurs, rubs, or gallops  ABDOMEN: Soft, Nontender, Nondistended; Bowel sounds present  EXTREMITIES:  2+ Peripheral Pulses, No clubbing, cyanosis, or edema  NEUROLOGY: non-focal  SKIN: No rashes or lesions                          10.9   7.11  )-----------( 308      ( 16 Feb 2024 19:15 )             32.1       02-17    140  |  102  |  9   ----------------------------<  166<H>  4.2   |  26  |  0.63    Ca    9.1      17 Feb 2024 05:26  Phos  2.5     02-17  Mg     1.80     02-17    TPro  6.7  /  Alb  3.1<L>  /  TBili  0.3  /  DBili  x   /  AST  17  /  ALT  7   /  AlkPhos  140<H>  02-16              .Bronchial REMBERTO-BAL  01-08 @ 13:40   Normal Respiratory Deidre present  --    No polymorphonuclear leukocytes seen per low power field  No Squamous epithelial cells seen per low power field  No organisms seen per oil power field      .Tissue REMBERTO-FORCEP BIOPSY  01-08 @ 13:11   Growth in fluid media only Streptococcus mitis/oralis group  --  Streptococcus mitis/oralis group      .Bronchial LEFT UPPER LOBE BRUSH  01-08 @ 13:05   No growth  --    No polymorphonuclear cells seen  No squamous epithelial cells  No organisms seen

## 2024-02-18 NOTE — CONSULT NOTE ADULT - SUBJECTIVE AND OBJECTIVE BOX
HPI:  69 y/o F with uterine cancer with metastases to the lungs and liver for which she is undergoing chemotherapy was admitted with SOB x 1 month. She had a lung biopsy a month ago and her latest chemotherapy session was 2 weeks ago. After that session, her SOB worsened.  A CT scan in the ER noted progression of the pulmonary nodules and increased mediastinal lymphadenopathy with extrinsic compression of the left pulmonary artery by a left apical mass with central cavitation.     PAST MEDICAL & SURGICAL HISTORY:  Carotid stenosis, left  Cerebrovascular accident (CVA) due to occlusion of carotid artery; No residual deficits  Endometrial cancer  HTN (hypertension)  Drug-induced thyroiditis  Hypothyroidism  H/O dilation and curettage  H/O myomectomy  H/O hand surgery- repair from dog bite 2000  H/O of right breast bx- benign  H/O hysterectomy    REVIEW OF SYSTEMS  Refer to HPI    MEDICATIONS  ([facility]):  enoxaparin Injectable 40 milliGRAM(s) SubCutaneous every 24 hours  influenza  Vaccine (HIGH DOSE) 0.7 milliLiter(s) IntraMuscular once  levothyroxine 100 MICROGram(s) Oral daily    MEDICATIONS  (PRN):  acetaminophen     Tablet .. 650 milliGRAM(s) Oral every 6 hours PRN Temp greater or equal to 38C (100.4F), Mild Pain (1 - 3)  aluminum hydroxide/magnesium hydroxide/simethicone Suspension 30 milliLiter(s) Oral every 4 hours PRN Dyspepsia  melatonin 3 milliGRAM(s) Oral at bedtime PRN Insomnia  ondansetron Injectable 4 milliGRAM(s) IV Push every 8 hours PRN Nausea and/or Vomiting    ALLERGIES  Benadryl (Other)  contrast media (iodine-based) (Other)  gabapentin (Other)  Intolerances: Taxol (Other)    FAMILY HISTORY:  Family history of CLL (chronic lymphoid leukemia)  Family history of breast cancer in female  Family history of hypertension    Vital Signs Last 24 Hrs  T(C): 37.2 (18 Feb 2024 18:35), Max: 37.2 (18 Feb 2024 18:35)  T(F): 98.9 (18 Feb 2024 18:35), Max: 98.9 (18 Feb 2024 18:35)  HR: 94 (18 Feb 2024 18:35) (90 - 94)  BP: 132/61 (18 Feb 2024 18:35) (132/61 - 143/87)  RR: 18 (18 Feb 2024 18:35) (17 - 18)  SpO2: 99% (18 Feb 2024 18:35) (98% - 99%)    Parameters below as of 18 Feb 2024 18:35  Patient On (Oxygen Delivery Method): room air        General: WN/WD NAD  Neurology: Awake, nonfocal, CAMARGO x 4  Eyes: Scleras clear, Gross vision intact  ENT: Gross hearing intact, grossly patent pharynx, no stridor  Neck: Neck supple, trachea midline, No JVD,   Respiratory: Decreased BS on the left No wheezing, rales, rhonchi  CV: RRR, S1, S2, no murmurs  Abdominal: Soft, NT, ND +BS,   Extremities: No edema, + peripheral pulses  Skin: No Rashes, Hematoma, Ecchymosis  Lymphatic: No Neck, axilla, groin LAD  Psych: Oriented x 3, normal affect    LABS:    02-17    140  |  102  |  9   ----------------------------<  166<H>  4.2   |  26  |  0.63    Ca    9.1      17 Feb 2024 05:26  Phos  2.5     02-17  Mg     1.80     02-17    Urinalysis Basic - ( 17 Feb 2024 05:26 )  Color: x / Appearance: x / SG: x / pH: x  Gluc: 166 mg/dL / Ketone: x  / Bili: x / Urobili: x   Blood: x / Protein: x / Nitrite: x   Leuk Esterase: x / RBC: x / WBC x   Sq Epi: x / Non Sq Epi: x / Bacteria: x    RADIOLOGY & ADDITIONAL STUDIES:  CT Angio Chest PE Protocol w/ IV Cont (02.16.24 @ 23:02) >  EXAM:  CT ANGIO CHEST PULM ART WAWI   FINDINGS:  LUNGS AND AIRWAYS: Left apical mass with central lucencies measures 5.8 x 5.0 cm, previously 5.2 x 4.3 cm (remeasured).. There is extension inferiorly towards the left suprahilar region. New/increased scattered nodules bilaterally. For example 5 mm left upper lobe nodule (301:67), increased size of right upper lobe nodule measuring up to 6 mm, previously 2 mm (301:39), and a right lower lobe 1 cm nodule adjacent to the wedge resection suture line (302:279) previously 7 mm.. Conglomerate nodules associated with the minor and major fissure on the right are not also mildly increased. Right lower lobe wedge resections.  PLEURA: New small left pleural effusion.  MEDIASTINUM AND VIDAL: Large confluent lymph node mass centered at the AP window, measuring approximately 8.6 x 8.7 cm. While direct comparison is limited due to noncontrast evaluation on prior, there has been interval increase in size, previously approximately 6.4 x 5.6 cm. there is associated narrowing of the left pulmonary artery as well as the left upper lobe bronchi.  VESSELS: No pulmonary embolism. Narrowing of the left pulmonary artery.  HEART: Heart size is normal. No pericardial effusion.  CHEST WALL AND LOWER NECK: Right-sided Mediport with tip in the SVC.  VISUALIZED UPPER ABDOMEN: Increased size of segment 3 hepatic metastases, measuring up to 4.5 x 4.5 cm. Severe right hydronephrosis with hypoenhancing right kidney. Bilateral renal cysts.  BONES: Degenerative changes.  IMPRESSION:  No pulmonary embolism.  Left apical mass with central cavitation is larger in size when compared to 1/8/2024.  Increased mediastinal lymphadenopathy, with extrinsic compression of the left pulmonary artery and left upper lobe bronchi.  Progression of pulmonary nodules and hepatic hepatic metastasis.  Severe right hydronephrosis.    Xray Chest 2 Views PA/Lat (02.16.24 @ 18:33) >  IMPRESSION:  Small left pleural effusion, new from prior.    ASSESSMENT:   Enlarging lung mets    PLAN:  No Thoracic Surgery intervention.   Recommend an evaluation for palliative radiation  Recommend GOC/ Palliative discussion    Dr Ross to follow up tomorrow.

## 2024-02-18 NOTE — PHYSICAL THERAPY INITIAL EVALUATION ADULT - PERTINENT HX OF CURRENT PROBLEM, REHAB EVAL
Pt is a 68 year old female presenting with worsening SOB and LOMAS. CTA chest negative for PE; significant for left apical mass with central cavitation is larger in size when compared to 1/8/2024, increased mediastinal lymphadenopathy, with extrinsic compression of the left pulmonary artery and left upper lobe bronchi, progression of pulmonary nodules and hepatic metastasis, and severe right hydronephrosis.

## 2024-02-18 NOTE — CONSULT NOTE ADULT - ATTENDING COMMENTS
will notify primary oncologist   agree with echo to determine if pulmonary hypertension has developed from pulmonary artery compression   could consider palliative RT based on results  will follow along

## 2024-02-18 NOTE — CONSULT NOTE ADULT - ASSESSMENT
67 y/o F PMHx uterine cancer (on chemo, last 2 weeks ago) with metastases to the lungs and liver, hypothyroid, HTN, CVA w/o residual deficits presents c/o worsening exertional dyspnea.   Heme consulted for metastatic uterine cancer.    # Metastatic uterine cancer  - Patient follows with Dr. Toy Blank at [email or web address]  - Tumor Markers: ER + ND + PAX 8 + MSI STABLE   - Current Treatment Status:. Since metastatic recurrence: Carbo/Taxol 8/14/20 - 2/11/21 --> POD --> Lenvima/Pembro --> 5 fx RT to vaginal cuff 5/25-6/1/23 (pembro held during RT, Lenvima held June/July 2021), continued Lenvima/Pembro --> immunotherapy-induced colitis + POD --> Doxil started 10/2022 --> POD, switched to Abrazane/Zirabev since 4/2023 --> POD 12/2023 --> Started Docetaxel on 2/1/24.  - 12/2/23 CT abdomen : Enlarging cavitary lung mass in the left upper lobe with interval development of lymphadenopathy in the AP window, highly concerning for neoplasm with metastatic disease. May represent a primary lung neoplasm. Cavitary metastasis is considered less likely yet also within the differential diagnosis. Mild pleural-based nodularity in the right lung as above which appears minimally increased. Liver metastasis has increased in size. Mild increase in soft tissue at the right vaginal cuff with mild right-sided hydronephrosis.  - Per outpatient record, CT scans from 12/2/23 previously reviewed with the patient, revealing POD and discussed screening patient for eligible clinical trials vs initiating new chemotherapy-> currently on docetaxel 67 y/o F PMHx uterine cancer (on chemo, last 2 weeks ago) with metastases to the lungs and liver, hypothyroid, HTN, CVA w/o residual deficits presents c/o worsening exertional dyspnea.   Heme consulted for metastatic uterine cancer.    # Metastatic uterine cancer  - Patient follows with Dr. Toy Blank at [facility]  - Tumor Markers: ER + WI + PAX 8 + MSI STABLE   - Current Treatment Status:. Since metastatic recurrence: Carbo/Taxol 8/14/20 - 2/11/21 --> POD --> Lenvima/Pembro --> 5 fx RT to vaginal cuff 5/25-6/1/23 (pembro held during RT, Lenvima held June/July 2021), continued Lenvima/Pembro --> immunotherapy-induced colitis + POD --> Doxil started 10/2022 --> POD, switched to Abrazane/Zirabev since 4/2023 --> POD 12/2023 --> Started Docetaxel on 2/1/24.  - 12/2/23 CT abdomen : Enlarging cavitary lung mass in the left upper lobe with interval development of lymphadenopathy in the AP window, highly concerning for neoplasm with metastatic disease. May represent a primary lung neoplasm. Cavitary metastasis is considered less likely yet also within the differential diagnosis. Mild pleural-based nodularity in the right lung as above which appears minimally increased. Liver metastasis has increased in size. Mild increase in soft tissue at the right vaginal cuff with mild right-sided hydronephrosis.  - Per outpatient record, CT scans from 12/2/23 previously reviewed with the patient, revealing POD and discussed screening patient for eligible clinical trials vs initiating new chemotherapy-> currently on docetaxel  - CT chest on 2/16/24-> No pulmonary embolism.Left apical mass with central cavitation is larger in size when compared to 1/8/2024. Increased mediastinal lymphadenopathy, with extrinsic compression of the left pulmonary artery and left upper lobe bronchi.  Progression of pulmonary nodules and hepatic hepatic metastasis. Severe right hydronephrosis.   67 y/o F PMHx uterine cancer (on chemo, last 2 weeks ago) with metastases to the lungs and liver, hypothyroid, HTN, CVA w/o residual deficits presents c/o worsening exertional dyspnea.   Heme consulted for metastatic uterine cancer.    # Metastatic uterine cancer  - Patient follows with Dr. Toy Blank at [facility]  - Tumor Markers: ER + GA + PAX 8 + MSI STABLE   - Current Treatment Status:. Since metastatic recurrence: Carbo/Taxol 8/14/20 - 2/11/21 --> POD --> Lenvima/Pembro --> 5 fx RT to vaginal cuff 5/25-6/1/23 (pembro held during RT, Lenvima held June/July 2021), continued Lenvima/Pembro --> immunotherapy-induced colitis + POD --> Doxil started 10/2022 --> POD, switched to Abrazane/Zirabev since 4/2023 --> POD 12/2023 --> Started Docetaxel on 2/1/24.  - 12/2/23 CT abdomen : Enlarging cavitary lung mass in the left upper lobe with interval development of lymphadenopathy in the AP window, highly concerning for neoplasm with metastatic disease. May represent a primary lung neoplasm. Cavitary metastasis is considered less likely yet also within the differential diagnosis. Mild pleural-based nodularity in the right lung as above which appears minimally increased. Liver metastasis has increased in size. Mild increase in soft tissue at the right vaginal cuff with mild right-sided hydronephrosis.  - Per outpatient record, CT scans from 12/2/23 previously reviewed with the patient, revealing POD and discussed screening patient for eligible clinical trials vs initiating new chemotherapy-> currently on docetaxel  - CT chest on 2/16/24-> No pulmonary embolism.Left apical mass with central cavitation is larger in size when compared to 1/8/2024. Increased mediastinal lymphadenopathy, with extrinsic compression of the left pulmonary artery and left upper lobe bronchi.  Progression of pulmonary nodules and hepatic hepatic metastasis. Severe right hydronephrosis.  - Suspect that her dyspnea is likely from compression of the bronchi and pulm artery however, pending TTE to r/o cardiac cause  - CT sx was consulted for the pulm artery compression-> RT eval can be considered to relieve the compressive symptoms.  - Dr. Toy Blank was emailed regarding the patient's admission  - Oncology will follow up.

## 2024-02-18 NOTE — CONSULT NOTE ADULT - ASSESSMENT
Urology Consult    HPI: 67 y/o F PMHx uterine cancer (on chemo, last 2 weeks ago) with metastases to the lungs and liver, hypothyroid, HTN, CVA w/o residual deficits presents c/o SOB. States she has had the SOB for at least one month and started around the time that she had her lung biopsy. She notes around 2 weeks ago after her last round of chemotherapy she started to feel worsening of her SOB.  2 weeks ago, she was walking around the neighborhood with her dog and now, she is only able to walk down the [place] before she gets short of breath.  Denies cough, fever, chills, chest pain, abdominal pain, nausea, vomiting, diarrhea, neck pain, headaches, neck swelling. States she has had "a sinus infection with post nasal drip since December when she had Covid. Notes she has had a "raspy whisper voice" for about 2 months "because of post nasal drip". Of note, patient states that she does not take aspirin since the CVA from many years ago. Her last echo was a few years ago.  She has been on cancer treatment for the last 3 years and unknown what stage of therapy this is. In the ED, tachycardia . Labs significant for hypokalemia K2.8, Hgb 10.9.  Initial EKG could not be located; repeat EKG NSR***CTA without PE; did note progression of pulmonary nodules, hepatic metastasis, severe right hydronephrosis, increased mediastinal lymphadenopathy with extrinsic compression of left pulmonary artery with left apical mass with central cavitation. Currently no dyspnea at rest.    Consulted for CT findings of Severe right hydronephrosis with hypoenhancing right kidney. Bilateral renal cysts. Pt is afebrile, no dysuria, hematuria, voiding without difficulty or hesitancy. No serum WBC count. Denies fever, chills, nausea/vomiting, dizziness, headache, abdominal pain.    Formal Recommendations to follow  ---------------------------------------------------------------------------------------------  No acute urological intervention at this time  Urine w/ culture  Continue to trend Cr  Care per primary team  Please follow up with new onset of fever, intractable pain or new onset MANJU Urology Consult    HPI: 69 y/o F PMHx uterine cancer (on chemo, last 2 weeks ago) with metastases to the lungs and liver, hypothyroid, HTN, CVA w/o residual deficits presents c/o SOB. States she has had the SOB for at least one month and started around the time that she had her lung biopsy. She notes around 2 weeks ago after her last round of chemotherapy she started to feel worsening of her SOB.  2 weeks ago, she was walking around the neighborhood with her dog and now, she is only able to walk down the [place] before she gets short of breath.  Denies cough, fever, chills, chest pain, abdominal pain, nausea, vomiting, diarrhea, neck pain, headaches, neck swelling. States she has had "a sinus infection with post nasal drip since December when she had Covid. Notes she has had a "raspy whisper voice" for about 2 months "because of post nasal drip". Of note, patient states that she does not take aspirin since the CVA from many years ago. Her last echo was a few years ago.  She has been on cancer treatment for the last 3 years and unknown what stage of therapy this is. In the ED, tachycardia . Labs significant for hypokalemia K2.8, Hgb 10.9.  Initial EKG could not be located; repeat EKG NSR***CTA without PE; did note progression of pulmonary nodules, hepatic metastasis, severe right hydronephrosis, increased mediastinal lymphadenopathy with extrinsic compression of left pulmonary artery with left apical mass with central cavitation. Currently no dyspnea at rest.    Consulted for CT findings of Severe right hydronephrosis with hypoenhancing right kidney. Bilateral renal cysts. Pt is afebrile, no dysuria, hematuria, voiding without difficulty or hesitancy. No serum WBC count. Denies fever, chills, nausea/vomiting, dizziness, headache, abdominal pain.    Plan:  No acute urological intervention at this time  Urine w/ culture  Continue to trend Cr  Care per primary team  Please follow up with new onset of fever, intractable pain or new onset MANJU, or any plan for treatment that would require preservation of renal function, in which case a R ureteral stent may be warranted.    Patient and plan discussed with on-call attending Dr. Degroot

## 2024-02-18 NOTE — CONSULT NOTE ADULT - SUBJECTIVE AND OBJECTIVE BOX
Urology Consult    HPI: 69 y/o F PMHx uterine cancer (on chemo, last 2 weeks ago) with metastases to the lungs and liver, hypothyroid, HTN, CVA w/o residual deficits presents c/o SOB. States she has had the SOB for at least one month and started around the time that she had her lung biopsy. She notes around 2 weeks ago after her last round of chemotherapy she started to feel worsening of her SOB.  2 weeks ago, she was walking around the neighborhood with her dog and now, she is only able to walk down the [place] before she gets short of breath.  Denies cough, fever, chills, chest pain, abdominal pain, nausea, vomiting, diarrhea, neck pain, headaches, neck swelling. States she has had "a sinus infection with post nasal drip since December when she had Covid. Notes she has had a "raspy whisper voice" for about 2 months "because of post nasal drip". Of note, patient states that she does not take aspirin since the CVA from many years ago. Her last echo was a few years ago.  She has been on cancer treatment for the last 3 years and unknown what stage of therapy this is. In the ED, tachycardia . Labs significant for hypokalemia K2.8, Hgb 10.9.  Initial EKG could not be located; repeat EKG NSR***CTA without PE; did note progression of pulmonary nodules, hepatic metastasis, severe right hydronephrosis, increased mediastinal lymphadenopathy with extrinsic compression of left pulmonary artery with left apical mass with central cavitation. Currently no dyspnea at rest.    Consulted for CT findings of Severe right hydronephrosis with hypoenhancing right kidney. Bilateral renal cysts. Pt is afebrile, no dysuria, hematuria, voiding without difficulty or hesitancy. No serum WBC count. Denies fever, chills, nausea/vomiting, dizziness, headache, abdominal pain.      PAST MEDICAL & SURGICAL HISTORY:  Carotid stenosis, left  Cerebrovascular accident (CVA) due to occlusion of carotid artery  No residual deficits  Endometrial cancer  HTN (hypertension)  Drug-induced thyroiditis  Hypothyroidism  H/O dilation and curettage  S/P myomectomy  History of hand surgery  repair from dog bite 2000  History of lumpectomy of right breast benign  S/P hysterectomy  Encounter for care related to vascular access port    FAMILY HISTORY:  Family history of CLL (chronic lymphoid leukemia)  Family history of breast cancer in female  Family history of hypertension    SOCIAL HISTORY:   Tobacco hx:    MEDICATIONS  ([facility]):  enoxaparin Injectable 40 milliGRAM(s) SubCutaneous every 24 hours  influenza  Vaccine (HIGH DOSE) 0.7 milliLiter(s) IntraMuscular once  levothyroxine 100 MICROGram(s) Oral daily    MEDICATIONS  (PRN):  acetaminophen     Tablet .. 650 milliGRAM(s) Oral every 6 hours PRN Temp greater or equal to 38C (100.4F), Mild Pain (1 - 3)  aluminum hydroxide/magnesium hydroxide/simethicone Suspension 30 milliLiter(s) Oral every 4 hours PRN Dyspepsia  melatonin 3 milliGRAM(s) Oral at bedtime PRN Insomnia  ondansetron Injectable 4 milliGRAM(s) IV Push every 8 hours PRN Nausea and/or Vomiting    Allergies    Benadryl (Other)  contrast media (iodine-based) (Other)  gabapentin (Other)    Intolerances    Taxol (Other)    REVIEW OF SYSTEMS: Pertinent positives and negatives as stated in HPI, otherwise negative    Vital signs  T(C): 36.3 (02-18-24 @ 14:54), Max: 36.7 (02-17-24 @ 16:51)  HR: 90 (02-18-24 @ 14:54)  BP: 143/87 (02-18-24 @ 14:54)  SpO2: 98% (02-18-24 @ 14:54)  Wt(kg): --    Output    UOP    Physical Exam  Vital signs reviewed.   CONSTITUTIONAL: Well-appearing; well-nourished; in no apparent distress.   HEAD: Normocephalic, atraumatic.  EYES: Normal conjunctiva and no sclera injection noted  ENT: normal nose; no rhinorrhea.  CARD: Normal S1, S2  RESP: Normal chest excursion with respiration; breath sounds clear and equal bilaterally  ABD/GI: Soft, non-distended; non-tender.  EXT/MS: Moves all extremities  SKIN: Normal for age and race-  NEURO: Awake, alert, oriented x 3-  PSYCH: Normal mood; appropriate affect.    LABS:      02-17 @ 05:26    WBC --    / Hct --    / SCr 0.63     02-16 @ 19:15    WBC 7.11  / Hct 32.1  / SCr 0.69     02-17    140  |  102  |  9   ----------------------------<  166<H>  4.2   |  26  |  0.63    Ca    9.1      17 Feb 2024 05:26  Phos  2.5     02-17  Mg     1.80     02-17    TPro  6.7  /  Alb  3.1<L>  /  TBili  0.3  /  DBili  x   /  AST  17  /  ALT  7   /  AlkPhos  140<H>  02-16    Urinalysis Basic - ( 17 Feb 2024 05:26 )    Color: x / Appearance: x / SG: x / pH: x  Gluc: 166 mg/dL / Ketone: x  / Bili: x / Urobili: x   Blood: x / Protein: x / Nitrite: x   Leuk Esterase: x / RBC: x / WBC x   Sq Epi: x / Non Sq Epi: x / Bacteria: x    Urine Cx:   Blood Cx:    RADIOLOGY:    ACC: [id number] EXAM:  CT ANGIO CHEST PULM ART WAWIC   ORDERED BY: NIKO DOBBINS     PROCEDURE DATE:  02/16/2024      INTERPRETATION:  CLINICAL INFORMATION: Shortness of breath. Metastatic   uterine cancer.    COMPARISON: CT chest 1/8/2024    CONTRAST/COMPLICATIONS:  IV Contrast: Omnipaque 350  90 cc administered   10 cc discarded  Oral Contrast: NONE  Complications: None reported at time of study completion    PROCEDURE:  CT Angiography of the Chest.  Sagittal and coronal reformats were performed as well as 3D (MIP)   reconstructions.    FINDINGS:    LUNGS AND AIRWAYS: Left apical mass with central lucencies measures 5.8 x   5.0 cm, previously 5.2 x 4.3 cm (remeasured).. There is extension   inferiorly towards the left suprahilar region. New/increased scattered   nodules bilaterally. For example 5 mm left upper lobe nodule (301:67),   increased size of right upper lobe nodule measuring up to 6 mm,   previously 2 mm (301:39), and a right lower lobe 1 cm nodule adjacent to   the wedge resection suture line (302:279) previously 7 mm.. Conglomerate   nodules associated with the minor and major fissure on the right are not   also mildly increased. Right lower lobe wedge resections.  PLEURA: New small left pleural effusion.  MEDIASTINUM AND VIDAL: Large confluent lymph node mass centered at the AP   window, measuring approximately 8.6 x 8.7 cm. While direct comparison is   limited due to noncontrast evaluation on prior, there has been interval   increase in size, previously approximately 6.4 x 5.6 cm. there is   associated narrowing of the left pulmonary artery as well as the left   upper lobe bronchi.  VESSELS: No pulmonary embolism. Narrowing of the left pulmonary artery.  HEART: Heart size is normal. No pericardial effusion.  CHEST WALL AND LOWER NECK: Right-sided Mediport with tip in the SVC.  VISUALIZED UPPER ABDOMEN: Increased size of segment 3 hepatic metastases,   measuring up to 4.5 x 4.5 cm. Severe right hydronephrosis with   hypoenhancing right kidney. Bilateral renal cysts.  BONES: Degenerative changes.    IMPRESSION:  No pulmonary embolism.  Left apical mass with central cavitation is larger in size when compared   to 1/8/2024.  Increased mediastinal lymphadenopathy, with extrinsic compression of the   left pulmonary artery and left upper lobe bronchi.  Progression of pulmonary nodules and hepatic hepatic metastasis.  Severe right hydronephrosis.    --- End of Report ---      TIFF FUCHS MD; Resident Radiologist  This document has been electronically signed.  KILO SINGH MD; Attending Radiologist  This document has been electronically signed. Feb 17 2024 12:33AM

## 2024-02-18 NOTE — PHYSICAL THERAPY INITIAL EVALUATION ADULT - PATIENT PROFILE REVIEW, REHAB EVAL
PT initial evaluation received and chart review completed. Pt agreeable to participate in PT evaluation. ACTIVITY: AMBULATE WITH ASSISTANCE/yes

## 2024-02-18 NOTE — PHYSICAL THERAPY INITIAL EVALUATION ADULT - GENERAL OBSERVATIONS, REHAB EVAL
Upon entry, pt semi-supine in bed in NAD; + telemetry. HR Upon entry, pt semi-supine in bed in NAD; + telemetry. HR 88 bpm. Pt left as received with all tubes/lines intact, call bell in reach and in NAD.

## 2024-02-18 NOTE — PHYSICAL THERAPY INITIAL EVALUATION ADULT - PATIENT/FAMILY/SIGNIFICANT OTHER GOALS STATEMENT, PT EVAL
Is This A New Presentation, Or A Follow-Up?: Nail Discoloration Additional History: PT states lesion has not changed since November, but PCP recommended further eval. Pt wants to get better and go home

## 2024-02-19 NOTE — PROGRESS NOTE ADULT - CONVERSATION DETAILS
Opened the conversation with what Pt's goals were and the possibility of going home to await further treatment. Pt then discussed that there were several times where she felt as though "the end were near" and then new treatment was offered. She states there is conversation about further therapy being offered. When asking about how she felt about that, she reports she wonders about the utility of it. When discussing what are the most important things, she states that she mourns the lost of her functional status and she wonders about the effect of her absence on her dog. She states that she would not like to die at home. She remains open to discussions/introduction to palliative care and possibly hospice alongside discussing next steps for treatment.    She lives at home by herself with a tenant downstairs who is caring for her dog. Her close friend is El ( with kids). Tenant is also a support for patient      Will consult palliative care for tomorrow

## 2024-02-20 NOTE — DIETITIAN INITIAL EVALUATION ADULT - OTHER CALCULATIONS
Ideal Body Weight: 105 lbs / 47.7 kg +/-10%   Weight history, per chart: (2/18 dosing) 120.8 lbs / 54.8 kg, (1/25) 114 lbs, (11/25/23) 115 lbs, (8/8/23) 124 lbs.

## 2024-02-20 NOTE — CONSULT NOTE ADULT - PROBLEM SELECTOR RECOMMENDATION 2
> Pt c/o mild back & groin pain in which PRN tylenol is helpful  > Can continue with PRN Tylenol, discussed the role of opiates if pain were to worsen. Pt verbalized understanding.

## 2024-02-20 NOTE — DIETITIAN INITIAL EVALUATION ADULT - PERTINENT MEDS FT
levothyroxine   aluminum hydroxide/magnesium hydroxide/simethicone Suspension PRN  ondansetron IV PRN

## 2024-02-20 NOTE — DIETITIAN INITIAL EVALUATION ADULT - ADD RECOMMEND
Recommend regular diet.  Obtained food preferences, will honor as able.  Monitor electrolytes (K+, Mg, P) and replete to within desired limits as clinically indicated.

## 2024-02-20 NOTE — CONSULT NOTE ADULT - CONVERSATION DETAILS
Referral to palliative care for complex decision making and symptom management in setting of advanced malignancy. Met with patient at bedside, introduced self and role of palliative care. Reviewed pt current medical status and disease trajectory. Pt shared she has been following with Dr. Toy Blank and is no longer interested in DMT at this time. Educated pt on the philosophy of hospice care and explained the various settings and criteria in which hospice can be delivered (home vs. nursing home vs. inpatient hospice). Discussed the services provided under hospice services emphasizing the goal of elevating patient's quality of life and optimizing symptom management and avoiding unnecessary future hospitalizations. In addition to symptom management expertise, hospice provides supportive counseling services, nutritional support and chaplaincy services.   Pt expressed interest in facility / inpatient hospice, however, does not meet the criteria for inpatient hospice at this time. Pt does not have medicaid for NH placement. Explained that she can enroll fro the community if she were to change her mind. Pt not interested in hospice referral at this time. Confirmed advanced directives, DNR/DNI with NIV, no HD & No PEG - MOLST in the chart.   Pt can follow up with Dr. Blank for ongoing [facility] conversations.

## 2024-02-20 NOTE — CONSULT NOTE ADULT - ASSESSMENT
67 y/o F PMHx uterine cancer (on chemo, last 2 weeks ago) with metastases to the lungs and liver, hypothyroid, HTN, CVA w/o residual deficits presents c/o SOB. States she has had the SOB for at least one month and started around the time that she had her lung biopsy. She notes around 2 weeks ago after her last round of chemotherapy she started to feel worsening of her SOB.  2 weeks ago, she was walking around the neighborhood with her dog and now, she is only able to walk down the [place] before she gets short of breath. Palliative consulted for GOC.

## 2024-02-20 NOTE — CONSULT NOTE ADULT - SUBJECTIVE AND OBJECTIVE BOX
HPI:  69 y/o F PMHx uterine cancer (on chemo, last 2 weeks ago) with metastases to the lungs and liver, hypothyroid, HTN, CVA w/o residual deficits presents c/o SOB. States she has had the SOB for at least one month and started around the time that she had her lung biopsy. She notes around 2 weeks ago after her last round of chemotherapy she started to feel worsening of her SOB.  2 weeks ago, she was walking around the neighborhood with her dog and now, she is only able to walk down the [place] before she gets short of breath.  Denies cough, fever, chills, chest pain, abdominal pain, nausea, vomiting, diarrhea, neck pain, headaches, neck swelling. States she has had "a sinus infection with post nasal drip since December when she had Covid. Notes she has had a "raspy whisper voice" for about 2 months "because of post nasal drip".   Of note, patient states that she does not take aspirin since the CVA from many years ago.  Her last echo was a few years ago.  She has been on cancer treatment for the last 3 years and unknown what stage of therapy this is.  In the ED, tachycardia .  Labs significant for hypokalemia K2.8, Hgb 10.9.  Initial EKG could not be located; repeat EKG NSR***CTA without PE; did note progression of pulmonary nodules, hepatic metastasis, severe right hydronephrosis, increased mediastinal lymphadenopathy with extrinsic compression of left pulmonary artery with left apical mass with central cavitation. Currently no dyspnea at rest.           KPS:    Allergies    Benadryl (Other)  contrast media (iodine-based) (Other)  gabapentin (Other)    Intolerances    Taxol (Other)      ROS: [  ] Fever  [  ] Chills  [  ]Chest Pain [  ] SOB  [  ]Cough [  ] N/V  [  ] Diarrhea [  ]Constipation [  ]Other ROS:  [  ] ROS otherwise negative    PAST MEDICAL & SURGICAL HISTORY:  Carotid stenosis, left    Cerebrovascular accident (CVA) due to occlusion of carotid artery  No residual deficits    Endometrial cancer    HTN (hypertension)    Drug-induced thyroiditis    Hypothyroidism    H/O dilation and curettage    S/P myomectomy    History of hand surgery  repair from dog bite 2000    History of lumpectomy of right breast  benign    Status post hysteroscopy    S/P hysterectomy    Encounter for care related to vascular access port        FAMILY HISTORY:  Family history of CLL (chronic lymphoid leukemia)    Family history of breast cancer in female    Family history of hypertension      MEDICATIONS  (STANDING):  albuterol/ipratropium for Nebulization 3 milliLiter(s) Nebulizer every 12 hours  enoxaparin Injectable 40 milliGRAM(s) SubCutaneous every 24 hours  fluticasone propionate 50 MICROgram(s)/spray Nasal Spray 1 Spray(s) Both Nostrils two times a day  guaiFENesin  milliGRAM(s) Oral every 12 hours  influenza  Vaccine (HIGH DOSE) 0.7 milliLiter(s) IntraMuscular once  levothyroxine 100 MICROGram(s) Oral daily  sodium chloride 3%  Inhalation 4 milliLiter(s) Inhalation every 12 hours  valsartan 160 milliGRAM(s) Oral daily    MEDICATIONS  (PRN):  acetaminophen     Tablet .. 650 milliGRAM(s) Oral every 6 hours PRN Temp greater or equal to 38C (100.4F), Mild Pain (1 - 3)  aluminum hydroxide/magnesium hydroxide/simethicone Suspension 30 milliLiter(s) Oral every 4 hours PRN Dyspepsia  melatonin 3 milliGRAM(s) Oral at bedtime PRN Insomnia  ondansetron Injectable 4 milliGRAM(s) IV Push every 8 hours PRN Nausea and/or Vomiting      PHYSICAL EXAM  Vital Signs Last 24 Hrs  T(C): 36.7 (20 Feb 2024 05:55), Max: 37.1 (19 Feb 2024 20:00)  T(F): 98 (20 Feb 2024 05:55), Max: 98.8 (19 Feb 2024 20:00)  HR: 81 (20 Feb 2024 05:55) (81 - 94)  BP: 126/70 (20 Feb 2024 05:55) (126/70 - 129/81)  BP(mean): --  RR: 18 (20 Feb 2024 05:55) (16 - 18)  SpO2: 100% (20 Feb 2024 05:55) (98% - 100%)    Parameters below as of 20 Feb 2024 05:55  Patient On (Oxygen Delivery Method): room air        General: Well nourished, well developed, no acute distress  HEENT: NC/AT; EOMI, PERRL, sclera nonicteric; external ears normal; no rhinorrhea or epistaxis; mucous membranes moist; oropharynx clear and without erythema  CV: NR, RR; no appreciable r/m/g  Lungs: CTAB, no increased work of breathing  Abdomen: Bowel sounds present; soft, NTND  MSK: Vertebral spine non-tender to palpation  Neuro: AAOx3; cranial nerves II-XII intact; strength 5/5 in upper and lower extremities; sensation to light touch in tact bilaterally.  Psych: Full affect; mood congruent  Skin: no visible rashes on limited examination    IMAGING/LABS/PATHOLOGY: I have personally reviewed the relevant labs, pathology, and imaging as noted in the HPI.  In addition,    ASSESSMENT/PLAN    SOHA VICENTE is a 68y woman with     We discussed the use of palliative radiation in this setting, namely to improve quality of life through the reduction of symptoms.  We talked about the risks, benefits, acute and long term side effects, as well as expected treatment outcomes.  He/She was given the opportunity to ask questions, which were answered to his/her apparent satisfaction.  He/She provided written consent to proceed with radiation therapy. We will arrange for inpatient/outpatient treatment. HPI:  67 y/o F PMHx uterine cancer (on chemo, last 2 weeks ago) with metastases to the lungs and liver, hypothyroid, HTN, CVA w/o residual deficits presents c/o SOB. States she has had the SOB for at least one month and started around the time that she had her lung biopsy. She notes around 2 weeks ago after her last round of chemotherapy she started to feel worsening of her SOB.  2 weeks ago, she was walking around the neighborhood with her dog and now, she is only able to walk down the [place] before she gets short of breath.  Denies cough, fever, chills, chest pain, abdominal pain, nausea, vomiting, diarrhea, neck pain, headaches, neck swelling. States she has had "a sinus infection with post nasal drip since December when she had Covid. Notes she has had a "raspy whisper voice" for about 2 months "because of post nasal drip".   Of note, patient states that she does not take aspirin since the CVA from many years ago.  Her last echo was a few years ago.  She has been on cancer treatment for the last 3 years and unknown what stage of therapy this is.  In the ED, tachycardia .  Labs significant for hypokalemia K2.8, Hgb 10.9.  Initial EKG could not be located; repeat EKG NSR***CTA without PE; did note progression of pulmonary nodules, hepatic metastasis, severe right hydronephrosis, increased mediastinal lymphadenopathy with extrinsic compression of left pulmonary artery with left apical mass with central cavitation. Currently no dyspnea at rest.     KPS: 70    Allergies    Benadryl (Other)  contrast media (iodine-based) (Other)  gabapentin (Other)    Intolerances    Taxol (Other)      ROS: [  ] Fever  [  ] Chills  [  ]Chest Pain [  ] SOB  [  ]Cough [  ] N/V  [  ] Diarrhea [  ]Constipation [  ]Other ROS:  [  ] ROS otherwise negative    PAST MEDICAL & SURGICAL HISTORY:  Carotid stenosis, left    Cerebrovascular accident (CVA) due to occlusion of carotid artery  No residual deficits    Endometrial cancer    HTN (hypertension)    Drug-induced thyroiditis    Hypothyroidism    H/O dilation and curettage    S/P myomectomy    History of hand surgery  repair from dog bite 2000    History of lumpectomy of right breast  benign    Status post hysteroscopy    S/P hysterectomy    Encounter for care related to vascular access port    HISTORY OF PRIOR RT: yes to the pelvis completed with Dr Bowling    FAMILY HISTORY:  Family history of CLL (chronic lymphoid leukemia)    Family history of breast cancer in female    Family history of hypertension      MEDICATIONS  (STANDING):  albuterol/ipratropium for Nebulization 3 milliLiter(s) Nebulizer every 12 hours  enoxaparin Injectable 40 milliGRAM(s) SubCutaneous every 24 hours  fluticasone propionate 50 MICROgram(s)/spray Nasal Spray 1 Spray(s) Both Nostrils two times a day  guaiFENesin  milliGRAM(s) Oral every 12 hours  influenza  Vaccine (HIGH DOSE) 0.7 milliLiter(s) IntraMuscular once  levothyroxine 100 MICROGram(s) Oral daily  sodium chloride 3%  Inhalation 4 milliLiter(s) Inhalation every 12 hours  valsartan 160 milliGRAM(s) Oral daily    MEDICATIONS  (PRN):  acetaminophen     Tablet .. 650 milliGRAM(s) Oral every 6 hours PRN Temp greater or equal to 38C (100.4F), Mild Pain (1 - 3)  aluminum hydroxide/magnesium hydroxide/simethicone Suspension 30 milliLiter(s) Oral every 4 hours PRN Dyspepsia  melatonin 3 milliGRAM(s) Oral at bedtime PRN Insomnia  ondansetron Injectable 4 milliGRAM(s) IV Push every 8 hours PRN Nausea and/or Vomiting      PHYSICAL EXAM  Vital Signs Last 24 Hrs  T(C): 36.7 (20 Feb 2024 05:55), Max: 37.1 (19 Feb 2024 20:00)  T(F): 98 (20 Feb 2024 05:55), Max: 98.8 (19 Feb 2024 20:00)  HR: 81 (20 Feb 2024 05:55) (81 - 94)  BP: 126/70 (20 Feb 2024 05:55) (126/70 - 129/81)  BP(mean): --  RR: 18 (20 Feb 2024 05:55) (16 - 18)  SpO2: 100% (20 Feb 2024 05:55) (98% - 100%)  gen: NAD, speaking without dyspnea  HEENT: nc, at  pulm: CTAB  abd: soft, nt, nd  extr: no c/c/e  psych: normal affect    Parameters below as of 20 Feb 2024 05:55  Patient On (Oxygen Delivery Method): room air    A/P 68 year old with metastatic uterine cancer  with L apical mass causing compression    I recommend palliative RT to the L lung as an outpatient, given her good performance status, comfortable breathing and oxygenation on room air at 100%, We will proceed with inpatient CT simulation to plan for radiation planning; radiation will be given as an outpatient upon discharge. Her radiation oncologist is Dr Bowling who will coordinate her outpatient care. Thank you for allowing me to participate in this patient's care.      General: Well nourished, well developed, no acute distress  HEENT: NC/AT; EOMI, PERRL, sclera nonicteric; external ears normal; no rhinorrhea or epistaxis; mucous membranes moist; oropharynx clear and without erythema  CV: NR, RR; no appreciable r/m/g  Lungs: CTAB, no increased work of breathing  Abdomen: Bowel sounds present; soft, NTND  MSK: Vertebral spine non-tender to palpation  Neuro: AAOx3; cranial nerves II-XII intact; strength 5/5 in upper and lower extremities; sensation to light touch in tact bilaterally.  Psych: Full affect; mood congruent  Skin: no visible rashes on limited examination    IMAGING/LABS/PATHOLOGY: I have personally reviewed the relevant labs, pathology, and imaging as noted in the HPI.  In addition,    ASSESSMENT/PLAN    SOHA VICENTE is a 68y woman with     We discussed the use of palliative radiation in this setting, namely to improve quality of life through the reduction of symptoms.  We talked about the risks, benefits, acute and long term side effects, as well as expected treatment outcomes.  He/She was given the opportunity to ask questions, which were answered to his/her apparent satisfaction.  He/She provided written consent to proceed with radiation therapy. We will arrange for inpatient/outpatient treatment.

## 2024-02-20 NOTE — CONSULT NOTE ADULT - SUBJECTIVE AND OBJECTIVE BOX
[facility]  Adelia Gonzalez, Palliative Care Nurse Practitioner  Contact Info: Page [phone] (Including Nights/Weekends), Message on Microsoft Teams (Adelia Gonzalez), or leave VM at Palliative Office [phone] (non-urgent)    Date of [id number] @ 12:55    HPI:  69 y/o F PMHx uterine cancer (on chemo, last 2 weeks ago) with metastases to the lungs and liver, hypothyroid, HTN, CVA w/o residual deficits presents c/o SOB. States she has had the SOB for at least one month and started around the time that she had her lung biopsy. She notes around 2 weeks ago after her last round of chemotherapy she started to feel worsening of her SOB.  2 weeks ago, she was walking around the neighborhood with her dog and now, she is only able to walk down the [place] before she gets short of breath.  Denies cough, fever, chills, chest pain, abdominal pain, nausea, vomiting, diarrhea, neck pain, headaches, neck swelling. States she has had "a sinus infection with post nasal drip since December when she had Covid. Notes she has had a "raspy whisper voice" for about 2 months "because of post nasal drip".   Of note, patient states that she does not take aspirin since the CVA from many years ago.  Her last echo was a few years ago.  She has been on cancer treatment for the last 3 years and unknown what stage of therapy this is.  In the ED, tachycardia .  Labs significant for hypokalemia K2.8, Hgb 10.9.  Initial EKG could not be located; repeat EKG NSR***CTA without PE; did note progression of pulmonary nodules, hepatic metastasis, severe right hydronephrosis, increased mediastinal lymphadenopathy with extrinsic compression of left pulmonary artery with left apical mass with central cavitation. Currently no dyspnea at rest. (17 Feb 2024 13:00)    PERTINENT PM/SXH:   Carotid stenosis, left    Cerebrovascular accident (CVA) due to occlusion of carotid artery    Endometrial cancer    HTN (hypertension)    Drug-induced thyroiditis    Hypothyroidism      H/O dilation and curettage    S/P myomectomy    History of hand surgery    History of lumpectomy of right breast    Status post hysteroscopy    S/P hysterectomy    Encounter for care related to vascular access port    -------------------------------------------------------------------------------------------------------    FAMILY HISTORY:  Family history of CLL (chronic lymphoid leukemia)    Family history of breast cancer in female    Family history of hypertension    Family Hx substance abuse [ ]yes [ ]no  ITEMS NOT CHECKED ARE NOT PRESENT    SOCIAL HISTORY:   Significant other/partner[ ]  Children[ ]  Islam/Spirituality:  Substance hx:  [ ]   Tobacco hx:  [ ]   Alcohol hx: [ ]   Home Opioid hx:  [ ] I-Stop Reference No:  [id number]      PDI Filter:    PDI	My Rx	Current Rx	Drug Type	Rx Written	Rx Dispensed	Drug	Quantity	Days Supply	Prescriber Name	Prescriber AKHIL #  A	N	N	O	12/07/2023	12/09/2023	hydrocodone-homatropine soln	200ml	10	Shanelle Gibson	[id number]  Living Situation: [X ]Home  [ ]Long term care  [ ]Rehab [ ]Other    BASELINE (I)ADL(s) (prior to admission):  Lipan: [ X]Total  [ ] Moderate [ ]Dependent    -------------------------------------------------------------------------------------------------------  ADVANCE DIRECTIVES:    DNR/MOLST  [ ]  Living Will  [ ]   DECISION MAKER(s):  [x ] Health Care Proxy(s)  [ ] Surrogate(s)  [ ] Guardian           Name(s): Jacobo Parker Phone Number(s): [phone]    -------------------------------------------------------------------------------------------------------  Allergies    Benadryl (Other)  contrast media (iodine-based) (Other)  gabapentin (Other)    Intolerances    Taxol (Other)  MEDICATIONS  (STANDING):  albuterol/ipratropium for Nebulization 3 milliLiter(s) Nebulizer every 12 hours  chlorhexidine 2% Cloths 1 Application(s) Topical daily  enoxaparin Injectable 40 milliGRAM(s) SubCutaneous every 24 hours  fluticasone propionate 50 MICROgram(s)/spray Nasal Spray 1 Spray(s) Both Nostrils two times a day  guaiFENesin  milliGRAM(s) Oral every 12 hours  influenza  Vaccine (HIGH DOSE) 0.7 milliLiter(s) IntraMuscular once  levothyroxine 100 MICROGram(s) Oral daily  sodium chloride 3%  Inhalation 4 milliLiter(s) Inhalation every 12 hours  valsartan 160 milliGRAM(s) Oral daily    MEDICATIONS  (PRN):  acetaminophen     Tablet .. 650 milliGRAM(s) Oral every 6 hours PRN Temp greater or equal to 38C (100.4F), Mild Pain (1 - 3)  aluminum hydroxide/magnesium hydroxide/simethicone Suspension 30 milliLiter(s) Oral every 4 hours PRN Dyspepsia  melatonin 3 milliGRAM(s) Oral at bedtime PRN Insomnia  ondansetron Injectable 4 milliGRAM(s) IV Push every 8 hours PRN Nausea and/or Vomiting    PRESENT SYMPTOMS: [ ]Unable to self-report  [ ] CPOT [ ] PAINADs [ ] RDOS  Source if other than patient:  [ ]Family   [ ]Team     Pain: [ X]yes [ ]no  QOL impact -   Location - back / groin                   Aggravating factors - none  Quality - pressure   Radiation - none  Timing- intermittent   Severity (0-10 scale): 4  Minimal acceptable level (0-10 scale)/Pain goal: 4    CPOT:    https://www.[email or web address].org/getattachment/[email or web address]/Critical-Care-Pain-Observation-Tool-(CPOT)    PAINAD Score: See PAINAD tool and score below       RDOS: See RDOS tool and score below   0 to 2  minimal or no respiratory distress   3  mild distress  4 to 6 moderate distress  >7 severe distress    Dyspnea:                           [ ]Mild [x ]Moderate [ ]Severe   Anxiety:                             [ ]Mild [ ]Moderate [ ]Severe  Fatigue:                             [ ]Mild [ ]Moderate [ ]Severe  Nausea:                             [ ]Mild [ ]Moderate [ ]Severe  Loss of appetite:              [ ]Mild [ ]Moderate [ ]Severe  Constipation:                    [ ]Mild [ ]Moderate [ ]Severe  Other Symptoms:  [ x]All other review of systems negative     -------------------------------------------------------------------------------------------------------  PCSSQ[Palliative Care Spiritual Screening Question]   Severity (0-10):  Score of 4 or > indicate consideration of Chaplaincy referral.  Chaplaincy Referral: [ ] yes [ ] refused [ ] following [x ] Deferred     Caregiver Mohler? : [ ] yes [ ] no [ ] Deferred [x ] Declined             Social work referral [ ] Patient & Family Centered Care Referral [ ]     Anticipatory Grief present?:  [ ] yes [ ] no  [x ] Deferred                  Social work referral [ ] Chaplaincy Referral [ ]      -------------------------------------------------------------------------------------------------------  PHYSICAL EXAM:  Vital Signs Last 24 Hrs  T(C): 36.7 (20 Feb 2024 12:11), Max: 37.1 (19 Feb 2024 20:00)  T(F): 98.1 (20 Feb 2024 12:11), Max: 98.8 (19 Feb 2024 20:00)  HR: 92 (20 Feb 2024 12:11) (81 - 94)  BP: 120/69 (20 Feb 2024 12:11) (120/69 - 129/81)  BP(mean): --  RR: 18 (20 Feb 2024 12:11) (16 - 18)  SpO2: 100% (20 Feb 2024 12:11) (98% - 100%)    Parameters below as of 20 Feb 2024 12:11  Patient On (Oxygen Delivery Method): room air     I&O's Summary    20 Feb 2024 07:01  -  20 Feb 2024 12:55  --------------------------------------------------------  IN: 0 mL / OUT: 300 mL / NET: -300 mL        GENERAL:  [ ]Cachexia  [ x]Alert  [x ]Oriented x 4  [ ]Lethargic  [ ]Unarousable  [ x]Verbal  [ ]Non-Verbal    Behavioral:   [ ] Anxiety  [ ] Delirium [ ] Agitation [x ] Other    HEENT:  [x ]Normal   [ ]Dry mouth   [ ]ET Tube/Trach  [ ]Oral lesions    PULMONARY:   [ ]Clear [ ]Tachypnea  [ ]Audible excessive secretions   [ ]Rhonchi        [ ]Right [ ]Left [ ]Bilateral  [ ]Crackles        [ ]Right [ ]Left [ ]Bilateral  [ ]Wheezing     [ ]Right [ ]Left [ ]Bilateral  [ x]Diminished breath sounds [ ]right [ ]left [ ]bilateral    CARDIOVASCULAR:    [x] Regular [ ]Irregular [ ]Tachy  [ ]Tristen [ ]Murmur [ ]Other    GASTROINTESTINAL:  [x ]Soft  [ ]Distended   [ x]+BS  [ ]Non tender [ ]Tender  [ ]Other [ ]PEG [ ]OGT/ NGT  Last BM: 2/18    GENITOURINARY:  [ x]Normal [ ] Incontinent   [ ]Oliguria/Anuria   [ ][place]    MUSCULOSKELETAL:   [x ]Normal   [ ]Weakness  [ ]Bed/Wheelchair bound [ ]Edema    NEUROLOGIC:   [x ]No focal deficits  [ ]Cognitive impairment  [ ]Dysphagia [ ]Dysarthria [ ]Paresis [ ]Other     SKIN:   [x ]Normal  [ ]Rash  [ ]Other  [ ]Pressure ulcer(s)       Present on admission [ ]y [ ]n    -------------------------------------------------------------------------------------------------------  CRITICAL CARE:  [ ] Shock Present  [ ]Septic [ ]Cardiogenic [ ]Neurologic [ ]Hypovolemic  [ ]  Vasopressors [ ]  Inotropes   [ ]Respiratory failure present [ ]Mechanical ventilation [ ]Non-invasive ventilatory support [ ]High flow    [ ]Acute  [ ]Chronic [ ]Hypoxic  [ ]Hypercarbic [ ]Other  [ ]Other organ failure     -------------------------------------------------------------------------------------------------------  LABS:                        11.5   10.61 )-----------( 371      ( 19 Feb 2024 06:41 )             35.0   02-19    141  |  101  |  13  ----------------------------<  130<H>  3.4<L>   |  29  |  0.79    Ca    9.2      19 Feb 2024 06:41  Phos  2.7     02-19  Mg     1.90     02-19    TPro  6.5  /  Alb  3.1<L>  /  TBili  0.2  /  DBili  x   /  AST  16  /  ALT  10  /  AlkPhos  144<H>  02-19  PT/INR - ( 19 Feb 2024 06:41 )   PT: 11.7 sec;   INR: 1.04 ratio         PTT - ( 19 Feb 2024 06:41 )  PTT:32.1 sec    Urinalysis Basic - ( 19 Feb 2024 06:41 )    Color: x / Appearance: x / SG: x / pH: x  Gluc: 130 mg/dL / Ketone: x  / Bili: x / Urobili: x   Blood: x / Protein: x / Nitrite: x   Leuk Esterase: x / RBC: x / WBC x   Sq Epi: x / Non Sq Epi: x / Bacteria: x  -------------------------------------------------------------------------------------------------------  RADIOLOGY & ADDITIONAL STUDIES: < from: CT Angio Chest PE Protocol w/ IV Cont (02.16.24 @ 23:02) >    IMPRESSION:  No pulmonary embolism.  Left apical mass with central cavitation is larger in size when compared   to 1/8/2024.  Increased mediastinal lymphadenopathy, with extrinsic compression of the   left pulmonary artery and left upper lobe bronchi.  Progression of pulmonary nodules and hepatic hepatic metastasis.  Severe right hydronephrosis.      --- End of Report ---  -------------------------------------------------------------------------------------------------------  PROTEIN CALORIE MALNUTRITION PRESENT: [ ]mild [ ]moderate [ ]severe [ ]underweight [ ]morbid obesity  https://www.andeal.org/vault/6032/web/files/ONC/Table_Clinical%20Characteristics%20to%20Document%20Malnutrition-White%20JV%20et%20al%202012.pdf    Height (cm): 154.9 (02-16-24 @ 15:10), 152.4 (02-01-24 @ 16:00), 154.9 (01-08-24 @ 09:11)  Weight (kg): 54.8 (02-18-24 @ 06:15), 51.5 (02-01-24 @ 16:00), 50.8 (01-08-24 @ 09:11)  BMI (kg/m2): 22.8 (02-18-24 @ 06:15), 21.5 (02-16-24 @ 15:10), 22.2 (02-01-24 @ 16:00)    Palliative Performance Status Version 2:   See PPSv2 tool and score below          [ ]PPSV2 < or = to 30% [ ]significant weight loss  [ ]poor nutritional intake  [ ]anasarca[ ]Artificial Nutrition      -------------------------------------------------------------------------------------------------------  Other REFERRALS:  [ ]Hospice  [ ]Child Life  [ ]Social Work  [ ]Case management [ ]Holistic Therapy     -------------------------------------------------------------------------------------------------------  Goals of Care Document:

## 2024-02-20 NOTE — CONSULT NOTE ADULT - PROBLEM SELECTOR RECOMMENDATION 3
> Pt denies dyspnea at rest  > Pending Rad/Onc consulted for possible RT to mass  > Not interested in symptom medications at this time, can f/u with Dr. Blank @ [facility].

## 2024-02-20 NOTE — CONSULT NOTE ADULT - TIME BILLING
high complexity of this case
55 minutes spent on total encounter. The necessity of the time spent during the encounter on this date of service was due to:     Time spent for extensive review of the physical chart, electronic medical record, and documentation to obtain collateral information including but not limited to:  [x] Inpatient records (ED, H&P, primary team, and consultants if applicable, care coordination)  [x] Inpatient values/results (biomarkers, immunoassays, imaging, and microbiology results)  [x] Current or proposed treatment plans  [x] Discussion with the primary team  [x] Discussion with the patient, surrogate decision maker, or family

## 2024-02-20 NOTE — CONSULT NOTE ADULT - CONSULT REASON
Severe Hydronephrosis
eval for radiation therapy
Enlarging lung metastases
Metastatic uterine cancer
GOC

## 2024-02-20 NOTE — DIETITIAN INITIAL EVALUATION ADULT - OTHER INFO
Per chart, pt is 68 year old female PMH metastatic uterine cancer (on chemotherapy), hypothyroidism, HTN, CVA (no residual deficits) presenting with worsening exertional dyspnea. Possible plan for palliative RT. DNR/DNI, no artificial nutrition per GOC.    Pt with limited participation in discussion, comprehensive chart review completed. Pt confirms NKFA, denies difficulties chewing/swallowing. Pt lives at home alone, tenant provides support. Pt reports generally good appetite/PO intake PTA, consumes a regular diet at baseline.     Pt reports eating well in house, tolerating diet. Pt is able to feed self independently. Preference vocalized for sandwich. Labs notable for hypokalemia, repleted. Pt denies current GI distress, unknown last BM; no bowel regimen ordered at this time.

## 2024-02-20 NOTE — CONSULT NOTE ADULT - PROBLEM SELECTOR RECOMMENDATION 9
> Follows wt Dr. Toy Blank @ [facility]  > Recent scans with POD, switched to Docetaxel q 2 weeks, pt shared she is not interested in continue DMT at this time.   > CT chest on 2/16/24-> No pulmonary embolism. Left apical mass with central cavitation is larger in size when compared to 1/8/2024. Increased mediastinal lymphadenopathy, with extrinsic compression of the left pulmonary artery and left upper lobe bronchi. Progression of pulmonary nodules and hepatic metastasis. Severe right hydronephrosis.  > Rad/onc consulted for possible RT

## 2024-02-20 NOTE — CONSULT NOTE ADULT - PROBLEM SELECTOR RECOMMENDATION 5
> See GOC note.   > Confirmed pt advanced directives, DNR/DNI with trial NIV, no HD, No PEG.  > Pt shared she follows with Dr. Blank @ [facility], was recently switched to another line of chemotherapy which she feels like she did not tolerate well. Expressed that she no longer wants to continue DMT. Discussed hospice, pt was not amenable for rereferral at this time.

## 2024-02-21 NOTE — PROGRESS NOTE ADULT - PROBLEM SELECTOR PLAN 4
Appreciate onc input  DNR/DNI  MOL filled

## 2024-02-21 NOTE — PROGRESS NOTE ADULT - PROBLEM SELECTOR PLAN 9
FENP: No IVF, Lytes PRN, DASH diet, Lovenox ppx

## 2024-02-21 NOTE — DISCHARGE NOTE PROVIDER - CARE PROVIDERS DIRECT ADDRESSES
,[email or web address]

## 2024-02-21 NOTE — PROGRESS NOTE ADULT - PROBLEM SELECTOR PLAN 2
CTA without PE; did note progression of pulmonary nodules. Likely progression of cancer  --If echo normal, will need pulmonary and cardiothoracic surgery regarding possible compression syndrome from mass  - outpt f/up Dr. Blank  - plan for outpt RT with Dr. Bowling for 10 treatments total, CT simulation today, as above   -She did have Bronch in Jan with negative BALs for acid fast bacteria  -procalcitonin 0.09-reassuring for no bacterial infection
CTA without PE; did note progression of pulmonary nodules. Likely progression of cancer  --If echo normal, will need pulmonary and cardiothoracic surgery regarding possible compression syndrome from mass  - outpt f/up Dr. Blank  - plan for outpt RT with Dr. Bowling for 10 treatments total, CT simulation today, as above   -She did have Bronch in Jan with negative BALs for acid fast bacteria  -procalcitonin 0.09-reassuring for no bacterial infection
CTA without PE; did note progression of pulmonary nodules. Likely progression of cancer  --If echo normal, will need pulmonary and cardiothoracic surgery regarding possible compression syndrome from mass  -Oncology consult regarding further cancer treatment  -She did have Bronch in Jan with negative BALs for acid fast bacteria  -procalcitonin 0.09-reassuring for no bacterial infection
CTA without PE; did note progression of pulmonary nodules. Likely progression of cancer  --If echo normal, will need pulmonary and cardiothoracic surgery regarding possible compression syndrome from mass  -Oncology consult regarding further cancer treatment  -She did have Bronch in Jan with negative BALs for acid fast bacteria  -procalcitonin 0.09-reassuring for no bacterial infection

## 2024-02-21 NOTE — DISCHARGE NOTE PROVIDER - NSDCCPCAREPLAN_GEN_ALL_CORE_FT
PRINCIPAL DISCHARGE DIAGNOSIS  Diagnosis: Lung mass  Assessment and Plan of Treatment: You have left lung mass compressing on left airway and pulmonary artery, please follow up with Dr. Bowling for outpatient radiation, plan for 10 treatments. Also follow up with your medical oncologist Dr. Blank.      SECONDARY DISCHARGE DIAGNOSES  Diagnosis: Dyspnea  Assessment and Plan of Treatment:

## 2024-02-21 NOTE — PROGRESS NOTE ADULT - PROBLEM SELECTOR PLAN 3
CTA with increased mediastinal lymphadenopathy with extrinsic compression of left pulmonary artery with left apical mass with central cavitation  -appreciate thoracic surgery's input
CTA with increased mediastinal lymphadenopathy with extrinsic compression of left pulmonary artery with left apical mass with central cavitation  -appreciate thoracic surgery's input-no surgical intervention
CTA with increased mediastinal lymphadenopathy with extrinsic compression of left pulmonary artery with left apical mass with central cavitation  -appreciate thoracic surgery's input
CTA with increased mediastinal lymphadenopathy with extrinsic compression of left pulmonary artery with left apical mass with central cavitation  -appreciate thoracic surgery's input

## 2024-02-21 NOTE — DISCHARGE NOTE PROVIDER - HOSPITAL COURSE
67 y/o F PMHx uterine cancer (on chemo, last 2 weeks ago) with metastases to the lungs and liver, hypothyroid, HTN, CVA w/o residual deficits presents c/o worsening exertional dyspnea.  Patient is currently on room air and not in acute respiratory failure.  Patient is admitted for workup of worsening exertional dyspnea.  Differentials include cardiomyopathy (ie chemotherapy-induced cardiomyopathy), pulmonary disease from worsening pulmonary metastasis pulmonary bronchial and pulmonary artery compression from mediastinal lymphadenopathy VS underlying infection from left apical mass.       Problem/Plan - 1:  ·  Problem: Exertional dyspnea.   ·  Plan: Dyspnea likely d/t worsening pulmonary metastasis pulmonary bronchial and pulmonary artery compression from mediastinal lymphadenopathy VS underlying infection from left apical mass.  -Echo (2/20) normal LV/RV function, EF 60%, mild MR  - no acute surgical intervention per CTS  - consulted rad/onc, d/w Dr. Goddard, CT simulation done today 2/21, plan for outpt RT with Dr. Bowling for 10 treatments total. Pt is known to Dr. Bowling who had previously radiated her pelvis.   -ProBNP 431, no sign of chf  -Dispo: likely DC home today, ambulating o2 sat briefly 89% then quickly recovered to 98% w/o supplemental oxygen  Time spent on discharge 31 minutes coordinating discharge plan and discussing with patient and family.     Problem/Plan - 2:  ·  Problem: Lung mass.   ·  Plan: CTA without PE; did note progression of pulmonary nodules. Likely progression of cancer  --If echo normal, will need pulmonary and cardiothoracic surgery regarding possible compression syndrome from mass  - outpt f/up Dr. Blank  - plan for outpt RT with Dr. Bowling for 10 treatments total, CT simulation today, as above   -She did have Bronch in Jan with negative BALs for acid fast bacteria  -procalcitonin 0.09-reassuring for no bacterial infection.     Problem/Plan - 3:  ·  Problem: Mediastinal mass.   ·  Plan: CTA with increased mediastinal lymphadenopathy with extrinsic compression of left pulmonary artery with left apical mass with central cavitation  -appreciate thoracic surgery's input.     Problem/Plan - 4:  ·  Problem: Endometrial cancer.   ·  Plan: Appreciate onc input  DNR/DNI  MOLST filled.     Problem/Plan - 5:  ·  Problem: HTN (hypertension).   ·  Plan: ok to resume full dose valsartan now.     Problem/Plan - 6:  ·  Problem: Hydronephrosis, right.   ·  Plan: -Urology consulted for R hydronephrosis. No MANJU  no surgical intervention.     Problem/Plan - 7:  ·  Problem: Hypothyroidism.   ·  Plan: TSH 0.39  Resume synthroid.     Problem/Plan - 8:  ·  Problem: Cerebrovascular accident (CVA) due to occlusion of carotid artery.   ·  Plan: Not on ASA. Asymptomatic/no residuals.   69 y/o F PMHx uterine cancer (on chemo, last 2 weeks ago) with metastases to the lungs and liver, hypothyroid, HTN, CVA w/o residual deficits presents c/o worsening exertional dyspnea.  Patient is currently on room air and not in acute respiratory failure.  Patient is admitted for workup of worsening exertional dyspnea.  Differentials include cardiomyopathy (ie chemotherapy-induced cardiomyopathy), pulmonary disease from worsening pulmonary metastasis pulmonary bronchial and pulmonary artery compression from mediastinal lymphadenopathy VS underlying infection from left apical mass.       Problem/Plan - 1:  ·  Problem: Exertional dyspnea.   ·  Plan: Dyspnea likely d/t worsening pulmonary metastasis pulmonary bronchial and pulmonary artery compression from mediastinal lymphadenopathy VS underlying infection from left apical mass.  -Echo (2/20) normal LV/RV function, EF 60%, mild MR  - no acute surgical intervention per CTS  - consulted rad/onc, d/w Dr. Goddard, CT simulation done today 2/21, plan for outpt RT with Dr. Bowling for 10 treatments total. Pt is known to Dr. Bowling who had previously radiated her pelvis.   -ProBNP 431, no sign of chf  -Dispo: likely DC home today, ambulating o2 sat briefly 89% then quickly recovered to 98% w/o supplemental oxygen  Time spent on discharge 31 minutes coordinating discharge plan and discussing with patient and family.     Problem/Plan - 2:  ·  Problem: Lung mass.   ·  Plan: CTA without PE; did note progression of pulmonary nodules. Likely progression of cancer  --If echo normal, will need pulmonary and cardiothoracic surgery regarding possible compression syndrome from mass  - outpt f/up Dr. Blank  - plan for outpt RT with Dr. Bowling for 10 treatments total, CT simulation today, as above   -She did have Bronch in Jan with negative BALs for acid fast bacteria  -procalcitonin 0.09-reassuring for no bacterial infection.     Problem/Plan - 3:  ·  Problem: Mediastinal mass.   ·  Plan: CTA with increased mediastinal lymphadenopathy with extrinsic compression of left pulmonary artery with left apical mass with central cavitation  -appreciate thoracic surgery's input.     Problem/Plan - 4:  ·  Problem: Endometrial cancer.   ·  Plan: Appreciate onc input  DNR/DNI  MOLST filled.     Problem/Plan - 5:  ·  Problem: HTN (hypertension).   ·  Plan: ok to resume full dose valsartan now.     Problem/Plan - 6:  ·  Problem: Hydronephrosis, right.   ·  Plan: -Urology consulted for R hydronephrosis. No MANJU  no surgical intervention.     Problem/Plan - 7:  ·  Problem: Hypothyroidism.   ·  Plan: TSH 0.39  Resume synthroid.     Problem/Plan - 8:  ·  Problem: Cerebrovascular accident (CVA) due to occlusion of carotid artery.   ·  Plan: Not on ASA. Asymptomatic/no residuals.

## 2024-02-21 NOTE — DISCHARGE NOTE PROVIDER - NSDCFUSCHEDAPPT_GEN_ALL_CORE_FT
[facility]  [facility]  Scheduled Appointment: 02/22/2024    [facility]  Scheduled Appointment: 02/22/2024    Shanelle Coats  [facility]  [facility]  Scheduled Appointment: 02/28/2024    [facility]  [facility]  Scheduled Appointment: 03/07/2024    Toy Blank  [facility]  [facility]  Scheduled Appointment: 03/07/2024    [facility]  Scheduled Appointment: 03/14/2024    [facility]  Scheduled Appointment: 03/14/2024    [facility]  Scheduled Appointment: 03/28/2024    Shanelle Coats  [facility]  [facility]  Scheduled Appointment: 03/28/2024     [facility]anelle Coats  [facility]  Scheduled Appointment: 02/28/2024    [facility]  Scheduled Appointment: 03/07/2024    Tyo Blank  [facility]  Scheduled Appointment: 03/07/2024    [facility]  Scheduled Appointment: 03/14/2024    [facility]  Scheduled Appointment: 03/14/2024    [facility]  Scheduled Appointment: 03/28/2024    Shanelle Coats  [facility]  Scheduled Appointment: 03/28/2024

## 2024-02-21 NOTE — PROGRESS NOTE ADULT - PROBLEM SELECTOR PLAN 8
Not on ASA. Asymptomatic/no residuals

## 2024-02-21 NOTE — PROGRESS NOTE ADULT - PROBLEM SELECTOR PLAN 5
ok to resume full dose valsartan now
ok to resume full dose valsartan
ok to resume full dose valsartan now
ok to resume full dose valsartan now

## 2024-02-21 NOTE — PROGRESS NOTE ADULT - SUBJECTIVE AND OBJECTIVE BOX
Dr. Becky Boss  Pager [phone]    PROGRESS NOTE:     Patient is a 68y old  Female who presents with a chief complaint of Exertional dyspnea (20 Feb 2024 12:52)      SUBJECTIVE / OVERNIGHT EVENTS: denies chest pain , still with dyspnea with minimal exertion  ADDITIONAL REVIEW OF SYSTEMS: afebrile, reports she had 1 chemo with Dr. Blank about 2 wks ago    MEDICATIONS  (STANDING):  albuterol/ipratropium for Nebulization 3 milliLiter(s) Nebulizer every 12 hours  chlorhexidine 2% Cloths 1 Application(s) Topical daily  enoxaparin Injectable 40 milliGRAM(s) SubCutaneous every 24 hours  fluticasone propionate 50 MICROgram(s)/spray Nasal Spray 1 Spray(s) Both Nostrils two times a day  guaiFENesin  milliGRAM(s) Oral every 12 hours  influenza  Vaccine (HIGH DOSE) 0.7 milliLiter(s) IntraMuscular once  levothyroxine 100 MICROGram(s) Oral daily  sodium chloride 3%  Inhalation 4 milliLiter(s) Inhalation every 12 hours  valsartan 160 milliGRAM(s) Oral daily    MEDICATIONS  (PRN):  acetaminophen     Tablet .. 650 milliGRAM(s) Oral every 6 hours PRN Temp greater or equal to 38C (100.4F), Mild Pain (1 - 3)  aluminum hydroxide/magnesium hydroxide/simethicone Suspension 30 milliLiter(s) Oral every 4 hours PRN Dyspepsia  ketorolac 10 milliGRAM(s) Oral every 6 hours PRN Severe Pain (7 - 10)  melatonin 3 milliGRAM(s) Oral at bedtime PRN Insomnia  ondansetron Injectable 4 milliGRAM(s) IV Push every 8 hours PRN Nausea and/or Vomiting      CAPILLARY BLOOD GLUCOSE        I&O's Summary    20 Feb 2024 07:01  -  20 Feb 2024 14:14  --------------------------------------------------------  IN: 0 mL / OUT: 300 mL / NET: -300 mL        PHYSICAL EXAM:  Vital Signs Last 24 Hrs  T(C): 36.7 (20 Feb 2024 12:11), Max: 37.1 (19 Feb 2024 20:00)  T(F): 98.1 (20 Feb 2024 12:11), Max: 98.8 (19 Feb 2024 20:00)  HR: 92 (20 Feb 2024 12:11) (81 - 94)  BP: 120/69 (20 Feb 2024 12:11) (120/69 - 128/82)  BP(mean): --  RR: 18 (20 Feb 2024 12:11) (16 - 18)  SpO2: 100% (20 Feb 2024 12:11) (98% - 100%)    Parameters below as of 20 Feb 2024 12:11  Patient On (Oxygen Delivery Method): room air      CONSTITUTIONAL: NAD,   ENMT: Moist oral mucosa,   RESPIRATORY: Normal respiratory effort; lungs are clear to auscultation bilaterally  Chest: port in place  CARDIOVASCULAR: Regular rate and rhythm, normal S1 and S2, no murmur/rub/gallop; No lower extremity edema;   ABDOMEN: Nontender to palpation, normoactive bowel sounds, no rebound/guarding; No hepatosplenomegaly  EXT: no edema b/l  NEUROLOGY: alert, following commands  SKIN: No rashes; no palpable lesions    LABS:                        11.5   10.61 )-----------( 371      ( 19 Feb 2024 06:41 )             35.0     02-19    141  |  101  |  13  ----------------------------<  130<H>  3.4<L>   |  29  |  0.79    Ca    9.2      19 Feb 2024 06:41  Phos  2.7     02-19  Mg     1.90     02-19    TPro  6.5  /  Alb  3.1<L>  /  TBili  0.2  /  DBili  x   /  AST  16  /  ALT  10  /  AlkPhos  144<H>  02-19    PT/INR - ( 19 Feb 2024 06:41 )   PT: 11.7 sec;   INR: 1.04 ratio         PTT - ( 19 Feb 2024 06:41 )  PTT:32.1 sec      Urinalysis Basic - ( 19 Feb 2024 06:41 )    Color: x / Appearance: x / SG: x / pH: x  Gluc: 130 mg/dL / Ketone: x  / Bili: x / Urobili: x   Blood: x / Protein: x / Nitrite: x   Leuk Esterase: x / RBC: x / WBC x   Sq Epi: x / Non Sq Epi: x / Bacteria: x          RADIOLOGY & ADDITIONAL TESTS:  Results Reviewed:   Imaging Personally Reviewed:  < from: TTE W or WO Ultrasound Enhancing Agent (02.20.24 @ 09:54) >   1. Left ventricular cavity is normal in size. Left ventricular wall thickness is normal. Left ventricular systolic function is normal with an ejection fraction of 60 % by Pyle's method of disks. There are no regional wall motion abnormalities seen.   2. Normal right ventricular cavity size and normal systolic function.   3. Structurally normal mitral valve with normal leaflet excursion. There is calcification of the mitral valve annulus. There is mild mitral regurgitation.        Electrocardiogram Personally Reviewed:    COORDINATION OF CARE:  Care Discussed with Consultants/Other Providers [Y/N]:  Prior or Outpatient Records Reviewed [Y/N]:  
Jania Monzon MD   [facility] Medicine  Teams preferred  Pager: [phone]    Patient is a 68y old  Female who presents with a chief complaint of Exertional dyspnea (18 Feb 2024 09:10)      INTERVAL HPI/OVERNIGHT EVENTS: no issues overnight. Reports feeling a little bit better. States she had some epigastric pain earlier but that has resolved. no other complaints    MEDICATIONS  (STANDING):  enoxaparin Injectable 40 milliGRAM(s) SubCutaneous every 24 hours  influenza  Vaccine (HIGH DOSE) 0.7 milliLiter(s) IntraMuscular once  levothyroxine 100 MICROGram(s) Oral daily  valsartan 80 milliGRAM(s) Oral daily    MEDICATIONS  (PRN):  acetaminophen     Tablet .. 650 milliGRAM(s) Oral every 6 hours PRN Temp greater or equal to 38C (100.4F), Mild Pain (1 - 3)  aluminum hydroxide/magnesium hydroxide/simethicone Suspension 30 milliLiter(s) Oral every 4 hours PRN Dyspepsia  melatonin 3 milliGRAM(s) Oral at bedtime PRN Insomnia  ondansetron Injectable 4 milliGRAM(s) IV Push every 8 hours PRN Nausea and/or Vomiting      Allergies    Benadryl (Other)  contrast media (iodine-based) (Other)  gabapentin (Other)    Intolerances    Taxol (Other)      REVIEW OF SYSTEMS:  Please see interval HPI:    Vital Signs Last 24 Hrs  T(C): 36.4 (18 Feb 2024 06:15), Max: 36.7 (17 Feb 2024 16:51)  T(F): 97.5 (18 Feb 2024 06:15), Max: 98 (17 Feb 2024 16:51)  HR: 93 (18 Feb 2024 06:15) (91 - 97)  BP: 139/94 (18 Feb 2024 06:15) (135/83 - 148/83)  BP(mean): --  RR: 18 (18 Feb 2024 06:15) (18 - 19)  SpO2: 99% (18 Feb 2024 06:15) (97% - 99%)    Parameters below as of 18 Feb 2024 06:15  Patient On (Oxygen Delivery Method): room air      I&O's Detail        PHYSICAL EXAM:  Vital Signs Last 24 Hrs  T(C): 36.4 (18 Feb 2024 06:15), Max: 36.7 (17 Feb 2024 16:51)  T(F): 97.5 (18 Feb 2024 06:15), Max: 98 (17 Feb 2024 16:51)  HR: 93 (18 Feb 2024 06:15) (91 - 97)  BP: 139/94 (18 Feb 2024 06:15) (135/83 - 148/83)  BP(mean): --  RR: 18 (18 Feb 2024 06:15) (18 - 19)  SpO2: 99% (18 Feb 2024 06:15) (97% - 99%)    Parameters below as of 18 Feb 2024 06:15  Patient On (Oxygen Delivery Method): room air      CONSTITUTIONAL: NAD, frail  ENMT: Moist oral mucosa,   RESPIRATORY: Normal respiratory effort; lungs are clear to auscultation bilaterally  CARDIOVASCULAR: Regular rate and rhythm, normal S1 and S2, no murmur/rub/gallop;   ABDOMEN: Nontender to palpation, normoactive bowel sounds, no rebound/guarding; No hepatosplenomegaly  EXT: no sig edema b/l  NEUROLOGY: alert, following commands  SKIN: No rashes; no palpable lesions      LABS:      Ca    9.1        17 Feb 2024 05:26        CAPILLARY BLOOD GLUCOSE        BLOOD CULTURE    RADIOLOGY & ADDITIONAL TESTS:    Imaging Personally Reviewed:  [ ] YES     Consultant(s) Notes Reviewed:      Care Discussed with Consultants/Other Providers:
Dr. Becky Boss  Pager [phone]    PROGRESS NOTE:     Patient is a 68y old  Female who presents with a chief complaint of Exertional dyspnea (20 Feb 2024 14:13)      SUBJECTIVE / OVERNIGHT EVENTS: pt denies chest pain, desat to 89% briefly with ambulation, then quickly recovered to 98%  ADDITIONAL REVIEW OF SYSTEMS: afebrile     MEDICATIONS  (STANDING):  albuterol/ipratropium for Nebulization 3 milliLiter(s) Nebulizer every 12 hours  chlorhexidine 2% Cloths 1 Application(s) Topical daily  enoxaparin Injectable 40 milliGRAM(s) SubCutaneous every 24 hours  fluticasone propionate 50 MICROgram(s)/spray Nasal Spray 1 Spray(s) Both Nostrils two times a day  influenza  Vaccine (HIGH DOSE) 0.7 milliLiter(s) IntraMuscular once  levothyroxine 100 MICROGram(s) Oral daily  sodium chloride 3%  Inhalation 4 milliLiter(s) Inhalation every 12 hours  valsartan 160 milliGRAM(s) Oral daily    MEDICATIONS  (PRN):  acetaminophen     Tablet .. 650 milliGRAM(s) Oral every 6 hours PRN Temp greater or equal to 38C (100.4F), Mild Pain (1 - 3)  aluminum hydroxide/magnesium hydroxide/simethicone Suspension 30 milliLiter(s) Oral every 4 hours PRN Dyspepsia  ketorolac 10 milliGRAM(s) Oral every 6 hours PRN Severe Pain (7 - 10)  melatonin 3 milliGRAM(s) Oral at bedtime PRN Insomnia  ondansetron Injectable 4 milliGRAM(s) IV Push every 8 hours PRN Nausea and/or Vomiting      CAPILLARY BLOOD GLUCOSE        I&O's Summary    20 Feb 2024 07:01  -  21 Feb 2024 07:00  --------------------------------------------------------  IN: 0 mL / OUT: 300 mL / NET: -300 mL        PHYSICAL EXAM:  Vital Signs Last 24 Hrs  T(C): 36.8 (21 Feb 2024 11:58), Max: 37 (21 Feb 2024 07:00)  T(F): 98.3 (21 Feb 2024 11:58), Max: 98.6 (21 Feb 2024 07:00)  HR: 100 (21 Feb 2024 11:58) (86 - 100)  BP: 125/81 (21 Feb 2024 11:58) (124/67 - 147/45)  BP(mean): --  RR: 18 (21 Feb 2024 11:58) (18 - 18)  SpO2: 98% (21 Feb 2024 12:56) (97% - 99%)    Parameters below as of 21 Feb 2024 12:56  Patient On (Oxygen Delivery Method): room air      CONSTITUTIONAL: NAD,   ENMT: Moist oral mucosa,   RESPIRATORY: Normal respiratory effort; lungs are clear to auscultation bilaterally  Chest: port in place  CARDIOVASCULAR: Regular rate and rhythm, normal S1 and S2, no murmur/rub/gallop; No lower extremity edema;   ABDOMEN: Nontender to palpation, normoactive bowel sounds, no rebound/guarding; No hepatosplenomegaly  EXT: no edema b/l  NEUROLOGY: alert, following commands  SKIN: No rashes; no palpable lesions  LABS:                        10.9   10.28 )-----------( 340      ( 21 Feb 2024 07:03 )             33.2     02-21    139  |  100  |  16  ----------------------------<  92  3.9   |  27  |  0.76    Ca    8.9      21 Feb 2024 07:03  Phos  3.2     02-21  Mg     1.90     02-21    TPro  6.0  /  Alb  2.9<L>  /  TBili  0.3  /  DBili  x   /  AST  22  /  ALT  14  /  AlkPhos  182<H>  02-21          Urinalysis Basic - ( 21 Feb 2024 07:03 )    Color: x / Appearance: x / SG: x / pH: x  Gluc: 92 mg/dL / Ketone: x  / Bili: x / Urobili: x   Blood: x / Protein: x / Nitrite: x   Leuk Esterase: x / RBC: x / WBC x   Sq Epi: x / Non Sq Epi: x / Bacteria: x          RADIOLOGY & ADDITIONAL TESTS:  Results Reviewed:   Imaging Personally Reviewed:  Electrocardiogram Personally Reviewed:    COORDINATION OF CARE:  Care Discussed with Consultants/Other Providers [Y/N]:  Prior or Outpatient Records Reviewed [Y/N]:  
Jania Monzon MD   [facility] Medicine  Teams preferred  Pager: [phone]    Patient is a 68y old  Female who presents with a chief complaint of Exertional dyspnea (18 Feb 2024 22:13)      INTERVAL HPI/OVERNIGHT EVENTS: no issues overnight. feels weak, tired. a little nauseous    MEDICATIONS  (STANDING):  enoxaparin Injectable 40 milliGRAM(s) SubCutaneous every 24 hours  fluticasone propionate 50 MICROgram(s)/spray Nasal Spray 1 Spray(s) Both Nostrils two times a day  influenza  Vaccine (HIGH DOSE) 0.7 milliLiter(s) IntraMuscular once  levothyroxine 100 MICROGram(s) Oral daily  valsartan 160 milliGRAM(s) Oral daily    MEDICATIONS  (PRN):  acetaminophen     Tablet .. 650 milliGRAM(s) Oral every 6 hours PRN Temp greater or equal to 38C (100.4F), Mild Pain (1 - 3)  aluminum hydroxide/magnesium hydroxide/simethicone Suspension 30 milliLiter(s) Oral every 4 hours PRN Dyspepsia  melatonin 3 milliGRAM(s) Oral at bedtime PRN Insomnia  ondansetron Injectable 4 milliGRAM(s) IV Push every 8 hours PRN Nausea and/or Vomiting      Allergies    Benadryl (Other)  contrast media (iodine-based) (Other)  gabapentin (Other)    Intolerances    Taxol (Other)      REVIEW OF SYSTEMS:  Please see interval HPI:    Vital Signs Last 24 Hrs  T(C): 36.7 (19 Feb 2024 14:07), Max: 37.2 (18 Feb 2024 18:35)  T(F): 98 (19 Feb 2024 14:07), Max: 98.9 (18 Feb 2024 18:35)  HR: 92 (19 Feb 2024 14:07) (90 - 94)  BP: 129/81 (19 Feb 2024 14:07) (126/79 - 143/87)  BP(mean): --  RR: 18 (19 Feb 2024 14:07) (17 - 18)  SpO2: 100% (19 Feb 2024 14:07) (98% - 100%)    Parameters below as of 19 Feb 2024 14:07  Patient On (Oxygen Delivery Method): room air      I&O's Detail        PHYSICAL EXAM:  Vital Signs Last 24 Hrs  T(C): 36.7 (19 Feb 2024 14:07), Max: 37.2 (18 Feb 2024 18:35)  T(F): 98 (19 Feb 2024 14:07), Max: 98.9 (18 Feb 2024 18:35)  HR: 92 (19 Feb 2024 14:07) (90 - 94)  BP: 129/81 (19 Feb 2024 14:07) (126/79 - 143/87)  BP(mean): --  RR: 18 (19 Feb 2024 14:07) (17 - 18)  SpO2: 100% (19 Feb 2024 14:07) (98% - 100%)    Parameters below as of 19 Feb 2024 14:07  Patient On (Oxygen Delivery Method): room air      CONSTITUTIONAL: NAD,   ENMT: Moist oral mucosa,   RESPIRATORY: Normal respiratory effort; lungs are clear to auscultation bilaterally  Chest: port in place  CARDIOVASCULAR: Regular rate and rhythm, normal S1 and S2, no murmur/rub/gallop; No lower extremity edema;   ABDOMEN: Nontender to palpation, normoactive bowel sounds, no rebound/guarding; No hepatosplenomegaly  EXT: no edema b/l  NEUROLOGY: alert, following commands  SKIN: No rashes; no palpable lesions      LABS:                        11.5   10.61 )-----------( 371      ( 19 Feb 2024 06:41 )             35.0     19 Feb 2024 06:41    141    |  101    |  13     ----------------------------<  130    3.4     |  29     |  0.79     Ca    9.2        19 Feb 2024 06:41  Phos  2.7       19 Feb 2024 06:41  Mg     1.90      19 Feb 2024 06:41    TPro  6.5    /  Alb  3.1    /  TBili  0.2    /  DBili  x      /  AST  16     /  ALT  10     /  AlkPhos  144    19 Feb 2024 06:41    PT/INR - ( 19 Feb 2024 06:41 )   PT: 11.7 sec;   INR: 1.04 ratio         PTT - ( 19 Feb 2024 06:41 )  PTT:32.1 sec  CAPILLARY BLOOD GLUCOSE        BLOOD CULTURE    RADIOLOGY & ADDITIONAL TESTS:    Imaging Personally Reviewed:  [ ] YES     Consultant(s) Notes Reviewed:      Care Discussed with Consultants/Other Providers:

## 2024-02-21 NOTE — PROGRESS NOTE ADULT - PROBLEM SELECTOR PROBLEM 8
Cerebrovascular accident (CVA) due to occlusion of carotid artery

## 2024-02-21 NOTE — DISCHARGE NOTE PROVIDER - NSDCFUADDAPPT_GEN_ALL_CORE_FT
Follow up with Dr. Blank on discharge for further management.    Follow up with your primary care provider in 1-2 weeks of discharge.    Follow up with Dr. Bowling, radiation oncology, for outpatient palliative radiation therapy.

## 2024-02-21 NOTE — DISCHARGE NOTE NURSING/CASE MANAGEMENT/SOCIAL WORK - PATIENT PORTAL LINK FT
You can access the FollowMyHealth Patient Portal offered by [facility] by registering at the following website: [email or web address]. By joining [facility] FollowMyHealth portal, you will also be able to view your health information using other applications (apps) compatible with our system.

## 2024-02-21 NOTE — DISCHARGE NOTE PROVIDER - CARE PROVIDER_API CALL
Amparo Bowling  Radiation Oncology  [address] [place] [address]  Phone: [phone]  Fax: [phone]  Follow Up Time: 1-3 days   Amparo Bowling  Radiation Oncology  [address] [place] [address] - [place] [address]  Phone: [phone]  Fax: [phone]  Follow Up Time: 1-3 days    Toy Blank  Medical Oncology  [address] [place] [id number]  Phone: [phone]  Fax: [phone]  Follow Up Time:

## 2024-02-21 NOTE — PROGRESS NOTE ADULT - PROBLEM SELECTOR PLAN 1
Differentials include cardiomyopathy (ie chemotherapy-induced cardiomyopathy), pulmonary disease from worsening pulmonary metastasis pulmonary bronchial and pulmonary artery compression from mediastinal lymphadenopathy VS underlying infection from left apical mass.  -Echo pending  -will consult Thoracic for pulm artery compression-recommendign rad onc. discussed with onc, this is mets from uterine cancer   -please discuss with rad onc in AM  -ProBNP 431
Differentials include cardiomyopathy (ie chemotherapy-induced cardiomyopathy), pulmonary disease from worsening pulmonary metastasis pulmonary bronchial and pulmonary artery compression from mediastinal lymphadenopathy VS underlying infection from left apical mass.  -Echo pending  -will consult Thoracic for pulm artery compression. discussed with onc, likely this is mets from uterine cancer vs primary lung neoplasm   -ProBNP 431
Dyspnea likely d/t worsening pulmonary metastasis pulmonary bronchial and pulmonary artery compression from mediastinal lymphadenopathy VS underlying infection from left apical mass.  -Echo (2/20) normal LV/RV function, EF 60%, mild MR  - no acute surgical intervention per CTS  - consulted rad/onc, d/w Dr. Goddard, CT simulation done today 2/21, plan for outpt RT with Dr. Bowling for 10 treatments total. Pt is known to Dr. Bowling who had previously radiated her pelvis.   -ProBNP 431, no sign of chf  -Dispo: likely DC home today, ambulating o2 sat briefly 89% then quickly recovered to 98% w/o supplemental oxygen  Time spent on discharge 31 minutes coordinating discharge plan and discussing with patient and family.
Dyspnea likely d/t worsening pulmonary metastasis pulmonary bronchial and pulmonary artery compression from mediastinal lymphadenopathy VS underlying infection from left apical mass.  -Echo (2/20) normal LV/RV function, EF 60%, mild MR  - no acute surgical intervention per CTS  - consulted rad/onc, d/w Dr. Goddard, will do CT simulation today and then plan for outpatient RT with Dr. Bowling for 10 treatments total. Pt is known to Dr. Bowling who had previously radiated her pelvis.   -ProBNP 431, no sign of chf  -Dispo: likely DC home tomorrow, check ambulating O2 sat to see if she qualifies for home O2

## 2024-02-21 NOTE — DISCHARGE NOTE PROVIDER - NSDCMRMEDTOKEN_GEN_ALL_CORE_FT
amLODIPine 5 mg oral tablet: 1 tab(s) orally once a day am  Unithroid 100 mcg (0.1 mg) oral tablet: 1 tab(s) orally once a day am  valsartan 160 mg oral capsule: 1 cap(s) orally once a day am   acetaminophen 325 mg oral tablet: 2 tab(s) orally every 6 hours As needed Temp greater or equal to 38C (100.4F), Mild Pain (1 - 3)  fluticasone 50 mcg/inh nasal spray: 1 spray(s) nasal 2 times a day  ipratropium-albuterol 0.5 mg-2.5 mg/3 mL inhalation solution: 3 milliliter(s) inhaled every 12 hours  ketorolac 10 mg oral tablet: 1 tab(s) orally every 6 hours as needed for Severe Pain (7 - 10)  melatonin 3 mg oral tablet: 1 tab(s) orally once a day (at bedtime) As needed Insomnia  Nebulizer: Nebulizer  sodium chloride 3% inhalation solution: 4 milliliter(s) inhaled every 12 hours  Unithroid 100 mcg (0.1 mg) oral tablet: 1 tab(s) orally once a day am  valsartan 160 mg oral capsule: 1 cap(s) orally once a day am   acetaminophen 325 mg oral tablet: 2 tab(s) orally every 6 hours As needed Temp greater or equal to 38C (100.4F), Mild Pain (1 - 3)  fluticasone 50 mcg/inh nasal spray: 1 spray(s) nasal 2 times a day  ipratropium-albuterol 0.5 mg-2.5 mg/3 mL inhalation solution: 3 milliliter(s) inhaled every 12 hours ICD-10 C78.0  ketorolac 10 mg oral tablet: 1 tab(s) orally every 6 hours as needed for Severe Pain (7 - 10)  melatonin 3 mg oral tablet: 1 tab(s) orally once a day (at bedtime) As needed Insomnia  sodium chloride 3% inhalation solution: 4 milliliter(s) inhaled every 12 hours ICD-10 C78.0  Unithroid 100 mcg (0.1 mg) oral tablet: 1 tab(s) orally once a day am  valsartan 160 mg oral capsule: 1 cap(s) orally once a day am   acetaminophen 325 mg oral tablet: 2 tab(s) orally every 6 hours As needed Temp greater or equal to 38C (100.4F), Mild Pain (1 - 3)  fluticasone 50 mcg/inh nasal spray: 1 spray(s) nasal 2 times a day  ipratropium-albuterol 0.5 mg-2.5 mg/3 mL inhalation solution: 3 milliliter(s) inhaled every 12 hours ICD-10 J44.9  ketorolac 10 mg oral tablet: 1 tab(s) orally every 6 hours as needed for Severe Pain (7 - 10)  melatonin 3 mg oral tablet: 1 tab(s) orally once a day (at bedtime) As needed Insomnia  sodium chloride 3% inhalation solution: 4 milliliter(s) inhaled every 12 hours ICD-10 J44.9  Unithroid 100 mcg (0.1 mg) oral tablet: 1 tab(s) orally once a day am  valsartan 160 mg oral capsule: 1 cap(s) orally once a day am

## 2024-02-21 NOTE — PROGRESS NOTE ADULT - PROBLEM/PLAN-2
DISPLAY PLAN FREE TEXT
Labs ordered for 01/19/2024 appointment.  
DISPLAY PLAN FREE TEXT

## 2024-02-21 NOTE — PROGRESS NOTE ADULT - PROBLEM SELECTOR PLAN 7
TSH 0.39  Resume synthroid

## 2024-02-21 NOTE — DISCHARGE NOTE NURSING/CASE MANAGEMENT/SOCIAL WORK - NSDCPEFALRISK_GEN_ALL_CORE
For information on Fall & Injury Prevention, visit: [email or web address] OR  [email or web address] OR  https://www.cdc.gov/steadi/patient.html

## 2024-02-21 NOTE — PROGRESS NOTE ADULT - ASSESSMENT
69 y/o F PMHx uterine cancer (on chemo, last 2 weeks ago) with metastases to the lungs and liver, hypothyroid, HTN, CVA w/o residual deficits presents c/o worsening exertional dyspnea.  Patient is currently on room air and not in acute respiratory failure.  Patient is admitted for workup of worsening exertional dyspnea.  Differentials include cardiomyopathy (ie chemotherapy-induced cardiomyopathy), pulmonary disease from worsening pulmonary metastasis pulmonary bronchial and pulmonary artery compression from mediastinal lymphadenopathy VS underlying infection from left apical mass.
67 y/o F PMHx uterine cancer (on chemo, last 2 weeks ago) with metastases to the lungs and liver, hypothyroid, HTN, CVA w/o residual deficits presents c/o worsening exertional dyspnea.  Patient is currently on room air and not in acute respiratory failure.  Patient is admitted for workup of worsening exertional dyspnea.  Differentials include cardiomyopathy (ie chemotherapy-induced cardiomyopathy), pulmonary disease from worsening pulmonary metastasis pulmonary bronchial and pulmonary artery compression from mediastinal lymphadenopathy VS underlying infection from left apical mass.
67 y/o F PMHx uterine cancer (on chemo, last 2 weeks ago) with metastases to the lungs and liver, hypothyroid, HTN, CVA w/o residual deficits presents c/o worsening exertional dyspnea.  Patient is currently on room air and not in acute respiratory failure.  Patient is admitted for workup of worsening exertional dyspnea.  Differentials include cardiomyopathy (ie chemotherapy-induced cardiomyopathy), pulmonary disease from worsening pulmonary metastasis pulmonary bronchial and pulmonary artery compression from mediastinal lymphadenopathy VS underlying infection from left apical mass.
69 y/o F PMHx uterine cancer (on chemo, last 2 weeks ago) with metastases to the lungs and liver, hypothyroid, HTN, CVA w/o residual deficits presents c/o worsening exertional dyspnea.  Patient is currently on room air and not in acute respiratory failure.  Patient is admitted for workup of worsening exertional dyspnea.  Differentials include cardiomyopathy (ie chemotherapy-induced cardiomyopathy), pulmonary disease from worsening pulmonary metastasis pulmonary bronchial and pulmonary artery compression from mediastinal lymphadenopathy VS underlying infection from left apical mass.

## 2024-02-21 NOTE — DISCHARGE NOTE PROVIDER - PROVIDER TOKENS
PROVIDER:[TOKEN:[[id number]],FOLLOWUP:[1-3 days]] PROVIDER:[TOKEN:[[id number]],FOLLOWUP:[1-3 days]],PROVIDER:[TOKEN:[[id number]]]

## 2024-02-27 NOTE — ED ADULT NURSE NOTE - PAIN: PRESENCE, MLM
complains of pain/discomfort [Time Spent: ___ minutes] : I have spent [unfilled] minutes of time on the encounter.

## 2024-02-27 NOTE — ED ADULT NURSE NOTE - OBJECTIVE STATEMENT
Pt arrives to room 26 A&Ox3 and ambulatory at baseline. PH of HTN, Uterine cancer with mets to lungs (last chemo 6 weeks ago). Pt comes to ED c/o nausea. Pt states shes has post-nasal drip for a few weeks and feels mucus in the back of her throat making her feel more nauseous. Pt endorses nausea with no vomiting and inability to tolerate PO intake. Pt states she has had nausea like this before but never this severe. Pt denies headache, dizziness, chest pain, SOB, fevers, chills, or diarrhea at this time. Pt also endorsing mid upper back pain. Pt arrives with R chest wall port, accessed by JADA Malave. Labs drawn and sent. Pt medicated per EMAR. Pt placed on continuos cardiac monitor, NSR noted. Respirations even and unlabored on room air. No acute distress noted. Plan of care ongoing, comfort measures provided and safety measures maintained. Awaiting results.

## 2024-02-27 NOTE — ED ADULT TRIAGE NOTE - CHIEF COMPLAINT QUOTE
pt. presents with c/o x1 day of nausea. pt. denies vomiting or abnormal bowel movements at this time. pt. endorses difficulty breathing on exertion due to a "lesion pressing on her pulmonary artery". Pt. currently denies cough, chest pain, N/V/D, Headaches, dizziness, blurry vision, fevers. chills. Pt. endorses a past medical Hx of Endometrial CA last Chemo x6 weeks ago, Hypothryoidism, Htn.

## 2024-02-27 NOTE — ED ADULT NURSE NOTE - NSFALLRISK_ED_ALL_ED
Increased latency Normal/Other Normal Normal/Other Normal Other Other Normal/Other Normal Normal Normal/Other Increased latency Other Normal Normal/Other Other Increased latency Normal No Increased latency

## 2024-02-28 NOTE — PATIENT PROFILE ADULT - FALL HARM RISK - UNIVERSAL INTERVENTIONS
LAST VISIT:   2/1/2023     Future Appointments   Date Time Provider Dannie Aldana   4/27/2023 10:15 AM RICH Wiggins NP  [email or web address]   5/3/2023 10:00 AM Maurita Members, APRN - CNP [facility]
Bed in lowest position, wheels locked, appropriate side rails in place/Call bell, personal items and telephone in reach/Instruct patient to call for assistance before getting out of bed or chair/Non-slip footwear when patient is out of bed/[place] to call system/Physically safe environment - no spills, clutter or unnecessary equipment/Purposeful Proactive Rounding/Room/bathroom lighting operational, light cord in reach

## 2024-02-28 NOTE — ED ADULT NURSE REASSESSMENT NOTE - NS ED NURSE REASSESS COMMENT FT1
Upon reassessment pt endorsing persistent nausea at this time. ROSSY Yang made aware. Pt medicated per EMAR. Respirations even and unlabored on room air. Pt remains on continuous cardiac monitor, NSR noted. No acute distress noted. Pt denies headache, dizziness, chest pain, SOB, and vomiting at this time. Awaiting disposition.

## 2024-02-28 NOTE — CHART NOTE - NSCHARTNOTEFT_GEN_A_CORE
Notified by RN that patient c/o chest pain. Patient seen and assessed at bedside. Patient is resting comfortably in bed. Patient reports pinching pain across the chest wall which is not new for which she takes Tylenol at home. Patient reports the pinching sensation is 2/2 bronchial lesions. Patient denies chest  pain, shortness of breath dizziness.  ICU Vital Signs Last 24 Hrs  T(C): 36.9 (28 Feb 2024 21:21), Max: 37 (28 Feb 2024 13:47)  T(F): 98.4 (28 Feb 2024 21:21), Max: 98.6 (28 Feb 2024 13:47)  HR: 96 (28 Feb 2024 21:21) (89 - 100)  BP: 151/87 (28 Feb 2024 21:21) (132/98 - 151/93)  BP(mean): --  ABP: --  ABP(mean): --  RR: 18 (28 Feb 2024 21:21) (16 - 20)  SpO2: 98% (28 Feb 2024 21:21) (95% - 100%)    O2 Parameters below as of 28 Feb 2024 21:21  Patient On (Oxygen Delivery Method): room air    General: No acute distress, resting comfortably in bed  Lungs: Clear to ausculation  Heart : S1 S2 regular   Lower extremity: No edema Notified by RN that patient c/o chest pain. Patient seen and assessed at bedside. Patient is resting comfortably in bed. Patient reports pinching pain across the chest wall which is not new for which she takes Tylenol at home. Patient reports the pinching sensation is 2/2 bronchial lesions. Patient denies chest  pain, shortness of breath dizziness.    T(C): 36.9 (28 Feb 2024 21:21), Max: 37 (28 Feb 2024 13:47)  T(F): 98.4 (28 Feb 2024 21:21), Max: 98.6 (28 Feb 2024 13:47)  HR: 96 (28 Feb 2024 21:21) (89 - 100)  BP: 151/87 (28 Feb 2024 21:21) (132/98 - 151/93)  RR: 18 (28 Feb 2024 21:21) (16 - 20)  SpO2: 98% (28 Feb 2024 21:21) (95% - 100%)    O2 Parameters below as of 28 Feb 2024 21:21  Patient On (Oxygen Delivery Method): room air    General: No acute distress, resting comfortably in bed  Lungs: Clear to ausculation  Heart : S1 S2 regular   Lower extremity: No edema    68F with hx of uterine cancer with mets to liver and lung (pulmonary bronchial and pulmonary artery compression from mediastinal lymphadenopathy), CVA with no residual deficits, HTN who presents with nausea and inability to tolerate PO, admitted with FTT. Imaging with worsening metastatic disease.   Plan  Tylenol PRN

## 2024-02-28 NOTE — PATIENT PROFILE ADULT - NSPRESCRALCFREQ_GEN_A_NUR
Reason for Disposition   Question about upcoming scheduled test, no triage required and triager able to answer question    Protocols used: ST INFORMATION ONLY CALL-A-AH      
Never

## 2024-02-28 NOTE — H&P ADULT - ASSESSMENT
68F with hx of uterine cancer with mets to liver and lung (pulmonary bronchial and pulmonary artery compression from mediastinal lymphadenopathy), CVA with no residual deficits, HTN who presents with nausea and inability to tolerate PO, admitted with FTT. Imaging with worsening metastatic disease.

## 2024-02-28 NOTE — ED PROVIDER NOTE - OBJECTIVE STATEMENT
67 y/o F PMHx uterine cancer with metastases to the lung (pulmonary bronchial and pulmonary artery compression from mediastinal lymphadenopathy) and liver, hypothyroid, HTN, CVA w/o residual deficits presents c/o nausea and inability to tolerate PO intake. She endorses experiencing nausea but has never been this severe. no associated abdominal pain, vomiting, fever, chills, HA, dizziness, palpitations, syncope. She has not recently receiving chemo treatment.

## 2024-02-28 NOTE — H&P ADULT - PROBLEM SELECTOR PLAN 7
DVT ppx: lovenox  DIET: Regular  DISPO: TBD  GOC: DNR/DNI with trial of NIV    *patient has port, refusing to have IV placed, educated on risks of using port, still refusing IV placement

## 2024-02-28 NOTE — H&P ADULT - PROBLEM SELECTOR PLAN 2
metastatic uterine cancer to liver and lung   - CTAP: new pulmonary, increased size and number hepatic, and new abdominal soft  tissue metastatic disease as noted above.  - recent admission for dyspnea found to have pulmonary bronchial and pulmonary artery compression from mediastinal lymphadenopathy, at the time no thoracic intervention recommended for outpatient palliative RT  - discussed with radon: patient is planned for outpatient RT (10 fractions with Dr. Bowling), if patient has prolonged hospital stay >5 days can reconsult [facility] for inpatient RT (5 fractions)  - follows with [facility] Dr. Blank, patient expresses she is no longer interested in DMT, will notify onc team   - pain control: reports pain is controlled with PRN toradol, does not want opiates at this time, seen by palliative care last admission, if pain uncontrolled with Toradol consult palliative care

## 2024-02-28 NOTE — H&P ADULT - PROBLEM SELECTOR PLAN 1
presents with nausea and inability to tolerate PO  - likely due to worsening metastatic disease seen on imaging   - c/w IV hydration, PRN Zofran, diet as tolerated

## 2024-02-28 NOTE — CHART NOTE - NSCHARTNOTEFT_GEN_A_CORE
Seen for prior rad onc consult 2/20/24- h/o Lung ca= NSCLC    She is s/p CT sim and was planned for outpatient lung RT, "curative" intent; 10 fractions or two weeks.    Returned to [facility] ED now for "nausea." Per CT , new abdominal soft tissue mets are noted.      Spoke with Hospitalist and it is anticipated a <5 day hospital course.  We will follow and if there is a need to remain hospitalized we would offer a shorter course of palliative RT in house.  For now, will await dispo plans to resume outpatient RT, two weeks/ 10 fractions with Dr. Bowling.           onc: Dr. Toy Blank    < from: CT Chest No Cont (01.08.24 @ 06:09) >    IMPRESSION:  Slight interval increase in size of left apical mass with central   cavitation with compared to 12/2/2023.    Increased mediastinal lymphadenopathy.    Increased pulmonary nodules as described above.    Increased size of hepatic metastases.    < end of copied text >            < from: CT Abdomen and Pelvis No Cont (02.28.24 @ 06:30) >    IMPRESSION:  Severe right hydronephrosis andmild hydroureter to the level of a soft   tissue lesion with calcification measuring 2.2 x 1.6 cm proximal to the   ureterovesicular junction and inseparable from the right vaginal cuff.   This lesion has increased in size as compared to prior.    New pulmonary, increased size and number hepatic, and new abdominal soft   tissue metastatic disease as noted above.    < end of copied text >            Cytopathology - Non Gyn Report:   ACCESSION No:  [id number]  Patient:     SOHA VICENTE  Accession:                             [id number]  Collected Date/Time:                   1/8/2024 15:08 EST  Received Date/Time:                    1/8/2024 20:52 EST    Fine Needle Aspiration Addendum Report - Auth (Verified    Specimen(s) Submitted  1. LUNG, LEFT UPPER LOBE,  BIOPSY AND FNA  2. LYMPH NODE, 11R, SUPERIOR, EBUS-GUIDED FNA  3. LYMPH NODE, LEVEL 7, EBUS-GUIDED FNA  4. LYMPH NODE, 10L, EBUS-GUIDED FNA  5. LUNG, LEFT UPPER LOBE, BRONCHOALVEOLAR LAVAGE    Final Diagnosis  1. LUNG, LEFT UPPER LOBE,  BIOPSY AND FNA  POSITIVE FOR MALIGNANT CELLS.  Favor metastatic adenocarcinoma. none

## 2024-02-28 NOTE — H&P ADULT - CONVERSATION DETAILS
discussed imaging findings of progression of metastatic disease  patient no longer interested in DMT, but ok with RT inpatient vs outpatient  DNR/DNI with trial of NIV

## 2024-02-28 NOTE — ED PROVIDER NOTE - CLINICAL SUMMARY MEDICAL DECISION MAKING FREE TEXT BOX
69 y/o F PMHx uterine cancer with metastases to the lung (pulmonary bronchial and pulmonary artery compression from mediastinal lymphadenopathy) and liver, hypothyroid, HTN, CVA w/o residual deficits presents c/o nausea and inability to tolerate PO intake. On presentation patient nontoxic-appearing, vital stable, on physical exam no abdominal tenderness appreciated.  Plan for routine labs to rule out electrolyte derangements, will treat patient for nauseousness w/ zofran, IVF and p.o. challenge

## 2024-02-28 NOTE — H&P ADULT - NSHPLABSRESULTS_GEN_ALL_CORE
10.5   13.74 )-----------( 362      ( 27 Feb 2024 22:02 )             31.4     02-27    135  |  94<L>  |  16  ----------------------------<  106<H>  3.9   |  27  |  0.84    Ca    9.5      27 Feb 2024 22:02    TPro  7.2  /  Alb  3.1<L>  /  TBili  0.4  /  DBili  x   /  AST  16  /  ALT  6   /  AlkPhos  190<H>  02-27    EKG: SR, QTC 435ms

## 2024-02-28 NOTE — H&P ADULT - NSHPPHYSICALEXAM_GEN_ALL_CORE
Vital Signs Last 24 Hrs  T(C): 36.8 (28 Feb 2024 07:20), Max: 36.9 (27 Feb 2024 23:37)  T(F): 98.2 (28 Feb 2024 07:20), Max: 98.4 (27 Feb 2024 23:37)  HR: 89 (28 Feb 2024 07:20) (89 - 125)  BP: 133/82 (28 Feb 2024 07:20) (126/81 - 141/74)  RR: 16 (28 Feb 2024 07:20) (16 - 20)  SpO2: 98% (28 Feb 2024 07:20) (98% - 100%)    Parameters below as of 28 Feb 2024 07:20  Patient On (Oxygen Delivery Method): room air        CONSTITUTIONAL: resting in bed comfortably   EYES: no conjunctival or scleral injection, non-icteric, PERRLA  ENMT: no external nasal lesions, oral mucosa with moist membranes  NECK: trachea midline, no palpable neck mass   RESPIRATORY: breathing comfortably, lungs CTA without wheeze/rales/rhonchi  CARDIOVASCULAR: regular rate and rhythm; +S1S2, no murmurs, rubs, or gallops, no lower extremity edema, 2+ peripheral pulses  GASTROINTESTINAL: soft, nontender, nondistended; +BS throughout, no rebound/guarding  MUSCULOSKELETAL: no joint effusions, normal strength and tone of extremities   NEUROLOGIC: non-focal, sensation intact to light touch in b/l upper and lower extremities   PSYCHIATRIC: AAOx3, appropriate mood and affect  SKIN: no rashes or lesions, warm

## 2024-02-28 NOTE — H&P ADULT - HISTORY OF PRESENT ILLNESS
68F with hx of uterine cancer with mets to liver and lung (pulmonary bronchial and pulmonary artery compression from mediastinal lymphadenopathy), CVA with no residual deficits, HTN who presents with nausea and inability to tolerate PO. Patient was recently hospitalized for dyspnea 1 week prior. discharged home, has been about the same but recently with worsening nausea to the point she cannot eat. No vomiting or diarrhea. Abdominal pain is at baseline. She tried PO zofran with minimal relief. She denies fevers, chills, cp, dysuria, sick contacts recent travel.  68F with hx of uterine cancer with mets to liver and lung (pulmonary bronchial and pulmonary artery compression from mediastinal lymphadenopathy), CVA with no residual deficits, HTN who presents with nausea and inability to tolerate PO. Patient was recently hospitalized for dyspnea 1 week prior, discharged home, has been about the same but recently with worsening nausea to the point she cannot eat. No vomiting or diarrhea. Abdominal pain is at baseline. She tried PO zofran with minimal relief. She denies fevers, chills, cp, dysuria, sick contacts recent travel.

## 2024-02-28 NOTE — ED PROVIDER NOTE - ATTENDING APP SHARED VISIT CONTRIBUTION OF CARE
AGree with above- pt with diffuse metastatic disease p/w nausea and retching, inability to tolerate any PO- no assoc symptoms such as fever, abd pain, back pain, headache, dysuria. Pt reports feeling very weak and very nauseous, states has no antiemetics at home. On exam, abd s/nt, +BS in all four quadrants, no resp distress. Plan for labs to eval for metabolic derangements i/s/o no PO intake, IVF, zofran for nausea, reassess. At this time, no emergent CT imaging necessarily given lack of abd pain and BS in all 4 quads, no c/f SBO.

## 2024-02-28 NOTE — H&P ADULT - PROBLEM SELECTOR PLAN 4
BP 130s range  - takes valsartan 160mg at home, given poor PO intake will start with reduced dose of 40mg daily and uptitrate as needed, monitor vital signs

## 2024-02-28 NOTE — H&P ADULT - PROBLEM SELECTOR PLAN 3
renal function stable  - CTAP with severe right hydronephrosis seen on prior imaging as well  - evaluated by urology on recent admission 2/21/24: no intervention, call back if patient becomes febrile/concern for UTI, new onset MANJU, intractable pain, or plan for any treatment that would require preservation of renal function, in which case a R ureteral stent may be warranted  - monitor creatinine

## 2024-02-28 NOTE — ED ADULT NURSE REASSESSMENT NOTE - NS ED NURSE REASSESS COMMENT FT1
Upon reassessment pt endorsing nausea at this time after ambulating to restroom. MD Salmon made aware. Pt medicated per EMAR. Respirations even and unlabored on room air. Pt remains on continuous cardiac monitor, NSR noted. No acute distress noted. Pt denies headache, dizziness, chest pain and SOB at this time. Pt also endorsing back pain due to feeling uncomfortable in stretcher. Plan of care ongoing, comfort measures provided and safety measures maintained. Awaiting disposition.

## 2024-02-29 NOTE — PROGRESS NOTE ADULT - SUBJECTIVE AND OBJECTIVE BOX
Prince Talbert MD  Academic Hospitalist  Pager [phone]  Email: [email or web address]  Available on [employer] Teams        PROGRESS NOTE:     Patient is a 68y old  Female who presents with a chief complaint of     SUBJECTIVE / OVERNIGHT EVENTS:  Patient seen and examined this morning. Still has some degree of pain and weakness, but improved compared to yesterday.   ADDITIONAL REVIEW OF SYSTEMS:  No f/c/n/v    MEDICATIONS  (STANDING):  enoxaparin Injectable 40 milliGRAM(s) SubCutaneous every 24 hours  fluticasone propionate 50 MICROgram(s)/spray Nasal Spray 1 Spray(s) Both Nostrils two times a day  levothyroxine 100 MICROGram(s) Oral daily  sodium chloride 0.9%. 1000 milliLiter(s) (75 mL/Hr) IV Continuous <Continuous>  valsartan 40 milliGRAM(s) Oral daily    MEDICATIONS  (PRN):  acetaminophen     Tablet .. 650 milliGRAM(s) Oral every 6 hours PRN Mild Pain (1 - 3), Moderate Pain (4 - 6)  albuterol    90 MICROgram(s) HFA Inhaler 2 Puff(s) Inhalation every 6 hours PRN Shortness of Breath and/or Wheezing  ketorolac   Injectable 15 milliGRAM(s) IV Push every 6 hours PRN Moderate to Severe Pain  melatonin 3 milliGRAM(s) Oral at bedtime PRN Insomnia  ondansetron Injectable 4 milliGRAM(s) IV Push every 8 hours PRN Nausea and/or Vomiting      CAPILLARY BLOOD GLUCOSE        I&O's Summary      PHYSICAL EXAM:  Vital Signs Last 24 Hrs  T(C): 36.8 (29 Feb 2024 11:59), Max: 37 (28 Feb 2024 19:15)  T(F): 98.2 (29 Feb 2024 11:59), Max: 98.6 (28 Feb 2024 19:15)  HR: 95 (29 Feb 2024 11:59) (93 - 98)  BP: 133/90 (29 Feb 2024 11:59) (133/90 - 151/87)  BP(mean): --  RR: 18 (29 Feb 2024 11:59) (18 - 20)  SpO2: 99% (29 Feb 2024 11:59) (96% - 99%)    Parameters below as of 29 Feb 2024 11:59  Patient On (Oxygen Delivery Method): room air      CONSTITUTIONAL: NAD, pleasant  RESPIRATORY: Normal respiratory effort; no respiratory distress, CTAB  CARDIOVASCULAR: No visible JVD, No lower extremity edema; S1S2, no m,r,g  ABDOMEN: Not guarding, does not appear distended, BS+  MUSCLOSKELETAL: no clubbing or cyanosis of digits; no joint swelling   PSYCH: AOx3      LABS:                        9.7    10.77 )-----------( 299      ( 29 Feb 2024 15:11 )             28.7     02-29    137  |  101  |  12  ----------------------------<  124<H>  3.9   |  25  |  1.01    Ca    8.8      29 Feb 2024 15:11  Phos  2.8     02-29  Mg     1.70     02-29    TPro  6.3  /  Alb  2.9<L>  /  TBili  0.2  /  DBili  x   /  AST  19  /  ALT  7   /  AlkPhos  164<H>  02-29          Urinalysis Basic - ( 29 Feb 2024 15:11 )    Color: x / Appearance: x / SG: x / pH: x  Gluc: 124 mg/dL / Ketone: x  / Bili: x / Urobili: x   Blood: x / Protein: x / Nitrite: x   Leuk Esterase: x / RBC: x / WBC x   Sq Epi: x / Non Sq Epi: x / Bacteria: x          RADIOLOGY & ADDITIONAL TESTS:  Results Reviewed:   Imaging Personally Reviewed:  Electrocardiogram Personally Reviewed:    COORDINATION OF CARE:  Care Discussed with Consultants/Other Providers [Y/N]:  Prior or Outpatient Records Reviewed [Y/N]:

## 2024-02-29 NOTE — PROGRESS NOTE ADULT - ASSESSMENT
68F with hx of uterine cancer with mets to liver and lung (pulmonary bronchial and pulmonary artery compression from mediastinal lymphadenopathy), CVA with no residual deficits, HTN who presents with nausea and inability to tolerate PO, admitted with FTT. Imaging with worsening metastatic disease.     Active Problems  FTT  Endometrial cancer w/mets  R. Hydronephrosis   HTN  Leukcoytosis  hypothyroidism        FTT-   Likely from advanced cancer, symptoms now controlled, if symptoms controlled and patient is eating, will discharge patient to follow up for RT as outpatient    Enometrial cancer  cancer w/mets- as above, outpatient follow up     R. Hydronephrosis  - CTAP with severe right hydronephrosis seen on prior imaging as well  - evaluated by urology on recent admission 2/21/24: no intervention, call back if patient becomes febrile/concern for UTI, new onset MANJU, intractable pain, or plan for any treatment that would require preservation of renal function, in which case a R ureteral stent may be warranted  - monitor creatinine.    HTN  BP 130s range  - takes valsartan 160mg at home, given poor PO intake will start with reduced dose of 40mg daily and uptitrate as needed, monitor vital signs.    Leukcoytosis  WBC 13  - likely reactive, no focal s/s infection  - trend WBC, fever curve.    hypothyroidism  chronic  - c/w levothyroxine 100mcg daily.

## 2024-03-01 NOTE — CONSULT NOTE ADULT - PROBLEM SELECTOR RECOMMENDATION 4
> 2/2 metastatic CA  > Nausea much improved since admission   > Can consider RD consult  > PPSV 60%-70% - needs assistance with ambulation at times. Can consider PT consult for possible rehab wt transition TLC wt hospice since pt does not want to go home?

## 2024-03-01 NOTE — PROGRESS NOTE ADULT - SUBJECTIVE AND OBJECTIVE BOX
Patient: Ross Rider Date: 2020   : 1961 Attending: Nikolai Enamorado MD   59 year old male      Primary Rehab Diagnosis: Primary Rehabilitation Diagnosis: CVA  Anticipated Discharge Date:    Planned Discharge Destination: Planned Discharge Destination: Home    Subjective: Mr. Rider denies with ongoing symptoms of left shoulder discomfort but had a stable night.  Patient denies any radiation the pain distal to the left shoulder.        Reviewed: Allergies and Medications      Vital Last Value 24 Hour Range   Temperature 97.8 °F (36.6 °C) (20 0500) Temp  Min: 97.7 °F (36.5 °C)  Max: 97.8 °F (36.6 °C)   Pulse 68 (20 0838) Pulse  Min: 61  Max: 68   Respiratory 15 (20 0500) Resp  Min: 15  Max: 18   Non-Invasive  Blood Pressure 124/70 (20 0838) BP  Min: 118/64  Max: 125/65   Pulse Oximetry 98 % (20 0500) SpO2  Min: 98 %  Max: 98 %     Vital Today Admit   Weight 84.5 kg (20 0700) Weight: 84.3 kg (20)   Height N/A Height: 5' 8\" (172.7 cm) (20)   BMI N/A BMI (Calculated): 28.26 (20)     Weight over the past 48 Hours:  Patient Vitals for the past 48 hrs:   Weight   20 0611 84.7 kg   20 0700 84.5 kg        Intake/Output:    Last Stool Occurrence: 1 (20 1000)    I/O this shift:  In: 236 [P.O.:236]  Out: -     I/O last 3 completed shifts:  In: -   Out: 200 [Urine:200]      Intake/Output Summary (Last 24 hours) at 2020 1241  Last data filed at 2020 0905  Gross per 24 hour   Intake 236 ml   Output 200 ml   Net 36 ml       Central Line: No    Martinez: No    hysical Exam:   General Appearance:  SHEENT:     Alert, cooperative,  appears stated age the in mild     discomfort due to persistent left shoulder pain.   Unremarkable.    Lungs:    Clear to auscultation bilaterally, respirations unlabored, no wheezes or rhonchi   Chest wall:    No tenderness or deformity   Heart:    Regular rate and rhythm, S1 and S2 normal, no  murmur, rub   or gallop   Abdomen:     Soft, non-tender, bowel sounds active all four quadrants, no hepatosplenomegaly   Genitalia:    Deferred, no cordova   Extremities:   Extremities normal, atraumatic, no cyanosis or edema.  There is tenderness on palpation around the left shoulder and increased discomfort with passive range of motion that was impaired. No shoulder subluxation.    Pulses:   2+ and symmetric all extremities   Skin:   Warm and dry, turgor normal, no rashes or lesions at exposed areas   Neurologic:   Patient is alert and able to follow commands well.  There is mild higher cognitive impairment. Left inattention/neglect.    Cranial nerves 2-12.  Grossly intact except for decreased shoulder shrug on the left.    Motor.  Patient is hemiplegic with 0/5 the left upper and left lower extremities. Mild subluxation at the left shoulder. Mild increased tone in the left leg. Strength is 5/5 on the right and lower extremities.  Passive range of motion demonstrated no increased tone or clonus.  Sensory.  Sensation is intact for touch and pinprick bilaterally.  Coordination.  Coordination tested by means of finger-nose-finger testing using the right upper extremity is within normal limits.  Gait. Non ambulatory.  Patient dependent with wheelchair propulsion.        Laboratory Results:    Lab Results   Component Value Date    SODIUM 138 07/29/2020    POTASSIUM 3.9 07/30/2020    CHLORIDE 105 07/29/2020    CO2 26 07/29/2020    CALCIUM 8.3 (L) 07/29/2020    BUN 23 (H) 07/29/2020    CREATININE 0.62 (L) 07/29/2020    MG 2.0 07/29/2020    INR 1.2 07/19/2020    PT 12.3 (H) 07/19/2020    WBC 10.6 07/29/2020    HCT 38.5 (L) 07/29/2020    HGB 12.7 (L) 07/29/2020     07/29/2020    ALBUMIN 3.8 07/16/2020    GFRA 116 06/03/2020    GFRNA 96 06/03/2020    GLUCOSE 154 (H) 07/29/2020     Imaging:     Other:     Current Functional status over the last 24 hours:    Nursing Skin Documentation:       Bladder FIM Documentation:                          Bowel FIM Documentation:                        Pain Documentation:       Mobility Documentation:   ,    Sit to Stand: Minimal Assist (Min) (08/19/20 1100), Stand to Sit: Minimal Assist (Min) (08/19/20 1100),     ,  ,     ,     Selfcare Documentation:     Grooming Assistance: Moderate Assist (Mod) (08/19/20 0720)  Bathing Assistance: Moderate Assist (Mod) (08/19/20 0720)  Upper Body Dressing Assistance: Moderate Assist (Mod) (08/19/20 0720)  Lower Body Clothing Assistance: Maximal Assist (Max) (08/19/20 0720)  Toileting Assistance: Maximal Assist (Max) (08/19/20 0720)   Communication/Cognition/Swallowing Documentation:   ,        ,     ,        Swallow/Feeding Tips: See posted feeding/swallowing guidelines;Other (comment);Feeds self with periodic supervision (08/18/20 1330)     Impressions/Plan/DC Plan:  Right MCA ischemic infarct hemorrhagic conversion status post craniotomy and evacuation. Mr. Rider will be continued with acute inpatient rehab.  Patient's rehab Team Conference note was reviewed. The tentative discharge is set for 09/03/2020.  Patient will be continued with baby aspirin. Patient will need supervision and physical assistance at discharge which the patient's family is aware.  Family conference will be held soon.       Left shoulder pain secondary to psoriatic arthritis and hemiplegia. Patient will be provided with Voltaren patch, therapies, taping and follow the symptoms.  As mentioned above patient completed Remicade infusion and will be continued with Imuran.  Patient with improved symptoms    Psoriatic tenosynovitis. Discussed with Dr. Kevyn Huang, patient's rheumatologist. Patient will be initiated back on Imuran 50 mg po daily and received a dose of Remicade IV infusion.       Dysphagia.  Resolved and patient is tolerating regular consistency solids with thin liquids with periodic supervision.      Left hemiplegia.  Patient will be continued with therapy process to improve  the motor recovery and functional use of left arm left leg. Slow motor recovery.      Gait impairment.  Patient to continue with therapy process to improve the gait pattern and ambulatory distance. Nonambulatory. Wheelchair mobility will be primary mode for mobility at home.      Essential hypertension.  Patient to continue with antihypertensive management under the guidance of the hospitalist.     Insulin-dependent diabetes mellitus.  Patient will be continued with present diabetic management under the guidance of the hospitalist.  Patient continued with diabetic diet and blood sugar checks.     Hyperlipidemia.  Patient advocated low-fat low-cholesterol diet along with continuation of the atorvastatin 20 mg p.o. daily.  Patient denies any myalgias.    Situational depression:  Patient continued ongoing support and counseling.  Patient will be followed by rehab psychologist.  Patient is not keen to pursue oral antidepressant which he is encouraged to do so.     Patient is participating actively with the Rehabilitation Team and is making progress.   Rehab team's documentation reviewed with current status noted above. See team conference reports for specific discharge plans.     I have considered how current medical status and co-morbidities are impacting progress towards goals. Presently, the patient's medical co-morbidities aremaximized and are not inhibiting therapy progress with the medical plan as noted. The patient has continued functional deficits requiring inpatient rehabilitation. Continue intensive rehab, medical supervision , nursing cares and comprehensive discharge planning.     During the COVID -19 pandemic emergency, IRP patients might not consistently receive the federally mandated therapy hour requirements each day due to the reassignment of rehabilitation personnel to support the acute care hospital treatment of patients.    Nikolai Enamorado MD   Diplomate American Board of PM & R.   Diplomate  American Board of Brain Injury Medicine.       Prince Talbert MD  Academic Hospitalist  Pager [phone]  Email: [email or web address]  Available on [employer] Teams        PROGRESS NOTE:     Patient is a 68y old  Female who presents with a chief complaint of Cancer associated pain (29 Feb 2024 16:45)      SUBJECTIVE / OVERNIGHT EVENTS:  Patient seen and examined this morning. States that she is too weak to go for outpatient RT, would prefer doing it as inpatient w/transition to hospice.   ADDITIONAL REVIEW OF SYSTEMS:  Continues to have intractable cancer associated pain, however later on in the day resolved (as per d/w with palliative care team). No f/c    MEDICATIONS  (STANDING):  enoxaparin Injectable 40 milliGRAM(s) SubCutaneous every 24 hours  fluticasone propionate 50 MICROgram(s)/spray Nasal Spray 1 Spray(s) Both Nostrils two times a day  levothyroxine 100 MICROGram(s) Oral daily  sodium chloride 0.9%. 1000 milliLiter(s) (75 mL/Hr) IV Continuous <Continuous>  valsartan 40 milliGRAM(s) Oral daily    MEDICATIONS  (PRN):  acetaminophen     Tablet .. 650 milliGRAM(s) Oral every 6 hours PRN Mild Pain (1 - 3), Moderate Pain (4 - 6)  albuterol    90 MICROgram(s) HFA Inhaler 2 Puff(s) Inhalation every 6 hours PRN Shortness of Breath and/or Wheezing  ketorolac   Injectable 15 milliGRAM(s) IV Push every 6 hours PRN Moderate to Severe Pain  melatonin 3 milliGRAM(s) Oral at bedtime PRN Insomnia  ondansetron Injectable 4 milliGRAM(s) IV Push every 8 hours PRN Nausea and/or Vomiting      CAPILLARY BLOOD GLUCOSE        I&O's Summary      PHYSICAL EXAM:  Vital Signs Last 24 Hrs  T(C): 37 (01 Mar 2024 12:36), Max: 37 (29 Feb 2024 21:34)  T(F): 98.6 (01 Mar 2024 12:36), Max: 98.6 (29 Feb 2024 21:34)  HR: 101 (01 Mar 2024 12:36) (90 - 101)  BP: 130/94 (01 Mar 2024 12:36) (130/94 - 149/95)  BP(mean): --  RR: 18 (01 Mar 2024 12:36) (18 - 18)  SpO2: 96% (01 Mar 2024 12:36) (96% - 99%)    Parameters below as of 01 Mar 2024 12:36  Patient On (Oxygen Delivery Method): room air        CONSTITUTIONAL: NAD, pleasant, hypophonic  RESPIRATORY: Normal respiratory effort; no respiratory distress, CTAB  CARDIOVASCULAR: No visible JVD, No lower extremity edema; S1S2, no m,r,g  ABDOMEN: Not guarding, does not appear distended, BS+  MUSCLOSKELETAL: no clubbing or cyanosis of digits; no joint swelling   PSYCH: AOx3    LABS:                        10.2   10.91 )-----------( 358      ( 01 Mar 2024 05:31 )             31.8     03-01    137  |  101  |  11  ----------------------------<  102<H>  4.0   |  25  |  0.96    Ca    9.1      01 Mar 2024 05:31  Phos  3.2     03-01  Mg     1.70     03-01    TPro  6.5  /  Alb  2.7<L>  /  TBili  0.3  /  DBili  x   /  AST  19  /  ALT  9   /  AlkPhos  175<H>  03-01          Urinalysis Basic - ( 01 Mar 2024 05:31 )    Color: x / Appearance: x / SG: x / pH: x  Gluc: 102 mg/dL / Ketone: x  / Bili: x / Urobili: x   Blood: x / Protein: x / Nitrite: x   Leuk Esterase: x / RBC: x / WBC x   Sq Epi: x / Non Sq Epi: x / Bacteria: x          RADIOLOGY & ADDITIONAL TESTS:  Results Reviewed:   Imaging Personally Reviewed:      ACC: [id number] EXAM:  CT ABDOMEN AND PELVIS   ORDERED BY: BRUNILDA REEDER     PROCEDURE DATE:  02/28/2024          INTERPRETATION:  CLINICAL INFORMATION: Intractable nausea. History of   endometrial cancer with metastatic disease. 6 mm nodule in the right   lower lobe    COMPARISON: CT chest abdomen and pelvis 12/2/2023.    CONTRAST/COMPLICATIONS:  IV Contrast: NONE  Oral Contrast: NONE  Complications: None reported at time of study completion    PROCEDURE:  CT of the Abdomen and Pelvis was performed.  Sagittal and coronal reformats were performed.    FINDINGS:  LOWER CHEST: Partially visualized catheter tip in SVC. Status post right   lower lobe wedge resection.. Multiple new lower lobe pulmonary nodules,   including  right lower lobe 7 mm nodule (301-6) and 6 mm nodule as well   as left lower lobe 9 m nodule (301-14) and 6 mm nodule (301-7). Trace   left pleural effusion and trace pericardial effusion.  LIVER: Interval increase in size of likely metastatic left lobe lesion   measuring 6.0 x 6.3 cm, previously 2.5 x 2.2 cm. New 2.7 x 2.8 cm right   hepatic lobe lesion, 1.2 and 1.5 cm left lobe lesions, and 9 mm right   lobe lesion, likely metastatic.  BILE DUCTS: Normal caliber.  GALLBLADDER: Within normal limits.  SPLEEN: Within normal limits.  PANCREAS: Within normal limits.  ADRENALS: Within normal limits.  KIDNEYS/URETERS: Bilateral renal cysts. Severe right hydronephrosis and   mild hydroureter to the level of a soft tissue lesion with calcification   measuring 2.2 x 1.6 cm (301-104), previously 1.5 x 1.1 cm, proximal to   the ureterovesicular junction and inseparable from the right vaginal   cuff. No left hydronephrosis.    BLADDER: Within normal limits.  REPRODUCTIVE ORGANS: Hysterectomy. Soft tissue lesion at the right   vaginal cuff as described above.    BOWEL: No bowel obstruction. Appendix is not visualized. No evidence of   inflammation in the pericecal region.  PERITONEUM: No ascites.  VESSELS: Atherosclerotic changes.  RETROPERITONEUM/LYMPH NODES: No lymphadenopathy.  ABDOMINAL WALL: Multiple new soft tissue density implants within the   abdominal subcutaneous soft tissues, for example 1.5 x 1.6 cm soft tissue   implant (301-53).  BONES: Degenerative changes.    IMPRESSION:  Severe right hydronephrosis and mild hydroureter to the level of a soft   tissue lesion with calcification measuring 2.2 x 1.6 cm proximal to the   ureterovesicular junction and inseparable from the right vaginal cuff.   This lesion has increased in size as compared to prior.    New pulmonary, increased size and number hepatic, and new abdominal soft   tissue metastatic disease as noted above.  Electrocardiogram Personally Reviewed:    COORDINATION OF CARE:  Care Discussed with Consultants/Other Providers [Y/N]: Case discussed during interdisciplinary rounds with social work and case management. Discussed with Adelia (palliative NP). Continue current meds.   Prior or Outpatient Records Reviewed [Y/N]:

## 2024-03-01 NOTE — PROGRESS NOTE ADULT - PROBLEM SELECTOR PROBLEM 8
Cerebrovascular accident (CVA) due to occlusion of carotid artery
Cerebrovascular accident (CVA) due to occlusion of carotid artery

## 2024-03-01 NOTE — CHART NOTE - NSCHARTNOTEFT_GEN_A_CORE
Received a call to revaluate patient for inpatient RT during this hospital admission. She is a 73 y/o F with metastatic uterine cancer to the liver and lungs. Initially was planned for 30Gy/10fx palliative treatment to her lung metastatic, however she was readmitted 2/28/2024 due to generalized body weakness and nausea. Patient was seen and revaluated at bedside, she denies cough, SOB or hemoptysis at this time. She reports burning sensation in the vagina but no vaginal bleeding or abnormal discharge. She also denies any pelvic pain. She lives alone at home with no assistance and reports her friend occasionally drives her to her clinic appointments.    Plan  Will replan for radiation treatment for a shorter course so she can get treated while inpatient.  Case also discussed with Dr Bowling- Attending on call who is also in agreement with the plan Received a call to revaluate patient for inpatient RT during this hospital admission. She is a 75 y/o F with metastatic uterine cancer to the liver and lungs. Initially was planned for 30Gy/10fx palliative treatment to her lung metastatic, however she was readmitted 2/28/2024 due to generalized body weakness and nausea. Patient was seen and revaluated at bedside, she denies cough, SOB or hemoptysis at this time. She reports burning sensation in the vagina but no vaginal bleeding or abnormal discharge. She also denies any pelvic pain. She lives alone at home with no assistance and reports her friend occasionally drives her to her clinic appointments.    Plan  Will replan for radiation treatment for a shorter course so she can get treated while inpatient.  Case also discussed with Dr Bowling- Attending on call who is also in agreement with the plan  Agree with above.

## 2024-03-01 NOTE — CONSULT NOTE ADULT - ASSESSMENT
68F with hx of uterine cancer with mets to liver and lung (pulmonary bronchial and pulmonary artery compression from mediastinal lymphadenopathy), CVA with no residual deficits, HTN who presents with nausea and inability to tolerate PO. Patient was recently hospitalized for dyspnea 1 week prior, discharged home, has been about the same but recently with worsening nausea to the point she cannot eat. No vomiting or diarrhea. Abdominal pain is at baseline. She tried PO zofran with minimal relief. She denies fevers, chills, cp, dysuria, sick contacts recent travel. Palliative consulted for symptom management.

## 2024-03-01 NOTE — CONSULT NOTE ADULT - SUBJECTIVE AND OBJECTIVE BOX
[facility] Geriatrics and Palliative Care  Adelia Gonzalez, Palliative Care Nurse Practitioner  Contact Info: Page [phone] (Including Nights/Weekends), Message on Microsoft Teams (Adelia Gonzalez), or leave VM at Palliative Office [phone] (non-urgent)    Date of [id number] @ 13:51    HPI:  68F with hx of uterine cancer with mets to liver and lung (pulmonary bronchial and pulmonary artery compression from mediastinal lymphadenopathy), CVA with no residual deficits, HTN who presents with nausea and inability to tolerate PO. Patient was recently hospitalized for dyspnea 1 week prior, discharged home, has been about the same but recently with worsening nausea to the point she cannot eat. No vomiting or diarrhea. Abdominal pain is at baseline. She tried PO zofran with minimal relief. She denies fevers, chills, cp, dysuria, sick contacts recent travel.  (28 Feb 2024 11:16)    PERTINENT PM/SXH:   Carotid stenosis, left    Cerebrovascular accident (CVA) due to occlusion of carotid artery    Endometrial cancer    HTN (hypertension)    Drug-induced thyroiditis    Hypothyroidism      H/O dilation and curettage    S/P myomectomy    History of hand surgery    History of lumpectomy of right breast    Status post hysteroscopy    S/P hysterectomy    Encounter for care related to vascular access port        -------------------------------------------------------------------------------------------------------    FAMILY HISTORY:  Family history of CLL (chronic lymphoid leukemia)    Family history of breast cancer in female    Family history of hypertension      Family Hx substance abuse [ ]yes [ ]no  ITEMS NOT CHECKED ARE NOT PRESENT    SOCIAL HISTORY:   Significant other/partner[ ]  Children[ ]  Church/Spirituality:  Substance hx:  [ ]   Tobacco hx:  [ ]   Alcohol hx: [ ]   Home Opioid hx:  [X ] I-Stop Reference No:   [id number]    Patient Demographic Information (PDI)       PDI	First Name	Last Name	Birth Date	Gender	Street Address	[facility]	Zip Code  EVITA Hoang	1955	Female	[address]	[place]	[address]    Prescription Information      PDI Filter:    PDI	My Rx	Current Rx	Drug Type	Rx Written	Rx Dispensed	Drug	Quantity	Days Supply	Prescriber Name	Prescriber AKHIL #  A	N	N	O	12/07/2023	12/09/2023	hydrocodone-homatropine soln	200ml	10	Shanelle Gibson	[id number]  Living Situation: [X ]Home  [ ]Long term care  [ ]Rehab [ ]Other    BASELINE (I)ADL(s) (prior to admission):  [place]: [ ]Total  [ ] Moderate [ ]Dependent    -------------------------------------------------------------------------------------------------------  ADVANCE DIRECTIVES:    DNR/MOLST  [ X]  Living Will  [ ]   DECISION MAKER(s):  [X ] Health Care Proxy(s)  [ ] Surrogate(s)  [ ] Guardian           Name(s): Jacobo Burton Phone Number(s): [phone]    -------------------------------------------------------------------------------------------------------  Allergies    gabapentin (Other)  contrast media (iodine-based) (Other)  Benadryl (Other)    Intolerances    Taxol (Other)  MEDICATIONS  (STANDING):  enoxaparin Injectable 40 milliGRAM(s) SubCutaneous every 24 hours  fluticasone propionate 50 MICROgram(s)/spray Nasal Spray 1 Spray(s) Both Nostrils two times a day  levothyroxine 100 MICROGram(s) Oral daily  sodium chloride 0.9%. 1000 milliLiter(s) (75 mL/Hr) IV Continuous <Continuous>  valsartan 40 milliGRAM(s) Oral daily    MEDICATIONS  (PRN):  acetaminophen     Tablet .. 650 milliGRAM(s) Oral every 6 hours PRN Mild Pain (1 - 3), Moderate Pain (4 - 6)  albuterol    90 MICROgram(s) HFA Inhaler 2 Puff(s) Inhalation every 6 hours PRN Shortness of Breath and/or Wheezing  ketorolac   Injectable 15 milliGRAM(s) IV Push every 6 hours PRN Moderate to Severe Pain  melatonin 3 milliGRAM(s) Oral at bedtime PRN Insomnia  ondansetron Injectable 4 milliGRAM(s) IV Push every 8 hours PRN Nausea and/or Vomiting    PRESENT SYMPTOMS: [ ]Unable to self-report  [ ] CPOT [ ] PAINADs [ ] RDOS  Source if other than patient:  [ ]Family   [ ]Team     Pain: [ X]yes [ ]no  QOL impact -   Location -                    Aggravating factors -  Quality -  Radiation -  Timing-  Severity (0-10 scale):  Minimal acceptable level (0-10 scale)/Pain goal:    CPOT:    https://www.[facility].org/getattachment/[email or web address]/Critical-Care-Pain-Observation-Tool-(CPOT)    PAINAD Score: See PAINAD tool and score below       RDOS: See RDOS tool and score below   0 to 2  minimal or no respiratory distress   3  mild distress  4 to 6 moderate distress  >7 severe distress    Dyspnea:                           [ ]Mild [ ]Moderate [ ]Severe  Anxiety:                             [ ]Mild [ ]Moderate [ ]Severe  Fatigue:                             [ ]Mild [ ]Moderate [ ]Severe  Nausea:                             [X ]Mild [ ]Moderate [ ]Severe  Loss of appetite:              [ ]Mild [ ]Moderate [ ]Severe  Constipation:                    [ ]Mild [ ]Moderate [ ]Severe  Other Symptoms:  [X ]All other review of systems negative     -------------------------------------------------------------------------------------------------------  PCSSQ[Palliative Care Spiritual Screening Question]   Severity (0-10):  Score of 4 or > indicate consideration of Chaplaincy referral.  Chaplaincy Referral: [ ] yes [ ] refused [ ] following [ ] Deferred     Caregiver [place]? : [ ] yes [ ] no [ ] Deferred [ ] Declined             Social work referral [ ] Patient & Family Centered Care Referral [ ]     Anticipatory Grief present?:  [ ] yes [ ] no  [ ] Deferred                  Social work referral [ ] Chaplaincy Referral [ ]      -------------------------------------------------------------------------------------------------------  PHYSICAL EXAM:  Vital Signs Last 24 Hrs  T(C): 37 (01 Mar 2024 12:36), Max: 37 (29 Feb 2024 21:34)  T(F): 98.6 (01 Mar 2024 12:36), Max: 98.6 (29 Feb 2024 21:34)  HR: 101 (01 Mar 2024 12:36) (90 - 101)  BP: 130/94 (01 Mar 2024 12:36) (130/94 - 149/95)  BP(mean): --  RR: 18 (01 Mar 2024 12:36) (18 - 18)  SpO2: 96% (01 Mar 2024 12:36) (96% - 99%)    Parameters below as of 01 Mar 2024 12:36  Patient On (Oxygen Delivery Method): room air     I&O's Summary      GENERAL:  [ ]Cachexia  [ ]Alert  [ ]Oriented x   [ ]Lethargic  [ ]Unarousable  [ ]Verbal  [ ]Non-Verbal    Behavioral:   [ ] Anxiety  [ ] Delirium [ ] Agitation [ ] Other    HEENT:  [ ]Normal   [ ]Dry mouth   [ ]ET Tube/Trach  [ ]Oral lesions    PULMONARY:   [ ]Clear [ ]Tachypnea  [ ]Audible excessive secretions   [ ]Rhonchi        [ ]Right [ ]Left [ ]Bilateral  [ ]Crackles        [ ]Right [ ]Left [ ]Bilateral  [ ]Wheezing     [ ]Right [ ]Left [ ]Bilateral  [ ]Diminished breath sounds [ ]right [ ]left [ ]bilateral    CARDIOVASCULAR:    [ ]Regular [ ]Irregular [ ]Tachy  [ ]Tristen [ ]Murmur [ ]Other    GASTROINTESTINAL:  [ ]Soft  [ ]Distended   [ ]+BS  [ ]Non tender [ ]Tender  [ ]Other [ ]PEG [ ]OGT/ NGT  Last BM:    GENITOURINARY:  [ ]Normal [ ] Incontinent   [ ]Oliguria/Anuria   [ ][place]    MUSCULOSKELETAL:   [ ]Normal   [ ]Weakness  [ ]Bed/Wheelchair bound [ ]Edema    NEUROLOGIC:   [ ]No focal deficits  [ ]Cognitive impairment  [ ]Dysphagia [ ]Dysarthria [ ]Paresis [ ]Other     SKIN:   [ ]Normal  [ ]Rash  [ ]Other  [ ]Pressure ulcer(s)       Present on admission [ ]y [ ]n    -------------------------------------------------------------------------------------------------------  CRITICAL CARE:  [ ] Shock Present  [ ]Septic [ ]Cardiogenic [ ]Neurologic [ ]Hypovolemic  [ ]  Vasopressors [ ]  Inotropes   [ ]Respiratory failure present [ ]Mechanical ventilation [ ]Non-invasive ventilatory support [ ]High flow    [ ]Acute  [ ]Chronic [ ]Hypoxic  [ ]Hypercarbic [ ]Other  [ ]Other organ failure     -------------------------------------------------------------------------------------------------------  LABS:                        10.2   10.91 )-----------( 358      ( 01 Mar 2024 05:31 )             31.8   03-01    137  |  101  |  11  ----------------------------<  102<H>  4.0   |  25  |  0.96    Ca    9.1      01 Mar 2024 05:31  Phos  3.2     03-01  Mg     1.70     03-01    TPro  6.5  /  Alb  2.7<L>  /  TBili  0.3  /  DBili  x   /  AST  19  /  ALT  9   /  AlkPhos  175<H>  03-01      Urinalysis Basic - ( 01 Mar 2024 05:31 )    Color: x / Appearance: x / SG: x / pH: x  Gluc: 102 mg/dL / Ketone: x  / Bili: x / Urobili: x   Blood: x / Protein: x / Nitrite: x   Leuk Esterase: x / RBC: x / WBC x   Sq Epi: x / Non Sq Epi: x / Bacteria: x  -------------------------------------------------------------------------------------------------------  RADIOLOGY & ADDITIONAL STUDIES: < from: CT Abdomen and Pelvis No Cont (02.28.24 @ 06:30) >  IMPRESSION:  Severe right hydronephrosis andmild hydroureter to the level of a soft   tissue lesion with calcification measuring 2.2 x 1.6 cm proximal to the   ureterovesicular junction and inseparable from the right vaginal cuff.   This lesion has increased in size as compared to prior.    New pulmonary, increased size and number hepatic, and new abdominal soft   tissue metastatic disease as noted above.    --- End of Report ---    -------------------------------------------------------------------------------------------------------  PROTEIN CALORIE MALNUTRITION PRESENT: [ ]mild [ ]moderate [ ]severe [ ]underweight [ ]morbid obesity  https://www.591wed.org/[employer]/8190/web/files/ONC/Table_Clinical%20Characteristics%20to%20Document%20Malnutrition-White%20JV%20et%20al%202012.pdf    Height (cm): 154.9 (02-27-24 @ 18:17), 154.9 (02-16-24 @ 15:10), 152.4 (02-01-24 @ 16:00)  Weight (kg): 49.9 (02-27-24 @ 18:17), 54.8 (02-18-24 @ 06:15), 51.5 (02-01-24 @ 16:00)  BMI (kg/m2): 20.8 (02-27-24 @ 18:17), 22.8 (02-18-24 @ 06:15), 21.5 (02-16-24 @ 15:10)    Palliative Performance Status Version 2:   See PPSv2 tool and score below          [ ]PPSV2 < or = to 30% [ ]significant weight loss  [ ]poor nutritional intake  [ ]anasarca[ ]Artificial Nutrition      -------------------------------------------------------------------------------------------------------  Other REFERRALS:  [ ]Hospice  [ ]Child Life  [ ]Social Work  [ ]Case management [ ]Holistic Therapy     -------------------------------------------------------------------------------------------------------  Goals of Care Document:  [facility] Geriatrics and Palliative Care  Adelia Gonzalez, Palliative Care Nurse Practitioner  Contact Info: Page [phone] (Including Nights/Weekends), Message on Microsoft Teams (Adelia Gonzalez), or leave VM at Palliative Office [phone] (non-urgent)    Date of [id number] @ 13:51    HPI:  68F with hx of uterine cancer with mets to liver and lung (pulmonary bronchial and pulmonary artery compression from mediastinal lymphadenopathy), CVA with no residual deficits, HTN who presents with nausea and inability to tolerate PO. Patient was recently hospitalized for dyspnea 1 week prior, discharged home, has been about the same but recently with worsening nausea to the point she cannot eat. No vomiting or diarrhea. Abdominal pain is at baseline. She tried PO zofran with minimal relief. She denies fevers, chills, cp, dysuria, sick contacts recent travel.  (28 Feb 2024 11:16)    PERTINENT PM/SXH:   Carotid stenosis, left    Cerebrovascular accident (CVA) due to occlusion of carotid artery    Endometrial cancer    HTN (hypertension)    Drug-induced thyroiditis    Hypothyroidism      H/O dilation and curettage    S/P myomectomy    History of hand surgery    History of lumpectomy of right breast    Status post hysteroscopy    S/P hysterectomy    Encounter for care related to vascular access port  -------------------------------------------------------------------------------------------------------    FAMILY HISTORY:  Family history of CLL (chronic lymphoid leukemia)    Family history of breast cancer in female    Family history of hypertension      Family Hx substance abuse [ ]yes [ ]no  ITEMS NOT CHECKED ARE NOT PRESENT    SOCIAL HISTORY:   Significant other/partner[ ]  Children[ ]  Alevism/Spirituality:  Substance hx:  [ ]   Tobacco hx:  [ ]   Alcohol hx: [ ]   Home Opioid hx:  [X ] I-Stop Reference No:   [id number]    Patient Demographic Information (PDI)       PDI	First Name	Last Name	Birth Date	Gender	Street Address	[facility]	Zip Code  EVITA Hoang	1955	Female	[address]	[place]	[address]    Prescription Information      PDI Filter:    PDI	My Rx	Current Rx	Drug Type	Rx Written	Rx Dispensed	Drug	Quantity	Days Supply	Prescriber Name	Prescriber AKHIL #  A	N	N	O	12/07/2023	12/09/2023	hydrocodone-homatropine soln	200ml	10	Shanelle Gibson	[id number]  Living Situation: [X ]Home  [ ]Long term care  [ ]Rehab [ ]Other    BASELINE (I)ADL(s) (prior to admission):  [place]: [X ]Total  [ ] Moderate [ ]Dependent    -------------------------------------------------------------------------------------------------------  ADVANCE DIRECTIVES:    DNR/MOLST  [ X]  Living Will  [ ]   DECISION MAKER(s):  [X ] Health Care Proxy(s)  [ ] Surrogate(s)  [ ] Guardian           Name(s): Jacobo Burton Phone Number(s): [phone]    -------------------------------------------------------------------------------------------------------  Allergies    gabapentin (Other)  contrast media (iodine-based) (Other)  Benadryl (Other)    Intolerances    Taxol (Other)  MEDICATIONS  (STANDING):  enoxaparin Injectable 40 milliGRAM(s) SubCutaneous every 24 hours  fluticasone propionate 50 MICROgram(s)/spray Nasal Spray 1 Spray(s) Both Nostrils two times a day  levothyroxine 100 MICROGram(s) Oral daily  sodium chloride 0.9%. 1000 milliLiter(s) (75 mL/Hr) IV Continuous <Continuous>  valsartan 40 milliGRAM(s) Oral daily    MEDICATIONS  (PRN):  acetaminophen     Tablet .. 650 milliGRAM(s) Oral every 6 hours PRN Mild Pain (1 - 3), Moderate Pain (4 - 6)  albuterol    90 MICROgram(s) HFA Inhaler 2 Puff(s) Inhalation every 6 hours PRN Shortness of Breath and/or Wheezing  ketorolac   Injectable 15 milliGRAM(s) IV Push every 6 hours PRN Moderate to Severe Pain  melatonin 3 milliGRAM(s) Oral at bedtime PRN Insomnia  ondansetron Injectable 4 milliGRAM(s) IV Push every 8 hours PRN Nausea and/or Vomiting    PRESENT SYMPTOMS: [ ]Unable to self-report  [ ] CPOT [ ] PAINADs [ ] RDOS  Source if other than patient:  [ ]Family   [ ]Team     Pain: [ X]yes [ ]no  QOL impact -   Location - BL shoulders                    Aggravating factors - movement   Quality - sharp  Radiation - across upper back  Timing- intermittent   Severity (0-10 scale):10/10   Minimal acceptable level (0-10 scale)/Pain goal: 3    CPOT:    https://www.[facility].org/getattachment/[email or web address]/Critical-Care-Pain-Observation-Tool-(CPOT)    PAINAD Score: See PAINAD tool and score below       RDOS: See RDOS tool and score below   0 to 2  minimal or no respiratory distress   3  mild distress  4 to 6 moderate distress  >7 severe distress    Dyspnea:                           [ ]Mild [ ]Moderate [ ]Severe  Anxiety:                             [ ]Mild [ ]Moderate [ ]Severe  Fatigue:                             [ ]Mild [ ]Moderate [ ]Severe  Nausea:                             [X ]Mild [ ]Moderate [ ]Severe  Loss of appetite:              [ ]Mild [ ]Moderate [ ]Severe  Constipation:                    [ ]Mild [ ]Moderate [ ]Severe  Other Symptoms:  [X ]All other review of systems negative     -------------------------------------------------------------------------------------------------------  PCSSQ[Palliative Care Spiritual Screening Question]   Severity (0-10):  Score of 4 or > indicate consideration of Chaplaincy referral.  Chaplaincy Referral: [ ] yes [ ] refused [ ] following [x ] Deferred     Caregiver [place]? : [ ] yes [ ] no [ ] Deferred [x ] Declined             Social work referral [ ] Patient & Family Centered Care Referral [ ]     Anticipatory Grief present?:  [ ] yes [ ] no  [ x] Deferred                  Social work referral [ ] Chaplaincy Referral [ ]      -------------------------------------------------------------------------------------------------------  PHYSICAL EXAM:  Vital Signs Last 24 Hrs  T(C): 37 (01 Mar 2024 12:36), Max: 37 (29 Feb 2024 21:34)  T(F): 98.6 (01 Mar 2024 12:36), Max: 98.6 (29 Feb 2024 21:34)  HR: 101 (01 Mar 2024 12:36) (90 - 101)  BP: 130/94 (01 Mar 2024 12:36) (130/94 - 149/95)  BP(mean): --  RR: 18 (01 Mar 2024 12:36) (18 - 18)  SpO2: 96% (01 Mar 2024 12:36) (96% - 99%)    Parameters below as of 01 Mar 2024 12:36  Patient On (Oxygen Delivery Method): room air     I&O's Summary      GENERAL:  [ ]Cachexia  [ X]Alert  [ X]Oriented x 4  [ ]Lethargic  [ ]Unarousable  [X ]Verbal  [ ]Non-Verbal    Behavioral:   [ ] Anxiety  [ ] Delirium [ ] Agitation [X ] Other    HEENT:  [X ]Normal   [ ]Dry mouth   [ ]ET Tube/Trach  [ ]Oral lesions    PULMONARY:   [ X]Clear [ ]Tachypnea  [ ]Audible excessive secretions   [ ]Rhonchi        [ ]Right [ ]Left [ ]Bilateral  [ ]Crackles        [ ]Right [ ]Left [ ]Bilateral  [ ]Wheezing     [ ]Right [ ]Left [ ]Bilateral  [ ]Diminished breath sounds [ ]right [ ]left [ ]bilateral    CARDIOVASCULAR:    [X ]Regular [ ]Irregular [ ]Tachy  [ ]Tristen [ ]Murmur [ ]Other    GASTROINTESTINAL:  [X ]Soft  [ ]Distended   [X ]+BS  [ X]Non tender [ ]Tender  [ ]Other [ ]PEG [ ]OGT/ NGT  Last BM: 2/28    GENITOURINARY:  [X ]Normal [ ] Incontinent   [ ]Oliguria/Anuria   [ ][place]    MUSCULOSKELETAL:   [ ]Normal   [x ]Weakness  [ ]Bed/Wheelchair bound [ ]Edema    NEUROLOGIC:   [ x]No focal deficits  [ ]Cognitive impairment  [ ]Dysphagia [ ]Dysarthria [ ]Paresis [ ]Other     SKIN:   [x ]Normal  [ ]Rash  [ ]Other  [ ]Pressure ulcer(s)       Present on admission [ ]y [ ]n    -------------------------------------------------------------------------------------------------------  CRITICAL CARE:  [ ] Shock Present  [ ]Septic [ ]Cardiogenic [ ]Neurologic [ ]Hypovolemic  [ ]  Vasopressors [ ]  Inotropes   [ ]Respiratory failure present [ ]Mechanical ventilation [ ]Non-invasive ventilatory support [ ]High flow    [ ]Acute  [ ]Chronic [ ]Hypoxic  [ ]Hypercarbic [ ]Other  [ ]Other organ failure     -------------------------------------------------------------------------------------------------------  LABS:                        10.2   10.91 )-----------( 358      ( 01 Mar 2024 05:31 )             31.8   03-01    137  |  101  |  11  ----------------------------<  102<H>  4.0   |  25  |  0.96    Ca    9.1      01 Mar 2024 05:31  Phos  3.2     03-01  Mg     1.70     03-01    TPro  6.5  /  Alb  2.7<L>  /  TBili  0.3  /  DBili  x   /  AST  19  /  ALT  9   /  AlkPhos  175<H>  03-01      Urinalysis Basic - ( 01 Mar 2024 05:31 )    Color: x / Appearance: x / SG: x / pH: x  Gluc: 102 mg/dL / Ketone: x  / Bili: x / Urobili: x   Blood: x / Protein: x / Nitrite: x   Leuk Esterase: x / RBC: x / WBC x   Sq Epi: x / Non Sq Epi: x / Bacteria: x  -------------------------------------------------------------------------------------------------------  RADIOLOGY & ADDITIONAL STUDIES: < from: CT Abdomen and Pelvis No Cont (02.28.24 @ 06:30) >  IMPRESSION:  Severe right hydronephrosis andmild hydroureter to the level of a soft   tissue lesion with calcification measuring 2.2 x 1.6 cm proximal to the   ureterovesicular junction and inseparable from the right vaginal cuff.   This lesion has increased in size as compared to prior.    New pulmonary, increased size and number hepatic, and new abdominal soft   tissue metastatic disease as noted above.    --- End of Report ---    -------------------------------------------------------------------------------------------------------  PROTEIN CALORIE MALNUTRITION PRESENT: [ ]mild [ ]moderate [ ]severe [ ]underweight [ ]morbid obesity  https://www.[employer]/8970/web/files/ONC/Table_Clinical%20Characteristics%20to%20Document%20Malnutrition-White%20JV%20et%20al%202012.pdf    Height (cm): 154.9 (02-27-24 @ 18:17), 154.9 (02-16-24 @ 15:10), 152.4 (02-01-24 @ 16:00)  Weight (kg): 49.9 (02-27-24 @ 18:17), 54.8 (02-18-24 @ 06:15), 51.5 (02-01-24 @ 16:00)  BMI (kg/m2): 20.8 (02-27-24 @ 18:17), 22.8 (02-18-24 @ 06:15), 21.5 (02-16-24 @ 15:10)    Palliative Performance Status Version 2:   See PPSv2 tool and score below          [ ]PPSV2 < or = to 30% [ ]significant weight loss  [ ]poor nutritional intake  [ ]anasarca[ ]Artificial Nutrition      -------------------------------------------------------------------------------------------------------  Other REFERRALS:  [ ]Hospice  [ ]Child Life  [ ]Social Work  [ ]Case management [ ]Holistic Therapy     -------------------------------------------------------------------------------------------------------  Goals of Care Document:

## 2024-03-01 NOTE — CONSULT NOTE ADULT - PROBLEM SELECTOR RECOMMENDATION 9
> Follows wt Dr. Toy Blank @ [facility]. No longer pursing chemo, however interested in palliative RT while inpt  > CT abd/pelvis: Severe right hydronephrosis and mild hydroureter to the level of a soft tissue lesion with calcification measuring 2.2 x 1.6 cm proximal to the ureterovesicular junction and inseparable from the right vaginal cuff. This lesion has increased in size as compared to prior. New pulmonary, increased size and number hepatic, and new abdominal soft tissue metastatic disease as noted above.  > Rad/onc consulted - will offer inpt palliative RT to lung

## 2024-03-01 NOTE — CONSULT NOTE ADULT - PROBLEM SELECTOR RECOMMENDATION 2
> Pt expressed intermittent nausea with no vomiting. No nausea today  > Well controlled on IV Zofran 4mg Q8 hrs, can liberate to Q6 if needed  > Discussed transition to PO Zofran when closer to dc, pt verbalized understanding.   > Pt tolerating PO diet at this time

## 2024-03-01 NOTE — PROGRESS NOTE ADULT - ASSESSMENT
68F with hx of uterine cancer with mets to liver and lung (pulmonary bronchial and pulmonary artery compression from mediastinal lymphadenopathy), CVA with no residual deficits, HTN who presents with nausea and inability to tolerate PO, admitted with FTT. Imaging with worsening metastatic disease.     Active Problems  FTT  Endometrial cancer w/mets  R. Hydronephrosis   HTN  Leukcoytosis  hypothyroidism        FTT-  Initial plan was control her cancer associated pain and attempt to discharge that she can finish RT as outpatient.   However, the patient now states that she is too weak to follow up as outpatient. Plan now is to start inpatient RT with subsequent transition to hospice.  D/W Adelia (palliative NP), continue current meds  CT A/p showing worsening disease      Endometrial cancer  cancer w/mets- as above, outpatient follow up     R. Hydronephrosis  - CTAP with severe right hydronephrosis seen on prior imaging as well  - evaluated by urology on recent admission 2/21/24: no intervention,   - Monitor Cr    HTN  BP 130s range  - takes valsartan 160mg at home, given poor PO intake will start with reduced dose of 40mg daily and uptitrate as needed, monitor vital signs.    Leukcoytosis  WBC 13 on admission  - likely reactive, no focal s/s infection  - trend WBC, fever curve.    hypothyroidism  chronic  - c/w levothyroxine 100mcg daily.

## 2024-03-01 NOTE — CONSULT NOTE ADULT - PROBLEM SELECTOR RECOMMENDATION 3
> Pt c/o intermittent BL shoulder pain. Denies pain during encounter  > Well controlled on IV Toradol 15mg Q6 hrs, discussed dose increase if necessary. Shared last dose took pain fully away.  > Can continue with PRN Toradol 15mg. Discussed the role of opiates if pain were to worsen. Pt verbalized understanding.  > Discussed transition to PO when closer to dc, pt verbalized understanding.

## 2024-03-01 NOTE — CONSULT NOTE ADULT - TIME BILLING
55 minutes spent on total encounter. The necessity of the time spent during the encounter on this date of service was due to:     Time spent for extensive review of the physical chart, electronic medical record, and documentation to obtain collateral information including but not limited to:  [x] Inpatient records (ED, H&P, primary team, and consultants if applicable, care coordination)  [x] Inpatient values/results (biomarkers, immunoassays, imaging, and microbiology results)  [x] Current or proposed treatment plans  [x] Discussion with the primary team  [x] Discussion with the patient, surrogate decision maker, or family

## 2024-03-01 NOTE — CONSULT NOTE ADULT - PROBLEM SELECTOR RECOMMENDATION 5
Thank you for allowing us to participate in your patient's care. We will continue to follow with you. Please page [phone] for any q's or c's. The Geriatric and Palliative Medicine service has coverage 24 hours a day/ 7 days a week to provide medical recommendations regarding symptom management needs via telephone. Thank you for allowing us to participate in your patient's care. We will continue to follow with you. Please page [phone] for any q's or c's. The Geriatric and Palliative Medicine service has coverage 24 hours a day/ 7 days a week to provide medical recommendations regarding symptom management needs via telephone.  > DNR/DNI   > Educated family on the philosophy of hospice care and explained the various settings and criteria in which hospice can be delivered (home vs. nursing home vs. inpatient hospice). Discussed the services provided under hospice services emphasizing the goal of elevating patient's quality of life and optimizing symptom management and avoiding unnecessary future hospitalizations. In addition to symptom management expertise, hospice provides supportive counseling services, nutritional support and chaplaincy services. Pt shared she does not want to go home, educated pt on criteria for inpatient hospice. Will assess symptoms next week.   > Case d/w Dr. Talbert

## 2024-03-02 NOTE — PROGRESS NOTE ADULT - ASSESSMENT
69 yo woman with h/o HTN, CVA, hypothyroidism, OP, stage II endometrial cancer > dx/treated in 7/201- s/p robotic assisted TLH BSO, bilateral pelvic and para-aortic sentinel LN dissection by Dr. Paras Grayson on 7/2/2018, followed by pelvic IMRT and vaginal cuff brachytherapy with Dr. 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  She was admitted to [facility] on 2/27 with progressive nausea and generalized weakness with admission CT a/p confirming POD; admission notable for mild leukocytosis. Pt's hospital course also currently c/b cancer-related pain and hyponatremia.    ACTIVE PROBLEMS  Met endometrial cancer  Goals of care discussion, counseling  Cancer-related pain  Nausea without vomiting  R hydroureteronephrosis  Hyponatremia  Leukocytosis  Anemia of chronic disease  Hypothyroidism  HTN  Hypercoag state    - Met endometrial cancer  - GOC discussion, counseling  Pt indicated to Pall Care team on 2/28 that she is no longer interested in pursuing systemic cancer treatment  She consented to DNR/DNI code status and expressed interest in inpt hospice placement, citing that she does not want to die alone at home  SW referral for hospice placement is pending  Pt's long-term prognosis is poor: weeks     - Cancer-related pain  Still suboptimally controlled  Increasing Toradol to 30mg q6/prn x5d course for inflammatory pain (with ppi ppx)  Starting Dilaudid PO 2mg q6/prn for mild pain  Starting Dilaudid IV 0.2/0.4mg q6/prn for mod-sev pain  Continuing bowel regimen to prevent OIC  Will fu with Rad Onc re: inpt palliative RT to soft tissue and mediastinal met    - Nausea without vomiting: improved, likely chemo-related; continuing Zofran IV 4mg q8/prn    - R hydroureteronephrosis  Cr slightly worse today, 0.9 from baseline 0.6-0.7  Pt still with sufficient UOP  Will continue to monitor    - Hyponatremia  Possibly due to hypovolemia  Pt is relatively asymptomatic  Continue to monitor    - Leukocytosis  WBCs stable ranging 10-11K  Pt remains afebrile and without focal symptoms of acute infection  Monitoring off abx for now    - Anemia of chronic disease  Hgb gradually downtrending but pt remains without s/s of active bleeding  Will continue to monitor for now (goal hgb > 7; plts > 10K)    - Hypothyroidism: continuing LFT 100mcg daily    - HTN: continuing Valsartan 40mg daily    - Hypercoag state: continuing lovenox ppx

## 2024-03-02 NOTE — PROGRESS NOTE ADULT - SUBJECTIVE AND OBJECTIVE BOX
SOLID TUMOR ONCOLOGY HOSPITALIST PROGRESS NOTE    S: No acute events overnight    CURRENT MEDICATIONS  MEDICATIONS  (STANDING):  chlorhexidine 2% Cloths 1 Application(s) Topical daily  enoxaparin Injectable 40 milliGRAM(s) SubCutaneous every 24 hours  fluticasone propionate 50 MICROgram(s)/spray Nasal Spray 1 Spray(s) Both Nostrils two times a day  levothyroxine 100 MICROGram(s) Oral daily  sodium chloride 0.9%. 1000 milliLiter(s) (75 mL/Hr) IV Continuous <Continuous>  valsartan 40 milliGRAM(s) Oral daily    MEDICATIONS  (PRN):  albuterol    90 MICROgram(s) HFA Inhaler 2 Puff(s) Inhalation every 6 hours PRN Shortness of Breath and/or Wheezing  HYDROmorphone   Tablet 2 milliGRAM(s) Oral every 6 hours PRN Mild Pain (1 - 3)  HYDROmorphone  Injectable 0.2 milliGRAM(s) IV Push every 6 hours PRN mild-moderate pain  HYDROmorphone  Injectable 0.4 milliGRAM(s) IV Push every 6 hours PRN Severe Pain (7 - 10)  ketorolac   Injectable 30 milliGRAM(s) IV Push every 6 hours PRN breakthrough pain  melatonin 3 milliGRAM(s) Oral at bedtime PRN Insomnia  ondansetron Injectable 4 milliGRAM(s) IV Push every 8 hours PRN Nausea and/or Vomiting      PHYSICAL EXAM  T(C): 36.9 (03-02-24 @ 21:42), Max: 36.9 (03-02-24 @ 21:42)  HR: 96 (03-02-24 @ 21:42) (94 - 97)  BP: 127/74 (03-02-24 @ 21:42) (119/76 - 132/82)  RR: 18 (03-02-24 @ 21:42) (17 - 18)  SpO2: 96% (03-02-24 @ 21:42) (96% - 98%)    03-01-24 @ 07:01  -  03-02-24 @ 07:00  --------------------------------------------------------  IN: 550 mL / OUT: 0 mL / NET: 550 mL    03-02-24 @ 07:01  -  03-02-24 @ 21:53  --------------------------------------------------------  IN: 450 mL / OUT: 0 mL / NET: 450 mL        LABS                        9.9    11.38 )-----------( 281      ( 02 Mar 2024 05:50 )             29.6     03-02    134<L>  |  99  |  14  ----------------------------<  108<H>  3.9   |  25  |  0.95    Ca    9.1      02 Mar 2024 05:50  Phos  3.5     03-02  Mg     1.90     03-02    TPro  6.3  /  Alb  2.7<L>  /  TBili  0.3  /  DBili  x   /  AST  18  /  ALT  10  /  AlkPhos  189<H>  03-02      CAPILLARY BLOOD GLUCOSE            MICROBIOLOGY  Urinalysis Basic - ( 02 Mar 2024 05:50 )    Color: x / Appearance: x / SG: x / pH: x  Gluc: 108 mg/dL / Ketone: x  / Bili: x / Urobili: x   Blood: x / Protein: x / Nitrite: x   Leuk Esterase: x / RBC: x / WBC x   Sq Epi: x / Non Sq Epi: x / Bacteria: x            PERTINENT RADIOLOGY         SOLID TUMOR ONCOLOGY HOSPITALIST PROGRESS NOTE    S: No acute events overnight.  Pt complained of poor sleep at night due to abd pain (currently 3/10 but increased to 8/10 at night), not relieved after she took Tylenol. She agreed to trial of low dose opioids.    CURRENT MEDICATIONS  MEDICATIONS  (STANDING):  chlorhexidine 2% Cloths 1 Application(s) Topical daily  enoxaparin Injectable 40 milliGRAM(s) SubCutaneous every 24 hours  fluticasone propionate 50 MICROgram(s)/spray Nasal Spray 1 Spray(s) Both Nostrils two times a day  levothyroxine 100 MICROGram(s) Oral daily  sodium chloride 0.9%. 1000 milliLiter(s) (75 mL/Hr) IV Continuous <Continuous>  valsartan 40 milliGRAM(s) Oral daily  MEDICATIONS  (PRN):  albuterol    90 MICROgram(s) HFA Inhaler 2 Puff(s) Inhalation every 6 hours PRN Shortness of Breath and/or Wheezing  HYDROmorphone   Tablet 2 milliGRAM(s) Oral every 6 hours PRN Mild Pain (1 - 3)  HYDROmorphone  Injectable 0.2 milliGRAM(s) IV Push every 6 hours PRN mild-moderate pain  HYDROmorphone  Injectable 0.4 milliGRAM(s) IV Push every 6 hours PRN Severe Pain (7 - 10)  ketorolac   Injectable 30 milliGRAM(s) IV Push every 6 hours PRN breakthrough pain  melatonin 3 milliGRAM(s) Oral at bedtime PRN Insomnia  ondansetron Injectable 4 milliGRAM(s) IV Push every 8 hours PRN Nausea and/or Vomiting    PHYSICAL EXAM  T(C): 36.9 (03-02-24 @ 21:42), Max: 36.9 (03-02-24 @ 21:42)  HR: 96 (03-02-24 @ 21:42) (94 - 97)  BP: 127/74 (03-02-24 @ 21:42) (119/76 - 132/82)  RR: 18 (03-02-24 @ 21:42) (17 - 18)  SpO2: 96% (03-02-24 @ 21:42) (96% - 98%)    03-01-24 @ 07:01  -  03-02-24 @ 07:00  --------------------------------------------------------  IN: 550 mL / OUT: 0 mL / NET: 550 mL    03-02-24 @ 07:01  -  03-02-24 @ 21:53  --------------------------------------------------------  IN: 450 mL / OUT: 0 mL / NET: 450 mL  non-toxic appearing woman, laying in bed and appears comfortable, nad  Anicteric sclera, no oral lesions/thrush  RRR, no m/r/g  CTAB, no w/c/r  Abd soft, mildly tender on palpation of epigastrum and RUQ, no palpable masses/organomegaly  No peripheral edema  CN 2-12 grossly intact; no gross focal neuro deficits      LABS                        9.9    11.38 )-----------( 281      ( 02 Mar 2024 05:50 )             29.6     03-02    134<L>  |  99  |  14  ----------------------------<  108<H>  3.9   |  25  |  0.95    Ca    9.1      02 Mar 2024 05:50  Phos  3.5     03-02  Mg     1.90     03-02    TPro  6.3  /  Alb  2.7<L>  /  TBili  0.3  /  DBili  x   /  AST  18  /  ALT  10  /  AlkPhos  189<H>  03-02    TSH 2/17 0.39      MICROBIOLOGY  Abx: none    Cx Data  3/2 MRSA/MSSA screen negative      PERTINENT RADIOLOGY  2/28 CT Abd/pelv: Severe R hydronephrosis and mild hydroureter to the level of a soft tissue lesion w/ calcification 2.2x1.6cm proximal to the ureterovesicular junction, inseparable from R vaginal cuff, increased in size. New pulm nodule increased in size w/ multiple hepatic met and abdominal soft tissue mets.

## 2024-03-03 NOTE — PROGRESS NOTE ADULT - SUBJECTIVE AND OBJECTIVE BOX
SOLID TUMOR ONCOLOGY HOSPITALIST PROGRESS NOTE    S: No acute events overnight.  Pt reported that her pain was improved after she took Dilaudid (she received Ketorolac 30mg x1 @ 1700 on 3/2, followed by Dilaudid IV 0.4mg x1 @ 2100 on 3/2 and again at 0400 on 3/3).  However, she complained of nausea this am and requested to have nausea medication.  She also confirmed that she's been having regular bms.    CURRENT MEDICATIONS  MEDICATIONS  (STANDING):  chlorhexidine 2% Cloths 1 Application(s) Topical daily  enoxaparin Injectable 40 milliGRAM(s) SubCutaneous every 24 hours  fluticasone propionate 50 MICROgram(s)/spray Nasal Spray 1 Spray(s) Both Nostrils two times a day  levothyroxine 100 MICROGram(s) Oral daily  sodium chloride 0.9%. 1000 milliLiter(s) (75 mL/Hr) IV Continuous <Continuous>  valsartan 40 milliGRAM(s) Oral daily  MEDICATIONS  (PRN):  albuterol    90 MICROgram(s) HFA Inhaler 2 Puff(s) Inhalation every 6 hours PRN Shortness of Breath and/or Wheezing  HYDROmorphone   Tablet 2 milliGRAM(s) Oral every 6 hours PRN Mild Pain (1 - 3)  HYDROmorphone  Injectable 0.4 milliGRAM(s) IV Push every 6 hours PRN Severe Pain (7 - 10)  HYDROmorphone  Injectable 0.2 milliGRAM(s) IV Push every 6 hours PRN mild-moderate pain  ketorolac   Injectable 30 milliGRAM(s) IV Push every 6 hours PRN breakthrough pain  melatonin 3 milliGRAM(s) Oral at bedtime PRN Insomnia  ondansetron Injectable 4 milliGRAM(s) IV Push every 8 hours PRN Nausea and/or Vomiting      PHYSICAL EXAM  T(C): 36.9 (03-03-24 @ 07:22), Max: 36.9 (03-02-24 @ 21:42)  HR: 84 (03-03-24 @ 07:22) (84 - 97)  BP: 107/73 (03-03-24 @ 07:22) (107/73 - 132/82)  RR: 18 (03-03-24 @ 07:22) (17 - 18)  SpO2: 98% (03-03-24 @ 07:22) (96% - 98%)    03-02-24 @ 07:01  -  03-03-24 @ 07:00  --------------------------------------------------------  IN: 450 mL / OUT: 0 mL / NET: 450 mL  non-toxica appearing woman, laying in bed and appears comfortable, nad  Anicteric sclera, no oral lesions/thrush  RRR, no m/r/g  CTAB, no w/c/r  Abd soft, mildly tender on palpation of epigastrum and RUQ, no palpable masses/organomegaly  No peripheral edema  CN 2-12 grossly intact; no gross focal neuro deficits      LABS                        9.3    10.06 )-----------( 309      ( 03 Mar 2024 08:31 )             27.3     03-03    132<L>  |  98  |  18  ----------------------------<  90  4.7   |  25  |  1.07    Ca    8.9      03 Mar 2024 08:31  Phos  3.7     03-03  Mg     1.90     03-03    TPro  6.2  /  Alb  2.6<L>  /  TBili  0.2  /  DBili  x   /  AST  17  /  ALT  9   /  AlkPhos  189<H>  03-03    TSH 2/17 0.39    MICROBIOLOGY  Abx: none    Micro  3/2 MRSA/MSSA screen negative      PERTINENT RADIOLOGY  2/28 CT Abd/pelv: Severe R hydronephrosis and mild hydroureter to the level of a soft tissue lesion w/ calcification 2.2x1.6cm proximal to the ureterovesicular junction, inseparable from R vaginal cuff, increased in size. New pulm nodule increased in size w/ multiple hepatic met and abdominal soft tissue mets.

## 2024-03-03 NOTE — PROGRESS NOTE ADULT - ASSESSMENT
67 yo woman with h/o HTN, CVA, hypothyroidism, OP, stage II endometrial cancer > dx/treated in 7/201- s/p robotic assisted TLH BSO, bilateral pelvic and para-aortic sentinel LN dissection by Dr. Paras Grayson on 7/2/2018, followed by pelvic IMRT and vaginal cuff brachytherapy with Dr. Bowling in 10/93062; dx with met recurrent in the lungs in 10/2019 but was lost to fu until 7/2020; progressed after Carbo/Taxol > palliative RT to the vaginal mass and cuff area 2000cGy/5fxs  from 5/25/21-6/1/21 > Pembro/Lenvima (tx course c/b ICI-related colitis) > Doxil > Abraxan/Avastin > most recently on Docetaxel monotherapy (last given 2/2024).  She was admitted to [facility] on 2/27 with progressive nausea and generalized weakness with admission CT a/p confirming POD; admission notable for mild leukocytosis. Pt's hospital course also currently c/b cancer-related pain and hyponatremia.    ACTIVE PROBLEMS  Met endometrial cancer  Goals of care discussion, counseling  Cancer-related pain  Nausea without vomiting  R hydroureteronephrosis  Hyponatremia  Leukocytosis  Anemia of chronic disease  Hypothyroidism  HTN  Hypercoag state    - Met endometrial cancer  - GOC discussion, counseling  Pt indicated to Pall Care team on 2/28 that she is no longer interested in pursuing systemic cancer treatment  She consented to DNR/DNI code status and expressed interest in inpt hospice placement, citing that she does not want to die alone at home  SW referral for hospice placement is pending  Pt's long-term prognosis is poor: weeks     - Cancer-related pain  Better controlled today  Continuing Toradol 30mg q6/prn x5d course for inflammatory pain (with ppi ppx)  Continuing Dilaudid PO 2mg q6/prn for mild pain  Continuing Dilaudid IV 0.2/0.4mg q6/prn for mod-sev pain  Continuing bowel regimen to prevent OIC  Will fu with Rad Onc re: inpt palliative RT to soft tissue and mediastinal met    - Nausea without vomiting: continuing Zofran IV 4mg q8/prn    - R hydroureteronephrosis  Cr uptrending, 1.07 today from baseline 0.6-0.7  Pt still with sufficient UOP  Urine lytes ordered  No role for stent, PCN at this time, but monitoring carefully    - Hyponatremia  Possibly due to hypovolemia  Pt is relatively asymptomatic  1L NS challenge ordered and urine lytes/osm ordered; will fu pm bmp    - Leukocytosis  WBCs stable ranging 10-11K  Pt remains afebrile and without focal symptoms of acute infection  Monitoring off abx for now    - Anemia of chronic disease  Hgb gradually downtrending but pt remains without s/s of active bleeding  Iron studies pending; labs not otherwise c/w hemolysis  Will continue to monitor for now (goal hgb > 7; plts > 10K)    - Hypothyroidism: repeat TFTs ordered; continuing LFT 100mcg daily    - HTN: continuing Valsartan 40mg daily    - Hypercoag state: continuing lovenox ppx   67 yo woman with h/o HTN, CVA, hypothyroidism, OP, stage II endometrial cancer > dx/treated in 7/201- s/p robotic assisted TLH BSO, bilateral pelvic and para-aortic sentinel LN dissection by Dr. Paras Grayson on 7/2/2018, followed by pelvic IMRT and vaginal cuff brachytherapy with Dr. Bowling in 10/61974; dx with met recurrent in the lungs in 10/2019 but was lost to fu until 7/2020; progressed after Carbo/Taxol > palliative RT to the vaginal mass and cuff area 2000cGy/5fxs  from 5/25/21-6/1/21 > Pembro/Lenvima (tx course c/b ICI-related colitis) > Doxil > Abraxan/Avastin > most recently on Docetaxel monotherapy (last given 2/2024).  She was admitted to [facility] on 2/27 with progressive nausea and generalized weakness with admission CT a/p confirming POD; admission notable for mild leukocytosis. Pt's hospital course also currently c/b cancer-related pain and hyponatremia.    ACTIVE PROBLEMS  Met endometrial cancer  Goals of care discussion, counseling  Cancer-related pain  Nausea without vomiting  R hydroureteronephrosis  Hyponatremia  Leukocytosis  Anemia of chronic disease  Hypothyroidism  HTN  Hypercoag state    - Met endometrial cancer  - GOC discussion, counseling  Pt indicated to Pall Care team on 2/28 that she is no longer interested in pursuing systemic cancer treatment  She consented to DNR/DNI code status and expressed interest in inpt hospice placement, citing that she does not want to die alone at home  SW referral for hospice placement is pending  Pt's long-term prognosis is poor: weeks     - Cancer-related pain  Better controlled today  Continuing Toradol 30mg q6/prn x5d course for inflammatory pain (with ppi ppx)  Continuing Dilaudid PO 2mg q6/prn for mild pain  Continuing Dilaudid IV 0.2/0.4mg q6/prn for mod-sev pain  Continuing bowel regimen to prevent OIC  Will fu with Rad Onc re: inpt palliative RT to soft tissue and mediastinal met    - Nausea without vomiting  Likely due to polypharmacy (opioids)  Admission imaging negative for ileus/obstruction  Continuing Zofran IV 4mg q8/prn  Repleting lytes prn  1L NS hydration ordered as below    - R hydroureteronephrosis  Cr uptrending, 1.07 today from baseline 0.6-0.7  Pt still with sufficient UOP  Urine lytes ordered  No role for stent, PCN at this time, but monitoring carefully    - Hyponatremia  Possibly due to hypovolemia  Pt is relatively asymptomatic  1L NS challenge ordered and urine lytes/osm ordered; will fu pm bmp    - Leukocytosis  WBCs stable ranging 10-11K  Pt remains afebrile and without focal symptoms of acute infection  Monitoring off abx for now    - Anemia of chronic disease  Hgb gradually downtrending but pt remains without s/s of active bleeding  Iron studies pending; labs not otherwise c/w hemolysis  Will continue to monitor for now (goal hgb > 7; plts > 10K)    - Hypothyroidism: repeat TFTs ordered; continuing LFT 100mcg daily    - HTN: continuing Valsartan 40mg daily    - Hypercoag state: continuing lovenox ppx   69 yo woman with h/o HTN, CVA, hypothyroidism, OP, stage II endometrial cancer > dx/treated in 7/201- s/p robotic assisted TLH BSO, bilateral pelvic and para-aortic sentinel LN dissection by Dr. Paras Grayson on 7/2/2018, followed by pelvic IMRT and vaginal cuff brachytherapy with Dr. Bowling in 10/50951; dx with met recurrent in the lungs in 10/2019 but was lost to fu until 7/2020; progressed after Carbo/Taxol > palliative RT to the vaginal mass and cuff area 2000cGy/5fxs  from 5/25/21-6/1/21 > Pembro/Lenvima (tx course c/b ICI-related colitis) > Doxil > Abraxan/Avastin > most recently on Docetaxel monotherapy (last given 2/2024).  She was admitted to [facility] on 2/27 with progressive nausea and generalized weakness with admission CT a/p confirming POD; admission notable for mild leukocytosis. Pt's hospital course also currently c/b cancer-related pain and hyponatremia.    ACTIVE PROBLEMS  Met endometrial cancer  Goals of care discussion, counseling  Cancer-related pain  Nausea without vomiting  R hydroureteronephrosis  Hyponatremia  Leukocytosis  Anemia of chronic disease  Hypothyroidism  HTN  Hypercoag state    - Met endometrial cancer  - GOC discussion, counseling  Pt indicated to Pall Care team on 2/28 that she is no longer interested in pursuing systemic cancer treatment  She consented to DNR/DNI code status and expressed interest in inpt hospice placement, citing that she does not want to die alone at home  SW referral for hospice placement is pending  Pt's long-term prognosis is poor: weeks     - Cancer-related pain  Better controlled today  Continuing Toradol 30mg q6/prn x5d course for inflammatory pain (with ppi ppx)  Continuing Dilaudid PO 2mg q6/prn for mild pain  Continuing Dilaudid IV 0.2/0.4mg q6/prn for mod-sev pain  Continuing bowel regimen to prevent OIC  Appreciate Rad Onc consult: pt planned for inpt RT, 20Gy/5fxs, presumably to met mediastinal LN +/- intraabd soft tissue met    - Nausea without vomiting  Likely due to polypharmacy (opioids)  Admission imaging negative for ileus/obstruction  Continuing Zofran IV 4mg q8/prn  Repleting lytes prn  1L NS hydration ordered as below    - R hydroureteronephrosis  Cr uptrending, 1.07 today from baseline 0.6-0.7  Pt still with sufficient UOP  Urine lytes ordered  No role for stent, PCN at this time, but monitoring carefully    - Hyponatremia  Possibly due to hypovolemia  Pt is relatively asymptomatic  1L NS challenge ordered and urine lytes/osm ordered; will fu pm bmp    - Leukocytosis  WBCs stable ranging 10-11K  Pt remains afebrile and without focal symptoms of acute infection  Monitoring off abx for now    - Anemia of chronic disease  Hgb gradually downtrending but pt remains without s/s of active bleeding  Iron studies pending; labs not otherwise c/w hemolysis  Will continue to monitor for now (goal hgb > 7; plts > 10K)    - Hypothyroidism: repeat TFTs ordered; continuing LFT 100mcg daily    - HTN: continuing Valsartan 40mg daily    - Hypercoag state: continuing lovenox ppx   67 yo woman with h/o HTN, CVA, hypothyroidism, OP, stage II endometrial cancer > dx/treated in 7/201- s/p robotic assisted TLH BSO, bilateral pelvic and para-aortic sentinel LN dissection by Dr. Paras Grayson on 7/2/2018, followed by pelvic IMRT and vaginal cuff brachytherapy with Dr. Bowling in 10/12860; dx with met recurrent in the lungs in 10/2019 but was lost to fu until 7/2020; progressed after Carbo/Taxol > palliative RT to the vaginal mass and cuff area 2000cGy/5fxs  from 5/25/21-6/1/21 > Pembro/Lenvima (tx course c/b ICI-related colitis) > Doxil > Abraxan/Avastin > most recently on Docetaxel monotherapy (last given 2/2024).  She was admitted to [facility] on 2/27 with progressive nausea and generalized weakness with admission CT a/p confirming POD; admission notable for mild leukocytosis. Pt's hospital course also currently c/b cancer-related pain and hyponatremia.    ACTIVE PROBLEMS  Met endometrial cancer  Goals of care discussion, counseling  Cancer-related pain  Nausea without vomiting  R hydroureteronephrosis  Hyponatremia  Leukocytosis  Anemia of chronic disease  Hypothyroidism  HTN  Hypercoag state    - Met endometrial cancer  - GOC discussion, counseling  Pt indicated to Pall Care team on 2/28 that she is no longer interested in pursuing systemic cancer treatment  She consented to DNR/DNI code status and expressed interest in inpt hospice placement, citing that she does not want to die alone at home  SW referral for hospice placement is pending  Pt's long-term prognosis is poor: weeks     - Cancer-related pain  Better controlled today  Continuing Toradol 30mg q6/prn x5d course for inflammatory pain (with ppi ppx)  Continuing Dilaudid PO 2mg q6/prn for mild pain  Continuing Dilaudid IV 0.2/0.4mg q6/prn for mod-sev pain  Continuing bowel regimen to prevent OIC  Appreciate Rad Onc consult: pt planned for inpt RT, 20Gy/5fxs, presumably to met mediastinal LN +/- intraabd soft tissue met    - Nausea without vomiting  Likely due to polypharmacy (opioids)  Admission imaging negative for ileus/obstruction  Zofran dced; starting Reglan IV 10mg q8/prn  Repleting lytes prn  1L NS hydration ordered as below    - R hydroureteronephrosis  Cr uptrending, 1.07 today from baseline 0.6-0.7  Pt still with sufficient UOP  Urine lytes ordered  No role for stent, PCN at this time, but monitoring carefully    - Hyponatremia  Possibly due to hypovolemia  Pt is relatively asymptomatic  1L NS challenge ordered and urine lytes/osm ordered; will fu pm bmp    - Leukocytosis  WBCs stable ranging 10-11K  Pt remains afebrile and without focal symptoms of acute infection  Monitoring off abx for now    - Anemia of chronic disease  Hgb gradually downtrending but pt remains without s/s of active bleeding  Iron studies pending; labs not otherwise c/w hemolysis  Will continue to monitor for now (goal hgb > 7; plts > 10K)    - Hypothyroidism: repeat TFTs ordered; continuing LFT 100mcg daily    - HTN: continuing Valsartan 40mg daily    - Hypercoag state: continuing lovenox ppx

## 2024-03-04 NOTE — PROGRESS NOTE ADULT - SUBJECTIVE AND OBJECTIVE BOX
[facility] Geriatrics and Palliative Care  Adelia Gonzalez, Palliative Care Nurse Practitioner  Contact Info: Page [phone] (Including Nights/Weekends), Message on Microsoft Teams (Adelia Gonzalez), or leave VM at Palliative Office [phone] (non-urgent)    Date of Service 03-04-24 @ 15:01    SUBJECTIVE AND OBJECTIVE:  Indication for Geriatrics and Palliative Care Services/INTERVAL HPI: Pain / GOC  Pt seen this AM sitting up in bed. Pt shared pain regimen was changed over the weekend, expresses relief with IV Dilaudid 0.4mg however feels around ~5hr avery she is needing her next dose. Discussed liberating frequency for better pain control, pt in agreement.     OVERNIGHT EVENTS: Pt required IV Dilaudid 0.4mg x 3 & IV Toradol 30mg x 1 within 24 hrs.     DNR on chart:DNI: Trial NIV  DNI: Trial NIV      Allergies    gabapentin (Other)  contrast media (iodine-based) (Other)  Benadryl (Other)    Intolerances    Taxol (Other)  MEDICATIONS  (STANDING):  chlorhexidine 2% Cloths 1 Application(s) Topical daily  enoxaparin Injectable 40 milliGRAM(s) SubCutaneous every 24 hours  fluticasone propionate 50 MICROgram(s)/spray Nasal Spray 1 Spray(s) Both Nostrils two times a day  levothyroxine 100 MICROGram(s) Oral daily  pantoprazole    Tablet 40 milliGRAM(s) Oral before breakfast  valsartan 40 milliGRAM(s) Oral daily    MEDICATIONS  (PRN):  albuterol    90 MICROgram(s) HFA Inhaler 2 Puff(s) Inhalation every 6 hours PRN Shortness of Breath and/or Wheezing  aluminum hydroxide/magnesium hydroxide/simethicone Suspension 30 milliLiter(s) Oral every 4 hours PRN Dyspepsia  HYDROmorphone   Tablet 2 milliGRAM(s) Oral every 6 hours PRN Mild Pain (1 - 3)  HYDROmorphone  Injectable 0.4 milliGRAM(s) IV Push every 4 hours PRN Severe Pain (7 - 10)  ketorolac   Injectable 30 milliGRAM(s) IV Push every 6 hours PRN breakthrough pain  melatonin 3 milliGRAM(s) Oral at bedtime PRN Insomnia  metoclopramide Injectable 10 milliGRAM(s) IV Push every 8 hours PRN nausea        -------------------------------------------------------------------------------------------------------  ITEMS UNCHECKED ARE NOT PRESENT    PRESENT SYMPTOMS: [ ]Unable to self-report - see [ ] CPOT [ ] PAINADS [ ] RDOS  Source if other than patient:  [ ]Family   [ ]Team     Pain: [ X]yes [ ]no  QOL impact -   Location - BL shoulders/pelvis            Aggravating factors - movement   Quality - sharp  Radiation - across upper back  Timing- intermittent   Severity (0-10 scale):10/10   Minimal acceptable level (0-10 scale)/Pain goal: 3    CPOT:    https://www.[email or web address]/getattachment/[email or web address]/Critical-Care-Pain-Observation-Tool-(CPOT)    PAINAD Score: See PAINAD tool and score below     RDOS: See RDOS tool and score below   0 to 2  minimal or no respiratory distress   3  mild distress  4 to 6 moderate distress  >7 severe distress    Dyspnea:                           [ ]Mild [ ]Moderate [ ]Severe  Anxiety:                             [ ]Mild [ ]Moderate [ ]Severe  Fatigue:                             [ ]Mild [ ]Moderate [ ]Severe  Nausea:                             [ ]Mild [ ]Moderate [ ]Severe  Loss of appetite:              [ ]Mild [ ]Moderate [ ]Severe  Constipation:                    [ ]Mild [ ]Moderate [ ]Severe  Other Symptoms:  [x ]All other review of systems negative     Home Medications for Symptoms if present:    I Stop Reference no:   -------------------------------------------------------------------------------------------------------  PCSSQ[Palliative Care Spiritual Screening Question]   Severity (0-10):  Score of 4 or > indicate consideration of Chaplaincy referral.  Chaplaincy Referral: [ ] yes [ ] refused [ ] following [x ] Deferred     Caregiver [place]? : [ ] yes [ ] no [ ] Deferred [x ] Declined             Social work referral [ ] Patient & Family Centered Care Referral [ ]     Anticipatory Grief present?:  [ ] yes [ ] no  [x ] Deferred                  Social work referral [ ] Chaplaincy Referral [ ]    -------------------------------------------------------------------------------------------------------  PHYSICAL EXAM:  Vital Signs Last 24 Hrs  T(C): 36.8 (04 Mar 2024 12:52), Max: 37.1 (03 Mar 2024 21:36)  T(F): 98.3 (04 Mar 2024 12:52), Max: 98.8 (03 Mar 2024 21:36)  HR: 78 (04 Mar 2024 12:52) (78 - 98)  BP: 150/89 (04 Mar 2024 12:52) (134/90 - 150/89)  BP(mean): --  RR: 18 (04 Mar 2024 12:52) (18 - 18)  SpO2: 97% (04 Mar 2024 12:52) (97% - 100%)    Parameters below as of 04 Mar 2024 12:52  Patient On (Oxygen Delivery Method): room air     I&O's Summary    03 Mar 2024 07:01  -  04 Mar 2024 07:00  --------------------------------------------------------  IN: 300 mL / OUT: 0 mL / NET: 300 mL         GENERAL:  [ ]Cachexia  [ X]Alert  [ X]Oriented x 4  [ ]Lethargic  [ ]Unarousable  [X ]Verbal  [ ]Non-Verbal    Behavioral:   [ ] Anxiety  [ ] Delirium [ ] Agitation [X ] Other    HEENT:  [X ]Normal   [ ]Dry mouth   [ ]ET Tube/Trach  [ ]Oral lesions    PULMONARY:   [ X]Clear [ ]Tachypnea  [ ]Audible excessive secretions   [ ]Rhonchi        [ ]Right [ ]Left [ ]Bilateral  [ ]Crackles        [ ]Right [ ]Left [ ]Bilateral  [ ]Wheezing     [ ]Right [ ]Left [ ]Bilateral  [ ]Diminished breath sounds [ ]right [ ]left [ ]bilateral    CARDIOVASCULAR:    [X ]Regular [ ]Irregular [ ]Tachy  [ ]Tristen [ ]Murmur [ ]Other    GASTROINTESTINAL:  [X ]Soft  [ ]Distended   [X ]+BS  [ X]Non tender [ ]Tender  [ ]Other [ ]PEG [ ]OGT/ NGT  Last BM: 3/4    GENITOURINARY:  [X ]Normal [ ] Incontinent   [ ]Oliguria/Anuria   [ ][place]    MUSCULOSKELETAL:   [ ]Normal   [x ]Weakness  [ ]Bed/Wheelchair bound [ ]Edema    NEUROLOGIC:   [ x]No focal deficits  [ ]Cognitive impairment  [ ]Dysphagia [ ]Dysarthria [ ]Paresis [ ]Other     SKIN:   [x ]Normal  [ ]Rash  [ ]Other  [ ]Pressure ulcer(s)       Present on admission [ ]y [ ]n    -------------------------------------------------------------------------------------------------------  CRITICAL CARE:  [ ]Shock Present  [ ]Septic [ ]Cardiogenic [ ]Neurologic [ ]Hypovolemic  [ ]Vasopressors [ ]Inotropes  [ ]Respiratory failure present [ ]Mechanical Ventilation [ ]Non-invasive ventilatory support [ ]High-Flow   [ ]Acute  [ ]Chronic [ ]Hypoxic  [ ]Hypercarbic [ ]Other  [ ]Other organ failure     -------------------------------------------------------------------------------------------------------  LABS:                        9.3    9.36  )-----------( 291      ( 04 Mar 2024 06:39 )             28.6   03-04    130<L>  |  97<L>  |  17  ----------------------------<  104<H>  4.4   |  23  |  1.06    Ca    8.9      04 Mar 2024 06:39  Phos  3.3     03-04  Mg     1.70     03-04    TPro  5.8<L>  /  Alb  2.5<L>  /  TBili  0.3  /  DBili  x   /  AST  19  /  ALT  11  /  AlkPhos  218<H>  03-04    Urinalysis Basic - ( 04 Mar 2024 06:39 )    Color: x / Appearance: x / SG: x / pH: x  Gluc: 104 mg/dL / Ketone: x  / Bili: x / Urobili: x   Blood: x / Protein: x / Nitrite: x   Leuk Esterase: x / RBC: x / WBC x   Sq Epi: x / Non Sq Epi: x / Bacteria: x    -------------------------------------------------------------------------------------------------------  RADIOLOGY & ADDITIONAL STUDIES: < from: CT Abdomen and Pelvis No Cont (02.28.24 @ 06:30) >  IMPRESSION:  Severe right hydronephrosis andmild hydroureter to the level of a soft   tissue lesion with calcification measuring 2.2 x 1.6 cm proximal to the   ureterovesicular junction and inseparable from the right vaginal cuff.   This lesion has increased in size as compared to prior.    New pulmonary, increased size and number hepatic, and new abdominal soft   tissue metastatic disease as noted above.    --- End of Report ---    -------------------------------------------------------------------------------------------------------  Protein Calorie Malnutrition Present: [ ]mild [ ]moderate [ ]severe [ ]underweight [ ]morbid obesity  https://[email or web address]/[email or web address]/web/files/ONC/Table_Clinical%20Characteristics%20to%20Document%20Malnutrition-White%20JV%20et%20al%202012.pdf    Height (cm): 154.9 (02-27-24 @ 18:17), 154.9 (02-16-24 @ 15:10), 152.4 (02-01-24 @ 16:00)  Weight (kg): 49.9 (02-27-24 @ 18:17), 54.8 (02-18-24 @ 06:15), 51.5 (02-01-24 @ 16:00)  BMI (kg/m2): 20.8 (02-27-24 @ 18:17), 22.8 (02-18-24 @ 06:15), 21.5 (02-16-24 @ 15:10)    Palliative Performance Status Version 2:   See PPSv2 tool and score below       [ ]PPSV2 < or = 30%  [ ]significant weight loss [ ]poor nutritional intake [ ]anasarca[ ]Artificial Nutrition    Other REFERRALS:  [ ]Hospice  [ ]Child Life  [ ]Social Work  [ ]Case management [ ]Holistic Therapy     -------------------------------------------------------------------------------------------------------

## 2024-03-04 NOTE — CHART NOTE - NSCHARTNOTEFT_GEN_A_CORE
68y.o. F h/o metastatic uterine ca to lung/liver, was planned for outpatient RT 10 fx to the lung,  readmitted, and plan is now to start inpatient lung RT :   3/5/24 - start date  3/11/24- end date.     Today, 3/4/24 is a verification sim used for planning the inpatient treatment course.           < from: CT Angio Chest PE Protocol w/ IV Cont (02.16.24 @ 23:02) >    IMPRESSION:  No pulmonary embolism.  Left apical mass with central cavitation is larger in size when compared   to 1/8/2024.  Increased mediastinal lymphadenopathy, with extrinsic compression of the   left pulmonary artery and left upper lobe bronchi.  Progression of pulmonary nodules and hepatic hepatic metastasis.  Severe right hydronephrosis.    < end of copied text > 68y.o. F h/o metastatic uterine ca to lung/liver, was planned for outpatient RT 10 fx to the lung,  readmitted, and plan is now to start inpatient lung RT :   3/5/24 - start date  3/11/24- end date.     Today, 3/4/24 is a verification sim used for planning the inpatient treatment course.           < from: CT Angio Chest PE Protocol w/ IV Cont (02.16.24 @ 23:02) >    IMPRESSION:  No pulmonary embolism.  Left apical mass with central cavitation is larger in size when compared   to 1/8/2024.  Increased mediastinal lymphadenopathy, with extrinsic compression of the   left pulmonary artery and left upper lobe bronchi.  Progression of pulmonary nodules and hepatic hepatic metastasis.  Severe right hydronephrosis.    < end of copied text >    discussed plan of care for palliative RT for lung with  this morning.  She agreed to treatment as outlined.

## 2024-03-04 NOTE — PROGRESS NOTE ADULT - SUBJECTIVE AND OBJECTIVE BOX
SOLID TUMOR ONCOLOGY HOSPITALIST PROGRESS NOTE    S: No acute events overnight.  Pt had no new complaints this morning.  She reported that her back and abd pain were improved today.  She also reported feeling less nauseous, but requested Maalox instead of Zofran or Reglan.    CURRENT MEDICATIONS  MEDICATIONS  (STANDING):  chlorhexidine 2% Cloths 1 Application(s) Topical daily  enoxaparin Injectable 40 milliGRAM(s) SubCutaneous every 24 hours  fluticasone propionate 50 MICROgram(s)/spray Nasal Spray 1 Spray(s) Both Nostrils two times a day  levothyroxine 100 MICROGram(s) Oral daily  pantoprazole    Tablet 40 milliGRAM(s) Oral before breakfast  valsartan 40 milliGRAM(s) Oral daily  MEDICATIONS  (PRN):  albuterol    90 MICROgram(s) HFA Inhaler 2 Puff(s) Inhalation every 6 hours PRN Shortness of Breath and/or Wheezing  aluminum hydroxide/magnesium hydroxide/simethicone Suspension 30 milliLiter(s) Oral every 4 hours PRN Dyspepsia  HYDROmorphone   Tablet 2 milliGRAM(s) Oral every 6 hours PRN Mild Pain (1 - 3)  HYDROmorphone  Injectable 0.4 milliGRAM(s) IV Push every 4 hours PRN Severe Pain (7 - 10)  ketorolac   Injectable 30 milliGRAM(s) IV Push every 6 hours PRN breakthrough pain  melatonin 3 milliGRAM(s) Oral at bedtime PRN Insomnia  metoclopramide Injectable 10 milliGRAM(s) IV Push every 8 hours PRN nausea      PHYSICAL EXAM  T(C): 36.8 (03-04-24 @ 12:52), Max: 37.1 (03-03-24 @ 21:36)  HR: 78 (03-04-24 @ 12:52) (78 - 98)  BP: 150/89 (03-04-24 @ 12:52) (134/90 - 150/89)  RR: 18 (03-04-24 @ 12:52) (18 - 18)  SpO2: 97% (03-04-24 @ 12:52) (97% - 100%)    03-03-24 @ 07:01  -  03-04-24 @ 07:00  --------------------------------------------------------  IN: 300 mL / OUT: 0 mL / NET: 300 mL  non-toxica appearing woman, laying in bed and appears comfortable, nad  Anicteric sclera, no oral lesions/thrush  RRR, no m/r/g  CTAB, no w/c/r  Abd soft, mildly tender on palpation of epigastrum and RUQ, no palpable masses/organomegaly  No peripheral edema  CN 2-12 grossly intact; no gross focal neuro deficits    LABS                        9.3    9.36  )-----------( 291      ( 04 Mar 2024 06:39 )             28.6     03-04    130<L>  |  97<L>  |  17  ----------------------------<  104<H>  4.4   |  23  |  1.06    Ca    8.9      04 Mar 2024 06:39  Phos  3.3     03-04  Mg     1.70     03-04    TPro  5.8<L>  /  Alb  2.5<L>  /  TBili  0.3  /  DBili  x   /  AST  19  /  ALT  11  /  AlkPhos  218<H>  03-04    TSH 2/17 0.39    MICROBIOLOGY  Abx: none    Micro  3/2 MRSA/MSSA screen negative      PERTINENT RADIOLOGY  2/28 CT Abd/pelv: Severe R hydronephrosis and mild hydroureter to the level of a soft tissue lesion w/ calcification 2.2x1.6cm proximal to the ureterovesicular junction, inseparable from R vaginal cuff, increased in size. New pulm nodule increased in size w/ multiple hepatic met and abdominal soft tissue mets.

## 2024-03-05 NOTE — DIETITIAN INITIAL EVALUATION ADULT - PERTINENT LABORATORY DATA
03-05    132<L>  |  95<L>  |  15  ----------------------------<  101<H>  4.4   |  27  |  0.93    Ca    8.9      05 Mar 2024 08:27  Phos  3.2     03-05  Mg     1.60     03-05    TPro  6.0  /  Alb  2.6<L>  /  TBili  0.3  /  DBili  x   /  AST  17  /  ALT  11  /  AlkPhos  238<H>  03-05  A1C with Estimated Average Glucose Result: 5.1 % (02-16-24 @ 19:15)

## 2024-03-05 NOTE — DIETITIAN INITIAL EVALUATION ADULT - ORAL INTAKE PTA/DIET HISTORY
Patient seen at bedside this AM by writer. Patient reports no known food allergies or food intolerances. Deferred further interview questions due to strong nausea.    Per chart review, noted previous [facility] RD documentation from 2/20/24: "Pt confirms NKFA, denies difficulties chewing/swallowing. Pt lives at home alone, tenant provides support. Pt reports generally good appetite/PO intake PTA, consumes a regular diet at baseline."    No weight history available per review of [facility].

## 2024-03-05 NOTE — DIETITIAN INITIAL EVALUATION ADULT - NS FNS DIET ORDER
Diet, DASH/TLC:   Sodium & Cholesterol Restricted  Gastroesophageal Reflux Disease (GERD) (02-29-24 @ 14:31) [Active]

## 2024-03-05 NOTE — CHART NOTE - NSCHARTNOTEFT_GEN_A_CORE
Refusing RT at the moment; today is day 1 of 5 fractions planned to the lung.   Called 8N floor to alert the RN.    "just not feeling well " and has nausea.     Will try again the AM

## 2024-03-05 NOTE — PROGRESS NOTE ADULT - SUBJECTIVE AND OBJECTIVE BOX
[facility] Palliative [facility]  Adelia Gonzalez, Palliative Care Nurse Practitioner  Contact Info: Page [phone] (Including Nights/Weekends), Message on Microsoft Teams (Adelia Gonzalez), or leave VM at Palliative Office [phone] (non-urgent)    Date of Service 03-05-24 @ 12:14    SUBJECTIVE AND OBJECTIVE:  Indication for Geriatrics and Palliative Care Services/INTERVAL HPI: Pain  Pt seen this AM, shared pain is controlled on current regimen. Most pressing issue was her cough which caused her some nausea and abd discomfort, discussed adding PRN cough medicine, pt in agreement. Pt shared she tolerated RT yesterday without issue with the help of IV Dilaudid 0.4mg, current dose lasts ~ 4/5 hours and takes pain from 10/10 to 0/10. No BM x 3 days, will add PRN senna.     OVERNIGHT EVENTS: Pt required PRN IV Dilaudid 0.4mg x 3 & IV Toradol x 1 within 24 hrs.     DNR on chart:DNI: Trial NIV  DNI: Trial NIV      Allergies    gabapentin (Other)  contrast media (iodine-based) (Other)  Benadryl (Other)    Intolerances    Taxol (Other)  MEDICATIONS  (STANDING):  chlorhexidine 2% Cloths 1 Application(s) Topical daily  enoxaparin Injectable 40 milliGRAM(s) SubCutaneous every 24 hours  fluticasone propionate 50 MICROgram(s)/spray Nasal Spray 1 Spray(s) Both Nostrils two times a day  levothyroxine 100 MICROGram(s) Oral daily  pantoprazole    Tablet 40 milliGRAM(s) Oral before breakfast  valsartan 40 milliGRAM(s) Oral daily    MEDICATIONS  (PRN):  albuterol    90 MICROgram(s) HFA Inhaler 2 Puff(s) Inhalation every 6 hours PRN Shortness of Breath and/or Wheezing  aluminum hydroxide/magnesium hydroxide/simethicone Suspension 30 milliLiter(s) Oral every 4 hours PRN Dyspepsia  guaiFENesin Oral Liquid (Sugar-Free) 100 milliGRAM(s) Oral once PRN Cough  HYDROmorphone   Tablet 2 milliGRAM(s) Oral every 6 hours PRN Mild Pain (1 - 3)  HYDROmorphone  Injectable 0.4 milliGRAM(s) IV Push every 4 hours PRN Severe Pain (7 - 10)  ketorolac   Injectable 30 milliGRAM(s) IV Push every 6 hours PRN breakthrough pain  melatonin 3 milliGRAM(s) Oral at bedtime PRN Insomnia  metoclopramide Injectable 10 milliGRAM(s) IV Push every 8 hours PRN nausea    -------------------------------------------------------------------------------------------------------  ITEMS UNCHECKED ARE NOT PRESENT    PRESENT SYMPTOMS: [ ]Unable to self-report - see [ ] CPOT [ ] PAINADS [ ] RDOS  Source if other than patient:  [ ]Family   [ ]Team       Pain: [ X]yes [ ]no  QOL impact -   Location - BL shoulders/pelvis            Aggravating factors - movement   Quality - sharp  Radiation - across upper back  Timing- intermittent   Severity (0-10 scale):10/10   Minimal acceptable level (0-10 scale)/Pain goal: 3    CPOT:    https://www.[facility].org/getattachment/[email or web address]/Critical-Care-Pain-Observation-Tool-(CPOT)    PAINAD Score: See PAINAD tool and score below       RDOS: See RDOS tool and score below   0 to 2  minimal or no respiratory distress   3  mild distress  4 to 6 moderate distress  >7 severe distress    Dyspnea:                           [ ]Mild [ ]Moderate [ ]Severe  Anxiety:                             [ ]Mild [ ]Moderate [ ]Severe  Fatigue:                             [ ]Mild [ ]Moderate [ ]Severe  Nausea:                             [ xMild [ ]Moderate [ ]Severe  Loss of appetite:              [ ]Mild [ ]Moderate [ ]Severe  Constipation:                    [x]Mild [ ]Moderate [ ]Severe  Other Symptoms:  [ xAll other review of systems negative     Home Medications for Symptoms if present:    I Stop Reference no:     -------------------------------------------------------------------------------------------------------  PCSSQ[Palliative Care Spiritual Screening Question]   Severity (0-10):  Score of 4 or > indicate consideration of Chaplaincy referral.  Chaplaincy Referral: [ ] yes [ ] refused [ ] following [x] Deferred     Caregiver [place]? : [ ] yes [ ] no [ ] Deferred [ x Declined             Social work referral [ ] Patient & Family Centered Care Referral [ ]     Anticipatory Grief present?:  [ ] yes [ ] no  [x] Deferred                  Social work referral [ ] Chaplaincy Referral [ ]    -------------------------------------------------------------------------------------------------------  PHYSICAL EXAM:  Vital Signs Last 24 Hrs  T(C): 37.4 (05 Mar 2024 12:10), Max: 37.4 (05 Mar 2024 12:10)  T(F): 99.4 (05 Mar 2024 12:10), Max: 99.4 (05 Mar 2024 12:10)  HR: 107 (05 Mar 2024 12:10) (78 - 107)  BP: 158/85 (05 Mar 2024 12:10) (124/62 - 158/85)  BP(mean): --  RR: 18 (05 Mar 2024 12:10) (17 - 18)  SpO2: 99% (05 Mar 2024 12:10) (97% - 99%)    Parameters below as of 05 Mar 2024 12:10  Patient On (Oxygen Delivery Method): room air     I&O's Summary       GENERAL:  [ ]Cachexia  [ X]Alert  [ X]Oriented x 4  [ ]Lethargic  [ ]Unarousable  [X ]Verbal  [ ]Non-Verbal    Behavioral:   [ ] Anxiety  [ ] Delirium [ ] Agitation [X ] Other    HEENT:  [X ]Normal   [ ]Dry mouth   [ ]ET Tube/Trach  [ ]Oral lesions    PULMONARY:   [ X]Clear [ ]Tachypnea  [ ]Audible excessive secretions   [ ]Rhonchi        [ ]Right [ ]Left [ ]Bilateral  [ ]Crackles        [ ]Right [ ]Left [ ]Bilateral  [ ]Wheezing     [ ]Right [ ]Left [ ]Bilateral  [ ]Diminished breath sounds [ ]right [ ]left [ ]bilateral    CARDIOVASCULAR:    [X ]Regular [ ]Irregular [ ]Tachy  [ ]Tristen [ ]Murmur [ ]Other    GASTROINTESTINAL:  [X ]Soft  [ ]Distended   [X ]+BS  [ X]Non tender [ ]Tender  [ ]Other [ ]PEG [ ]OGT/ NGT  Last BM: 3/3    GENITOURINARY:  [X ]Normal [ ] Incontinent   [ ]Oliguria/Anuria   [ ][place]    MUSCULOSKELETAL:   [ ]Normal   [x ]Weakness  [ ]Bed/Wheelchair bound [ ]Edema    NEUROLOGIC:   [ x]No focal deficits  [ ]Cognitive impairment  [ ]Dysphagia [ ]Dysarthria [ ]Paresis [ ]Other     SKIN:   [x ]Normal  [ ]Rash  [ ]Other  [ ]Pressure ulcer(s)       Present on admission [ ]y [ ]n    -------------------------------------------------------------------------------------------------------  CRITICAL CARE:  [ ]Shock Present  [ ]Septic [ ]Cardiogenic [ ]Neurologic [ ]Hypovolemic  [ ]Vasopressors [ ]Inotropes  [ ]Respiratory failure present [ ]Mechanical Ventilation [ ]Non-invasive ventilatory support [ ]High-Flow   [ ]Acute  [ ]Chronic [ ]Hypoxic  [ ]Hypercarbic [ ]Other  [ ]Other organ failure     -------------------------------------------------------------------------------------------------------  LABS:                        9.0    8.85  )-----------( 283      ( 05 Mar 2024 08:27 )             27.5   03-05    132<L>  |  95<L>  |  15  ----------------------------<  101<H>  4.4   |  27  |  0.93    Ca    8.9      05 Mar 2024 08:27  Phos  3.2     03-05  Mg     1.60     03-05    TPro  6.0  /  Alb  2.6<L>  /  TBili  0.3  /  DBili  x   /  AST  17  /  ALT  11  /  AlkPhos  238<H>  03-05    Urinalysis Basic - ( 05 Mar 2024 08:27 )    Color: x / Appearance: x / SG: x / pH: x  Gluc: 101 mg/dL / Ketone: x  / Bili: x / Urobili: x   Blood: x / Protein: x / Nitrite: x   Leuk Esterase: x / RBC: x / WBC x   Sq Epi: x / Non Sq Epi: x / Bacteria: x    -------------------------------------------------------------------------------------------------------  RADIOLOGY & ADDITIONAL STUDIES: < from: CT Abdomen and Pelvis No Cont (02.28.24 @ 06:30) >    IMPRESSION:  Severe right hydronephrosis andmild hydroureter to the level of a soft   tissue lesion with calcification measuring 2.2 x 1.6 cm proximal to the   ureterovesicular junction and inseparable from the right vaginal cuff.   This lesion has increased in size as compared to prior.    New pulmonary, increased size and number hepatic, and new abdominal soft   tissue metastatic disease as noted above.    --- End of Report ---    -------------------------------------------------------------------------------------------------------  Protein Calorie Malnutrition Present: [ ]mild [ ]moderate [ ]severe [ ]underweight [ ]morbid obesity  [email or web address]/Table_Clinical%20Characteristics%20to%[email or web address]-White%20JV%20et%20al%202012.pdf    Height (cm): 154.9 (02-27-24 @ 18:17), 154.9 (02-16-24 @ 15:10), 152.4 (02-01-24 @ 16:00)  Weight (kg): 49.9 (02-27-24 @ 18:17), 54.8 (02-18-24 @ 06:15), 51.5 (02-01-24 @ 16:00)  BMI (kg/m2): 20.8 (02-27-24 @ 18:17), 22.8 (02-18-24 @ 06:15), 21.5 (02-16-24 @ 15:10)    Palliative Performance Status Version 2:   See PPSv2 tool and score below       [ ]PPSV2 < or = 30%  [ ]significant weight loss [ ]poor nutritional intake [ ]anasarca[ ]Artificial Nutrition    Other REFERRALS:  [ ]Hospice  [ ]Child Life  [ ]Social Work  [ ]Case management [ ]Holistic Therapy     -------------------------------------------------------------------------------------------------------  Goals of Care Document:

## 2024-03-05 NOTE — DIETITIAN INITIAL EVALUATION ADULT - ORAL NUTRITION SUPPLEMENTS
Consider Ensure Plus High Protein (provides 350kcal, 20gms protein per serving) BID to support nutrition needs

## 2024-03-05 NOTE — PROGRESS NOTE ADULT - ASSESSMENT
69 yo woman with h/o HTN, CVA, hypothyroidism, OP, stage II endometrial cancer > dx/treated in 7/201- s/p robotic assisted TLH BSO, bilateral pelvic and para-aortic sentinel LN dissection by Dr. Paras Grayson on 7/2/2018, followed by pelvic IMRT and vaginal cuff brachytherapy with Dr. Bowling in [id number]/34792; dx with met recurrent in the lungs in 10/2019 but was lost to fu until 7/2020; progressed after Carbo/Taxol > palliative RT to the vaginal mass and cuff area 2000cGy/5fxs  from 5/25/21-6/1/21 > Pembro/Lenvima (tx course c/b ICI-related colitis) > Doxil > Abraxan/Avastin > most recently on Docetaxel monotherapy (last given 2/2024).  She was admitted to [facility] on 2/27 with progressive nausea and generalized weakness with admission CT a/p confirming POD; admission notable for mild leukocytosis. Pt's hospital course also currently c/b cancer-related pain and hyponatremia.    ACTIVE PROBLEMS  Met endometrial cancer  Goals of care discussion, counseling  Cancer-related pain  Nausea without vomiting  R hydroureteronephrosis  MANJU  Hyponatremia  Leukocytosis  Anemia of chronic disease  Hypothyroidism  HTN  Hypercoag state    - Met endometrial cancer  - GOC discussion, counseling  Pt indicated to Pall Care team on 2/28 that she is no longer interested in pursuing systemic cancer treatment  She consented to DNR/DNI code status and expressed interest in inpt hospice placement, citing that she does not want to die alone at home  SW referral for hospice placement is in progress  Pt's long-term prognosis is poor: weeks     - Cancer-related pain  Continuing Toradol 30mg q6/prn x5d course for inflammatory pain (with ppi ppx)  Continuing Dilaudid PO 2mg q6/prn for mild pain  Continuing Dilaudid IV 0.4mg q6/prn for mod-sev pain  Continuing bowel regimen to prevent OIC  Appreciate Rad Onc consult: pt to complete inpt RT to mediastinal met, 20Gy/5fxs, 3/5-13   * Pt refused RT today, will reattempt on 3/6    - Nausea without vomiting  Improved  Likely iatrogenic from medications (opioids)  Admission imaging negative for ileus/obstruction  Zofran dced; starting Reglan IV 10mg q8/prn  Repleting lytes prn  1L NS hydration ordered as below    - R hydroureteronephrosis  MANJU resolved- Cr 0.93 (prior baseline 0.6-0.7)  Pt still with sufficient UOP  No role for stent, PCN at this time  Urine lytes pending  Planning to hold ARB if Cr worsens  Monitoring UOP    - Hyponatremia  Na downtrended 132 > 130 after 1L NS challenge on 3/3, c/f SIADH  Pt is relatively asymptomatic  Urine lytes/osm, serum osm still pending  Will continue to monitor    - Leukocytosis  WBCs normalized  Pt remains afebrile and without focal symptoms of acute infection  Monitoring off abx    - Anemia of chronic disease  Hgb gradually downtrending stable; pt remains without s/s of active bleeding  3/4 iron studies more c/w ACD; labs not otherwise c/w hemolysis  Transfusing supportively prn (goal hgb > 7; plts > 10K)    - Hypothyroidism: 3/4 TFTs wnl; continuing LFT 100mcg daily    - HTN: continuing Valsartan 40mg daily    - Hypercoag state: continuing lovenox ppx

## 2024-03-05 NOTE — PROGRESS NOTE ADULT - SUBJECTIVE AND OBJECTIVE BOX
SOLID TUMOR ONCOLOGY HOSPITALIST PROGRESS NOTE    S: No acute events overnight.  However, pt complained of sudden severe hip pain for which RN reported she received DIlaudid IV earlier in the morning.  She reported that the pain is achy and she attributes it to laying on the table for CT sim yesterday.  She declined to go to RT tx today.    CURRENT MEDICATIONS  MEDICATIONS  (STANDING):  chlorhexidine 2% Cloths 1 Application(s) Topical daily  enoxaparin Injectable 40 milliGRAM(s) SubCutaneous every 24 hours  fluticasone propionate 50 MICROgram(s)/spray Nasal Spray 1 Spray(s) Both Nostrils two times a day  levothyroxine 100 MICROGram(s) Oral daily  pantoprazole    Tablet 40 milliGRAM(s) Oral before breakfast  valsartan 40 milliGRAM(s) Oral daily  MEDICATIONS  (PRN):  albuterol    90 MICROgram(s) HFA Inhaler 2 Puff(s) Inhalation every 6 hours PRN Shortness of Breath and/or Wheezing  aluminum hydroxide/magnesium hydroxide/simethicone Suspension 30 milliLiter(s) Oral every 4 hours PRN Dyspepsia  cyclobenzaprine 5 milliGRAM(s) Oral every 8 hours PRN Muscle Spasm  guaiFENesin Oral Liquid (Sugar-Free) 100 milliGRAM(s) Oral every 8 hours PRN Cough  HYDROmorphone   Tablet 2 milliGRAM(s) Oral every 6 hours PRN Mild Pain (1 - 3)  HYDROmorphone  Injectable 0.4 milliGRAM(s) IV Push every 4 hours PRN Severe Pain (7 - 10)  ketorolac   Injectable 30 milliGRAM(s) IV Push every 6 hours PRN breakthrough pain  melatonin 3 milliGRAM(s) Oral at bedtime PRN Insomnia  metoclopramide Injectable 10 milliGRAM(s) IV Push every 8 hours PRN nausea  senna 2 Tablet(s) Oral daily PRN Constipation      PHYSICAL EXAM  T(C): 37.4 (03-05-24 @ 12:10), Max: 37.4 (03-05-24 @ 12:10)  HR: 107 (03-05-24 @ 12:10) (100 - 107)  BP: 158/85 (03-05-24 @ 12:10) (124/62 - 158/85)  RR: 18 (03-05-24 @ 12:10) (17 - 18)  SpO2: 99% (03-05-24 @ 12:10) (99% - 99%)  non-toxic appearing woman, laying in bed and appears comfortable, nad  Anicteric sclera, no oral lesions/thrush  RRR, no m/r/g  CTAB, no w/c/r  Abd soft, mildly tender on palpation of epigastrum and RUQ, no palpable masses/organomegaly  No peripheral edema  CN 2-12 grossly intact; no gross focal neuro deficits    LABS                        9.0    8.85  )-----------( 283      ( 05 Mar 2024 08:27 )             27.5     03-05    132<L>  |  95<L>  |  15  ----------------------------<  101<H>  4.4   |  27  |  0.93    Ca    8.9      05 Mar 2024 08:27  Phos  3.2     03-05  Mg     1.60     03-05    TPro  6.0  /  Alb  2.6<L>  /  TBili  0.3  /  DBili  x   /  AST  17  /  ALT  11  /  AlkPhos  238<H>  03-05      TSH 2/17 0.39    MICROBIOLOGY  Abx: none    Micro  3/2 MRSA/MSSA screen negative      PERTINENT RADIOLOGY  2/28 CT Abd/pelv: Severe R hydronephrosis and mild hydroureter to the level of a soft tissue lesion w/ calcification 2.2x1.6cm proximal to the ureterovesicular junction, inseparable from R vaginal cuff, increased in size. New pulm nodule increased in size w/ multiple hepatic met and abdominal soft tissue mets.

## 2024-03-05 NOTE — DIETITIAN INITIAL EVALUATION ADULT - REASON FOR ADMISSION
History of other condition    Patient is a 68y Female with PMH HTN, CVA, endometrial cancer who presented to [facility] with nausea, vomiting, and inability to tolerate PO.

## 2024-03-05 NOTE — DIETITIAN INITIAL EVALUATION ADULT - PERTINENT MEDS FT
MEDICATIONS  (STANDING):  chlorhexidine 2% Cloths 1 Application(s) Topical daily  enoxaparin Injectable 40 milliGRAM(s) SubCutaneous every 24 hours  fluticasone propionate 50 MICROgram(s)/spray Nasal Spray 1 Spray(s) Both Nostrils two times a day  levothyroxine 100 MICROGram(s) Oral daily  pantoprazole    Tablet 40 milliGRAM(s) Oral before breakfast  valsartan 40 milliGRAM(s) Oral daily    MEDICATIONS  (PRN):  albuterol    90 MICROgram(s) HFA Inhaler 2 Puff(s) Inhalation every 6 hours PRN Shortness of Breath and/or Wheezing  aluminum hydroxide/magnesium hydroxide/simethicone Suspension 30 milliLiter(s) Oral every 4 hours PRN Dyspepsia  cyclobenzaprine 5 milliGRAM(s) Oral every 8 hours PRN Muscle Spasm  guaiFENesin Oral Liquid (Sugar-Free) 100 milliGRAM(s) Oral every 8 hours PRN Cough  HYDROmorphone   Tablet 2 milliGRAM(s) Oral every 6 hours PRN Mild Pain (1 - 3)  HYDROmorphone  Injectable 0.4 milliGRAM(s) IV Push every 4 hours PRN Severe Pain (7 - 10)  ketorolac   Injectable 30 milliGRAM(s) IV Push every 6 hours PRN breakthrough pain  melatonin 3 milliGRAM(s) Oral at bedtime PRN Insomnia  metoclopramide Injectable 10 milliGRAM(s) IV Push every 8 hours PRN nausea  senna 2 Tablet(s) Oral daily PRN Constipation

## 2024-03-05 NOTE — DIETITIAN INITIAL EVALUATION ADULT - OTHER INFO
Patient is currently ordered for a PO diet. Documented on RN flowsheet as consuming 51-75% of meals. No report of any chewing or swallowing difficulty on current diet order. Patient reports nausea. Last BM 3/3/24 per RN flowsheet documentation. Noted to be on a bowel regimen. Noted HbA1c 5.1% (2/16), within normal limits.

## 2024-03-06 NOTE — PROGRESS NOTE ADULT - ASSESSMENT
69 yo woman with h/o HTN, CVA, hypothyroidism, OP, stage II endometrial cancer > dx/treated in 7/201- s/p robotic assisted TLH BSO, bilateral pelvic and para-aortic sentinel LN dissection by Dr. Paras Grayson on 7/2/2018, followed by pelvic IMRT and vaginal cuff brachytherapy with Dr. Bowling in 10/85147; dx with met recurrent in the lungs in 10/2019 but was lost to fu until 7/2020; progressed after Carbo/Taxol > palliative RT to the vaginal mass and cuff area 2000cGy/5fxs  from 5/25/21-6/1/21 > Pembro/Lenvima (tx course c/b ICI-related colitis) > Doxil > Abraxan/Avastin > most recently on Docetaxel monotherapy (last given 2/2024).  She was admitted to [facility] on 2/27 with progressive nausea and generalized weakness with admission CT a/p confirming POD; admission notable for mild leukocytosis. Pt's hospital course also currently c/b cancer-related pain and hyponatremia.    ACTIVE PROBLEMS  Met endometrial cancer  Goals of care discussion, counseling  Cancer-related pain  Nausea without vomiting  R hydroureteronephrosis  MANUJ  Hyponatremia  Leukocytosis  Anemia of chronic disease  Hypothyroidism  HTN  Hypercoag state    - Met endometrial cancer  - GOC discussion, counseling  Pt indicated to Pall Care team on 2/28 that she is no longer interested in pursuing systemic cancer treatment  She consented to DNR/DNI code status and expressed interest in inpt hospice placement, citing that she does not want to die alone at home  SW referral for hospice placement is in progress  Pt's long-term prognosis is poor: weeks     - Cancer-related pain  Continuing Toradol 30mg q6/prn x5d course for inflammatory pain (with ppi ppx)  Continuing Dilaudid PO 2mg q6/prn for mild pain  Continuing Dilaudid IV 0.4mg q6/prn for mod-sev pain  Continuing bowel regimen to prevent OIC  Appreciate Rad Onc consult: pt to complete inpt RT to mediastinal met, 20Gy/5fxs, 3/6-14    - Nausea without vomiting  Improved  Likely iatrogenic from medications (opioids)  Admission imaging negative for ileus/obstruction  Zofran dced; starting Reglan IV 10mg q8/prn  Repleting lytes prn    - R hydroureteronephrosis  Cr uptrended to 1.10  Pt still with sufficient UOP  No role for stent, PCN at this time  Urine lytes pending  Planning to hold ARB if Cr worsens  Monitoring UOP    - Hyponatremia  Na stable ranging 130-32  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  Pt is relatively asymptomatic  Starting Salt tab 1gm BID  Will discuss fluid restriction with pt, but not inclined to order at this time as her GOC if comfort and quality of life    - Leukocytosis  WBCs normalized  Pt remains afebrile and without focal symptoms of acute infection  Monitoring off abx    - Anemia of chronic disease  Hgb stable; pt remains without s/s of active bleeding  3/4 iron studies more c/w ACD; labs not otherwise c/w hemolysis  Transfusing supportively prn (goal hgb > 7; plts > 10K)    - Hypothyroidism: 3/4 TFTs wnl; continuing LT4 100mcg daily    - HTN: continuing Valsartan 40mg daily    - Hypercoag state: continuing lovenox ppx

## 2024-03-06 NOTE — PROGRESS NOTE ADULT - SUBJECTIVE AND OBJECTIVE BOX
SOLID TUMOR ONCOLOGY HOSPITALIST PROGRESS NOTE    S: No acute events overnight.  Pt reported that both her pain and nausea are "basically non-existent" today.    CURRENT MEDICATIONS  MEDICATIONS  (STANDING):  chlorhexidine 2% Cloths 1 Application(s) Topical daily  enoxaparin Injectable 40 milliGRAM(s) SubCutaneous every 24 hours  fluticasone propionate 50 MICROgram(s)/spray Nasal Spray 1 Spray(s) Both Nostrils two times a day  levothyroxine 100 MICROGram(s) Oral daily  pantoprazole    Tablet 40 milliGRAM(s) Oral before breakfast  valsartan 40 milliGRAM(s) Oral daily  MEDICATIONS  (PRN):  albuterol    90 MICROgram(s) HFA Inhaler 2 Puff(s) Inhalation every 6 hours PRN Shortness of Breath and/or Wheezing  aluminum hydroxide/magnesium hydroxide/simethicone Suspension 30 milliLiter(s) Oral every 4 hours PRN Dyspepsia  cyclobenzaprine 5 milliGRAM(s) Oral every 8 hours PRN Muscle Spasm  guaiFENesin Oral Liquid (Sugar-Free) 100 milliGRAM(s) Oral every 8 hours PRN Cough  HYDROmorphone   Tablet 2 milliGRAM(s) Oral every 6 hours PRN Mild Pain (1 - 3)  HYDROmorphone  Injectable 0.4 milliGRAM(s) IV Push every 4 hours PRN Severe Pain (7 - 10)  ketorolac   Injectable 30 milliGRAM(s) IV Push every 6 hours PRN breakthrough pain  melatonin 3 milliGRAM(s) Oral at bedtime PRN Insomnia  metoclopramide Injectable 10 milliGRAM(s) IV Push every 8 hours PRN nausea  senna 2 Tablet(s) Oral daily PRN Constipation      PHYSICAL EXAM  T(C): 36.8 (03-06-24 @ 12:41), Max: 36.8 (03-05-24 @ 20:59)  HR: 100 (03-06-24 @ 12:41) (97 - 100)  BP: 131/89 (03-06-24 @ 12:41) (130/79 - 141/87)  RR: 18 (03-06-24 @ 12:41) (16 - 18)  SpO2: 95% (03-06-24 @ 12:41) (95% - 95%)  non-toxic appearing woman, laying in bed and appears comfortable, nad  Anicteric sclera, no oral lesions/thrush  RRR, no m/r/g  CTAB, no w/c/r  Abd soft, mildly tender on palpation of epigastrum and RUQ, no palpable masses/organomegaly  No peripheral edema  CN 2-12 grossly intact; no gross focal neuro deficits      LABS                        9.2    9.67  )-----------( 322      ( 06 Mar 2024 07:00 )             27.7     03-06    131<L>  |  93<L>  |  14  ----------------------------<  105<H>  4.6   |  25  |  1.10    Ca    9.1      06 Mar 2024 07:00  Phos  3.2     03-06  Mg     1.80     03-06    TPro  6.0  /  Alb  2.6<L>  /  TBili  0.3  /  DBili  x   /  AST  17  /  ALT  11  /  AlkPhos  238<H>  03-05    3/3 Gael 78; Urine osm 572    TSH 2/17 0.39  3/4 TSH 2.44, fT4 1.3    MICROBIOLOGY  Abx: none    Micro  3/2 MRSA/MSSA screen negative      PERTINENT RADIOLOGY  2/28 CT Abd/pelv: Severe R hydronephrosis and mild hydroureter to the level of a soft tissue lesion w/ calcification 2.2x1.6cm proximal to the ureterovesicular junction, inseparable from R vaginal cuff, increased in size. New pulm nodule increased in size w/ multiple hepatic met and abdominal soft tissue mets.

## 2024-03-07 NOTE — PROGRESS NOTE ADULT - ASSESSMENT
67 yo woman with h/o HTN, CVA, hypothyroidism, OP, stage II endometrial cancer > dx/treated in 7/201- s/p robotic assisted TLH BSO, bilateral pelvic and para-aortic sentinel LN dissection by Dr. Paras Grayson on 7/2/2018, followed by pelvic IMRT and vaginal cuff brachytherapy with Dr. Bowling in 10/26814; dx with met recurrent in the lungs in 10/2019 but was lost to fu until 7/2020; progressed after Carbo/Taxol > palliative RT to the vaginal mass and cuff area 2000cGy/5fxs  from 5/25/21-6/1/21 > Pembro/Lenvima (tx course c/b ICI-related colitis) > Doxil > Abraxan/Avastin > most recently on Docetaxel monotherapy (last given 2/2024).  She was admitted to [facility] on 2/27 with progressive nausea and generalized weakness with admission CT a/p confirming POD; admission notable for mild leukocytosis. Pt's hospital course also currently c/b cancer-related pain and hyponatremia.    ACTIVE PROBLEMS  Met endometrial cancer  Goals of care discussion, counseling  Cancer-related pain  Nausea without vomiting  R hydroureteronephrosis  MANJU  Hyponatremia  2/2 SIADH  Anemia of chronic disease  Hypothyroidism  HTN  Hypercoag state    - Met endometrial cancer  - GOC discussion, counseling  Pt indicated to Pall Care team on 2/28 that she is no longer interested in pursuing systemic cancer treatment  She consented to DNR/DNI code status and previously expressed interest in inpt hospice placement, citing that she does not want to die alone at home; however, on 3/7 she noted that she no longer wants to pursue hospice and she wants to resume cancer-directed tx  Will discuss options for future cancer tx with pt's outpt Gyn Onc  Pt's long-term prognosis is poor    - Cancer-related pain  Continuing Toradol 30mg q6/prn x5d course for inflammatory pain (with ppi ppx)  Continuing Dilaudid PO 2mg q6/prn for mild pain  Continuing Dilaudid IV 0.4mg q6/prn for mod-sev pain  Continuing bowel regimen to prevent OIC  Appreciate Rad Onc consult: pt to complete inpt RT to mediastinal met, 20Gy/5fxs, 3/6-14    - Nausea without vomiting  Improved  Likely iatrogenic from medications (opioids)  Admission imaging negative for ileus/obstruction  Zofran dced; starting Reglan IV 10mg q8/prn  Repleting lytes prn    - R hydroureteronephrosis  Cr improved, 1.10 > 0.86 today  Pt still with sufficient UOP  No role for stent, PCN at this time  Urine lytes pending  Planning to hold ARB if Cr worsens  Monitoring UOP    - Hyponatremia 2/2 SIADH  Na stable ranging 130-32  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  Pt is relatively asymptomatic  Continuing Salt tab 1gm BID  Starting 1L fluid restriction    - Anemia of chronic disease  Hgb stable; pt remains without s/s of active bleeding  3/4 iron studies more c/w ACD; labs not otherwise c/w hemolysis  Transfusing supportively prn (goal hgb > 7; plts > 10K)    - Hypothyroidism: 3/4 TFTs wnl; continuing LT4 100mcg daily    - HTN: continuing Valsartan 40mg daily    - Hypercoag state: continuing lovenox ppx   67 yo woman with h/o HTN, CVA, hypothyroidism, OP, stage II endometrial cancer > dx/treated in 7/201- s/p robotic assisted TLH BSO, bilateral pelvic and para-aortic sentinel LN dissection by Dr. Paras Grayson on 7/2/2018, followed by pelvic IMRT and vaginal cuff brachytherapy with Dr. Bowling in 10/30552; dx with met recurrent in the lungs in 10/2019 but was lost to fu until 7/2020; progressed after Carbo/Taxol > palliative RT to the vaginal mass and cuff area 2000cGy/5fxs  from 5/25/21-6/1/21 > Pembro/Lenvima (tx course c/b ICI-related colitis) > Doxil > Abraxan/Avastin > most recently on Docetaxel monotherapy (last given 2/2024).  She was admitted to [facility] on 2/27 with progressive nausea and generalized weakness with admission CT a/p confirming POD; admission notable for mild leukocytosis. Pt's hospital course also currently c/b cancer-related pain and hyponatremia.    ACTIVE PROBLEMS  Met endometrial cancer  Goals of care discussion, counseling  Cancer-related pain  Nausea without vomiting  R hydroureteronephrosis  MANJU  Hyponatremia  2/2 SIADH  Anemia of chronic disease  Hypothyroidism  HTN  Hypercoag state    - Met endometrial cancer  - GOC discussion, counseling  Pt indicated to Pall Care team on 2/28 that she is no longer interested in pursuing systemic cancer treatment  She consented to DNR/DNI code status and previously expressed interest in inpt hospice placement, citing that she does not want to die alone at home; however, on 3/7 she noted that she no longer wants to pursue hospice and she wants to resume cancer-directed tx  Will discuss options for future cancer tx with pt's outpt Gyn Onc  Pt's long-term prognosis is poor    - Cancer-related pain  Continuing Toradol 30mg q6/prn x5d course (until 3/7) for inflammatory pain (with ppi ppx)  Continuing Dilaudid PO 2mg q6/prn for mild pain  Continuing Dilaudid IV 0.4mg q6/prn for mod-sev pain  Continuing Flexiril 5mg q8/prn  Continuing bowel regimen to prevent OIC  Appreciate Rad Onc consult: pt to complete inpt RT to mediastinal met, 20Gy/5fxs, 3/6-14    - Nausea without vomiting  Improved  Likely iatrogenic from medications (opioids)  Admission imaging negative for ileus/obstruction  Zofran dced; starting Reglan IV 10mg q8/prn  Repleting lytes prn    - R hydroureteronephrosis  Cr improved, 1.10 > 0.86 today  Pt still with sufficient UOP  No role for stent, PCN at this time  Urine lytes pending  Planning to hold ARB if Cr worsens  Monitoring UOP    - Hyponatremia 2/2 SIADH  Na stable ranging 130-32  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  Pt is relatively asymptomatic  Continuing Salt tab 1gm BID  Starting 1L fluid restriction    - Anemia of chronic disease  Hgb stable; pt remains without s/s of active bleeding  3/4 iron studies more c/w ACD; labs not otherwise c/w hemolysis  Transfusing supportively prn (goal hgb > 7; plts > 10K)    - Hypothyroidism: 3/4 TFTs wnl; continuing LT4 100mcg daily    - HTN: continuing Valsartan 40mg daily    - Hypercoag state: continuing lovenox ppx

## 2024-03-07 NOTE — PROGRESS NOTE ADULT - ASSESSMENT
incomplete note   > pt shared PO dilaudid 2mg was helpful, discussed transition to PO once closer to DC. Can continue IV Dilaudid 0.4mg Q4 hrs PRN severe pain  > pt refused RT today 2/2 nausea, however no PRN Reglan given > 24 hrs. discussed making reglan ATC to prevent opiate induced nausea. Pt in agreement  68F with hx of uterine cancer with mets to liver and lung (pulmonary bronchial and pulmonary artery compression from mediastinal lymphadenopathy), CVA with no residual deficits, HTN who presents with nausea and inability to tolerate PO. Patient was recently hospitalized for dyspnea 1 week prior, discharged home, has been about the same but recently with worsening nausea to the point she cannot eat. No vomiting or diarrhea. Abdominal pain is at baseline. She tried PO zofran with minimal relief. She denies fevers, chills, cp, dysuria, sick contacts recent travel. Palliative consulted for symptom management.

## 2024-03-07 NOTE — PROGRESS NOTE ADULT - SUBJECTIVE AND OBJECTIVE BOX
[facility] Geriatrics and Palliative Care  Adelia Gonzalez, Palliative Care Nurse Practitioner  Contact Info: Page [phone] (Including Nights/Weekends), Message on Microsoft Teams (Adelia Gonzalez), or leave VM at Palliative Office [phone] (non-urgent)    Date of Service 03-07-24 @ 13:20    SUBJECTIVE AND OBJECTIVE:  Indication for Geriatrics and Palliative Care Services/INTERVAL HPI:    OVERNIGHT EVENTS:    DNR on chart:DNI: Trial NIV  DNI: Trial NIV      Allergies    gabapentin (Other)  contrast media (iodine-based) (Other)  Benadryl (Other)    Intolerances    Taxol (Other)  MEDICATIONS  (STANDING):  chlorhexidine 2% Cloths 1 Application(s) Topical daily  enoxaparin Injectable 40 milliGRAM(s) SubCutaneous every 24 hours  fluticasone propionate 50 MICROgram(s)/spray Nasal Spray 1 Spray(s) Both Nostrils two times a day  levothyroxine 100 MICROGram(s) Oral daily  pantoprazole    Tablet 40 milliGRAM(s) Oral before breakfast  sodium chloride 1 Gram(s) Oral two times a day  valsartan 40 milliGRAM(s) Oral daily    MEDICATIONS  (PRN):  albuterol    90 MICROgram(s) HFA Inhaler 2 Puff(s) Inhalation every 6 hours PRN Shortness of Breath and/or Wheezing  aluminum hydroxide/magnesium hydroxide/simethicone Suspension 30 milliLiter(s) Oral every 4 hours PRN Dyspepsia  cyclobenzaprine 5 milliGRAM(s) Oral every 8 hours PRN Muscle Spasm  guaiFENesin Oral Liquid (Sugar-Free) 100 milliGRAM(s) Oral every 8 hours PRN Cough  HYDROmorphone   Tablet 2 milliGRAM(s) Oral every 6 hours PRN Mild Pain (1 - 3)  HYDROmorphone  Injectable 0.4 milliGRAM(s) IV Push every 4 hours PRN Severe Pain (7 - 10)  ketorolac   Injectable 30 milliGRAM(s) IV Push every 6 hours PRN breakthrough pain  melatonin 3 milliGRAM(s) Oral at bedtime PRN Insomnia  metoclopramide Injectable 10 milliGRAM(s) IV Push every 8 hours PRN nausea  senna 2 Tablet(s) Oral daily PRN Constipation        -------------------------------------------------------------------------------------------------------  ITEMS UNCHECKED ARE NOT PRESENT    PRESENT SYMPTOMS: [ ]Unable to self-report - see [ ] CPOT [ ] PAINADS [ ] RDOS  Source if other than patient:  [ ]Family   [ ]Team     Pain:  [ ]yes [ ]no  QOL impact -   Location -                    Aggravating factors -  Quality -  Radiation -  Timing-  Severity (0-10 scale):  Minimal acceptable level (0-10 scale)/pain goal:    CPOT:    https://www.[facility].org/getattachment/[email or web address]/Critical-Care-Pain-Observation-Tool-(CPOT)    PAINAD Score: See PAINAD tool and score below       RDOS: See RDOS tool and score below   0 to 2  minimal or no respiratory distress   3  mild distress  4 to 6 moderate distress  >7 severe distress    Dyspnea:                           [ ]Mild [ ]Moderate [ ]Severe  Anxiety:                             [ ]Mild [ ]Moderate [ ]Severe  Fatigue:                             [ ]Mild [ ]Moderate [ ]Severe  Nausea:                             [ ]Mild [ ]Moderate [ ]Severe  Loss of appetite:              [ ]Mild [ ]Moderate [ ]Severe  Constipation:                    [ ]Mild [ ]Moderate [ ]Severe  Other Symptoms:  [ ]All other review of systems negative     Home Medications for Symptoms if present:    I Stop Reference no:     -------------------------------------------------------------------------------------------------------  PCSSQ[Palliative Care Spiritual Screening Question]   Severity (0-10):  Score of 4 or > indicate consideration of Chaplaincy referral.  Chaplaincy Referral: [ ] yes [ ] refused [ ] following [ ] Deferred     Caregiver [place]? : [ ] yes [ ] no [ ] Deferred [ ] Declined             Social work referral [ ] Patient & Family Centered Care Referral [ ]     Anticipatory Grief present?:  [ ] yes [ ] no  [ ] Deferred                  Social work referral [ ] Chaplaincy Referral [ ]    -------------------------------------------------------------------------------------------------------  PHYSICAL EXAM:  Vital Signs Last 24 Hrs  T(C): 36.3 (07 Mar 2024 12:00), Max: 37.4 (06 Mar 2024 20:33)  T(F): 97.3 (07 Mar 2024 12:00), Max: 99.3 (06 Mar 2024 20:33)  HR: 102 (07 Mar 2024 12:00) (99 - 109)  BP: 153/70 (07 Mar 2024 12:00) (110/70 - 153/85)  BP(mean): --  RR: 18 (07 Mar 2024 12:00) (17 - 18)  SpO2: 95% (07 Mar 2024 12:00) (94% - 95%)    Parameters below as of 07 Mar 2024 12:00  Patient On (Oxygen Delivery Method): room air     I&O's Summary     GENERAL:   [ ]Cachexia  [ ]Alert  [ ]Oriented x   [ ]Lethargic  [ ]Unarousable  [ ]Verbal  [ ]Non-Verbal    Behavioral:   [ ]Anxiety  [ ]Delirium [ ]Agitation [ ]Other    HEENT:  [ ]Normal   [ ]Dry mouth   [ ]ET Tube/Trach  [ ]Oral lesions    PULMONARY:   [ ]Clear [ ]Tachypnea  [ ]Audible excessive secretions   [ ]Rhonchi        [ ]Right [ ]Left [ ]Bilateral  [ ]Crackles        [ ]Right [ ]Left [ ]Bilateral  [ ]Wheezing     [ ]Right [ ]Left [ ]Bilateral  [ ]Diminished BS [ ] Right [ ]Left [ ]Bilateral    CARDIOVASCULAR:    [ ]Regular [ ]Irregular [ ]Tachy  [ ]Tristen [ ]Murmur [ ]Other    GASTROINTESTINAL:  [ ]Soft  [ ]Distended   [ ]+BS  [ ]Non tender [ ]Tender  [ ]Other [ ]PEG [ ]OGT/ NGT   Last BM:     GENITOURINARY:  [ ]Normal [ ]Incontinent   [ ]Oliguria/Anuria   [ ][place]    MUSCULOSKELETAL:   [ ]Normal   [ ]Weakness  [ ]Bed/Wheelchair bound [ ]Edema    NEUROLOGIC:   [ ]No focal deficits  [ ] Cognitive impairment  [ ] Dysphagia [ ]Dysarthria [ ] Paresis [ ]Other     SKIN:   [ ]Normal  [ ]Rash  [ ]Other  [ ]Pressure ulcer(s) [ ]y [ ]n present on admission    -------------------------------------------------------------------------------------------------------  CRITICAL CARE:  [ ]Shock Present  [ ]Septic [ ]Cardiogenic [ ]Neurologic [ ]Hypovolemic  [ ]Vasopressors [ ]Inotropes  [ ]Respiratory failure present [ ]Mechanical Ventilation [ ]Non-invasive ventilatory support [ ]High-Flow   [ ]Acute  [ ]Chronic [ ]Hypoxic  [ ]Hypercarbic [ ]Other  [ ]Other organ failure     -------------------------------------------------------------------------------------------------------  LABS:                        9.1    7.86  )-----------( 298      ( 07 Mar 2024 05:00 )             27.4   03-07    131<L>  |  94<L>  |  13  ----------------------------<  103<H>  4.4   |  25  |  0.86    Ca    9.1      07 Mar 2024 05:00  Phos  3.3     03-07  Mg     1.70     03-07        Urinalysis Basic - ( 07 Mar 2024 05:00 )    Color: x / Appearance: x / SG: x / pH: x  Gluc: 103 mg/dL / Ketone: x  / Bili: x / Urobili: x   Blood: x / Protein: x / Nitrite: x   Leuk Esterase: x / RBC: x / WBC x   Sq Epi: x / Non Sq Epi: x / Bacteria: x    -------------------------------------------------------------------------------------------------------  RADIOLOGY & ADDITIONAL STUDIES: < from: CT Abdomen and Pelvis No Cont (02.28.24 @ 06:30) >  IMPRESSION:  Severe right hydronephrosis andmild hydroureter to the level of a soft   tissue lesion with calcification measuring 2.2 x 1.6 cm proximal to the   ureterovesicular junction and inseparable from the right vaginal cuff.   This lesion has increased in size as compared to prior.    New pulmonary, increased size and number hepatic, and new abdominal soft   tissue metastatic disease as noted above.    --- End of Report ---  -------------------------------------------------------------------------------------------------------  Protein Calorie Malnutrition Present: [ ]mild [ ]moderate [ ]severe [ ]underweight [ ]morbid obesity  https://www.andeal.org/vault/6349/web/files/ONC/Table_Clinical%20Characteristics%20to%20Document%20Malnutrition-White%20JV%20et%20al%202012.pdf    Height (cm): 154.9 (02-27-24 @ 18:17), 154.9 (02-16-24 @ 15:10), 152.4 (02-01-24 @ 16:00)  Weight (kg): 49.9 (02-27-24 @ 18:17), 54.8 (02-18-24 @ 06:15), 51.5 (02-01-24 @ 16:00)  BMI (kg/m2): 20.8 (02-27-24 @ 18:17), 22.8 (02-18-24 @ 06:15), 21.5 (02-16-24 @ 15:10)    Palliative Performance Status Version 2:   See PPSv2 tool and score below       [ ]PPSV2 < or = 30%  [ ]significant weight loss [ ]poor nutritional intake [ ]anasarca[ ]Artificial Nutrition    Other REFERRALS:  [ ]Hospice  [ ]Child Life  [ ]Social Work  [ ]Case management [ ]Holistic Therapy     -------------------------------------------------------------------------------------------------------  Goals of Care Document: [facility] Geriatrics and Palliative Care  Adelia Gonzalez, Palliative Care Nurse Practitioner  Contact Info: Page [phone] (Including Nights/Weekends), Message on Microsoft Teams (Adelia Gonzalez), or leave VM at Palliative Office [phone] (non-urgent)    Date of Service 03-07-24 @ 13:20    SUBJECTIVE AND OBJECTIVE:  Indication for Geriatrics and Palliative Care Services/INTERVAL HPI: symptoms / GOC  Pt seen this AM, shared she has felt nauseous all morning preventing her from going to radiation. No PRN reglan given > 24 hrs. Discussed making reglan ATC in order to prevent nausea, nausea may be secondary to opiates.     OVERNIGHT EVENTS: Refused RT today. IV dilaudid .4 x 3 within 24 hrs    DNR on chart:DNI: Trial NIV  DNI: Trial NIV      Allergies    gabapentin (Other)  contrast media (iodine-based) (Other)  Benadryl (Other)    Intolerances    Taxol (Other)  MEDICATIONS  (STANDING):  chlorhexidine 2% Cloths 1 Application(s) Topical daily  enoxaparin Injectable 40 milliGRAM(s) SubCutaneous every 24 hours  fluticasone propionate 50 MICROgram(s)/spray Nasal Spray 1 Spray(s) Both Nostrils two times a day  levothyroxine 100 MICROGram(s) Oral daily  pantoprazole    Tablet 40 milliGRAM(s) Oral before breakfast  sodium chloride 1 Gram(s) Oral two times a day  valsartan 40 milliGRAM(s) Oral daily    MEDICATIONS  (PRN):  albuterol    90 MICROgram(s) HFA Inhaler 2 Puff(s) Inhalation every 6 hours PRN Shortness of Breath and/or Wheezing  aluminum hydroxide/magnesium hydroxide/simethicone Suspension 30 milliLiter(s) Oral every 4 hours PRN Dyspepsia  cyclobenzaprine 5 milliGRAM(s) Oral every 8 hours PRN Muscle Spasm  guaiFENesin Oral Liquid (Sugar-Free) 100 milliGRAM(s) Oral every 8 hours PRN Cough  HYDROmorphone   Tablet 2 milliGRAM(s) Oral every 6 hours PRN Mild Pain (1 - 3)  HYDROmorphone  Injectable 0.4 milliGRAM(s) IV Push every 4 hours PRN Severe Pain (7 - 10)  ketorolac   Injectable 30 milliGRAM(s) IV Push every 6 hours PRN breakthrough pain  melatonin 3 milliGRAM(s) Oral at bedtime PRN Insomnia  metoclopramide Injectable 10 milliGRAM(s) IV Push every 8 hours PRN nausea  senna 2 Tablet(s) Oral daily PRN Constipation        -------------------------------------------------------------------------------------------------------  ITEMS UNCHECKED ARE NOT PRESENT    PRESENT SYMPTOMS: [ ]Unable to self-report - see [ ] CPOT [ ] PAINADS [ ] RDOS  Source if other than patient:  [ ]Family   [ ]Team     Pain:  [ ]yes [ ]no  QOL impact -   Location -                    Aggravating factors -  Quality -  Radiation -  Timing-  Severity (0-10 scale):  Minimal acceptable level (0-10 scale)/pain goal:    CPOT:    https://www.[facility].org/getattachment/[email or web address]/Critical-Care-Pain-Observation-Tool-(CPOT)    PAINAD Score: See PAINAD tool and score below       RDOS: See RDOS tool and score below   0 to 2  minimal or no respiratory distress   3  mild distress  4 to 6 moderate distress  >7 severe distress    Dyspnea:                           [ ]Mild [ ]Moderate [ ]Severe  Anxiety:                             [ ]Mild [ ]Moderate [ ]Severe  Fatigue:                             [ ]Mild [ ]Moderate [ ]Severe  Nausea:                             [ ]Mild [ ]Moderate [ ]Severe  Loss of appetite:              [ ]Mild [ ]Moderate [ ]Severe  Constipation:                    [ ]Mild [ ]Moderate [ ]Severe  Other Symptoms:  [ ]All other review of systems negative     Home Medications for Symptoms if present:    I Stop Reference no:     -------------------------------------------------------------------------------------------------------  PCSSQ[Palliative Care Spiritual Screening Question]   Severity (0-10):  Score of 4 or > indicate consideration of Chaplaincy referral.  Chaplaincy Referral: [ ] yes [ ] refused [ ] following [ ] Deferred     Caregiver [place]? : [ ] yes [ ] no [ ] Deferred [ ] Declined             Social work referral [ ] Patient & Family Centered Care Referral [ ]     Anticipatory Grief present?:  [ ] yes [ ] no  [ ] Deferred                  Social work referral [ ] Chaplaincy Referral [ ]    -------------------------------------------------------------------------------------------------------  PHYSICAL EXAM:  Vital Signs Last 24 Hrs  T(C): 36.3 (07 Mar 2024 12:00), Max: 37.4 (06 Mar 2024 20:33)  T(F): 97.3 (07 Mar 2024 12:00), Max: 99.3 (06 Mar 2024 20:33)  HR: 102 (07 Mar 2024 12:00) (99 - 109)  BP: 153/70 (07 Mar 2024 12:00) (110/70 - 153/85)  BP(mean): --  RR: 18 (07 Mar 2024 12:00) (17 - 18)  SpO2: 95% (07 Mar 2024 12:00) (94% - 95%)    Parameters below as of 07 Mar 2024 12:00  Patient On (Oxygen Delivery Method): room air     I&O's Summary     GENERAL:   [ ]Cachexia  [ ]Alert  [ ]Oriented x   [ ]Lethargic  [ ]Unarousable  [ ]Verbal  [ ]Non-Verbal    Behavioral:   [ ]Anxiety  [ ]Delirium [ ]Agitation [ ]Other    HEENT:  [ ]Normal   [ ]Dry mouth   [ ]ET Tube/Trach  [ ]Oral lesions    PULMONARY:   [ ]Clear [ ]Tachypnea  [ ]Audible excessive secretions   [ ]Rhonchi        [ ]Right [ ]Left [ ]Bilateral  [ ]Crackles        [ ]Right [ ]Left [ ]Bilateral  [ ]Wheezing     [ ]Right [ ]Left [ ]Bilateral  [ ]Diminished BS [ ] Right [ ]Left [ ]Bilateral    CARDIOVASCULAR:    [ ]Regular [ ]Irregular [ ]Tachy  [ ]Tristen [ ]Murmur [ ]Other    GASTROINTESTINAL:  [ ]Soft  [ ]Distended   [ ]+BS  [ ]Non tender [ ]Tender  [ ]Other [ ]PEG [ ]OGT/ NGT   Last BM:     GENITOURINARY:  [ ]Normal [ ]Incontinent   [ ]Oliguria/Anuria   [ ][place]    MUSCULOSKELETAL:   [ ]Normal   [ ]Weakness  [ ]Bed/Wheelchair bound [ ]Edema    NEUROLOGIC:   [ ]No focal deficits  [ ] Cognitive impairment  [ ] Dysphagia [ ]Dysarthria [ ] Paresis [ ]Other     SKIN:   [ ]Normal  [ ]Rash  [ ]Other  [ ]Pressure ulcer(s) [ ]y [ ]n present on admission    -------------------------------------------------------------------------------------------------------  CRITICAL CARE:  [ ]Shock Present  [ ]Septic [ ]Cardiogenic [ ]Neurologic [ ]Hypovolemic  [ ]Vasopressors [ ]Inotropes  [ ]Respiratory failure present [ ]Mechanical Ventilation [ ]Non-invasive ventilatory support [ ]High-Flow   [ ]Acute  [ ]Chronic [ ]Hypoxic  [ ]Hypercarbic [ ]Other  [ ]Other organ failure     -------------------------------------------------------------------------------------------------------  LABS:                        9.1    7.86  )-----------( 298      ( 07 Mar 2024 05:00 )             27.4   03-07    131<L>  |  94<L>  |  13  ----------------------------<  103<H>  4.4   |  25  |  0.86    Ca    9.1      07 Mar 2024 05:00  Phos  3.3     03-07  Mg     1.70     03-07        Urinalysis Basic - ( 07 Mar 2024 05:00 )    Color: x / Appearance: x / SG: x / pH: x  Gluc: 103 mg/dL / Ketone: x  / Bili: x / Urobili: x   Blood: x / Protein: x / Nitrite: x   Leuk Esterase: x / RBC: x / WBC x   Sq Epi: x / Non Sq Epi: x / Bacteria: x    -------------------------------------------------------------------------------------------------------  RADIOLOGY & ADDITIONAL STUDIES: < from: CT Abdomen and Pelvis No Cont (02.28.24 @ 06:30) >  IMPRESSION:  Severe right hydronephrosis andmild hydroureter to the level of a soft   tissue lesion with calcification measuring 2.2 x 1.6 cm proximal to the   ureterovesicular junction and inseparable from the right vaginal cuff.   This lesion has increased in size as compared to prior.    New pulmonary, increased size and number hepatic, and new abdominal soft   tissue metastatic disease as noted above.    --- End of Report ---  -------------------------------------------------------------------------------------------------------  Protein Calorie Malnutrition Present: [ ]mild [ ]moderate [ ]severe [ ]underweight [ ]morbid obesity  https://www.andpr2go.com.org/Inofile/5633/web/files/ONC/Table_Clinical%20Characteristics%20to%20Document%20Malnutrition-White%20JV%20et%20al%202012.pdf    Height (cm): 154.9 (02-27-24 @ 18:17), 154.9 (02-16-24 @ 15:10), 152.4 (02-01-24 @ 16:00)  Weight (kg): 49.9 (02-27-24 @ 18:17), 54.8 (02-18-24 @ 06:15), 51.5 (02-01-24 @ 16:00)  BMI (kg/m2): 20.8 (02-27-24 @ 18:17), 22.8 (02-18-24 @ 06:15), 21.5 (02-16-24 @ 15:10)    Palliative Performance Status Version 2:   See PPSv2 tool and score below       [ ]PPSV2 < or = 30%  [ ]significant weight loss [ ]poor nutritional intake [ ]anasarca[ ]Artificial Nutrition    Other REFERRALS:  [ ]Hospice  [ ]Child Life  [ ]Social Work  [ ]Case management [ ]Holistic Therapy     -------------------------------------------------------------------------------------------------------  Goals of Care Document: [facility] Geriatrics and Palliative Care  Adelia Gonzalez, Palliative Care Nurse Practitioner  Contact Info: Page [phone] (Including Nights/Weekends), Message on Microsoft Teams (Adelia Gonzalez), or leave VM at Palliative Office [phone] (non-urgent)    Date of Service 03-07-24 @ 13:20    SUBJECTIVE AND OBJECTIVE:  Indication for Geriatrics and Palliative Care Services/INTERVAL HPI: symptoms / GOC  Pt seen this AM, shared she has felt nauseous all morning preventing her from going to radiation. No PRN reglan given > 24 hrs. Discussed making reglan ATC in order to prevent nausea, nausea may be secondary to opiates.     OVERNIGHT EVENTS: Refused RT today. IV dilaudid .4 x 3 within 24 hrs    DNR on chart:DNI: Trial NIV  DNI: Trial NIV      Allergies    gabapentin (Other)  contrast media (iodine-based) (Other)  Benadryl (Other)    Intolerances    Taxol (Other)  MEDICATIONS  (STANDING):  chlorhexidine 2% Cloths 1 Application(s) Topical daily  enoxaparin Injectable 40 milliGRAM(s) SubCutaneous every 24 hours  fluticasone propionate 50 MICROgram(s)/spray Nasal Spray 1 Spray(s) Both Nostrils two times a day  levothyroxine 100 MICROGram(s) Oral daily  pantoprazole    Tablet 40 milliGRAM(s) Oral before breakfast  sodium chloride 1 Gram(s) Oral two times a day  valsartan 40 milliGRAM(s) Oral daily    MEDICATIONS  (PRN):  albuterol    90 MICROgram(s) HFA Inhaler 2 Puff(s) Inhalation every 6 hours PRN Shortness of Breath and/or Wheezing  aluminum hydroxide/magnesium hydroxide/simethicone Suspension 30 milliLiter(s) Oral every 4 hours PRN Dyspepsia  cyclobenzaprine 5 milliGRAM(s) Oral every 8 hours PRN Muscle Spasm  guaiFENesin Oral Liquid (Sugar-Free) 100 milliGRAM(s) Oral every 8 hours PRN Cough  HYDROmorphone   Tablet 2 milliGRAM(s) Oral every 6 hours PRN Mild Pain (1 - 3)  HYDROmorphone  Injectable 0.4 milliGRAM(s) IV Push every 4 hours PRN Severe Pain (7 - 10)  ketorolac   Injectable 30 milliGRAM(s) IV Push every 6 hours PRN breakthrough pain  melatonin 3 milliGRAM(s) Oral at bedtime PRN Insomnia  metoclopramide Injectable 10 milliGRAM(s) IV Push every 8 hours PRN nausea  senna 2 Tablet(s) Oral daily PRN Constipation        -------------------------------------------------------------------------------------------------------  ITEMS UNCHECKED ARE NOT PRESENT    PRESENT SYMPTOMS: [ ]Unable to self-report - see [ ] CPOT [ ] PAINADS [ ] RDOS  Source if other than patient:  [ ]Family   [ ]Team     Pain: [ X]yes [ ]no  QOL impact -   Location - BL shoulders/pelvis            Aggravating factors - movement   Quality - sharp  Radiation - across upper back  Timing- intermittent   Severity (0-10 scale):10/10   Minimal acceptable level (0-10 scale)/Pain goal: 3    CPOT:    https://www.[facility].org/getattachment/[email or web address]/Critical-Care-Pain-Observation-Tool-(CPOT)    PAINAD Score: See PAINAD tool and score below       RDOS: See RDOS tool and score below   0 to 2  minimal or no respiratory distress   3  mild distress  4 to 6 moderate distress  >7 severe distress    Dyspnea:                           [ ]Mild [ ]Moderate [ ]Severe  Anxiety:                             [ ]Mild [ ]Moderate [ ]Severe  Fatigue:                             [ ]Mild [ ]Moderate [ ]Severe  Nausea:                             [ ]Mild [ ]Moderate [ ]Severe  Loss of appetite:              [ ]Mild [ ]Moderate [ ]Severe  Constipation:                    [ ]Mild [ ]Moderate [ ]Severe  Other Symptoms:  [ ]All other review of systems negative     Home Medications for Symptoms if present:    I Stop Reference no:     -------------------------------------------------------------------------------------------------------  PCSSQ[Palliative Care Spiritual Screening Question]   Severity (0-10):  Score of 4 or > indicate consideration of Chaplaincy referral.  Chaplaincy Referral: [ ] yes [ ] refused [ ] following [ ] Deferred     Caregiver [place]? : [ ] yes [ ] no [ ] Deferred [ ] Declined             Social work referral [ ] Patient & Family Centered Care Referral [ ]     Anticipatory Grief present?:  [ ] yes [ ] no  [ ] Deferred                  Social work referral [ ] Chaplaincy Referral [ ]    -------------------------------------------------------------------------------------------------------  PHYSICAL EXAM:  Vital Signs Last 24 Hrs  T(C): 36.3 (07 Mar 2024 12:00), Max: 37.4 (06 Mar 2024 20:33)  T(F): 97.3 (07 Mar 2024 12:00), Max: 99.3 (06 Mar 2024 20:33)  HR: 102 (07 Mar 2024 12:00) (99 - 109)  BP: 153/70 (07 Mar 2024 12:00) (110/70 - 153/85)  BP(mean): --  RR: 18 (07 Mar 2024 12:00) (17 - 18)  SpO2: 95% (07 Mar 2024 12:00) (94% - 95%)    Parameters below as of 07 Mar 2024 12:00  Patient On (Oxygen Delivery Method): room air     I&O's Summary       GENERAL:  [ ]Cachexia  [ X]Alert  [ X]Oriented x 4  [ ]Lethargic  [ ]Unarousable  [X ]Verbal  [ ]Non-Verbal    Behavioral:   [ ] Anxiety  [ ] Delirium [ ] Agitation [X ] Other    HEENT:  [X ]Normal   [ ]Dry mouth   [ ]ET Tube/Trach  [ ]Oral lesions    PULMONARY:   [ X]Clear [ ]Tachypnea  [ ]Audible excessive secretions   [ ]Rhonchi        [ ]Right [ ]Left [ ]Bilateral  [ ]Crackles        [ ]Right [ ]Left [ ]Bilateral  [ ]Wheezing     [ ]Right [ ]Left [ ]Bilateral  [ ]Diminished breath sounds [ ]right [ ]left [ ]bilateral    CARDIOVASCULAR:    [X ]Regular [ ]Irregular [ ]Tachy  [ ]Tristen [ ]Murmur [ ]Other    GASTROINTESTINAL:  [X ]Soft  [ ]Distended   [X ]+BS  [ X]Non tender [ ]Tender  [ ]Other [ ]PEG [ ]OGT/ NGT  Last BM: 3/4    GENITOURINARY:  [X ]Normal [ ] Incontinent   [ ]Oliguria/Anuria   [ ][place]    MUSCULOSKELETAL:   [ ]Normal   [x ]Weakness  [ ]Bed/Wheelchair bound [ ]Edema    NEUROLOGIC:   [ x]No focal deficits  [ ]Cognitive impairment  [ ]Dysphagia [ ]Dysarthria [ ]Paresis [ ]Other     SKIN:   [x ]Normal  [ ]Rash  [ ]Other  [ ]Pressure ulcer(s)       Present on admission [ ]y [ ]n    -------------------------------------------------------------------------------------------------------  CRITICAL CARE:  [ ]Shock Present  [ ]Septic [ ]Cardiogenic [ ]Neurologic [ ]Hypovolemic  [ ]Vasopressors [ ]Inotropes  [ ]Respiratory failure present [ ]Mechanical Ventilation [ ]Non-invasive ventilatory support [ ]High-Flow   [ ]Acute  [ ]Chronic [ ]Hypoxic  [ ]Hypercarbic [ ]Other  [ ]Other organ failure     -------------------------------------------------------------------------------------------------------  LABS:                        9.1    7.86  )-----------( 298      ( 07 Mar 2024 05:00 )             27.4   03-07    131<L>  |  94<L>  |  13  ----------------------------<  103<H>  4.4   |  25  |  0.86    Ca    9.1      07 Mar 2024 05:00  Phos  3.3     03-07  Mg     1.70     03-07        Urinalysis Basic - ( 07 Mar 2024 05:00 )    Color: x / Appearance: x / SG: x / pH: x  Gluc: 103 mg/dL / Ketone: x  / Bili: x / Urobili: x   Blood: x / Protein: x / Nitrite: x   Leuk Esterase: x / RBC: x / WBC x   Sq Epi: x / Non Sq Epi: x / Bacteria: x    -------------------------------------------------------------------------------------------------------  RADIOLOGY & ADDITIONAL STUDIES: < from: CT Abdomen and Pelvis No Cont (02.28.24 @ 06:30) >  IMPRESSION:  Severe right hydronephrosis andmild hydroureter to the level of a soft   tissue lesion with calcification measuring 2.2 x 1.6 cm proximal to the   ureterovesicular junction and inseparable from the right vaginal cuff.   This lesion has increased in size as compared to prior.    New pulmonary, increased size and number hepatic, and new abdominal soft   tissue metastatic disease as noted above.    --- End of Report ---  -------------------------------------------------------------------------------------------------------  Protein Calorie Malnutrition Present: [ ]mild [ ]moderate [ ]severe [ ]underweight [ ]morbid obesity  [email or web address]    Height (cm): 154.9 (02-27-24 @ 18:17), 154.9 (02-16-24 @ 15:10), 152.4 (02-01-24 @ 16:00)  Weight (kg): 49.9 (02-27-24 @ 18:17), 54.8 (02-18-24 @ 06:15), 51.5 (02-01-24 @ 16:00)  BMI (kg/m2): 20.8 (02-27-24 @ 18:17), 22.8 (02-18-24 @ 06:15), 21.5 (02-16-24 @ 15:10)    Palliative Performance Status Version 2:   See PPSv2 tool and score below       [ ]PPSV2 < or = 30%  [ ]significant weight loss [ ]poor nutritional intake [ ]anasarca[ ]Artificial Nutrition    Other REFERRALS:  [ ]Hospice  [ ]Child Life  [ ]Social Work  [ ]Case management [ ]Holistic Therapy     -------------------------------------------------------------------------------------------------------  Goals of Care Document:

## 2024-03-07 NOTE — PROGRESS NOTE ADULT - SUBJECTIVE AND OBJECTIVE BOX
SOLID TUMOR ONCOLOGY HOSPITALIST PROGRESS NOTE    S: No acute events overnight.  Pt refused to RT this morning.  She expressed that she feels like she can't move, but was able to lift both her arms and legs off the bed without difficulty on command.  She said that she feels sick and is in excruciating pain, not relieved after taking Dilaudid 30 minutes ago.  When asked if she was nauseous or if she had a recent bowel movement, she rolled her eyes and replied, "I'm not stupid... It's not me, I really wanted to go to radiation today... Just back off of me today, ok?"  She also expressed that she no longer wanted to pursue hospice placement because she was interested in resuming cancer-directed therapy.    CURRENT MEDICATIONS  MEDICATIONS  (STANDING):  chlorhexidine 2% Cloths 1 Application(s) Topical daily  enoxaparin Injectable 40 milliGRAM(s) SubCutaneous every 24 hours  fluticasone propionate 50 MICROgram(s)/spray Nasal Spray 1 Spray(s) Both Nostrils two times a day  levothyroxine 100 MICROGram(s) Oral daily  pantoprazole    Tablet 40 milliGRAM(s) Oral before breakfast  sodium chloride 1 Gram(s) Oral two times a day  valsartan 40 milliGRAM(s) Oral daily  MEDICATIONS  (PRN):  albuterol    90 MICROgram(s) HFA Inhaler 2 Puff(s) Inhalation every 6 hours PRN Shortness of Breath and/or Wheezing  aluminum hydroxide/magnesium hydroxide/simethicone Suspension 30 milliLiter(s) Oral every 4 hours PRN Dyspepsia  cyclobenzaprine 5 milliGRAM(s) Oral every 8 hours PRN Muscle Spasm  guaiFENesin Oral Liquid (Sugar-Free) 100 milliGRAM(s) Oral every 8 hours PRN Cough  HYDROmorphone   Tablet 2 milliGRAM(s) Oral every 6 hours PRN Mild Pain (1 - 3)  HYDROmorphone  Injectable 0.4 milliGRAM(s) IV Push every 4 hours PRN Severe Pain (7 - 10)  ketorolac   Injectable 30 milliGRAM(s) IV Push every 6 hours PRN breakthrough pain  melatonin 3 milliGRAM(s) Oral at bedtime PRN Insomnia  metoclopramide Injectable 10 milliGRAM(s) IV Push every 8 hours PRN nausea  senna 2 Tablet(s) Oral daily PRN Constipation      PHYSICAL EXAM  T(C): 36.3 (03-07-24 @ 12:00), Max: 37.4 (03-06-24 @ 20:33)  HR: 102 (03-07-24 @ 12:00) (99 - 109)  BP: 153/70 (03-07-24 @ 12:00) (110/70 - 153/85)  RR: 18 (03-07-24 @ 12:00) (17 - 18)  SpO2: 95% (03-07-24 @ 12:00) (94% - 95%)  non-toxic appearing woman, laying in bed and appears comfortable, nad  Anicteric sclera, no oral lesions/thrush  RRR, no m/r/g  CTAB, no w/c/r  Abd soft, mildly tender on palpation of epigastrum and RUQ, no palpable masses/organomegaly  No peripheral edema  CN 2-12 grossly intact; no gross focal neuro deficits    LABS                        9.1    7.86  )-----------( 298      ( 07 Mar 2024 05:00 )             27.4     03-07    131<L>  |  94<L>  |  13  ----------------------------<  103<H>  4.4   |  25  |  0.86    Ca    9.1      07 Mar 2024 05:00  Phos  3.3     03-07  Mg     1.70     03-07      TSH 2/17 0.39  3/4 TSH 2.44, fT4 1.3    MICROBIOLOGY  Abx: none    Micro  3/2 MRSA/MSSA screen negative      PERTINENT RADIOLOGY  2/28 CT Abd/pelv: Severe R hydronephrosis and mild hydroureter to the level of a soft tissue lesion w/ calcification 2.2x1.6cm proximal to the ureterovesicular junction, inseparable from R vaginal cuff, increased in size. New pulm nodule increased in size w/ multiple hepatic met and abdominal soft tissue mets.

## 2024-03-08 NOTE — PROGRESS NOTE ADULT - ASSESSMENT
67 yo woman with h/o HTN, CVA, hypothyroidism, OP, stage II endometrial cancer > dx/treated in 7/201- s/p robotic assisted TLH BSO, bilateral pelvic and para-aortic sentinel LN dissection by Dr. Paras Grayson on 7/2/2018, followed by pelvic IMRT and vaginal cuff brachytherapy with Dr. 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  She was admitted to [facility] on 2/27 with progressive nausea and generalized weakness with admission CT a/p confirming POD; admission notable for mild leukocytosis. Pt's hospital course also currently c/b cancer-related pain and hyponatremia.    ACTIVE PROBLEMS  Met endometrial cancer  Goals of care discussion, counseling  Cancer-related pain  Nausea without vomiting  R hydroureteronephrosis  MANJU  Hyponatremia  2/2 SIADH  Anemia of chronic disease  Hypothyroidism  HTN  Hypercoag state    - Met endometrial cancer  - GOC discussion, counseling  Pt indicated to Pall Care team on 2/28 that she is no longer interested in pursuing systemic cancer treatment  She consented to DNR/DNI code status and previously expressed interest in inpt hospice placement, citing that she does not want to die alone at home; however, on 3/7 she noted that she no longer wants to pursue hospice and she wants to resume cancer-directed tx  Will discuss options for future cancer tx with pt's outpt Gyn Onc  Pt's long-term prognosis is poor    - Cancer-related pain  Continuing Toradol 30mg q6/prn x5d course (until 3/7) for inflammatory pain (with ppi ppx)  Continuing Dilaudid PO 2mg q6/prn for mild pain  Continuing Dilaudid IV 0.4mg q6/prn for mod-sev pain  Continuing Flexiril 5mg q8/prn  Continuing bowel regimen to prevent OIC  Appreciate Rad Onc consult: pt to complete inpt RT to mediastinal met, 20Gy/5fxs, 3/6-14    - Nausea without vomiting  Improved  Likely iatrogenic from medications (opioids)  Admission imaging negative for ileus/obstruction  Zofran dced; starting Reglan IV 10mg q8/prn  Repleting lytes prn    - R hydroureteronephrosis  Cr relatively stable  Pt still with sufficient UOP  No role for stent, PCN at this time  Planning to hold ARB and check urine lytes if Cr worsens  Monitoring UOP    - Hyponatremia 2/2 SIADH  Na improved to 133, range 130-32  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  Pt is relatively asymptomatic  Continuing Salt tab 1gm BID  Starting 1L fluid restriction    - Anemia of chronic disease  Hgb stable; pt remains without s/s of active bleeding  3/4 iron studies more c/w ACD; labs not otherwise c/w hemolysis  Transfusing supportively prn (goal hgb > 7; plts > 10K)    - Hypothyroidism: 3/4 TFTs wnl; continuing LT4 100mcg daily    - HTN: continuing Valsartan 40mg daily    - Hypercoag state: continuing lovenox ppx

## 2024-03-08 NOTE — PROGRESS NOTE ADULT - SUBJECTIVE AND OBJECTIVE BOX
[facility]  Adelia Gonzalez, Palliative Care Nurse Practitioner  Contact Info: Page [phone] (Including Nights/Weekends), Message on Microsoft Teams (Adelia Gonzalez), or leave VM at Palliative Office [phone] (non-urgent)    Date of Service 03-08-24 @ 10:48    SUBJECTIVE AND OBJECTIVE:  Indication for Geriatrics and Palliative Care Services/INTERVAL HPI: N/V  Pt seen this AM, shared nausea has improved with ATC Reglan. Pain well controlled on IV dilaudid. Pt in agreement to go to RT today. Plan to switch to oral medications after the weekend in preparation for discharge. Noted in chart that patient no longer interested in hospice and wants to pursue more chemo.     OVERNIGHT EVENTS:    DNR on chart:DNI: Trial NIV  DNI: Trial NIV      Allergies    gabapentin (Other)  contrast media (iodine-based) (Other)  Benadryl (Other)    Intolerances    Taxol (Other)  MEDICATIONS  (STANDING):  chlorhexidine 2% Cloths 1 Application(s) Topical daily  enoxaparin Injectable 40 milliGRAM(s) SubCutaneous every 24 hours  fluticasone propionate 50 MICROgram(s)/spray Nasal Spray 1 Spray(s) Both Nostrils two times a day  levothyroxine 100 MICROGram(s) Oral daily  metoclopramide Injectable 10 milliGRAM(s) IV Push every 8 hours  pantoprazole    Tablet 40 milliGRAM(s) Oral before breakfast  sodium chloride 1 Gram(s) Oral two times a day  valsartan 40 milliGRAM(s) Oral daily    MEDICATIONS  (PRN):  albuterol    90 MICROgram(s) HFA Inhaler 2 Puff(s) Inhalation every 6 hours PRN Shortness of Breath and/or Wheezing  aluminum hydroxide/magnesium hydroxide/simethicone Suspension 30 milliLiter(s) Oral every 4 hours PRN Dyspepsia  cyclobenzaprine 5 milliGRAM(s) Oral every 8 hours PRN Muscle Spasm  guaiFENesin Oral Liquid (Sugar-Free) 100 milliGRAM(s) Oral every 8 hours PRN Cough  HYDROmorphone   Tablet 2 milliGRAM(s) Oral every 6 hours PRN Mild Pain (1 - 3)  HYDROmorphone  Injectable 0.4 milliGRAM(s) IV Push every 4 hours PRN Severe Pain (7 - 10)  melatonin 3 milliGRAM(s) Oral at bedtime PRN Insomnia  senna 2 Tablet(s) Oral daily PRN Constipation        -------------------------------------------------------------------------------------------------------  ITEMS UNCHECKED ARE NOT PRESENT    PRESENT SYMPTOMS: [ ]Unable to self-report - see [ ] CPOT [ ] PAINADS [ ] RDOS  Source if other than patient:  [ ]Family   [ ]Team     Pain:  [ ]yes [ ]no  QOL impact -   Location -                    Aggravating factors -  Quality -  Radiation -  Timing-  Severity (0-10 scale):  Minimal acceptable level (0-10 scale)/pain goal:    CPOT:    https://www.[facility].org/getattachment/[email or web address]/Critical-Care-Pain-Observation-Tool-(CPOT)    PAINAD Score: See PAINAD tool and score below       RDOS: See RDOS tool and score below   0 to 2  minimal or no respiratory distress   3  mild distress  4 to 6 moderate distress  >7 severe distress    Dyspnea:                           [ ]Mild [ ]Moderate [ ]Severe  Anxiety:                             [ ]Mild [ ]Moderate [ ]Severe  Fatigue:                             [ ]Mild [ ]Moderate [ ]Severe  Nausea:                             [ ]Mild [ ]Moderate [ ]Severe  Loss of appetite:              [ ]Mild [ ]Moderate [ ]Severe  Constipation:                    [ ]Mild [ ]Moderate [ ]Severe  Other Symptoms:  [ ]All other review of systems negative     Home Medications for Symptoms if present:    I Stop Reference no:     -------------------------------------------------------------------------------------------------------  PCSSQ[Palliative Care Spiritual Screening Question]   Severity (0-10):  Score of 4 or > indicate consideration of Chaplaincy referral.  Chaplaincy Referral: [ ] yes [ ] refused [ ] following [ ] Deferred     Caregiver [place]? : [ ] yes [ ] no [ ] Deferred [ ] Declined             Social work referral [ ] Patient & Family Centered Care Referral [ ]     Anticipatory Grief present?:  [ ] yes [ ] no  [ ] Deferred                  Social work referral [ ] Chaplaincy Referral [ ]    -------------------------------------------------------------------------------------------------------  PHYSICAL EXAM:  Vital Signs Last 24 Hrs  T(C): 36.9 (08 Mar 2024 06:00), Max: 37.1 (07 Mar 2024 21:17)  T(F): 98.4 (08 Mar 2024 06:00), Max: 98.8 (07 Mar 2024 21:17)  HR: 100 (08 Mar 2024 06:00) (100 - 102)  BP: 139/84 (08 Mar 2024 06:00) (139/84 - 153/70)  BP(mean): --  RR: 18 (08 Mar 2024 06:00) (18 - 18)  SpO2: 96% (08 Mar 2024 06:00) (93% - 96%)    Parameters below as of 08 Mar 2024 06:00  Patient On (Oxygen Delivery Method): room air     I&O's Summary     GENERAL:   [ ]Cachexia  [ ]Alert  [ ]Oriented x   [ ]Lethargic  [ ]Unarousable  [ ]Verbal  [ ]Non-Verbal    Behavioral:   [ ]Anxiety  [ ]Delirium [ ]Agitation [ ]Other    HEENT:  [ ]Normal   [ ]Dry mouth   [ ]ET Tube/Trach  [ ]Oral lesions    PULMONARY:   [ ]Clear [ ]Tachypnea  [ ]Audible excessive secretions   [ ]Rhonchi        [ ]Right [ ]Left [ ]Bilateral  [ ]Crackles        [ ]Right [ ]Left [ ]Bilateral  [ ]Wheezing     [ ]Right [ ]Left [ ]Bilateral  [ ]Diminished BS [ ] Right [ ]Left [ ]Bilateral    CARDIOVASCULAR:    [ ]Regular [ ]Irregular [ ]Tachy  [ ]Tristen [ ]Murmur [ ]Other    GASTROINTESTINAL:  [ ]Soft  [ ]Distended   [ ]+BS  [ ]Non tender [ ]Tender  [ ]Other [ ]PEG [ ]OGT/ NGT   Last BM:     GENITOURINARY:  [ ]Normal [ ]Incontinent   [ ]Oliguria/Anuria   [ ][place]    MUSCULOSKELETAL:   [ ]Normal   [ ]Weakness  [ ]Bed/Wheelchair bound [ ]Edema    NEUROLOGIC:   [ ]No focal deficits  [ ] Cognitive impairment  [ ] Dysphagia [ ]Dysarthria [ ] Paresis [ ]Other     SKIN:   [ ]Normal  [ ]Rash  [ ]Other  [ ]Pressure ulcer(s) [ ]y [ ]n present on admission    -------------------------------------------------------------------------------------------------------  CRITICAL CARE:  [ ]Shock Present  [ ]Septic [ ]Cardiogenic [ ]Neurologic [ ]Hypovolemic  [ ]Vasopressors [ ]Inotropes  [ ]Respiratory failure present [ ]Mechanical Ventilation [ ]Non-invasive ventilatory support [ ]High-Flow   [ ]Acute  [ ]Chronic [ ]Hypoxic  [ ]Hypercarbic [ ]Other  [ ]Other organ failure     -------------------------------------------------------------------------------------------------------  LABS:                        9.3    8.59  )-----------( 317      ( 08 Mar 2024 07:16 )             27.8   03-08    133<L>  |  95<L>  |  18  ----------------------------<  108<H>  4.1   |  27  |  0.95    Ca    9.5      08 Mar 2024 07:16  Phos  3.7     03-08  Mg     2.00     03-08    TPro  6.5  /  Alb  2.7<L>  /  TBili  0.2  /  DBili  x   /  AST  18  /  ALT  10  /  AlkPhos  179<H>  03-08    Urinalysis Basic - ( 08 Mar 2024 07:16 )    Color: x / Appearance: x / SG: x / pH: x  Gluc: 108 mg/dL / Ketone: x  / Bili: x / Urobili: x   Blood: x / Protein: x / Nitrite: x   Leuk Esterase: x / RBC: x / WBC x   Sq Epi: x / Non Sq Epi: x / Bacteria: x    -------------------------------------------------------------------------------------------------------  RADIOLOGY & ADDITIONAL STUDIES: < from: CT Abdomen and Pelvis No Cont (02.28.24 @ 06:30) >  IMPRESSION:  Severe right hydronephrosis andmild hydroureter to the level of a soft   tissue lesion with calcification measuring 2.2 x 1.6 cm proximal to the   ureterovesicular junction and inseparable from the right vaginal cuff.   This lesion has increased in size as compared to prior.    New pulmonary, increased size and number hepatic, and new abdominal soft   tissue metastatic disease as noted above.    --- End of Report ---  -------------------------------------------------------------------------------------------------------  Protein Calorie Malnutrition Present: [ ]mild [ ]moderate [ ]severe [ ]underweight [ ]morbid obesity  https://www.[employer].org/vault/7220/web/files/ONC/Table_Clinical%20Characteristics%20to%20Document%20Malnutrition-White%20JV%20et%20al%202012.pdf    Height (cm): 154.9 (02-27-24 @ 18:17), 154.9 (02-16-24 @ 15:10), 152.4 (02-01-24 @ 16:00)  Weight (kg): 49.9 (02-27-24 @ 18:17), 54.8 (02-18-24 @ 06:15), 51.5 (02-01-24 @ 16:00)  BMI (kg/m2): 20.8 (02-27-24 @ 18:17), 22.8 (02-18-24 @ 06:15), 21.5 (02-16-24 @ 15:10)    Palliative Performance Status Version 2:   See PPSv2 tool and score below       [ ]PPSV2 < or = 30%  [ ]significant weight loss [ ]poor nutritional intake [ ]anasarca[ ]Artificial Nutrition    Other REFERRALS:  [ ]Hospice  [ ]Child Life  [ ]Social Work  [ ]Case management [ ]Holistic Therapy     -------------------------------------------------------------------------------------------------------  Goals of Care Document: [facility] and Palliative [facility]  Adelia Gonzalez, Palliative Care Nurse Practitioner  Contact Info: Page [phone] (Including Nights/Weekends), Message on Microsoft Teams (Adelia Gonzalez), or leave VM at Palliative Office [phone] (non-urgent)    Date of Service 03-08-24 @ 10:48    SUBJECTIVE AND OBJECTIVE:  Indication for Geriatrics and Palliative Care Services/INTERVAL HPI: N/V  Pt seen this AM, shared nausea has improved with ATC Reglan. Pain well controlled on IV dilaudid. Pt in agreement to go to RT today. Plan to switch to oral medications after the weekend in preparation for discharge. Noted in chart that patient no longer interested in hospice and wants to pursue more chemo.     OVERNIGHT EVENTS: Pt required ATC reglan Q8hrs, IV dilaudid 0.4mg x 3, IV Toradol 30mg x 1 within 24 hrs (8a-8a).    DNR on chart:DNI: Trial NIV  DNI: Trial NIV      Allergies    gabapentin (Other)  contrast media (iodine-based) (Other)  Benadryl (Other)    Intolerances    Taxol (Other)  MEDICATIONS  (STANDING):  chlorhexidine 2% Cloths 1 Application(s) Topical daily  enoxaparin Injectable 40 milliGRAM(s) SubCutaneous every 24 hours  fluticasone propionate 50 MICROgram(s)/spray Nasal Spray 1 Spray(s) Both Nostrils two times a day  levothyroxine 100 MICROGram(s) Oral daily  metoclopramide Injectable 10 milliGRAM(s) IV Push every 8 hours  pantoprazole    Tablet 40 milliGRAM(s) Oral before breakfast  sodium chloride 1 Gram(s) Oral two times a day  valsartan 40 milliGRAM(s) Oral daily    MEDICATIONS  (PRN):  albuterol    90 MICROgram(s) HFA Inhaler 2 Puff(s) Inhalation every 6 hours PRN Shortness of Breath and/or Wheezing  aluminum hydroxide/magnesium hydroxide/simethicone Suspension 30 milliLiter(s) Oral every 4 hours PRN Dyspepsia  cyclobenzaprine 5 milliGRAM(s) Oral every 8 hours PRN Muscle Spasm  guaiFENesin Oral Liquid (Sugar-Free) 100 milliGRAM(s) Oral every 8 hours PRN Cough  HYDROmorphone   Tablet 2 milliGRAM(s) Oral every 6 hours PRN Mild Pain (1 - 3)  HYDROmorphone  Injectable 0.4 milliGRAM(s) IV Push every 4 hours PRN Severe Pain (7 - 10)  melatonin 3 milliGRAM(s) Oral at bedtime PRN Insomnia  senna 2 Tablet(s) Oral daily PRN Constipation  -------------------------------------------------------------------------------------------------------  ITEMS UNCHECKED ARE NOT PRESENT    PRESENT SYMPTOMS: [ ]Unable to self-report - see [ ] CPOT [ ] PAINADS [ ] RDOS  Source if other than patient:  [ ]Family   [ ]Team     Pain: [ X]yes [ ]no  QOL impact -   Location - BL shoulders/pelvis            Aggravating factors - movement   Quality - sharp  Radiation - across upper back  Timing- intermittent   Severity (0-10 scale):10/10   Minimal acceptable level (0-10 scale)/Pain goal: 3    CPOT:    https://www.[facility].org/getattachment/[email or web address]/Critical-Care-Pain-Observation-Tool-(CPOT)    PAINAD Score: See PAINAD tool and score below       RDOS: See RDOS tool and score below   0 to 2  minimal or no respiratory distress   3  mild distress  4 to 6 moderate distress  >7 severe distress    Dyspnea:                           [ ]Mild [ ]Moderate [ ]Severe  Anxiety:                             [ ]Mild [ ]Moderate [ ]Severe  Fatigue:                             [ ]Mild [ ]Moderate [ ]Severe  Nausea:                             [ ]Mild [ ]Moderate [ ]Severe  Loss of appetite:              [ ]Mild [ ]Moderate [ ]Severe  Constipation:                    [ ]Mild [ ]Moderate [ ]Severe  Other Symptoms:  [ X]All other review of systems negative     Home Medications for Symptoms if present:    I Stop Reference no:     -------------------------------------------------------------------------------------------------------  PCSSQ[Palliative Care Spiritual Screening Question]   Severity (0-10):  Score of 4 or > indicate consideration of Chaplaincy referral.  Chaplaincy Referral: [ ] yes [ ] refused [ ] following [X ] Deferred     Caregiver [place]? : [ ] yes [ ] no [ ] Deferred [ X] Declined             Social work referral [ ] Patient & Family Centered Care Referral [ ]     Anticipatory Grief present?:  [ ] yes [ ] no  [X ] Deferred                  Social work referral [ ] Chaplaincy Referral [ ]    -------------------------------------------------------------------------------------------------------  PHYSICAL EXAM:  Vital Signs Last 24 Hrs  T(C): 36.9 (08 Mar 2024 06:00), Max: 37.1 (07 Mar 2024 21:17)  T(F): 98.4 (08 Mar 2024 06:00), Max: 98.8 (07 Mar 2024 21:17)  HR: 100 (08 Mar 2024 06:00) (100 - 102)  BP: 139/84 (08 Mar 2024 06:00) (139/84 - 153/70)  BP(mean): --  RR: 18 (08 Mar 2024 06:00) (18 - 18)  SpO2: 96% (08 Mar 2024 06:00) (93% - 96%)    Parameters below as of 08 Mar 2024 06:00  Patient On (Oxygen Delivery Method): room air     I&O's Summary       GENERAL:  [ ]Cachexia  [ X]Alert  [ X]Oriented x 4  [ ]Lethargic  [ ]Unarousable  [X ]Verbal  [ ]Non-Verbal    Behavioral:   [ ] Anxiety  [ ] Delirium [ ] Agitation [X ] Other    HEENT:  [X ]Normal   [ ]Dry mouth   [ ]ET Tube/Trach  [ ]Oral lesions    PULMONARY:   [ X]Clear [ ]Tachypnea  [ ]Audible excessive secretions   [ ]Rhonchi        [ ]Right [ ]Left [ ]Bilateral  [ ]Crackles        [ ]Right [ ]Left [ ]Bilateral  [ ]Wheezing     [ ]Right [ ]Left [ ]Bilateral  [ ]Diminished breath sounds [ ]right [ ]left [ ]bilateral    CARDIOVASCULAR:    [X ]Regular [ ]Irregular [ ]Tachy  [ ]Tristen [ ]Murmur [ ]Other    GASTROINTESTINAL:  [X ]Soft  [ ]Distended   [X ]+BS  [ X]Non tender [ ]Tender  [ ]Other [ ]PEG [ ]OGT/ NGT  Last BM: 3/4    GENITOURINARY:  [X ]Normal [ ] Incontinent   [ ]Oliguria/Anuria   [ ][place]    MUSCULOSKELETAL:   [ ]Normal   [x ]Weakness  [ ]Bed/Wheelchair bound [ ]Edema    NEUROLOGIC:   [ x]No focal deficits  [ ]Cognitive impairment  [ ]Dysphagia [ ]Dysarthria [ ]Paresis [ ]Other     SKIN:   [x ]Normal  [ ]Rash  [ ]Other  [ ]Pressure ulcer(s)       Present on admission [ ]y [ ]n  -------------------------------------------------------------------------------------------------------  CRITICAL CARE:  [ ]Shock Present  [ ]Septic [ ]Cardiogenic [ ]Neurologic [ ]Hypovolemic  [ ]Vasopressors [ ]Inotropes  [ ]Respiratory failure present [ ]Mechanical Ventilation [ ]Non-invasive ventilatory support [ ]High-Flow   [ ]Acute  [ ]Chronic [ ]Hypoxic  [ ]Hypercarbic [ ]Other  [ ]Other organ failure     -------------------------------------------------------------------------------------------------------  LABS:                        9.3    8.59  )-----------( 317      ( 08 Mar 2024 07:16 )             27.8   03-08    133<L>  |  95<L>  |  18  ----------------------------<  108<H>  4.1   |  27  |  0.95    Ca    9.5      08 Mar 2024 07:16  Phos  3.7     03-08  Mg     2.00     03-08    TPro  6.5  /  Alb  2.7<L>  /  TBili  0.2  /  DBili  x   /  AST  18  /  ALT  10  /  AlkPhos  179<H>  03-08    Urinalysis Basic - ( 08 Mar 2024 07:16 )    Color: x / Appearance: x / SG: x / pH: x  Gluc: 108 mg/dL / Ketone: x  / Bili: x / Urobili: x   Blood: x / Protein: x / Nitrite: x   Leuk Esterase: x / RBC: x / WBC x   Sq Epi: x / Non Sq Epi: x / Bacteria: x    -------------------------------------------------------------------------------------------------------  RADIOLOGY & ADDITIONAL STUDIES: < from: CT Abdomen and Pelvis No Cont (02.28.24 @ 06:30) >  IMPRESSION:  Severe right hydronephrosis andmild hydroureter to the level of a soft   tissue lesion with calcification measuring 2.2 x 1.6 cm proximal to the   ureterovesicular junction and inseparable from the right vaginal cuff.   This lesion has increased in size as compared to prior.    New pulmonary, increased size and number hepatic, and new abdominal soft   tissue metastatic disease as noted above.    --- End of Report ---  -------------------------------------------------------------------------------------------------------  Protein Calorie Malnutrition Present: [ ]mild [ ]moderate [ ]severe [ ]underweight [ ]morbid obesity  https://www.[employer]/2440/web/files/ONC/Table_Clinical%20Characteristics%20to%20Document%20Malnutrition-White%20JV%20et%20al%202012.pdf    Height (cm): 154.9 (02-27-24 @ 18:17), 154.9 (02-16-24 @ 15:10), 152.4 (02-01-24 @ 16:00)  Weight (kg): 49.9 (02-27-24 @ 18:17), 54.8 (02-18-24 @ 06:15), 51.5 (02-01-24 @ 16:00)  BMI (kg/m2): 20.8 (02-27-24 @ 18:17), 22.8 (02-18-24 @ 06:15), 21.5 (02-16-24 @ 15:10)    Palliative Performance Status Version 2:   See PPSv2 tool and score below       [ ]PPSV2 < or = 30%  [ ]significant weight loss [ ]poor nutritional intake [ ]anasarca[ ]Artificial Nutrition    Other REFERRALS:  [ ]Hospice  [ ]Child Life  [ ]Social Work  [ ]Case management [ ]Holistic Therapy     -------------------------------------------------------------------------------------------------------  Goals of Care Document:

## 2024-03-08 NOTE — PROGRESS NOTE ADULT - ASSESSMENT
68F with hx of uterine cancer with mets to liver and lung (pulmonary bronchial and pulmonary artery compression from mediastinal lymphadenopathy), CVA with no residual deficits, HTN who presents with nausea and inability to tolerate PO. Patient was recently hospitalized for dyspnea 1 week prior, discharged home, has been about the same but recently with worsening nausea to the point she cannot eat. No vomiting or diarrhea. Abdominal pain is at baseline. She tried PO zofran with minimal relief. She denies fevers, chills, cp, dysuria, sick contacts recent travel. Palliative consulted for symptom management

## 2024-03-08 NOTE — PROGRESS NOTE ADULT - SUBJECTIVE AND OBJECTIVE BOX
difficulty breathing and " barking cough". patient recently admitted as per mom with croup SOLID TUMOR ONCOLOGY HOSPITALIST PROGRESS NOTE    S: No acute events overnight.    CURRENT MEDICATIONS  MEDICATIONS  (STANDING):  chlorhexidine 2% Cloths 1 Application(s) Topical daily  enoxaparin Injectable 40 milliGRAM(s) SubCutaneous every 24 hours  fluticasone propionate 50 MICROgram(s)/spray Nasal Spray 1 Spray(s) Both Nostrils two times a day  levothyroxine 100 MICROGram(s) Oral daily  metoclopramide Injectable 10 milliGRAM(s) IV Push every 8 hours  pantoprazole    Tablet 40 milliGRAM(s) Oral before breakfast  sodium chloride 1 Gram(s) Oral two times a day  valsartan 40 milliGRAM(s) Oral daily  MEDICATIONS  (PRN):  albuterol    90 MICROgram(s) HFA Inhaler 2 Puff(s) Inhalation every 6 hours PRN Shortness of Breath and/or Wheezing  aluminum hydroxide/magnesium hydroxide/simethicone Suspension 30 milliLiter(s) Oral every 4 hours PRN Dyspepsia  cyclobenzaprine 5 milliGRAM(s) Oral every 8 hours PRN Muscle Spasm  guaiFENesin Oral Liquid (Sugar-Free) 100 milliGRAM(s) Oral every 8 hours PRN Cough  HYDROmorphone   Tablet 2 milliGRAM(s) Oral every 6 hours PRN Mild Pain (1 - 3)  HYDROmorphone  Injectable 0.4 milliGRAM(s) IV Push every 4 hours PRN Severe Pain (7 - 10)  melatonin 3 milliGRAM(s) Oral at bedtime PRN Insomnia  senna 2 Tablet(s) Oral daily PRN Constipation      PHYSICAL EXAM  T(C): 37 (03-08-24 @ 21:01), Max: 37 (03-08-24 @ 21:01)  HR: 106 (03-08-24 @ 21:01) (100 - 106)  BP: 100/73 (03-08-24 @ 21:01) (100/73 - 139/84)  RR: 18 (03-08-24 @ 21:01) (18 - 18)  SpO2: 95% (03-08-24 @ 21:01) (95% - 96%)  non-toxic appearing woman, laying in bed and appears comfortable, nad  Anicteric sclera, no oral lesions/thrush  RRR, no m/r/g  CTAB, no w/c/r  Abd soft, mildly tender on palpation of epigastrum and RUQ, no palpable masses/organomegaly  No peripheral edema  CN 2-12 grossly intact; no gross focal neuro deficits    LABS                        9.3    8.59  )-----------( 317      ( 08 Mar 2024 07:16 )             27.8     03-08    133<L>  |  95<L>  |  18  ----------------------------<  108<H>  4.1   |  27  |  0.95    Ca    9.5      08 Mar 2024 07:16  Phos  3.7     03-08  Mg     2.00     03-08    TPro  6.5  /  Alb  2.7<L>  /  TBili  0.2  /  DBili  x   /  AST  18  /  ALT  10  /  AlkPhos  179<H>  03-08    TSH 2/17 0.39  3/4 TSH 2.44, fT4 1.3    MICROBIOLOGY  Abx: none    Micro  3/2 MRSA/MSSA screen negative      PERTINENT RADIOLOGY  2/28 CT Abd/pelv: Severe R hydronephrosis and mild hydroureter to the level of a soft tissue lesion w/ calcification 2.2x1.6cm proximal to the ureterovesicular junction, inseparable from R vaginal cuff, increased in size. New pulm nodule increased in size w/ multiple hepatic met and abdominal soft tissue mets.

## 2024-03-09 NOTE — PROGRESS NOTE ADULT - ASSESSMENT
67 yo woman with h/o HTN, CVA, hypothyroidism, OP, stage II endometrial cancer > dx/treated in 7/201- s/p robotic assisted TLH BSO, bilateral pelvic and para-aortic sentinel LN dissection by Dr. Paras Grayson on 7/2/2018, followed by pelvic IMRT and vaginal cuff brachytherapy with Dr. Bowling in [id number]; dx with met recurrent in the lungs in 10/2019 but was lost to fu until 7/2020; progressed after Carbo/Taxol > palliative RT to the vaginal mass and cuff area 2000cGy/5fxs  from 5/25/21-6/1/21 > Pembro/Lenvima (tx course c/b ICI-related colitis) > Doxil > Abraxan/Avastin > most recently on Docetaxel monotherapy (last given 2/2024).  She was admitted to [facility] on 2/27 with progressive nausea and generalized weakness with admission CT a/p confirming POD; admission notable for mild leukocytosis. Pt's hospital course also currently c/b cancer-related pain and hyponatremia.    ACTIVE PROBLEMS  Met endometrial cancer  Goals of care discussion, counseling  Cancer-related pain  Nausea without vomiting  R hydroureteronephrosis  MANJU  Hyponatremia  2/2 SIADH  Anemia of chronic disease  Hypothyroidism  HTN  Hypercoag state    - Met endometrial cancer  - GOC discussion, counseling  Pt indicated to Pall Care team on 2/28 that she is no longer interested in pursuing systemic cancer treatment  She consented to DNR/DNI code status and previously expressed interest in inpt hospice placement, citing that she does not want to die alone at home; however, on 3/7 she noted that she no longer wants to pursue hospice and she wants to resume cancer-directed tx  Will discuss options for future cancer tx with pt's outpt Gyn Onc  Pt's long-term prognosis is poor    - Cancer-related pain  Continuing Toradol 30mg q6/prn x5d course (until 3/7) for inflammatory pain (with ppi ppx)  Continuing Dilaudid PO 2mg q6/prn for mild pain  Continuing Dilaudid IV 0.4mg q6/prn for mod-sev pain  Continuing Flexiril 5mg q8/prn  Continuing bowel regimen to prevent OIC  Appreciate Rad Onc consult: pt to complete inpt RT to mediastinal met, 20Gy/5fxs, 3/6-14    - Nausea without vomiting  Improved  Likely iatrogenic from medications (opioids)  Admission imaging negative for ileus/obstruction  Zofran dced; starting Reglan IV 10mg q8/prn  Repleting lytes prn    - R hydroureteronephrosis  Cr relatively stable  Pt still with sufficient UOP  No role for stent, PCN at this time  Planning to hold ARB and check urine lytes if Cr worsens  Monitoring UOP    - Hyponatremia 2/2 SIADH  Na improved to 133, range 130-32  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  Pt is relatively asymptomatic  Continuing Salt tab 1gm BID  Starting 1L fluid restriction    - Anemia of chronic disease  Hgb stable; pt remains without s/s of active bleeding  3/4 iron studies more c/w ACD; labs not otherwise c/w hemolysis  Transfusing supportively prn (goal hgb > 7; plts > 10K)    - Hypothyroidism: 3/4 TFTs wnl; continuing LT4 100mcg daily    - HTN: continuing Valsartan 40mg daily    - Hypercoag state: continuing lovenox ppx   69 yo woman with h/o HTN, CVA, hypothyroidism, OP, stage II endometrial cancer > dx/treated in 7/201- s/p robotic assisted TLH BSO, bilateral pelvic and para-aortic sentinel LN dissection by Dr. Paras Grayson on 7/2/2018, followed by pelvic IMRT and vaginal cuff brachytherapy with Dr. Bowling in 10/[id number]; dx with met recurrent in the lungs in 10/2019 but was lost to fu until 7/2020; progressed after Carbo/Taxol > palliative RT to the vaginal mass and cuff area 2000cGy/5fxs  from 5/25/21-6/1/21 > Pembro/Lenvima (tx course c/b ICI-related colitis) > Doxil > Abraxan/Avastin > most recently on Docetaxel monotherapy (last given 2/2024).  She was admitted to [facility] on 2/27 with progressive nausea and generalized weakness with admission CT a/p confirming POD; admission notable for mild leukocytosis. Pt's hospital course also currently c/b cancer-related pain and hyponatremia.    ACTIVE PROBLEMS  Met endometrial cancer  Goals of care discussion, counseling  Cancer-related pain  Nausea without vomiting  R hydroureteronephrosis  MANJU  Hyponatremia  2/2 SIADH  Anemia of chronic disease  Hypothyroidism  HTN  Hypercoag state    - Met endometrial cancer  - GOC discussion, counseling  Pt indicated to Pall Care team on 2/28 that she is no longer interested in pursuing systemic cancer treatment  She consented to DNR/DNI code status and previously expressed interest in inpt hospice placement, citing that she does not want to die alone at home; however, on 3/7 she noted that she no longer wants to pursue hospice and she wants to resume cancer-directed tx  Will discuss options for future cancer tx with pt's outpt Gyn Onc  Pt's long-term prognosis is poor    - Cancer-related pain  Continuing Toradol 30mg q6/prn x5d course (until 3/7) for inflammatory pain (with ppi ppx)  Continuing Dilaudid PO 2mg q6/prn for mild pain  Continuing Dilaudid IV 0.4mg q6/prn for mod-sev pain  Continuing Flexiril 5mg q8/prn  Continuing bowel regimen to prevent OIC  Appreciate Rad Onc consult: pt to complete inpt RT to mediastinal met, 20Gy/5fxs, 3/6-14    Constipation  --> pt reports last BM 5d ago, aggressive bowel regimen      - Nausea without vomiting  Improved  Likely iatrogenic from medications (opioids)  Admission imaging negative for ileus/obstruction  Zofran dced; starting Reglan IV 10mg q8/prn  Repleting lytes prn    - R hydroureteronephrosis  Cr relatively stable  Pt still with sufficient UOP  No role for stent, PCN at this time  Planning to hold ARB and check urine lytes if Cr worsens  Monitoring UOP    - Hyponatremia 2/2 SIADH  Na improved to 133, range 130-32  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  Pt is relatively asymptomatic  Continuing Salt tab 1gm BID  Starting 1L fluid restriction    - Anemia of chronic disease  Hgb stable; pt remains without s/s of active bleeding  3/4 iron studies more c/w ACD; labs not otherwise c/w hemolysis  Transfusing supportively prn (goal hgb > 7; plts > 10K)    - Hypothyroidism: 3/4 TFTs wnl; continuing LT4 100mcg daily    - HTN: continuing Valsartan 40mg daily    - Hypercoag state: continuing lovenox ppx

## 2024-03-09 NOTE — PROGRESS NOTE ADULT - SUBJECTIVE AND OBJECTIVE BOX
[facility] Division of Hospital Medicine  Aspen Strange MD  Pager (M-F, 8A-5P):  In-house pager [phone]; Long-range pager [phone]  Other Times:  Please page Hospitalist in Charge -  In-house pager [phone]    Patient is a 68y old  Female who presents with a chief complaint of FTT (08 Mar 2024 10:47)    SUBJECTIVE / OVERNIGHT EVENTS:  Pt seen with friend   ADDITIONAL REVIEW OF SYSTEMS:    MEDICATIONS  (STANDING):  bisacodyl Suppository 10 milliGRAM(s) Rectal once  chlorhexidine 2% Cloths 1 Application(s) Topical daily  enoxaparin Injectable 40 milliGRAM(s) SubCutaneous every 24 hours  fluticasone propionate 50 MICROgram(s)/spray Nasal Spray 1 Spray(s) Both Nostrils two times a day  levothyroxine 100 MICROGram(s) Oral daily  metoclopramide Injectable 10 milliGRAM(s) IV Push every 8 hours  pantoprazole    Tablet 40 milliGRAM(s) Oral before breakfast  polyethylene glycol 3350 17 Gram(s) Oral daily  senna 2 Tablet(s) Oral two times a day  sodium chloride 1 Gram(s) Oral two times a day  valsartan 40 milliGRAM(s) Oral daily    MEDICATIONS  (PRN):  albuterol    90 MICROgram(s) HFA Inhaler 2 Puff(s) Inhalation every 6 hours PRN Shortness of Breath and/or Wheezing  aluminum hydroxide/magnesium hydroxide/simethicone Suspension 30 milliLiter(s) Oral every 4 hours PRN Dyspepsia  cyclobenzaprine 5 milliGRAM(s) Oral every 8 hours PRN Muscle Spasm  guaiFENesin Oral Liquid (Sugar-Free) 100 milliGRAM(s) Oral every 8 hours PRN Cough  HYDROmorphone   Tablet 2 milliGRAM(s) Oral every 6 hours PRN Mild Pain (1 - 3)  HYDROmorphone  Injectable 0.4 milliGRAM(s) IV Push every 4 hours PRN Severe Pain (7 - 10)  melatonin 3 milliGRAM(s) Oral at bedtime PRN Insomnia    PHYSICAL EXAM:  Vital Signs Last 24 Hrs  T(C): 36.3 (09 Mar 2024 12:27), Max: 37 (08 Mar 2024 21:01)  T(F): 97.3 (09 Mar 2024 12:27), Max: 98.6 (08 Mar 2024 21:01)  HR: 79 (09 Mar 2024 12:27) (79 - 106)  BP: 120/47 (09 Mar 2024 12:27) (100/73 - 122/69)  BP(mean): --  RR: 18 (09 Mar 2024 12:27) (18 - 18)  SpO2: 99% (09 Mar 2024 12:27) (95% - 99%)    Parameters below as of 09 Mar 2024 12:27  Patient On (Oxygen Delivery Method): room air    resting in bed, whispers  Anicteric sclera, no oral lesions/thrush  RRR, no m/r/g  CTAB, no w/c/r  Abd soft, mildly tender on palpation of epigastrum and RUQ, no palpable masses/organomegaly  No peripheral edema  CN 2-12 grossly intact; no gross focal neuro deficits    LABS:                        8.6    8.31  )-----------( 266      ( 09 Mar 2024 06:00 )             26.6     03-09    132<L>  |  93<L>  |  19  ----------------------------<  103<H>  4.6   |  27  |  0.95    Ca    9.1      09 Mar 2024 06:00  Phos  3.2     03-09  Mg     1.70     03-09    TPro  6.1  /  Alb  2.6<L>  /  TBili  0.2  /  DBili  x   /  AST  17  /  ALT  7   /  AlkPhos  151<H>  03-09    RADIOLOGY & ADDITIONAL TESTS:  Results Reviewed:   Imaging Personally Reviewed:  Electrocardiogram Personally Reviewed:    COORDINATION OF CARE:  Care Discussed with Consultants/Other Providers [Y/N]: RN, SW, CM, ACP re overall care   Prior or Outpatient Records Reviewed [Y/N]:   [facility] Division of Hospital Medicine  Aspen Strange MD  Pager (M-F, 8A-5P):  In-house pager [phone]; Long-range pager [phone]  Other Times:  Please page Hospitalist in Charge -  In-house pager [phone]    Patient is a 68y old  Female who presents with a chief complaint of FTT (08 Mar 2024 10:47)    SUBJECTIVE / OVERNIGHT EVENTS:  Pt seen with friend Jacobo at bedside.   Pt whispers, says "I'm listening to my body...some episodes of debilitating pain" on R side. Reglan helps with nausea.   No BM 5 days.  ADDITIONAL REVIEW OF SYSTEMS:    MEDICATIONS  (STANDING):  bisacodyl Suppository 10 milliGRAM(s) Rectal once  chlorhexidine 2% Cloths 1 Application(s) Topical daily  enoxaparin Injectable 40 milliGRAM(s) SubCutaneous every 24 hours  fluticasone propionate 50 MICROgram(s)/spray Nasal Spray 1 Spray(s) Both Nostrils two times a day  levothyroxine 100 MICROGram(s) Oral daily  metoclopramide Injectable 10 milliGRAM(s) IV Push every 8 hours  pantoprazole    Tablet 40 milliGRAM(s) Oral before breakfast  polyethylene glycol 3350 17 Gram(s) Oral daily  senna 2 Tablet(s) Oral two times a day  sodium chloride 1 Gram(s) Oral two times a day  valsartan 40 milliGRAM(s) Oral daily    MEDICATIONS  (PRN):  albuterol    90 MICROgram(s) HFA Inhaler 2 Puff(s) Inhalation every 6 hours PRN Shortness of Breath and/or Wheezing  aluminum hydroxide/magnesium hydroxide/simethicone Suspension 30 milliLiter(s) Oral every 4 hours PRN Dyspepsia  cyclobenzaprine 5 milliGRAM(s) Oral every 8 hours PRN Muscle Spasm  guaiFENesin Oral Liquid (Sugar-Free) 100 milliGRAM(s) Oral every 8 hours PRN Cough  HYDROmorphone   Tablet 2 milliGRAM(s) Oral every 6 hours PRN Mild Pain (1 - 3)  HYDROmorphone  Injectable 0.4 milliGRAM(s) IV Push every 4 hours PRN Severe Pain (7 - 10)  melatonin 3 milliGRAM(s) Oral at bedtime PRN Insomnia    PHYSICAL EXAM:  Vital Signs Last 24 Hrs  T(C): 36.3 (09 Mar 2024 12:27), Max: 37 (08 Mar 2024 21:01)  T(F): 97.3 (09 Mar 2024 12:27), Max: 98.6 (08 Mar 2024 21:01)  HR: 79 (09 Mar 2024 12:27) (79 - 106)  BP: 120/47 (09 Mar 2024 12:27) (100/73 - 122/69)  BP(mean): --  RR: 18 (09 Mar 2024 12:27) (18 - 18)  SpO2: 99% (09 Mar 2024 12:27) (95% - 99%)    Parameters below as of 09 Mar 2024 12:27  Patient On (Oxygen Delivery Method): room air    resting in bed, whispers  Anicteric sclera, no oral lesions/thrush  port accessed  RRR, no m/r/g  CTAB, no w/c/r  Abd soft, mildly tender on palpation of epigastrum and RUQ, no palpable masses/organomegaly  No peripheral edema  LABS:                        8.6    8.31  )-----------( 266      ( 09 Mar 2024 06:00 )             26.6     03-09    132<L>  |  93<L>  |  19  ----------------------------<  103<H>  4.6   |  27  |  0.95    Ca    9.1      09 Mar 2024 06:00  Phos  3.2     03-09  Mg     1.70     03-09    TPro  6.1  /  Alb  2.6<L>  /  TBili  0.2  /  DBili  x   /  AST  17  /  ALT  7   /  AlkPhos  151<H>  03-09    RADIOLOGY & ADDITIONAL TESTS:  Results Reviewed:   Imaging Personally Reviewed:  Electrocardiogram Personally Reviewed:    COORDINATION OF CARE:  Care Discussed with Consultants/Other Providers [Y/N]: RN, SW, CM, ACP re overall care   Prior or Outpatient Records Reviewed [Y/N]:

## 2024-03-10 NOTE — PROVIDER CONTACT NOTE (OTHER) - ACTION/TREATMENT ORDERED:
ACP made aware. No further interventions ordered.
ACP made aware. Provider assessed pt at bedside. Toradol IVP PRN administered as ordered. No further interventions ordered.
ACP notified. Per ACP the pt can receive IV toradol, then can still receive IV dilaudid dose due at 1:40am.

## 2024-03-10 NOTE — PROVIDER CONTACT NOTE (OTHER) - BACKGROUND
Pt hx of metastatic uterine cancer
Pt admitted with n/v & inability to tolerate PO.
Pt admitted with FTT. PMH uterine cancer with mets to liver and lungs.

## 2024-03-10 NOTE — PROGRESS NOTE ADULT - SUBJECTIVE AND OBJECTIVE BOX
[facility] Division of Hospital Medicine  Aspen Strange MD  Pager (M-F, 8A-5P):  In-house pager [phone]; Long-range pager [phone]  Other Times:  Please page Hospitalist in Charge -  In-house pager [phone]    Patient is a 68y old  Female who presents with a chief complaint of pain, N/V (09 Mar 2024 18:19)    SUBJECTIVE / OVERNIGHT EVENTS:    ADDITIONAL REVIEW OF SYSTEMS:    MEDICATIONS  (STANDING):  bisacodyl Suppository 10 milliGRAM(s) Rectal once  chlorhexidine 2% Cloths 1 Application(s) Topical daily  enoxaparin Injectable 40 milliGRAM(s) SubCutaneous every 24 hours  fluticasone propionate 50 MICROgram(s)/spray Nasal Spray 1 Spray(s) Both Nostrils two times a day  lactulose Syrup 20 Gram(s) Oral every 8 hours  levothyroxine 100 MICROGram(s) Oral daily  metoclopramide Injectable 10 milliGRAM(s) IV Push every 8 hours  pantoprazole    Tablet 40 milliGRAM(s) Oral before breakfast  polyethylene glycol 3350 17 Gram(s) Oral every 12 hours  senna 2 Tablet(s) Oral two times a day  sodium chloride 1 Gram(s) Oral two times a day  valsartan 40 milliGRAM(s) Oral daily    MEDICATIONS  (PRN):  albuterol    90 MICROgram(s) HFA Inhaler 2 Puff(s) Inhalation every 6 hours PRN Shortness of Breath and/or Wheezing  aluminum hydroxide/magnesium hydroxide/simethicone Suspension 30 milliLiter(s) Oral every 4 hours PRN Dyspepsia  benzonatate 200 milliGRAM(s) Oral every 8 hours PRN Cough  cyclobenzaprine 5 milliGRAM(s) Oral every 8 hours PRN Muscle Spasm  guaiFENesin Oral Liquid (Sugar-Free) 100 milliGRAM(s) Oral every 8 hours PRN Cough  HYDROmorphone   Tablet 2 milliGRAM(s) Oral every 6 hours PRN Mild Pain (1 - 3)  HYDROmorphone  Injectable 0.4 milliGRAM(s) IV Push every 4 hours PRN Severe Pain (7 - 10)  melatonin 3 milliGRAM(s) Oral at bedtime PRN Insomnia    PHYSICAL EXAM:  Vital Signs Last 24 Hrs  T(C): 36.5 (10 Mar 2024 04:22), Max: 36.7 (09 Mar 2024 16:53)  T(F): 97.7 (10 Mar 2024 04:22), Max: 98.1 (09 Mar 2024 16:53)  HR: 100 (10 Mar 2024 04:22) (76 - 104)  BP: 127/74 (10 Mar 2024 04:22) (120/47 - 127/80)  BP(mean): --  RR: 17 (10 Mar 2024 04:22) (17 - 18)  SpO2: 94% (10 Mar 2024 04:22) (94% - 99%)    Parameters below as of 10 Mar 2024 04:22  Patient On (Oxygen Delivery Method): room air    resting in bed, whispers  Anicteric sclera, no oral lesions/thrush  port accessed  RRR, no m/r/g  CTAB, no w/c/r  Abd soft, mildly tender on palpation of epigastrum and RUQ, no palpable masses/organomegaly  No peripheral edema    LABS:                        8.9    11.27 )-----------( 309      ( 10 Mar 2024 06:00 )             27.3     03-10    131<L>  |  92<L>  |  21  ----------------------------<  115<H>  4.7   |  24  |  1.04    Ca    9.4      10 Mar 2024 06:00  Phos  3.5     03-10  Mg     2.20     03-10    TPro  6.5  /  Alb  2.8<L>  /  TBili  0.2  /  DBili  x   /  AST  25  /  ALT  9   /  AlkPhos  154<H>  03-10    RADIOLOGY & ADDITIONAL TESTS:  Results Reviewed:   Imaging Personally Reviewed:  Electrocardiogram Personally Reviewed:    COORDINATION OF CARE:  Care Discussed with Consultants/Other Providers [Y/N]: RN, SW, CM, ACP re overall care  Prior or Outpatient Records Reviewed [Y/N]:   [facility] Division of Hospital Medicine  Aspen Strange MD  Pager (M-F, 8A-5P):  In-house pager [phone]; Long-range pager [phone]  Other Times:  Please page Hospitalist in Charge -  In-house pager [phone]    Patient is a 68y old  Female who presents with a chief complaint of pain, N/V (09 Mar 2024 18:19)    SUBJECTIVE / OVERNIGHT EVENTS:  Pt seen resting in bed. Says she "feels her body is changing." Pain R side up to 9/10, when sleeps after pain meds, but feels having to wait too long to get meds.  No BM, took Miralax but not lactulose. Very uncomfortable from the pain.   ADDITIONAL REVIEW OF SYSTEMS:    MEDICATIONS  (STANDING):  bisacodyl Suppository 10 milliGRAM(s) Rectal once  chlorhexidine 2% Cloths 1 Application(s) Topical daily  enoxaparin Injectable 40 milliGRAM(s) SubCutaneous every 24 hours  fluticasone propionate 50 MICROgram(s)/spray Nasal Spray 1 Spray(s) Both Nostrils two times a day  lactulose Syrup 20 Gram(s) Oral every 8 hours  levothyroxine 100 MICROGram(s) Oral daily  metoclopramide Injectable 10 milliGRAM(s) IV Push every 8 hours  pantoprazole    Tablet 40 milliGRAM(s) Oral before breakfast  polyethylene glycol 3350 17 Gram(s) Oral every 12 hours  senna 2 Tablet(s) Oral two times a day  sodium chloride 1 Gram(s) Oral two times a day  valsartan 40 milliGRAM(s) Oral daily    MEDICATIONS  (PRN):  albuterol    90 MICROgram(s) HFA Inhaler 2 Puff(s) Inhalation every 6 hours PRN Shortness of Breath and/or Wheezing  aluminum hydroxide/magnesium hydroxide/simethicone Suspension 30 milliLiter(s) Oral every 4 hours PRN Dyspepsia  benzonatate 200 milliGRAM(s) Oral every 8 hours PRN Cough  cyclobenzaprine 5 milliGRAM(s) Oral every 8 hours PRN Muscle Spasm  guaiFENesin Oral Liquid (Sugar-Free) 100 milliGRAM(s) Oral every 8 hours PRN Cough  HYDROmorphone   Tablet 2 milliGRAM(s) Oral every 6 hours PRN Mild Pain (1 - 3)  HYDROmorphone  Injectable 0.4 milliGRAM(s) IV Push every 4 hours PRN Severe Pain (7 - 10)  melatonin 3 milliGRAM(s) Oral at bedtime PRN Insomnia    PHYSICAL EXAM:  Vital Signs Last 24 Hrs  T(C): 36.5 (10 Mar 2024 04:22), Max: 36.7 (09 Mar 2024 16:53)  T(F): 97.7 (10 Mar 2024 04:22), Max: 98.1 (09 Mar 2024 16:53)  HR: 100 (10 Mar 2024 04:22) (76 - 104)  BP: 127/74 (10 Mar 2024 04:22) (120/47 - 127/80)  BP(mean): --  RR: 17 (10 Mar 2024 04:22) (17 - 18)  SpO2: 94% (10 Mar 2024 04:22) (94% - 99%)    Parameters below as of 10 Mar 2024 04:22  Patient On (Oxygen Delivery Method): room air    resting in bed, whispers  Anicteric sclera, no oral lesions/thrush  port accessed  RRR, no m/r/g  CTAB, no w/c/r  Abd soft, mildly tender on palpation of epigastrum and RUQ  No peripheral edema    LABS:                        8.9    11.27 )-----------( 309      ( 10 Mar 2024 06:00 )             27.3     03-10    131<L>  |  92<L>  |  21  ----------------------------<  115<H>  4.7   |  24  |  1.04    Ca    9.4      10 Mar 2024 06:00  Phos  3.5     03-10  Mg     2.20     03-10    TPro  6.5  /  Alb  2.8<L>  /  TBili  0.2  /  DBili  x   /  AST  25  /  ALT  9   /  AlkPhos  154<H>  03-10    RADIOLOGY & ADDITIONAL TESTS:  Results Reviewed:   Imaging Personally Reviewed:  Electrocardiogram Personally Reviewed:    COORDINATION OF CARE:  Care Discussed with Consultants/Other Providers [Y/N]: RN, SW, CM, ACP re overall care  Prior or Outpatient Records Reviewed [Y/N]:

## 2024-03-10 NOTE — PROGRESS NOTE ADULT - ASSESSMENT
67 yo woman with h/o HTN, CVA, hypothyroidism, OP, stage II endometrial cancer > dx/treated in 7/201- s/p robotic assisted TLH BSO, bilateral pelvic and para-aortic sentinel LN dissection by Dr. Paras Grayson on 7/2/2018, followed by pelvic IMRT and vaginal cuff brachytherapy with Dr. Bowling in 10/57761; dx with met recurrent in the lungs in 10/2019 but was lost to fu until 7/2020; progressed after Carbo/Taxol > palliative RT to the vaginal mass and cuff area 2000cGy/5fxs  from 5/25/21-6/1/21 > Pembro/Lenvima (tx course c/b ICI-related colitis) > Doxil > Abraxan/Avastin > most recently on Docetaxel monotherapy (last given 2/2024).  She was admitted to [facility] on 2/27 with progressive nausea and generalized weakness with admission CT a/p confirming POD; admission notable for mild leukocytosis. Pt's hospital course also currently c/b cancer-related pain and hyponatremia.    ACTIVE PROBLEMS  Met endometrial cancer  Goals of care discussion, counseling  Cancer-related pain  Nausea without vomiting  R hydroureteronephrosis  MANJU  Hyponatremia  2/2 SIADH  Anemia of chronic disease  Hypothyroidism  HTN  Hypercoag state    - Met endometrial cancer  - GOC discussion, counseling  Pt indicated to Pall Care team on 2/28 that she is no longer interested in pursuing systemic cancer treatment  She consented to DNR/DNI code status and previously expressed interest in inpt hospice placement, citing that she does not want to die alone at home; however, on 3/7 she noted that she no longer wants to pursue hospice and she wants to resume cancer-directed tx  Will discuss options for future cancer tx with pt's outpt Gyn Onc  Pt's long-term prognosis is poor    - Cancer-related pain  Continuing Toradol 30mg q6/prn x5d course (until 3/7) for inflammatory pain (with ppi ppx)  Continuing Dilaudid PO 2mg q6/prn for mild pain  Continuing Dilaudid IV 0.4mg q6/prn for mod-sev pain  Continuing Flexiril 5mg q8/prn  Continuing bowel regimen to prevent OIC  Appreciate Rad Onc consult: pt to complete inpt RT to mediastinal met, 20Gy/5fxs, 3/6-14    Constipation  --> pt reports last BM 5d ago, aggressive bowel regimen      - Nausea without vomiting  Improved  Likely iatrogenic from medications (opioids)  Admission imaging negative for ileus/obstruction  Zofran dced; starting Reglan IV 10mg q8/prn  Repleting lytes prn    - R hydroureteronephrosis  Cr relatively stable  Pt still with sufficient UOP  No role for stent, PCN at this time  Planning to hold ARB and check urine lytes if Cr worsens  Monitoring UOP    - Hyponatremia 2/2 SIADH  Na improved to 133, range 130-32  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  Pt is relatively asymptomatic  Continuing Salt tab 1gm BID  Starting 1L fluid restriction    - Anemia of chronic disease  Hgb stable; pt remains without s/s of active bleeding  3/4 iron studies more c/w ACD; labs not otherwise c/w hemolysis  Transfusing supportively prn (goal hgb > 7; plts > 10K)    - Hypothyroidism: 3/4 TFTs wnl; continuing LT4 100mcg daily    - HTN: continuing Valsartan 40mg daily    - Hypercoag state: continuing lovenox ppx   67 yo woman with h/o HTN, CVA, hypothyroidism, OP, stage II endometrial cancer > dx/treated in 7/201- s/p robotic assisted TLH BSO, bilateral pelvic and para-aortic sentinel LN dissection by Dr. Paras Grayson on 7/2/2018, followed by pelvic IMRT and vaginal cuff brachytherapy with Dr. Bowling in 10/97739; dx with met recurrent in the lungs in 10/2019 but was lost to fu until 7/2020; progressed after Carbo/Taxol > palliative RT to the vaginal mass and cuff area 2000cGy/5fxs  from 5/25/21-6/1/21 > Pembro/Lenvima (tx course c/b ICI-related colitis) > Doxil > Abraxan/Avastin > most recently on Docetaxel monotherapy (last given 2/2024).  She was admitted to [facility] on 2/27 with progressive nausea and generalized weakness with admission CT a/p confirming POD; admission notable for mild leukocytosis. Pt's hospital course also currently c/b cancer-related pain and hyponatremia.    ACTIVE PROBLEMS  Met endometrial cancer  Goals of care discussion, counseling  Cancer-related pain  Nausea without vomiting  R hydroureteronephrosis  MANJU  Hyponatremia  2/2 SIADH  Anemia of chronic disease  Hypothyroidism  HTN  Hypercoag state    - Met endometrial cancer  - GOC discussion, counseling  Pt indicated to Pall Care team on 2/28 that she is no longer interested in pursuing systemic cancer treatment  She consented to DNR/DNI code status and previously expressed interest in inpt hospice placement, citing that she does not want to die alone at home; however, on 3/7 she noted that she no longer wants to pursue hospice and she wants to resume cancer-directed tx  Will discuss options for future cancer tx with pt's outpt Gyn Onc  Pt's long-term prognosis is poor    - Cancer-related pain  Continuing Toradol 30mg q6/prn x5d course (until 3/7) for inflammatory pain (with ppi ppx)  Continuing Dilaudid PO 2mg q6/prn for mild pain  Continuing Dilaudid IV 0.4mg q6/prn for mod-sev pain --> change to q3 PRN......  Continuing Flexiril 5mg q8/prn  Continuing bowel regimen to prevent OIC  Appreciate Rad Onc consult: pt to complete inpt RT to mediastinal met, 20Gy/5fxs, 3/6-14    Constipation  --> pt reports last BM 5d ago, aggressive bowel regimen, encouraging patient to take bowel regimen...  --> check AXR    - Nausea without vomiting  Improved  Likely iatrogenic from medications (opioids)  Admission imaging negative for ileus/obstruction  Zofran dced; starting Reglan IV 10mg q8/prn  Repleting lytes prn    - R hydroureteronephrosis  Cr relatively stable  Pt still with sufficient UOP  No role for stent, PCN at this time  Planning to hold ARB and check urine lytes if Cr worsens  Monitoring UOP    - Hyponatremia 2/2 SIADH  Na improved to 133, range 130-32  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  Pt is relatively asymptomatic  Continuing Salt tab 1gm BID  Starting 1L fluid restriction    - Anemia of chronic disease  Hgb stable; pt remains without s/s of active bleeding  3/4 iron studies more c/w ACD; labs not otherwise c/w hemolysis  Transfusing supportively prn (goal hgb > 7; plts > 10K)    - Hypothyroidism: 3/4 TFTs wnl; continuing LT4 100mcg daily    - HTN: continuing Valsartan 40mg daily    - Hypercoag state: continuing lovenox ppx

## 2024-03-10 NOTE — PROVIDER CONTACT NOTE (OTHER) - ASSESSMENT
Pt tearful, complaining of severe pain unrelieved by pain medications given
VSS. Pt states pain is a "pinching" sensation & is not new. Pt otherwise asymptomatic. Denies pain radiating, Palpitations or SOB.

## 2024-03-10 NOTE — PROVIDER CONTACT NOTE (OTHER) - SITUATION
Pt c/o 7/10 chest pain.
Pt complaining of severe burning pain in lower abdomen/pelvic area and back
Pt refusing IVF.

## 2024-03-11 NOTE — PROGRESS NOTE ADULT - SUBJECTIVE AND OBJECTIVE BOX
SOLID TUMOR ONCOLOGY HOSPITALIST PROGRESS NOTE    S: No acute events overnight    CURRENT MEDICATIONS  MEDICATIONS  (STANDING):  bisacodyl Suppository 10 milliGRAM(s) Rectal once  chlorhexidine 2% Cloths 1 Application(s) Topical daily  enoxaparin Injectable 40 milliGRAM(s) SubCutaneous every 24 hours  fluticasone propionate 50 MICROgram(s)/spray Nasal Spray 1 Spray(s) Both Nostrils two times a day  lactulose Syrup 20 Gram(s) Oral every 8 hours  levothyroxine 100 MICROGram(s) Oral daily  metoclopramide Injectable 10 milliGRAM(s) IV Push every 8 hours  pantoprazole    Tablet 40 milliGRAM(s) Oral before breakfast  polyethylene glycol 3350 17 Gram(s) Oral every 12 hours  senna 2 Tablet(s) Oral two times a day  sodium chloride 1 Gram(s) Oral two times a day  valsartan 40 milliGRAM(s) Oral daily    MEDICATIONS  (PRN):  acetaminophen     Tablet .. 650 milliGRAM(s) Oral every 6 hours PRN Mild Pain (1 - 3)  albuterol    90 MICROgram(s) HFA Inhaler 2 Puff(s) Inhalation every 6 hours PRN Shortness of Breath and/or Wheezing  aluminum hydroxide/magnesium hydroxide/simethicone Suspension 30 milliLiter(s) Oral every 4 hours PRN Dyspepsia  benzonatate 200 milliGRAM(s) Oral every 8 hours PRN Cough  cyclobenzaprine 5 milliGRAM(s) Oral every 8 hours PRN Muscle Spasm  guaiFENesin Oral Liquid (Sugar-Free) 100 milliGRAM(s) Oral every 8 hours PRN Cough  HYDROmorphone   Tablet 2 milliGRAM(s) Oral every 6 hours PRN Moderate Pain (4 - 6)  HYDROmorphone  Injectable 0.4 milliGRAM(s) IV Push every 3 hours PRN Severe Pain (7 - 10)  melatonin 3 milliGRAM(s) Oral at bedtime PRN Insomnia      PHYSICAL EXAM  T(C): 36.4 (03-11-24 @ 07:01), Max: 37.7 (03-10-24 @ 22:07)  HR: 95 (03-11-24 @ 07:01) (95 - 121)  BP: 107/68 (03-11-24 @ 07:01) (107/68 - 154/100)  RR: 18 (03-11-24 @ 07:01) (17 - 18)  SpO2: 95% (03-11-24 @ 07:01) (93% - 100%)      LABS                        8.0    8.28  )-----------( 229      ( 11 Mar 2024 07:00 )             24.3     03-11    131<L>  |  94<L>  |  20  ----------------------------<  104<H>  4.3   |  26  |  0.94    Ca    8.9      11 Mar 2024 07:00  Phos  3.8     03-11  Mg     1.90     03-11    TPro  5.8<L>  /  Alb  2.5<L>  /  TBili  0.2  /  DBili  x   /  AST  18  /  ALT  7   /  AlkPhos  141<H>  03-11      CAPILLARY BLOOD GLUCOSE            MICROBIOLOGY  Urinalysis Basic - ( 11 Mar 2024 07:00 )    Color: x / Appearance: x / SG: x / pH: x  Gluc: 104 mg/dL / Ketone: x  / Bili: x / Urobili: x   Blood: x / Protein: x / Nitrite: x   Leuk Esterase: x / RBC: x / WBC x   Sq Epi: x / Non Sq Epi: x / Bacteria: x            PERTINENT RADIOLOGY         SOLID TUMOR ONCOLOGY HOSPITALIST PROGRESS NOTE    S: No acute events overnight.  However, pt complained of feeling like she couldn't move and expressed feeling sick. She complained of productive cough and generalized weakness.  She refused to go to RT, and stated that she no longer wants to pursue any cancer-directed therapy, stating, " I just cant do it anymore... I just want to die."  I iterated to her that RT because less effective if not given sequentially; she expressed that she's been told this already and that it did not matter to her. She just wanted to medication that would "knock her out".    CURRENT MEDICATIONS  MEDICATIONS  (STANDING):  bisacodyl Suppository 10 milliGRAM(s) Rectal once  chlorhexidine 2% Cloths 1 Application(s) Topical daily  enoxaparin Injectable 40 milliGRAM(s) SubCutaneous every 24 hours  fluticasone propionate 50 MICROgram(s)/spray Nasal Spray 1 Spray(s) Both Nostrils two times a day  lactulose Syrup 20 Gram(s) Oral every 8 hours  levothyroxine 100 MICROGram(s) Oral daily  metoclopramide Injectable 10 milliGRAM(s) IV Push every 8 hours  pantoprazole    Tablet 40 milliGRAM(s) Oral before breakfast  polyethylene glycol 3350 17 Gram(s) Oral every 12 hours  senna 2 Tablet(s) Oral two times a day  sodium chloride 1 Gram(s) Oral two times a day  valsartan 40 milliGRAM(s) Oral daily  MEDICATIONS  (PRN):  acetaminophen     Tablet .. 650 milliGRAM(s) Oral every 6 hours PRN Mild Pain (1 - 3)  albuterol    90 MICROgram(s) HFA Inhaler 2 Puff(s) Inhalation every 6 hours PRN Shortness of Breath and/or Wheezing  aluminum hydroxide/magnesium hydroxide/simethicone Suspension 30 milliLiter(s) Oral every 4 hours PRN Dyspepsia  benzonatate 200 milliGRAM(s) Oral every 8 hours PRN Cough  cyclobenzaprine 5 milliGRAM(s) Oral every 8 hours PRN Muscle Spasm  guaiFENesin Oral Liquid (Sugar-Free) 100 milliGRAM(s) Oral every 8 hours PRN Cough  HYDROmorphone   Tablet 2 milliGRAM(s) Oral every 6 hours PRN Moderate Pain (4 - 6)  HYDROmorphone  Injectable 0.4 milliGRAM(s) IV Push every 3 hours PRN Severe Pain (7 - 10)  melatonin 3 milliGRAM(s) Oral at bedtime PRN Insomnia      PHYSICAL EXAM  T(C): 36.4 (03-11-24 @ 07:01), Max: 37.7 (03-10-24 @ 22:07)  HR: 95 (03-11-24 @ 07:01) (95 - 121)  BP: 107/68 (03-11-24 @ 07:01) (107/68 - 154/100)  RR: 18 (03-11-24 @ 07:01) (17 - 18)  SpO2: 95% (03-11-24 @ 07:01) (93% - 100%)  non-toxic appearing woman, laying in bed and appears comfortable, nad  Anicteric sclera, no oral lesions/thrush  RRR, no m/r/g  CTAB, no w/c/r  Abd soft, mildly tender on palpation of epigastrum and RUQ, no palpable masses/organomegaly  No peripheral edema  CN 2-12 grossly intact; no gross focal neuro deficits    LABS                        8.0    8.28  )-----------( 229      ( 11 Mar 2024 07:00 )             24.3     03-11    131<L>  |  94<L>  |  20  ----------------------------<  104<H>  4.3   |  26  |  0.94    Ca    8.9      11 Mar 2024 07:00  Phos  3.8     03-11  Mg     1.90     03-11    TPro  5.8<L>  /  Alb  2.5<L>  /  TBili  0.2  /  DBili  x   /  AST  18  /  ALT  7   /  AlkPhos  141<H>  03-11  TSH 2/17 0.39  3/4 TSH 2.44, fT4 1.3    MICROBIOLOGY  3/11 procal 0.26  Abx: none    Cx Data  3/11 RSV, FLU A/B, Covid: negative  3/2 MRSA/MSSA screen negative      PERTINENT RADIOLOGY  3/11 CXR: Left apical mass better assessed on chest CT scan.  New left basilar opacities may reflect pneumonia or atelectasis.    3/10 AXR: Large stool burden suggesting constipation. No evidence of acute   obstruction.    2/28 CT Abd/pelv: Severe R hydronephrosis and mild hydroureter to the level of a soft tissue lesion w/ calcification 2.2x1.6cm proximal to the ureterovesicular junction, inseparable from R vaginal cuff, increased in size. New pulm nodule increased in size w/ multiple hepatic met and abdominal soft tissue mets.

## 2024-03-11 NOTE — PROGRESS NOTE ADULT - ASSESSMENT
67 yo woman with h/o HTN, CVA, hypothyroidism, OP, stage II endometrial cancer > dx/treated in 7/201- s/p robotic assisted TLH BSO, bilateral pelvic and para-aortic sentinel LN dissection by Dr. Paras Grayson on 7/2/2018, followed by pelvic IMRT and vaginal cuff brachytherapy with Dr. Bowling in 10/49173; dx with met recurrent in the lungs in 10/2019 but was lost to fu until 7/2020; progressed after Carbo/Taxol > palliative RT to the vaginal mass and cuff area 2000cGy/5fxs  from 5/25/21-6/1/21 > Pembro/Lenvima (tx course c/b ICI-related colitis) > Doxil > Abraxan/Avastin > most recently on Docetaxel monotherapy (last given 2/2024).  She was admitted to [facility] on 2/27 with progressive nausea and generalized weakness with admission CT a/p confirming POD; admission notable for mild leukocytosis. Pt's hospital course also currently c/b cancer-related pain and hyponatremia.    ACTIVE PROBLEMS  Met endometrial cancer  Goals of care discussion, counseling  Cancer-related pain  Nausea without vomiting  R hydroureteronephrosis  MANJU  Hyponatremia  2/2 SIADH  Anemia of chronic disease  Hypothyroidism  HTN  Hypercoag state    - Met endometrial cancer  - GOC discussion, counseling  Pt indicated to Pall Care team on 2/28 that she is no longer interested in pursuing systemic cancer treatment  She consented to DNR/DNI code status and previously expressed interest in inpt hospice placement, citing that she does not want to die alone at home; however, on 3/7 she noted that she no longer wants to pursue hospice and she wants to resume cancer-directed tx  Will discuss options for future cancer tx with pt's outpt Gyn Onc  Pt's long-term prognosis is poor    - Cancer-related pain  Continuing Toradol 30mg q6/prn x5d course (until 3/7) for inflammatory pain (with ppi ppx)  Continuing Dilaudid PO 2mg q6/prn for mild pain  Continuing Dilaudid IV 0.4mg q6/prn for mod-sev pain  Continuing Flexiril 5mg q8/prn  Continuing bowel regimen to prevent OIC  Appreciate Rad Onc consult: pt to complete inpt RT to mediastinal met, 20Gy/5fxs, 3/6-14    - Nausea without vomiting  Improved  Likely iatrogenic from medications (opioids)  Admission imaging negative for ileus/obstruction  Zofran dced; starting Reglan IV 10mg q8/prn  Repleting lytes prn    - R hydroureteronephrosis  Cr relatively stable  Pt still with sufficient UOP  No role for stent, PCN at this time  Planning to hold ARB and check urine lytes if Cr worsens  Monitoring UOP    - Hyponatremia 2/2 SIADH  Na improved to 133, range 130-32  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  Pt is relatively asymptomatic  Continuing Salt tab 1gm BID  Starting 1L fluid restriction    - Anemia of chronic disease  Hgb stable; pt remains without s/s of active bleeding  3/4 iron studies more c/w ACD; labs not otherwise c/w hemolysis  Transfusing supportively prn (goal hgb > 7; plts > 10K)    - Hypothyroidism: 3/4 TFTs wnl; continuing LT4 100mcg daily    - HTN: continuing Valsartan 40mg daily    - Hypercoag state: continuing lovenox ppx   67 yo woman with h/o HTN, CVA, hypothyroidism, OP, stage II endometrial cancer > dx/treated in 7/201- s/p robotic assisted TLH BSO, bilateral pelvic and para-aortic sentinel LN dissection by Dr. Paras Grayson on 7/2/2018, followed by pelvic IMRT and vaginal cuff brachytherapy with Dr. Bowling in 10/29258; dx with met recurrent in the lungs in 10/2019 but was lost to fu until 7/2020; progressed after Carbo/Taxol > palliative RT to the vaginal mass and cuff area 2000cGy/5fxs  from 5/25/21-6/1/21 > Pembro/Lenvima (tx course c/b ICI-related colitis) > Doxil > Abraxan/Avastin > most recently on Docetaxel monotherapy (last given 2/2024).  She was admitted to [facility] on 2/27 with progressive nausea and generalized weakness with admission CT a/p confirming POD; admission notable for mild leukocytosis. Pt's hospital course also currently c/b cancer-related pain and hyponatremia.    ACTIVE PROBLEMS  Met endometrial cancer  Goals of care discussion, counseling  Cancer-related pain  Nausea without vomiting  Productive cough with abnormal CXR   R hydroureteronephrosis  MANJU  Hyponatremia  2/2 SIADH  Anemia of chronic disease  Hypothyroidism  HTN  Hypercoag state    - Met endometrial cancer  - GOC discussion, counseling  Pt indicated to Pall Care team on 2/28 that she is no longer interested in pursuing systemic cancer treatment  She consented to DNR/DNI code status and has wavered in her decision to pursue comfort measures/hospice placement, but today she was more definitive in her acknowledgement that she can't tolerate future systemic cancer directed therapy, including palliative RT which was deferred at pt's request today, and will be cancelled permanently if pt continues to decline as per Rad Onc  Pt's long-term prognosis remains poor and hospice placement is appropriate- will discuss dispo options with care coordination  Pt remains DNR/DNI    - Cancer-related pain  Continuing Morphine ER 15mg q12  Continuing Dilaudid IV 0.5mg q3/prn and PO 2m q6/prn  Continuing Flexiril 5mg q8/prn  Continuing Tylenol 650mg q6/prn  Continuing bowel regimen to prevent OIC  Appreciate Rad Onc consult: pt to complete inpt RT to mediastinal met, 20Gy/5fxs, 3/6-14 (if she is amenable)    - Productive cough with abnormal CXR on 3/11  C/f evolving HAP (procal 0.26)  3/11 RSV, Covid, Flu PCR negative  Completing 7d course of empiric Zosyn  Continuing supportive resp care prn    - Nausea without vomiting  Likely iatrogenic from medications (opioids)  Admission imaging negative for ileus/obstruction  Continuing Reglan IV 10mg q8/prn  Continuing Maalox 30mg q6/prn  Repleting lytes prn    - R hydroureteronephrosis  Cr relatively stable  Pt still with sufficient UOP  No role for stent, PCN at this time  Planning to hold ARB and check urine lytes if Cr worsens  Monitoring UOP    - Hyponatremia 2/2 SIADH  Na improved to 133, range 130-32  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  Pt is relatively asymptomatic  Continuing Salt tab 1gm BID    - Anemia of chronic disease  Hgb stable; pt remains without s/s of active bleeding  3/4 iron studies more c/w ACD; labs not otherwise c/w hemolysis  Transfusing supportively prn (goal hgb > 7; plts > 10K)    - Hypothyroidism: 3/4 TFTs wnl; continuing LT4 100mcg daily    - HTN: continuing Valsartan 40mg daily    - Hypercoag state: continuing lovenox ppx

## 2024-03-11 NOTE — CHART NOTE - NSCHARTNOTEFT_GEN_A_CORE
68y.o. F h/o metastatic endometrial cancer, planned to undergo and complete lung RT but there have been days Ms. Hoang refused to come down for RT due to "not feeling well."  Additionally, as below, she agreed to hospice then rescinded this wish for further care.     She was due to start RT on 3/5/24 and have it complete on 3/13/24.   I checked (ARIA) our RT record keeping system.  It shows she was treated 2/5   on 3/6 and 3/8 2024.         - Met endometrial cancer  - [facility] discussion, counseling  Pt indicated to Pall Care team on 2/28 that she is no longer interested in pursuing systemic cancer treatment  She consented to DNR/DNI code status and previously expressed interest in inpt hospice placement, citing that she does not want to die alone at home; however, on 3/7 she noted that she no longer wants to pursue hospice and she wants to resume cancer-directed tx  Will discuss options for future cancer tx with pt's outpt Gyn Onc  Pt's long-term prognosis is poor

## 2024-03-11 NOTE — PROGRESS NOTE ADULT - SUBJECTIVE AND OBJECTIVE BOX
SUBJECTIVE AND OBJECTIVE: C/o pain, indicates that IV medication helps bring down the pain however it wears off at around 2-3hr avery, unable to take pills, the ones she's taking are being crushed.    Indication for Geriatrics and Palliative Care Services/INTERVAL HPI: complex symptom management in the setting of advanced illness.     OVERNIGHT EVENTS: pt used 7 PRNs in the past 24hrs    DNR on chart: DNI: Trial NIV  DNI: Trial NIV    Allergies    gabapentin (Other)  contrast media (iodine-based) (Other)  Benadryl (Other)    Intolerances    Taxol (Other)  MEDICATIONS  (STANDING):  bisacodyl Suppository 10 milliGRAM(s) Rectal once  chlorhexidine 2% Cloths 1 Application(s) Topical daily  enoxaparin Injectable 40 milliGRAM(s) SubCutaneous every 24 hours  fluticasone propionate 50 MICROgram(s)/spray Nasal Spray 1 Spray(s) Both Nostrils two times a day  lactulose Syrup 20 Gram(s) Oral every 8 hours  levothyroxine 100 MICROGram(s) Oral daily  metoclopramide Injectable 10 milliGRAM(s) IV Push every 8 hours  mirtazapine 15 milliGRAM(s) Oral at bedtime  pantoprazole    Tablet 40 milliGRAM(s) Oral before breakfast  piperacillin/tazobactam IVPB.- 3.375 Gram(s) IV Intermittent once  piperacillin/tazobactam IVPB.. 3.375 Gram(s) IV Intermittent every 8 hours  polyethylene glycol 3350 17 Gram(s) Oral every 12 hours  senna 2 Tablet(s) Oral two times a day  sodium chloride 1 Gram(s) Oral two times a day  valsartan 40 milliGRAM(s) Oral daily    MEDICATIONS  (PRN):  acetaminophen     Tablet .. 650 milliGRAM(s) Oral every 6 hours PRN Mild Pain (1 - 3)  albuterol    90 MICROgram(s) HFA Inhaler 2 Puff(s) Inhalation every 6 hours PRN Shortness of Breath and/or Wheezing  aluminum hydroxide/magnesium hydroxide/simethicone Suspension 30 milliLiter(s) Oral every 4 hours PRN Dyspepsia  benzonatate 200 milliGRAM(s) Oral every 8 hours PRN Cough  cyclobenzaprine 5 milliGRAM(s) Oral every 8 hours PRN Muscle Spasm  guaiFENesin Oral Liquid (Sugar-Free) 100 milliGRAM(s) Oral every 8 hours PRN Cough  HYDROmorphone   Tablet 2 milliGRAM(s) Oral every 6 hours PRN Moderate Pain (4 - 6)  HYDROmorphone  Injectable 0.4 milliGRAM(s) IV Push every 3 hours PRN Severe Pain (7 - 10)  melatonin 3 milliGRAM(s) Oral at bedtime PRN Insomnia    ITEMS UNCHECKED ARE NOT PRESENT    PRESENT SYMPTOMS: [ ]Unable to self-report - see [ ] CPOT [ ] PAINADS [ ] RDOS  Source if other than patient:  [ ]Family   [ ]Team     Pain: [ X]yes [ ]no  QOL impact -   Location - BL shoulders/pelvis            Aggravating factors - movement   Quality - sharp  Radiation - across upper back  Timing- intermittent   Severity (0-10 scale):10/10   Minimal acceptable level (0-10 scale)/Pain goal: 3    CPOT:    https://[email or web address]/getattachment/[email or web address]/Critical-Care-Pain-Observation-Tool-(CPOT)    PAIN AD Score:	  http://[email or web address]/painad.pdf (Ctrl + left click to view)    Dyspnea:                           [ ]Mild [ ]Moderate [ ]Severe    RDOS:  0 to 2  minimal or no respiratory distress   3  mild distress  4 to 6 moderate distress  >7 severe distress  https://homecareinformation.net/handouts/hen/Respiratory_Distress_Observation_Scale.pdf (Ctrl +  left click to view)     Anxiety:                             [ ]Mild [ ]Moderate [ ]Severe  Fatigue:                             [ ]Mild [ ]Moderate [ ]Severe  Nausea:                             [ ]Mild [ x]Moderate [ ]Severe  Loss of appetite:              [ ]Mild [ ]Moderate [ ]Severe  Constipation:                    [ ]Mild [ ]Moderate [ ]Severe    PCSSQ[Palliative Care Spiritual Screening Question]   Severity (0-10): deferred  Score of 4 or > indicate consideration of Chaplaincy referral.    Chaplaincy Referral: [ ] yes [ ] refused [ ] following [x] deferred    Other Symptoms:  [x ]All other review of systems negative     Palliative Performance Status Version 2: 50 %      http://npcrc.org/files/news/palliative_performance_scale_ppsv2.pdf    PHYSICAL EXAM:  Vital Signs Last 24 Hrs  T(C): 37.1 (11 Mar 2024 12:25), Max: 37.7 (10 Mar 2024 22:07)  T(F): 98.8 (11 Mar 2024 12:25), Max: 99.9 (10 Mar 2024 22:07)  HR: 105 (11 Mar 2024 12:25) (95 - 121)  BP: 125/69 (11 Mar 2024 12:25) (107/68 - 154/100)  BP(mean): --  RR: 17 (11 Mar 2024 12:25) (17 - 18)  SpO2: 98% (11 Mar 2024 12:25) (93% - 100%)    Parameters below as of 11 Mar 2024 12:25  Patient On (Oxygen Delivery Method): room air     I&O's Summary     GENERAL: [ ]Cachexia    [x ]Alert  [x ]Oriented x 4  [ ]Lethargic  [ ]Unarousable  [x ]Verbal  [ ]Non-Verbal  Behavioral:   [ ]Anxiety  [ ]Delirium [ ]Agitation [ ]Other  HEENT:  [ x]Normal   [ ]Dry mouth   [ ]ET Tube/Trach  [ ]Oral lesions  PULMONARY:   [x ]Clear [ ]Tachypnea  [ ]Audible excessive secretions   [ ]Rhonchi        [ ]Right [ ]Left [ ]Bilateral  [ ]Crackles        [ ]Right [ ]Left [ ]Bilateral  [ ]Wheezing     [ ]Right [ ]Left [ ]Bilateral  [ ]Diminished BS [ ] Right [ ]Left [ ]Bilateral  CARDIOVASCULAR:    [x ]Regular [ ]Irregular [ ]Tachy  [ ]Tristen [ ]Murmur [ ]Other  GASTROINTESTINAL:  [ x]Soft  [ ]Distended   [ x]+BS  [ x]Non tender [ ]Tender  [ ]Other [ ]PEG [ ]OGT/ NGT   Last BM:  3/11 per pt  GENITOURINARY:  [ x]Normal [ ]Incontinent   [ ]Oliguria/Anuria   [ ][place]  MUSCULOSKELETAL:   [ ]Normal   [x ]Weakness  [ ]Bed/Wheelchair bound [ ]Edema  NEUROLOGIC:   [x ]No focal deficits  [ ] Cognitive impairment  [ ] Dysphagia [ ]Dysarthria [ ] Paresis [ ]Other   SKIN:   [x ]Normal  [ ]Rash  [ ]Other  [ ]Pressure ulcer(s) [ ]y [ ]n present on admission    CRITICAL CARE:  [ ]Shock Present  [ ]Septic [ ]Cardiogenic [ ]Neurologic [ ]Hypovolemic  [ ]Vasopressors [ ]Inotropes  [ ]Respiratory failure present [ ]Mechanical Ventilation [ ]Non-invasive ventilatory support [ ]High-Flow   [ ]Acute  [ ]Chronic [ ]Hypoxic  [ ]Hypercarbic [ ]Other  [ ]Other organ failure     LABS:                        8.0    8.28  )-----------( 229      ( 11 Mar 2024 07:00 )             24.3   03-11    131<L>  |  94<L>  |  20  ----------------------------<  104<H>  4.3   |  26  |  0.94    Ca    8.9      11 Mar 2024 07:00  Phos  3.8     03-11  Mg     1.90     03-11    TPro  5.8<L>  /  Alb  2.5<L>  /  TBili  0.2  /  DBili  x   /  AST  18  /  ALT  7   /  AlkPhos  141<H>  03-11    Urinalysis Basic - ( 11 Mar 2024 07:00 )    Color: x / Appearance: x / SG: x / pH: x  Gluc: 104 mg/dL / Ketone: x  / Bili: x / Urobili: x   Blood: x / Protein: x / Nitrite: x   Leuk Esterase: x / RBC: x / WBC x   Sq Epi: x / Non Sq Epi: x / Bacteria: x    RADIOLOGY & ADDITIONAL STUDIES: reviewed    Protein Calorie Malnutrition Present: [ ]mild [ ]moderate [ ]severe [ ]underweight [ ]morbid obesity  https://www.andeal.org/vault/6574/web/files/ONC/Table_Clinical%20Characteristics%20to%20Document%20Malnutrition-White%20JV%20et%20al%202012.pdf    Height (cm): 154.9 (02-27-24 @ 18:17), 154.9 (02-16-24 @ 15:10), 152.4 (02-01-24 @ 16:00)  Weight (kg): 49.9 (02-27-24 @ 18:17), 54.8 (02-18-24 @ 06:15), 51.5 (02-01-24 @ 16:00)  BMI (kg/m2): 20.8 (02-27-24 @ 18:17), 22.8 (02-18-24 @ 06:15), 21.5 (02-16-24 @ 15:10)    [ ]PPSV2 < or = 30%  [ ]significant weight loss [ ]poor nutritional intake [ ]anasarca[ ]Artificial Nutrition    Other REFERRALS:  [ ]Hospice  [ ]Child Life  [ ]Social Work  [ ]Case management [ ]Holistic Therapy     Goals of Care Document:

## 2024-03-12 NOTE — PROGRESS NOTE ADULT - ASSESSMENT
> incomplete note , complete to follow  > Pt in agreement to initiate long acting pain meds: Started MS Contin 15mg BID   > Shared she is interested in hospice moving forward  > Nausea is controlled, tolerating some food/candy. Will attempt to transition all IV symptom medications to oral tomorrow. Dispo pending clinical course.  68F with hx of uterine cancer with mets to liver and lung (pulmonary bronchial and pulmonary artery compression from mediastinal lymphadenopathy), CVA with no residual deficits, HTN who presents with nausea and inability to tolerate PO. Patient was recently hospitalized for dyspnea 1 week prior, discharged home, has been about the same but recently with worsening nausea to the point she cannot eat. No vomiting or diarrhea. Abdominal pain is at baseline. She tried PO zofran with minimal relief. She denies fevers, chills, cp, dysuria, sick contacts recent travel. Palliative consulted for symptom management

## 2024-03-12 NOTE — PROGRESS NOTE ADULT - SUBJECTIVE AND OBJECTIVE BOX
Hospitalist Progress Note  Nereyda Comer MD Pager # [phone]    OVERNIGHT EVENTS: KAREN    SUBJECTIVE / INTERVAL HPI: Patient seen and examined at bedside. Patient reports she is tired of coughing. She overall feels uncomfortable.     VITAL SIGNS:  Vital Signs Last 24 Hrs  T(C): 36.7 (12 Mar 2024 13:34), Max: 37.2 (11 Mar 2024 21:07)  T(F): 98.1 (12 Mar 2024 13:34), Max: 98.9 (11 Mar 2024 21:07)  HR: 93 (12 Mar 2024 13:34) (81 - 101)  BP: 106/70 (12 Mar 2024 13:34) (98/65 - 139/81)  BP(mean): --  RR: 18 (12 Mar 2024 13:34) (17 - 18)  SpO2: 98% (12 Mar 2024 13:34) (92% - 98%)    Parameters below as of 12 Mar 2024 13:34  Patient On (Oxygen Delivery Method): room air        PHYSICAL EXAM:    General: Uncomfortable/weak appearing and resting in bed  HEENT: NC/AT; PERRL, anicteric sclera; MMM  Neck: supple  Cardiovascular: +S1/S2; RRR  Respiratory: CTA B/L; no W/R/R  Gastrointestinal: soft, NT/ND; +BSx4  Extremities: WWP; no edema, clubbing or cyanosis  Vascular: 2+ radial, DP/PT pulses B/L  Neurological: AAOx3; no focal deficits    MEDICATIONS:  MEDICATIONS  (STANDING):  bisacodyl Suppository 10 milliGRAM(s) Rectal once  chlorhexidine 2% Cloths 1 Application(s) Topical daily  enoxaparin Injectable 40 milliGRAM(s) SubCutaneous every 24 hours  fluticasone propionate 50 MICROgram(s)/spray Nasal Spray 1 Spray(s) Both Nostrils two times a day  lactulose Syrup 20 Gram(s) Oral every 8 hours  levothyroxine 100 MICROGram(s) Oral daily  metoclopramide Injectable 10 milliGRAM(s) IV Push every 8 hours  mirtazapine 15 milliGRAM(s) Oral at bedtime  morphine ER Tablet 15 milliGRAM(s) Oral two times a day  pantoprazole    Tablet 40 milliGRAM(s) Oral before breakfast  piperacillin/tazobactam IVPB.. 3.375 Gram(s) IV Intermittent every 8 hours  polyethylene glycol 3350 17 Gram(s) Oral every 12 hours  senna 2 Tablet(s) Oral two times a day  sodium chloride 1 Gram(s) Oral two times a day  valsartan 40 milliGRAM(s) Oral daily    MEDICATIONS  (PRN):  acetaminophen     Tablet .. 650 milliGRAM(s) Oral every 6 hours PRN Mild Pain (1 - 3)  albuterol    90 MICROgram(s) HFA Inhaler 2 Puff(s) Inhalation every 6 hours PRN Shortness of Breath and/or Wheezing  aluminum hydroxide/magnesium hydroxide/simethicone Suspension 30 milliLiter(s) Oral every 4 hours PRN Dyspepsia  benzonatate 200 milliGRAM(s) Oral every 8 hours PRN Cough  cyclobenzaprine 5 milliGRAM(s) Oral every 8 hours PRN Muscle Spasm  guaiFENesin Oral Liquid (Sugar-Free) 100 milliGRAM(s) Oral every 8 hours PRN Cough  HYDROmorphone   Tablet 2 milliGRAM(s) Oral every 6 hours PRN Moderate Pain (4 - 6)  HYDROmorphone  Injectable 0.5 milliGRAM(s) IV Push every 3 hours PRN Severe Pain (7 - 10)  melatonin 3 milliGRAM(s) Oral at bedtime PRN Insomnia      ALLERGIES:  Allergies    gabapentin (Other)  contrast media (iodine-based) (Other)  Benadryl (Other)    Intolerances    Taxol (Other)      LABS:                        8.9    10.86 )-----------( 287      ( 12 Mar 2024 06:00 )             26.7     03-12    130<L>  |  93<L>  |  18  ----------------------------<  117<H>  4.2   |  26  |  1.14    Ca    9.3      12 Mar 2024 06:00  Phos  3.1     03-12  Mg     1.70     03-12    TPro  5.8<L>  /  Alb  2.5<L>  /  TBili  0.2  /  DBili  x   /  AST  18  /  ALT  7   /  AlkPhos  141<H>  03-11      Urinalysis Basic - ( 12 Mar 2024 06:00 )    Color: x / Appearance: x / SG: x / pH: x  Gluc: 117 mg/dL / Ketone: x  / Bili: x / Urobili: x   Blood: x / Protein: x / Nitrite: x   Leuk Esterase: x / RBC: x / WBC x   Sq Epi: x / Non Sq Epi: x / Bacteria: x      CAPILLARY BLOOD GLUCOSE          RADIOLOGY & ADDITIONAL TESTS: Reviewed.    ASSESSMENT:    PLAN:

## 2024-03-12 NOTE — PROGRESS NOTE ADULT - SUBJECTIVE AND OBJECTIVE BOX
[facility] Geriatrics and Palliative Care  Adelia Gonzalez, Palliative Care Nurse Practitioner  Contact Info: Page [phone] (Including Nights/Weekends), Message on Microsoft Teams (Adelia Gonzalez), or leave  at Palliative Office [phone] (non-urgent)    Date of Service 03-12-24 @ 10:40    SUBJECTIVE AND OBJECTIVE:  Indication for Geriatrics and Palliative Care Services/INTERVAL HPI:     OVERNIGHT EVENTS:    DNR on chart:DNI: Trial NIV  DNI: Trial NIV      Allergies    gabapentin (Other)  contrast media (iodine-based) (Other)  Benadryl (Other)    Intolerances    Taxol (Other)  MEDICATIONS  (STANDING):  bisacodyl Suppository 10 milliGRAM(s) Rectal once  chlorhexidine 2% Cloths 1 Application(s) Topical daily  enoxaparin Injectable 40 milliGRAM(s) SubCutaneous every 24 hours  fluticasone propionate 50 MICROgram(s)/spray Nasal Spray 1 Spray(s) Both Nostrils two times a day  lactulose Syrup 20 Gram(s) Oral every 8 hours  levothyroxine 100 MICROGram(s) Oral daily  metoclopramide Injectable 10 milliGRAM(s) IV Push every 8 hours  mirtazapine 15 milliGRAM(s) Oral at bedtime  morphine ER Tablet 15 milliGRAM(s) Oral two times a day  pantoprazole    Tablet 40 milliGRAM(s) Oral before breakfast  piperacillin/tazobactam IVPB.. 3.375 Gram(s) IV Intermittent every 8 hours  polyethylene glycol 3350 17 Gram(s) Oral every 12 hours  senna 2 Tablet(s) Oral two times a day  sodium chloride 1 Gram(s) Oral two times a day  valsartan 40 milliGRAM(s) Oral daily    MEDICATIONS  (PRN):  acetaminophen     Tablet .. 650 milliGRAM(s) Oral every 6 hours PRN Mild Pain (1 - 3)  albuterol    90 MICROgram(s) HFA Inhaler 2 Puff(s) Inhalation every 6 hours PRN Shortness of Breath and/or Wheezing  aluminum hydroxide/magnesium hydroxide/simethicone Suspension 30 milliLiter(s) Oral every 4 hours PRN Dyspepsia  benzonatate 200 milliGRAM(s) Oral every 8 hours PRN Cough  cyclobenzaprine 5 milliGRAM(s) Oral every 8 hours PRN Muscle Spasm  guaiFENesin Oral Liquid (Sugar-Free) 100 milliGRAM(s) Oral every 8 hours PRN Cough  HYDROmorphone   Tablet 2 milliGRAM(s) Oral every 6 hours PRN Moderate Pain (4 - 6)  HYDROmorphone  Injectable 0.5 milliGRAM(s) IV Push every 3 hours PRN Severe Pain (7 - 10)  melatonin 3 milliGRAM(s) Oral at bedtime PRN Insomnia        -------------------------------------------------------------------------------------------------------  ITEMS UNCHECKED ARE NOT PRESENT    PRESENT SYMPTOMS: [ ]Unable to self-report - see [ ] CPOT [ ] PAINADS [ ] RDOS  Source if other than patient:  [ ]Family   [ ]Team     Pain:  [ ]yes [ ]no  QOL impact -   Location -                    Aggravating factors -  Quality -  Radiation -  Timing-  Severity (0-10 scale):  Minimal acceptable level (0-10 scale)/pain goal:    CPOT:    https://www.[facility].org/getattachment/[email or web address]/Critical-Care-Pain-Observation-Tool-(CPOT)    PAINAD Score: See PAINAD tool and score below       RDOS: See RDOS tool and score below   0 to 2  minimal or no respiratory distress   3  mild distress  4 to 6 moderate distress  >7 severe distress    Dyspnea:                           [ ]Mild [ ]Moderate [ ]Severe  Anxiety:                             [ ]Mild [ ]Moderate [ ]Severe  Fatigue:                             [ ]Mild [ ]Moderate [ ]Severe  Nausea:                             [ ]Mild [ ]Moderate [ ]Severe  Loss of appetite:              [ ]Mild [ ]Moderate [ ]Severe  Constipation:                    [ ]Mild [ ]Moderate [ ]Severe  Other Symptoms:  [ ]All other review of systems negative     Home Medications for Symptoms if present:    I Stop Reference no:     -------------------------------------------------------------------------------------------------------  PCSSQ[Palliative Care Spiritual Screening Question]   Severity (0-10):  Score of 4 or > indicate consideration of Chaplaincy referral.  Chaplaincy Referral: [ ] yes [ ] refused [ ] following [ ] Deferred     Caregiver [place]? : [ ] yes [ ] no [ ] Deferred [ ] Declined             Social work referral [ ] Patient & Family Centered Care Referral [ ]     Anticipatory Grief present?:  [ ] yes [ ] no  [ ] Deferred                  Social work referral [ ] Chaplaincy Referral [ ]    -------------------------------------------------------------------------------------------------------  PHYSICAL EXAM:  Vital Signs Last 24 Hrs  T(C): 36.7 (12 Mar 2024 05:36), Max: 37.2 (11 Mar 2024 21:07)  T(F): 98.1 (12 Mar 2024 05:36), Max: 98.9 (11 Mar 2024 21:07)  HR: 81 (12 Mar 2024 05:36) (81 - 105)  BP: 98/65 (12 Mar 2024 05:36) (98/65 - 139/81)  BP(mean): --  RR: 17 (12 Mar 2024 05:36) (17 - 18)  SpO2: 94% (12 Mar 2024 05:36) (92% - 98%)    Parameters below as of 12 Mar 2024 05:36  Patient On (Oxygen Delivery Method): room air     I&O's Summary     GENERAL:   [ ]Cachexia  [ ]Alert  [ ]Oriented x   [ ]Lethargic  [ ]Unarousable  [ ]Verbal  [ ]Non-Verbal    Behavioral:   [ ]Anxiety  [ ]Delirium [ ]Agitation [ ]Other    HEENT:  [ ]Normal   [ ]Dry mouth   [ ]ET Tube/Trach  [ ]Oral lesions    PULMONARY:   [ ]Clear [ ]Tachypnea  [ ]Audible excessive secretions   [ ]Rhonchi        [ ]Right [ ]Left [ ]Bilateral  [ ]Crackles        [ ]Right [ ]Left [ ]Bilateral  [ ]Wheezing     [ ]Right [ ]Left [ ]Bilateral  [ ]Diminished BS [ ] Right [ ]Left [ ]Bilateral    CARDIOVASCULAR:    [ ]Regular [ ]Irregular [ ]Tachy  [ ]Tristen [ ]Murmur [ ]Other    GASTROINTESTINAL:  [ ]Soft  [ ]Distended   [ ]+BS  [ ]Non tender [ ]Tender  [ ]Other [ ]PEG [ ]OGT/ NGT   Last BM:     GENITOURINARY:  [ ]Normal [ ]Incontinent   [ ]Oliguria/Anuria   [ ][place]    MUSCULOSKELETAL:   [ ]Normal   [ ]Weakness  [ ]Bed/Wheelchair bound [ ]Edema    NEUROLOGIC:   [ ]No focal deficits  [ ] Cognitive impairment  [ ] Dysphagia [ ]Dysarthria [ ] Paresis [ ]Other     SKIN:   [ ]Normal  [ ]Rash  [ ]Other  [ ]Pressure ulcer(s) [ ]y [ ]n present on admission    -------------------------------------------------------------------------------------------------------  CRITICAL CARE:  [ ]Shock Present  [ ]Septic [ ]Cardiogenic [ ]Neurologic [ ]Hypovolemic  [ ]Vasopressors [ ]Inotropes  [ ]Respiratory failure present [ ]Mechanical Ventilation [ ]Non-invasive ventilatory support [ ]High-Flow   [ ]Acute  [ ]Chronic [ ]Hypoxic  [ ]Hypercarbic [ ]Other  [ ]Other organ failure     -------------------------------------------------------------------------------------------------------  LABS:                        8.9    10.86 )-----------( 287      ( 12 Mar 2024 06:00 )             26.7   03-12    130<L>  |  93<L>  |  18  ----------------------------<  117<H>  4.2   |  26  |  1.14    Ca    9.3      12 Mar 2024 06:00  Phos  3.1     03-12  Mg     1.70     03-12    TPro  5.8<L>  /  Alb  2.5<L>  /  TBili  0.2  /  DBili  x   /  AST  18  /  ALT  7   /  AlkPhos  141<H>  03-11      Urinalysis Basic - ( 12 Mar 2024 06:00 )    Color: x / Appearance: x / SG: x / pH: x  Gluc: 117 mg/dL / Ketone: x  / Bili: x / Urobili: x   Blood: x / Protein: x / Nitrite: x   Leuk Esterase: x / RBC: x / WBC x   Sq Epi: x / Non Sq Epi: x / Bacteria: x    -------------------------------------------------------------------------------------------------------  RADIOLOGY & ADDITIONAL STUDIES:      -------------------------------------------------------------------------------------------------------  Protein Calorie Malnutrition Present: [ ]mild [ ]moderate [ ]severe [ ]underweight [ ]morbid obesity  https://www.andeal.org/vault/2440/web/files/ONC/Table_Clinical%20Characteristics%20to%20Document%20Malnutrition-White%20JV%20et%20al%202012.pdf    Height (cm): 154.9 (02-27-24 @ 18:17), 154.9 (02-16-24 @ 15:10), 152.4 (02-01-24 @ 16:00)  Weight (kg): 49.9 (02-27-24 @ 18:17), 54.8 (02-18-24 @ 06:15), 51.5 (02-01-24 @ 16:00)  BMI (kg/m2): 20.8 (02-27-24 @ 18:17), 22.8 (02-18-24 @ 06:15), 21.5 (02-16-24 @ 15:10)    Palliative Performance Status Version 2:   See PPSv2 tool and score below       [ ]PPSV2 < or = 30%  [ ]significant weight loss [ ]poor nutritional intake [ ]anasarca[ ]Artificial Nutrition    Other REFERRALS:  [ ]Hospice  [ ]Child Life  [ ]Social Work  [ ]Case management [ ]Holistic Therapy     -------------------------------------------------------------------------------------------------------   [facility] Geriatrics and Palliative Care  Adelia Gonzalez, Palliative Care Nurse Practitioner  Contact Info: Page [phone] (Including Nights/Weekends), Message on Microsoft Teams (Adelia Gonzalez), or leave VM at Palliative Office [phone] (non-urgent)    Date of Service 03-12-24 @ 10:40    SUBJECTIVE AND OBJECTIVE:  Indication for Geriatrics and Palliative Care Services/INTERVAL HPI: Symptoms   Pt seen this AM resting in bed, denies any pain or nausea currently. Pt shared cough has become more burdensome, IV abx started by primary team for PNA, PRN cough medicine also available. Pt tolerating regular diet and some candy with no nausea or vomiting. Discussed initiating long acting pain medication for better pain management, pt in agreement. Pt also shared she no longer wishes to pursue cancer treatment, including inpatient RT, requesting hospice.      OVERNIGHT EVENTS: Pt required PRN IV Dilaudid 0.4mg x 1 & IV Dilaudid 0.5mg x 3 within 24 hrs. Total 38 OME.     DNR on chart:DNI: Trial NIV  DNI: Trial NIV      Allergies    gabapentin (Other)  contrast media (iodine-based) (Other)  Benadryl (Other)    Intolerances    Taxol (Other)  MEDICATIONS  (STANDING):  bisacodyl Suppository 10 milliGRAM(s) Rectal once  chlorhexidine 2% Cloths 1 Application(s) Topical daily  enoxaparin Injectable 40 milliGRAM(s) SubCutaneous every 24 hours  fluticasone propionate 50 MICROgram(s)/spray Nasal Spray 1 Spray(s) Both Nostrils two times a day  lactulose Syrup 20 Gram(s) Oral every 8 hours  levothyroxine 100 MICROGram(s) Oral daily  metoclopramide Injectable 10 milliGRAM(s) IV Push every 8 hours  mirtazapine 15 milliGRAM(s) Oral at bedtime  morphine ER Tablet 15 milliGRAM(s) Oral two times a day  pantoprazole    Tablet 40 milliGRAM(s) Oral before breakfast  piperacillin/tazobactam IVPB.. 3.375 Gram(s) IV Intermittent every 8 hours  polyethylene glycol 3350 17 Gram(s) Oral every 12 hours  senna 2 Tablet(s) Oral two times a day  sodium chloride 1 Gram(s) Oral two times a day  valsartan 40 milliGRAM(s) Oral daily    MEDICATIONS  (PRN):  acetaminophen     Tablet .. 650 milliGRAM(s) Oral every 6 hours PRN Mild Pain (1 - 3)  albuterol    90 MICROgram(s) HFA Inhaler 2 Puff(s) Inhalation every 6 hours PRN Shortness of Breath and/or Wheezing  aluminum hydroxide/magnesium hydroxide/simethicone Suspension 30 milliLiter(s) Oral every 4 hours PRN Dyspepsia  benzonatate 200 milliGRAM(s) Oral every 8 hours PRN Cough  cyclobenzaprine 5 milliGRAM(s) Oral every 8 hours PRN Muscle Spasm  guaiFENesin Oral Liquid (Sugar-Free) 100 milliGRAM(s) Oral every 8 hours PRN Cough  HYDROmorphone   Tablet 2 milliGRAM(s) Oral every 6 hours PRN Moderate Pain (4 - 6)  HYDROmorphone  Injectable 0.5 milliGRAM(s) IV Push every 3 hours PRN Severe Pain (7 - 10)  melatonin 3 milliGRAM(s) Oral at bedtime PRN Insomnia    -------------------------------------------------------------------------------------------------------  ITEMS UNCHECKED ARE NOT PRESENT    PRESENT SYMPTOMS: [ ]Unable to self-report - see [ ] CPOT [ ] PAINADS [ ] RDOS  Source if other than patient:  [ ]Family   [ ]Team     Pain: [ X]yes [ ]no  QOL impact -   Location - BL shoulders/pelvis            Aggravating factors - movement   Quality - sharp  Radiation - across upper back  Timing- intermittent   Severity (0-10 scale):10/10   Minimal acceptable level (0-10 scale)/Pain goal: 3  CPOT:    https://www.[facility].org/getattachment/[email or web address]-[email or web address]-[email or web address]-9361d3088t8w/Critical-Care-Pain-Observation-Tool-(CPOT)    PAINAD Score: See PAINAD tool and score below       RDOS: See RDOS tool and score below   0 to 2  minimal or no respiratory distress   3  mild distress  4 to 6 moderate distress  >7 severe distress    Dyspnea:                           [ ]Mild [ ]Moderate [ ]Severe  Anxiety:                             [ ]Mild [ ]Moderate [ ]Severe  Fatigue:                             [ ]Mild [ ]Moderate [ ]Severe  Nausea:                             [ ]Mild [ ]Moderate [ ]Severe  Loss of appetite:              [ ]Mild [ ]Moderate [ ]Severe  Constipation:                    [ ]Mild [ ]Moderate [ ]Severe  Other Symptoms:  [ x]All other review of systems negative     Home Medications for Symptoms if present:    I Stop Reference no:     -------------------------------------------------------------------------------------------------------  PCSSQ[Palliative Care Spiritual Screening Question]   Severity (0-10):  Score of 4 or > indicate consideration of Chaplaincy referral.  Chaplaincy Referral: [ ] yes [ ] refused [ ] following [ x] Deferred     Caregiver [place]? : [ ] yes [ ] no [ ] Deferred [ x] Declined             Social work referral [ ] Patient & Family Centered Care Referral [ ]     Anticipatory Grief present?:  [ ] yes [ ] no  [ x] Deferred                  Social work referral [ ] Chaplaincy Referral [ ]    -------------------------------------------------------------------------------------------------------  PHYSICAL EXAM:  Vital Signs Last 24 Hrs  T(C): 36.7 (12 Mar 2024 05:36), Max: 37.2 (11 Mar 2024 21:07)  T(F): 98.1 (12 Mar 2024 05:36), Max: 98.9 (11 Mar 2024 21:07)  HR: 81 (12 Mar 2024 05:36) (81 - 105)  BP: 98/65 (12 Mar 2024 05:36) (98/65 - 139/81)  BP(mean): --  RR: 17 (12 Mar 2024 05:36) (17 - 18)  SpO2: 94% (12 Mar 2024 05:36) (92% - 98%)    Parameters below as of 12 Mar 2024 05:36  Patient On (Oxygen Delivery Method): room air     I&O's Summary       GENERAL:  [ ]Cachexia  [ X]Alert  [ X]Oriented x 4  [ ]Lethargic  [ ]Unarousable  [X ]Verbal  [ ]Non-Verbal    Behavioral:   [ ] Anxiety  [ ] Delirium [ ] Agitation [X ] Other    HEENT:  [X ]Normal   [ ]Dry mouth   [ ]ET Tube/Trach  [ ]Oral lesions    PULMONARY:   [ X]Clear [ ]Tachypnea  [ ]Audible excessive secretions   [ ]Rhonchi        [ ]Right [ ]Left [ ]Bilateral  [ ]Crackles        [ ]Right [ ]Left [ ]Bilateral  [ ]Wheezing     [ ]Right [ ]Left [ ]Bilateral  [ ]Diminished breath sounds [ ]right [ ]left [ ]bilateral    CARDIOVASCULAR:    [X ]Regular [ ]Irregular [ ]Tachy  [ ]Tristen [ ]Murmur [ ]Other    GASTROINTESTINAL:  [X ]Soft  [ ]Distended   [X ]+BS  [ X]Non tender [ ]Tender  [ ]Other [ ]PEG [ ]OGT/ NGT  Last BM: 3/11    GENITOURINARY:  [X ]Normal [ ] Incontinent   [ ]Oliguria/Anuria   [ ][place]    MUSCULOSKELETAL:   [ ]Normal   [x ]Weakness  [ ]Bed/Wheelchair bound [ ]Edema    NEUROLOGIC:   [ x]No focal deficits  [ ]Cognitive impairment  [ ]Dysphagia [ ]Dysarthria [ ]Paresis [ ]Other     SKIN:   [x ]Normal  [ ]Rash  [ ]Other  [ ]Pressure ulcer(s)       Present on admission [ ]y [ ]n  -------------------------------------------------------------------------------------------------------  CRITICAL CARE:  [ ]Shock Present  [ ]Septic [ ]Cardiogenic [ ]Neurologic [ ]Hypovolemic  [ ]Vasopressors [ ]Inotropes  [ ]Respiratory failure present [ ]Mechanical Ventilation [ ]Non-invasive ventilatory support [ ]High-Flow   [ ]Acute  [ ]Chronic [ ]Hypoxic  [ ]Hypercarbic [ ]Other  [ ]Other organ failure     -------------------------------------------------------------------------------------------------------  LABS:                        8.9    10.86 )-----------( 287      ( 12 Mar 2024 06:00 )             26.7   03-12    130<L>  |  93<L>  |  18  ----------------------------<  117<H>  4.2   |  26  |  1.14    Ca    9.3      12 Mar 2024 06:00  Phos  3.1     03-12  Mg     1.70     03-12    TPro  5.8<L>  /  Alb  2.5<L>  /  TBili  0.2  /  DBili  x   /  AST  18  /  ALT  7   /  AlkPhos  141<H>  03-11      Urinalysis Basic - ( 12 Mar 2024 06:00 )    Color: x / Appearance: x / SG: x / pH: x  Gluc: 117 mg/dL / Ketone: x  / Bili: x / Urobili: x   Blood: x / Protein: x / Nitrite: x   Leuk Esterase: x / RBC: x / WBC x   Sq Epi: x / Non Sq Epi: x / Bacteria: x    -------------------------------------------------------------------------------------------------------  RADIOLOGY & ADDITIONAL STUDIES: < from: Xray Abdomen 2 View PORTABLE -Routine (Xray Abdomen 2 View PORTABLE -Routine .) (03.10.24 @ 20:56) >  FINDINGS:    No air-filled, focally dilated loops of bowel.  Large stool burden.  Osseous degenerative changes.    IMPRESSION:    Large stool burden suggesting constipation. No evidence of acute   obstruction.    --- End of Report ---      < from: Xray Chest 1 View- PORTABLE-Urgent (Xray Chest 1 View- PORTABLE-Urgent .) (03.11.24 @ 00:41) >  IMPRESSION:  Left apical mass better assessed on chest CT scan.  New left basilar opacities may reflect pneumonia or atelectasis.    --- End of Report ---    -------------------------------------------------------------------------------------------------------  Protein Calorie Malnutrition Present: [ ]mild [ ]moderate [ ]severe [ ]underweight [ ]morbid obesity  https://www.andeal.org/vault/2440/web/files/ONC/Table_Clinical%20Characteristics%20to%20Document%20Malnutrition-White%20JV%20et%20al%202012.pdf    Height (cm): 154.9 (02-27-24 @ 18:17), 154.9 (02-16-24 @ 15:10), 152.4 (02-01-24 @ 16:00)  Weight (kg): 49.9 (02-27-24 @ 18:17), 54.8 (02-18-24 @ 06:15), 51.5 (02-01-24 @ 16:00)  BMI (kg/m2): 20.8 (02-27-24 @ 18:17), 22.8 (02-18-24 @ 06:15), 21.5 (02-16-24 @ 15:10)    Palliative Performance Status Version 2:   See PPSv2 tool and score below       [ ]PPSV2 < or = 30%  [ ]significant weight loss [ ]poor nutritional intake [ ]anasarca[ ]Artificial Nutrition    Other REFERRALS:  [ ]Hospice  [ ]Child Life  [ ]Social Work  [ ]Case management [ ]Holistic Therapy     -------------------------------------------------------------------------------------------------------

## 2024-03-12 NOTE — CHART NOTE - NSCHARTNOTEFT_GEN_A_CORE
Ms. Hoang, received 2/5 planned palliative RT lung treatments which have been interrupted with several days lapsing  without RT due to patient refusal.  She is of sound mind, a.o. x 3.    Today, she indicated wanting no more RT.  We will cancel any further RT as of today.     She is hospice appropriate.  KPS 50 today.           Problem/Plan - 1:  ·  Problem: Endometrial cancer.   ·  Plan: Stage IV  Follows [facility] Dr. Toy Blank [email or web address] No longer pursing chemo, no longer wants RT  CT abd/pelvis: Severe right hydronephrosis and mild hydroureter to the level of a soft tissue lesion with calcification measuring 2.2 x 1.6 cm proximal to the ureterovesicular junction and inseparable from the right vaginal cuff. This lesion has increased in size as compared to prior.      < from: CT Angio Chest PE Protocol w/ IV Cont (02.16.24 @ 23:02) >      IMPRESSION:  No pulmonary embolism.  Left apical mass with central cavitation is larger in size when compared   to 1/8/2024.  Increased mediastinal lymphadenopathy, with extrinsic compression of the   left pulmonary artery and left upper lobe bronchi.  Progression of pulmonary nodules and hepatic hepatic metastasis.  Severe right hydronephrosis.    < end of copied text >

## 2024-03-12 NOTE — PROGRESS NOTE ADULT - ASSESSMENT
67 yo woman with h/o HTN, CVA, hypothyroidism, OP, stage II endometrial cancer > dx/treated in 7/201- s/p robotic assisted TLH BSO, bilateral pelvic and para-aortic sentinel LN dissection by Dr. Paras Grayson on 7/2/2018, followed by pelvic IMRT and vaginal cuff brachytherapy with Dr. Bowling in 10/64736; dx with met recurrent in the lungs in 10/2019 but was lost to fu until 7/2020; progressed after Carbo/Taxol > palliative RT to the vaginal mass and cuff area 2000cGy/5fxs  from 5/25/21-6/1/21 > Pembro/Lenvima (tx course c/b ICI-related colitis) > Doxil > Abraxan/Avastin > most recently on Docetaxel monotherapy (last given 2/2024).  She was admitted to [facility] on 2/27 with progressive nausea and generalized weakness with admission CT a/p confirming POD; admission notable for mild leukocytosis. Pt's hospital course also currently c/b cancer-related pain and hyponatremia.    ACTIVE PROBLEMS  Met endometrial cancer  Goals of care discussion, counseling  Cancer-related pain  Nausea without vomiting  R hydroureteronephrosis  MANJU  Hyponatremia  2/2 SIADH  Anemia of chronic disease  Hypothyroidism  HTN  Hypercoag state    - Met endometrial cancer  - GOC discussion, counseling  Pt indicated to Pall Care team on 2/28 that she is no longer interested in pursuing systemic cancer treatment  She consented to DNR/DNI code status and previously expressed interest in inpt hospice placement, citing that she does not want to die alone at home; however, on 3/7 she noted that she no longer wants to pursue hospice and she wants to resume cancer-directed tx  Will discuss options for future cancer tx with pt's outpt Gyn Onc  Pt's long-term prognosis is poor    - Cancer-related pain  Continuing Toradol 30mg q6/prn x5d course (until 3/7) for inflammatory pain (with ppi ppx)  Continuing Dilaudid PO 2mg q6/prn for mild pain  Continuing Dilaudid IV 0.4mg q6/prn for mod-sev pain  Continuing Flexiril 5mg q8/prn  Continuing bowel regimen to prevent OIC  Appreciate Rad Onc consult: pt to complete inpt RT to mediastinal met, 20Gy/5fxs, 3/6-14    - Nausea without vomiting  Improved  Likely iatrogenic from medications (opioids)  Admission imaging negative for ileus/obstruction  Zofran dced; starting Reglan IV 10mg q8/prn  Repleting lytes prn    - R hydroureteronephrosis  Cr relatively stable  Pt still with sufficient UOP  No role for stent, PCN at this time  Planning to hold ARB and check urine lytes if Cr worsens  Monitoring UOP    - Hyponatremia 2/2 SIADH  Na improved to 133, range 130-32  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  Pt is relatively asymptomatic  Continuing Salt tab 1gm BID  Starting 1L fluid restriction    - Anemia of chronic disease  Hgb stable; pt remains without s/s of active bleeding  3/4 iron studies more c/w ACD; labs not otherwise c/w hemolysis  Transfusing supportively prn (goal hgb > 7; plts > 10K)    - Hypothyroidism: 3/4 TFTs wnl; continuing LT4 100mcg daily    - HTN: continuing Valsartan 40mg daily    - Hypercoag state: continuing lovenox ppx   69 yo woman with h/o HTN, CVA, hypothyroidism, OP, stage II endometrial cancer > dx/treated in 7/201- s/p robotic assisted TLH BSO, bilateral pelvic and para-aortic sentinel LN dissection by Dr. Paras Grayosn on 7/2/2018, followed by pelvic IMRT and vaginal cuff brachytherapy with Dr. Bowling in 10/80923; dx with met recurrent in the lungs in 10/2019 but was lost to fu until 7/2020; progressed after Carbo/Taxol > palliative RT to the vaginal mass and cuff area 2000cGy/5fxs  from 5/25/21-6/1/21 > Pembro/Lenvima (tx course c/b ICI-related colitis) > Doxil > Abraxan/Avastin > most recently on Docetaxel monotherapy (last given 2/2024).  She was admitted to [facility] on 2/27 with progressive nausea and generalized weakness with admission CT a/p confirming POD; admission notable for mild leukocytosis. Pt's hospital course also currently c/b cancer-related pain and hyponatremia.    ACTIVE PROBLEMS  Met endometrial cancer  Goals of care discussion, counseling  Cancer-related pain  Nausea without vomiting  R hydroureteronephrosis  MANJU  Hyponatremia  2/2 SIADH  Anemia of chronic disease  Hypothyroidism  HTN  Hypercoag state    - Met endometrial cancer  - GOC discussion, counseling  Pt indicated to Pall Care team on 2/28 that she is no longer interested in pursuing systemic cancer treatment  She consented to DNR/DNI code status and previously expressed interest in inpt hospice placement, citing that she does not want to die alone at home; however, on 3/7 she noted that she no longer wants to pursue hospice and she wants to resume cancer-directed tx  Will discuss options for future cancer tx with pt's outpt Gyn Onc  Pt's long-term prognosis is poor    - Cancer-related pain  Continuing Toradol 30mg q6/prn x5d course (until 3/7) for inflammatory pain (with ppi ppx)  Continuing Dilaudid PO 2mg q6/prn for mild pain  Continuing Dilaudid IV 0.4mg q6/prn for mod-sev pain  Continuing Flexiril 5mg q8/prn  Continuing bowel regimen to prevent OIC  Appreciate Rad Onc consult: pt to complete inpt RT to mediastinal met, 20Gy/5fxs, 3/6-14    - PNA  Patient with a wet cough that was not improving and was started on empiric Zosyn. CXR showing new l. basilar opacity and atelectasis. Likely 2/ 2 immobility.   - C/w Zosyn for ow   - Encouraged the use of incentive spirometry to help improve atelectasis    - Nausea without vomiting  Improved  Likely iatrogenic from medications (opioids)  Admission imaging negative for ileus/obstruction  Zofran dced; starting Reglan IV 10mg q8/prn  Repleting lytes prn    - R hydroureteronephrosis  Cr relatively stable  Pt still with sufficient UOP  No role for stent, PCN at this time  Planning to hold ARB and check urine lytes if Cr worsens  Monitoring UOP    - Hyponatremia 2/2 SIADH  Na improved to 133, range 130-32  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  Pt is relatively asymptomatic  Continuing Salt tab 1gm BID  Starting 1L fluid restriction    - Anemia of chronic disease  Hgb stable; pt remains without s/s of active bleeding  3/4 iron studies more c/w ACD; labs not otherwise c/w hemolysis  Transfusing supportively prn (goal hgb > 7; plts > 10K)    - Hypothyroidism: 3/4 TFTs wnl; continuing LT4 100mcg daily    - HTN: continuing Valsartan 40mg daily    - Hypercoag state: continuing lovenox ppx

## 2024-03-13 NOTE — PROGRESS NOTE ADULT - SUBJECTIVE AND OBJECTIVE BOX
[facility] Geriatrics and Palliative Care  Adelia Gonzalez, Palliative Care Nurse Practitioner  Contact Info: Page [phone] (Including Nights/Weekends), Message on Microsoft Teams (Adelia Gonzalez), or leave  at Palliative Office [phone] (non-urgent)    Date of Service 03-13-24 @ 11:10    SUBJECTIVE AND OBJECTIVE:  Indication for Geriatrics and Palliative Care Services/INTERVAL HPI:    OVERNIGHT EVENTS:    DNR on chart:DNI: Trial NIV  DNI: Trial NIV      Allergies    gabapentin (Other)  contrast media (iodine-based) (Other)  Benadryl (Other)    Intolerances    Taxol (Other)  MEDICATIONS  (STANDING):  bisacodyl Suppository 10 milliGRAM(s) Rectal once  chlorhexidine 2% Cloths 1 Application(s) Topical daily  enoxaparin Injectable 40 milliGRAM(s) SubCutaneous every 24 hours  fluticasone propionate 50 MICROgram(s)/spray Nasal Spray 1 Spray(s) Both Nostrils two times a day  levothyroxine 100 MICROGram(s) Oral daily  metoclopramide 10 milliGRAM(s) Oral three times a day  mirtazapine 15 milliGRAM(s) Oral at bedtime  morphine ER Tablet 15 milliGRAM(s) Oral two times a day  pantoprazole    Tablet 40 milliGRAM(s) Oral before breakfast  piperacillin/tazobactam IVPB.. 3.375 Gram(s) IV Intermittent every 8 hours  polyethylene glycol 3350 17 Gram(s) Oral every 12 hours  senna 2 Tablet(s) Oral two times a day  sodium chloride 1 Gram(s) Oral two times a day  valsartan 40 milliGRAM(s) Oral daily    MEDICATIONS  (PRN):  acetaminophen     Tablet .. 650 milliGRAM(s) Oral every 6 hours PRN Mild Pain (1 - 3)  albuterol    90 MICROgram(s) HFA Inhaler 2 Puff(s) Inhalation every 6 hours PRN Shortness of Breath and/or Wheezing  aluminum hydroxide/magnesium hydroxide/simethicone Suspension 30 milliLiter(s) Oral every 4 hours PRN Dyspepsia  benzonatate 200 milliGRAM(s) Oral every 8 hours PRN Cough  cyclobenzaprine 5 milliGRAM(s) Oral every 8 hours PRN Muscle Spasm  guaiFENesin Oral Liquid (Sugar-Free) 100 milliGRAM(s) Oral every 8 hours PRN Cough  HYDROmorphone   Tablet 2 milliGRAM(s) Oral every 4 hours PRN Severe Pain (7 - 10)  melatonin 3 milliGRAM(s) Oral at bedtime PRN Insomnia        -------------------------------------------------------------------------------------------------------  ITEMS UNCHECKED ARE NOT PRESENT    PRESENT SYMPTOMS: [ ]Unable to self-report - see [ ] CPOT [ ] PAINADS [ ] RDOS  Source if other than patient:  [ ]Family   [ ]Team     Pain:  [ ]yes [ ]no  QOL impact -   Location -                    Aggravating factors -  Quality -  Radiation -  Timing-  Severity (0-10 scale):  Minimal acceptable level (0-10 scale)/pain goal:    CPOT:    https://www.[facility].org/getattachment/[email or web address]/Critical-Care-Pain-Observation-Tool-(CPOT)    PAINAD Score: See PAINAD tool and score below       RDOS: See RDOS tool and score below   0 to 2  minimal or no respiratory distress   3  mild distress  4 to 6 moderate distress  >7 severe distress    Dyspnea:                           [ ]Mild [ ]Moderate [ ]Severe  Anxiety:                             [ ]Mild [ ]Moderate [ ]Severe  Fatigue:                             [ ]Mild [ ]Moderate [ ]Severe  Nausea:                             [ ]Mild [ ]Moderate [ ]Severe  Loss of appetite:              [ ]Mild [ ]Moderate [ ]Severe  Constipation:                    [ ]Mild [ ]Moderate [ ]Severe  Other Symptoms:  [ ]All other review of systems negative     Home Medications for Symptoms if present:    I Stop Reference no:     -------------------------------------------------------------------------------------------------------  PCSSQ[Palliative Care Spiritual Screening Question]   Severity (0-10):  Score of 4 or > indicate consideration of Chaplaincy referral.  Chaplaincy Referral: [ ] yes [ ] refused [ ] following [ ] Deferred     Caregiver [place]? : [ ] yes [ ] no [ ] Deferred [ ] Declined             Social work referral [ ] Patient & Family Centered Care Referral [ ]     Anticipatory Grief present?:  [ ] yes [ ] no  [ ] Deferred                  Social work referral [ ] Chaplaincy Referral [ ]    -------------------------------------------------------------------------------------------------------  PHYSICAL EXAM:  Vital Signs Last 24 Hrs  T(C): 37.1 (13 Mar 2024 07:02), Max: 37.1 (12 Mar 2024 21:09)  T(F): 98.8 (13 Mar 2024 07:02), Max: 98.8 (12 Mar 2024 21:09)  HR: 110 (13 Mar 2024 07:02) (93 - 112)  BP: 139/88 (13 Mar 2024 07:02) (106/70 - 139/88)  BP(mean): --  RR: 18 (13 Mar 2024 07:02) (16 - 18)  SpO2: 95% (13 Mar 2024 07:02) (93% - 98%)    Parameters below as of 13 Mar 2024 07:02  Patient On (Oxygen Delivery Method): room air     I&O's Summary     GENERAL:   [ ]Cachexia  [ ]Alert  [ ]Oriented x   [ ]Lethargic  [ ]Unarousable  [ ]Verbal  [ ]Non-Verbal    Behavioral:   [ ]Anxiety  [ ]Delirium [ ]Agitation [ ]Other    HEENT:  [ ]Normal   [ ]Dry mouth   [ ]ET Tube/Trach  [ ]Oral lesions    PULMONARY:   [ ]Clear [ ]Tachypnea  [ ]Audible excessive secretions   [ ]Rhonchi        [ ]Right [ ]Left [ ]Bilateral  [ ]Crackles        [ ]Right [ ]Left [ ]Bilateral  [ ]Wheezing     [ ]Right [ ]Left [ ]Bilateral  [ ]Diminished BS [ ] Right [ ]Left [ ]Bilateral    CARDIOVASCULAR:    [ ]Regular [ ]Irregular [ ]Tachy  [ ]Tristen [ ]Murmur [ ]Other    GASTROINTESTINAL:  [ ]Soft  [ ]Distended   [ ]+BS  [ ]Non tender [ ]Tender  [ ]Other [ ]PEG [ ]OGT/ NGT   Last BM:     GENITOURINARY:  [ ]Normal [ ]Incontinent   [ ]Oliguria/Anuria   [ ][place]    MUSCULOSKELETAL:   [ ]Normal   [ ]Weakness  [ ]Bed/Wheelchair bound [ ]Edema    NEUROLOGIC:   [ ]No focal deficits  [ ] Cognitive impairment  [ ] Dysphagia [ ]Dysarthria [ ] Paresis [ ]Other     SKIN:   [ ]Normal  [ ]Rash  [ ]Other  [ ]Pressure ulcer(s) [ ]y [ ]n present on admission    -------------------------------------------------------------------------------------------------------  CRITICAL CARE:  [ ]Shock Present  [ ]Septic [ ]Cardiogenic [ ]Neurologic [ ]Hypovolemic  [ ]Vasopressors [ ]Inotropes  [ ]Respiratory failure present [ ]Mechanical Ventilation [ ]Non-invasive ventilatory support [ ]High-Flow   [ ]Acute  [ ]Chronic [ ]Hypoxic  [ ]Hypercarbic [ ]Other  [ ]Other organ failure     -------------------------------------------------------------------------------------------------------  LABS:                        8.9    13.77 )-----------( 354      ( 13 Mar 2024 08:10 )             27.2   03-13    132<L>  |  91<L>  |  17  ----------------------------<  107<H>  4.2   |  25  |  1.12    Ca    9.2      13 Mar 2024 08:10  Phos  3.4     03-13  Mg     1.70     03-13    TPro  6.3  /  Alb  2.7<L>  /  TBili  0.5  /  DBili  x   /  AST  34<H>  /  ALT  12  /  AlkPhos  223<H>  03-13      Urinalysis Basic - ( 13 Mar 2024 08:10 )    Color: x / Appearance: x / SG: x / pH: x  Gluc: 107 mg/dL / Ketone: x  / Bili: x / Urobili: x   Blood: x / Protein: x / Nitrite: x   Leuk Esterase: x / RBC: x / WBC x   Sq Epi: x / Non Sq Epi: x / Bacteria: x          -------------------------------------------------------------------------------------------------------  RADIOLOGY & ADDITIONAL STUDIES:      -------------------------------------------------------------------------------------------------------  Protein Calorie Malnutrition Present: [ ]mild [ ]moderate [ ]severe [ ]underweight [ ]morbid obesity  https://www.[facility].org/vault/5725/web/files/ONC/Table_Clinical%20Characteristics%20to%20Document%20Malnutrition-White%20JV%20et%20al%202012.pdf    Height (cm): 154.9 (02-27-24 @ 18:17), 154.9 (02-16-24 @ 15:10), 152.4 (02-01-24 @ 16:00)  Weight (kg): 49.9 (02-27-24 @ 18:17), 54.8 (02-18-24 @ 06:15), 51.5 (02-01-24 @ 16:00)  BMI (kg/m2): 20.8 (02-27-24 @ 18:17), 22.8 (02-18-24 @ 06:15), 21.5 (02-16-24 @ 15:10)    Palliative Performance Status Version 2:   See PPSv2 tool and score below       [ ]PPSV2 < or = 30%  [ ]significant weight loss [ ]poor nutritional intake [ ]anasarca[ ]Artificial Nutrition    Other REFERRALS:  [ ]Hospice  [ ]Child Life  [ ]Social Work  [ ]Case management [ ]Holistic Therapy     -------------------------------------------------------------------------------------------------------  Goals of Care Document: [facility] Geriatrics and Palliative Care  Adelia Gonzalez, Palliative Care Nurse Practitioner  Contact Info: Page [phone] (Including Nights/Weekends), Message on Microsoft Teams (Adelia Gonzalez), or leave VM at Palliative Office [phone] (non-urgent)    Date of Service 03-13-24 @ 11:10    SUBJECTIVE AND OBJECTIVE:  Indication for Geriatrics and Palliative Care Services/INTERVAL HPI: Symptom management   Pt seen this AM resting in bed, pt shared pain is better controlled with MS contin. Denies any pain or nausea currently. Discussed transitioning all symptom medications to orals, pt in agreement.     OVERNIGHT EVENTS: Pt required PRN IV Dilaudid 0.5mg x 3 and PO Dilaudid 2mg x 1 within 24 hrs. No episodes of vomiting.     DNR on chart:DNI: Trial NIV  DNI: Trial NIV      Allergies    gabapentin (Other)  contrast media (iodine-based) (Other)  Benadryl (Other)    Intolerances    Taxol (Other)  MEDICATIONS  (STANDING):  bisacodyl Suppository 10 milliGRAM(s) Rectal once  chlorhexidine 2% Cloths 1 Application(s) Topical daily  enoxaparin Injectable 40 milliGRAM(s) SubCutaneous every 24 hours  fluticasone propionate 50 MICROgram(s)/spray Nasal Spray 1 Spray(s) Both Nostrils two times a day  levothyroxine 100 MICROGram(s) Oral daily  metoclopramide 10 milliGRAM(s) Oral three times a day  mirtazapine 15 milliGRAM(s) Oral at bedtime  morphine ER Tablet 15 milliGRAM(s) Oral two times a day  pantoprazole    Tablet 40 milliGRAM(s) Oral before breakfast  piperacillin/tazobactam IVPB.. 3.375 Gram(s) IV Intermittent every 8 hours  polyethylene glycol 3350 17 Gram(s) Oral every 12 hours  senna 2 Tablet(s) Oral two times a day  sodium chloride 1 Gram(s) Oral two times a day  valsartan 40 milliGRAM(s) Oral daily    MEDICATIONS  (PRN):  acetaminophen     Tablet .. 650 milliGRAM(s) Oral every 6 hours PRN Mild Pain (1 - 3)  albuterol    90 MICROgram(s) HFA Inhaler 2 Puff(s) Inhalation every 6 hours PRN Shortness of Breath and/or Wheezing  aluminum hydroxide/magnesium hydroxide/simethicone Suspension 30 milliLiter(s) Oral every 4 hours PRN Dyspepsia  benzonatate 200 milliGRAM(s) Oral every 8 hours PRN Cough  cyclobenzaprine 5 milliGRAM(s) Oral every 8 hours PRN Muscle Spasm  guaiFENesin Oral Liquid (Sugar-Free) 100 milliGRAM(s) Oral every 8 hours PRN Cough  HYDROmorphone   Tablet 2 milliGRAM(s) Oral every 4 hours PRN Severe Pain (7 - 10)  melatonin 3 milliGRAM(s) Oral at bedtime PRN Insomnia    -------------------------------------------------------------------------------------------------------  ITEMS UNCHECKED ARE NOT PRESENT    PRESENT SYMPTOMS: [ ]Unable to self-report - see [ ] CPOT [ ] PAINADS [ ] RDOS  Source if other than patient:  [ ]Family   [ ]Team       Pain: [ X]yes [ ]no  QOL impact -   Location - BL shoulders/pelvis            Aggravating factors - movement   Quality - sharp  Radiation - across upper back  Timing- intermittent   Severity (0-10 scale):3/10   Minimal acceptable level (0-10 scale)/Pain goal: 3    CPOT:    https://www.[facility].org/getattachment/[email or web address]-[email or web address]/Critical-Care-Pain-Observation-Tool-(CPOT)    PAINAD Score: See PAINAD tool and score below       RDOS: See RDOS tool and score below   0 to 2  minimal or no respiratory distress   3  mild distress  4 to 6 moderate distress  >7 severe distress    Dyspnea:                           [ ]Mild [ ]Moderate [ ]Severe  Anxiety:                             [ ]Mild [ ]Moderate [ ]Severe  Fatigue:                             [ ]Mild [ ]Moderate [ ]Severe  Nausea:                             [ ]Mild [ ]Moderate [ ]Severe  Loss of appetite:              [ ]Mild [ ]Moderate [ ]Severe  Constipation:                    [ ]Mild [ ]Moderate [ ]Severe  Other Symptoms:  [X ]All other review of systems negative     Home Medications for Symptoms if present:    I Stop Reference no:     -------------------------------------------------------------------------------------------------------  PCSSQ[Palliative Care Spiritual Screening Question]   Severity (0-10):  Score of 4 or > indicate consideration of Chaplaincy referral.  Chaplaincy Referral: [ ] yes [ ] refused [ ] following [X ] Deferred     Caregiver [place]? : [ ] yes [ ] no [ ] Deferred [ X] Declined             Social work referral [ ] Patient & Family Centered Care Referral [ ]     Anticipatory Grief present?:  [ ] yes [ ] no  [X ] Deferred                  Social work referral [ ] Chaplaincy Referral [ ]    -------------------------------------------------------------------------------------------------------  PHYSICAL EXAM:  Vital Signs Last 24 Hrs  T(C): 37.1 (13 Mar 2024 07:02), Max: 37.1 (12 Mar 2024 21:09)  T(F): 98.8 (13 Mar 2024 07:02), Max: 98.8 (12 Mar 2024 21:09)  HR: 110 (13 Mar 2024 07:02) (93 - 112)  BP: 139/88 (13 Mar 2024 07:02) (106/70 - 139/88)  BP(mean): --  RR: 18 (13 Mar 2024 07:02) (16 - 18)  SpO2: 95% (13 Mar 2024 07:02) (93% - 98%)    Parameters below as of 13 Mar 2024 07:02  Patient On (Oxygen Delivery Method): room air     I&O's Summary       GENERAL:  [ ]Cachexia  [ X]Alert  [ X]Oriented x 4  [ ]Lethargic  [ ]Unarousable  [X ]Verbal  [ ]Non-Verbal    Behavioral:   [ ] Anxiety  [ ] Delirium [ ] Agitation [X ] Other    HEENT:  [X ]Normal   [ ]Dry mouth   [ ]ET Tube/Trach  [ ]Oral lesions    PULMONARY:   [ X]Clear [ ]Tachypnea  [ ]Audible excessive secretions   [ ]Rhonchi        [ ]Right [ ]Left [ ]Bilateral  [ ]Crackles        [ ]Right [ ]Left [ ]Bilateral  [ ]Wheezing     [ ]Right [ ]Left [ ]Bilateral  [ ]Diminished breath sounds [ ]right [ ]left [ ]bilateral    CARDIOVASCULAR:    [X ]Regular [ ]Irregular [ ]Tachy  [ ]Tristen [ ]Murmur [ ]Other    GASTROINTESTINAL:  [X ]Soft  [ ]Distended   [X ]+BS  [ X]Non tender [ ]Tender  [ ]Other [ ]PEG [ ]OGT/ NGT  Last BM: 3/13    GENITOURINARY:  [X ]Normal [ ] Incontinent   [ ]Oliguria/Anuria   [ ][place]    MUSCULOSKELETAL:   [ ]Normal   [x ]Weakness  [ ]Bed/Wheelchair bound [ ]Edema    NEUROLOGIC:   [ x]No focal deficits  [ ]Cognitive impairment  [ ]Dysphagia [ ]Dysarthria [ ]Paresis [ ]Other     SKIN:   [x ]Normal  [ ]Rash  [ ]Other  [ ]Pressure ulcer(s)       Present on admission [ ]y [ ]n  -------------------------------------------------------------------------------------------------------  CRITICAL CARE:  [ ]Shock Present  [ ]Septic [ ]Cardiogenic [ ]Neurologic [ ]Hypovolemic  [ ]Vasopressors [ ]Inotropes  [ ]Respiratory failure present [ ]Mechanical Ventilation [ ]Non-invasive ventilatory support [ ]High-Flow   [ ]Acute  [ ]Chronic [ ]Hypoxic  [ ]Hypercarbic [ ]Other  [ ]Other organ failure     -------------------------------------------------------------------------------------------------------  LABS:                        8.9    13.77 )-----------( 354      ( 13 Mar 2024 08:10 )             27.2   03-13    132<L>  |  91<L>  |  17  ----------------------------<  107<H>  4.2   |  25  |  1.12    Ca    9.2      13 Mar 2024 08:10  Phos  3.4     03-13  Mg     1.70     03-13    TPro  6.3  /  Alb  2.7<L>  /  TBili  0.5  /  DBili  x   /  AST  34<H>  /  ALT  12  /  AlkPhos  223<H>  03-13      Urinalysis Basic - ( 13 Mar 2024 08:10 )    Color: x / Appearance: x / SG: x / pH: x  Gluc: 107 mg/dL / Ketone: x  / Bili: x / Urobili: x   Blood: x / Protein: x / Nitrite: x   Leuk Esterase: x / RBC: x / WBC x   Sq Epi: x / Non Sq Epi: x / Bacteria: x  -------------------------------------------------------------------------------------------------------  RADIOLOGY & ADDITIONAL STUDIES: < from: Xray Chest 1 View- PORTABLE-Urgent (Xray Chest 1 View- PORTABLE-Urgent .) (03.11.24 @ 00:41) >    IMPRESSION:  Left apical mass better assessed on chest CT scan.  New left basilar opacities may reflect pneumonia or atelectasis.    --- End of Report ---    -------------------------------------------------------------------------------------------------------  Protein Calorie Malnutrition Present: [ ]mild [ ]moderate [ ]severe [ ]underweight [ ]morbid obesity  https://www.[employer].org/[employer]/1290/web/files/ONC/Table_Clinical%20Characteristics%20to%20Document%20Malnutrition-White%20JV%20et%20al%202012.pdf    Height (cm): 154.9 (02-27-24 @ 18:17), 154.9 (02-16-24 @ 15:10), 152.4 (02-01-24 @ 16:00)  Weight (kg): 49.9 (02-27-24 @ 18:17), 54.8 (02-18-24 @ 06:15), 51.5 (02-01-24 @ 16:00)  BMI (kg/m2): 20.8 (02-27-24 @ 18:17), 22.8 (02-18-24 @ 06:15), 21.5 (02-16-24 @ 15:10)    Palliative Performance Status Version 2:   See PPSv2 tool and score below       [ ]PPSV2 < or = 30%  [ ]significant weight loss [ ]poor nutritional intake [ ]anasarca[ ]Artificial Nutrition    Other REFERRALS:  [ ]Hospice  [ ]Child Life  [ ]Social Work  [ ]Case management [ ]Holistic Therapy     -------------------------------------------------------------------------------------------------------  Goals of Care Document:

## 2024-03-13 NOTE — PROGRESS NOTE ADULT - SUBJECTIVE AND OBJECTIVE BOX
Hospitalist Progress Note  Nereyda Comer MD Pager # [phone]    OVERNIGHT EVENTS: KAREN    SUBJECTIVE / INTERVAL HPI: Patient seen and examined at bedside. Patient is sleeping and comfortable.     VITAL SIGNS:  Vital Signs Last 24 Hrs  T(C): 36.8 (13 Mar 2024 13:10), Max: 37.1 (12 Mar 2024 21:09)  T(F): 98.2 (13 Mar 2024 13:10), Max: 98.8 (12 Mar 2024 21:09)  HR: 75 (13 Mar 2024 13:10) (75 - 112)  BP: 120/62 (13 Mar 2024 13:10) (120/62 - 139/88)  BP(mean): --  RR: 18 (13 Mar 2024 13:10) (16 - 18)  SpO2: 96% (13 Mar 2024 13:10) (93% - 96%)    Parameters below as of 13 Mar 2024 13:10  Patient On (Oxygen Delivery Method): room air        PHYSICAL EXAM:    General: Comfortable  HEENT: NC/AT; PERRL, anicteric sclera; MMM  Neck: supple  Cardiovascular: +S1/S2; RRR  Respiratory: rhonchorus lungs bilaterally  Gastrointestinal: soft, NT/ND; +BSx4  Extremities: WWP; no edema, clubbing or cyanosis  Vascular: 2+ radial, DP/PT pulses B/L  Neurological: AAOx3; no focal deficits    MEDICATIONS:  MEDICATIONS  (STANDING):  bisacodyl Suppository 10 milliGRAM(s) Rectal once  chlorhexidine 2% Cloths 1 Application(s) Topical daily  enoxaparin Injectable 40 milliGRAM(s) SubCutaneous every 24 hours  fluticasone propionate 50 MICROgram(s)/spray Nasal Spray 1 Spray(s) Both Nostrils two times a day  levothyroxine 100 MICROGram(s) Oral daily  metoclopramide 10 milliGRAM(s) Oral three times a day  mirtazapine 15 milliGRAM(s) Oral at bedtime  morphine ER Tablet 15 milliGRAM(s) Oral two times a day  pantoprazole    Tablet 40 milliGRAM(s) Oral before breakfast  piperacillin/tazobactam IVPB.. 3.375 Gram(s) IV Intermittent every 8 hours  polyethylene glycol 3350 17 Gram(s) Oral every 12 hours  senna 2 Tablet(s) Oral two times a day  sodium chloride 1 Gram(s) Oral two times a day  valsartan 40 milliGRAM(s) Oral daily    MEDICATIONS  (PRN):  acetaminophen     Tablet .. 650 milliGRAM(s) Oral every 6 hours PRN Mild Pain (1 - 3)  albuterol    90 MICROgram(s) HFA Inhaler 2 Puff(s) Inhalation every 6 hours PRN Shortness of Breath and/or Wheezing  aluminum hydroxide/magnesium hydroxide/simethicone Suspension 30 milliLiter(s) Oral every 4 hours PRN Dyspepsia  benzonatate 200 milliGRAM(s) Oral every 8 hours PRN Cough  cyclobenzaprine 5 milliGRAM(s) Oral every 8 hours PRN Muscle Spasm  guaiFENesin Oral Liquid (Sugar-Free) 100 milliGRAM(s) Oral every 8 hours PRN Cough  HYDROmorphone   Tablet 2 milliGRAM(s) Oral every 4 hours PRN Severe Pain (7 - 10)  melatonin 3 milliGRAM(s) Oral at bedtime PRN Insomnia      ALLERGIES:  Allergies    gabapentin (Other)  contrast media (iodine-based) (Other)  Benadryl (Other)    Intolerances    Taxol (Other)      LABS:                        8.9    13.77 )-----------( 354      ( 13 Mar 2024 08:10 )             27.2     03-13    132<L>  |  91<L>  |  17  ----------------------------<  107<H>  4.2   |  25  |  1.12    Ca    9.2      13 Mar 2024 08:10  Phos  3.4     03-13  Mg     1.70     03-13    TPro  6.3  /  Alb  2.7<L>  /  TBili  0.5  /  DBili  x   /  AST  34<H>  /  ALT  12  /  AlkPhos  223<H>  03-13      Urinalysis Basic - ( 13 Mar 2024 08:10 )    Color: x / Appearance: x / SG: x / pH: x  Gluc: 107 mg/dL / Ketone: x  / Bili: x / Urobili: x   Blood: x / Protein: x / Nitrite: x   Leuk Esterase: x / RBC: x / WBC x   Sq Epi: x / Non Sq Epi: x / Bacteria: x      CAPILLARY BLOOD GLUCOSE          RADIOLOGY & ADDITIONAL TESTS: Reviewed.    ASSESSMENT:    PLAN:

## 2024-03-13 NOTE — PROGRESS NOTE ADULT - ASSESSMENT
incomplete note - full to follow  > Pt shared pain has improved wt MS contin 15mg BID  > Transition IV Dilaudid & IV Reglan to PO.  68F with hx of uterine cancer with mets to liver and lung (pulmonary bronchial and pulmonary artery compression from mediastinal lymphadenopathy), CVA with no residual deficits, HTN who presents with nausea and inability to tolerate PO. Patient was recently hospitalized for dyspnea 1 week prior, discharged home, has been about the same but recently with worsening nausea to the point she cannot eat. No vomiting or diarrhea. Abdominal pain is at baseline. She tried PO zofran with minimal relief. She denies fevers, chills, cp, dysuria, sick contacts recent travel. Palliative consulted for symptom management moderate assist (50% patients effort)

## 2024-03-13 NOTE — PROGRESS NOTE ADULT - ASSESSMENT
69 yo woman with h/o HTN, CVA, hypothyroidism, OP, stage II endometrial cancer > dx/treated in 7/201- s/p robotic assisted TLH BSO, bilateral pelvic and para-aortic sentinel LN dissection by Dr. Paras Grayson on 7/2/2018, followed by pelvic IMRT and vaginal cuff brachytherapy with Dr. Bowling in 10/74008; dx with met recurrent in the lungs in 10/2019 but was lost to fu until 7/2020; progressed after Carbo/Taxol > palliative RT to the vaginal mass and cuff area 2000cGy/5fxs  from 5/25/21-6/1/21 > Pembro/Lenvima (tx course c/b ICI-related colitis) > Doxil > Abraxan/Avastin > most recently on Docetaxel monotherapy (last given 2/2024).  She was admitted to [facility] on 2/27 with progressive nausea and generalized weakness with admission CT a/p confirming POD; admission notable for mild leukocytosis. Pt's hospital course also currently c/b cancer-related pain and hyponatremia.    ACTIVE PROBLEMS  Met endometrial cancer  Goals of care discussion, counseling  Cancer-related pain  Nausea without vomiting  R hydroureteronephrosis  MANJU  Hyponatremia  2/2 SIADH  Anemia of chronic disease  Hypothyroidism  HTN  Hypercoag state    - Met endometrial cancer  - GOC discussion, counseling  Pt indicated to Pall Care team on 2/28 that she is no longer interested in pursuing systemic cancer treatment  She consented to DNR/DNI code status and previously expressed interest in inpt hospice placement, citing that she does not want to die alone at home; however, on 3/7 she noted that she no longer wants to pursue hospice and she wants to resume cancer-directed tx  Will discuss options for future cancer tx with pt's outpt Gyn Onc  Pt's long-term prognosis is poor    - Cancer-related pain  Continuing Toradol 30mg q6/prn x5d course (until 3/7) for inflammatory pain (with ppi ppx)  Continuing Dilaudid PO 2mg q6/prn for mild pain  Continuing Dilaudid IV 0.4mg q6/prn for mod-sev pain  Continuing Flexiril 5mg q8/prn  Continuing bowel regimen to prevent OIC  Appreciate Rad Onc consult: pt to complete inpt RT to mediastinal met, 20Gy/5fxs, 3/6-14    - PNA  Patient with a wet cough that was not improving and was started on empiric Zosyn. CXR showing new l. basilar opacity and atelectasis. Likely 2/ 2 immobility.   - C/w Zosyn for 7 day course - can transition to oral.   - Encouraged the use of incentive spirometry to help improve atelectasis    - Nausea without vomiting  Improved  Likely iatrogenic from medications (opioids)  Admission imaging negative for ileus/obstruction  Zofran dced; starting Reglan IV 10mg q8/prn  Repleting lytes prn    - R hydroureteronephrosis  Cr relatively stable  Pt still with sufficient UOP  No role for stent, PCN at this time  Planning to hold ARB and check urine lytes if Cr worsens  Monitoring UOP    - Hyponatremia 2/2 SIADH  Na improved to 133, range 130-32  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  Pt is relatively asymptomatic  Continuing Salt tab 1gm BID  Starting 1L fluid restriction    - Anemia of chronic disease  Hgb stable; pt remains without s/s of active bleeding  3/4 iron studies more c/w ACD; labs not otherwise c/w hemolysis  Transfusing supportively prn (goal hgb > 7; plts > 10K)    - Hypothyroidism: 3/4 TFTs wnl; continuing LT4 100mcg daily    - HTN: continuing Valsartan 40mg daily    - Hypercoag state: continuing lovenox ppx

## 2024-03-14 NOTE — PHYSICAL THERAPY INITIAL EVALUATION ADULT - STANDING BALANCE: STATIC
poor balance Pt required maximal assistance to maintain static standing balance with significant retropulsion/poor balance

## 2024-03-14 NOTE — PROGRESS NOTE ADULT - PROBLEM SELECTOR PROBLEM 1
Exertional dyspnea
Exertional dyspnea
Endometrial cancer

## 2024-03-14 NOTE — PROGRESS NOTE ADULT - PROBLEM SELECTOR PLAN 3
> Pt c/o intermittent BL shoulder pain. Denies pain during encounter  > Pt c/o worsening pain over the weekend, primary team added IV dilaudid 0.2mg Q6, later increased to 0.4mg Q6. Pt shared dose increase is helpful however around 5hr avery feels like she has to wait the extra hour for her next dose. Discussed liberating frequency to Q4 hrs PRN. pt in agreement. Discussed plan with ACP Saima.   > Can continue with PRN Toradol 30mg Q6 for breakthrough pain  > Discussed transition to PO when closer to dc, pt verbalized understanding.  > Bowel regimen while on opiates
Pt c/o intermittent BL shoulder pain & pelvic pain  Used 7 PRNs IV in the past 24hrs  Would recommend:  - Increase IV Dilaudid to 0.5mg increase frequency to q3hrs PRN for severe pain  - Continue with Dilaudid 2mg PO q4hrs PRN for moderate pain, pt might need long acting medication, still nausea, pt indicates that the pills she's taking are crushed, long acting meds not able to crush, will consider options once pts nausea is better controlled.  - Continue with Flexeril 5mg Q8 hrs PRN  - Bowel regimen while on opiates
> Pt c/o intermittent BL shoulder pain & pelvic pain. Denies pain during encounter. Shared IV Dilaudid 0.5mg is helpful  > Pt in agreement to initiate MS contin 15mg BID for long acting pain control.   > c/w IV Dilaudid to 0.5mg q3hrs PRN for severe pain & Dilaudid 2mg PO q4hrs PRN for moderate pain  > Continue with Flexeril 5mg Q8 hrs PRN  > Bowel regimen while on opiates
> Pt c/o intermittent BL shoulder pain & pelvic pain  > 3/5: Pt shared pain was well controlled on IV Dilaudid 0.4mg, last ~ 4/5 hours. Pt was able to tolerate RT yesterday without incident. Pt denied pain during encounter this AM. ~1230 received call from RN stating pt was not comfortable going to RT 2/2 pain/discomfort, PRN Dilaudid dose given 30 minutes prior. Discussed wt Dr. Sow addition of PRN flexeril 5mg Q8 hrs.   dose.   > c/w IV Dilaudid 0.4mg Q4 hrs, PRN Toradol 30mg Q6 for breakthrough pain, PRN Flexeril 5mg Q8 hrs.  > Discussed transition to PO when closer to dc, pt verbalized understanding.  > Bowel regimen while on opiates
> Pt c/o intermittent BL shoulder pain & pelvic pain. Denies pain during encounter.  > c/w MS contin 15mg BID for long acting pain control.   > Transitioned IV Dilaudid to 2mg PO Q5 hrs for severe pain  > Continue with Flexeril 5mg Q8 hrs PRN  > Bowel regimen while on opiates
> Pt c/o intermittent BL shoulder pain & pelvic pain  > 3/5: Pt shared pain was well controlled on IV Dilaudid 0.4mg, last ~ 4/5 hours. Pt was able to tolerate RT yesterday without incident. Pt denied pain during encounter this AM. ~1230 received call from RN stating pt was not comfortable going to RT 2/2 pain/discomfort, PRN Dilaudid dose given 30 minutes prior. Discussed wt Dr. Sow addition of PRN flexeril 5mg Q8 hrs.   dose.   > 3/7: Pt states pain remains controlled on IV dilaudid 0.4mg. Pt took PO dilaudid 2mg with good relief. Once closer to DC will transition IV to PO, pt in agreement.   > c/w IV Dilaudid 0.4mg Q4 hrs, PRN Toradol 30mg Q6 for breakthrough pain, PRN Flexeril 5mg Q8 hrs.  > Bowel regimen while on opiates.
> Pt c/o intermittent BL shoulder pain & pelvic pain  > 3/5: Pt shared pain was well controlled on IV Dilaudid 0.4mg, last ~ 4/5 hours. Pt was able to tolerate RT yesterday without incident. Pt denied pain during encounter this AM. ~1230 received call from RN stating pt was not comfortable going to RT 2/2 pain/discomfort, PRN Dilaudid dose given 30 minutes prior. Discussed wt Dr. Sow addition of PRN flexeril 5mg Q8 hrs.   dose.   > 3/7: Pt states pain remains controlled on IV dilaudid 0.4mg. Pt took PO dilaudid 2mg with good relief. Once closer to DC will transition IV to PO, pt in agreement.   > c/w IV Dilaudid 0.4mg Q4 hrs, PRN Toradol 30mg Q6 for breakthrough pain, PRN Flexeril 5mg Q8 hrs.  > Bowel regimen while on opiates
> Pt c/o intermittent BL shoulder pain & pelvic pain. Denies pain during encounter.  > c/w MS contin 15mg BID for long acting pain control, & PRN Dilaudid 2mg PO Q4 hrs.   > Continue with Flexeril 5mg Q8 hrs PRN  > Bowel regimen while on opiates
headache

## 2024-03-14 NOTE — PHYSICAL THERAPY INITIAL EVALUATION ADULT - GENERAL OBSERVATIONS, REHAB EVAL
Upon entry, pt received laying semisupine in bed in NAD; + Primafit Catheter, + IV. Pt HR 79 BPM. Pt left as received laying semisupine in bed in NAD, call bell in reach, & with all lines and tubes intact. Upon entry, pt received laying semisupine in bed in NAD; + Primafit Catheter, + IV. Pt HR 79 BPM. Pt left as received laying semisupine in bed in NAD, call bell in reach, bed alarm on, & with all lines and tubes intact.

## 2024-03-14 NOTE — PHYSICAL THERAPY INITIAL EVALUATION ADULT - BALANCE DISTURBANCE, IDENTIFIED IMPAIRMENT CONTRIBUTE, REHAB EVAL
Impaired Endurance/impaired coordination/impaired motor control/impaired postural control/decreased strength Impaired endurance/impaired coordination/impaired motor control/impaired postural control/decreased strength

## 2024-03-14 NOTE — PHYSICAL THERAPY INITIAL EVALUATION ADULT - LEVEL OF INDEPENDENCE: BED TO CHAIR, REHAB EVAL
maximum assist (25% patients effort)/dependent (less than 25% patients effort) maximum assist (25% patients effort)

## 2024-03-14 NOTE — PROGRESS NOTE ADULT - SUBJECTIVE AND OBJECTIVE BOX
SOLID TUMOR ONCOLOGY HOSPITALIST PROGRESS NOTE    S: No acute events overnight.  Pt had no new complaints this am.    CURRENT MEDICATIONS  MEDICATIONS  (STANDING):  chlorhexidine 2% Cloths 1 Application(s) Topical daily  enoxaparin Injectable 40 milliGRAM(s) SubCutaneous every 24 hours  fluticasone propionate 50 MICROgram(s)/spray Nasal Spray 1 Spray(s) Both Nostrils two times a day  levothyroxine 100 MICROGram(s) Oral daily  metoclopramide 10 milliGRAM(s) Oral three times a day  mirtazapine 15 milliGRAM(s) Oral at bedtime  morphine ER Tablet 15 milliGRAM(s) Oral two times a day  pantoprazole    Tablet 40 milliGRAM(s) Oral before breakfast  piperacillin/tazobactam IVPB.. 3.375 Gram(s) IV Intermittent every 8 hours  polyethylene glycol 3350 17 Gram(s) Oral every 12 hours  senna 2 Tablet(s) Oral two times a day  sodium chloride 1 Gram(s) Oral two times a day  valsartan 40 milliGRAM(s) Oral daily  MEDICATIONS  (PRN):  acetaminophen     Tablet .. 650 milliGRAM(s) Oral every 6 hours PRN Mild Pain (1 - 3)  albuterol    90 MICROgram(s) HFA Inhaler 2 Puff(s) Inhalation every 6 hours PRN Shortness of Breath and/or Wheezing  aluminum hydroxide/magnesium hydroxide/simethicone Suspension 30 milliLiter(s) Oral every 4 hours PRN Dyspepsia  benzonatate 200 milliGRAM(s) Oral every 8 hours PRN Cough  cyclobenzaprine 5 milliGRAM(s) Oral every 8 hours PRN Muscle Spasm  guaiFENesin Oral Liquid (Sugar-Free) 100 milliGRAM(s) Oral every 8 hours PRN Cough  HYDROmorphone   Tablet 2 milliGRAM(s) Oral every 4 hours PRN Severe Pain (7 - 10)  melatonin 3 milliGRAM(s) Oral at bedtime PRN Insomnia      PHYSICAL EXAM  T(C): 37.1 (03-14-24 @ 12:42), Max: 37.1 (03-14-24 @ 12:42)  HR: 99 (03-14-24 @ 12:42) (79 - 108)  BP: 120/62 (03-14-24 @ 12:42) (112/70 - 120/62)  RR: 18 (03-14-24 @ 12:42) (18 - 18)  SpO2: 91% (03-14-24 @ 12:42) (91% - 92%)    03-13-24 @ 07:01  -  03-14-24 @ 07:00  --------------------------------------------------------  IN: 200 mL / OUT: 0 mL / NET: 200 mL  non-toxic appearing woman, sitting stably on edge of bed, nad  Anicteric sclera, no oral lesions/thrush  RRR, no m/r/g  CTAB, no w/c/r  Abd soft, mildly tender on palpation of epigastrum and RUQ, no palpable masses/organomegaly  No peripheral edema  CN 2-12 grossly intact; no gross focal neuro deficits      LABS                        8.6    12.73 )-----------( 368      ( 14 Mar 2024 07:20 )             26.8     03-14    132<L>  |  93<L>  |  21  ----------------------------<  110<H>  4.1   |  24  |  1.26    Ca    9.0      14 Mar 2024 07:20  Phos  3.4     03-14  Mg     1.90     03-14    TPro  6.3  /  Alb  2.5<L>  /  TBili  0.5  /  DBili  x   /  AST  20  /  ALT  7   /  AlkPhos  187<H>  03-14    TSH 2/17 0.39  3/4 TSH 2.44, fT4 1.3    MICROBIOLOGY  3/11 procal 0.26  Abx  Zosyn 3/11-present (7d course)    Cx Data  3/11 RSV, FLU A/B, Covid: negative  3/2 MRSA/MSSA screen negative      PERTINENT RADIOLOGY  3/11 CXR: Left apical mass better assessed on chest CT scan.  New left basilar opacities may reflect pneumonia or atelectasis.    3/10 AXR: Large stool burden suggesting constipation. No evidence of acute   obstruction.    2/28 CT Abd/pelv: Severe R hydronephrosis and mild hydroureter to the level of a soft tissue lesion w/ calcification 2.2x1.6cm proximal to the ureterovesicular junction, inseparable from R vaginal cuff, increased in size. New pulm nodule increased in size w/ multiple hepatic met and abdominal soft tissue mets.

## 2024-03-14 NOTE — PROGRESS NOTE ADULT - PROBLEM SELECTOR PROBLEM 4
Endometrial cancer
Constipation
Endometrial cancer
Constipation
Adult failure to thrive
Constipation

## 2024-03-14 NOTE — PROGRESS NOTE ADULT - PROVIDER SPECIALTY LIST ADULT
Heme/Onc
Heme/Onc
Hospitalist
Palliative Care
Palliative Care
Heme/Onc
Hospitalist
Palliative Care
Heme/Onc
Heme/Onc
Hospitalist
Palliative Care
Heme/Onc
Heme/Onc
Hospitalist
Heme/Onc
Heme/Onc
Palliative Care

## 2024-03-14 NOTE — PROGRESS NOTE ADULT - PROBLEM SELECTOR PROBLEM 2
Nausea and/or vomiting
Lung mass
Nausea and/or vomiting
Nausea and/or vomiting
Lung mass
Nausea and/or vomiting

## 2024-03-14 NOTE — OCCUPATIONAL THERAPY INITIAL EVALUATION ADULT - PERTINENT HX OF CURRENT PROBLEM, REHAB EVAL
Patient is a 68 year old female presenting with progressive nausea and generalized weakness. CT notable for mild leukocytosis. Pt's hospital course also currently c/b cancer-related pain and hyponatremia. PMHx: HTN, CVA, hypothyroidism, OP, stage II endometrial cancer > dx/treated in 7/201- s/p robotic assisted TLH BSO, bilateral pelvic and para-aortic sentinel LN dissection by Dr. Paras Grayson on 7/2/2018, followed by pelvic IMRT and vaginal cuff brachytherapy with Dr. Bowling in 10/42066; dx with met recurrent in the lungs in 10/2019 but was lost to fu until 7/2020; progressed after Carbo/Taxol > palliative RT to the vaginal mass and cuff area 2000cGy/5fxs  from 5/25/21-6/1/21 > Pembro/Lenvima (tx course c/b ICI-related colitis) > Doxil > Abraxan/Avastin > most recently on Docetaxel monotherapy (last given 2/2024).
1

## 2024-03-14 NOTE — OCCUPATIONAL THERAPY INITIAL EVALUATION ADULT - LIVES WITH, PROFILE
Patient lives alone in a private home with 1 step to enter and +1 flight to the 2nd level of the home/alone

## 2024-03-14 NOTE — PROGRESS NOTE ADULT - PROBLEM SELECTOR PLAN 5
> 2/2 metastatic CA  > On Remeron 15mg PO QHS  > Pt shared she tolerated diet this AM, no n/v.
Thank you for allowing us to participate in your patient's care. We will continue to follow with you. Please page [phone] for any q's or c's. The Geriatric and Palliative Medicine service has coverage 24 hours a day/ 7 days a week to provide medical recommendations regarding symptom management needs via telephone.  > DNR/DNI   > Educated family on the philosophy of hospice care and explained the various settings and criteria in which hospice can be delivered (home vs. nursing home vs. inpatient hospice). Discussed the services provided under hospice services emphasizing the goal of elevating patient's quality of life and optimizing symptom management and avoiding unnecessary future hospitalizations. In addition to symptom management expertise, hospice provides supportive counseling services, nutritional support and chaplaincy services. Pt shared she does not want to go home, educated pt on criteria for inpatient hospice. Will continue to assess pt symptoms / needs, dispo pending hospital course.
2/2 metastatic CA  On Remeron 15mg PO QHS  Pt with uncontrolled symptoms might be contributing to her lack of appetite
> 2/2 metastatic CA  > On Remeron 15mg PO QHS  > Pt shared she tolerated diet this AM, no n/v.
> 2/2 metastatic CA  > Can consider RD consult  > PPSV 60%-70% - needs assistance with ambulation at times. Can consider PT consult for possible rehab wt transition TLC wt hospice since pt does not want to go home?
> 2/2 metastatic CA  > On Remeron 15mg PO QHS  > Pt shared she tolerated diet this AM, no n/v.
> 2/2 metastatic CA  > Can consider RD consult  > PPSV 60%-70% - needs assistance with ambulation at times. Can consider PT consult for possible rehab wt transition TLC wt hospice since pt does not want to go home?
> 2/2 metastatic CA  > Nausea much improved since admission   > Can consider RD consult  > PPSV 60%-70% - needs assistance with ambulation at times. Can consider PT consult for possible rehab wt transition TLC wt hospice since pt does not want to go home?

## 2024-03-14 NOTE — OCCUPATIONAL THERAPY INITIAL EVALUATION ADULT - GENERAL OBSERVATIONS, REHAB EVAL
Upon entry, patient semi-supine in bed, patient agreeable to OT eval, cleared for OT evaluation as per JADA aragon. Patient left semi-supine in bed, call bell in reach, all lines/tubes intact, bed alarm activated, vitals stable, NAD, all precautions maintained, RN made aware

## 2024-03-14 NOTE — PROGRESS NOTE ADULT - PROBLEM SELECTOR PLAN 6
Thank you for allowing us to participate in your patient's care. We will continue to follow with you. Please page [phone] for any q's or c's. The Geriatric and Palliative Medicine service has coverage 24 hours a day/ 7 days a week to provide medical recommendations regarding symptom management needs via telephone.  > DNR/DNI   > Educated family on the philosophy of hospice care and explained the various settings and criteria in which hospice can be delivered (home vs. nursing home vs. inpatient hospice). Discussed the services provided under hospice services emphasizing the goal of elevating patient's quality of life and optimizing symptom management and avoiding unnecessary future hospitalizations. In addition to symptom management expertise, hospice provides supportive counseling services, nutritional support and chaplaincy services. Pt shared she does not want to go home, educated pt on criteria for inpatient hospice. Will continue to assess pt symptoms / needs, dispo pending hospital course.
Thank you for allowing us to participate in your patient's care. We will continue to follow with you. Please page [phone] for any q's or c's. The Geriatric and Palliative Medicine service has coverage 24 hours a day/ 7 days a week to provide medical recommendations regarding symptom management needs via telephone.  > DNR/DNI with NIV.   > Now interested in hospice, however pending PT consult at this time
Pt is DNR/DNI with a trial of non invasive ventilation.   Will continue to follow  Page for uncontrolled symptoms [phone]
Thank you for allowing us to participate in your patient's care. We will continue to follow with you. Please page [phone] for any q's or c's. The Geriatric and Palliative Medicine service has coverage 24 hours a day/ 7 days a week to provide medical recommendations regarding symptom management needs via telephone.  > DNR/DNI with NIV.   > Now interested in hospice, referral to [facility] - pt refusing. Sent to [facility].
Thank you for allowing us to participate in your patient's care. We will continue to follow with you. Please page [phone] for any q's or c's. The Geriatric and Palliative Medicine service has coverage 24 hours a day/ 7 days a week to provide medical recommendations regarding symptom management needs via telephone.  > DNR/DNI   > Educated family on the philosophy of hospice care and explained the various settings and criteria in which hospice can be delivered (home vs. nursing home vs. inpatient hospice). Discussed the services provided under hospice services emphasizing the goal of elevating patient's quality of life and optimizing symptom management and avoiding unnecessary future hospitalizations. In addition to symptom management expertise, hospice provides supportive counseling services, nutritional support and chaplaincy services. Pt shared she does not want to go home, educated pt on criteria for inpatient hospice. Will continue to assess pt symptoms / needs, dispo pending hospital course.
Thank you for allowing us to participate in your patient's care. We will continue to follow with you. Please page [phone] for any q's or c's. The Geriatric and Palliative Medicine service has coverage 24 hours a day/ 7 days a week to provide medical recommendations regarding symptom management needs via telephone.  > DNR/DNI   > Educated family on the philosophy of hospice care and explained the various settings and criteria in which hospice can be delivered (home vs. nursing home vs. inpatient hospice). Discussed the services provided under hospice services emphasizing the goal of elevating patient's quality of life and optimizing symptom management and avoiding unnecessary future hospitalizations. In addition to symptom management expertise, hospice provides supportive counseling services, nutritional support and chaplaincy services. Pt shared she does not want to go home, educated pt on criteria for inpatient hospice. Will continue to assess pt symptoms / needs, dispo pending hospital course.
Thank you for allowing us to participate in your patient's care. We will continue to follow with you. Please page [phone] for any q's or c's. The Geriatric and Palliative Medicine service has coverage 24 hours a day/ 7 days a week to provide medical recommendations regarding symptom management needs via telephone.  > DNR/DNI with NIV.   > Now interested in hospice.

## 2024-03-14 NOTE — PROGRESS NOTE ADULT - PROBLEM SELECTOR PLAN 2
> Pt denies nausea at this time   > Indicates that medications are working for nausea  > Transitioned IV Reglan to PO 10mg Q8 hrs ATC   > If refractory nausea can add PO Ativan 0.25mg Q6 hrs PRN
> Pt expressed intermittent nausea with no vomiting. Worsening nausea this AM, however no PRN reglan given.   > Discussed transitioning PRN Reglan 10mg IVP to ATC Q8 hrs, pt in agreement  > If up to date EKG shows qtc < 500 can also add PRN Zofran 4mg Q8 hrs for breakthrough.
Pt expressed intermittent nausea with no vomiting.   Indicates that medications are working for nausea  Would recommend:  - Continue with Reglan 10mg IVP to ATC Q8 hrs  - Can consider Ativan 0.25mg IV q6hrs PRN for refractory nausea
> Pt expressed intermittent nausea with no vomiting. No nausea today  > Zofran transition to Reglan over the weekend  > Discussed transitioning to PO when ready for dc  > Pt tolerating PO diet at this time
> Pt expressed intermittent nausea with no vomiting. No nausea today  > Zofran transition to Reglan over the weekend  > Discussed transitioning to PO when ready for dc  > Pt tolerating PO diet at this time
> Pt expressed intermittent nausea with no vomiting. Worsening nausea this AM, however no PRN reglan given.   > Discussed transitioning PRN Reglan 10mg IVP to ATC Q8 hrs, pt in agreement  > If up to date EKG shows qtc < 500 can also add PRN Zofran 4mg Q8 hrs for breakthrough
> Pt expressed intermittent nausea with no vomiting.   > Indicates that medications are working for nausea  > Would recommend: Continue with Reglan 10mg IVP to ATC Q8 hrs. Can consider Ativan 0.25mg IV q6hrs PRN for refractory nausea. Will transition ATC Reglan to PO tomorrow.
> Pt denies nausea at this time   > Indicates that medications are working for nausea  > Transitioned IV Reglan to PO 10mg Q8 hrs ATC   > If refractory nausea can add PO Ativan 0.25mg Q6 hrs PRN

## 2024-03-14 NOTE — PHYSICAL THERAPY INITIAL EVALUATION ADULT - TRANSFER SAFETY CONCERNS NOTED: BED/CHAIR, REHAB EVAL
decreased safety awareness/losing balance/decreased sequencing ability/stand pivot/decreased weight-shifting ability losing balance/decreased sequencing ability/stand pivot/decreased weight-shifting ability

## 2024-03-14 NOTE — PROGRESS NOTE ADULT - PROBLEM SELECTOR PLAN 1
Stage IV  Follows wt Dr. Toy Blank @ [facility]. No longer pursing chemo, however interested in palliative RT while inpt  CT abd/pelvis: Severe right hydronephrosis and mild hydroureter to the level of a soft tissue lesion with calcification measuring 2.2 x 1.6 cm proximal to the ureterovesicular junction and inseparable from the right vaginal cuff. This lesion has increased in size as compared to prior. New pulmonary, increased size and number hepatic, and new abdominal soft tissue metastatic disease as noted above  Getting inpt palliative RT to lung 2/5, pending 3
> Follows wt Dr. Toy Blank @ [facility]. No longer pursing chemo, however interested in palliative RT while inpt  > CT abd/pelvis: Severe right hydronephrosis and mild hydroureter to the level of a soft tissue lesion with calcification measuring 2.2 x 1.6 cm proximal to the ureterovesicular junction and inseparable from the right vaginal cuff. This lesion has increased in size as compared to prior. New pulmonary, increased size and number hepatic, and new abdominal soft tissue metastatic disease as noted above.  > Rad/onc consulted - will offer inpt palliative RT to lung x 5
> Follows wt Dr. Toy Blank @ [facility]. No longer pursing chemo, however interested in palliative RT while inpt  > CT abd/pelvis: Severe right hydronephrosis and mild hydroureter to the level of a soft tissue lesion with calcification measuring 2.2 x 1.6 cm proximal to the ureterovesicular junction and inseparable from the right vaginal cuff. This lesion has increased in size as compared to prior. New pulmonary, increased size and number hepatic, and new abdominal soft tissue metastatic disease as noted above  > Rad/onc consulted - will offer inpt palliative RT to lung x 5, pt refused today
> Follows wt Dr. Toy Blank @ [facility]. No longer pursing chemo, however interested in palliative RT while inpt  > CT abd/pelvis: Severe right hydronephrosis and mild hydroureter to the level of a soft tissue lesion with calcification measuring 2.2 x 1.6 cm proximal to the ureterovesicular junction and inseparable from the right vaginal cuff. This lesion has increased in size as compared to prior. New pulmonary, increased size and number hepatic, and new abdominal soft tissue metastatic disease as noted above  > Rad/onc consulted - will offer inpt palliative RT to lung x 5, pt refused today and not interested in continuing remainder of the sessions  > Pt interested in hospice, dispo pending clinical course, will attempt to control symptoms on oral medications. D/W medical team & HALINA Rene.
.
.
> Follows wt Dr. Toy Blank @ [facility]. No longer pursing chemo, however interested in palliative RT while inpt  > CT abd/pelvis: Severe right hydronephrosis and mild hydroureter to the level of a soft tissue lesion with calcification measuring 2.2 x 1.6 cm proximal to the ureterovesicular junction and inseparable from the right vaginal cuff. This lesion has increased in size as compared to prior. New pulmonary, increased size and number hepatic, and new abdominal soft tissue metastatic disease as noted above  > Rad/onc consulted - will offer inpt palliative RT to lung x 5, pt refused today and not interested in continuing remainder of the sessions  > Pt interested in hospice, dispo pending clinical course, will attempt to control symptoms on oral medications. D/W medical team & HALINA Rene.
> Follows wt Dr. Toy Blank @ [facility]. No longer pursing chemo, however interested in palliative RT while inpt  > CT abd/pelvis: Severe right hydronephrosis and mild hydroureter to the level of a soft tissue lesion with calcification measuring 2.2 x 1.6 cm proximal to the ureterovesicular junction and inseparable from the right vaginal cuff. This lesion has increased in size as compared to prior. New pulmonary, increased size and number hepatic, and new abdominal soft tissue metastatic disease as noted above.  > Rad/onc consulted - will offer inpt palliative RT to lung x 5
> Follows wt Dr. Toy Blank @ [facility]. No longer pursing chemo, however interested in palliative RT while inpt  > CT abd/pelvis: Severe right hydronephrosis and mild hydroureter to the level of a soft tissue lesion with calcification measuring 2.2 x 1.6 cm proximal to the ureterovesicular junction and inseparable from the right vaginal cuff. This lesion has increased in size as compared to prior. New pulmonary, increased size and number hepatic, and new abdominal soft tissue metastatic disease as noted above  > s/p 2 FX RT to lung before patient refused remainder of sessions  > Hospice appropriate
> Follows wt Dr. Toy Blank @ [facility]. No longer pursing chemo, however interested in palliative RT while inpt  > CT abd/pelvis: Severe right hydronephrosis and mild hydroureter to the level of a soft tissue lesion with calcification measuring 2.2 x 1.6 cm proximal to the ureterovesicular junction and inseparable from the right vaginal cuff. This lesion has increased in size as compared to prior. New pulmonary, increased size and number hepatic, and new abdominal soft tissue metastatic disease as noted above  > Rad/onc consulted - will offer inpt palliative RT to lung x 5, pt refused today.

## 2024-03-14 NOTE — PROGRESS NOTE ADULT - PROBLEM SELECTOR PROBLEM 6
Palliative care encounter
Hydronephrosis, right
Hydronephrosis, right
Palliative care encounter

## 2024-03-14 NOTE — PHYSICAL THERAPY INITIAL EVALUATION ADULT - IMPAIRED TRANSFERS: BED/CHAIR, REHAB EVAL
Impaired Endurance/impaired balance/impaired coordination/impaired motor control/narrow base of support/impaired postural control/decreased strength Impaired endurance/impaired balance/impaired coordination/impaired motor control/narrow base of support/impaired postural control/decreased strength

## 2024-03-14 NOTE — OCCUPATIONAL THERAPY INITIAL EVALUATION ADULT - ADDITIONAL COMMENTS
As per patient, independent with ADLs prior to admission, ambulated independently, no DME within the home

## 2024-03-14 NOTE — PHYSICAL THERAPY INITIAL EVALUATION ADULT - IMPAIRMENTS CONTRIBUTING IMPAIRED BED MOBILITY, REHAB EVAL
Impaired Endurance/impaired balance/impaired coordination/impaired motor control/impaired postural control/decreased strength Impaired endurance/impaired balance/impaired coordination/impaired motor control/impaired postural control/decreased strength

## 2024-03-14 NOTE — PROGRESS NOTE ADULT - TIME BILLING
20 mins-Vitals, meds, new results/radiology, interdisciplinary charting reviewed   20 mins-Patient seen and examined and plan discussed, all questions were answered to the best of my ability  20 mins-Charting/documentation and orders.
20 mins-Vitals, meds, new results/radiology, interdisciplinary charting reviewed   20 mins-Patient seen and examined and plan discussed, all questions were answered to the best of my ability  20 mins-Charting/documentation and orders.
Preparing to see the patient including review of tests and other providers' notes, confirming history with patient/family member, performing medical examination and evaluation, counseling and educating the patient/family/caregiver, ordering medications, tests and procedures, communicating with other health care professionals, documenting clinical information in the EMR, independently interpreting results and communicating results to the patient/family/caregiver, care coordination
Review of laboratory data, radiology results, consultants' recommendations, documentation in [place], discussion with patient/advanced care providers and interdisciplinary staff (such as , social workers, etc). Interventions were performed as documented above.
20 mins-Vitals, meds, new results/radiology, interdisciplinary charting reviewed   20 mins-Patient seen and examined and plan discussed, all questions were answered to the best of my ability  20 mins-Charting/documentation and orders.
20 mins-Vitals, meds, new results/radiology, interdisciplinary charting reviewed   20 mins-Patient seen and examined and plan discussed, all questions were answered to the best of my ability  20 mins-Charting/documentation and orders.
Preparing to see the patient including review of tests and other providers' notes, confirming history with patient/family member, performing medical examination and evaluation, counseling and educating the patient/family/caregiver, ordering medications, tests and procedures, communicating with other health care professionals, documenting clinical information in the EMR, independently interpreting results and communicating results to the patient/family/caregiver, care coordination
20 mins-Vitals, meds, new results/radiology, interdisciplinary charting reviewed   20 mins-Patient seen and examined and plan discussed, all questions were answered to the best of my ability  20 mins-Charting/documentation and orders.
20 mins-Vitals, meds, new results/radiology, interdisciplinary charting reviewed   20 mins-Patient seen and examined and plan discussed, all questions were answered to the best of my ability  20 mins-Charting/documentation and orders.
Review of laboratory data, radiology results, consultants' recommendations, documentation in [place], discussion with patient/advanced care providers and interdisciplinary staff (such as , social workers, etc). Interventions were performed as documented above.
20 mins-Vitals, meds, new results/radiology, interdisciplinary charting reviewed   20 mins-Patient seen and examined and plan discussed, all questions were answered to the best of my ability  20 mins-Charting/documentation and orders.
20 mins-Vitals, meds, new results/radiology, interdisciplinary charting reviewed   20 mins-Patient seen and examined and plan discussed, all questions were answered to the best of my ability  20 mins-Charting/documentation and orders.
35 minutes spent on total encounter. The necessity of the time spent during the encounter on this date of service was due to:     Time spent for extensive review of the physical chart, electronic medical record, and documentation to obtain collateral information including but not limited to:  [x] Inpatient records (ED, H&P, primary team, and consultants if applicable, care coordination)  [x] Inpatient values/results (biomarkers, immunoassays, imaging, and microbiology results)  [x] Current or proposed treatment plans  [x] Discussion with the primary team  [x] Discussion with the patient, surrogate decision maker, or family
20 mins-Vitals, meds, new results/radiology, interdisciplinary charting reviewed   20 mins-Patient seen and examined and plan discussed, all questions were answered to the best of my ability  20 mins-Charting/documentation and orders.
35 minutes spent on total encounter. The necessity of the time spent during the encounter on this date of service was due to:     Time spent for extensive review of the physical chart, electronic medical record, and documentation to obtain collateral information including but not limited to:  [x] Inpatient records (ED, H&P, primary team, and consultants if applicable, care coordination)  [x] Inpatient values/results (biomarkers, immunoassays, imaging, and microbiology results)  [x] Current or proposed treatment plans  [x] Discussion with the primary team  [x] Discussion with the patient, surrogate decision maker, or family

## 2024-03-14 NOTE — PHYSICAL THERAPY INITIAL EVALUATION ADULT - PERTINENT HX OF CURRENT PROBLEM, REHAB EVAL
Pt is a 68 year old female admitted to [facility] on 2/27 with progressive nausea and generalized weakness with admission CT anterior to posterior confirming POD; admission notable for mild leukocytosis; pt's hospital course also currently c/b cancer-related pain and hyponatremia. Xray of chest significant for left apical mass better assessed on chest CT scan, new left basilar opacities may reflect pneumonia or atelectasis. Xray of abdomen significant for large stool burden suggesting constipation & no evidence of acute   obstruction. Pt is a 68 year old female presenting for progressive nausea, generalized weakness, inability to tolerate PO, and LOMAS. CT A/P significant for severe right hydronephrosis and mild hydroureter to the level of a soft tissue lesion with calcification, increased in size as compared to prior, and new pulmonary, increased size and number hepatic, and new abdominal soft tissue metastatic disease. XR abdomen revealed large stool burden suggesting constipation. Pt admitted for adult failure to thrive likely due to worsening metastatic disease seen on imaging. Hospital course c/b cancer-related pain and hyponatremia.

## 2024-03-14 NOTE — PHYSICAL THERAPY INITIAL EVALUATION ADULT - MANUAL MUSCLE TESTING RESULTS, REHAB EVAL
Bilateral Lower Extremities grossly 3/5/grossly assessed due to Bilateral Lower Extremities grossly 4/5

## 2024-03-14 NOTE — PHYSICAL THERAPY INITIAL EVALUATION ADULT - DIAGNOSIS, PT EVAL
Pt displays impairments in balance, endurance, gait, & strength. Pt displays impairments in transfers, balance, endurance, gait, & strength.

## 2024-03-14 NOTE — PROGRESS NOTE ADULT - PROBLEM SELECTOR PROBLEM 3
Neoplasm related pain
Mediastinal mass
Neoplasm related pain
Mediastinal mass

## 2024-03-14 NOTE — PROGRESS NOTE ADULT - ASSESSMENT
69 yo woman with h/o HTN, CVA, hypothyroidism, OP, stage II endometrial cancer > dx/treated in 7/201- s/p robotic assisted TLH BSO, bilateral pelvic and para-aortic sentinel LN dissection by Dr. Paras Grayson on 7/2/2018, followed by pelvic IMRT and vaginal cuff brachytherapy with Dr. Bowling in [id number]/[id number]; dx with met recurrent in the lungs in 10/2019 but was lost to fu until 7/2020; progressed after Carbo/Taxol > palliative RT to the vaginal mass and cuff area 2000cGy/5fxs  from 5/25/21-6/1/21 > Pembro/Lenvima (tx course c/b ICI-related colitis) > Doxil > Abraxan/Avastin > most recently on Docetaxel monotherapy (last given 2/2024).  She was admitted to [facility] on 2/27 with progressive nausea and generalized weakness with admission CT a/p confirming POD; admission notable for mild leukocytosis. Pt's hospital course also currently c/b cancer-related pain and hyponatremia.    ACTIVE PROBLEMS  Met endometrial cancer  Goals of care discussion, counseling  Cancer-related pain  Nausea without vomiting  Productive cough with abnormal CXR   R hydroureteronephrosis  MANJU  Hyponatremia  2/2 SIADH  Anemia of chronic disease  Hypothyroidism  HTN  Hypercoag state    - Met endometrial cancer  - GOC discussion, counseling  Pt indicated to Pall Care team on 2/28 that she is no longer interested in pursuing systemic cancer treatment  She consented to DNR/DNI code status and has wavered in her decision to pursue comfort measures/hospice placement, however, on 3/11 she was more definitive in her acknowledgement that she can't tolerate future systemic cancer directed therapy, including palliative RT (which is now cancelled)  Pt's long-term prognosis remains poor (< 6 months) and hospice placement is appropriate- will discuss dispo options with care coordination  Pt remains DNR/DNI    - Cancer-related pain  Appreciate Pall Care recs  Continuing Morphine ER 15mg q12  Continuing Dilaudid PO 2mg q6/prn  Continuing Flexiril 5mg q8/prn  Continuing Tylenol 650mg q6/prn  Continuing bowel regimen to prevent OIC    - Productive cough with abnormal CXR on 3/11  C/f evolving HAP (procal 0.26)  3/11 RSV, Covid, Flu PCR negative  Completing 7d course of empiric Zosyn, until 3/18   Continuing supportive resp care prn    - Nausea without vomiting  Likely iatrogenic from medications (opioids)  Admission imaging negative for ileus/obstruction  Continuing Reglan IV 10mg q8/prn  Continuing Maalox 30mg q6/prn  Repleting lytes prn    - R hydroureteronephrosis  Cr uptrended to 1.26 today  Pt still with sufficient UOP  No role for stent, PCN at this time  Planning to hold ARB and check urine lytes if Cr worsens  Monitoring UOP    - Hyponatremia 2/2 SIADH  Na stable, ranging 130-32  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  Pt is relatively asymptomatic  Continuing Salt tab 1gm BID    - Anemia of chronic disease  Hgb stable; pt remains without s/s of active bleeding  3/4 iron studies more c/w ACD; labs not otherwise c/w hemolysis  Transfusing supportively prn (goal hgb > 7; plts > 10K)    - Hypothyroidism: 3/4 TFTs wnl; continuing LT4 100mcg daily    - HTN: continuing Valsartan 40mg daily    - Hypercoag state: continuing lovenox ppx

## 2024-03-14 NOTE — PROGRESS NOTE ADULT - SUBJECTIVE AND OBJECTIVE BOX
[facility]  Adelia Gonzalez, Palliative Care Nurse Practitioner  Contact Info: Page [phone] (Including Nights/Weekends), Message on Microsoft Teams (Adelia Gonzalez), or leave  at Palliative Office [phone] (non-urgent)    Date of Service 03-14-24 @ 13:07    SUBJECTIVE AND OBJECTIVE:  Indication for Geriatrics and Palliative Care Services/INTERVAL HPI: Symptom management  Pt seen this AM resting in bed, denies any pain or nausea. Shared she was speaking with Anju MEADE about discharge planning, refusing calvary.     OVERNIGHT EVENTS: Pt required PRN Dilaudid 2mg x 1 & IV Dilaudid 0.5mg x 1 within 24 hrs.     DNR on chart:DNI: Trial NIV  DNI: Trial NIV      Allergies    gabapentin (Other)  contrast media (iodine-based) (Other)  Benadryl (Other)    Intolerances    Taxol (Other)  MEDICATIONS  (STANDING):  chlorhexidine 2% Cloths 1 Application(s) Topical daily  enoxaparin Injectable 40 milliGRAM(s) SubCutaneous every 24 hours  fluticasone propionate 50 MICROgram(s)/spray Nasal Spray 1 Spray(s) Both Nostrils two times a day  levothyroxine 100 MICROGram(s) Oral daily  metoclopramide 10 milliGRAM(s) Oral three times a day  mirtazapine 15 milliGRAM(s) Oral at bedtime  morphine ER Tablet 15 milliGRAM(s) Oral two times a day  pantoprazole    Tablet 40 milliGRAM(s) Oral before breakfast  piperacillin/tazobactam IVPB.. 3.375 Gram(s) IV Intermittent every 8 hours  polyethylene glycol 3350 17 Gram(s) Oral every 12 hours  senna 2 Tablet(s) Oral two times a day  sodium chloride 1 Gram(s) Oral two times a day  valsartan 40 milliGRAM(s) Oral daily    MEDICATIONS  (PRN):  acetaminophen     Tablet .. 650 milliGRAM(s) Oral every 6 hours PRN Mild Pain (1 - 3)  albuterol    90 MICROgram(s) HFA Inhaler 2 Puff(s) Inhalation every 6 hours PRN Shortness of Breath and/or Wheezing  aluminum hydroxide/magnesium hydroxide/simethicone Suspension 30 milliLiter(s) Oral every 4 hours PRN Dyspepsia  benzonatate 200 milliGRAM(s) Oral every 8 hours PRN Cough  cyclobenzaprine 5 milliGRAM(s) Oral every 8 hours PRN Muscle Spasm  guaiFENesin Oral Liquid (Sugar-Free) 100 milliGRAM(s) Oral every 8 hours PRN Cough  HYDROmorphone   Tablet 2 milliGRAM(s) Oral every 4 hours PRN Severe Pain (7 - 10)  melatonin 3 milliGRAM(s) Oral at bedtime PRN Insomnia    ------------------------------------------------------------------------------------------------------  ITEMS UNCHECKED ARE NOT PRESENT    PRESENT SYMPTOMS: [ ]Unable to self-report - see [ ] CPOT [ ] PAINADS [ ] RDOS  Source if other than patient:  [ ]Family   [ ]Team     Pain: [ X]yes [ ]no  QOL impact -   Location - BL shoulders/pelvis            Aggravating factors - movement   Quality - sharp  Radiation - across upper back  Timing- intermittent   Severity (0-10 scale):3/10   Minimal acceptable level (0-10 scale)/Pain goal: 3    CPOT:    https://www.[email or web address]/getattachment/[email or web address]/Critical-Care-Pain-Observation-Tool-(CPOT)    PAINAD Score: See PAINAD tool and score below       RDOS: See RDOS tool and score below   0 to 2  minimal or no respiratory distress   3  mild distress  4 to 6 moderate distress  >7 severe distress    Dyspnea:                           [ ]Mild [ ]Moderate [ ]Severe  Anxiety:                             [ ]Mild [ ]Moderate [ ]Severe  Fatigue:                             [ ]Mild [ ]Moderate [ ]Severe  Nausea:                             [ ]Mild [ ]Moderate [ ]Severe  Loss of appetite:              [ ]Mild [ ]Moderate [ ]Severe  Constipation:                    [ ]Mild [ ]Moderate [ ]Severe  Other Symptoms:  [ ]All other review of systems negative     Home Medications for Symptoms if present:    I Stop Reference no:     -------------------------------------------------------------------------------------------------------  PCSSQ[Palliative Care Spiritual Screening Question]   Severity (0-10):  Score of 4 or > indicate consideration of Chaplaincy referral.  Chaplaincy Referral: [ ] yes [ ] refused [ ] following [ X] Deferred     Caregiver [place]? : [ ] yes [ ] no [ ] Deferred [ X] Declined             Social work referral [ ] Patient & Family Centered Care Referral [ ]     Anticipatory Grief present?:  [ ] yes [ ] no  [X ] Deferred                  Social work referral [ ] Chaplaincy Referral [ ]    -------------------------------------------------------------------------------------------------------  PHYSICAL EXAM:  Vital Signs Last 24 Hrs  T(C): 37.1 (14 Mar 2024 12:42), Max: 37.1 (14 Mar 2024 12:42)  T(F): 98.7 (14 Mar 2024 12:42), Max: 98.7 (14 Mar 2024 12:42)  HR: 99 (14 Mar 2024 12:42) (75 - 108)  BP: 120/62 (14 Mar 2024 12:42) (112/70 - 120/62)  BP(mean): --  RR: 18 (14 Mar 2024 12:42) (18 - 18)  SpO2: 91% (14 Mar 2024 12:42) (91% - 96%)    Parameters below as of 14 Mar 2024 12:42  Patient On (Oxygen Delivery Method): room air     I&O's Summary    13 Mar 2024 07:01  -  14 Mar 2024 07:00  --------------------------------------------------------  IN: 200 mL / OUT: 0 mL / NET: 200 mL         GENERAL:  [ ]Cachexia  [ X]Alert  [ X]Oriented x 4  [ ]Lethargic  [ ]Unarousable  [X ]Verbal  [ ]Non-Verbal    Behavioral:   [ ] Anxiety  [ ] Delirium [ ] Agitation [X ] Other    HEENT:  [X ]Normal   [ ]Dry mouth   [ ]ET Tube/Trach  [ ]Oral lesions    PULMONARY:   [ X]Clear [ ]Tachypnea  [ ]Audible excessive secretions   [ ]Rhonchi        [ ]Right [ ]Left [ ]Bilateral  [ ]Crackles        [ ]Right [ ]Left [ ]Bilateral  [ ]Wheezing     [ ]Right [ ]Left [ ]Bilateral  [ ]Diminished breath sounds [ ]right [ ]left [ ]bilateral    CARDIOVASCULAR:    [X ]Regular [ ]Irregular [ ]Tachy  [ ]Tristen [ ]Murmur [ ]Other    GASTROINTESTINAL:  [X ]Soft  [ ]Distended   [X ]+BS  [ X]Non tender [ ]Tender  [ ]Other [ ]PEG [ ]OGT/ NGT  Last BM: 3/13    GENITOURINARY:  [X ]Normal [ ] Incontinent   [ ]Oliguria/Anuria   [ ][place]    MUSCULOSKELETAL:   [ ]Normal   [x ]Weakness  [ ]Bed/Wheelchair bound [ ]Edema    NEUROLOGIC:   [ x]No focal deficits  [ ]Cognitive impairment  [ ]Dysphagia [ ]Dysarthria [ ]Paresis [ ]Other     SKIN:   [x ]Normal  [ ]Rash  [ ]Other  [ ]Pressure ulcer(s)       Present on admission [ ]y [ ]n    -------------------------------------------------------------------------------------------------------  CRITICAL CARE:  [ ]Shock Present  [ ]Septic [ ]Cardiogenic [ ]Neurologic [ ]Hypovolemic  [ ]Vasopressors [ ]Inotropes  [ ]Respiratory failure present [ ]Mechanical Ventilation [ ]Non-invasive ventilatory support [ ]High-Flow   [ ]Acute  [ ]Chronic [ ]Hypoxic  [ ]Hypercarbic [ ]Other  [ ]Other organ failure     -------------------------------------------------------------------------------------------------------  LABS:                        8.6    12.73 )-----------( 368      ( 14 Mar 2024 07:20 )             26.8   03-14    132<L>  |  93<L>  |  21  ----------------------------<  110<H>  4.1   |  24  |  1.26    Ca    9.0      14 Mar 2024 07:20  Phos  3.4     03-14  Mg     1.90     03-14    TPro  6.3  /  Alb  2.5<L>  /  TBili  0.5  /  DBili  x   /  AST  20  /  ALT  7   /  AlkPhos  187<H>  03-14      Urinalysis Basic - ( 14 Mar 2024 07:20 )    Color: x / Appearance: x / SG: x / pH: x  Gluc: 110 mg/dL / Ketone: x  / Bili: x / Urobili: x   Blood: x / Protein: x / Nitrite: x   Leuk Esterase: x / RBC: x / WBC x   Sq Epi: x / Non Sq Epi: x / Bacteria: x      -------------------------------------------------------------------------------------------------------  RADIOLOGY & ADDITIONAL STUDIES: No new studies       -------------------------------------------------------------------------------------------------------  Protein Calorie Malnutrition Present: [ ]mild [ ]moderate [ ]severe [ ]underweight [ ]morbid obesity  https://[email or web address]/Table_Clinical%20Characteristics%20to%20Document%20Malnutrition-White%20JV%20et%20al%202012.pdf    Height (cm): 154.9 (02-27-24 @ 18:17), 154.9 (02-16-24 @ 15:10), 152.4 (02-01-24 @ 16:00)  Weight (kg): 49.9 (02-27-24 @ 18:17), 54.8 (02-18-24 @ 06:15), 51.5 (02-01-24 @ 16:00)  BMI (kg/m2): 20.8 (02-27-24 @ 18:17), 22.8 (02-18-24 @ 06:15), 21.5 (02-16-24 @ 15:10)    Palliative Performance Status Version 2:   See PPSv2 tool and score below       [ ]PPSV2 < or = 30%  [ ]significant weight loss [ ]poor nutritional intake [ ]anasarca[ ]Artificial Nutrition    Other REFERRALS:  [ ]Hospice  [ ]Child Life  [ ]Social Work  [ ]Case management [ ]Holistic Therapy     -------------------------------------------------------------------------------------------------------  Goals of Care Document:

## 2024-03-14 NOTE — PROGRESS NOTE ADULT - PROBLEM SELECTOR PLAN 4
> No BM x 2 days  > Added PRN Senna 2 tablets daily
> No BM x 3 days  > Added PRN Senna 2 tablets daily  > pt shared does not feel constipation, abd soft nontender.
> 2/2 metastatic CA  > Nausea much improved since admission   > Can consider RD consult  > PPSV 60%-70% - needs assistance with ambulation at times. Can consider PT consult for possible rehab wt transition TLC wt hospice since pt does not want to go home?
> Per pt had BM yesterday   > Continue with bowel regimen
Per pt had BM today  Continue with bowel regimen  abd soft nontender.
> No BM x 3 days  > Added PRN Senna 2 tablets daily  > pt shared does not feel constipation, abd soft nontender
> Per pt had BM today  > Continue with bowel regimen  > d/c lactulose
> Resolved  > Continue with bowel regimen  > d/c lactulose

## 2024-03-15 NOTE — DISCHARGE NOTE PROVIDER - HOSPITAL COURSE
69 yo woman with h/o HTN, CVA, hypothyroidism, OP, stage II endometrial cancer > dx/treated in 7/201- s/p robotic assisted TLH BSO, bilateral pelvic and para-aortic sentinel LN dissection by Dr. Paras Grayson on 7/2/2018, followed by pelvic IMRT and vaginal cuff brachytherapy with Dr. Bowling in [id number]; dx with met recurrent in the lungs in 10/2019 but was lost to fu until 7/2020; progressed after Carbo/Taxol > palliative RT to the vaginal mass and cuff area 2000cGy/5fxs  from 5/25/21-6/1/21 > Pembro/Lenvima (tx course c/b ICI-related colitis) > Doxil > Abraxan/Avastin > most recently on Docetaxel monotherapy (last given 2/2024).  She was admitted to [facility] on 2/27 with progressive nausea and generalized weakness with admission CT a/p confirming POD; admission notable for mild leukocytosis. Pt's hospital course also currently c/b cancer-related pain, productive cough (with abnormal CXR c/f HAP), and hyponatremia 2/2 SIADH.    - Met endometrial cancer  - GOC discussion, counseling  Pt indicated to Pall Care team on 2/28 that she is no longer interested in pursuing systemic cancer treatment  She consented to DNR/DNI code status and has wavered in her decision to pursue comfort measures/hospice placement, however, on 3/11 she was more definitive in her acknowledgement that she can't tolerate future systemic cancer directed therapy, including palliative RT (which is now cancelled)  Pt's long-term prognosis remains poor (< 6 months) and pt is amenable to hospice placement  [facility] and is stable for discharge today; she remains DNR/DNI    - Cancer-related pain  Cont Morphine ER 15mg q12  Cont Dilaudid PO 2mg q6/prn  ContFlexiril 5mg q8/prn  Cont Tylenol 650mg q6/prn  Cont bowel regimen to prevent OIC    - Productive cough with abnormal CXR on 3/11  C/f evolving HAP (procal 0.26)  3/11 RSV, Covid, Flu PCR negative  Completed 5d course of empiric Zosyn, 3/11-15  Cont supportive resp care prn    - Nausea without vomiting  Likely iatrogenic from medications (opioids)  Admission imaging negative for ileus/obstruction  Cont Reglan IV 10mg q8/prn  Contin Maalox 30mg q6/prn    - R hydroureteronephrosis  Cr 1.26 on day of discharge  Pt still with sufficient UOP  No role for stent, PCN     - Hyponatremia 2/2 SIADH  Na stable, ranging 130-32  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  Pt is relatively asymptomatic  Continuing Salt tab 1gm BID    - Anemia of chronic disease  Hgb stable; pt remains without s/s of active bleeding  3/4 iron studies more c/w ACD; labs not otherwise c/w hemolysis    - Hypothyroidism: 3/4 TFTs wnl; contLT4 100mcg daily    - HTN: cont Valsartan 40mg daily 67 yo woman with h/o HTN, CVA, hypothyroidism, OP, stage II endometrial cancer > dx/treated in 7/201- s/p robotic assisted TLH BSO, bilateral pelvic and para-aortic sentinel LN dissection by Dr. Paras Grayson on 7/2/2018, followed by pelvic IMRT and vaginal cuff brachytherapy with Dr. 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  She was admitted to [facility] on 2/27 with progressive nausea and generalized weakness with admission CT a/p confirming POD; admission notable for mild leukocytosis. Pt's hospital course also currently c/b cancer-related pain, productive cough (with abnormal CXR c/f HAP), and hyponatremia 2/2 SIADH.    - Met endometrial cancer  - GOC discussion, counseling  Pt indicated to Pall Care team on 2/28 that she is no longer interested in pursuing systemic cancer treatment  She consented to DNR/DNI code status and has wavered in her decision to pursue comfort measures/hospice placement, however, on 3/11 she was more definitive in her acknowledgement that she can't tolerate future systemic cancer directed therapy, including palliative RT (which is now cancelled)  Pt's long-term prognosis remains poor (< 6 months) and pt is amenable to hospice placement  Pt accepted at [facility] and is stable for discharge today; she remains DNR/DNI    - Cancer-related pain  Cont Morphine ER 15mg q12  Cont Dilaudid PO 2mg q6/prn  ContFlexiril 5mg q8/prn  Cont Tylenol 650mg q6/prn  Cont bowel regimen to prevent OIC    - Productive cough with abnormal CXR on 3/11  C/f evolving HAP (procal 0.26)  3/11 RSV, Covid, Flu PCR negative  Completed 5d course of empiric Zosyn, 3/11-15  Cont supportive resp care prn    - Nausea without vomiting  Likely iatrogenic from medications (opioids)  Admission imaging negative for ileus/obstruction  Cont Reglan IV 10mg q8/prn  Contin Maalox 30mg q6/prn    - R hydroureteronephrosis  Cr 1.26 on day of discharge  Pt still with sufficient UOP  No role for stent, PCN     - Hyponatremia 2/2 SIADH  Na stable, ranging 130-32  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  Pt is relatively asymptomatic  Continuing Salt tab 1gm BID    - Anemia of chronic disease  Hgb stable; pt remains without s/s of active bleeding  3/4 iron studies more c/w ACD; labs not otherwise c/w hemolysis    - Hypothyroidism: 3/4 TFTs wnl; cont LT4 100mcg daily    - HTN: cont Valsartan 40mg daily

## 2024-03-15 NOTE — CHART NOTE - NSCHARTNOTEFT_GEN_A_CORE
Source: Patient [ ] sleeping, resting at time of visit   Family [ ]     other [X ] electronic chart, RN    Diet : Diet, DASH/TLC:   Sodium & Cholesterol Restricted  Gastroesophageal Reflux Disease (GERD) (02-29-24 @ 14:31)    Nutrition follow-up note. Per chart review, 67 y/o woman with h/o HTN, CVA, hypothyroidism, OP, stage II endometrial cancer > dx/treated in 7/201- s/p robotic assisted TLH BSO, bilateral pelvic and para-aortic sentinel LN dissection on 7/2/2018, followed by pelvic IMRT and vaginal cuff brachytherapy  10/[id number]; dx with met recurrent in the lungs in 10/2019 but was lost to fu until 7/2020; progressed after Carbo/Taxol > palliative RT to the vaginal mass and cuff area 2000cGy/5fxs  from 5/25/21-6/1/21 > Pembro/Lenvima (tx course c/b ICI-related colitis) > Doxil > Abraxan/Avastin > most recently on Docetaxel monotherapy (last given 2/2024).  She was admitted to [facility] on 2/27 with progressive nausea and generalized weakness with admission CT a/p confirming POD; admission notable for mild leukocytosis. Pt's hospital course also currently c/b cancer-related pain and hyponatremia. No longer on XRT, patient has been refusing per chart. Patient no longer candidate for cancer-directed therapies. Patient is now interested in hospice, referral to [facility] - pt refusing. Sent to [facility].    Patient with suboptimal po intake noted, consuming mostly 0-50% of meals despite prescribed on appetite stimulant mirtazapine. No nausea/vomiting/diarrhea/constipation or difficulty chewing and swallowing reported at this time. Last bowel movement on 3/12. Continue to provide encouragement and assistance during mealtime as needed.       Weight: 49.9kg 2/28  No new weight since admission documented.   RN to obtain new weight and weekly weight as able.      Pertinent Medications: MEDICATIONS  (STANDING):  chlorhexidine 2% Cloths 1 Application(s) Topical daily  enoxaparin Injectable 40 milliGRAM(s) SubCutaneous every 24 hours  fluticasone propionate 50 MICROgram(s)/spray Nasal Spray 1 Spray(s) Both Nostrils two times a day  levothyroxine 100 MICROGram(s) Oral daily  metoclopramide 10 milliGRAM(s) Oral three times a day  mirtazapine 15 milliGRAM(s) Oral at bedtime  morphine ER Tablet 15 milliGRAM(s) Oral two times a day  pantoprazole    Tablet 40 milliGRAM(s) Oral before breakfast  piperacillin/tazobactam IVPB.. 3.375 Gram(s) IV Intermittent every 8 hours  polyethylene glycol 3350 17 Gram(s) Oral every 12 hours  senna 2 Tablet(s) Oral two times a day  sodium chloride 1 Gram(s) Oral two times a day  valsartan 40 milliGRAM(s) Oral daily    MEDICATIONS  (PRN):  acetaminophen     Tablet .. 650 milliGRAM(s) Oral every 6 hours PRN Mild Pain (1 - 3)  albuterol    90 MICROgram(s) HFA Inhaler 2 Puff(s) Inhalation every 6 hours PRN Shortness of Breath and/or Wheezing  aluminum hydroxide/magnesium hydroxide/simethicone Suspension 30 milliLiter(s) Oral every 4 hours PRN Dyspepsia  benzonatate 200 milliGRAM(s) Oral every 8 hours PRN Cough  cyclobenzaprine 5 milliGRAM(s) Oral every 8 hours PRN Muscle Spasm  guaiFENesin Oral Liquid (Sugar-Free) 100 milliGRAM(s) Oral every 8 hours PRN Cough  HYDROmorphone   Tablet 2 milliGRAM(s) Oral every 4 hours PRN Severe Pain (7 - 10)  melatonin 3 milliGRAM(s) Oral at bedtime PRN Insomnia    Pertinent Labs:  03-14 Na132 mmol/L<L> Glu 110 mg/dL<H> K+ 4.1 mmol/L Cr  1.26 mg/dL BUN 21 mg/dL 03-14 Phos 3.4 mg/dL 03-14 Alb 2.5 g/dL<L>      Skin: No pressure ulcers/DTI noted in flowsheets.   No edema per nursing flowsheets.    Estimated Needs:   [ X] no change since previous assessment  [ ] recalculated:     Previous Nutrition Diagnosis: Increased Nutrient Needs    Nutrition Diagnosis is [X ] ongoing  [ ] resolved [ ] not applicable     New Nutrition Diagnosis: Inadequate po intake related to physiological causes, lack of desire to consume adequate po intake, As evidenced by recorded suboptimal po intake in nursing flowsheet.     Nutrition focused physical exam deferred at this time. Sleeping with blanket over comfortably. This department will reattempt at later time as able.     Education:    [  ] Given today    Type of education provided:    [  ] Given on previous assessment by RD    [  ] Not applicable 2/2 cognitive deficit    [  ] Pt refusal of education offered    [ X] Not applicable 2/2 current prognosis    [  ] Not warranted at present    Recommend:   1. Given limited PO intake, would suggest liberalizing diet to Regular and add Ensure Plus HP BID for added calories and protein.   2. Foodservice department will provide magic cup daily BID for trial.   3. Monitor weights, labs, BM's, skin integrity, p.o. intake.   4. Please document % PO intake in nursing flowsheet.   5. Honor food and beverage preferences within diet restriction of patient in an effort to maximize level of nutrient intake.   6. Please Encourage po intake, assist with meals and menu selections, provide alternatives PRN.     Monitoring and Evaluation:     [X ] PO intake [X ] Tolerance to diet prescription [X ] weights [X ] follow up per protocol    Wilman Dorantes, MS, CDN, RDN     pager [phone] or TEAMS Source: Patient [ ] sleeping, resting at time of visit   Family [ ]     other [X ] electronic chart, RN, ACP  Diet : Diet, DASH/TLC:   Sodium & Cholesterol Restricted  Gastroesophageal Reflux Disease (GERD) (02-29-24 @ 14:31)    Nutrition follow-up note. Per chart review, 69 y/o woman with h/o HTN, CVA, hypothyroidism, OP, stage II endometrial cancer > dx/treated in 7/201- s/p robotic assisted TLH BSO, bilateral pelvic and para-aortic sentinel LN dissection on 7/2/2018, followed by pelvic IMRT and vaginal cuff brachytherapy  10/68285; dx with met recurrent in the lungs in 10/2019 but was lost to fu until 7/2020; progressed after Carbo/Taxol > palliative RT to the vaginal mass and cuff area 2000cGy/5fxs  from 5/25/21-6/1/21 > Pembro/Lenvima (tx course c/b ICI-related colitis) > Doxil > Abraxan/Avastin > most recently on Docetaxel monotherapy (last given 2/2024).  She was admitted to [facility] on 2/27 with progressive nausea and generalized weakness with admission CT a/p confirming POD; admission notable for mild leukocytosis. Pt's hospital course also currently c/b cancer-related pain and hyponatremia. No longer on XRT, patient has been refusing per chart. Patient no longer candidate for cancer-directed therapies. Patient is now interested in hospice, referral to [facility] - pt refusing. Sent to [facility].    Patient with suboptimal po intake noted, consuming mostly 0-50% of meals despite prescribed on appetite stimulant mirtazapine. No nausea/vomiting/diarrhea/constipation or difficulty chewing and swallowing reported at this time. Last bowel movement on 3/12. Continue to provide encouragement and assistance during mealtime as needed.       Weight: 49.9kg 2/28  No new weight since admission documented.   RN to obtain new weight and weekly weight as able.      Pertinent Medications: MEDICATIONS  (STANDING):  chlorhexidine 2% Cloths 1 Application(s) Topical daily  enoxaparin Injectable 40 milliGRAM(s) SubCutaneous every 24 hours  fluticasone propionate 50 MICROgram(s)/spray Nasal Spray 1 Spray(s) Both Nostrils two times a day  levothyroxine 100 MICROGram(s) Oral daily  metoclopramide 10 milliGRAM(s) Oral three times a day  mirtazapine 15 milliGRAM(s) Oral at bedtime  morphine ER Tablet 15 milliGRAM(s) Oral two times a day  pantoprazole    Tablet 40 milliGRAM(s) Oral before breakfast  piperacillin/tazobactam IVPB.. 3.375 Gram(s) IV Intermittent every 8 hours  polyethylene glycol 3350 17 Gram(s) Oral every 12 hours  senna 2 Tablet(s) Oral two times a day  sodium chloride 1 Gram(s) Oral two times a day  valsartan 40 milliGRAM(s) Oral daily    MEDICATIONS  (PRN):  acetaminophen     Tablet .. 650 milliGRAM(s) Oral every 6 hours PRN Mild Pain (1 - 3)  albuterol    90 MICROgram(s) HFA Inhaler 2 Puff(s) Inhalation every 6 hours PRN Shortness of Breath and/or Wheezing  aluminum hydroxide/magnesium hydroxide/simethicone Suspension 30 milliLiter(s) Oral every 4 hours PRN Dyspepsia  benzonatate 200 milliGRAM(s) Oral every 8 hours PRN Cough  cyclobenzaprine 5 milliGRAM(s) Oral every 8 hours PRN Muscle Spasm  guaiFENesin Oral Liquid (Sugar-Free) 100 milliGRAM(s) Oral every 8 hours PRN Cough  HYDROmorphone   Tablet 2 milliGRAM(s) Oral every 4 hours PRN Severe Pain (7 - 10)  melatonin 3 milliGRAM(s) Oral at bedtime PRN Insomnia    Pertinent Labs:  03-14 Na132 mmol/L<L> Glu 110 mg/dL<H> K+ 4.1 mmol/L Cr  1.26 mg/dL BUN 21 mg/dL 03-14 Phos 3.4 mg/dL 03-14 Alb 2.5 g/dL<L>      Skin: No pressure ulcers/DTI noted in flowsheets.   No edema per nursing flowsheets.    Estimated Needs:   [ X] no change since previous assessment  [ ] recalculated:     Previous Nutrition Diagnosis: Increased Nutrient Needs    Nutrition Diagnosis is [X ] ongoing  [ ] resolved [ ] not applicable     New Nutrition Diagnosis: Inadequate po intake related to physiological causes, lack of desire to consume adequate po intake, As evidenced by recorded suboptimal po intake in nursing flowsheet.     Nutrition focused physical exam deferred at this time. Sleeping with blanket over comfortably. This department will reattempt at later time as able.     Education:    [  ] Given today    Type of education provided:    [  ] Given on previous assessment by RD    [  ] Not applicable 2/2 cognitive deficit    [  ] Pt refusal of education offered    [ X] Not applicable 2/2 current prognosis    [  ] Not warranted at present    Recommend:   1. Given limited PO intake, would suggest liberalizing diet to Regular and add Ensure Plus HP BID for added calories and protein.   2. Foodservice department will provide magic cup daily BID for trial.   3. Monitor weights, labs, BM's, skin integrity, p.o. intake.   4. Please document % PO intake in nursing flowsheet.   5. Honor food and beverage preferences within diet restriction of patient in an effort to maximize level of nutrient intake.   6. Please Encourage po intake, assist with meals and menu selections, provide alternatives PRN.     Monitoring and Evaluation:     [X ] PO intake [X ] Tolerance to diet prescription [X ] weights [X ] follow up per protocol    Wilman Dorantes, MS, CDN, RDN     pager [phone] or TEAMS

## 2024-03-15 NOTE — DISCHARGE NOTE NURSING/CASE MANAGEMENT/SOCIAL WORK - PATIENT PORTAL LINK FT
You can access the FollowMyHealth Patient Portal offered by [facility] by registering at the following website: [email or web address]. By joining [employer]’s FollowMyHealth portal, you will also be able to view your health information using other applications (apps) compatible with our system.

## 2024-03-15 NOTE — DISCHARGE NOTE PROVIDER - ATTENDING DISCHARGE PHYSICAL EXAMINATION:
Vital Signs Last 24 Hrs  T(C): 36.9 (15 Mar 2024 13:05), Max: 37 (14 Mar 2024 21:08)  T(F): 98.5 (15 Mar 2024 13:05), Max: 98.6 (14 Mar 2024 21:08)  HR: 99 (15 Mar 2024 13:05) (99 - 99)  BP: 101/70 (15 Mar 2024 13:05) (101/70 - 114/72)  RR: 18 (15 Mar 2024 13:05) (18 - 20)  SpO2: 99% (15 Mar 2024 13:05) (92% - 99%)  Parameters below as of 15 Mar 2024 13:05  Patient On (Oxygen Delivery Method): room air  non-toxic appearing woman, sitting stably on edge of bed, nad  Anicteric sclera, no oral lesions/thrush  RRR, no m/r/g  CTAB, no w/c/r  Abd soft/NT/ND, no palpable masses/organomegaly  No peripheral edema  CN 2-12 grossly intact; no gross focal neuro deficits

## 2024-03-15 NOTE — DISCHARGE NOTE PROVIDER - NSDCCPCAREPLAN_GEN_ALL_CORE_FT
PRINCIPAL DISCHARGE DIAGNOSIS  Diagnosis: Nausea and vomiting  Assessment and Plan of Treatment:       SECONDARY DISCHARGE DIAGNOSES  Diagnosis: Endometrial cancer  Assessment and Plan of Treatment: You were seen by pallitive team while in the hospital for symptom managment . After persuing    Diagnosis: Hypothyroidism  Assessment and Plan of Treatment:     Diagnosis: Hypertension  Assessment and Plan of Treatment:     Diagnosis: Hydronephrosis of right kidney  Assessment and Plan of Treatment:     Diagnosis: Neoplasm related pain  Assessment and Plan of Treatment:     Diagnosis: Goals of care, counseling/discussion  Assessment and Plan of Treatment:     Diagnosis: Comfort measures only status  Assessment and Plan of Treatment:     Diagnosis: HAP (hospital-acquired pneumonia)  Assessment and Plan of Treatment:      PRINCIPAL DISCHARGE DIAGNOSIS  Diagnosis: Endometrial cancer  Assessment and Plan of Treatment: You were seen by pallitive team while in the hospital for symptom managment . After attempting to undergo radiation therapy, you have decided to persue hospice care.      SECONDARY DISCHARGE DIAGNOSES  Diagnosis: Neoplasm related pain  Assessment and Plan of Treatment: Continue pain medication as ordered.    Diagnosis: Hypothyroidism  Assessment and Plan of Treatment: Continue medication as prescribed.    Diagnosis: Hypertension  Assessment and Plan of Treatment: Continue blood pressure medication regimen as directed. Monitor for any visual changes, headaches or dizziness.  Monitor blood pressure regularly.  Follow up with your primary care provider for further management for high blood pressure.      Diagnosis: Hydronephrosis of right kidney  Assessment and Plan of Treatment:     Diagnosis: Goals of care, counseling/discussion  Assessment and Plan of Treatment:     Diagnosis: Comfort measures only status  Assessment and Plan of Treatment:     Diagnosis: HAP (hospital-acquired pneumonia)  Assessment and Plan of Treatment:     Diagnosis: Endometrial cancer  Assessment and Plan of Treatment: You were seen by pallitive team while in the hospital for symptom managment . After attempting to undergo radiation therapy, you have decided to persue hospice care.

## 2024-03-15 NOTE — DISCHARGE NOTE PROVIDER - NSDCFUSCHEDAPPT_GEN_ALL_CORE_FT
[facility]  Lynnette Lowe  Scheduled Appointment: 03/28/2024

## 2024-03-15 NOTE — DISCHARGE NOTE PROVIDER - NSDCMRMEDTOKEN_GEN_ALL_CORE_FT
albuterol 90 mcg/inh inhalation aerosol: 2 puff(s) inhaled every 6 hours As needed Shortness of Breath and/or Wheezing  aluminum hydroxide-magnesium hydroxide 200 mg-200 mg/5 mL oral suspension: 30 milliliter(s) orally every 4 hours As needed Dyspepsia  benzonatate 100 mg oral capsule: 2 cap(s) orally every 8 hours As needed Cough  cyclobenzaprine 5 mg oral tablet: 1 tab(s) orally every 8 hours As needed Muscle Spasm  guaiFENesin 100 mg/5 mL oral liquid: 5 milliliter(s) orally every 8 hours As needed Cough  HYDROmorphone 2 mg oral tablet: 1 tab(s) orally every 4 hours As needed Severe Pain (7 - 10)  melatonin 3 mg oral tablet: 1 tab(s) orally once a day (at bedtime) As needed Insomnia  metoclopramide 10 mg oral tablet: 1 tab(s) orally 3 times a day  mirtazapine 15 mg oral tablet: 1 tab(s) orally once a day (at bedtime)  morphine 15 mg/8 to 12 hr oral tablet, extended release: 1 tab(s) orally 2 times a day  ondansetron 8 mg oral tablet: 1 tab(s) orally 3 times a day  pantoprazole 40 mg oral delayed release tablet: 1 tab(s) orally once a day (before a meal)  polyethylene glycol 3350 oral powder for reconstitution: 17 gram(s) orally every 12 hours  senna leaf extract oral tablet: 2 tab(s) orally 2 times a day  Sodium Chloride 1 g oral tablet: 1 tab(s) orally 2 times a day  sodium chloride 3% inhalation solution: 4 milliliter(s) inhaled every 12 hours ICD-10 J44.9  Synthroid 100 mcg (0.1 mg) oral tablet: 1 tab(s) orally once a day  Tycolene 325 mg oral tablet: 2 tab(s) orally every 6 hours As needed Mild Pain (1 - 3)  valsartan 40 mg oral tablet: 1 tab(s) orally once a day

## 2024-03-15 NOTE — CHART NOTE - NSCHARTNOTESELECT_GEN_ALL_CORE
Event Note
Nutrition Follow-up note/Nutrition Services
rad onc/Event Note
Event Note

## 2024-03-28 ENCOUNTER — APPOINTMENT (OUTPATIENT)
Dept: HEMATOLOGY ONCOLOGY | Facility: CLINIC | Age: 69
End: 2024-03-28

## 2024-03-28 ENCOUNTER — APPOINTMENT (OUTPATIENT)
Dept: INFUSION THERAPY | Facility: HOSPITAL | Age: 69
End: 2024-03-28

## 2024-04-25 NOTE — CONSULT NOTE ADULT - PROBLEM SELECTOR RECOMMENDATION 4
[facility] Thank you for allowing us to participate in your patient's care. We will continue to follow with you. Please page [phone] for any q's or c's. The Geriatric and Palliative Medicine service has coverage 24 hours a day/ 7 days a week to provide medical recommendations regarding symptom management needs via telephone.

## 2024-05-12 NOTE — HISTORY OF PRESENT ILLNESS
[FreeTextEntry1] : Ms. Hoang is a 66 year old woman with prior FIGO stage 2 endometrioid endometrial cancer s/p TLHBSO and adjuvant pelvic IMRT and vaginal cuff brachytherapy using 45Gy/5 weeks and a 6Gyx2 brachytherapy boost in 2018 with pulmonary and vaginal recurrence 9/2019 with enlarging pulmonary nodules s/p wedge resection x3 and subsequent loss to follow up and representation in 8/2020 with disease progression in the vagina, pelvic lymph nodes, and lungs. \par \par She has since received Carbo/Taxol with Dr. Blank with mixed response but has most recently experienced progression through therapy at the vagina and REMBERTO anterior to mediastinum. \par \par She received radiation to the vaginal canal to a total dose of 2000 cGy in 5 fraction. Treatment was delivered from 5/25/21-6/1/21. She tolerated treatment well.  and appeared to have a response achieving good pain contol with MEthadone 2.5 mg tid \par \par CT CAP 8/19/21 shows favorable response to therapy, decreased size of left inguinal LN and vaginal mass, pulmonary nodules decreased in size. \par \par Saw Dr Gaytan 10/14/21, exam notable for post-RT changes, no masses, but thought to have a fistula causing incontinence. \par \par Saw Dr Hackett 11/3/21, planning re-imaging later in the month.. Will FU with Dr Umana for pain control. CBC 11/3/21 unremarkable,  wnl. \par \par She returns for a followup, last seen in august 2021 for PTE.  No longer taking pain meds, no longer on methadone, not even needing OTC analgesia anymore. Continues on Lenvima and Pembrolizumab since march 2021. Lower dose lenvima from prior\par \par Denies vaginal bleeding, bowel or bladder complaints, pain, discomfort, bone pain or unintentional weight loss. Feels "fabulous", daily improvements. C/o tinnitis, mild knee pain bilaterally. No other concerns. \par \par 
No

## 2024-09-24 NOTE — PATIENT PROFILE ADULT - HOW PATIENT ADDRESSED, PROFILE
Patient presented to the Emergency Dept with a complaint of swelling, pain and redness to right foot which started 2 days ago, however has been getting worse.    Patient rates pain 9/10 on pain scale      Patient alert and oriented x 4, patient breathes freely on room air in nil cardiopulmonary distress      Angela

## 2024-12-07 NOTE — ASSESSMENT
[FreeTextEntry1] : Acquired hypothyroidism, secondary to use of check point and TK inhibitors\par - cont Synthroid 75 mcg\par - screen for pituitary deficiencies, type 1 diabetes, adrenal insufficiency. waiting for the rest of labs\par RTC 3 mos\par \par 
no known precautions/limitations

## 2025-02-21 NOTE — DIETITIAN INITIAL EVALUATION ADULT - PROBLEM/PLAN-5
Caller: KATIUSKA    Relationship to patient: BLANCHE    Best call back number: [phone]     Patient is needing: PT CALLED IN TO CX APPT TODAY.  WILL CALL BACK TO R/S         DISPLAY PLAN FREE TEXT

## 2025-03-13 NOTE — DISCHARGE NOTE PROVIDER - NSDCADMDATE_GEN_ALL_CORE_FT
I called PT with results.  PT reports continued fatigue.  Advised Rheum/ Kristan evaluation for bilateral thigh muscle fatigue, elevated CRP. 
30-Oct-2019 10:45

## 2025-03-19 NOTE — DISCHARGE NOTE PROVIDER - NSDCFUSCHEDAPPT_GEN_ALL_CORE_FT
[facility]  PULMMED [address]  Scheduled Appointment: 01/10/2024    Marko Lee  [facility]  ENDOCRIN [address]  Scheduled Appointment: 01/16/2024     No change [facility]  PULMMED [address]  Scheduled Appointment: 01/10/2024    Marko Lee  [facility]  ENDOCRIN [address]  Scheduled Appointment: 01/16/2024

## 2025-05-10 NOTE — ED ADULT NURSE NOTE - NSICDXPASTSURGICALHX_GEN_ALL_CORE_FT
PAST SURGICAL HISTORY:  Encounter for care related to vascular access port     H/O dilation and curettage     History of hand surgery repair from dog bite 2000    History of lumpectomy of right breast benign    S/P hysterectomy     S/P myomectomy      Initial (On Arrival)

## (undated) DEVICE — CONTAINER FORMALIN 80ML YELLOW

## (undated) DEVICE — ELCTR ECG CONDUCTIVE ADHESIVE

## (undated) DEVICE — BIOPSY FORCEP COLD DISP

## (undated) DEVICE — DRSG CURITY GAUZE SPONGE 4 X 4" 12-PLY NON-STERILE

## (undated) DEVICE — BIOPSY FORCEP RADIAL JAW 4 STANDARD WITH NEEDLE

## (undated) DEVICE — CATH IV SAFE BC 22G X 1" (BLUE)

## (undated) DEVICE — FACESHIELD FULL VISOR

## (undated) DEVICE — TUBING SUCTION NONCONDUCTIVE 6MM X 12FT

## (undated) DEVICE — BASIN EMESIS 10IN GRADUATED MAUVE

## (undated) DEVICE — PACK IV START WITH CHG

## (undated) DEVICE — CLAMP BX HOT RAD JAW 3

## (undated) DEVICE — DRSG BANDAID 0.75X3"

## (undated) DEVICE — TUBING MEDI-VAC W MAXIGRIP CONNECTORS 1/4"X6'

## (undated) DEVICE — LINE BREATHE SAMPLNG

## (undated) DEVICE — BITE BLOCK ADULT 20 X 27MM (GREEN)

## (undated) DEVICE — DENTURE CUP PINK

## (undated) DEVICE — SALIVA EJECTOR (BLUE)

## (undated) DEVICE — TUBING IV SET GRAVITY 3Y 100" MACRO

## (undated) DEVICE — GOWN LG

## (undated) DEVICE — DRSG 2X2

## (undated) DEVICE — LUBRICATING JELLY HR ONE SHOT 3G

## (undated) DEVICE — ELCTR GROUNDING PAD ADULT COVIDIEN

## (undated) DEVICE — UNDERPAD LINEN SAVER 17 X 24"